# Patient Record
Sex: FEMALE | Race: WHITE | NOT HISPANIC OR LATINO | Employment: PART TIME | URBAN - METROPOLITAN AREA
[De-identification: names, ages, dates, MRNs, and addresses within clinical notes are randomized per-mention and may not be internally consistent; named-entity substitution may affect disease eponyms.]

---

## 2017-03-02 ENCOUNTER — DOCUMENTATION ONLY (OUTPATIENT)
Dept: FAMILY MEDICINE | Facility: CLINIC | Age: 67
End: 2017-03-02

## 2017-03-02 ENCOUNTER — TELEPHONE (OUTPATIENT)
Dept: FAMILY MEDICINE | Facility: CLINIC | Age: 67
End: 2017-03-02

## 2017-03-02 NOTE — PROGRESS NOTES
Pre-Visit Chart Review  For Appointment Scheduled on 3-8-17    Health Maintenance Due   Topic Date Due    Eye Exam  12/23/1962    TETANUS VACCINE  12/23/1970    Zoster Vaccine  12/23/2012    Influenza Vaccine  08/01/2016    Hemoglobin A1c  12/22/2016    Foot Exam  03/24/2017

## 2017-03-03 ENCOUNTER — OFFICE VISIT (OUTPATIENT)
Dept: FAMILY MEDICINE | Facility: CLINIC | Age: 67
End: 2017-03-03
Payer: COMMERCIAL

## 2017-03-03 VITALS
WEIGHT: 197.31 LBS | HEART RATE: 80 BPM | TEMPERATURE: 98 F | SYSTOLIC BLOOD PRESSURE: 137 MMHG | BODY MASS INDEX: 32.87 KG/M2 | HEIGHT: 65 IN | DIASTOLIC BLOOD PRESSURE: 87 MMHG

## 2017-03-03 DIAGNOSIS — S82.891D ANKLE FRACTURE, RIGHT, CLOSED, WITH ROUTINE HEALING, SUBSEQUENT ENCOUNTER: Primary | ICD-10-CM

## 2017-03-03 DIAGNOSIS — S13.4XXD WHIPLASH, SUBSEQUENT ENCOUNTER: ICD-10-CM

## 2017-03-03 PROCEDURE — 1160F RVW MEDS BY RX/DR IN RCRD: CPT | Mod: S$GLB,,, | Performed by: NURSE PRACTITIONER

## 2017-03-03 PROCEDURE — 99213 OFFICE O/P EST LOW 20 MIN: CPT | Mod: S$GLB,,, | Performed by: NURSE PRACTITIONER

## 2017-03-03 PROCEDURE — 99999 PR PBB SHADOW E&M-EST. PATIENT-LVL III: CPT | Mod: PBBFAC,,, | Performed by: NURSE PRACTITIONER

## 2017-03-03 RX ORDER — CYCLOBENZAPRINE HCL 10 MG
10 TABLET ORAL 3 TIMES DAILY PRN
Qty: 90 TABLET | Refills: 0 | Status: SHIPPED | OUTPATIENT
Start: 2017-03-03 | End: 2017-03-08 | Stop reason: SDUPTHER
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
## 2017-03-08 ENCOUNTER — OFFICE VISIT (OUTPATIENT)
Dept: FAMILY MEDICINE | Facility: CLINIC | Age: 67
End: 2017-03-08
Payer: COMMERCIAL

## 2017-03-08 ENCOUNTER — LAB VISIT (OUTPATIENT)
Dept: LAB | Facility: HOSPITAL | Age: 67
End: 2017-03-08
Attending: FAMILY MEDICINE
Payer: COMMERCIAL

## 2017-03-08 VITALS
HEIGHT: 65 IN | DIASTOLIC BLOOD PRESSURE: 80 MMHG | TEMPERATURE: 98 F | OXYGEN SATURATION: 94 % | HEART RATE: 94 BPM | SYSTOLIC BLOOD PRESSURE: 132 MMHG | WEIGHT: 206.38 LBS | BODY MASS INDEX: 34.38 KG/M2 | RESPIRATION RATE: 12 BRPM

## 2017-03-08 DIAGNOSIS — K29.70 GASTRITIS, PRESENCE OF BLEEDING UNSPECIFIED, UNSPECIFIED CHRONICITY, UNSPECIFIED GASTRITIS TYPE: ICD-10-CM

## 2017-03-08 DIAGNOSIS — E11.9 CONTROLLED TYPE 2 DIABETES MELLITUS WITHOUT COMPLICATION, UNSPECIFIED LONG TERM INSULIN USE STATUS: ICD-10-CM

## 2017-03-08 DIAGNOSIS — R10.13 EPIGASTRIC PAIN: Primary | ICD-10-CM

## 2017-03-08 DIAGNOSIS — R10.13 EPIGASTRIC PAIN: ICD-10-CM

## 2017-03-08 DIAGNOSIS — S82.891D ANKLE FRACTURE, RIGHT, CLOSED, WITH ROUTINE HEALING, SUBSEQUENT ENCOUNTER: ICD-10-CM

## 2017-03-08 LAB
BASOPHILS # BLD AUTO: 0.03 K/UL
BASOPHILS NFR BLD: 0.3 %
DIFFERENTIAL METHOD: ABNORMAL
EOSINOPHIL # BLD AUTO: 0.2 K/UL
EOSINOPHIL NFR BLD: 1.9 %
ERYTHROCYTE [DISTWIDTH] IN BLOOD BY AUTOMATED COUNT: 13.6 %
HCT VFR BLD AUTO: 41.3 %
HGB BLD-MCNC: 13.1 G/DL
LYMPHOCYTES # BLD AUTO: 2.5 K/UL
LYMPHOCYTES NFR BLD: 28.1 %
MCH RBC QN AUTO: 27.7 PG
MCHC RBC AUTO-ENTMCNC: 31.7 %
MCV RBC AUTO: 87 FL
MONOCYTES # BLD AUTO: 0.6 K/UL
MONOCYTES NFR BLD: 6.4 %
NEUTROPHILS # BLD AUTO: 5.6 K/UL
NEUTROPHILS NFR BLD: 62.9 %
PLATELET # BLD AUTO: 244 K/UL
PMV BLD AUTO: 10.9 FL
RBC # BLD AUTO: 4.73 M/UL
WBC # BLD AUTO: 8.9 K/UL

## 2017-03-08 PROCEDURE — 2022F DILAT RTA XM EVC RTNOPTHY: CPT | Mod: S$GLB,,, | Performed by: FAMILY MEDICINE

## 2017-03-08 PROCEDURE — 83036 HEMOGLOBIN GLYCOSYLATED A1C: CPT

## 2017-03-08 PROCEDURE — 99214 OFFICE O/P EST MOD 30 MIN: CPT | Mod: S$GLB,,, | Performed by: FAMILY MEDICINE

## 2017-03-08 PROCEDURE — 3060F POS MICROALBUMINURIA REV: CPT | Mod: S$GLB,,, | Performed by: FAMILY MEDICINE

## 2017-03-08 PROCEDURE — 1160F RVW MEDS BY RX/DR IN RCRD: CPT | Mod: S$GLB,,, | Performed by: FAMILY MEDICINE

## 2017-03-08 PROCEDURE — 85025 COMPLETE CBC W/AUTO DIFF WBC: CPT

## 2017-03-08 PROCEDURE — 99999 PR PBB SHADOW E&M-EST. PATIENT-LVL III: CPT | Mod: PBBFAC,,, | Performed by: FAMILY MEDICINE

## 2017-03-08 PROCEDURE — 36415 COLL VENOUS BLD VENIPUNCTURE: CPT | Mod: PO

## 2017-03-08 PROCEDURE — 3044F HG A1C LEVEL LT 7.0%: CPT | Mod: S$GLB,,, | Performed by: FAMILY MEDICINE

## 2017-03-08 RX ORDER — METFORMIN HYDROCHLORIDE 500 MG/1
500 TABLET, EXTENDED RELEASE ORAL
COMMUNITY
Start: 2017-02-10 | End: 2017-03-10 | Stop reason: SDUPTHER

## 2017-03-08 RX ORDER — CYCLOBENZAPRINE HCL 10 MG
10 TABLET ORAL 3 TIMES DAILY PRN
Qty: 90 TABLET | Refills: 0 | Status: SHIPPED | OUTPATIENT
Start: 2017-03-08 | End: 2017-06-01 | Stop reason: SDUPTHER

## 2017-03-08 RX ORDER — ESOMEPRAZOLE MAGNESIUM 40 MG/1
40 CAPSULE, DELAYED RELEASE ORAL
Qty: 30 CAPSULE | Refills: 1 | Status: SHIPPED | OUTPATIENT
Start: 2017-03-08 | End: 2017-06-23 | Stop reason: CLARIF

## 2017-03-08 RX ORDER — NAPROXEN 500 MG/1
500 TABLET ORAL 2 TIMES DAILY PRN
COMMUNITY
Start: 2017-02-23 | End: 2017-03-31
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
## 2017-03-08 NOTE — PATIENT INSTRUCTIONS
Weight Management: Getting Started  Healthy bodies come in all shapes and sizes. Not all bodies are made to be thin. For some people, a healthy weight is higher than the average weight listed on weight charts. Your healthcare provider can help you decide on a healthy weight for you.    Reasons to lose weight  Losing weight can help with some health problems, such as high blood pressure, heart disease, diabetes, sleep apnea, and arthritis. You may also feel more energy.  Set your long-term goal  Your goal doesn't even have to be a specific weight. You may decide on a fitness goal (such as being able to walk 10 miles a week), or a health goal (such as lowering your blood pressure). Choose a goal that is measurable and reasonable, so you know when you've reached it. A goal of reaching a BMI of less than 25 is not always reasonable (or possible).   Make an action plan  Habits dont change overnight. Setting your goals too high can leave you feeling discouraged if you cant reach them. Be realistic. Choose one or two small changes you can make now. Set an action plan for how you are going to make these changes. When you can stick to this plan, keep making a few more small changes. Taking small steps will help you stay on the path to success.  Track your progress  Write down your goals. Then, keep a daily record of your progress. Write down what you eat and how active you are. This record lets you look back on how much youve done. It may also help when youre feeling frustrated. Reward yourself for success. Even if you dont reach every goal, give yourself credit for what you do get done.  Get support  Encouragement from others can help make losing weight easier. Ask your family members and friends for support. They may even want to join you. Also look to your healthcare provider, registered dietitian, and  for help. Your local hospital can give you more information about nutrition, exercise, and  weight loss.  Date Last Reviewed: 1/31/2016  © 4910-6024 [employer] [address] [place] [address], [place] [address]. All rights reserved. This information is not intended as a substitute for professional medical care. Always follow your healthcare professional's instructions.

## 2017-03-08 NOTE — MR AVS SNAPSHOT
[facility]  [address]  [place] [address]  Phone: [phone]  Fax: [phone]                  Doris Pastrana   3/8/2017 2:00 PM   Office Visit    Description:  Female : 1950   Provider:  Tor Garg MD   Department:  [facility]           Reason for Visit     Abdominal Pain           Diagnoses this Visit        Comments    Epigastric pain    -  Primary     Gastritis, presence of bleeding unspecified, unspecified chronicity, unspecified gastritis type         Controlled type 2 diabetes mellitus without complication, unspecified long term insulin use status         Ankle fracture, right, closed, with routine healing, subsequent encounter                To Do List           Future Appointments        Provider Department Dept Phone    3/8/2017 3:15 PM LAB, TEREZATIMUR SAT [facility] [phone]    3/8/2017 3:30 PM LAB, [place] [facility] [phone]    3/16/2017 9:00 AM Andrzej Mendez MD [facility] [phone]      Goals (5 Years of Data)     None       These Medications        Disp Refills Start End    esomeprazole (NEXIUM) 40 MG capsule 30 capsule 1 3/8/2017 3/8/2018    Take 1 capsule (40 mg total) by mouth before breakfast. - Oral    Pharmacy: [id number] - [place] - [address] Ph #: [phone]       cyclobenzaprine (FLEXERIL) 10 MG tablet 90 tablet 0 3/8/2017     Take 1 tablet (10 mg total) by mouth 3 (three) times daily as needed for Muscle spasms. - Oral    Pharmacy: [id number] - [place] - [address] Ph #: [phone]         [facility] On [facility]     [facility] On Call Nurse Care Line -  Assistance  Registered nurses in the [facility] provide clinical advisement, health education, appointment booking, and other advisory services.  Call for this free service at [phone].             Medications           Message regarding Medications     Verify the  changes and/or additions to your medication regime listed below are the same as discussed with your clinician today.  If any of these changes or additions are incorrect, please notify your healthcare provider.        START taking these NEW medications        Refills    esomeprazole (NEXIUM) 40 MG capsule 1    Sig: Take 1 capsule (40 mg total) by mouth before breakfast.    Class: Normal    Route: Oral      STOP taking these medications     indomethacin (INDOCIN) 50 MG capsule Take 1 capsule (50 mg total) by mouth 3 (three) times daily with meals.           Verify that the below list of medications is an accurate representation of the medications you are currently taking.  If none reported, the list may be blank. If incorrect, please contact your healthcare provider. Carry this list with you in case of emergency.           Current Medications     alprazolam (XANAX) 0.25 MG tablet Use one 20-30 minutes before flying    cyclobenzaprine (FLEXERIL) 10 MG tablet Take 1 tablet (10 mg total) by mouth 3 (three) times daily as needed for Muscle spasms.    hydrocodone-acetaminophen 5-325mg (NORCO) 5-325 mg per tablet Take 1 tablet by mouth every 6 (six) hours as needed for Pain.    Lactobacillus rhamnosus GG (CULTURELLE) 10 billion cell capsule Take 1 capsule by mouth once daily.    meclizine (ANTIVERT) 25 mg tablet Take 1 tablet (25 mg total) by mouth 3 (three) times daily as needed.    metformin (GLUCOPHAGE-XR) 500 MG 24 hr tablet Take 500 mg by mouth daily with breakfast.    topiramate 25 mg capsule Take 1 capsule (25 mg total) by mouth once daily.    blood sugar diagnostic Strp 1 strip by Misc.(Non-Drug; Combo Route) route Daily.    esomeprazole (NEXIUM) 40 MG capsule Take 1 capsule (40 mg total) by mouth before breakfast.    lancets 33 gauge Misc 1 lancet by Misc.(Non-Drug; Combo Route) route Daily.    naproxen (NAPROSYN) 500 MG tablet Take 500 mg by mouth 2 (two) times daily as needed.           Clinical Reference  "Information           Your Vitals Were     BP Pulse Temp Resp Height Weight    132/80 (BP Location: Right arm, Patient Position: Sitting, BP Method: Automatic) 94 98.4 °F (36.9 °C) (Oral) 12 5' 5" (1.651 m) 93.6 kg (206 lb 5.6 oz)    SpO2 BMI             94% 34.34 kg/m2         Blood Pressure          Most Recent Value    BP  132/80      Allergies as of 3/8/2017     Pravastatin    Codeine    Iodine      Immunizations Administered on Date of Encounter - 3/8/2017     None      Orders Placed During Today's Visit     Future Labs/Procedures Expected by Expires    CBC auto differential  3/8/2017 3/9/2018    Hemoglobin A1c  3/8/2017 5/7/2018    Microalbumin/creatinine urine ratio  3/8/2017 3/8/2018      Instructions      Weight Management: Getting Started  Healthy bodies come in all shapes and sizes. Not all bodies are made to be thin. For some people, a healthy weight is higher than the average weight listed on weight charts. Your healthcare provider can help you decide on a healthy weight for you.    Reasons to lose weight  Losing weight can help with some health problems, such as high blood pressure, heart disease, diabetes, sleep apnea, and arthritis. You may also feel more energy.  Set your long-term goal  Your goal doesn't even have to be a specific weight. You may decide on a fitness goal (such as being able to walk 10 miles a week), or a health goal (such as lowering your blood pressure). Choose a goal that is measurable and reasonable, so you know when you've reached it. A goal of reaching a BMI of less than 25 is not always reasonable (or possible).   Make an action plan  Habits dont change overnight. Setting your goals too high can leave you feeling discouraged if you cant reach them. Be realistic. Choose one or two small changes you can make now. Set an action plan for how you are going to make these changes. When you can stick to this plan, keep making a few more small changes. Taking small steps will help " you stay on the path to success.  Track your progress  Write down your goals. Then, keep a daily record of your progress. Write down what you eat and how active you are. This record lets you look back on how much youve done. It may also help when youre feeling frustrated. Reward yourself for success. Even if you dont reach every goal, give yourself credit for what you do get done.  Get support  Encouragement from others can help make losing weight easier. Ask your family members and friends for support. They may even want to join you. Also look to your healthcare provider, registered dietitian, and  for help. Your local hospital can give you more information about nutrition, exercise, and weight loss.  Date Last Reviewed: 1/31/2016  © 8199-8259 [employer]. [address] [place] [address], [place] [address]. All rights reserved. This information is not intended as a substitute for professional medical care. Always follow your healthcare professional's instructions.             Language Assistance Services     ATTENTION: Language assistance services are available, free of charge. Please call [phone].      ATENCIÓN: Si habla barrett, tiene a rowe disposición servicios gratuitos de asistencia lingüística. Llame al [phone].     SEBASTIÁN Ý: N?u b?n nói Ti?ng Vi?t, có các d?ch v? h? tr? ngôn ng? mi?n phí dành cho b?n. G?i s? [phone].         [facility] complies with applicable Federal civil rights laws and does not discriminate on the basis of race, color, national origin, age, disability, or sex.

## 2017-03-09 ENCOUNTER — PATIENT MESSAGE (OUTPATIENT)
Dept: FAMILY MEDICINE | Facility: CLINIC | Age: 67
End: 2017-03-09

## 2017-03-09 LAB
ESTIMATED AVG GLUCOSE: 177 MG/DL
HBA1C MFR BLD HPLC: 7.8 %

## 2017-03-10 ENCOUNTER — TELEPHONE (OUTPATIENT)
Dept: FAMILY MEDICINE | Facility: CLINIC | Age: 67
End: 2017-03-10

## 2017-03-10 RX ORDER — METFORMIN HYDROCHLORIDE 500 MG/1
TABLET, EXTENDED RELEASE ORAL
Qty: 120 TABLET | Refills: 5 | Status: SHIPPED | OUTPATIENT
Start: 2017-03-10 | End: 2017-05-25 | Stop reason: SDUPTHER

## 2017-03-10 NOTE — TELEPHONE ENCOUNTER
----- Message from Stefany So sent at 3/10/2017  1:41 PM CST -----  Contact: self  Placed a call to the pod, missed a call from your office please call back at [phone] (tgjl)

## 2017-03-10 NOTE — TELEPHONE ENCOUNTER
----- Message from Stefany So sent at 3/10/2017  1:41 PM CST -----  Contact: self  Placed a call to the pod, missed a call from your office please call back at [phone] (arxh)

## 2017-03-10 NOTE — TELEPHONE ENCOUNTER
----- Message from Tor Garg MD sent at 3/9/2017  7:17 PM CST -----  The blood sugar control is poor.  I would suggest we increase the metformin to the maximum going to two a day for a week, then three a day for a week (two am and one pm) and then two twice a day and repeat the a1c in three months.    Results sent by email

## 2017-03-10 NOTE — TELEPHONE ENCOUNTER
Patient given lab results and instructions on how to increase Metformin-she is going to need more Metformin-send to [facility].

## 2017-03-15 DIAGNOSIS — M25.571 RIGHT ANKLE PAIN, UNSPECIFIED CHRONICITY: Primary | ICD-10-CM

## 2017-03-16 ENCOUNTER — TELEPHONE (OUTPATIENT)
Dept: FAMILY MEDICINE | Facility: CLINIC | Age: 67
End: 2017-03-16

## 2017-03-16 NOTE — TELEPHONE ENCOUNTER
Fax from pharmacy for prior auth on Nexium generic- called [employer]- was told no PPI's are covered on her insurance since they are available otc. Would Ranitidine be an alternative for her? Don't see that she has tried this in epic or ocw.

## 2017-03-17 ENCOUNTER — TELEPHONE (OUTPATIENT)
Dept: FAMILY MEDICINE | Facility: CLINIC | Age: 67
End: 2017-03-17

## 2017-03-17 NOTE — TELEPHONE ENCOUNTER
Ranitidine would be okay, its weaker and also available otc so they may not cover it either.  This is not being used for gerd but for gastritis/possible ulcer for 1-2 months.

## 2017-03-17 NOTE — TELEPHONE ENCOUNTER
----- Message from Ekaterina Comer sent at 3/16/2017  1:00 PM CDT -----  Ayleen calling back from pharmacy/stated prior authorization not usable for medication: Esomeprazole/please call back at [phone].

## 2017-03-23 ENCOUNTER — TELEPHONE (OUTPATIENT)
Dept: FAMILY MEDICINE | Facility: CLINIC | Age: 67
End: 2017-03-23

## 2017-03-23 ENCOUNTER — HOSPITAL ENCOUNTER (OUTPATIENT)
Dept: RADIOLOGY | Facility: HOSPITAL | Age: 67
Discharge: HOME | End: 2017-03-23
Attending: ORTHOPAEDIC SURGERY
Payer: COMMERCIAL

## 2017-03-23 ENCOUNTER — OFFICE VISIT (OUTPATIENT)
Dept: ORTHOPEDICS | Facility: CLINIC | Age: 67
End: 2017-03-23
Payer: COMMERCIAL

## 2017-03-23 VITALS
DIASTOLIC BLOOD PRESSURE: 78 MMHG | HEART RATE: 89 BPM | HEIGHT: 65 IN | BODY MASS INDEX: 34.32 KG/M2 | WEIGHT: 206 LBS | SYSTOLIC BLOOD PRESSURE: 160 MMHG

## 2017-03-23 DIAGNOSIS — M25.571 RIGHT ANKLE PAIN, UNSPECIFIED CHRONICITY: ICD-10-CM

## 2017-03-23 DIAGNOSIS — S93.431A SPRAIN OF TIBIOFIBULAR LIGAMENT OF RIGHT ANKLE, INITIAL ENCOUNTER: ICD-10-CM

## 2017-03-23 PROCEDURE — 99999 PR PBB SHADOW E&M-EST. PATIENT-LVL III: CPT | Mod: PBBFAC,,, | Performed by: ORTHOPAEDIC SURGERY

## 2017-03-23 PROCEDURE — 73610 X-RAY EXAM OF ANKLE: CPT | Mod: 26,RT,, | Performed by: RADIOLOGY

## 2017-03-23 PROCEDURE — 99243 OFF/OP CNSLTJ NEW/EST LOW 30: CPT | Mod: S$GLB,,, | Performed by: ORTHOPAEDIC SURGERY

## 2017-03-23 PROCEDURE — 73610 X-RAY EXAM OF ANKLE: CPT | Mod: TC,PN,RT

## 2017-03-23 NOTE — MR AVS SNAPSHOT
[facility]  [address] [place] [address] Drive  [place] [address]  Phone: [phone]                  Doris Pastrana   3/23/2017 1:30 PM   Office Visit    Description:  Female : 1950   Provider:  Andrzej Mendez MD   Department:  [facility]           Reason for Visit     Right Ankle - Injury                To Do List           Future Appointments        Provider Department Dept Phone    2017 8:00 AM [place] [facility] [phone]      Goals (5 Years of Data)     None      [facility]     [facility] Nurse Care Line -  Assistance  Registered nurses in the [facility] On Call Center provide clinical advisement, health education, appointment booking, and other advisory services.  Call for this free service at [phone].             Medications           Message regarding Medications     Verify the changes and/or additions to your medication regime listed below are the same as discussed with your clinician today.  If any of these changes or additions are incorrect, please notify your healthcare provider.             Verify that the below list of medications is an accurate representation of the medications you are currently taking.  If none reported, the list may be blank. If incorrect, please contact your healthcare provider. Carry this list with you in case of emergency.           Current Medications     alprazolam (XANAX) 0.25 MG tablet Use one 20-30 minutes before flying    blood sugar diagnostic Strp 1 strip by Misc.(Non-Drug; Combo Route) route Daily.    cyclobenzaprine (FLEXERIL) 10 MG tablet Take 1 tablet (10 mg total) by mouth 3 (three) times daily as needed for Muscle spasms.    esomeprazole (NEXIUM) 40 MG capsule Take 1 capsule (40 mg total) by mouth before breakfast.    hydrocodone-acetaminophen 5-325mg (NORCO) 5-325 mg per tablet Take 1 tablet by mouth every 6 (six) hours as needed for Pain.    Lactobacillus rhamnosus GG  "(CULTURELLE) 10 billion cell capsule Take 1 capsule by mouth once daily.    lancets 33 gauge Misc 1 lancet by Misc.(Non-Drug; Combo Route) route Daily.    meclizine (ANTIVERT) 25 mg tablet Take 1 tablet (25 mg total) by mouth 3 (three) times daily as needed.    metformin (GLUCOPHAGE-XR) 500 MG 24 hr tablet Take 2 a day for 1 week, the 2 AM and 1PM for 1 week, then 2 bid thereafter    naproxen (NAPROSYN) 500 MG tablet Take 500 mg by mouth 2 (two) times daily as needed.    topiramate 25 mg capsule Take 1 capsule (25 mg total) by mouth once daily.           Clinical Reference Information           Your Vitals Were     BP Pulse Height Weight BMI    160/78 89 5' 5" (1.651 m) 93.4 kg (206 lb) 34.28 kg/m2      Blood Pressure          Most Recent Value    BP  (!)  160/78      Allergies as of 3/23/2017     Pravastatin    Codeine    Iodine      Immunizations Administered on Date of Encounter - 3/23/2017     None      Language Assistance Services     ATTENTION: Language assistance services are available, free of charge. Please call [phone].      ATENCIÓN: Si habla español, tiene a rowe disposición servicios gratuitos de asistencia lingüística. Llame al [phone].     SEBASTIÁN Ý: N?u b?n nói Ti?ng Vi?t, có các d?ch v? h? tr? ngôn ng? mi?n phí dành cho b?n. G?i s? [phone].         [facility] complies with applicable Federal civil rights laws and does not discriminate on the basis of race, color, national origin, age, disability, or sex.        "

## 2017-03-23 NOTE — TELEPHONE ENCOUNTER
----- Message from Ekaterina Comer sent at 3/21/2017 12:09 PM CDT -----  Leyla Brown calling concerning medical record request received on patient/please call back at [phone].

## 2017-03-31 PROBLEM — S93.431A SPRAIN OF TIBIOFIBULAR LIGAMENT OF RIGHT ANKLE: Status: ACTIVE | Noted: 2017-03-31

## 2017-03-31 RX ORDER — CELECOXIB 200 MG/1
200 CAPSULE ORAL 2 TIMES DAILY
Qty: 60 CAPSULE | Refills: 3 | Status: SHIPPED | OUTPATIENT
Start: 2017-03-31 | End: 2017-06-23 | Stop reason: SDUPTHER
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
## 2017-04-04 ENCOUNTER — TELEPHONE (OUTPATIENT)
Dept: ORTHOPEDICS | Facility: CLINIC | Age: 67
End: 2017-04-04

## 2017-04-04 NOTE — TELEPHONE ENCOUNTER
----- Message from Valeriano Espitia sent at 3/31/2017  4:30 PM CDT -----  Contact: self   Patient wants to know when can she  another boot the one she got is too small, her foot is swollen please call back at [phone]

## 2017-04-04 NOTE — TELEPHONE ENCOUNTER
Left voicemail for patient to come to clinic on Thursday and we will have our DME fitter re fit her boot.

## 2017-04-05 ENCOUNTER — TELEPHONE (OUTPATIENT)
Dept: ORTHOPEDICS | Facility: CLINIC | Age: 67
End: 2017-04-05

## 2017-04-05 NOTE — TELEPHONE ENCOUNTER
Spoke with pt, informed pt PT orders have been faxed to  office in [place] per pt request.Pt verbalized understanding.

## 2017-04-27 ENCOUNTER — OFFICE VISIT (OUTPATIENT)
Dept: ORTHOPEDICS | Facility: CLINIC | Age: 67
End: 2017-04-27
Payer: COMMERCIAL

## 2017-04-27 VITALS
WEIGHT: 206 LBS | HEIGHT: 65 IN | HEART RATE: 79 BPM | DIASTOLIC BLOOD PRESSURE: 89 MMHG | SYSTOLIC BLOOD PRESSURE: 158 MMHG | BODY MASS INDEX: 34.32 KG/M2

## 2017-04-27 DIAGNOSIS — S93.431D SPRAIN OF TIBIOFIBULAR LIGAMENT OF RIGHT ANKLE, SUBSEQUENT ENCOUNTER: Primary | ICD-10-CM

## 2017-04-27 PROCEDURE — 99213 OFFICE O/P EST LOW 20 MIN: CPT | Mod: S$GLB,,, | Performed by: ORTHOPAEDIC SURGERY

## 2017-04-27 PROCEDURE — 99999 PR PBB SHADOW E&M-EST. PATIENT-LVL II: CPT | Mod: PBBFAC,,, | Performed by: ORTHOPAEDIC SURGERY

## 2017-04-27 PROCEDURE — 1160F RVW MEDS BY RX/DR IN RCRD: CPT | Mod: S$GLB,,, | Performed by: ORTHOPAEDIC SURGERY
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
## 2017-05-25 ENCOUNTER — LAB VISIT (OUTPATIENT)
Dept: LAB | Facility: HOSPITAL | Age: 67
End: 2017-05-25
Attending: FAMILY MEDICINE
Payer: COMMERCIAL

## 2017-05-25 ENCOUNTER — OFFICE VISIT (OUTPATIENT)
Dept: FAMILY MEDICINE | Facility: CLINIC | Age: 67
End: 2017-05-25
Payer: COMMERCIAL

## 2017-05-25 ENCOUNTER — PATIENT MESSAGE (OUTPATIENT)
Dept: FAMILY MEDICINE | Facility: CLINIC | Age: 67
End: 2017-05-25

## 2017-05-25 ENCOUNTER — NURSE TRIAGE (OUTPATIENT)
Dept: ADMINISTRATIVE | Facility: CLINIC | Age: 67
End: 2017-05-25

## 2017-05-25 ENCOUNTER — DOCUMENTATION ONLY (OUTPATIENT)
Dept: FAMILY MEDICINE | Facility: CLINIC | Age: 67
End: 2017-05-25

## 2017-05-25 VITALS
SYSTOLIC BLOOD PRESSURE: 147 MMHG | HEIGHT: 65 IN | DIASTOLIC BLOOD PRESSURE: 85 MMHG | TEMPERATURE: 98 F | HEART RATE: 77 BPM | BODY MASS INDEX: 33.38 KG/M2 | WEIGHT: 200.38 LBS

## 2017-05-25 DIAGNOSIS — R53.83 FATIGUE, UNSPECIFIED TYPE: ICD-10-CM

## 2017-05-25 DIAGNOSIS — R07.9 CHEST PAIN, UNSPECIFIED TYPE: ICD-10-CM

## 2017-05-25 DIAGNOSIS — R55 NEAR SYNCOPE: ICD-10-CM

## 2017-05-25 DIAGNOSIS — R53.83 FATIGUE, UNSPECIFIED TYPE: Primary | ICD-10-CM

## 2017-05-25 LAB
ANION GAP SERPL CALC-SCNC: 9 MMOL/L
BASOPHILS # BLD AUTO: 0.03 K/UL
BASOPHILS NFR BLD: 0.3 %
BUN SERPL-MCNC: 19 MG/DL
CALCIUM SERPL-MCNC: 9.8 MG/DL
CHLORIDE SERPL-SCNC: 103 MMOL/L
CO2 SERPL-SCNC: 30 MMOL/L
CREAT SERPL-MCNC: 0.8 MG/DL
DIFFERENTIAL METHOD: NORMAL
EOSINOPHIL # BLD AUTO: 0.2 K/UL
EOSINOPHIL NFR BLD: 2 %
ERYTHROCYTE [DISTWIDTH] IN BLOOD BY AUTOMATED COUNT: 14.2 %
EST. GFR  (AFRICAN AMERICAN): >60 ML/MIN/1.73 M^2
EST. GFR  (NON AFRICAN AMERICAN): >60 ML/MIN/1.73 M^2
GLUCOSE SERPL-MCNC: 108 MG/DL
HCT VFR BLD AUTO: 40.9 %
HGB BLD-MCNC: 13.4 G/DL
LYMPHOCYTES # BLD AUTO: 2 K/UL
LYMPHOCYTES NFR BLD: 22 %
MCH RBC QN AUTO: 28.6 PG
MCHC RBC AUTO-ENTMCNC: 32.8 %
MCV RBC AUTO: 87 FL
MONOCYTES # BLD AUTO: 0.5 K/UL
MONOCYTES NFR BLD: 5.6 %
NEUTROPHILS # BLD AUTO: 6.3 K/UL
NEUTROPHILS NFR BLD: 69.9 %
PLATELET # BLD AUTO: 257 K/UL
PMV BLD AUTO: 10.5 FL
POTASSIUM SERPL-SCNC: 4.1 MMOL/L
RBC # BLD AUTO: 4.69 M/UL
SODIUM SERPL-SCNC: 142 MMOL/L
TSH SERPL DL<=0.005 MIU/L-ACNC: 1.6 UIU/ML
WBC # BLD AUTO: 8.95 K/UL

## 2017-05-25 PROCEDURE — 85025 COMPLETE CBC W/AUTO DIFF WBC: CPT

## 2017-05-25 PROCEDURE — 93010 ELECTROCARDIOGRAM REPORT: CPT | Mod: S$GLB,,, | Performed by: INTERNAL MEDICINE

## 2017-05-25 PROCEDURE — 93005 ELECTROCARDIOGRAM TRACING: CPT | Mod: S$GLB,,, | Performed by: FAMILY MEDICINE

## 2017-05-25 PROCEDURE — 83036 HEMOGLOBIN GLYCOSYLATED A1C: CPT

## 2017-05-25 PROCEDURE — 84443 ASSAY THYROID STIM HORMONE: CPT

## 2017-05-25 PROCEDURE — 99214 OFFICE O/P EST MOD 30 MIN: CPT | Mod: S$GLB,,, | Performed by: PHYSICIAN ASSISTANT

## 2017-05-25 PROCEDURE — 99999 PR PBB SHADOW E&M-EST. PATIENT-LVL III: CPT | Mod: PBBFAC,,, | Performed by: PHYSICIAN ASSISTANT

## 2017-05-25 PROCEDURE — 80048 BASIC METABOLIC PNL TOTAL CA: CPT

## 2017-05-25 PROCEDURE — 36415 COLL VENOUS BLD VENIPUNCTURE: CPT | Mod: PO

## 2017-05-25 RX ORDER — METFORMIN HYDROCHLORIDE 500 MG/1
1000 TABLET, EXTENDED RELEASE ORAL 2 TIMES DAILY WITH MEALS
COMMUNITY
End: 2017-06-01 | Stop reason: SDUPTHER

## 2017-05-25 NOTE — TELEPHONE ENCOUNTER
Reason for Disposition   MODERATE weakness (i.e., interferes with work, school, normal activities) and cause unknown    Protocols used: ST WEAKNESS (GENERALIZED) AND FATIGUE-A-OH    Doris is calling to report that this AM she felt very weak and almost passed out.  States if her  would not have caught her she would have fallen.   is a diabetic and took her husbands Metformin HCL this am because it is the same strength as hers.  Does not monitor her blood sugar.   does not know how to use the monitor.   still feels strange and is concerned because she couldn't get an appointment until 3:40 pm today.  Advised that if she almost passed out and she still feels weak she should go to ER.   will not go to ER because it costs to much.  Will get her friend who is a nurse to come over and check her blood sugar for her.  Would like to see if she can get in to see Dr. Garg earlier than 3 pm.  Please contact Doris to advise.

## 2017-05-25 NOTE — PROGRESS NOTES
Subjective:       Patient ID: Doris Pastrana is a 66 y.o. female.    Chief Complaint: Loss of Consciousness; Hypertension; and Headache    Loss of Consciousness   This is a new problem. The current episode started today. The problem has been resolved. There was no loss of consciousness. The symptoms are aggravated by climbing stairs. Associated symptoms include chest pain, diaphoresis, dizziness, focal weakness, headaches, light-headedness and malaise/fatigue. Pertinent negatives include no abdominal pain, auditory change, aura, back pain, bladder incontinence, bowel incontinence, clumsiness, confusion, fever, focal sensory loss, nausea, palpitations, slurred speech, vertigo, visual change, vomiting or weakness. Associated symptoms comments: Blood sugar was 120 by the time the patient's friend got to the house.  BP was 160/70 about 1 hour after incident.  She does not take blood sugar readings daily and was recently increased to 1000 metformin BID. She has tried nothing for the symptoms. Her past medical history is significant for DM and HTN. There is no history of arrhythmia, CVA, seizures, TIA or vertigo.     Review of Systems   Constitutional: Positive for diaphoresis and malaise/fatigue. Negative for activity change, appetite change, fatigue, fever and unexpected weight change.   HENT: Negative for congestion, dental problem, hearing loss, postnasal drip, rhinorrhea, sinus pressure and trouble swallowing.    Eyes: Negative for photophobia, discharge and visual disturbance.   Respiratory: Negative for cough, chest tightness, shortness of breath and wheezing.    Cardiovascular: Positive for chest pain and syncope. Negative for palpitations and leg swelling.   Gastrointestinal: Negative for abdominal pain, blood in stool, bowel incontinence, constipation, diarrhea, nausea and vomiting.   Genitourinary: Negative for bladder incontinence, difficulty urinating, dyspareunia, dysuria, hematuria, menstrual problem,  pelvic pain, vaginal bleeding, vaginal discharge and vaginal pain.   Musculoskeletal: Negative for arthralgias, back pain, joint swelling and neck pain.   Skin: Negative for color change and rash.   Neurological: Positive for dizziness, focal weakness, light-headedness and headaches. Negative for vertigo, seizures, weakness and numbness.   Hematological: Does not bruise/bleed easily.   Psychiatric/Behavioral: Negative for confusion, sleep disturbance and suicidal ideas. The patient is not nervous/anxious.        Objective:      Physical Exam   Constitutional: She is oriented to person, place, and time. She appears well-developed and well-nourished. No distress.   HENT:   Head: Normocephalic and atraumatic.   Mouth/Throat: Uvula is midline, oropharynx is clear and moist and mucous membranes are normal. No oropharyngeal exudate, posterior oropharyngeal edema, posterior oropharyngeal erythema or tonsillar abscesses.   Eyes: Conjunctivae and EOM are normal. Pupils are equal, round, and reactive to light.   Neck: Normal range of motion. Neck supple. Carotid bruit is not present. No thyromegaly present.   Cardiovascular: Normal rate and regular rhythm.  Exam reveals no gallop and no friction rub.    No murmur heard.  Pulmonary/Chest: Effort normal and breath sounds normal. No stridor. No respiratory distress. She has no wheezes. She has no rales.   Abdominal: Soft. Bowel sounds are normal. She exhibits no mass. There is no tenderness.   Musculoskeletal: Normal range of motion. She exhibits no edema or tenderness.   Lymphadenopathy:     She has no cervical adenopathy.   Neurological: She is alert and oriented to person, place, and time. She has normal reflexes.   Skin: Skin is warm and dry.   Psychiatric: She has a normal mood and affect. Her behavior is normal. Judgment and thought content normal.   Nursing note and vitals reviewed.      Assessment:       1. Fatigue, unspecified type    2. Chest pain, unspecified type     3. Near syncope        Plan:       Doris was seen today for loss of consciousness, hypertension and headache.    Diagnoses and all orders for this visit:    Fatigue, unspecified type  -     CBC auto differential; Future  -     TSH; Future    Chest pain, unspecified type  -     EKG 12-lead    Near syncope    Move A1c to today, will call with results

## 2017-05-26 ENCOUNTER — TELEPHONE (OUTPATIENT)
Dept: FAMILY MEDICINE | Facility: CLINIC | Age: 67
End: 2017-05-26

## 2017-05-26 DIAGNOSIS — F41.9 ANXIETY: Primary | ICD-10-CM

## 2017-05-26 LAB
ESTIMATED AVG GLUCOSE: 160 MG/DL
HBA1C MFR BLD HPLC: 7.2 %

## 2017-05-26 RX ORDER — ESCITALOPRAM OXALATE 10 MG/1
10 TABLET ORAL DAILY
Qty: 30 TABLET | Refills: 0 | Status: SHIPPED | OUTPATIENT
Start: 2017-05-26 | End: 2017-06-23 | Stop reason: SDUPTHER

## 2017-05-26 NOTE — TELEPHONE ENCOUNTER
Spoke with patient she states that you told her you were going to prescribe Lexapro for her on yesterday.  [facility] to the [facility] [address]

## 2017-05-26 NOTE — TELEPHONE ENCOUNTER
----- Message from Mayi King sent at 5/26/2017 12:04 PM CDT -----  Contact: [phone]  Patient requesting a call from nurse checking the status of medication, pharmacy haven't received prescription that was to be sent over.  Patient contact # [phone]

## 2017-05-26 NOTE — TELEPHONE ENCOUNTER
----- Message from RT Rodrick sent at 5/26/2017  2:13 PM CDT -----  Contact: [place], [phone] [facility]  [place] [phone] [facility], requesting to inform the pt's Lexapro interferes with the pt's flexeril, thanks.

## 2017-05-27 ENCOUNTER — TELEPHONE (OUTPATIENT)
Dept: FAMILY MEDICINE | Facility: CLINIC | Age: 67
End: 2017-05-27

## 2017-05-27 DIAGNOSIS — R07.9 CHEST PAIN ON EXERTION: Primary | ICD-10-CM

## 2017-05-27 DIAGNOSIS — R94.31 ABNORMAL EKG: ICD-10-CM

## 2017-05-27 RX ORDER — GLIPIZIDE 5 MG/1
5 TABLET, FILM COATED, EXTENDED RELEASE ORAL
Qty: 90 TABLET | Refills: 1 | Status: SHIPPED | OUTPATIENT
Start: 2017-05-27 | End: 2017-06-01 | Stop reason: SDUPTHER

## 2017-05-27 NOTE — TELEPHONE ENCOUNTER
Glimepiride probably has a greater tendency to weight gain, glipizide is probably better and we'll try to keep to a low dose of the slow release.  She had a normal thyroid test two days ago.

## 2017-05-27 NOTE — TELEPHONE ENCOUNTER
----- Message from Tor Garg MD sent at 5/26/2017  9:20 PM CDT -----  The chemistry panel looks good but the a1c indicates the sugar control is still a little above goal of <7.  I'd suggest we add some glipizide or glimepiride (glucotrol or amaryl) to help bring it down.    Results sent by email

## 2017-05-27 NOTE — TELEPHONE ENCOUNTER
Patient informed. Please send in rx to [facility]. She stated if it would help her loose weight she will take it. Trying to stick to diet. No energy, almost passed out recently. Wanted thyroid testing. Has apt 6-5-17.

## 2017-05-29 NOTE — TELEPHONE ENCOUNTER
It was abnormal but not changed since the ekg of 2/23/16 after which she had a normal stress test with Dr. Loaiza.  We can get a cardiology consult to review need for further testing (nuclear stress test or angiogram) if she wishes.

## 2017-05-29 NOTE — TELEPHONE ENCOUNTER
Patient started the Glipizide. Patient is under a lot of stress with family- she can't lose weight, is always tired. When she tries to exercise she gets chest pain- she says she was told at appt 5/25/17 that her ekg was abnormal. Please review it. She is thinking about weight loss surgery and will discuss with Dr. Garg at appt 6/5/17.

## 2017-05-29 NOTE — TELEPHONE ENCOUNTER
----- Message from Mayi King sent at 5/27/2017 11:58 AM CDT -----  Contact: [phone]  Patient is returning nurse's phone call.  Please call patient back at [phone].

## 2017-05-29 NOTE — TELEPHONE ENCOUNTER
Patient advised- she wants to go ahead with cardio consult with the one her  see's Dr. Rashad Mir's.

## 2017-05-31 ENCOUNTER — DOCUMENTATION ONLY (OUTPATIENT)
Dept: FAMILY MEDICINE | Facility: CLINIC | Age: 67
End: 2017-05-31

## 2017-05-31 RX ORDER — INSULIN PUMP SYRINGE, 3 ML
EACH MISCELLANEOUS
Qty: 1 EACH | Refills: 0 | Status: SHIPPED | OUTPATIENT
Start: 2017-05-31 | End: 2019-03-12 | Stop reason: ALTCHOICE

## 2017-05-31 RX ORDER — LANCETS 33 GAUGE
2 EACH MISCELLANEOUS DAILY
Qty: 200 EACH | Refills: 3 | Status: SHIPPED | OUTPATIENT
Start: 2017-05-31 | End: 2018-07-25 | Stop reason: SDUPTHER

## 2017-05-31 NOTE — TELEPHONE ENCOUNTER
----- Message from Nai Mcdowell sent at 5/31/2017 10:15 AM CDT -----  Contact: self  Patient would like to speak to nurse regarding a prescription for new meter ,test strips and lancets    Please call her at  to advise.    Thanks

## 2017-05-31 NOTE — PROGRESS NOTES
Pre-Visit Chart Review  For Appointment Scheduled on 6-5-17    Health Maintenance Due   Topic Date Due    Zoster Vaccine  12/23/2012    Foot Exam  03/24/2017

## 2017-06-01 ENCOUNTER — TELEPHONE (OUTPATIENT)
Dept: FAMILY MEDICINE | Facility: CLINIC | Age: 67
End: 2017-06-01

## 2017-06-01 DIAGNOSIS — S82.891D ANKLE FRACTURE, RIGHT, CLOSED, WITH ROUTINE HEALING, SUBSEQUENT ENCOUNTER: ICD-10-CM

## 2017-06-01 RX ORDER — GLIPIZIDE 5 MG/1
5 TABLET, FILM COATED, EXTENDED RELEASE ORAL
Qty: 90 TABLET | Refills: 1 | Status: SHIPPED | OUTPATIENT
Start: 2017-06-01 | End: 2018-01-03 | Stop reason: SDUPTHER

## 2017-06-01 RX ORDER — CYCLOBENZAPRINE HCL 10 MG
10 TABLET ORAL 3 TIMES DAILY PRN
Qty: 90 TABLET | Refills: 0 | Status: SHIPPED | OUTPATIENT
Start: 2017-06-01 | End: 2017-10-18 | Stop reason: SDUPTHER

## 2017-06-01 RX ORDER — METFORMIN HYDROCHLORIDE 500 MG/1
1000 TABLET, EXTENDED RELEASE ORAL 2 TIMES DAILY WITH MEALS
Qty: 360 TABLET | Refills: 1 | Status: SHIPPED | OUTPATIENT
Start: 2017-06-01 | End: 2017-06-23 | Stop reason: SDUPTHER

## 2017-06-01 NOTE — TELEPHONE ENCOUNTER
Called the number in the message and it's the patient's cell number.  She states that she did not call regarding her medication.

## 2017-06-01 NOTE — TELEPHONE ENCOUNTER
----- Message from Kristina Luke sent at 6/1/2017 10:42 AM CDT -----  Adriana Byrd) called regarding refill for medication:  Escitalopram tablet 10 mg 90 day supply/pls call back at [phone]

## 2017-06-05 ENCOUNTER — DOCUMENTATION ONLY (OUTPATIENT)
Dept: FAMILY MEDICINE | Facility: CLINIC | Age: 67
End: 2017-06-05

## 2017-06-05 NOTE — PROGRESS NOTES
Pre-Visit Chart Review  For Appointment Scheduled on 6-23-17    Health Maintenance Due   Topic Date Due    Zoster Vaccine  12/23/2012    Foot Exam  03/24/2017

## 2017-06-17 ENCOUNTER — TELEPHONE (OUTPATIENT)
Dept: FAMILY MEDICINE | Facility: CLINIC | Age: 67
End: 2017-06-17

## 2017-06-17 ENCOUNTER — CLINICAL SUPPORT (OUTPATIENT)
Dept: FAMILY MEDICINE | Facility: CLINIC | Age: 67
End: 2017-06-17
Payer: COMMERCIAL

## 2017-06-17 VITALS — DIASTOLIC BLOOD PRESSURE: 88 MMHG | SYSTOLIC BLOOD PRESSURE: 156 MMHG

## 2017-06-17 DIAGNOSIS — I10 HYPERTENSION, ESSENTIAL: Primary | ICD-10-CM

## 2017-06-17 NOTE — TELEPHONE ENCOUNTER
----- Message from Maritza Garg sent at 6/17/2017 10:59 AM CDT -----  Patient's blood pressure is running high and she would like to come by to get her blood pressure checked.     Please call patient back at [phone].     Thank you.

## 2017-06-17 NOTE — PROGRESS NOTES
Patient here for a B/P nurse visit. She brought her cuff from home because she was not sure if she was using it corectly. I educated her on how to use it and she verbalized understanding. Her cuff read 165/93, taken on her right arm at 1135. I took it manually at 1145, from her left arm and the reading was 156/88. Will send note to Dr Garg.

## 2017-06-17 NOTE — TELEPHONE ENCOUNTER
Spoke with patient. She said she was not sure if she was using her B/P cuff correctly and wanted her b/p checked, she said she got a reading of 225/139 and also com[lained of a headache. Told patient to come up and bring her B/P cuff so I can make sure she is using it correctly.

## 2017-06-19 ENCOUNTER — TELEPHONE (OUTPATIENT)
Dept: ADMINISTRATIVE | Facility: HOSPITAL | Age: 67
End: 2017-06-19

## 2017-06-19 NOTE — TELEPHONE ENCOUNTER
For checking calibration you should use the same arm.  Looks like her cuff is accurate, left arm is lower than right in 85% of people.   I'd suggest starting on lisinopril or losartan.  (losartan if ever had a cough with an ace inhibitor in the past.)

## 2017-06-20 RX ORDER — LISINOPRIL 10 MG/1
10 TABLET ORAL DAILY
Qty: 90 TABLET | Refills: 3 | Status: SHIPPED | OUTPATIENT
Start: 2017-06-20 | End: 2017-08-03 | Stop reason: SDUPTHER

## 2017-06-23 ENCOUNTER — OFFICE VISIT (OUTPATIENT)
Dept: FAMILY MEDICINE | Facility: CLINIC | Age: 67
End: 2017-06-23
Payer: COMMERCIAL

## 2017-06-23 VITALS
WEIGHT: 194 LBS | BODY MASS INDEX: 32.32 KG/M2 | OXYGEN SATURATION: 93 % | TEMPERATURE: 99 F | DIASTOLIC BLOOD PRESSURE: 72 MMHG | SYSTOLIC BLOOD PRESSURE: 136 MMHG | HEART RATE: 88 BPM | HEIGHT: 65 IN | RESPIRATION RATE: 12 BRPM

## 2017-06-23 DIAGNOSIS — E11.59 HYPERTENSION ASSOCIATED WITH DIABETES: ICD-10-CM

## 2017-06-23 DIAGNOSIS — I10 GOOD HYPERTENSION CONTROL: ICD-10-CM

## 2017-06-23 DIAGNOSIS — M54.2 NECK PAIN: ICD-10-CM

## 2017-06-23 DIAGNOSIS — G89.29 CHRONIC LEFT-SIDED THORACIC BACK PAIN: Primary | ICD-10-CM

## 2017-06-23 DIAGNOSIS — M54.6 CHRONIC LEFT-SIDED THORACIC BACK PAIN: Primary | ICD-10-CM

## 2017-06-23 DIAGNOSIS — F41.9 ANXIETY: ICD-10-CM

## 2017-06-23 DIAGNOSIS — E11.9 CONTROLLED TYPE 2 DIABETES MELLITUS WITHOUT COMPLICATION, WITHOUT LONG-TERM CURRENT USE OF INSULIN: ICD-10-CM

## 2017-06-23 DIAGNOSIS — I15.2 HYPERTENSION ASSOCIATED WITH DIABETES: ICD-10-CM

## 2017-06-23 DIAGNOSIS — G44.229 CHRONIC TENSION-TYPE HEADACHE, NOT INTRACTABLE: ICD-10-CM

## 2017-06-23 DIAGNOSIS — M62.838 MUSCLE SPASM: ICD-10-CM

## 2017-06-23 PROCEDURE — 99214 OFFICE O/P EST MOD 30 MIN: CPT | Mod: S$GLB,,, | Performed by: FAMILY MEDICINE

## 2017-06-23 PROCEDURE — 4010F ACE/ARB THERAPY RXD/TAKEN: CPT | Mod: S$GLB,,, | Performed by: FAMILY MEDICINE

## 2017-06-23 PROCEDURE — 3045F PR MOST RECENT HEMOGLOBIN A1C LEVEL 7.0-9.0%: CPT | Mod: S$GLB,,, | Performed by: FAMILY MEDICINE

## 2017-06-23 PROCEDURE — 99999 PR PBB SHADOW E&M-EST. PATIENT-LVL III: CPT | Mod: PBBFAC,,, | Performed by: FAMILY MEDICINE

## 2017-06-23 RX ORDER — ESCITALOPRAM OXALATE 10 MG/1
TABLET ORAL
Qty: 30 TABLET | Refills: 0 | Status: SHIPPED | OUTPATIENT
Start: 2017-06-23 | End: 2017-06-23 | Stop reason: SDUPTHER

## 2017-06-23 RX ORDER — CELECOXIB 200 MG/1
200 CAPSULE ORAL 2 TIMES DAILY
Qty: 60 CAPSULE | Refills: 3 | Status: SHIPPED | OUTPATIENT
Start: 2017-06-23 | End: 2017-08-09 | Stop reason: HOSPADM

## 2017-06-23 RX ORDER — METFORMIN HYDROCHLORIDE 750 MG/1
750 TABLET, EXTENDED RELEASE ORAL 2 TIMES DAILY WITH MEALS
Qty: 180 TABLET | Refills: 1 | Status: SHIPPED | OUTPATIENT
Start: 2017-06-23 | End: 2018-01-03 | Stop reason: SDUPTHER

## 2017-06-23 RX ORDER — TIZANIDINE 4 MG/1
4 TABLET ORAL EVERY 6 HOURS PRN
Qty: 60 TABLET | Refills: 5 | Status: SHIPPED | OUTPATIENT
Start: 2017-06-23 | End: 2017-07-03

## 2017-06-23 RX ORDER — ESCITALOPRAM OXALATE 10 MG/1
10 TABLET ORAL DAILY
Qty: 30 TABLET | Refills: 11 | Status: SHIPPED | OUTPATIENT
Start: 2017-06-23 | End: 2017-08-03 | Stop reason: SDUPTHER

## 2017-06-23 NOTE — PATIENT INSTRUCTIONS
Walking for Fitness  Fitness walking has something for everyone, even people who are already fit. Walking is one of the safest ways to condition your body aerobically. It can boost energy, help you lose weight, and reduce stress.    Physical benefits  · Walking strengthens your heart and lungs, and tones your muscles.  · When walking, your feet land with less impact than in other sports. This reduces chances of muscle, bone, and joint injury.  · Regular walking improves your cholesterol levels and lowers your risk of heart disease. And it helps you control your blood sugar if you have diabetes.  · Walking is a weight-bearing activity, which helps maintain bone density. This can help prevent osteoporosis.  Personal rewards  · Taking walks can help you relax and manage stress. And fitness walking may make you feel better about yourself.  · Walking can help you sleep better at night and make you less likely to be depressed.  · Regular walking may help maintain your memory as you get older.  · Walking is a great way to spend extra time with friends and family members. Be sure to invite your dog along!  Q&A about fitness walking  Q: Will walking keep me fit?  A: Yes. Regular walking at the right pace gives you all the benefits of other aerobic activities, such as jogging and swimming.  Q: Will walking help me lose weight and keep it off?  A: Yes. Per mile, walking can burn as many calories as jogging. Your health care provider can help work walking into your weight-loss plan.  Q: Is walking safe for my health?  A: Yes. Walking is safe if you have high blood pressure, diabetes, heart disease, or other conditions. Talk to your health care provider before you start.  Date Last Reviewed: 5/9/2015  © 2546-3848 [employer]. [address] [place] [address], [place] [address]. All rights reserved. This information is not intended as a substitute for professional medical care. Always follow your healthcare professional's  instructions.        Weight Management: Getting Started  Healthy bodies come in all shapes and sizes. Not all bodies are made to be thin. For some people, a healthy weight is higher than the average weight listed on weight charts. Your healthcare provider can help you decide on a healthy weight for you.    Reasons to lose weight  Losing weight can help with some health problems, such as high blood pressure, heart disease, diabetes, sleep apnea, and arthritis. You may also feel more energy.  Set your long-term goal  Your goal doesn't even have to be a specific weight. You may decide on a fitness goal (such as being able to walk 10 miles a week), or a health goal (such as lowering your blood pressure). Choose a goal that is measurable and reasonable, so you know when you've reached it. A goal of reaching a BMI of less than 25 is not always reasonable (or possible).   Make an action plan  Habits dont change overnight. Setting your goals too high can leave you feeling discouraged if you cant reach them. Be realistic. Choose one or two small changes you can make now. Set an action plan for how you are going to make these changes. When you can stick to this plan, keep making a few more small changes. Taking small steps will help you stay on the path to success.  Track your progress  Write down your goals. Then, keep a daily record of your progress. Write down what you eat and how active you are. This record lets you look back on how much youve done. It may also help when youre feeling frustrated. Reward yourself for success. Even if you dont reach every goal, give yourself credit for what you do get done.  Get support  Encouragement from others can help make losing weight easier. Ask your family members and friends for support. They may even want to join you. Also look to your healthcare provider, registered dietitian, and  for help. Your local hospital can give you more information about  nutrition, exercise, and weight loss.  Date Last Reviewed: 1/31/2016 © 2000-2016 [employer]. [address] [place] [address], [place] [address]. All rights reserved. This information is not intended as a substitute for professional medical care. Always follow your healthcare professional's instructions.        Diabetes (General Information)  Diabetes is a long-term health problem. It means your body does not make enough insulin. Or it may mean that your body cannot use the insulin it makes. Insulin is a hormone in your body. It lets blood sugar (glucose) reach the cells in your body. All of your cells need glucose for fuel.  When you have diabetes, the glucose in your blood builds up because it cannot get into the cells. This buildup is called high blood sugar (hyperglycemia).  Your blood sugar level depends on several things. It depends on what kind of food you eat and how much of it you eat. It also depends on how much exercise you get, and how much insulin you have in your body. Eating too much of the wrong kinds of food or not taking diabetes medicine on time can cause high blood sugar. Infections can cause high blood sugar even if you are taking medicines correctly.  These things can also cause low blood sugar:  · Missing meals  · Not eating enough food  · Taking too much diabetes medicine  Diabetes can cause serious problems over time if you do not get treated. These problems include heart disease, stroke, kidney failure, and blindness. They also include nerve pain or loss of feeling in your legs and feet, and gangrene of the feet. By keeping your blood sugar under control you can prevent or delay these problems.  Normal blood sugar levels are 80 to 100 before a meal and less than 180 in the 1 to 2 hours after a meal.  Home care  Follow these guidelines when caring for yourself at home:  · Follow the diet your healthcare provider gives you. Take insulin or other diabetes medicine exactly as told  to.  · Watch your blood sugar as you are told to. Keep a log of your results. This will help your provider change your medicines to keep your blood sugar under control.  · Try to reach your ideal weight. You may be able to cut back on or not have to take diabetes medicine if you eat the right foods and get exercise.  · Do not smoke. Smoking worsens the effects of diabetes on your circulation. You are much more likely to have a heart attack if you have diabetes and you smoke.  · Take good care of your feet. If you have lost feeling in your feet, you may not see an injury or infection. Check your feet and between your toes at least once a week.  · Wear a medical alert bracelet or necklace, or carry a card in your wallet that says you have diabetes. This will help healthcare providers give you the right care if you get very ill and cannot tell them that you have diabetes.  Sick day plan  If you get a cold, the flu, or a bacterial or viral infection, take these steps:  · Look at your diabetes sick plan and call your healthcare provider as you were told to. You may need to call your provider right away if:  ¨ Your blood sugar is above 240 while taking your diabetes medicine  ¨ Your urine ketone levels are above normal or high  ¨ You have been vomiting more than 6 hours  ¨ You have trouble breathing or your breath ha s a fruity smell  ¨ You have a high fever  ¨ You have a fever for several days and you are not getting better  ¨ You get light-headed and are sleepier than usual  · Keep taking your diabetes pills (oral medicine) even if you have been vomiting and are feeling sick. Call your provider right away because you may need insulin to lower your blood sugar until you recover from your illness.  · Keep taking your insulin even if you have been vomiting and are feeling sick. Call your provider right away to ask if you need to change your insulin dose. This will depend on your blood sugar results.  · Check your blood  sugar every 2 to 4 hours, or at least 4 times a day.  · Check your ketones often. If you are vomiting and having diarrhea, watch them more often.  · Do not skip meals. Try to eat small meals on a regular schedule. Do this even if you do not feel like eating.  · Drink water or other liquids that do not have caffeine or calories. This will keep you from getting dehydrated. If you are nauseated or vomiting, takes small sips every 5 minutes. To prevent dehydration try to drink a cup (8 ounces) of fluids every hour while you are awake.  General care  Always bring a source of fast-acting sugar with you in case you have symptoms of low blood sugar (below 70). At the first sign of low blood sugar, eat or drink 15 to 20 grams of fast-acting sugar to raise your blood sugar. Examples are:  · 3 to 4 glucose tablets. You can buy these at most [employer].  · 4 ounces (1/2 cup) of regular (not diet) soft drinks  · 4 ounces (1/2 cup) of any fruit juice  · 8 ounces (1 cup) of milk  · 5 to 6 pieces of hard candy  · 1 tablespoon of honey  Check your blood sugar 15 minutes after treating yourself. If it is still below 70, take 15 to 20 more grams of fast-acting sugar. Test again in 15 minutes. If it returns to normal (70 or above), eat a snack or meal to keep your blood sugar in a safe range. If it stays low, call your doctor or go to an emergency room.  Follow-up care  Follow-up with your healthcare provider, or as advised. For more information about diabetes, visit the American Diabetes Association website at www.diabetes.org or call 157-061-4279.  When to seek medical advice  Call your healthcare provider right away if you have any of these symptoms of high blood sugar:  · Frequent urination  · Dizziness  · Drowsiness  · Thirst  · Headache  · Nausea or vomiting  · Abdominal pain  · Eyesight changes  · Fast breathing  · Confusion or loss of consciousness  Also call your provider right away if you have any of these signs of low blood  sugar:  · Fatigue  · Headache  · Shakes  · Excess sweating  · Hunger  · Feeling anxious or restless  · Eyesight changes  · Drowsiness  · Weakness  · Confusion or loss of consciousness  Call 911  Call for emergency help right away if any of these occur:  · Chest pain or shortness of breath  · Dizziness or fainting  · Weakness of an arm or leg or one side of the face  · Trouble speaking or seeing   Date Last Reviewed: 6/1/2016  © 6724-8863 [employer]. [address] [place] [address]. All rights reserved. This information is not intended as a substitute for professional medical care. Always follow your healthcare professional's instructions.
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
## 2017-06-29 DIAGNOSIS — E11.9 TYPE 2 DIABETES MELLITUS WITHOUT COMPLICATION: ICD-10-CM

## 2017-07-03 DIAGNOSIS — M25.571 ACUTE RIGHT ANKLE PAIN: Primary | ICD-10-CM

## 2017-07-13 ENCOUNTER — HOSPITAL ENCOUNTER (OUTPATIENT)
Dept: RADIOLOGY | Facility: HOSPITAL | Age: 67
Discharge: HOME | End: 2017-07-13
Attending: ORTHOPAEDIC SURGERY
Payer: COMMERCIAL

## 2017-07-13 ENCOUNTER — OFFICE VISIT (OUTPATIENT)
Dept: ORTHOPEDICS | Facility: CLINIC | Age: 67
End: 2017-07-13
Payer: COMMERCIAL

## 2017-07-13 VITALS
DIASTOLIC BLOOD PRESSURE: 78 MMHG | BODY MASS INDEX: 32.32 KG/M2 | WEIGHT: 194 LBS | SYSTOLIC BLOOD PRESSURE: 131 MMHG | HEIGHT: 65 IN | HEART RATE: 82 BPM

## 2017-07-13 DIAGNOSIS — S86.301D PERONEAL TENDON INJURY, RIGHT, SUBSEQUENT ENCOUNTER: ICD-10-CM

## 2017-07-13 DIAGNOSIS — M25.571 ACUTE RIGHT ANKLE PAIN: Primary | ICD-10-CM

## 2017-07-13 DIAGNOSIS — M25.571 ACUTE RIGHT ANKLE PAIN: ICD-10-CM

## 2017-07-13 PROBLEM — S86.309A PERONEAL TENDON INJURY: Status: ACTIVE | Noted: 2017-07-13

## 2017-07-13 PROCEDURE — 73610 X-RAY EXAM OF ANKLE: CPT | Mod: 26,RT,, | Performed by: RADIOLOGY

## 2017-07-13 PROCEDURE — 99999 PR PBB SHADOW E&M-EST. PATIENT-LVL III: CPT | Mod: PBBFAC,,, | Performed by: ORTHOPAEDIC SURGERY

## 2017-07-13 PROCEDURE — 99214 OFFICE O/P EST MOD 30 MIN: CPT | Mod: S$GLB,,, | Performed by: ORTHOPAEDIC SURGERY

## 2017-07-13 PROCEDURE — 73610 X-RAY EXAM OF ANKLE: CPT | Mod: TC,PN,RT
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
## 2017-07-27 ENCOUNTER — OFFICE VISIT (OUTPATIENT)
Dept: ORTHOPEDICS | Facility: CLINIC | Age: 67
End: 2017-07-27
Payer: COMMERCIAL

## 2017-07-27 ENCOUNTER — TELEPHONE (OUTPATIENT)
Dept: ORTHOPEDICS | Facility: CLINIC | Age: 67
End: 2017-07-27

## 2017-07-27 ENCOUNTER — HOSPITAL ENCOUNTER (OUTPATIENT)
Dept: RADIOLOGY | Facility: HOSPITAL | Age: 67
Discharge: HOME | End: 2017-07-27
Attending: ORTHOPAEDIC SURGERY
Payer: COMMERCIAL

## 2017-07-27 VITALS
BODY MASS INDEX: 32.32 KG/M2 | HEIGHT: 65 IN | HEART RATE: 84 BPM | WEIGHT: 194 LBS | DIASTOLIC BLOOD PRESSURE: 84 MMHG | SYSTOLIC BLOOD PRESSURE: 173 MMHG

## 2017-07-27 DIAGNOSIS — G89.29 CHRONIC PAIN OF RIGHT ANKLE: ICD-10-CM

## 2017-07-27 DIAGNOSIS — M25.571 ACUTE RIGHT ANKLE PAIN: ICD-10-CM

## 2017-07-27 DIAGNOSIS — S86.301D PERONEAL TENDON INJURY, RIGHT, SUBSEQUENT ENCOUNTER: ICD-10-CM

## 2017-07-27 DIAGNOSIS — M25.571 CHRONIC PAIN OF RIGHT ANKLE: ICD-10-CM

## 2017-07-27 DIAGNOSIS — S93.431D SPRAIN OF TIBIOFIBULAR LIGAMENT OF RIGHT ANKLE, SUBSEQUENT ENCOUNTER: Primary | ICD-10-CM

## 2017-07-27 PROCEDURE — 73721 MRI JNT OF LWR EXTRE W/O DYE: CPT | Mod: TC,RT

## 2017-07-27 PROCEDURE — 99214 OFFICE O/P EST MOD 30 MIN: CPT | Mod: S$GLB,,, | Performed by: ORTHOPAEDIC SURGERY

## 2017-07-27 PROCEDURE — 73721 MRI JNT OF LWR EXTRE W/O DYE: CPT | Mod: 26,RT,, | Performed by: RADIOLOGY

## 2017-07-27 PROCEDURE — 99999 PR PBB SHADOW E&M-EST. PATIENT-LVL III: CPT | Mod: PBBFAC,,, | Performed by: ORTHOPAEDIC SURGERY

## 2017-07-27 NOTE — TELEPHONE ENCOUNTER
Patient requested to see Dr. Mendez today as she had her MRI done. MRI has been read. Scheduled her for today at 145 with Dr. Mendez. Patient stated understanding.

## 2017-07-27 NOTE — TELEPHONE ENCOUNTER
----- Message from Leonela Molina sent at 7/27/2017  9:09 AM CDT -----  Contact: self  Patient [phone] had an MRI at [facility] this morning 07 27 17 at 8am/patient is saying that the technician told her Dr Mendez would have the report this morning and patient wants to speak with the Nurse about getting an appt with Dr Mendez this afternoon for the results/please call patient to advise and also patient wants to know Nurse did receive this message

## 2017-07-28 ENCOUNTER — TELEPHONE (OUTPATIENT)
Dept: ORTHOPEDICS | Facility: CLINIC | Age: 67
End: 2017-07-28

## 2017-07-28 NOTE — TELEPHONE ENCOUNTER
Left message for patient that we will book her surgery for 8/9/17. Patient to call back with any questions.

## 2017-07-28 NOTE — TELEPHONE ENCOUNTER
----- Message from Ekaterina Babcock sent at 7/28/2017 11:59 AM CDT -----  Patient is requesting to schedule her surgery on 08/09/17, contact patient at [phone].    Thank you
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
## 2017-08-01 DIAGNOSIS — S93.431D SPRAIN OF TIBIOFIBULAR LIGAMENT OF RIGHT ANKLE, SUBSEQUENT ENCOUNTER: Primary | ICD-10-CM

## 2017-08-01 DIAGNOSIS — M25.571 CHRONIC PAIN OF RIGHT ANKLE: ICD-10-CM

## 2017-08-01 DIAGNOSIS — G89.29 CHRONIC PAIN OF RIGHT ANKLE: ICD-10-CM

## 2017-08-01 DIAGNOSIS — S86.301D PERONEAL TENDON INJURY, RIGHT, SUBSEQUENT ENCOUNTER: ICD-10-CM

## 2017-08-01 RX ORDER — MUPIROCIN 20 MG/G
1 OINTMENT TOPICAL
Status: CANCELLED | OUTPATIENT
Start: 2017-08-01

## 2017-08-03 DIAGNOSIS — F41.9 ANXIETY: ICD-10-CM

## 2017-08-03 DIAGNOSIS — I10 HYPERTENSION, ESSENTIAL: ICD-10-CM

## 2017-08-03 RX ORDER — ESCITALOPRAM OXALATE 10 MG/1
10 TABLET ORAL DAILY
Qty: 90 TABLET | Refills: 3 | Status: SHIPPED | OUTPATIENT
Start: 2017-08-03 | End: 2018-10-01 | Stop reason: SDUPTHER

## 2017-08-03 RX ORDER — LISINOPRIL 10 MG/1
10 TABLET ORAL DAILY
Qty: 90 TABLET | Refills: 3 | Status: SHIPPED | OUTPATIENT
Start: 2017-08-03 | End: 2017-10-18 | Stop reason: SDUPTHER

## 2017-08-08 ENCOUNTER — ANESTHESIA EVENT (OUTPATIENT)
Dept: SURGERY | Facility: HOSPITAL | Age: 67
End: 2017-08-08
Payer: COMMERCIAL

## 2017-08-09 ENCOUNTER — SURGERY (OUTPATIENT)
Age: 67
End: 2017-08-09

## 2017-08-09 ENCOUNTER — HOSPITAL ENCOUNTER (OUTPATIENT)
Facility: HOSPITAL | Age: 67
Discharge: HOME | End: 2017-08-09
Attending: ORTHOPAEDIC SURGERY | Admitting: ORTHOPAEDIC SURGERY
Payer: COMMERCIAL

## 2017-08-09 ENCOUNTER — TELEPHONE (OUTPATIENT)
Dept: ORTHOPEDICS | Facility: CLINIC | Age: 67
End: 2017-08-09

## 2017-08-09 ENCOUNTER — ANESTHESIA (OUTPATIENT)
Dept: SURGERY | Facility: HOSPITAL | Age: 67
End: 2017-08-09
Payer: COMMERCIAL

## 2017-08-09 VITALS
OXYGEN SATURATION: 95 % | TEMPERATURE: 97 F | BODY MASS INDEX: 31.32 KG/M2 | DIASTOLIC BLOOD PRESSURE: 60 MMHG | HEART RATE: 78 BPM | SYSTOLIC BLOOD PRESSURE: 130 MMHG | WEIGHT: 188 LBS | HEIGHT: 65 IN | RESPIRATION RATE: 16 BRPM

## 2017-08-09 DIAGNOSIS — S86.301D PERONEAL TENDON INJURY, RIGHT, SUBSEQUENT ENCOUNTER: Primary | ICD-10-CM

## 2017-08-09 DIAGNOSIS — M25.571 CHRONIC PAIN OF RIGHT ANKLE: ICD-10-CM

## 2017-08-09 DIAGNOSIS — G89.29 CHRONIC PAIN OF RIGHT ANKLE: ICD-10-CM

## 2017-08-09 DIAGNOSIS — S93.431D SPRAIN OF TIBIOFIBULAR LIGAMENT OF RIGHT ANKLE, SUBSEQUENT ENCOUNTER: ICD-10-CM

## 2017-08-09 LAB — GLUCOSE SERPL-MCNC: 109 MG/DL (ref 70–110)

## 2017-08-09 PROCEDURE — 76942 ECHO GUIDE FOR BIOPSY: CPT | Mod: 26,59,, | Performed by: ANESTHESIOLOGY

## 2017-08-09 PROCEDURE — 82962 GLUCOSE BLOOD TEST: CPT | Mod: PO | Performed by: ORTHOPAEDIC SURGERY

## 2017-08-09 PROCEDURE — 25000003 PHARM REV CODE 250: Mod: PO | Performed by: ORTHOPAEDIC SURGERY

## 2017-08-09 PROCEDURE — 64446 NJX AA&/STRD SC NRV NFS IMG: CPT | Mod: PO | Performed by: ANESTHESIOLOGY

## 2017-08-09 PROCEDURE — 71000039 HC RECOVERY, EACH ADD'L HOUR: Mod: PO | Performed by: ORTHOPAEDIC SURGERY

## 2017-08-09 PROCEDURE — 63600175 PHARM REV CODE 636 W HCPCS: Mod: PO | Performed by: ORTHOPAEDIC SURGERY

## 2017-08-09 PROCEDURE — 63600175 PHARM REV CODE 636 W HCPCS: Mod: PO | Performed by: NURSE ANESTHETIST, CERTIFIED REGISTERED

## 2017-08-09 PROCEDURE — 25000003 PHARM REV CODE 250: Mod: PO | Performed by: ANESTHESIOLOGY

## 2017-08-09 PROCEDURE — 37000008 HC ANESTHESIA 1ST 15 MINUTES: Mod: PO | Performed by: ORTHOPAEDIC SURGERY

## 2017-08-09 PROCEDURE — 27200664 HC NERVE BLOCK COMPLETE KIT: Mod: PO | Performed by: ANESTHESIOLOGY

## 2017-08-09 PROCEDURE — D9220A PRA ANESTHESIA: Mod: ANES,,, | Performed by: ANESTHESIOLOGY

## 2017-08-09 PROCEDURE — 27691 REVISE LOWER LEG TENDON: CPT | Mod: RT,,, | Performed by: ORTHOPAEDIC SURGERY

## 2017-08-09 PROCEDURE — 36000709 HC OR TIME LEV III EA ADD 15 MIN: Mod: PO | Performed by: ORTHOPAEDIC SURGERY

## 2017-08-09 PROCEDURE — 27200750 HC INSULATED NEEDLE/ STIMUPLEX: Mod: PO | Performed by: ANESTHESIOLOGY

## 2017-08-09 PROCEDURE — 63600175 PHARM REV CODE 636 W HCPCS: Mod: PO | Performed by: ANESTHESIOLOGY

## 2017-08-09 PROCEDURE — 36000708 HC OR TIME LEV III 1ST 15 MIN: Mod: PO | Performed by: ORTHOPAEDIC SURGERY

## 2017-08-09 PROCEDURE — 88305 TISSUE EXAM BY PATHOLOGIST: CPT | Performed by: PATHOLOGY

## 2017-08-09 PROCEDURE — 29897 ANKLE ARTHROSCOPY/SURGERY: CPT | Mod: 51,RT,, | Performed by: ORTHOPAEDIC SURGERY

## 2017-08-09 PROCEDURE — 27201423 OPTIME MED/SURG SUP & DEVICES STERILE SUPPLY: Mod: PO | Performed by: ORTHOPAEDIC SURGERY

## 2017-08-09 PROCEDURE — 27698 REPAIR OF ANKLE LIGAMENT: CPT | Mod: 51,RT,, | Performed by: ORTHOPAEDIC SURGERY

## 2017-08-09 PROCEDURE — 37000009 HC ANESTHESIA EA ADD 15 MINS: Mod: PO | Performed by: ORTHOPAEDIC SURGERY

## 2017-08-09 PROCEDURE — 88305 TISSUE EXAM BY PATHOLOGIST: CPT | Mod: 26,,, | Performed by: PATHOLOGY

## 2017-08-09 PROCEDURE — C1713 ANCHOR/SCREW BN/BN,TIS/BN: HCPCS | Mod: PO | Performed by: ORTHOPAEDIC SURGERY

## 2017-08-09 PROCEDURE — D9220A PRA ANESTHESIA: Mod: CRNA,,, | Performed by: NURSE ANESTHETIST, CERTIFIED REGISTERED

## 2017-08-09 PROCEDURE — 71000033 HC RECOVERY, INTIAL HOUR: Mod: PO | Performed by: ORTHOPAEDIC SURGERY

## 2017-08-09 PROCEDURE — 64450 NJX AA&/STRD OTHER PN/BRANCH: CPT | Mod: 59,,, | Performed by: ANESTHESIOLOGY

## 2017-08-09 DEVICE — ANCHOR 2-0 FIBERWIRE 2.4X8.5MM: Type: IMPLANTABLE DEVICE | Site: ANKLE | Status: FUNCTIONAL

## 2017-08-09 DEVICE — LIGAMENT INTERNALBRACE: Type: IMPLANTABLE DEVICE | Site: ANKLE | Status: FUNCTIONAL

## 2017-08-09 RX ORDER — MUPIROCIN 20 MG/G
1 OINTMENT TOPICAL
Status: COMPLETED | OUTPATIENT
Start: 2017-08-09 | End: 2017-08-09

## 2017-08-09 RX ORDER — CLINDAMYCIN HYDROCHLORIDE 300 MG/1
300 CAPSULE ORAL EVERY 8 HOURS
Qty: 21 CAPSULE | Refills: 0 | Status: SHIPPED | OUTPATIENT
Start: 2017-08-09 | End: 2017-08-16

## 2017-08-09 RX ORDER — SODIUM CHLORIDE, SODIUM LACTATE, POTASSIUM CHLORIDE, CALCIUM CHLORIDE 600; 310; 30; 20 MG/100ML; MG/100ML; MG/100ML; MG/100ML
INJECTION, SOLUTION INTRAVENOUS CONTINUOUS
Status: DISCONTINUED | OUTPATIENT
Start: 2017-08-09 | End: 2017-08-09 | Stop reason: HOSPADM

## 2017-08-09 RX ORDER — PHENYLEPHRINE HYDROCHLORIDE 10 MG/ML
INJECTION INTRAVENOUS
Status: DISCONTINUED | OUTPATIENT
Start: 2017-08-09 | End: 2017-08-09

## 2017-08-09 RX ORDER — SCOLOPAMINE TRANSDERMAL SYSTEM 1 MG/1
1 PATCH, EXTENDED RELEASE TRANSDERMAL
Status: DISCONTINUED | OUTPATIENT
Start: 2017-08-09 | End: 2017-08-09 | Stop reason: HOSPADM

## 2017-08-09 RX ORDER — ACETAMINOPHEN 10 MG/ML
INJECTION, SOLUTION INTRAVENOUS
Status: DISCONTINUED | OUTPATIENT
Start: 2017-08-09 | End: 2017-08-09

## 2017-08-09 RX ORDER — FENTANYL CITRATE 50 UG/ML
25 INJECTION, SOLUTION INTRAMUSCULAR; INTRAVENOUS EVERY 5 MIN PRN
Status: DISCONTINUED | OUTPATIENT
Start: 2017-08-09 | End: 2017-08-09 | Stop reason: HOSPADM

## 2017-08-09 RX ORDER — FENTANYL CITRATE 50 UG/ML
INJECTION, SOLUTION INTRAMUSCULAR; INTRAVENOUS
Status: DISCONTINUED | OUTPATIENT
Start: 2017-08-09 | End: 2017-08-09

## 2017-08-09 RX ORDER — OXYCODONE HYDROCHLORIDE 5 MG/1
5 TABLET ORAL
Status: DISCONTINUED | OUTPATIENT
Start: 2017-08-09 | End: 2017-08-09 | Stop reason: HOSPADM

## 2017-08-09 RX ORDER — HYDROCODONE BITARTRATE AND ACETAMINOPHEN 10; 325 MG/1; MG/1
1 TABLET ORAL EVERY 4 HOURS PRN
Qty: 42 TABLET | Refills: 0 | Status: SHIPPED | OUTPATIENT
Start: 2017-08-09 | End: 2017-08-24 | Stop reason: SDUPTHER

## 2017-08-09 RX ORDER — ONDANSETRON HYDROCHLORIDE 2 MG/ML
INJECTION, SOLUTION INTRAMUSCULAR; INTRAVENOUS
Status: DISCONTINUED | OUTPATIENT
Start: 2017-08-09 | End: 2017-08-09

## 2017-08-09 RX ORDER — MUPIROCIN 20 MG/G
OINTMENT TOPICAL
Status: DISCONTINUED | OUTPATIENT
Start: 2017-08-09 | End: 2017-08-09 | Stop reason: HOSPADM

## 2017-08-09 RX ORDER — DEXAMETHASONE SODIUM PHOSPHATE 4 MG/ML
8 INJECTION, SOLUTION INTRA-ARTICULAR; INTRALESIONAL; INTRAMUSCULAR; INTRAVENOUS; SOFT TISSUE
Status: COMPLETED | OUTPATIENT
Start: 2017-08-09 | End: 2017-08-09

## 2017-08-09 RX ORDER — LIDOCAINE HYDROCHLORIDE 10 MG/ML
1 INJECTION, SOLUTION EPIDURAL; INFILTRATION; INTRACAUDAL; PERINEURAL ONCE
Status: DISCONTINUED | OUTPATIENT
Start: 2017-08-09 | End: 2017-08-09 | Stop reason: HOSPADM

## 2017-08-09 RX ORDER — SODIUM CHLORIDE 0.9 % (FLUSH) 0.9 %
3 SYRINGE (ML) INJECTION
Status: DISCONTINUED | OUTPATIENT
Start: 2017-08-09 | End: 2017-08-09 | Stop reason: HOSPADM

## 2017-08-09 RX ORDER — LIDOCAINE HCL/PF 100 MG/5ML
SYRINGE (ML) INTRAVENOUS
Status: DISCONTINUED | OUTPATIENT
Start: 2017-08-09 | End: 2017-08-09

## 2017-08-09 RX ORDER — PROMETHAZINE HYDROCHLORIDE 25 MG/1
25 TABLET ORAL EVERY 6 HOURS PRN
Qty: 20 TABLET | Refills: 1 | Status: SHIPPED | OUTPATIENT
Start: 2017-08-09 | End: 2017-08-24 | Stop reason: SDUPTHER

## 2017-08-09 RX ORDER — PROPOFOL 10 MG/ML
VIAL (ML) INTRAVENOUS
Status: DISCONTINUED | OUTPATIENT
Start: 2017-08-09 | End: 2017-08-09

## 2017-08-09 RX ORDER — SODIUM CHLORIDE 0.9 G/100ML
IRRIGANT IRRIGATION
Status: DISCONTINUED | OUTPATIENT
Start: 2017-08-09 | End: 2017-08-09 | Stop reason: HOSPADM

## 2017-08-09 RX ORDER — MIDAZOLAM HYDROCHLORIDE 1 MG/ML
INJECTION, SOLUTION INTRAMUSCULAR; INTRAVENOUS
Status: DISCONTINUED | OUTPATIENT
Start: 2017-08-09 | End: 2017-08-09

## 2017-08-09 RX ADMIN — MIDAZOLAM HYDROCHLORIDE 2 MG: 1 INJECTION, SOLUTION INTRAMUSCULAR; INTRAVENOUS at 08:08

## 2017-08-09 RX ADMIN — ONDANSETRON 4 MG: 2 INJECTION, SOLUTION INTRAMUSCULAR; INTRAVENOUS at 09:08

## 2017-08-09 RX ADMIN — DEXTROSE 2 G: 50 INJECTION, SOLUTION INTRAVENOUS at 09:08

## 2017-08-09 RX ADMIN — PROPOFOL 180 MG: 10 INJECTION, EMULSION INTRAVENOUS at 09:08

## 2017-08-09 RX ADMIN — DEXAMETHASONE SODIUM PHOSPHATE 8 MG: 4 INJECTION, SOLUTION INTRAMUSCULAR; INTRAVENOUS at 08:08

## 2017-08-09 RX ADMIN — ACETAMINOPHEN 1000 MG: 10 INJECTION, SOLUTION INTRAVENOUS at 09:08

## 2017-08-09 RX ADMIN — FENTANYL CITRATE 100 MCG: 50 INJECTION, SOLUTION INTRAMUSCULAR; INTRAVENOUS at 08:08

## 2017-08-09 RX ADMIN — MUPIROCIN 1 TUBE: 20 OINTMENT TOPICAL at 10:08

## 2017-08-09 RX ADMIN — SODIUM CHLORIDE, SODIUM LACTATE, POTASSIUM CHLORIDE, AND CALCIUM CHLORIDE: .6; .31; .03; .02 INJECTION, SOLUTION INTRAVENOUS at 07:08

## 2017-08-09 RX ADMIN — SODIUM CHLORIDE, SODIUM LACTATE, POTASSIUM CHLORIDE, AND CALCIUM CHLORIDE: .6; .31; .03; .02 INJECTION, SOLUTION INTRAVENOUS at 09:08

## 2017-08-09 RX ADMIN — PHENYLEPHRINE HYDROCHLORIDE 100 MCG: 10 INJECTION, SOLUTION INTRAMUSCULAR; INTRAVENOUS; SUBCUTANEOUS at 10:08

## 2017-08-09 RX ADMIN — LIDOCAINE HYDROCHLORIDE 100 MG: 20 INJECTION PARENTERAL at 09:08

## 2017-08-09 RX ADMIN — PHENYLEPHRINE HYDROCHLORIDE 200 MCG: 10 INJECTION, SOLUTION INTRAMUSCULAR; INTRAVENOUS; SUBCUTANEOUS at 10:08

## 2017-08-09 RX ADMIN — SODIUM CHLORIDE 3000 ML: 0.9 IRRIGANT IRRIGATION at 10:08

## 2017-08-09 RX ADMIN — MIDAZOLAM HYDROCHLORIDE 2 MG: 1 INJECTION, SOLUTION INTRAMUSCULAR; INTRAVENOUS at 07:08

## 2017-08-09 RX ADMIN — SCOPALAMINE 1.5 MG: 1 PATCH, EXTENDED RELEASE TRANSDERMAL at 08:08

## 2017-08-09 RX ADMIN — MUPIROCIN 1 G: 20 OINTMENT TOPICAL at 08:08

## 2017-08-09 NOTE — BRIEF OP NOTE
OPERATIVE NOTE     DATE: 8/9/2017   TIME: 1:17 PM     PATIENT NAME: Doris Pastrana      PRE-OPERATIVE DIAGNOSIS: 1) Right lateral ankle instability 2) Right peroneal tendon tear 3) Right chronic ankle pain    POST-OPERATIVE DIAGNOSIS: 1) Right lateral ankle instability 2) Right peroneal tendon tear 3) Right chronic ankle pain    PROCEDURE: 1. Right ankle arthroscopy with partial debridement 2. Excision of Right peroneus brevis with tenodesis to the peroneus longus tendon 3. Right Ankle Brostrom-Nguyen lateral ligament reconstruction     SURGEON: Andrzej Mendez MD     ANESTHESIA TYPE: GETA     SPECIMENS SENT: peroneus brevis tendon from right ankle    COMPLICATIONS: NONE     BLOOD LOSS: <5cc     ASSISTANT: NARDA Mckinney

## 2017-08-09 NOTE — OP NOTE
OPERATIVE NOTE     DATE: 8/9/2017   TIME: 1:17 PM     PATIENT NAME: Doris Pastrana      PRE-OPERATIVE DIAGNOSIS: 1) Right lateral ankle instability 2) Right peroneal tendon tear 3) Right chronic ankle pain    POST-OPERATIVE DIAGNOSIS: 1) Right lateral ankle instability 2) Right peroneal tendon tear 3) Right chronic ankle pain    PROCEDURE: 1. Right ankle arthroscopy with partial debridement 2. Excision of Right peroneus brevis with tenodesis to the peroneus longus tendon 3. Right Ankle Brostrom-Nguyen lateral ligament reconstruction     SURGEON: Andrzej Mendez MD     ANESTHESIA TYPE: GETA     SPECIMENS SENT: peroneus brevis tendon from right ankle    COMPLICATIONS: NONE     BLOOD LOSS: <5cc     ASSISTANT: NARDA Mckinney       Procedure in detail: After appropriate informed consent was obtained. The patient was placed in the supine position on the operating table. The Right lower extremity was placed in the well leg ochoa with the knee at 90 degrees. The Right lower extremity was then prepped and draped in the usual sterile fashion. Tourniquet was raised to 300 mmHg after esmarch exsanguination of the limb. The foot was placed in the ankle distractor and the ankle was distracted. The anteromedial portal for ankle arthroscopy was established using an 18 gauge spinal needle, injecting and aspirating back 10 cc of normal saline. A very small incision was made using a #11 blade just medial to the tibialis anterior tendon in line with the spinal needle insertion site through the skin only. A small hemostat was introduced and using to spread down to the capsule. The blunt trocar was placed into the joint. The camera was inserted and the ankle joint was inspected medially and laterally. There was good visualization of the joint; there was synovitis and generalized  degenerative changes.   The anterolateral portal was established using 18 gauge spinal needle which was inserted into the lateral joint space while  visualized using the camera. A very small incision as made using a #11 blade in line with the spinal needle insertion site through the skin only. The Arthrex 2.4mm shaver was placed and used to perform debridement of synovitis within the ankle. No osteochondral defects were found.   The camera was switched to the lateral portal where the joint was again inspected.  Last look was made through the medial and lateral gutters and the posterior ankle and then the trocar and the cameras were removed.   The patient's leg was taken out of the well leg ochoa.    Next, an approximately 9 cm curvilinear incision was made using a #15 blade overlying the distal fibula. Care was taken to identify and avoid the sural nerve. The peroneal tendons were exposed and the sheath was split longitudinally. The tendons were explored proximally and distally. There was severe degeneration and tearing of the peroneus brevis tendon with severe synovitis. The peroneus brevis tendon was not able to be repaired. The degenerated portion was removed using sharp dissection with a #15 blade. The peroneus brevis tenodesis was performed proximally and distally using 2.0 fiberwire interrupted sutures. The diseased tendon was sent for specimen.     Attention was then turned to the anterior portion of the incision where the proximal edge of the inferior extensor retinaculum was then identified, carefully dissected and mobilized. The lateral ankle capsule was then identified along with the attenuated anterior talofibular ligament. The ligament was quite thin. The calcaneofibular ligament was identified at the tip of the distal fibula with inferior retraction of the peroneal tendons. The capsule was then divided from the distal fibula and extended about one centimeter proximally via sub periosteal elevation with a #15 blade.     The lateral ankle gutter and the lateral talar dome were also inspected for loose bodies and osteochondral injuries. A 1 cm distal  fibular old avulsion fracture fragment was removed from the tip of the distal fibula. Next, the distal tip of the fibula at the ATFL and CFL footprint was debrided with a curette and surgical blade, then a wronger was used to lightly decorticate the bone in order to provide a bleeding bony bed for healing and suture anchor placement. Using the Arthrex 1.8-mm drill two drill holes were then made at the ATFL and CFL insertion site on the distal fibula. Care was taken to make sure that the drill hole tunnels did not intersect with each other. Next drill hole was placed in the distal fibula for the 3.5 mm anchor for the internal brace and drill was placed in the lateral neck of the talus for the 4.75mm internal brace.  Two 2.4mm Mini Bio-SutureTak anchors were placed into the holes. The distal suture was placed into the CFL in a horizontal mattress fashion. The proximal suturewas placed into the ATFL ligament using a horizontal mattress and these were both tied down with the foot in dorsiflexion and eversion.     The anchor for the internal brace was then placed into the lateral talar neck and a hemostat was placed under the suture to prevent overtightening.  The extensor retinaculum was then advanced and repaired to the periosteum of the distal fibula to reinforce the repair using interrupted 2.0 ethibond sutures. This is also done with the foot in neutral dorsiflexion and eversion.  The peroneal tendon sheath was repaired using 2.0 ethibone suture to prevent subluxation of the tendons.  The incision was irrigated with normal saline. Tourniquet was let down. Adequate hemostasis was achieved using bovie cautery. The deep layer of the incision was re-approximated using 0-monocyrl in an interrupted fashion. The subcutaneous layer was re-approximated using 3.0-monocyrl in an interrupted fashion. The skin was re-approximated using 3.0 nylon in a running fashion. Sterile dressing using xeroform, bacitracin, 4x4s, cast  padding, and posterior splint and stirrups with the foot in neutral and eversion were placed. Patient tolerated the procedure well without complications. A well-padded posterior splint and stirrups with the foot in neutral and eversion was placed. I was present and scrubbed for the entire case.

## 2017-08-09 NOTE — TELEPHONE ENCOUNTER
----- Message from RT Rodrick sent at 8/9/2017  2:27 PM CDT -----  Contact: Becca, [phone], [facility]  Becca, [phone], [facility], requesting to inform the have prescription for Norco and is allergic to codeine, thanks.

## 2017-08-09 NOTE — TRANSFER OF CARE
"Anesthesia Transfer of Care Note    Patient: Doris Pastrana    Procedure(s) Performed: Procedure(s) (LRB):  REPAIR-LIGAMENT (brostrom han) (Right)  REPAIR-TENDON-LEG (peroneal tendon) (Right)  ARTHROSCOPY-ANKLE (Right)  TENODESIS (TENDON FIXATION)PERONEAL (Right)    Patient location: PACU    Anesthesia Type: regional and general    Transport from OR: Transported from OR on room air with adequate spontaneous ventilation    Post pain: adequate analgesia    Post assessment: no apparent anesthetic complications    Post vital signs: stable    Level of consciousness: awake    Nausea/Vomiting: no nausea/vomiting    Complications: none    Transfer of care protocol was followed      Last vitals:   Visit Vitals  /60 (BP Location: Right arm, Patient Position: Lying, BP Method: Automatic)   Pulse 82   Temp 36.7 °C (98.1 °F) (Skin)   Resp 16   Ht 5' 5" (1.651 m)   Wt 85.3 kg (188 lb)   SpO2 99%   Breastfeeding? No   BMI 31.28 kg/m²     "

## 2017-08-09 NOTE — DISCHARGE SUMMARY
[facility]  Discharge Note  Short Stay    Admit Date: 8/9/2017    Discharge Date and Time: 8/9/2017  1:00 PM       Discharge Provider: Andrzej Mendez    Diagnoses:  Active Hospital Problems    Diagnosis  POA    *Sprain of tibiofibular ligament of right ankle [S93.431A]  Yes      Resolved Hospital Problems    Diagnosis Date Resolved POA   No resolved problems to display.       Discharged Condition: good    Hospital Course: Patient was admitted for an outpatient procedure and tolerated the procedure well with no complications.    Final Diagnoses: Same as principal problem.    Disposition: Home or Self Care    Follow up/Patient Instructions:    Medications:  Reconciled Home Medications:   Discharge Medication List as of 8/9/2017 12:28 PM      CONTINUE these medications which have NOT CHANGED    Details   alprazolam (XANAX) 0.25 MG tablet Use one 20-30 minutes before flying, Normal      blood sugar diagnostic Strp 2 strips by Misc.(Non-Drug; Combo Route) route Daily., Starting Wed 5/31/2017, Normal      blood-glucose meter kit One Touch glucometer check glucose twice daily. If One Touch not covered dispense Accu-chek., Normal      cyclobenzaprine (FLEXERIL) 10 MG tablet Take 1 tablet (10 mg total) by mouth 3 (three) times daily as needed for Muscle spasms., Starting Thu 6/1/2017, Normal      escitalopram oxalate (LEXAPRO) 10 MG tablet Take 1 tablet (10 mg total) by mouth once daily., Starting Thu 8/3/2017, Normal      glipiZIDE (GLUCOTROL) 5 MG TR24 Take 1 tablet (5 mg total) by mouth daily with breakfast., Starting Thu 6/1/2017, Until Fri 6/1/2018, Normal      Lactobacillus rhamnosus GG (CULTURELLE) 10 billion cell capsule Take 1 capsule by mouth once daily., Until Discontinued, Historical Med      lancets 33 gauge Misc 2 lancets by Misc.(Non-Drug; Combo Route) route Daily., Starting Wed 5/31/2017, Normal      lisinopril 10 MG tablet Take 1 tablet (10 mg total) by mouth once daily., Starting Thu 8/3/2017,  Until Fri 8/3/2018, Normal      meclizine (ANTIVERT) 25 mg tablet Take 1 tablet (25 mg total) by mouth 3 (three) times daily as needed., Starting 10/11/2016, Until Discontinued, Normal      metformin (GLUCOPHAGE-XR) 750 MG 24 hr tablet Take 1 tablet (750 mg total) by mouth 2 (two) times daily with meals., Starting Fri 6/23/2017, Print      topiramate 25 mg capsule Take 1 capsule (25 mg total) by mouth once daily., Starting Fri 6/24/2016, Until Wed 8/9/2017, Normal      celecoxib (CELEBREX) 200 MG capsule Take 1 capsule (200 mg total) by mouth 2 (two) times daily., Starting Fri 6/23/2017, Normal             Discharge Procedure Orders  Diet general     Call MD for:  temperature >100.4     Call MD for:  persistent nausea and vomiting     Call MD for:  severe uncontrolled pain     Call MD for:  difficulty breathing, headache or visual disturbances     Call MD for:  redness, tenderness, or signs of infection (pain, swelling, redness, odor or green/yellow discharge around incision site)     Call MD for:  hives     Call MD for:  persistent dizziness or light-headedness     Call MD for:  extreme fatigue     Keep surgical extremity elevated     Non weight bearing   Order Comments: Strict Non-weightbearing RLE     No driving, operating heavy equipment or signing legal documents while taking pain medication     Leave dressing on - Keep it clean, dry, and intact until clinic visit       Follow-up Information     Andrzej Mendez MD On 8/24/2017.    Specialty:  Orthopedic Surgery  Contact information:  [address] [place] [address]  [phone]                   Discharge Procedure Orders (must include Diet, Follow-up, Activity):    Discharge Procedure Orders (must include Diet, Follow-up, Activity)  Diet general     Call MD for:  temperature >100.4     Call MD for:  persistent nausea and vomiting     Call MD for:  severe uncontrolled pain     Call MD for:  difficulty breathing, headache or visual disturbances     Call  MD for:  redness, tenderness, or signs of infection (pain, swelling, redness, odor or green/yellow discharge around incision site)     Call MD for:  hives     Call MD for:  persistent dizziness or light-headedness     Call MD for:  extreme fatigue     Keep surgical extremity elevated     Non weight bearing   Order Comments: Strict Non-weightbearing RLE     No driving, operating heavy equipment or signing legal documents while taking pain medication     Leave dressing on - Keep it clean, dry, and intact until clinic visit

## 2017-08-09 NOTE — ANESTHESIA POSTPROCEDURE EVALUATION
"Anesthesia Post Evaluation    Patient: Doris Pastrana    Procedure(s) Performed: Procedure(s) (LRB):  REPAIR-LIGAMENT (brostrom han) (Right)  REPAIR-TENDON-LEG (peroneal tendon) (Right)  ARTHROSCOPY-ANKLE (Right)  TENODESIS (TENDON FIXATION)PERONEAL (Right)    Final Anesthesia Type: general  Patient location during evaluation: PACU  Patient participation: Yes- Able to Participate  Level of consciousness: awake and alert  Post-procedure vital signs: reviewed and stable  Pain management: adequate  Airway patency: patent  PONV status at discharge: No PONV  Anesthetic complications: no      Cardiovascular status: hemodynamically stable and blood pressure returned to baseline  Respiratory status: unassisted, spontaneous ventilation and room air  Hydration status: euvolemic  Follow-up not needed.        Visit Vitals  /60   Pulse 78   Temp 36.2 °C (97.2 °F) (Oral)   Resp 16   Ht 5' 5" (1.651 m)   Wt 85.3 kg (188 lb)   SpO2 95%   Breastfeeding? No   BMI 31.28 kg/m²       Pain/Hayde Score: Pain Assessment Performed: Yes (8/9/2017 12:36 PM)  Presence of Pain: denies (8/9/2017 12:36 PM)  Pain Rating Prior to Med Admin: 0 (8/9/2017 11:42 AM)  Hayde Score: 9 (8/9/2017 11:50 AM)      "

## 2017-08-09 NOTE — DISCHARGE INSTRUCTIONS
FOOT SURGERY  After surgery:    DOS:   Keep leg eleva alondra until first post operative visit   Keep dressing clean and dry DO NOT CHANGE BANDAGES   Advanced diet as tolerated.    Check circulation frequently in toes by pressing down on toenail. Nail should turn white and then pink when released.   May walk on foot to go to the bathroom only DO NOT WALK WITHOUT SHOE   Wear surgical shoe. May remove shoe to sleep.   Resume home medications    DONT:   Do not remove your dressing   Do not get dressing wet.   No driving until released by MD   DO NOT TAKE ADDITIONAL TYLENOL/ACETAMINOPHEN WHILE TAKING NARCOTIC PAIN MEDICATION THAT CONTAINS TYLENOL/ACETAMINOPHEN.    CALL PHYSICIAN FOR:   Pain, burning, or numbness of the toes not relieved by elevation of the leg.   Pale or cold toes; bluish nail beds.   Redness, swelling, or bleeding.   Fever> 101   Drainage (pus) from the puncture sites   Pain unrelieved by pain medication    FOR EMERGENCIES CONTACT YOUR PHYSICIAN [phone]      Discharge Instructions: After Your Surgery  Youve just had surgery. During surgery you were given medicine called anesthesia to keep you relaxed and free of pain. After surgery you may have some pain or nausea. This is common. Here are some tips for feeling better and getting well after surgery.     Stay on schedule with your medication.   Going home  Your doctor or nurse will show you how to take care of yourself when you go home. He or she will also answer your questions. Have an adult family member or friend drive you home. For the first 24 hours after your surgery:  · Do not drive or use heavy equipment.  · Do not make important decisions or sign legal papers.  · Do not drink alcohol.  · Have someone stay with you, if needed. He or she can watch for problems and help keep you safe.  Be sure to go to all follow-up visits with your doctor. And rest after your surgery for as long as your doctor tells you to.  Coping with  pain  If you have pain after surgery, pain medicine will help you feel better. Take it as told, before pain becomes severe. Also, ask your doctor or pharmacist about other ways to control pain. This might be with heat, ice, or relaxation. And follow any other instructions your surgeon or nurse gives you.  Tips for taking pain medicine  To get the best relief possible, remember these points:  · Pain medicines can upset your stomach. Taking them with a little food may help.  · Most pain relievers taken by mouth need at least 20 to 30 minutes to start to work.  · Taking medicine on a schedule can help you remember to take it. Try to time your medicine so that you can take it before starting an activity. This might be before you get dressed, go for a walk, or sit down for dinner.  · Constipation is a common side effect of pain medicines. Call your doctor before taking any medicines such as laxatives or stool softeners to help ease constipation. Also ask if you should skip any foods. Drinking lots of fluids and eating foods such as fruits and vegetables that are high in fiber can also help. Remember, do not take laxatives unless your surgeon has prescribed them.  · Drinking alcohol and taking pain medicine can cause dizziness and slow your breathing. It can even be deadly. Do not drink alcohol while taking pain medicine.  · Pain medicine can make you react more slowly to things. Do not drive or run machinery while taking pain medicine.  Your health care provider may tell you to take acetaminophen to help ease your pain. Ask him or her how much you are supposed to take each day. Acetaminophen or other pain relievers may interact with your prescription medicines or other over-the-counter (OTC) drugs. Some prescription medicines have acetaminophen and other ingredients. Using both prescription and OTC acetaminophen for pain can cause you to overdose. Read the labels on your OTC medicines with care. This will help you to  clearly know the list of ingredients, how much to take, and any warnings. It may also help you not take too much acetaminophen. If you have questions or do not understand the information, ask your pharmacist or health care provider to explain it to you before you take the OTC medicine.  Managing nausea  Some people have an upset stomach after surgery. This is often because of anesthesia, pain, or pain medicine, or the stress of surgery. These tips will help you handle nausea and eat healthy foods as you get better. If you were on a special food plan before surgery, ask your doctor if you should follow it while you get better. These tips may help:  · Do not push yourself to eat. Your body will tell you when to eat and how much.  · Start off with clear liquids and soup. They are easier to digest.  · Next try semi-solid foods, such as mashed potatoes, applesauce, and gelatin, as you feel ready.  · Slowly move to solid foods. Dont eat fatty, rich, or spicy foods at first.  · Do not force yourself to have 3 large meals a day. Instead eat smaller amounts more often.  · Take pain medicines with a small amount of solid food, such as crackers or toast, to avoid nausea.     Call your surgeon if  · You still have pain an hour after taking medicine. The medicine may not be strong enough.  · You feel too sleepy, dizzy, or groggy. The medicine may be too strong.  · You have side effects like nausea, vomiting, or skin changes, such as rash, itching, or hives.       If you have obstructive sleep apnea  You were given anesthesia medicine during surgery to keep you comfortable and free of pain. After surgery, you may have more apnea spells because of this medicine and other medicines you were given. The spells may last longer than usual.   At home:  · Keep using the continuous positive airway pressure (CPAP) device when you sleep. Unless your health care provider tells you not to, use it when you sleep, day or night. CPAP is a  common device used to treat obstructive sleep apnea.  · Talk with your provider before taking any pain medicine, muscle relaxants, or sedatives. Your provider will tell you about the possible dangers of taking these medicines.  Date Last Reviewed: 10/16/2014  © 5331-8730 [employer]. [address] [place] [address]. All rights reserved. This information is not intended as a substitute for professional medical care. Always follow your healthcare professional's instructions.

## 2017-08-09 NOTE — ANESTHESIA PREPROCEDURE EVALUATION
08/09/2017  Doris Pastrana is a 66 y.o., female.    Pre-op Assessment    I have reviewed the Patient Summary Reports.     I have reviewed the Nursing Notes.   I have reviewed the Medications.     Review of Systems  Anesthesia Hx:  Personal Hx of Anesthesia complications, Post-Operative Nausea/Vomiting   Social:  Former Smoker, Alcohol Use    Cardiovascular:   Hypertension Denies CAD.    Denies CABG/stent.   Denies Angina. hyperlipidemia    Hepatic/GI:   Liver Disease,    Musculoskeletal:   Arthritis     Endocrine:   Diabetes    Psych:   anxiety          Physical Exam  General:  Obesity    Airway/Jaw/Neck:  Airway Findings: Mouth Opening: Normal Tongue: Normal  General Airway Assessment: Adult  Mallampati: III  Improves to II with phonation.  TM Distance: Normal, at least 6 cm  Jaw/Neck Findings:  Neck ROM: Normal ROM  Neck Findings:  Girth Increased      Dental:  Dental Findings: upper front caps, molar caps   Chest/Lungs:  Chest/Lungs Findings: Clear to auscultation, Normal Respiratory Rate     Heart/Vascular:  Heart Findings: Rate: Normal  Rhythm: Regular Rhythm             Anesthesia Plan  Type of Anesthesia, risks & benefits discussed:  Anesthesia Type:  general  Patient's Preference:   Intra-op Monitoring Plan: standard ASA monitors  Intra-op Monitoring Plan Comments:   Post Op Pain Control Plan: multimodal analgesia  Post Op Pain Control Plan Comments:   Induction:   IV  Beta Blocker:  Patient is not currently on a Beta-Blocker (No further documentation required).       Informed Consent: Patient understands risks and agrees with Anesthesia plan.  Questions answered. Anesthesia consent signed with patient.  ASA Score: 3     Day of Surgery Review of History & Physical: I have interviewed and examined the patient. I have reviewed the patient's H&P dated:  There are no significant changes.  H&P update  referred to the provider.         Ready For Surgery From Anesthesia Perspective.
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
## 2017-08-09 NOTE — ANESTHESIA PROCEDURE NOTES
Peripheral right single shot saphenous nerve block    Patient location during procedure: pre-op   Block not for primary anesthetic.  Reason for block: at surgeon's request and post-op pain management   Post-op Pain Location: right ankle OA, arthroscopy  Start time: 8/9/2017 8:30 AM  Timeout: 8/9/2017 8:10 AM   End time: 8/9/2017 8:40 AM  Surgery related to: right ankle OA, arthroscopy  Staffing  Anesthesiologist: ZULEYMA KLEIN  Performed: anesthesiologist   Preanesthetic Checklist  Completed: patient identified, site marked, surgical consent, pre-op evaluation, timeout performed, IV checked, risks and benefits discussed and monitors and equipment checked  Peripheral Block  Patient position: supine  Prep: ChloraPrep  Patient monitoring: heart rate, cardiac monitor, continuous pulse ox, continuous capnometry and frequent blood pressure checks  Block type: saphenous  Laterality: right  Injection technique: single shot  Needle  Needle type: Stimuplex   Needle gauge: 21 G  Needle length: 4 in  Needle localization: anatomical landmarks and ultrasound guidance  Needle insertion depth: 6 cm   -ultrasound image captured on disc.  Assessment  Injection assessment: negative aspiration, negative parasthesia and local visualized surrounding nerve  Paresthesia pain: none  Heart rate change: no  Slow fractionated injection: yes  Medications:  Bolus administered: 15 mL of 0.5 bupivacaine  Epinephrine added: 5 mcg/mL (1/200,000)  Additional Notes  VSS.  [facility] RN monitoring vitals throughout procedure.  Patient tolerated procedure well.

## 2017-08-09 NOTE — ANESTHESIA PROCEDURE NOTES
Peripheral right continuous popliteal nerve block    Patient location during procedure: pre-op   Block not for primary anesthetic.  Reason for block: at surgeon's request and post-op pain management   Post-op Pain Location: right ankle tendon defect, arthroscopy  Start time: 8/9/2017 8:14 AM  Timeout: 8/9/2017 8:10 AM   End time: 8/9/2017 8:26 AM  Surgery related to: right ankle tendon defect, arthroscopy  Staffing  Anesthesiologist: ZULEYMA KLEIN  Other anesthesia staff: JAMES MONTAÑO  Performed: anesthesiologist   Preanesthetic Checklist  Completed: patient identified, site marked, surgical consent, pre-op evaluation, timeout performed, IV checked, risks and benefits discussed and monitors and equipment checked  Peripheral Block  Patient position: supine  Prep: ChloraPrep and site prepped and draped  Patient monitoring: heart rate, cardiac monitor, continuous pulse ox, continuous capnometry and frequent blood pressure checks  Block type: popliteal  Laterality: right  Injection technique: continuous  Needle  Needle type: Tuohy   Needle gauge: 18 G  Needle length: 4 in  Needle localization: anatomical landmarks and ultrasound guidance  Needle insertion depth: 7 cm  Catheter type: stimulating  Catheter size: 20 G  Catheter at skin depth: 7 cm  Test dose: lidocaine 1.5% with Epi 1-to-200,000 and negative   -ultrasound image captured on disc.  Assessment  Injection assessment: negative aspiration, negative parasthesia and local visualized surrounding nerve  Paresthesia pain: none  Heart rate change: no  Slow fractionated injection: yes  Medications:  Bolus administered: 30 mL of 0.5 bupivacaine  Epinephrine added: 5 mcg/mL (1/200,000)  Additional Notes  VSS.  [facility] RN monitoring vitals throughout procedure.  Patient tolerated procedure well.

## 2017-08-09 NOTE — TELEPHONE ENCOUNTER
Spoke with Becca at [facility] pharmacy, informed her pts reaction to codeine is nausea only and pt can take the codeine, ok per . She verbalized understanding and stated she will fill the script.

## 2017-08-10 ENCOUNTER — PATIENT MESSAGE (OUTPATIENT)
Dept: FAMILY MEDICINE | Facility: CLINIC | Age: 67
End: 2017-08-10

## 2017-08-10 NOTE — ANESTHESIA POST-OP PAIN MANAGEMENT
Acute Pain Service Progress Note    Doris Pastrana is a 66 y.o., female, [id number].    Surgery:   REPAIR-LIGAMENT (brostrom han) (Right )      REPAIR-TENDON-LEG (peroneal tendon) (Right )      ARTHROSCOPY-ANKLE (Right Ankle)      TENODESIS (TENDON FIXATION)PERONEAL (Right )    Post Op Day #: 1    Catheter type: perineural  popliteal    Infusion type: Ropivacaine 0.2%  8 basal    Problem List:    Active Hospital Problems    Diagnosis  POA    *Sprain of tibiofibular ligament of right ankle [S93.431A]  Yes      Resolved Hospital Problems    Diagnosis Date Resolved POA   No resolved problems to display.       Subjective:     General appearance of alert, oriented, no complaints   Pain with rest: 3    Numbers   Pain with movement: 4    Numbers   Side Effects   none    Objective:   Catheter site clean, dry, intact   Catheter placement 7 cm @skin      Vitals   Vitals:    08/09/17 1220   BP: 130/60   Pulse: 78   Resp: 16   Temp:         Labs    Admission on 08/09/2017, Discharged on 08/09/2017   Component Date Value Ref Range Status    POC Glucose 08/09/2017 109  70 - 110 mg/dL Final        Meds   No current facility-administered medications for this encounter.      Current Outpatient Prescriptions   Medication Sig Dispense Refill    cyclobenzaprine (FLEXERIL) 10 MG tablet Take 1 tablet (10 mg total) by mouth 3 (three) times daily as needed for Muscle spasms. 90 tablet 0    escitalopram oxalate (LEXAPRO) 10 MG tablet Take 1 tablet (10 mg total) by mouth once daily. 90 tablet 3    glipiZIDE (GLUCOTROL) 5 MG TR24 Take 1 tablet (5 mg total) by mouth daily with breakfast. 90 tablet 1    Lactobacillus rhamnosus GG (CULTURELLE) 10 billion cell capsule Take 1 capsule by mouth once daily.      lisinopril 10 MG tablet Take 1 tablet (10 mg total) by mouth once daily. 90 tablet 3    metformin (GLUCOPHAGE-XR) 750 MG 24 hr tablet Take 1 tablet (750 mg total) by mouth 2 (two) times daily with meals. 180 tablet 1    topiramate 25  mg capsule Take 1 capsule (25 mg total) by mouth once daily. (Patient taking differently: Take 25 mg by mouth daily as needed. ) 30 capsule 11    alprazolam (XANAX) 0.25 MG tablet Use one 20-30 minutes before flying (Patient taking differently: Use one 20-30 minutes before flying prn) 4 tablet 1    blood sugar diagnostic Strp 2 strips by Misc.(Non-Drug; Combo Route) route Daily. 200 strip 3    blood-glucose meter kit One Touch glucometer check glucose twice daily. If One Touch not covered dispense Accu-chek. 1 each 0    clindamycin (CLEOCIN) 300 MG capsule Take 1 capsule (300 mg total) by mouth every 8 (eight) hours. 21 capsule 0    hydrocodone-acetaminophen 10-325mg (NORCO)  mg Tab Take 1 tablet by mouth every 4 (four) hours as needed for Pain. 42 tablet 0    lancets 33 gauge Misc 2 lancets by Misc.(Non-Drug; Combo Route) route Daily. 200 each 3    meclizine (ANTIVERT) 25 mg tablet Take 1 tablet (25 mg total) by mouth 3 (three) times daily as needed. 30 tablet 5    promethazine (PHENERGAN) 25 MG tablet Take 1 tablet (25 mg total) by mouth every 6 (six) hours as needed for Nausea. 20 tablet 1       Assessment:     Pain control adequate    Plan:     Patient doing well, continue present treatment.  Nerve cath to be d/c'd tomorrow.  Instructions for removal given to pt and pt's daughter.    Evaluator Ehsan Hartley

## 2017-08-11 ENCOUNTER — TELEPHONE (OUTPATIENT)
Dept: ORTHOPEDICS | Facility: CLINIC | Age: 67
End: 2017-08-11

## 2017-08-11 NOTE — PROGRESS NOTES
APS  POD#2    Infusion complete and catheter removed intact per patient.   Patient instructed how to titrate PO analgesics for ongoing pain control.

## 2017-08-11 NOTE — ANESTHESIA POST-OP PAIN MANAGEMENT
Acute Pain Service Progress Note    Doris Pastrana is a 66 y.o., female, [id number].    Surgery:  REPAIR-LIGAMENT (brostrom han) (Right )      REPAIR-TENDON-LEG (peroneal tendon) (Right )      ARTHROSCOPY-ANKLE (Right Ankle)      TENODESIS (TENDON FIXATION)PERONEAL (Right )    Post Op Day #: 2    Catheter type: perineural  popliteal  REMOVED this AM      Problem List:    Active Hospital Problems    Diagnosis  POA    *Sprain of tibiofibular ligament of right ankle [S93.431A]  Yes      Resolved Hospital Problems    Diagnosis Date Resolved POA   No resolved problems to display.       Subjective:     General appearance of alert, oriented, no complaints   Pain with rest: 7    Numbers   Pain with movement: 9    Numbers   Side Effects  none  Objective:  OUT      Vitals   Vitals:    08/09/17 1220   BP: 130/60   Pulse: 78   Resp: 16   Temp:         Labs    Admission on 08/09/2017, Discharged on 08/09/2017   Component Date Value Ref Range Status    POC Glucose 08/09/2017 109  70 - 110 mg/dL Final        Meds   No current facility-administered medications for this encounter.      Current Outpatient Prescriptions   Medication Sig Dispense Refill    cyclobenzaprine (FLEXERIL) 10 MG tablet Take 1 tablet (10 mg total) by mouth 3 (three) times daily as needed for Muscle spasms. 90 tablet 0    escitalopram oxalate (LEXAPRO) 10 MG tablet Take 1 tablet (10 mg total) by mouth once daily. 90 tablet 3    glipiZIDE (GLUCOTROL) 5 MG TR24 Take 1 tablet (5 mg total) by mouth daily with breakfast. 90 tablet 1    Lactobacillus rhamnosus GG (CULTURELLE) 10 billion cell capsule Take 1 capsule by mouth once daily.      lisinopril 10 MG tablet Take 1 tablet (10 mg total) by mouth once daily. 90 tablet 3    metformin (GLUCOPHAGE-XR) 750 MG 24 hr tablet Take 1 tablet (750 mg total) by mouth 2 (two) times daily with meals. 180 tablet 1    topiramate 25 mg capsule Take 1 capsule (25 mg total) by mouth once daily. (Patient taking differently:  Take 25 mg by mouth daily as needed. ) 30 capsule 11    alprazolam (XANAX) 0.25 MG tablet Use one 20-30 minutes before flying (Patient taking differently: Use one 20-30 minutes before flying prn) 4 tablet 1    blood sugar diagnostic Strp 2 strips by Misc.(Non-Drug; Combo Route) route Daily. 200 strip 3    blood-glucose meter kit One Touch glucometer check glucose twice daily. If One Touch not covered dispense Accu-chek. 1 each 0    clindamycin (CLEOCIN) 300 MG capsule Take 1 capsule (300 mg total) by mouth every 8 (eight) hours. 21 capsule 0    hydrocodone-acetaminophen 10-325mg (NORCO)  mg Tab Take 1 tablet by mouth every 4 (four) hours as needed for Pain. 42 tablet 0    lancets 33 gauge Misc 2 lancets by Misc.(Non-Drug; Combo Route) route Daily. 200 each 3    meclizine (ANTIVERT) 25 mg tablet Take 1 tablet (25 mg total) by mouth 3 (three) times daily as needed. 30 tablet 5    promethazine (PHENERGAN) 25 MG tablet Take 1 tablet (25 mg total) by mouth every 6 (six) hours as needed for Nausea. 20 tablet 1           Assessment:     Pain control inadequate    Plan:     Discontinue present therapy. Analgesia to be provided by the primary team, ortho.  Nerve cath removed this am when ropiv ran out.  Pt advised by [facility] to regularly take her rx pain meds and request opiate only rx from ortho, as she is currently taking too much acetaminophen.  She may also begin nsaids     Evaluator Ehsan Hartley

## 2017-08-11 NOTE — TELEPHONE ENCOUNTER
Spoke to patient. Patient states pain is 10/10. Block has worn off. Instructed patient per Dr. Mendez to take Norco 10/325 2 tabs every 4 hours for the next 24 hours to get pain under control. In 24 hours return to Norco 10/325mg 1 tab every 4 hours. Instructed to keep foot elevated as high as possible. Patient stated understanding.

## 2017-08-11 NOTE — ADDENDUM NOTE
Addendum  created 08/11/17 0737 by Damián Lin MD    Anesthesia Event edited, Sign clinical note

## 2017-08-11 NOTE — TELEPHONE ENCOUNTER
----- Message from Elen Lester sent at 8/11/2017 10:03 AM CDT -----  Contact:  call  [phone]    Calling to  Get a  Different   Pain  Med// please call   For  Details ,  Pt  Needs  Something    Without tylenol//   Pt  Is  In   Pain //  And a call  Was  Placed   To  [id number] - [place] - [address]  [address] [place] [address]  Phone: [phone] Fax: [phone]

## 2017-08-17 ENCOUNTER — TELEPHONE (OUTPATIENT)
Dept: ORTHOPEDICS | Facility: CLINIC | Age: 67
End: 2017-08-17

## 2017-08-17 NOTE — TELEPHONE ENCOUNTER
Spoke with pt, pt requesting a home health aide to help her bathe as she has not bathed since the Tuesday before her sx date. Informed her I have to speak with  to see if she will order the HH, and there is a chance it will get denied.Pt verbalized understanding and stated she will wait for a return call.

## 2017-08-17 NOTE — TELEPHONE ENCOUNTER
----- Message from No Mai sent at 8/17/2017  1:04 PM CDT -----  Contact: pt [phone]  Patient called and asked for a call back today she is asking for some assistance from Home Health to help bathe. She has fallen twice since her surgery . She is home alone from time to time until her daughter gets home. Patient is asking for a call back today.

## 2017-08-21 DIAGNOSIS — S86.301D PERONEAL TENDON INJURY, RIGHT, SUBSEQUENT ENCOUNTER: Primary | ICD-10-CM

## 2017-08-24 ENCOUNTER — OFFICE VISIT (OUTPATIENT)
Dept: ORTHOPEDICS | Facility: CLINIC | Age: 67
End: 2017-08-24
Payer: COMMERCIAL

## 2017-08-24 ENCOUNTER — HOSPITAL ENCOUNTER (OUTPATIENT)
Dept: RADIOLOGY | Facility: HOSPITAL | Age: 67
Discharge: HOME | End: 2017-08-24
Attending: ORTHOPAEDIC SURGERY
Payer: COMMERCIAL

## 2017-08-24 VITALS
WEIGHT: 188 LBS | SYSTOLIC BLOOD PRESSURE: 134 MMHG | HEART RATE: 78 BPM | DIASTOLIC BLOOD PRESSURE: 65 MMHG | HEIGHT: 65 IN | BODY MASS INDEX: 31.32 KG/M2

## 2017-08-24 DIAGNOSIS — S86.301D PERONEAL TENDON INJURY, RIGHT, SUBSEQUENT ENCOUNTER: Primary | ICD-10-CM

## 2017-08-24 DIAGNOSIS — S86.301D PERONEAL TENDON INJURY, RIGHT, SUBSEQUENT ENCOUNTER: ICD-10-CM

## 2017-08-24 DIAGNOSIS — S93.431D SPRAIN OF TIBIOFIBULAR LIGAMENT OF RIGHT ANKLE, SUBSEQUENT ENCOUNTER: ICD-10-CM

## 2017-08-24 PROCEDURE — 29405 APPL SHORT LEG CAST: CPT | Mod: RT,S$GLB,, | Performed by: ORTHOPAEDIC SURGERY

## 2017-08-24 PROCEDURE — 73610 X-RAY EXAM OF ANKLE: CPT | Mod: TC,PN,RT

## 2017-08-24 PROCEDURE — 99024 POSTOP FOLLOW-UP VISIT: CPT | Mod: S$GLB,,, | Performed by: ORTHOPAEDIC SURGERY

## 2017-08-24 PROCEDURE — 73610 X-RAY EXAM OF ANKLE: CPT | Mod: 26,RT,, | Performed by: RADIOLOGY

## 2017-08-24 PROCEDURE — 99999 PR PBB SHADOW E&M-EST. PATIENT-LVL III: CPT | Mod: PBBFAC,,, | Performed by: ORTHOPAEDIC SURGERY

## 2017-08-24 RX ORDER — HYDROCODONE BITARTRATE AND ACETAMINOPHEN 10; 325 MG/1; MG/1
1 TABLET ORAL EVERY 4 HOURS PRN
Qty: 42 TABLET | Refills: 0 | Status: SHIPPED | OUTPATIENT
Start: 2017-08-24 | End: 2017-09-07 | Stop reason: SDUPTHER

## 2017-08-24 RX ORDER — PROMETHAZINE HYDROCHLORIDE 25 MG/1
25 TABLET ORAL EVERY 6 HOURS PRN
Qty: 20 TABLET | Refills: 1 | Status: SHIPPED | OUTPATIENT
Start: 2017-08-24 | End: 2017-10-19

## 2017-08-24 NOTE — PROGRESS NOTES
Subjective:      Patient ID: Doris Pastrana is a 66 y.o. female.    Chief Complaint: Post-op Evaluation of the Right Ankle and Post-op Evaluation (s/p ankle ATS with brostrom han; peroneal tendon repair 8/9/17)    She rates her pain as 7/10 today s/p right ankle arthroscopy with brostrom han and peroneal tenodesis.   Social History     Occupational History     [employer]     Social History Main Topics    Smoking status: Former Smoker     Packs/day: 1.00     Types: Cigarettes     Quit date: 3/12/1983    Smokeless tobacco: Never Used    Alcohol use Yes      Comment: socially    Drug use: No    Sexual activity: Not Currently            Objective:    Ortho Exam     RLE: Neurovascularly intact, incisions well healed, moderate swelling, sutures are intact.  No signs of infection.    Assessment:         s/p right ankle arthroscopy with brostrom han and peroneal tenodesis.       Plan:     We discussed that she may not experience complete relief of pain as she has osteoarthritis of the ankle as well.   Short leg cast applied. Non-weightbearing. F/u 2 weeks, no xray.

## 2017-09-07 ENCOUNTER — OFFICE VISIT (OUTPATIENT)
Dept: ORTHOPEDICS | Facility: CLINIC | Age: 67
End: 2017-09-07
Payer: COMMERCIAL

## 2017-09-07 VITALS
HEART RATE: 76 BPM | HEIGHT: 65 IN | WEIGHT: 188 LBS | DIASTOLIC BLOOD PRESSURE: 77 MMHG | SYSTOLIC BLOOD PRESSURE: 130 MMHG | BODY MASS INDEX: 31.32 KG/M2

## 2017-09-07 DIAGNOSIS — M25.371 RIGHT ANKLE INSTABILITY: ICD-10-CM

## 2017-09-07 DIAGNOSIS — S86.301D PERONEAL TENDON INJURY, RIGHT, SUBSEQUENT ENCOUNTER: Primary | ICD-10-CM

## 2017-09-07 PROBLEM — S93.431A SPRAIN OF TIBIOFIBULAR LIGAMENT OF RIGHT ANKLE: Status: RESOLVED | Noted: 2017-03-31 | Resolved: 2017-09-07

## 2017-09-07 PROCEDURE — 99024 POSTOP FOLLOW-UP VISIT: CPT | Mod: S$GLB,,, | Performed by: ORTHOPAEDIC SURGERY

## 2017-09-07 PROCEDURE — 29405 APPL SHORT LEG CAST: CPT | Mod: 58,RT,S$GLB, | Performed by: ORTHOPAEDIC SURGERY

## 2017-09-07 PROCEDURE — 99999 PR PBB SHADOW E&M-EST. PATIENT-LVL III: CPT | Mod: PBBFAC,,, | Performed by: ORTHOPAEDIC SURGERY

## 2017-09-07 RX ORDER — HYDROCODONE BITARTRATE AND ACETAMINOPHEN 10; 325 MG/1; MG/1
1 TABLET ORAL EVERY 4 HOURS PRN
Qty: 42 TABLET | Refills: 0 | Status: SHIPPED | OUTPATIENT
Start: 2017-09-07 | End: 2017-11-30

## 2017-09-07 NOTE — PROGRESS NOTES
Subjective:      Patient ID: Doris Pastrana is a 66 y.o. female.    Chief Complaint: Post-op Evaluation of the Right Ankle (DOS: 8-9-17/Ankle ATS; brostrom han; peroneal tendonesis )    She rates her pain as 6/10 today s/p right ankle arthroscopy with brostrom han and peroneal tenodesis.   Social History     Occupational History     [employer]     Social History Main Topics    Smoking status: Former Smoker     Packs/day: 1.00     Types: Cigarettes     Quit date: 3/12/1983    Smokeless tobacco: Never Used    Alcohol use Yes      Comment: socially    Drug use: No    Sexual activity: Not Currently            Objective:    Ortho Exam     RLE: Neurovascularly intact, incisions well healed, mild swelling.  No signs of infection. Decreased range of motion.     Assessment:         s/p right ankle arthroscopy with brostrom han and peroneal tenodesis.       Plan:       Short leg cast applied. Non-weightbearing. F/u 2 weeks, no xray.

## 2017-09-21 ENCOUNTER — OFFICE VISIT (OUTPATIENT)
Dept: ORTHOPEDICS | Facility: CLINIC | Age: 67
End: 2017-09-21
Payer: COMMERCIAL

## 2017-09-21 VITALS
HEART RATE: 83 BPM | SYSTOLIC BLOOD PRESSURE: 145 MMHG | DIASTOLIC BLOOD PRESSURE: 72 MMHG | BODY MASS INDEX: 31.32 KG/M2 | HEIGHT: 65 IN | WEIGHT: 188 LBS

## 2017-09-21 DIAGNOSIS — M25.371 RIGHT ANKLE INSTABILITY: Primary | ICD-10-CM

## 2017-09-21 DIAGNOSIS — S86.301D PERONEAL TENDON INJURY, RIGHT, SUBSEQUENT ENCOUNTER: ICD-10-CM

## 2017-09-21 PROCEDURE — 99024 POSTOP FOLLOW-UP VISIT: CPT | Mod: S$GLB,,, | Performed by: ORTHOPAEDIC SURGERY

## 2017-09-21 PROCEDURE — 97760 ORTHOTIC MGMT&TRAING 1ST ENC: CPT | Mod: S$GLB,,, | Performed by: ORTHOPAEDIC SURGERY

## 2017-09-21 PROCEDURE — 99999 PR PBB SHADOW E&M-EST. PATIENT-LVL III: CPT | Mod: PBBFAC,,, | Performed by: ORTHOPAEDIC SURGERY

## 2017-09-21 NOTE — PROGRESS NOTES
Subjective:      Patient ID: Doris Pastrana is a 66 y.o. female.    Chief Complaint: Post-op Evaluation of the Right Ankle (DOS: 8-9-17/Ankle ATS; brostrom han peroneal tendonesis )    She rates her pain as 8/10 today s/p right ankle arthroscopy with brostrom han and peroneal tenodesis.   Social History     Occupational History     [employer]     Social History Main Topics    Smoking status: Former Smoker     Packs/day: 1.00     Types: Cigarettes     Quit date: 3/12/1983    Smokeless tobacco: Never Used    Alcohol use Yes      Comment: socially    Drug use: No    Sexual activity: Not Currently            Objective:    Ortho Exam     RLE: Neurovascularly intact, incisions well healed, mild swelling.  No signs of infection.  Decreased range of motion.  Moderate tenderness to palpation.     Assessment:         s/p right ankle arthroscopy with brostrom han and peroneal tenodesis.     Plan:        We performed a custom orthotic/brace adjustment, fitting and training with the patient today. The patient demonstrated understanding and proper care. This was performed for 15 minutes.  Short cam boot  was given.  Weight bearing as tolerated in boot. PT for range of motion and strengthening of the right ankle and foot. Ok to transition to regular shoe after 2 weeks.  F/u 4 weeks, with xray of the right ankle.

## 2017-09-29 ENCOUNTER — TELEPHONE (OUTPATIENT)
Dept: ORTHOPEDICS | Facility: CLINIC | Age: 67
End: 2017-09-29

## 2017-09-29 NOTE — TELEPHONE ENCOUNTER
----- Message from Jamel SEGURA Rivera sent at 9/29/2017  2:38 PM CDT -----  Contact: Leticia/[facility]  Leticia called in regarding the attached patient and would like a call back from nurse regarding getting copy of op report from patients procedure on 8/9/17.  Fax number is [phone] and call back number is [phone], ext [phone]

## 2017-10-16 DIAGNOSIS — M25.371 RIGHT ANKLE INSTABILITY: Primary | ICD-10-CM

## 2017-10-18 DIAGNOSIS — S82.891D ANKLE FRACTURE, RIGHT, CLOSED, WITH ROUTINE HEALING, SUBSEQUENT ENCOUNTER: ICD-10-CM

## 2017-10-18 DIAGNOSIS — I10 HYPERTENSION, ESSENTIAL: ICD-10-CM

## 2017-10-18 RX ORDER — CYCLOBENZAPRINE HCL 10 MG
TABLET ORAL
Qty: 270 TABLET | Refills: 1 | Status: SHIPPED | OUTPATIENT
Start: 2017-10-18 | End: 2018-11-12 | Stop reason: SDUPTHER

## 2017-10-18 RX ORDER — LISINOPRIL 10 MG/1
10 TABLET ORAL DAILY
Qty: 90 TABLET | Refills: 3 | Status: SHIPPED | OUTPATIENT
Start: 2017-10-18 | End: 2018-11-08

## 2017-10-19 ENCOUNTER — PATIENT MESSAGE (OUTPATIENT)
Dept: ORTHOPEDICS | Facility: CLINIC | Age: 67
End: 2017-10-19

## 2017-10-19 ENCOUNTER — TELEPHONE (OUTPATIENT)
Dept: ORTHOPEDICS | Facility: CLINIC | Age: 67
End: 2017-10-19

## 2017-10-19 ENCOUNTER — OFFICE VISIT (OUTPATIENT)
Dept: ORTHOPEDICS | Facility: CLINIC | Age: 67
End: 2017-10-19
Payer: COMMERCIAL

## 2017-10-19 ENCOUNTER — HOSPITAL ENCOUNTER (OUTPATIENT)
Dept: RADIOLOGY | Facility: HOSPITAL | Age: 67
Discharge: HOME | End: 2017-10-19
Attending: ORTHOPAEDIC SURGERY
Payer: COMMERCIAL

## 2017-10-19 VITALS
DIASTOLIC BLOOD PRESSURE: 66 MMHG | HEART RATE: 86 BPM | WEIGHT: 188 LBS | SYSTOLIC BLOOD PRESSURE: 138 MMHG | BODY MASS INDEX: 31.32 KG/M2 | HEIGHT: 65 IN

## 2017-10-19 DIAGNOSIS — M25.371 RIGHT ANKLE INSTABILITY: Primary | ICD-10-CM

## 2017-10-19 DIAGNOSIS — S86.301D INJURY OF PERONEAL TENDON OF RIGHT FOOT, SUBSEQUENT ENCOUNTER: ICD-10-CM

## 2017-10-19 DIAGNOSIS — M25.371 RIGHT ANKLE INSTABILITY: ICD-10-CM

## 2017-10-19 DIAGNOSIS — M19.079 ARTHRITIS OF ANKLE: ICD-10-CM

## 2017-10-19 PROCEDURE — 73610 X-RAY EXAM OF ANKLE: CPT | Mod: 26,RT,, | Performed by: RADIOLOGY

## 2017-10-19 PROCEDURE — 99024 POSTOP FOLLOW-UP VISIT: CPT | Mod: S$GLB,,, | Performed by: ORTHOPAEDIC SURGERY

## 2017-10-19 PROCEDURE — 99999 PR PBB SHADOW E&M-EST. PATIENT-LVL III: CPT | Mod: PBBFAC,,, | Performed by: ORTHOPAEDIC SURGERY

## 2017-10-19 PROCEDURE — 73610 X-RAY EXAM OF ANKLE: CPT | Mod: TC,PN,RT

## 2017-10-19 RX ORDER — IBUPROFEN 800 MG/1
800 TABLET ORAL 3 TIMES DAILY
Qty: 60 TABLET | Refills: 6 | Status: SHIPPED | OUTPATIENT
Start: 2017-10-19 | End: 2017-11-11

## 2017-10-19 RX ORDER — TRAMADOL HYDROCHLORIDE 50 MG/1
50 TABLET ORAL EVERY 6 HOURS PRN
Qty: 50 TABLET | Refills: 1 | Status: SHIPPED | OUTPATIENT
Start: 2017-10-19 | End: 2017-10-29

## 2017-10-19 NOTE — TELEPHONE ENCOUNTER
----- Message from Ivy Munoz sent at 10/19/2017 10:04 AM CDT -----  Contact: Alba long/Erasmo Daniels [phone]  The tramadol will interact with the lexapro she takes.  Please call her.  Thank you!

## 2017-10-19 NOTE — PROGRESS NOTES
Subjective:      Patient ID: Doris Pastrana is a 66 y.o. female.    Chief Complaint: Post-op Evaluation of the Right Ankle (DOS: 8-9-17/Ankle ATS; brostrom han peroneal tendonesis )    She rates her pain as 7/10 today s/p right ankle arthroscopy with brostrom han and peroneal tenodesis. She still having pain and swelling and can't wear a shoe. She is doing PT.   Social History     Occupational History     [employer]     Social History Main Topics    Smoking status: Former Smoker     Packs/day: 1.00     Types: Cigarettes     Quit date: 3/12/1983    Smokeless tobacco: Never Used    Alcohol use Yes      Comment: socially    Drug use: No    Sexual activity: Not Currently            Objective:    Ortho Exam     RLE: Neurovascularly intact, incisions well healed, mild swelling.  No signs of infection.  Decreased range of motion.  Moderate tenderness to palpation lateral ankle. +swelling mainly lateral ankle.  tenderness to palpation achilles tendon and ankle joint.      Assessment:         s/p right ankle arthroscopy with brostrom han and peroneal tenodesis.     Medications Ordered This Encounter      ibuprofen (ADVIL,MOTRIN) 800 MG tablet      tramadol (ULTRAM) 50 mg tablet    Encounter Diagnoses   Name Primary?    Right ankle instability Yes    Injury of peroneal tendon of right foot, subsequent encounter     Arthritis of ankle       Plan:     Continue PT for range of motion and strengthening of the right ankle and foot. We discussed that she had significant osteoarthritis in the ankle and that her peroneal pathology was severe so it may take deneen time to improve and I reiniterated that swelling takes 6 months to a year to resolve.  Continue compression stockings.  F/u 6 weeks, with xray of the right ankle.  May try ankle injection at f/u.

## 2017-10-25 ENCOUNTER — TELEPHONE (OUTPATIENT)
Dept: ORTHOPEDICS | Facility: CLINIC | Age: 67
End: 2017-10-25

## 2017-10-25 NOTE — TELEPHONE ENCOUNTER
Spoke with pt pharmacy, pharmacist stated it is a drug interaction with tramadol and lexapro.Informed her she can cancel the script for tramadol, she verbalized understanding and stated she will cancel the script.

## 2017-11-08 ENCOUNTER — DOCUMENTATION ONLY (OUTPATIENT)
Dept: FAMILY MEDICINE | Facility: CLINIC | Age: 67
End: 2017-11-08

## 2017-11-08 NOTE — PROGRESS NOTES
Pre-Visit Chart Review  For Appointment Scheduled on 10-11-17    Health Maintenance Due   Topic Date Due    Zoster Vaccine  12/23/2012    Lipid Panel  06/22/2017    Influenza Vaccine  08/01/2017

## 2017-11-11 ENCOUNTER — LAB VISIT (OUTPATIENT)
Dept: LAB | Facility: HOSPITAL | Age: 67
End: 2017-11-11
Attending: FAMILY MEDICINE
Payer: COMMERCIAL

## 2017-11-11 ENCOUNTER — OFFICE VISIT (OUTPATIENT)
Dept: FAMILY MEDICINE | Facility: CLINIC | Age: 67
End: 2017-11-11
Attending: FAMILY MEDICINE
Payer: COMMERCIAL

## 2017-11-11 VITALS
DIASTOLIC BLOOD PRESSURE: 86 MMHG | HEART RATE: 84 BPM | WEIGHT: 196 LBS | RESPIRATION RATE: 12 BRPM | SYSTOLIC BLOOD PRESSURE: 144 MMHG | OXYGEN SATURATION: 96 % | TEMPERATURE: 98 F | BODY MASS INDEX: 32.65 KG/M2 | HEIGHT: 65 IN

## 2017-11-11 DIAGNOSIS — E78.5 HYPERLIPIDEMIA, UNSPECIFIED HYPERLIPIDEMIA TYPE: ICD-10-CM

## 2017-11-11 DIAGNOSIS — M54.6 CHRONIC MIDLINE THORACIC BACK PAIN: ICD-10-CM

## 2017-11-11 DIAGNOSIS — E11.9 TYPE 2 DIABETES MELLITUS WITHOUT COMPLICATION, WITHOUT LONG-TERM CURRENT USE OF INSULIN: ICD-10-CM

## 2017-11-11 DIAGNOSIS — G89.29 CHRONIC MIDLINE THORACIC BACK PAIN: ICD-10-CM

## 2017-11-11 DIAGNOSIS — R07.9 CHEST PAIN, UNSPECIFIED TYPE: Primary | ICD-10-CM

## 2017-11-11 LAB
ANION GAP SERPL CALC-SCNC: 9 MMOL/L
BUN SERPL-MCNC: 16 MG/DL
CALCIUM SERPL-MCNC: 9.9 MG/DL
CHLORIDE SERPL-SCNC: 104 MMOL/L
CHOLEST SERPL-MCNC: 243 MG/DL
CHOLEST/HDLC SERPL: 5 {RATIO}
CO2 SERPL-SCNC: 30 MMOL/L
CREAT SERPL-MCNC: 0.7 MG/DL
EST. GFR  (AFRICAN AMERICAN): >60 ML/MIN/1.73 M^2
EST. GFR  (NON AFRICAN AMERICAN): >60 ML/MIN/1.73 M^2
ESTIMATED AVG GLUCOSE: 120 MG/DL
GLUCOSE SERPL-MCNC: 78 MG/DL
HBA1C MFR BLD HPLC: 5.8 %
HDLC SERPL-MCNC: 49 MG/DL
HDLC SERPL: 20.2 %
LDLC SERPL CALC-MCNC: 164.6 MG/DL
NONHDLC SERPL-MCNC: 194 MG/DL
POTASSIUM SERPL-SCNC: 4 MMOL/L
SODIUM SERPL-SCNC: 143 MMOL/L
TRIGL SERPL-MCNC: 147 MG/DL

## 2017-11-11 PROCEDURE — 93005 ELECTROCARDIOGRAM TRACING: CPT | Mod: S$GLB,,, | Performed by: FAMILY MEDICINE

## 2017-11-11 PROCEDURE — 99214 OFFICE O/P EST MOD 30 MIN: CPT | Mod: S$GLB,,, | Performed by: FAMILY MEDICINE

## 2017-11-11 PROCEDURE — 83036 HEMOGLOBIN GLYCOSYLATED A1C: CPT

## 2017-11-11 PROCEDURE — 80061 LIPID PANEL: CPT

## 2017-11-11 PROCEDURE — 80048 BASIC METABOLIC PNL TOTAL CA: CPT

## 2017-11-11 PROCEDURE — 93010 ELECTROCARDIOGRAM REPORT: CPT | Mod: S$GLB,,, | Performed by: INTERNAL MEDICINE

## 2017-11-11 PROCEDURE — 99999 PR PBB SHADOW E&M-EST. PATIENT-LVL IV: CPT | Mod: PBBFAC,,, | Performed by: FAMILY MEDICINE

## 2017-11-11 PROCEDURE — 36415 COLL VENOUS BLD VENIPUNCTURE: CPT | Mod: PO

## 2017-11-11 NOTE — PATIENT INSTRUCTIONS
Walking for Fitness  Fitness walking has something for everyone, even people who are already fit. Walking is one of the safest ways to condition your body aerobically. It can boost energy, help you lose weight, and reduce stress.    Physical benefits  · Walking strengthens your heart and lungs, and tones your muscles.  · When walking, your feet land with less impact than in other sports. This reduces chances of muscle, bone, and joint injury.  · Regular walking improves your cholesterol levels and lowers your risk of heart disease. And it helps you control your blood sugar if you have diabetes.  · Walking is a weight-bearing activity, which helps maintain bone density. This can help prevent osteoporosis.  Personal rewards  · Taking walks can help you relax and manage stress. And fitness walking may make you feel better about yourself.  · Walking can help you sleep better at night and make you less likely to be depressed.  · Regular walking may help maintain your memory as you get older.  · Walking is a great way to spend extra time with friends and family members. Be sure to invite your dog along!  Q&A about fitness walking  Q: Will walking keep me fit?  A: Yes. Regular walking at the right pace gives you all the benefits of other aerobic activities, such as jogging and swimming.  Q: Will walking help me lose weight and keep it off?  A: Yes. Per mile, walking can burn as many calories as jogging. Your health care provider can help work walking into your weight-loss plan.  Q: Is walking safe for my health?  A: Yes. Walking is safe if you have high blood pressure, diabetes, heart disease, or other conditions. Talk to your healthcare provider before you start.  Date Last Reviewed: 4/1/2017 © 2000-2017 [employer]. [address] [place] [address], [place] [address]. All rights reserved. This information is not intended as a substitute for professional medical care. Always follow your healthcare professional's  instructions.        Weight Management: Getting Started  Healthy bodies come in all shapes and sizes. Not all bodies are made to be thin. For some people, a healthy weight is higher than the average weight listed on weight charts. Your healthcare provider can help you decide on a healthy weight for you.    Reasons to lose weight  Losing weight can help with some health problems, such as high blood pressure, heart disease, diabetes, sleep apnea, and arthritis. You may also feel more energy.  Set your long-term goal  Your goal doesn't even have to be a specific weight. You may decide on a fitness goal (such as being able to walk 10 miles a week), or a health goal (such as lowering your blood pressure). Choose a goal that is measurable and reasonable, so you know when you've reached it. A goal of reaching a BMI of less than 25 is not always reasonable (or possible).   Make an action plan  Habits dont change overnight. Setting your goals too high can leave you feeling discouraged if you cant reach them. Be realistic. Choose one or two small changes you can make now. Set an action plan for how you are going to make these changes. When you can stick to this plan, keep making a few more small changes. Taking small steps will help you stay on the path to success.  Track your progress  Write down your goals. Then, keep a daily record of your progress. Write down what you eat and how active you are. This record lets you look back on how much youve done. It may also help when youre feeling frustrated. Reward yourself for success. Even if you dont reach every goal, give yourself credit for what you do get done.  Get support  Encouragement from others can help make losing weight easier. Ask your family members and friends for support. They may even want to join you. Also look to your healthcare provider, registered dietitian, and  for help. Your local hospital can give you more information about  nutrition, exercise, and weight loss.  Date Last Reviewed: 1/31/2016 © 2000-2017 [employer]. [address] [place] [address], [place] [address]. All rights reserved. This information is not intended as a substitute for professional medical care. Always follow your healthcare professional's instructions.        Diabetes (General Information)  Diabetes is a long-term health problem. It means your body does not make enough insulin. Or it may mean that your body cannot use the insulin it makes. Insulin is a hormone in your body. It lets blood sugar (glucose) reach the cells in your body. All of your cells need glucose for fuel.  When you have diabetes, the glucose in your blood builds up because it cannot get into the cells. This buildup is called high blood sugar (hyperglycemia).  Your blood sugar level depends on several things. It depends on what kind of food you eat and how much of it you eat. It also depends on how much exercise you get, and how much insulin you have in your body. Eating too much of the wrong kinds of food or not taking diabetes medicine on time can cause high blood sugar. Infections can cause high blood sugar even if you are taking medicines correctly.  These things can also cause low blood sugar:  · Missing meals  · Not eating enough food  · Taking too much diabetes medicine  Diabetes can cause serious problems over time if you do not get treated. These problems include heart disease, stroke, kidney failure, and blindness. They also include nerve pain or loss of feeling in your legs and feet, and gangrene of the feet. By keeping your blood sugar under control you can prevent or delay these problems.  Normal blood sugar levels are 80 to 100 before a meal and less than 180 in the 1 to 2 hours after a meal.  Home care  Follow these guidelines when caring for yourself at home:  · Follow the diet your healthcare provider gives you. Take insulin or other diabetes medicine exactly as told  to.  · Watch your blood sugar as you are told to. Keep a log of your results. This will help your provider change your medicines to keep your blood sugar under control.  · Try to reach your ideal weight. You may be able to cut back on or not have to take diabetes medicine if you eat the right foods and get exercise.  · Do not smoke. Smoking worsens the effects of diabetes on your circulation. You are much more likely to have a heart attack if you have diabetes and you smoke.  · Take good care of your feet. If you have lost feeling in your feet, you may not see an injury or infection. Check your feet and between your toes at least once a week.  · Wear a medical alert bracelet or necklace, or carry a card in your wallet that says you have diabetes. This will help healthcare providers give you the right care if you get very ill and cannot tell them that you have diabetes.  Sick day plan  If you get a cold, the flu, or a bacterial or viral infection, take these steps:  · Look at your diabetes sick plan and call your healthcare provider as you were told to. You may need to call your provider right away if:  ¨ Your blood sugar is above 240 while taking your diabetes medicine  ¨ Your urine ketone levels are above normal or high  ¨ You have been vomiting more than 6 hours  ¨ You have trouble breathing or your breath ha s a fruity smell  ¨ You have a high fever  ¨ You have a fever for several days and you are not getting better  ¨ You get light-headed and are sleepier than usual  · Keep taking your diabetes pills (oral medicine) even if you have been vomiting and are feeling sick. Call your provider right away because you may need insulin to lower your blood sugar until you recover from your illness.  · Keep taking your insulin even if you have been vomiting and are feeling sick. Call your provider right away to ask if you need to change your insulin dose. This will depend on your blood sugar results.  · Check your blood  sugar every 2 to 4 hours, or at least 4 times a day.  · Check your ketones often. If you are vomiting and having diarrhea, watch them more often.  · Do not skip meals. Try to eat small meals on a regular schedule. Do this even if you do not feel like eating.  · Drink water or other liquids that do not have caffeine or calories. This will keep you from getting dehydrated. If you are nauseated or vomiting, takes small sips every 5 minutes. To prevent dehydration try to drink a cup (8 ounces) of fluids every hour while you are awake.  General care  Always bring a source of fast-acting sugar with you in case you have symptoms of low blood sugar (below 70). At the first sign of low blood sugar, eat or drink 15 to 20 grams of fast-acting sugar to raise your blood sugar. Examples are:  · 3 to 4 glucose tablets. You can buy these at most [employer].  · 4 ounces (1/2 cup) of regular (not diet) soft drinks  · 4 ounces (1/2 cup) of any fruit juice  · 8 ounces (1 cup) of milk  · 5 to 6 pieces of hard candy  · 1 tablespoon of honey  Check your blood sugar 15 minutes after treating yourself. If it is still below 70, take 15 to 20 more grams of fast-acting sugar. Test again in 15 minutes. If it returns to normal (70 or above), eat a snack or meal to keep your blood sugar in a safe range. If it stays low, call your doctor or go to an emergency room.  Follow-up care  Follow-up with your healthcare provider, or as advised. For more information about diabetes, visit the American Diabetes Association website at www.diabetes.org or call 864-263-6508.  When to seek medical advice  Call your healthcare provider right away if you have any of these symptoms of high blood sugar:  · Frequent urination  · Dizziness  · Drowsiness  · Thirst  · Headache  · Nausea or vomiting  · Abdominal pain  · Eyesight changes  · Fast breathing  · Confusion or loss of consciousness  Also call your provider right away if you have any of these signs of low blood  sugar:  · Fatigue  · Headache  · Shakes  · Excess sweating  · Hunger  · Feeling anxious or restless  · Eyesight changes  · Drowsiness  · Weakness  · Confusion or loss of consciousness  Call 911  Call for emergency help right away if any of these occur:  · Chest pain or shortness of breath  · Dizziness or fainting  · Weakness of an arm or leg or one side of the face  · Trouble speaking or seeing   Date Last Reviewed: 6/1/2016  © 0440-8054 [employer]. [address] [place] [address], [place] [address]. All rights reserved. This information is not intended as a substitute for professional medical care. Always follow your healthcare professional's instructions.        Controlling High Blood Pressure  High blood pressure (hypertension) is often called the silent killer. This is because many people who have it dont know it. High blood pressure is defined as 140/90 mm Hg or higher. Know your blood pressure and remember to check it regularly. Doing so can save your life. Here are some things you can do to help control your blood pressure.    Choose heart-healthy foods  · Select low-salt, low-fat foods. Limit sodium intake to 2,400 mg per day or the amount suggested by your healthcare provider.  · Limit canned, dried, cured, packaged, and fast foods. These can contain a lot of salt.  · Eat 8 to 10 servings of fruits and vegetables every day.  · Choose lean meats, fish, or chicken.  · Eat whole-grain pasta, brown rice, and beans.  · Eat 2 to 3 servings of low-fat or fat-free dairy products.  · Ask your doctor about the DASH eating plan. This plan helps reduce blood pressure.  · When you go to a restaurant, ask that your meal be prepared with no added salt.  Maintain a healthy weight  · Ask your healthcare provider how many calories to eat a day. Then stick to that number.  · Ask your healthcare provider what weight range is healthiest for you. If you are overweight, a weight loss of only 3% to 5% of your body  weight can help lower blood pressure. Generally, a good weight loss goal is to lose 10% of your body weight in a year.  · Limit snacks and sweets.  · Get regular exercise.  Get up and get active  · Choose activities you enjoy. Find ones you can do with friends or family. This includes bicycling, dancing, walking, and jogging.  · Park farther away from building entrances.  · Use stairs instead of the elevator.  · When you can, walk or bike instead of driving.  · [employer] [place] leaves, garden, or do household repairs.  · Be active at a moderate to vigorous level of physical activity for at least 40 minutes for a minimum of 3 to 4 days a week.   Manage stress  · Make time to relax and enjoy life. Find time to laugh.  · Communicate your concerns with your loved ones and your healthcare provider.  · Visit with family and friends, and keep up with hobbies.  Limit alcohol and quit smoking  · Men should have no more than 2 drinks per day.  · Women should have no more than 1 drink per day.  · Talk with your healthcare provider about quitting smoking. Smoking significantly increases your risk for heart disease and stroke. Ask your healthcare provider about community smoking cessation programs and other options.  Medicines  If lifestyle changes arent enough, your healthcare provider may prescribe high blood pressure medicine. Take all medicines as prescribed. If you have any questions about your medicines, ask your healthcare provider before stopping or changing them.   Date Last Reviewed: 4/27/2016  © 7745-9035 [email or web address]. [address] [place] [address], [place] [address]. All rights reserved. This information is not intended as a substitute for professional medical care. Always follow your healthcare professional's instructions.
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
## 2017-11-14 ENCOUNTER — TELEPHONE (OUTPATIENT)
Dept: FAMILY MEDICINE | Facility: CLINIC | Age: 67
End: 2017-11-14

## 2017-11-14 DIAGNOSIS — E78.5 HYPERLIPIDEMIA, UNSPECIFIED HYPERLIPIDEMIA TYPE: Primary | ICD-10-CM

## 2017-11-14 DIAGNOSIS — E11.9 CONTROLLED TYPE 2 DIABETES MELLITUS WITHOUT COMPLICATION, WITHOUT LONG-TERM CURRENT USE OF INSULIN: ICD-10-CM

## 2017-11-14 RX ORDER — ROSUVASTATIN CALCIUM 20 MG/1
20 TABLET, COATED ORAL NIGHTLY
Qty: 90 TABLET | Refills: 3 | Status: SHIPPED | OUTPATIENT
Start: 2017-11-14 | End: 2018-01-03 | Stop reason: SDUPTHER

## 2017-11-14 NOTE — TELEPHONE ENCOUNTER
----- Message from Tor Garg MD sent at 11/13/2017  8:03 PM CST -----  Chemistry panel and blood glucose control look excellent.  The BMP and A1c should be repeated in 4-6 months    Cholesterol levels are very high but slightly improved.  She is still well above the diabetic goal LDL of less than 100.  The American Heart Association recommends diabetics go on a statin to get the LDL below 100.    Results sent by email

## 2017-11-14 NOTE — TELEPHONE ENCOUNTER
No record of statin in epic, may have been older.  crestor sent to [facility].  Orders in for bmp, a1c, lipid recheck    Suggest take co-q-10 100mg daily for two weeks before starting crestor, then start every other day for the first month.  If no problems can go to daily after that.

## 2017-11-14 NOTE — TELEPHONE ENCOUNTER
Patient was read results. Is willing to go on statin. She was on one in the past from Dr Garg that caused leg cramps. Please send in something else to [facility]. Lab will be booked in 5 months

## 2017-11-14 NOTE — TELEPHONE ENCOUNTER
Patient notified stress echo is at [facility] 11/28/17 at 8:30- npo orders discussed.

## 2017-11-28 ENCOUNTER — HOSPITAL ENCOUNTER (OUTPATIENT)
Dept: CARDIOLOGY | Facility: HOSPITAL | Age: 67
Discharge: HOME | End: 2017-11-28
Attending: FAMILY MEDICINE
Payer: COMMERCIAL

## 2017-11-28 ENCOUNTER — PATIENT MESSAGE (OUTPATIENT)
Dept: FAMILY MEDICINE | Facility: CLINIC | Age: 67
End: 2017-11-28

## 2017-11-28 DIAGNOSIS — R07.9 CHEST PAIN, UNSPECIFIED TYPE: ICD-10-CM

## 2017-11-28 LAB
DIASTOLIC DYSFUNCTION: NO
GLOBAL PERICARDIAL EFFUSION: ABNORMAL
RETIRED EF AND QEF - SEE NOTES: 64 (ref 55–65)

## 2017-11-28 PROCEDURE — 93351 STRESS TTE COMPLETE: CPT

## 2017-11-28 PROCEDURE — 93351 STRESS TTE COMPLETE: CPT | Mod: 26,,, | Performed by: INTERNAL MEDICINE

## 2017-11-28 PROCEDURE — 93321 DOPPLER ECHO F-UP/LMTD STD: CPT | Mod: 26,,, | Performed by: INTERNAL MEDICINE

## 2017-11-29 DIAGNOSIS — M25.371 RIGHT ANKLE INSTABILITY: Primary | ICD-10-CM

## 2017-11-30 ENCOUNTER — OFFICE VISIT (OUTPATIENT)
Dept: ORTHOPEDICS | Facility: CLINIC | Age: 67
End: 2017-11-30
Payer: COMMERCIAL

## 2017-11-30 ENCOUNTER — HOSPITAL ENCOUNTER (OUTPATIENT)
Dept: RADIOLOGY | Facility: HOSPITAL | Age: 67
Discharge: HOME | End: 2017-11-30
Attending: ORTHOPAEDIC SURGERY
Payer: COMMERCIAL

## 2017-11-30 VITALS
HEART RATE: 93 BPM | WEIGHT: 195 LBS | DIASTOLIC BLOOD PRESSURE: 77 MMHG | BODY MASS INDEX: 32.49 KG/M2 | SYSTOLIC BLOOD PRESSURE: 171 MMHG | HEIGHT: 65 IN

## 2017-11-30 DIAGNOSIS — M19.079 ARTHRITIS OF ANKLE: Primary | ICD-10-CM

## 2017-11-30 DIAGNOSIS — M25.371 RIGHT ANKLE INSTABILITY: ICD-10-CM

## 2017-11-30 DIAGNOSIS — S86.301D INJURY OF PERONEAL TENDON OF RIGHT FOOT, SUBSEQUENT ENCOUNTER: ICD-10-CM

## 2017-11-30 PROCEDURE — 99999 PR PBB SHADOW E&M-EST. PATIENT-LVL III: CPT | Mod: PBBFAC,,, | Performed by: ORTHOPAEDIC SURGERY

## 2017-11-30 PROCEDURE — 73610 X-RAY EXAM OF ANKLE: CPT | Mod: TC,PN,RT

## 2017-11-30 PROCEDURE — 99214 OFFICE O/P EST MOD 30 MIN: CPT | Mod: S$GLB,,, | Performed by: ORTHOPAEDIC SURGERY

## 2017-11-30 PROCEDURE — 73610 X-RAY EXAM OF ANKLE: CPT | Mod: 26,RT,, | Performed by: RADIOLOGY

## 2017-11-30 NOTE — PROGRESS NOTES
Subjective:      Patient ID: Doris Pastrana is a 66 y.o. female.    Chief Complaint: Post-op Evaluation of the Right Ankle (DOS: 8-9-17/Ankle ATS; brostrom han; peroneal tenodesis )    She rates her pain as 5/10 today s/p right ankle arthroscopy with brostrom han and peroneal tenodesis. She still having pain and swelling but is now wearing a tennis shoe and it is definitely improving. She is doing well with PT.   Social History     Occupational History     [employer]     Social History Main Topics    Smoking status: Former Smoker     Packs/day: 1.00     Types: Cigarettes     Quit date: 3/12/1983    Smokeless tobacco: Never Used    Alcohol use Yes      Comment: socially    Drug use: No    Sexual activity: Not Currently            Objective:    Ortho Exam     RLE: Neurovascularly intact, incisions well healed, mild swelling.  No signs of infection.  Decreased range of motion.  Moderate tenderness to palpation lateral ankle. +swelling mainly lateral ankle.  tenderness to palpation ankle joint and along lateral incision.    Assessment:         s/p right ankle arthroscopy with brostrom han and peroneal tenodesis.        Encounter Diagnoses   Name Primary?    Arthritis of ankle Yes    Injury of peroneal tendon of right foot, subsequent encounter     Right ankle instability       Plan:     Continue PT for range of motion and strengthening of the right ankle and foot. We discussed that she had significant osteoarthritis in the ankle and that her peroneal pathology was severe so it may take quite  some time to improve and I reiniterated that swelling takes 6 months to a year to resolve.  Continue compression stockings.  F/u 3 months  xray of the right ankle.

## 2017-12-20 ENCOUNTER — OFFICE VISIT (OUTPATIENT)
Dept: FAMILY MEDICINE | Facility: CLINIC | Age: 67
End: 2017-12-20
Payer: COMMERCIAL

## 2017-12-20 VITALS
OXYGEN SATURATION: 94 % | DIASTOLIC BLOOD PRESSURE: 68 MMHG | WEIGHT: 202.38 LBS | RESPIRATION RATE: 18 BRPM | HEART RATE: 98 BPM | BODY MASS INDEX: 33.72 KG/M2 | TEMPERATURE: 99 F | SYSTOLIC BLOOD PRESSURE: 140 MMHG | HEIGHT: 65 IN

## 2017-12-20 DIAGNOSIS — J32.4 PANSINUSITIS, UNSPECIFIED CHRONICITY: Primary | ICD-10-CM

## 2017-12-20 PROCEDURE — 99214 OFFICE O/P EST MOD 30 MIN: CPT | Mod: S$GLB,,, | Performed by: FAMILY MEDICINE

## 2017-12-20 PROCEDURE — 99999 PR PBB SHADOW E&M-EST. PATIENT-LVL III: CPT | Mod: PBBFAC,,, | Performed by: FAMILY MEDICINE

## 2017-12-20 RX ORDER — HYDROCODONE POLISTIREX AND CHLORPHENIRAMINE POLISTIREX 10; 8 MG/5ML; MG/5ML
5 SUSPENSION, EXTENDED RELEASE ORAL EVERY 12 HOURS PRN
Qty: 115 ML | Refills: 0 | Status: SHIPPED | OUTPATIENT
Start: 2017-12-20 | End: 2017-12-30

## 2017-12-20 RX ORDER — AMOXICILLIN AND CLAVULANATE POTASSIUM 875; 125 MG/1; MG/1
1 TABLET, FILM COATED ORAL 2 TIMES DAILY
Qty: 20 TABLET | Refills: 0 | Status: SHIPPED | OUTPATIENT
Start: 2017-12-20 | End: 2017-12-30

## 2017-12-20 NOTE — PROGRESS NOTES
Subjective:       Patient ID: Doris Pastrana is a 66 y.o. female.    Chief Complaint: URI (X 2 weeks, head congestion, cough with light yellow sputum, left ear pain)    URI    Associated symptoms include congestion, coughing and a sore throat. Pertinent negatives include no abdominal pain, chest pain, nausea or rash.   Sinusitis   This is a new problem. The current episode started 1 to 4 weeks ago. The problem has been waxing and waning since onset. The maximum temperature recorded prior to her arrival was 100.4 - 100.9 F. The fever has been present for 1 to 2 days. The pain is moderate. Associated symptoms include congestion, coughing, shortness of breath, sinus pressure and a sore throat.     Review of Systems   Constitutional: Negative for fever.   HENT: Positive for congestion, sinus pressure and sore throat.    Respiratory: Positive for cough and shortness of breath.    Cardiovascular: Negative for chest pain.   Gastrointestinal: Negative for abdominal pain and nausea.   Skin: Negative for rash.   All other systems reviewed and are negative.      Objective:      Physical Exam   Constitutional: She appears well-developed. No distress.   HENT:   Right Ear: Tympanic membrane is not erythematous.   Left Ear: Tympanic membrane is not erythematous.   Nose: Mucosal edema present. Right sinus exhibits maxillary sinus tenderness and frontal sinus tenderness. Left sinus exhibits maxillary sinus tenderness and frontal sinus tenderness.   Mouth/Throat: Posterior oropharyngeal erythema present.   Neck: Neck supple.   Cardiovascular: Normal rate and regular rhythm.    No murmur heard.  Pulmonary/Chest: Effort normal and breath sounds normal.   Lymphadenopathy:     She has no cervical adenopathy.       Assessment:       1. Pansinusitis, unspecified chronicity        Plan:         Doris was seen today for uri.    Diagnoses and all orders for this visit:    Pansinusitis, unspecified chronicity  -     amoxicillin-clavulanate  875-125mg (AUGMENTIN) 875-125 mg per tablet; Take 1 tablet by mouth 2 (two) times daily. Take after meals.  -     hydrocodone-chlorpheniramine (TUSSIONEX) 10-8 mg/5 mL suspension; Take 5 mLs by mouth every 12 (twelve) hours as needed.

## 2018-01-03 DIAGNOSIS — E78.5 HYPERLIPIDEMIA, UNSPECIFIED HYPERLIPIDEMIA TYPE: ICD-10-CM

## 2018-01-03 DIAGNOSIS — E11.9 CONTROLLED TYPE 2 DIABETES MELLITUS WITHOUT COMPLICATION, WITHOUT LONG-TERM CURRENT USE OF INSULIN: ICD-10-CM

## 2018-01-03 RX ORDER — METFORMIN HYDROCHLORIDE 500 MG/1
TABLET, EXTENDED RELEASE ORAL
Qty: 360 TABLET | Refills: 0 | OUTPATIENT
Start: 2018-01-03

## 2018-01-03 RX ORDER — ROSUVASTATIN CALCIUM 20 MG/1
20 TABLET, COATED ORAL NIGHTLY
Qty: 90 TABLET | Refills: 3 | Status: SHIPPED | OUTPATIENT
Start: 2018-01-03 | End: 2018-04-12 | Stop reason: SDUPTHER

## 2018-01-03 RX ORDER — METFORMIN HYDROCHLORIDE 750 MG/1
750 TABLET, EXTENDED RELEASE ORAL 2 TIMES DAILY WITH MEALS
Qty: 180 TABLET | Refills: 1 | Status: SHIPPED | OUTPATIENT
Start: 2018-01-03 | End: 2018-07-23 | Stop reason: SDUPTHER

## 2018-01-03 RX ORDER — ROSUVASTATIN CALCIUM 20 MG/1
20 TABLET, COATED ORAL NIGHTLY
Qty: 90 TABLET | Refills: 3 | Status: CANCELLED | OUTPATIENT
Start: 2018-01-03 | End: 2019-01-03

## 2018-01-03 RX ORDER — GLIPIZIDE 5 MG/1
TABLET ORAL
Qty: 90 TABLET | Refills: 1 | Status: SHIPPED | OUTPATIENT
Start: 2018-01-03 | End: 2018-02-22 | Stop reason: SINTOL

## 2018-01-03 NOTE — TELEPHONE ENCOUNTER
Patient is now with [facility]. She is not sure why 500 mg was requested. She is not home to see what she is currently taking. Requested you [facility].   She was sent 90 day on crestor to [facility] in Nov. She will want the next refill to be at [facility]. Should she have you send it now?

## 2018-01-04 DIAGNOSIS — Z20.828 EXPOSURE TO INFLUENZA: Primary | ICD-10-CM

## 2018-01-04 RX ORDER — OSELTAMIVIR PHOSPHATE 75 MG/1
75 CAPSULE ORAL DAILY
Qty: 10 CAPSULE | Refills: 0 | Status: SHIPPED | OUTPATIENT
Start: 2018-01-04 | End: 2018-01-14

## 2018-02-05 ENCOUNTER — PATIENT MESSAGE (OUTPATIENT)
Dept: ORTHOPEDICS | Facility: CLINIC | Age: 68
End: 2018-02-05

## 2018-02-09 ENCOUNTER — TELEPHONE (OUTPATIENT)
Dept: ORTHOPEDICS | Facility: CLINIC | Age: 68
End: 2018-02-09

## 2018-02-09 NOTE — TELEPHONE ENCOUNTER
Lm for patient to return call. Needs to rs appt from 3/1/18 to 3/15/18 when Dr. Mendez returns to [place].

## 2018-02-12 ENCOUNTER — TELEPHONE (OUTPATIENT)
Dept: ORTHOPEDICS | Facility: CLINIC | Age: 68
End: 2018-02-12

## 2018-02-12 NOTE — TELEPHONE ENCOUNTER
Lm for patient to return call to rs appt from 3/1 to 3/15 as that is when Dr. Mendez will return to [place].

## 2018-02-12 NOTE — TELEPHONE ENCOUNTER
----- Message from Denise Ceja sent at 2/9/2018  4:54 PM CST -----  Contact: patient   Patient returning a missed call from [place]. Please advise.   Call back   Thank you!

## 2018-02-14 ENCOUNTER — TELEPHONE (OUTPATIENT)
Dept: ORTHOPEDICS | Facility: CLINIC | Age: 68
End: 2018-02-14

## 2018-02-14 NOTE — TELEPHONE ENCOUNTER
Lm for patient advising that Dr. Mendez will not be in [place] until 3/15/18. Third attempt to contact patient. Will cancel appt.

## 2018-02-19 ENCOUNTER — DOCUMENTATION ONLY (OUTPATIENT)
Dept: FAMILY MEDICINE | Facility: CLINIC | Age: 68
End: 2018-02-19

## 2018-02-19 DIAGNOSIS — Z78.0 ASYMPTOMATIC MENOPAUSAL STATE: ICD-10-CM

## 2018-02-19 DIAGNOSIS — E11.9 TYPE 2 DIABETES MELLITUS WITHOUT COMPLICATION, WITHOUT LONG-TERM CURRENT USE OF INSULIN: Primary | ICD-10-CM

## 2018-02-19 NOTE — PROGRESS NOTES
Pre-Visit Chart Review  For Appointment Scheduled on 2-22-18    Health Maintenance Due   Topic Date Due    DEXA SCAN  12/23/1992    Zoster Vaccine  12/23/2012    Influenza Vaccine  08/01/2017    Pneumococcal (65+) (2 of 2 - PPSV23) 12/23/2017    Eye Exam  12/23/2017

## 2018-02-22 ENCOUNTER — CLINICAL SUPPORT (OUTPATIENT)
Dept: FAMILY MEDICINE | Facility: CLINIC | Age: 68
End: 2018-02-22
Attending: FAMILY MEDICINE
Payer: COMMERCIAL

## 2018-02-22 ENCOUNTER — PATIENT MESSAGE (OUTPATIENT)
Dept: FAMILY MEDICINE | Facility: CLINIC | Age: 68
End: 2018-02-22

## 2018-02-22 VITALS
BODY MASS INDEX: 31.96 KG/M2 | TEMPERATURE: 99 F | RESPIRATION RATE: 12 BRPM | SYSTOLIC BLOOD PRESSURE: 130 MMHG | DIASTOLIC BLOOD PRESSURE: 90 MMHG | HEART RATE: 90 BPM | WEIGHT: 191.81 LBS | OXYGEN SATURATION: 94 % | HEIGHT: 65 IN

## 2018-02-22 DIAGNOSIS — Z78.0 MENOPAUSE: ICD-10-CM

## 2018-02-22 DIAGNOSIS — I10 GOOD HYPERTENSION CONTROL: ICD-10-CM

## 2018-02-22 DIAGNOSIS — E11.9 DIABETIC EYE EXAM: ICD-10-CM

## 2018-02-22 DIAGNOSIS — E11.9 TYPE 2 DIABETES MELLITUS WITHOUT COMPLICATION, WITHOUT LONG-TERM CURRENT USE OF INSULIN: ICD-10-CM

## 2018-02-22 DIAGNOSIS — E11.9 CONTROLLED TYPE 2 DIABETES MELLITUS WITHOUT COMPLICATION, WITHOUT LONG-TERM CURRENT USE OF INSULIN: Primary | ICD-10-CM

## 2018-02-22 DIAGNOSIS — Z23 IMMUNIZATION DUE: ICD-10-CM

## 2018-02-22 DIAGNOSIS — Z01.00 DIABETIC EYE EXAM: ICD-10-CM

## 2018-02-22 PROCEDURE — 90471 IMMUNIZATION ADMIN: CPT | Mod: S$GLB,,, | Performed by: FAMILY MEDICINE

## 2018-02-22 PROCEDURE — 99999 PR PBB SHADOW E&M-EST. PATIENT-LVL II: CPT | Mod: PBBFAC,,,

## 2018-02-22 PROCEDURE — 90732 PPSV23 VACC 2 YRS+ SUBQ/IM: CPT | Mod: S$GLB,,, | Performed by: FAMILY MEDICINE

## 2018-02-22 PROCEDURE — 92250 FUNDUS PHOTOGRAPHY W/I&R: CPT | Mod: S$GLB,,, | Performed by: OPTOMETRIST

## 2018-02-22 PROCEDURE — 99214 OFFICE O/P EST MOD 30 MIN: CPT | Mod: 25,S$GLB,, | Performed by: FAMILY MEDICINE

## 2018-02-22 PROCEDURE — 3008F BODY MASS INDEX DOCD: CPT | Mod: S$GLB,,, | Performed by: FAMILY MEDICINE

## 2018-02-22 PROCEDURE — 99999 PR PBB SHADOW E&M-EST. PATIENT-LVL V: CPT | Mod: PBBFAC,,, | Performed by: FAMILY MEDICINE

## 2018-02-22 RX ORDER — CEFDINIR 300 MG/1
300 CAPSULE ORAL 2 TIMES DAILY
COMMUNITY
Start: 2018-02-18 | End: 2018-03-15

## 2018-02-22 RX ORDER — FLUTICASONE PROPIONATE 50 MCG
1 SPRAY, SUSPENSION (ML) NASAL DAILY
COMMUNITY
End: 2018-12-21

## 2018-02-22 RX ORDER — MELOXICAM 7.5 MG/1
7.5 TABLET ORAL DAILY PRN
COMMUNITY
End: 2018-06-15

## 2018-02-22 RX ORDER — CETIRIZINE HYDROCHLORIDE 10 MG/1
10 TABLET ORAL DAILY PRN
COMMUNITY

## 2018-02-22 RX ORDER — TOPIRAMATE SPINKLE 25 MG/1
25 CAPSULE ORAL 2 TIMES DAILY
Status: ON HOLD | COMMUNITY
End: 2020-01-03

## 2018-02-22 RX ORDER — METFORMIN HYDROCHLORIDE 500 MG/1
500 TABLET, EXTENDED RELEASE ORAL
COMMUNITY
End: 2018-03-15

## 2018-02-22 NOTE — PATIENT INSTRUCTIONS
Walking for Fitness  Fitness walking has something for everyone, even people who are already fit. Walking is one of the safest ways to condition your body aerobically. It can boost energy, help you lose weight, and reduce stress.    Physical benefits  · Walking strengthens your heart and lungs, and tones your muscles.  · When walking, your feet land with less impact than in other sports. This reduces chances of muscle, bone, and joint injury.  · Regular walking improves your cholesterol levels and lowers your risk of heart disease. And it helps you control your blood sugar if you have diabetes.  · Walking is a weight-bearing activity, which helps maintain bone density. This can help prevent osteoporosis.  Personal rewards  · Taking walks can help you relax and manage stress. And fitness walking may make you feel better about yourself.  · Walking can help you sleep better at night and make you less likely to be depressed.  · Regular walking may help maintain your memory as you get older.  · Walking is a great way to spend extra time with friends and family members. Be sure to invite your dog along!  Q&A about fitness walking  Q: Will walking keep me fit?  A: Yes. Regular walking at the right pace gives you all the benefits of other aerobic activities, such as jogging and swimming.  Q: Will walking help me lose weight and keep it off?  A: Yes. Per mile, walking can burn as many calories as jogging. Your health care provider can help work walking into your weight-loss plan.  Q: Is walking safe for my health?  A: Yes. Walking is safe if you have high blood pressure, diabetes, heart disease, or other conditions. Talk to your healthcare provider before you start.  Date Last Reviewed: 4/1/2017 © 2000-2017 [employer]. [address] [place] [address], [place] [address]. All rights reserved. This information is not intended as a substitute for professional medical care. Always follow your healthcare professional's  instructions.        Weight Management: Getting Started  Healthy bodies come in all shapes and sizes. Not all bodies are made to be thin. For some people, a healthy weight is higher than the average weight listed on weight charts. Your healthcare provider can help you decide on a healthy weight for you.    Reasons to lose weight  Losing weight can help with some health problems, such as high blood pressure, heart disease, diabetes, sleep apnea, and arthritis. You may also feel more energy.  Set your long-term goal  Your goal doesn't even have to be a specific weight. You may decide on a fitness goal (such as being able to walk 10 miles a week), or a health goal (such as lowering your blood pressure). Choose a goal that is measurable and reasonable, so you know when you've reached it. A goal of reaching a BMI of less than 25 is not always reasonable (or possible).   Make an action plan  Habits dont change overnight. Setting your goals too high can leave you feeling discouraged if you cant reach them. Be realistic. Choose one or two small changes you can make now. Set an action plan for how you are going to make these changes. When you can stick to this plan, keep making a few more small changes. Taking small steps will help you stay on the path to success.  Track your progress  Write down your goals. Then, keep a daily record of your progress. Write down what you eat and how active you are. This record lets you look back on how much youve done. It may also help when youre feeling frustrated. Reward yourself for success. Even if you dont reach every goal, give yourself credit for what you do get done.  Get support  Encouragement from others can help make losing weight easier. Ask your family members and friends for support. They may even want to join you. Also look to your healthcare provider, registered dietitian, and  for help. Your local hospital can give you more information about  nutrition, exercise, and weight loss.  Date Last Reviewed: 1/31/2016  © 8251-2637 [employer]. [address] [place] [address], [place] [address]. All rights reserved. This information is not intended as a substitute for professional medical care. Always follow your healthcare professional's instructions.        Controlling High Blood Pressure  High blood pressure (hypertension) is often called the silent killer. This is because many people who have it dont know it. High blood pressure is defined as 140/90 mm Hg or higher. Know your blood pressure and remember to check it regularly. Doing so can save your life. Here are some things you can do to help control your blood pressure.    Choose heart-healthy foods  · Select low-salt, low-fat foods. Limit sodium intake to 2,400 mg per day or the amount suggested by your healthcare provider.  · Limit canned, dried, cured, packaged, and fast foods. These can contain a lot of salt.  · Eat 8 to 10 servings of fruits and vegetables every day.  · Choose lean meats, fish, or chicken.  · Eat whole-grain pasta, brown rice, and beans.  · Eat 2 to 3 servings of low-fat or fat-free dairy products.  · Ask your doctor about the DASH eating plan. This plan helps reduce blood pressure.  · When you go to a restaurant, ask that your meal be prepared with no added salt.  Maintain a healthy weight  · Ask your healthcare provider how many calories to eat a day. Then stick to that number.  · Ask your healthcare provider what weight range is healthiest for you. If you are overweight, a weight loss of only 3% to 5% of your body weight can help lower blood pressure. Generally, a good weight loss goal is to lose 10% of your body weight in a year.  · Limit snacks and sweets.  · Get regular exercise.  Get up and get active  · Choose activities you enjoy. Find ones you can do with friends or family. This includes bicycling, dancing, walking, and jogging.  · Park farther away from building  entrances.  · Use stairs instead of the elevator.  · When you can, walk or bike instead of driving.  · [place] leaves, garden, or do household repairs.  · Be active at a moderate to vigorous level of physical activity for at least 40 minutes for a minimum of 3 to 4 days a week.   Manage stress  · Make time to relax and enjoy life. Find time to laugh.  · Communicate your concerns with your loved ones and your healthcare provider.  · Visit with family and friends, and keep up with hobbies.  Limit alcohol and quit smoking  · Men should have no more than 2 drinks per day.  · Women should have no more than 1 drink per day.  · Talk with your healthcare provider about quitting smoking. Smoking significantly increases your risk for heart disease and stroke. Ask your healthcare provider about community smoking cessation programs and other options.  Medicines  If lifestyle changes arent enough, your healthcare provider may prescribe high blood pressure medicine. Take all medicines as prescribed. If you have any questions about your medicines, ask your healthcare provider before stopping or changing them.   Date Last Reviewed: 4/27/2016 © 2000-2017 [employer]. [address] [place] [address], [place] [address]. All rights reserved. This information is not intended as a substitute for professional medical care. Always follow your healthcare professional's instructions.

## 2018-02-22 NOTE — Clinical Note
OS image good, OD image out of focus and blurred. No apparent DM retinopathy, recommend comprehensive eye exam when convenient to check OD.

## 2018-02-22 NOTE — PROGRESS NOTES
Doris Pastrana is a 67 y.o. female here for a diabetic eye screening with non-dilated fundus photos per  Dr.Robert Garg    Patient cooperative?:  yes  Small pupils?:  no  Last eye exam: 2016    For exam results, see Encounter Report.

## 2018-02-22 NOTE — PROGRESS NOTES
Subjective:       Patient ID: Doris Pastrana is a 67 y.o. female.    Chief Complaint: Diabetes    67-year-old female coming in to recheck diabetes.  She is supposed to be on metformin XL or 750 twice daily that she thinks her pharmacy gave her the 500s instead.  Unfortunately she did not bring them in so we can confirm this.  Her pharmacy did request refills of 500s that she was not taking back in January and this was declined with the correct order sent in and confirmed through each scripts by the pharmacy.  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  She has been on glipizide 5 mg once daily and reports that her blood sugars are frequently getting down into the mid 40s.  She was interested in using Victoza instead of the Glipizide.  Her weight is down from her previous visit November, blood pressure is borderline and she is recovering from a flulike illness with bronchitis currently.  There is been somewhat painful in her household, her  has left and moved to [place].    Past Medical History:  No date: Acute sinus infection  No date: Allergy  No date: Anemia  No date: Arthritis  No date: Bronchitis  No date: Degenerative disc disease      Comment: lumbar, pain contract 2013  No date: Depression  16: Diabetes mellitus, type 2  No date: Fatty liver  16: Hyperlipidemia  No date: Hypertension  No date: Mitral valve prolapse  No date: Pleurisy  No date: Pneumonia  No date: PONV (postoperative nausea and vomiting)    Past Surgical History:  2017: ANKLE SURGERY      Comment: right ankle torn ligament  No date: APPENDECTOMY  No date:  SECTION  No date: CHOLECYSTECTOMY  8/13/15: COLONOSCOPY      Comment: Dr. Oliveira, five year recheck  No date: HYSTERECTOMY  No date: TONSILLECTOMY  No date: VAGINAL DELIVERY      Current Outpatient Prescriptions:     alprazolam (XANAX) 0.25 MG tablet, Use one 20-30 minutes before  flying (Patient taking differently: Use one 20-30 minutes before flying prn), Disp: 4 tablet, Rfl: 1    blood sugar diagnostic Strp, 2 strips by Misc.(Non-Drug; Combo Route) route Daily., Disp: 200 strip, Rfl: 3    blood-glucose meter kit, One Touch glucometer check glucose twice daily. If One Touch not covered dispense Accu-chek., Disp: 1 each, Rfl: 0    cefdinir (OMNICEF) 300 MG capsule, Take 300 mg by mouth 2 (two) times daily., Disp: , Rfl:     cetirizine (ZYRTEC) 10 MG tablet, Take 10 mg by mouth once daily., Disp: , Rfl:     cyclobenzaprine (FLEXERIL) 10 MG tablet, TAKE 1 TABLET BY MOUTH THREE TIMES DAILY AS NEEDED FOR MUSCLE SPASMS, Disp: 270 tablet, Rfl: 1    escitalopram oxalate (LEXAPRO) 10 MG tablet, Take 1 tablet (10 mg total) by mouth once daily., Disp: 90 tablet, Rfl: 3    fluticasone (FLONASE) 50 mcg/actuation nasal spray, 1 spray by Each Nare route once daily., Disp: , Rfl:     lancets 33 gauge Misc, 2 lancets by Misc.(Non-Drug; Combo Route) route Daily., Disp: 200 each, Rfl: 3    lisinopril 10 MG tablet, Take 1 tablet (10 mg total) by mouth once daily., Disp: 90 tablet, Rfl: 3    meclizine (ANTIVERT) 25 mg tablet, Take 1 tablet (25 mg total) by mouth 3 (three) times daily as needed., Disp: 30 tablet, Rfl: 5    meloxicam (MOBIC) 7.5 MG tablet, Take 7.5 mg by mouth daily as needed for Pain., Disp: , Rfl:     metFORMIN (GLUCOPHAGE-XR) 500 MG 24 hr tablet, Take 500 mg by mouth daily with breakfast., Disp: , Rfl: -QUESTIONABLE    OSELTAMIVIR PHOSPHATE (TAMIFLU ORAL), Take 75 mg by mouth 2 (two) times daily., Disp: , Rfl:     rosuvastatin (CRESTOR) 20 MG tablet, Take 1 tablet (20 mg total) by mouth every evening., Disp: 90 tablet, Rfl: 3    topiramate 25 mg capsule, Take 25 mg by mouth 2 (two) times daily., Disp: , Rfl:     metFORMIN (GLUCOPHAGE-XR) 750 MG 24 hr tablet, Take 1 tablet (750 mg total) by mouth 2 (two) times daily with meals., Disp: 180 tablet, Rfl: 1    DEXA SCAN due on  12/23/1992  Zoster Vaccine due on 12/23/2012-declines  Influenza Vaccine due on 08/01/2017-declines  Pneumococcal (65+)(2 of 2 - PPSV23) due on 12/23/2017  Eye Exam due on 12/23/2017  Urine Microalbumin due on 03/08/2018          Review of Systems   Constitutional: Negative for chills and fever.   HENT: Positive for congestion and voice change. Negative for rhinorrhea, sinus pain and sore throat.    Respiratory: Positive for cough. Negative for chest tightness and shortness of breath.    Cardiovascular: Negative for chest pain and palpitations.   Endocrine: Negative for polydipsia and polyuria.       Objective:      Physical Exam   Constitutional: She is oriented to person, place, and time. She appears well-developed. No distress.   Borderline blood pressure  Obese with BMI 31.9 she is down 4.1 pounds from November 11, 2017 when I last saw her   HENT:   Head: Normocephalic and atraumatic.   Cardiovascular: Normal rate and regular rhythm.    Neurological: She is alert and oriented to person, place, and time.   Skin: She is not diaphoretic.   Psychiatric: She has a normal mood and affect. Her behavior is normal. Thought content normal.   Nursing note and vitals reviewed.      Assessment:       1. Controlled type 2 diabetes mellitus without complication, without long-term current use of insulin    2. Diabetic eye exam    3. Menopause    4. Good hypertension control    5. Immunization due    6. BMI 31.0-31.9,adult        Plan:       1. Controlled type 2 diabetes mellitus without complication, without long-term current use of insulin  Having frequent hypoglycemic episodes.  Stop glipizide 5 mg daily.  Confirm she is taking the ordered metformin next are 750 twice daily and correct if not.  Then we'll check A1c and BMP along with a microalbumin in 3 months.  If her A1c goes up too high or if otherwise needed she would be agreeable to an injectable agent such as the Victoza or Trulicity  - Basic metabolic panel; Future  -  Hemoglobin A1c; Future  - Microalbumin/creatinine urine ratio; Future    2. Diabetic eye exam  - Ambulatory referral to Optometry    3. Menopause  - DXA Bone Density Spine And Hip; Future    4. Good hypertension control  No changes needed, monitor as she continues to lose weight    5. Immunization due  - (In Office Administered) Pneumococcal Polysaccharide Vaccine (23 Valent) (SQ/IM)    6. BMI 31.0-31.9,adult  Continue weight loss program         Patient readiness: acceptance and barriers:none    During the course of the visit the patient was educated and counseled about the following:     Diabetes:  Discussed general issues about diabetes pathophysiology and management.  Addressed ADA diet.  Encouraged aerobic exercise.  Reminded to get yearly retinal exam.  Hypertension:   Medication: no change.  Dietary sodium restriction.  Regular aerobic exercise.  Obesity:   General weight loss/lifestyle modification strategies discussed (elicit support from others; identify saboteurs; non-food rewards, etc).  Informal exercise measures discussed, e.g. taking stairs instead of elevator.  Regular aerobic exercise program discussed.    Goals: Diabetes: Maintain Hemoglobin A1C below 7, Hypertension: Reduce Blood Pressure and Obesity: Reduce calorie intake and BMI    Did patient meet goals/outcomes: Yes    The following self management tools provided: blood pressure log  blood glucose log  excercise log    Patient Instructions (the written plan) was given to the patient/family.     Time spent with patient: 30 minutes

## 2018-03-14 DIAGNOSIS — M19.079 ARTHRITIS OF ANKLE: Primary | ICD-10-CM

## 2018-03-15 ENCOUNTER — HOSPITAL ENCOUNTER (OUTPATIENT)
Dept: RADIOLOGY | Facility: HOSPITAL | Age: 68
Discharge: HOME | End: 2018-03-15
Attending: ORTHOPAEDIC SURGERY
Payer: COMMERCIAL

## 2018-03-15 ENCOUNTER — OFFICE VISIT (OUTPATIENT)
Dept: ORTHOPEDICS | Facility: CLINIC | Age: 68
End: 2018-03-15
Payer: COMMERCIAL

## 2018-03-15 VITALS
SYSTOLIC BLOOD PRESSURE: 187 MMHG | DIASTOLIC BLOOD PRESSURE: 86 MMHG | HEART RATE: 89 BPM | BODY MASS INDEX: 31.96 KG/M2 | HEIGHT: 65 IN | WEIGHT: 191.81 LBS

## 2018-03-15 DIAGNOSIS — M19.079 ARTHRITIS OF ANKLE: Primary | ICD-10-CM

## 2018-03-15 DIAGNOSIS — S86.301D INJURY OF PERONEAL TENDON OF RIGHT FOOT, SUBSEQUENT ENCOUNTER: ICD-10-CM

## 2018-03-15 DIAGNOSIS — M19.079 ARTHRITIS OF ANKLE: ICD-10-CM

## 2018-03-15 PROCEDURE — 3077F SYST BP >= 140 MM HG: CPT | Mod: CPTII,S$GLB,, | Performed by: ORTHOPAEDIC SURGERY

## 2018-03-15 PROCEDURE — 73610 X-RAY EXAM OF ANKLE: CPT | Mod: TC,PN,RT

## 2018-03-15 PROCEDURE — 99213 OFFICE O/P EST LOW 20 MIN: CPT | Mod: S$GLB,,, | Performed by: ORTHOPAEDIC SURGERY

## 2018-03-15 PROCEDURE — 99999 PR PBB SHADOW E&M-EST. PATIENT-LVL III: CPT | Mod: PBBFAC,,, | Performed by: ORTHOPAEDIC SURGERY

## 2018-03-15 PROCEDURE — 3079F DIAST BP 80-89 MM HG: CPT | Mod: CPTII,S$GLB,, | Performed by: ORTHOPAEDIC SURGERY

## 2018-03-15 PROCEDURE — 73610 X-RAY EXAM OF ANKLE: CPT | Mod: 26,RT,, | Performed by: RADIOLOGY

## 2018-03-15 NOTE — PROGRESS NOTES
Subjective:      Patient ID: Doris Pastrana is a 67 y.o. female.    Chief Complaint: Post-op Evaluation of the Right Ankle and Post-op Evaluation (s/p ankle ATS; Brostrom Han; Peroneal Tenodesis 8/9/17)    She rates her pain as 3/10 today s/p right ankle arthroscopy with brostrom han and peroneal tenodesis. She is walking with a regular shoe and still notes burning and pain lateral foot and ankle but I is improving.   Social History     Occupational History     [employer]     Social History Main Topics    Smoking status: Former Smoker     Packs/day: 1.00     Types: Cigarettes     Quit date: 3/12/1983    Smokeless tobacco: Never Used    Alcohol use Yes      Comment: socially    Drug use: No    Sexual activity: Not Currently            Objective:    Ortho Exam     RLE: Neurovascularly intact, incisions well healed, mild swelling.  No signs of infection. IMproving range of motion.  Mild tenderness to palpation lateral ankle.  Mild swelling mainly lateral ankle.  tenderness to palpation ankle joint and along lateral incision.    Assessment:         s/p right ankle arthroscopy with brostrom han and peroneal tenodesis.        Plan:     Continue HEP  PT for range of motion and strengthening of the right ankle and foot. 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  Continue compression stockings.  We discussed that she may benefit from injection. She would like an injection in May prior to going a trip. F/u prn.

## 2018-03-23 ENCOUNTER — OFFICE VISIT (OUTPATIENT)
Dept: OPTOMETRY | Facility: CLINIC | Age: 68
End: 2018-03-23
Attending: FAMILY MEDICINE
Payer: COMMERCIAL

## 2018-03-23 DIAGNOSIS — H04.123 DRY EYE SYNDROME, BILATERAL: ICD-10-CM

## 2018-03-23 DIAGNOSIS — H52.7 REFRACTIVE ERROR: ICD-10-CM

## 2018-03-23 DIAGNOSIS — E11.9 DIABETES MELLITUS WITHOUT OPHTHALMIC MANIFESTATIONS: Primary | ICD-10-CM

## 2018-03-23 DIAGNOSIS — H25.13 NUCLEAR SCLEROSIS, BILATERAL: ICD-10-CM

## 2018-03-23 PROCEDURE — 92015 DETERMINE REFRACTIVE STATE: CPT | Mod: S$GLB,,, | Performed by: OPTOMETRIST

## 2018-03-23 PROCEDURE — 99999 PR PBB SHADOW E&M-EST. PATIENT-LVL II: CPT | Mod: PBBFAC,,, | Performed by: OPTOMETRIST

## 2018-03-23 PROCEDURE — 92004 COMPRE OPH EXAM NEW PT 1/>: CPT | Mod: S$GLB,,, | Performed by: OPTOMETRIST

## 2018-03-23 NOTE — PROGRESS NOTES
HPI     Presenting Complaint: Pt here today for yearly diabetic eye exam and   refraction. Pt states blood sugar levels have been controlled.     Hemoglobin A1C       Date                     Value               Ref Range             Status                11/11/2017               5.8 (H)             4.0 - 5.6 %           Final                05/25/2017               7.2 (H)             4.5 - 6.2 %           Final                  03/08/2017               7.8 (H)             4.5 - 6.2 %           Final             Pt uses OTC readers for small print. Pt states distance has started   getting blurry over the last year.    DLE: 2 years     Ophthalmic medication / drops: Art tears as needed for dryness    (-) headaches  (-) diplopia   (-) flashes / (+) hx of floaters      Last edited by Tomás Burton, OD on 3/23/2018 10:52 AM. (History)            Assessment /Plan     For exam results, see Encounter Report.    Diabetes mellitus without ophthalmic manifestations    Nuclear sclerosis, bilateral    Refractive error    Dry eye syndrome, bilateral      DM type 2 w/o ocular retinopathy OU. Discussed possible ocular affects of uncontrolled blood sugar with patient. Recommended continued strong blood sugar control and continued care with PCP. Monitor yearly.     Mild NS OU. Discussed possible ocular affects of cataracts. Acceptable BCVA OU. Discussed treatment options. Surgery not recommended at this time. Monitor yearly.     Dispensed updated spectacle Rx. Discussed various spectacle lens options, pt wants to try PALs. Discussed adaptation period to new specs.  Demonstrated new spec Rx vs uncorrected vision in phoropter with patient satisfaction.    Mild KARL OU. Patient educated about ocular affects of dry eyes. Recommend OTC artificial tears two four times a day in OU. Discussed chronicity of KARL. RTC if symptoms not alleviated by continued use of artificial tears.       RTC in 1 year for comprehensive eye exam, or sooner prn.

## 2018-04-11 ENCOUNTER — LAB VISIT (OUTPATIENT)
Dept: LAB | Facility: HOSPITAL | Age: 68
End: 2018-04-11
Attending: FAMILY MEDICINE
Payer: COMMERCIAL

## 2018-04-11 DIAGNOSIS — E11.9 CONTROLLED TYPE 2 DIABETES MELLITUS WITHOUT COMPLICATION, WITHOUT LONG-TERM CURRENT USE OF INSULIN: ICD-10-CM

## 2018-04-11 DIAGNOSIS — E78.5 HYPERLIPIDEMIA, UNSPECIFIED HYPERLIPIDEMIA TYPE: ICD-10-CM

## 2018-04-11 LAB
ANION GAP SERPL CALC-SCNC: 7 MMOL/L
BUN SERPL-MCNC: 12 MG/DL
CALCIUM SERPL-MCNC: 9.5 MG/DL
CHLORIDE SERPL-SCNC: 103 MMOL/L
CHOLEST SERPL-MCNC: 173 MG/DL
CHOLEST/HDLC SERPL: 3.8 {RATIO}
CO2 SERPL-SCNC: 32 MMOL/L
CREAT SERPL-MCNC: 0.7 MG/DL
EST. GFR  (AFRICAN AMERICAN): >60 ML/MIN/1.73 M^2
EST. GFR  (NON AFRICAN AMERICAN): >60 ML/MIN/1.73 M^2
ESTIMATED AVG GLUCOSE: 146 MG/DL
GLUCOSE SERPL-MCNC: 121 MG/DL
HBA1C MFR BLD HPLC: 6.7 %
HDLC SERPL-MCNC: 45 MG/DL
HDLC SERPL: 26 %
LDLC SERPL CALC-MCNC: 93.8 MG/DL
NONHDLC SERPL-MCNC: 128 MG/DL
POTASSIUM SERPL-SCNC: 4.5 MMOL/L
SODIUM SERPL-SCNC: 142 MMOL/L
TRIGL SERPL-MCNC: 171 MG/DL

## 2018-04-11 PROCEDURE — 80061 LIPID PANEL: CPT

## 2018-04-11 PROCEDURE — 36415 COLL VENOUS BLD VENIPUNCTURE: CPT | Mod: PO

## 2018-04-11 PROCEDURE — 80048 BASIC METABOLIC PNL TOTAL CA: CPT

## 2018-04-11 PROCEDURE — 83036 HEMOGLOBIN GLYCOSYLATED A1C: CPT

## 2018-04-12 ENCOUNTER — TELEPHONE (OUTPATIENT)
Dept: FAMILY MEDICINE | Facility: CLINIC | Age: 68
End: 2018-04-12

## 2018-04-12 DIAGNOSIS — E78.5 HYPERLIPIDEMIA, UNSPECIFIED HYPERLIPIDEMIA TYPE: ICD-10-CM

## 2018-04-12 DIAGNOSIS — E11.9 CONTROLLED TYPE 2 DIABETES MELLITUS WITHOUT COMPLICATION, WITHOUT LONG-TERM CURRENT USE OF INSULIN: Primary | ICD-10-CM

## 2018-04-12 RX ORDER — ROSUVASTATIN CALCIUM 40 MG/1
40 TABLET, COATED ORAL NIGHTLY
Qty: 90 TABLET | Refills: 3 | Status: SHIPPED | OUTPATIENT
Start: 2018-04-12 | End: 2019-04-14 | Stop reason: SDUPTHER

## 2018-04-12 NOTE — TELEPHONE ENCOUNTER
Patient  Informed. She is willing to increase crestor to 40 mg. Please send rx to her [facility] on [address]. When should she pao labs? Need order.

## 2018-05-30 ENCOUNTER — TELEPHONE (OUTPATIENT)
Dept: FAMILY MEDICINE | Facility: CLINIC | Age: 68
End: 2018-05-30

## 2018-05-30 NOTE — TELEPHONE ENCOUNTER
----- Message from Hyacinth Lou sent at 5/30/2018 11:33 AM CDT -----  Contact: Self  Patient needs to know if the office can put orders in for a AC to do before her appt on Thursday     Please call back [phone]

## 2018-05-30 NOTE — TELEPHONE ENCOUNTER
Patient would like A1c lab scheduled. Informed patient labs are scheduled for 9/18. Patient would like them to be scheduled sooner. Informed patient when she comes to her appointment on 6/1/18 to bring her concerns up for a better clarification regarding her labs. Pt. Verbalized understanding.

## 2018-05-30 NOTE — TELEPHONE ENCOUNTER
----- Message from Yonatan Ledezma sent at 5/30/2018  1:25 PM CDT -----  Contact: Pt  .Type:  Patient Returning Call    Who Called: Fabian  Who Left Message for Patient: Doris  Does the patient know what this is regarding?:maybe about A1C  Best Call Back Number: [phone] (home)   Additional Information:

## 2018-06-01 ENCOUNTER — LAB VISIT (OUTPATIENT)
Dept: LAB | Facility: HOSPITAL | Age: 68
End: 2018-06-01
Attending: FAMILY MEDICINE
Payer: MEDICARE

## 2018-06-01 ENCOUNTER — OFFICE VISIT (OUTPATIENT)
Dept: FAMILY MEDICINE | Facility: CLINIC | Age: 68
End: 2018-06-01
Attending: FAMILY MEDICINE
Payer: MEDICARE

## 2018-06-01 VITALS
HEART RATE: 74 BPM | SYSTOLIC BLOOD PRESSURE: 134 MMHG | DIASTOLIC BLOOD PRESSURE: 80 MMHG | BODY MASS INDEX: 31.96 KG/M2 | HEIGHT: 65 IN | WEIGHT: 191.81 LBS | RESPIRATION RATE: 16 BRPM | TEMPERATURE: 98 F

## 2018-06-01 DIAGNOSIS — K52.9 CHRONIC DIARRHEA: ICD-10-CM

## 2018-06-01 DIAGNOSIS — R10.13 EPIGASTRIC PAIN: ICD-10-CM

## 2018-06-01 DIAGNOSIS — R10.13 EPIGASTRIC PAIN: Primary | ICD-10-CM

## 2018-06-01 DIAGNOSIS — F40.243 FEAR OF FLYING: ICD-10-CM

## 2018-06-01 PROCEDURE — 36415 COLL VENOUS BLD VENIPUNCTURE: CPT | Mod: PO

## 2018-06-01 PROCEDURE — 82150 ASSAY OF AMYLASE: CPT

## 2018-06-01 PROCEDURE — 80076 HEPATIC FUNCTION PANEL: CPT

## 2018-06-01 PROCEDURE — 99213 OFFICE O/P EST LOW 20 MIN: CPT | Mod: PBBFAC,PO | Performed by: FAMILY MEDICINE

## 2018-06-01 PROCEDURE — 99999 PR PBB SHADOW E&M-EST. PATIENT-LVL III: CPT | Mod: PBBFAC,,, | Performed by: FAMILY MEDICINE

## 2018-06-01 PROCEDURE — 99214 OFFICE O/P EST MOD 30 MIN: CPT | Mod: S$PBB,,, | Performed by: FAMILY MEDICINE

## 2018-06-01 PROCEDURE — 85025 COMPLETE CBC W/AUTO DIFF WBC: CPT

## 2018-06-01 RX ORDER — ALPRAZOLAM 0.25 MG/1
TABLET ORAL
Qty: 4 TABLET | Refills: 1 | Status: SHIPPED | OUTPATIENT
Start: 2018-06-01 | End: 2018-11-08 | Stop reason: SDUPTHER

## 2018-06-01 RX ORDER — CHOLESTYRAMINE 4 G/4.8G
1 POWDER, FOR SUSPENSION ORAL 3 TIMES DAILY
Qty: 30 PACKET | Refills: 5 | Status: SHIPPED | OUTPATIENT
Start: 2018-06-01 | End: 2018-12-21 | Stop reason: ALTCHOICE

## 2018-06-01 RX ORDER — OMEPRAZOLE 40 MG/1
40 CAPSULE, DELAYED RELEASE ORAL DAILY
Qty: 30 CAPSULE | Refills: 1 | Status: SHIPPED | OUTPATIENT
Start: 2018-06-01 | End: 2018-07-25 | Stop reason: SDUPTHER
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
## 2018-06-02 LAB
ALBUMIN SERPL BCP-MCNC: 4 G/DL
ALP SERPL-CCNC: 130 U/L
ALT SERPL W/O P-5'-P-CCNC: 22 U/L
AMYLASE SERPL-CCNC: 101 U/L
AST SERPL-CCNC: 17 U/L
BASOPHILS # BLD AUTO: 0.04 K/UL
BASOPHILS NFR BLD: 0.5 %
BILIRUB DIRECT SERPL-MCNC: 0.2 MG/DL
BILIRUB SERPL-MCNC: 0.6 MG/DL
DIFFERENTIAL METHOD: ABNORMAL
EOSINOPHIL # BLD AUTO: 0.2 K/UL
EOSINOPHIL NFR BLD: 2 %
ERYTHROCYTE [DISTWIDTH] IN BLOOD BY AUTOMATED COUNT: 14.9 %
HCT VFR BLD AUTO: 40.6 %
HGB BLD-MCNC: 12.4 G/DL
IMM GRANULOCYTES # BLD AUTO: 0.04 K/UL
IMM GRANULOCYTES NFR BLD AUTO: 0.5 %
LYMPHOCYTES # BLD AUTO: 2.3 K/UL
LYMPHOCYTES NFR BLD: 31.1 %
MCH RBC QN AUTO: 27.5 PG
MCHC RBC AUTO-ENTMCNC: 30.5 G/DL
MCV RBC AUTO: 90 FL
MONOCYTES # BLD AUTO: 0.5 K/UL
MONOCYTES NFR BLD: 6.6 %
NEUTROPHILS # BLD AUTO: 4.5 K/UL
NEUTROPHILS NFR BLD: 59.3 %
NRBC BLD-RTO: 0 /100 WBC
PLATELET # BLD AUTO: 249 K/UL
PMV BLD AUTO: 10.8 FL
PROT SERPL-MCNC: 7.2 G/DL
RBC # BLD AUTO: 4.51 M/UL
WBC # BLD AUTO: 7.53 K/UL

## 2018-06-14 ENCOUNTER — TELEPHONE (OUTPATIENT)
Dept: FAMILY MEDICINE | Facility: CLINIC | Age: 68
End: 2018-06-14

## 2018-06-14 NOTE — TELEPHONE ENCOUNTER
----- Message from Susan Redman sent at 6/14/2018  7:39 AM CDT -----  Call [phone] / pt has been up since 3 am with hip pain ... Can barely walk /asking to be seen today / next available 06/15 th

## 2018-06-15 ENCOUNTER — OFFICE VISIT (OUTPATIENT)
Dept: FAMILY MEDICINE | Facility: CLINIC | Age: 68
End: 2018-06-15
Attending: FAMILY MEDICINE
Payer: MEDICARE

## 2018-06-15 ENCOUNTER — HOSPITAL ENCOUNTER (OUTPATIENT)
Dept: RADIOLOGY | Facility: HOSPITAL | Age: 68
Discharge: HOME | End: 2018-06-15
Attending: FAMILY MEDICINE
Payer: MEDICARE

## 2018-06-15 ENCOUNTER — HOSPITAL ENCOUNTER (OUTPATIENT)
Dept: RADIOLOGY | Facility: CLINIC | Age: 68
Discharge: HOME | End: 2018-06-15
Attending: FAMILY MEDICINE
Payer: MEDICARE

## 2018-06-15 VITALS
OXYGEN SATURATION: 93 % | HEART RATE: 85 BPM | BODY MASS INDEX: 32.14 KG/M2 | WEIGHT: 192.88 LBS | TEMPERATURE: 99 F | RESPIRATION RATE: 18 BRPM | DIASTOLIC BLOOD PRESSURE: 78 MMHG | HEIGHT: 65 IN | SYSTOLIC BLOOD PRESSURE: 140 MMHG

## 2018-06-15 DIAGNOSIS — M75.111 INCOMPLETE TEAR OF RIGHT ROTATOR CUFF: ICD-10-CM

## 2018-06-15 DIAGNOSIS — M70.61 TROCHANTERIC BURSITIS OF RIGHT HIP: ICD-10-CM

## 2018-06-15 DIAGNOSIS — M25.511 CHRONIC RIGHT SHOULDER PAIN: ICD-10-CM

## 2018-06-15 DIAGNOSIS — G89.29 CHRONIC PAIN OF RIGHT ANKLE: ICD-10-CM

## 2018-06-15 DIAGNOSIS — G89.29 CHRONIC RIGHT SHOULDER PAIN: ICD-10-CM

## 2018-06-15 DIAGNOSIS — M25.571 CHRONIC PAIN OF RIGHT ANKLE: ICD-10-CM

## 2018-06-15 DIAGNOSIS — M70.61 TROCHANTERIC BURSITIS OF RIGHT HIP: Primary | ICD-10-CM

## 2018-06-15 PROCEDURE — 99215 OFFICE O/P EST HI 40 MIN: CPT | Mod: PBBFAC,25,PO | Performed by: FAMILY MEDICINE

## 2018-06-15 PROCEDURE — 99214 OFFICE O/P EST MOD 30 MIN: CPT | Mod: S$PBB,,, | Performed by: FAMILY MEDICINE

## 2018-06-15 PROCEDURE — 73030 X-RAY EXAM OF SHOULDER: CPT | Mod: 26,RT,S$GLB, | Performed by: RADIOLOGY

## 2018-06-15 PROCEDURE — 99999 PR PBB SHADOW E&M-EST. PATIENT-LVL V: CPT | Mod: PBBFAC,,, | Performed by: FAMILY MEDICINE

## 2018-06-15 PROCEDURE — 73502 X-RAY EXAM HIP UNI 2-3 VIEWS: CPT | Mod: 26,RT,S$GLB, | Performed by: RADIOLOGY

## 2018-06-15 PROCEDURE — 73502 X-RAY EXAM HIP UNI 2-3 VIEWS: CPT | Mod: TC,FY,PO,RT

## 2018-06-15 PROCEDURE — 73030 X-RAY EXAM OF SHOULDER: CPT | Mod: TC,FY,PO,RT

## 2018-06-15 RX ORDER — HYDROCODONE BITARTRATE AND ACETAMINOPHEN 5; 325 MG/1; MG/1
1 TABLET ORAL EVERY 6 HOURS PRN
Qty: 40 TABLET | Refills: 0 | Status: ON HOLD | OUTPATIENT
Start: 2018-06-15 | End: 2019-05-22 | Stop reason: SDUPTHER

## 2018-06-15 NOTE — PROGRESS NOTES
Subjective:       Patient ID: Doris Pastrana is a 67 y.o. female.    Chief Complaint: Hip Pain (right) and Shoulder Pain (right)    67-year-old female coming in with several concerns.  She has been having chronic pain in her right ankle and had a tendon surgery with Dr. Mendez.  She continues to have pain and dysfunction walking and is due for a follow-up with Dr. Mendez.  She has been having pain in her right shoulder on and off for several years and recently has had worsening pain posteriorly anteriorly and laterally.  She is unable to reach behind her to fasten her bra or scratch her back and has limited ability to abduct and flex the shoulder.  2 days ago she awakened and had pain in the right hip indicating the area of the greater trochanter that limited her walking and she was unable to go to work yesterday or today.  She doesn't recall any change in activity.  Her house has been raised and she goes up and down the stairs on average about 4 times a day but that has never been a problem.  She has not been sitting in any overstuffed chairs or bucket seats and her vehicle only requires a slight climb to get into it and has never bothered her before.  Pain in the hip does radiate down to the knee and maybe slightly below it but doesn't seem to have any radicular symptoms.  She has some ibuprofen 800s at home and did get partial relief using those.  Cannot tolerate tramadol or codeine due to nausea.    Past Medical History:  No date: Acute sinus infection  No date: Allergy  No date: Anemia  No date: Arthritis  No date: Bronchitis  No date: Degenerative disc disease      Comment: lumbar, pain contract 1/28/2013  No date: Depression  2/25/16: Diabetes mellitus, type 2  No date: Fatty liver  2/25/16: Hyperlipidemia  No date: Hypertension  No date: Mitral valve prolapse  No date: Pleurisy  No date: Pneumonia  No date: PONV (postoperative nausea and vomiting)    Past Surgical History:  08/09/2017: ANKLE SURGERY       Comment: right ankle torn ligament  No date: APPENDECTOMY  No date:  SECTION  No date: CHOLECYSTECTOMY  8/13/15: COLONOSCOPY      Comment: Dr. Oliveira, five year recheck  No date: HYSTERECTOMY  No date: TONSILLECTOMY  No date: VAGINAL DELIVERY    Current Outpatient Prescriptions on File Prior to Visit:  ALPRAZolam (XANAX) 0.25 MG tablet, Use one 20-30 minutes before flying prn, Disp: 4 tablet, Rfl: 1  blood sugar diagnostic Strp, 2 strips by Misc.(Non-Drug; Combo Route) route Daily., Disp: 200 strip, Rfl: 3  cetirizine (ZYRTEC) 10 MG tablet, Take 10 mg by mouth once daily., Disp: , Rfl:   cholestyramine-aspartame (PREVALITE) 4 gram PwPk, Take 1 packet (4 g total) by mouth 3 (three) times daily., Disp: 30 packet, Rfl: 5  cyclobenzaprine (FLEXERIL) 10 MG tablet, TAKE 1 TABLET BY MOUTH THREE TIMES DAILY AS NEEDED FOR MUSCLE SPASMS, Disp: 270 tablet, Rfl: 1  escitalopram oxalate (LEXAPRO) 10 MG tablet, Take 1 tablet (10 mg total) by mouth once daily., Disp: 90 tablet, Rfl: 3  fluticasone (FLONASE) 50 mcg/actuation nasal spray, 1 spray by Each Nare route once daily., Disp: , Rfl:   lancets 33 gauge Misc, 2 lancets by Misc.(Non-Drug; Combo Route) route Daily., Disp: 200 each, Rfl: 3  lisinopril 10 MG tablet, Take 1 tablet (10 mg total) by mouth once daily., Disp: 90 tablet, Rfl: 3  meclizine (ANTIVERT) 25 mg tablet, Take 1 tablet (25 mg total) by mouth 3 (three) times daily as needed., Disp: 30 tablet, Rfl: 5  metFORMIN (GLUCOPHAGE-XR) 750 MG 24 hr tablet, Take 1 tablet (750 mg total) by mouth 2 (two) times daily with meals., Disp: 180 tablet, Rfl: 1  omeprazole (PRILOSEC) 40 MG capsule, Take 1 capsule (40 mg total) by mouth once daily., Disp: 30 capsule, Rfl: 1  rosuvastatin (CRESTOR) 40 MG Tab, Take 1 tablet (40 mg total) by mouth every evening., Disp: 90 tablet, Rfl: 3  topiramate 25 mg capsule, Take 25 mg by mouth 2 (two) times daily., Disp: , Rfl:   blood-glucose meter kit, One Touch glucometer check glucose  twice daily. If One Touch not covered dispense Accu-chek., Disp: 1 each, Rfl: 0  (DISCONTINUED) meloxicam (MOBIC) 7.5 MG tablet, Take 7.5 mg by mouth daily as needed for Pain., Disp: , Rfl:     No current facility-administered medications on file prior to visit.           Review of Systems   Constitutional: Negative for activity change and unexpected weight change.   HENT: Negative for hearing loss, rhinorrhea and trouble swallowing.    Eyes: Negative for discharge and visual disturbance.   Respiratory: Positive for chest tightness. Negative for wheezing.    Cardiovascular: Positive for chest pain. Negative for palpitations.   Gastrointestinal: Positive for diarrhea. Negative for blood in stool, constipation and vomiting.   Endocrine: Positive for polydipsia. Negative for polyuria.   Genitourinary: Negative for difficulty urinating, dysuria, hematuria and menstrual problem.   Musculoskeletal: Positive for arthralgias, joint swelling and neck pain.   Neurological: Positive for weakness and headaches.   Psychiatric/Behavioral: Negative for confusion and dysphoric mood.       Objective:      Physical Exam   Constitutional: She is oriented to person, place, and time. She appears well-developed. No distress.   Borderline blood pressure  Obese with a BMI of 32.1 she is up 1.1 pounds from her last visit June 1, 2018   Musculoskeletal:        Right shoulder: She exhibits decreased range of motion, tenderness (Tenderness over supraspinatus, supraclavicular, and deltoid insertion), crepitus, pain, spasm and decreased strength. She exhibits no swelling, no effusion and no deformity.        Right hip: She exhibits decreased range of motion, decreased strength and bony tenderness (Very tender over the greater trochanter). She exhibits no swelling, no crepitus and no deformity.   Neurological: She is alert and oriented to person, place, and time.   Skin: She is not diaphoretic.   Psychiatric: She has a normal mood and affect. Her  behavior is normal. Judgment and thought content normal.   Nursing note and vitals reviewed.      Assessment:       1. Trochanteric bursitis of right hip    2. Chronic right shoulder pain    3. Incomplete tear of right rotator cuff    4. Chronic pain of right ankle        Plan:       1. Trochanteric bursitis of right hip  - X-Ray Hip 2 View Right; Future  - Ambulatory referral to Physical Medicine Rehab  - HYDROcodone-acetaminophen (NORCO) 5-325 mg per tablet; Take 1 tablet by mouth every 6 (six) hours as needed for Pain.  Dispense: 40 tablet; Refill: 0    2. Chronic right shoulder pain  - X-ray Shoulder 2 or More Views Right; Future  - Ambulatory referral to Physical Medicine Rehab    3. Incomplete tear of right rotator cuff  - X-ray Shoulder 2 or More Views Right; Future  - Ambulatory referral to Physical Medicine Rehab  - HYDROcodone-acetaminophen (NORCO) 5-325 mg per tablet; Take 1 tablet by mouth every 6 (six) hours as needed for Pain.  Dispense: 40 tablet; Refill: 0    4. Chronic pain of right ankle  - Ambulatory referral to Physical Medicine Rehab

## 2018-06-18 ENCOUNTER — INITIAL CONSULT (OUTPATIENT)
Dept: PHYSICAL MEDICINE AND REHAB | Facility: CLINIC | Age: 68
End: 2018-06-18
Attending: FAMILY MEDICINE
Payer: COMMERCIAL

## 2018-06-18 VITALS
HEIGHT: 65 IN | HEART RATE: 88 BPM | WEIGHT: 192 LBS | DIASTOLIC BLOOD PRESSURE: 100 MMHG | SYSTOLIC BLOOD PRESSURE: 155 MMHG | BODY MASS INDEX: 31.99 KG/M2

## 2018-06-18 DIAGNOSIS — G89.29 CHRONIC RIGHT SHOULDER PAIN: Primary | ICD-10-CM

## 2018-06-18 DIAGNOSIS — M25.511 CHRONIC RIGHT SHOULDER PAIN: Primary | ICD-10-CM

## 2018-06-18 DIAGNOSIS — M75.01 ADHESIVE CAPSULITIS OF RIGHT SHOULDER: ICD-10-CM

## 2018-06-18 PROCEDURE — 99243 OFF/OP CNSLTJ NEW/EST LOW 30: CPT | Mod: 25,S$GLB,, | Performed by: PHYSICAL MEDICINE & REHABILITATION

## 2018-06-18 PROCEDURE — 76881 US COMPL JOINT R-T W/IMG: CPT | Mod: S$GLB,,, | Performed by: PHYSICAL MEDICINE & REHABILITATION

## 2018-06-18 PROCEDURE — 99999 PR PBB SHADOW E&M-EST. PATIENT-LVL III: CPT | Mod: PBBFAC,,, | Performed by: PHYSICAL MEDICINE & REHABILITATION

## 2018-06-18 NOTE — LETTER
June 18, 2018      Tor Garg MD  [address]  [place] [address]           [facility]  [address] [place] [address]  [address]  [place] [address]  Phone: [phone]  Fax: [phone]          Patient: Doris Pastrana   MR Number: [id number]   YOB: 1950   Date of Visit: 6/18/2018       Dear Dr. Tor Garg:    Thank you for referring Doris Pastrana to me for evaluation. Attached you will find relevant portions of my assessment and plan of care.    If you have questions, please do not hesitate to call me. I look forward to following Doris Pastrana along with you.    Sincerely,    Boris Islas MD    Enclosure  CC:  No Recipients    If you would like to receive this communication electronically, please contact [email or web address] or [phone] to request more information on [employer] Link access.    For providers and/or their staff who would like to refer a patient to [facility], please contact us through our one-stop-shop provider referral line, [facility], at [phone].    If you feel you have received this communication in error or would no longer like to receive these types of communications, please e-mail [email or web address]

## 2018-06-18 NOTE — PROGRESS NOTES
[facility]    Consulting Provider: Tor Garg MD    CHIEF COMPLAINT:   Chief Complaint   Patient presents with    Shoulder Pain     right shoulder pain     HISTORY OF PRESENT ILLNESS: Doris Pastrana is a 67 y.o. female who presents to me today for evaluation of right shoulder pain. She reports she began to experience pain since February 2018. Initially, she was experiencing significant pain which gradually improved, but in the past few months had significantly worsened.   She describes the pain as sharp, stabbing, progressive, constant, 8/10 which is worse with overhead lifting, picking up objects or moving the arm behind her back. Pain is alleviated with ice and ibuprofen 800 mg. She has also been prescribed norco 5 mg for the pain. She also takes glucosamine chondroitin for joint health. She does experience occasional numbness and tingling down to the elbow. She has not tried any PT in the past. She did not receive any steroid injections in the past. She denies of any history of trauma, injury or any surgeries of the shoulder. She does report increased pain with lying down on the right but also attributes it to right hip pain.     Review of Systems   Constitutional: Negative for fever.   HENT: Negative for drooling.    Eyes: Negative for discharge.   Respiratory: Negative for choking.    Cardiovascular: Negative for chest pain.   Genitourinary: Negative for flank pain.   Skin: Negative for wound.   Allergic/Immunologic: Negative for immunocompromised state.   Neurological: Negative for tremors and syncope.   Psychiatric/Behavioral: Negative for behavioral problems.     Past Medical History:   Past Medical History:   Diagnosis Date    Acute sinus infection     Allergy     Anemia     Arthritis     Bronchitis     Degenerative disc disease     lumbar, pain contract 1/28/2013    Depression     Diabetes mellitus, type 2 2/25/16    Fatty liver     Hyperlipidemia 2/25/16     Hypertension     Mitral valve prolapse     Pleurisy     Pneumonia     PONV (postoperative nausea and vomiting)        Past Surgical History:   Past Surgical History:   Procedure Laterality Date    ANKLE SURGERY  2017    right ankle torn ligament    APPENDECTOMY       SECTION      CHOLECYSTECTOMY      COLONOSCOPY  8/13/15    Dr. Oliveira, five year recheck    HYSTERECTOMY      TONSILLECTOMY      VAGINAL DELIVERY         Family History:   Family History   Problem Relation Age of Onset    Arthritis Mother     Hypertension Mother     Vision loss Mother     Diabetes Mother     Alcohol abuse Father     Depression Father     Early death Father     Alcohol abuse Sister     Depression Sister     Cancer Sister         thyroid    Diabetes Sister     Depression Brother     Hypertension Brother     Cancer Brother         prostate/stomach    Diabetes Brother     Aneurysm Brother         aaa    Learning disabilities Daughter     Hypertension Maternal Grandmother     Heart disease Maternal Grandmother     Cancer Maternal Grandfather         stomach    Diabetes Paternal Grandmother     Heart disease Paternal Grandfather     Heart disease Maternal Uncle     Kidney disease Maternal Uncle     Kidney disease Paternal Aunt     Glaucoma Neg Hx        Medications:   Current Outpatient Prescriptions on File Prior to Visit   Medication Sig Dispense Refill    ALPRAZolam (XANAX) 0.25 MG tablet Use one 20-30 minutes before flying prn 4 tablet 1    blood sugar diagnostic Strp 2 strips by Misc.(Non-Drug; Combo Route) route Daily. 200 strip 3    blood-glucose meter kit One Touch glucometer check glucose twice daily. If One Touch not covered dispense Accu-chek. 1 each 0    cetirizine (ZYRTEC) 10 MG tablet Take 10 mg by mouth once daily.      cholestyramine-aspartame (PREVALITE) 4 gram PwPk Take 1 packet (4 g total) by mouth 3 (three) times daily. 30 packet 5    cyclobenzaprine (FLEXERIL) 10  MG tablet TAKE 1 TABLET BY MOUTH THREE TIMES DAILY AS NEEDED FOR MUSCLE SPASMS 270 tablet 1    escitalopram oxalate (LEXAPRO) 10 MG tablet Take 1 tablet (10 mg total) by mouth once daily. 90 tablet 3    fluticasone (FLONASE) 50 mcg/actuation nasal spray 1 spray by Each Nare route once daily.      HYDROcodone-acetaminophen (NORCO) 5-325 mg per tablet Take 1 tablet by mouth every 6 (six) hours as needed for Pain. 40 tablet 0    lancets 33 gauge Misc 2 lancets by Misc.(Non-Drug; Combo Route) route Daily. 200 each 3    lisinopril 10 MG tablet Take 1 tablet (10 mg total) by mouth once daily. 90 tablet 3    meclizine (ANTIVERT) 25 mg tablet Take 1 tablet (25 mg total) by mouth 3 (three) times daily as needed. 30 tablet 5    metFORMIN (GLUCOPHAGE-XR) 750 MG 24 hr tablet Take 1 tablet (750 mg total) by mouth 2 (two) times daily with meals. 180 tablet 1    omeprazole (PRILOSEC) 40 MG capsule Take 1 capsule (40 mg total) by mouth once daily. 30 capsule 1    rosuvastatin (CRESTOR) 40 MG Tab Take 1 tablet (40 mg total) by mouth every evening. 90 tablet 3    topiramate 25 mg capsule Take 25 mg by mouth 2 (two) times daily.       No current facility-administered medications on file prior to visit.        Allergies:   Review of patient's allergies indicates:   Allergen Reactions    Codeine Nausea Only    Rhubarb Swelling    Strawberries [strawberry] Hives    Iodine Rash       Other reaction(s): Nausea    Latex, natural rubber Rash    Pravastatin Other (See Comments)     Muscle pain        Social History:   Social History     Social History    Marital status:      Spouse name: N/A    Number of children: N/A    Years of education: N/A     Occupational History     [employer]     Social History Main Topics    Smoking status: Former Smoker     Packs/day: 1.00     Types: Cigarettes     Quit date: 3/12/1983    Smokeless tobacco: Never Used    Alcohol use Yes      Comment: socially    Drug use: No     "Sexual activity: Not Currently     Other Topics Concern    None     Social History Narrative    None     Doris lives alone in [place], [place]. 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     PHYSICAL EXAMINATION:   General    Vitals:    06/18/18 1258   Weight: 87.1 kg (192 lb)   Height: 5' 5" (1.651 m)     Constitutional: Oriented to person, place, and time. No apparent distress. Appears well-developed and well-nourished. Pleasant.  HENT:   Head: Normocephalic and atraumatic.   Eyes: Right eye exhibits no discharge. Left eye exhibits no discharge. No scleral icterus.   Pulmonary/Chest: Effort normal. No respiratory distress.   Abdominal: There is no guarding.   Neurological: Alert and oriented to person, place, and time.   Psychiatric: Behavior is normal.   Right Shoulder Exam     Tenderness   The patient is experiencing tenderness in the biceps tendon and acromioclavicular joint.    Range of Motion   Active Abduction: 90   Passive Abduction: 90   Extension: 10   Forward Flexion: 100   External Rotation: 50   Internal Rotation 90 degrees: 50     Muscle Strength   Abduction: 4/5   Internal Rotation: 4/5   External Rotation: 5/5   Supraspinatus: 4/5   Subscapularis: 4/5   Biceps: 5/5     Tests   Drop Arm: negative  Hawkin's test: positive  Impingement: positive  Sulcus: absent    Other   Erythema: absent  Scars: absent  Sensation: normal  Pulse: present    Comments:  Negative speed's, negative o'mandi's, pain with active and passive ROM      Left Shoulder Exam   Left shoulder exam is normal.    Tenderness   The patient is experiencing no tenderness.         Range of Motion   Active Abduction: 170   Passive Abduction: 170   Extension: 50   Forward Flexion: 180     Muscle Strength   Abduction: 5/5   Internal Rotation: 5/5   External Rotation: 5/5   Supraspinatus: 5/5   Subscapularis: 5/5   Biceps: 5/5     Tests   Cross Arm: negative  Hawkin's " test: negative  Impingement: negative    Other   Erythema: absent  Scars: absent  Sensation: normal  Pulse: present         INSPECTION: There is no swelling, ecchymoses, erythema or gross deformity of the shoulders.    Imaging  X-ray of the right shoulder from 06/15/18  FINDINGS:  There is osteopenia present.  There is moderate hypertrophic degenerative change of the right acromioclavicular joint.  No fracture, dislocation, or osseous destructive process.  No calcific tendinitis.  There is a calcified granuloma present in the right upper lobe.  There is degenerative change of the thoracic spine.   Impression       Degenerative change of the right acromioclavicular joint.     Diagnostic Ultrasound Exam:  FINDINGS: Real time examination was performed. Selected static and dynamic images were obtained, and correlated with prior x-ray and other available imaging.     The right shoulder was examined and findings were as follows: the long head of the biceps tendon was observed to have a normal fibular appearance and was positioned appropriately in the bicipital groove. There was a small amount of hypoechoic fluid surrounding the tendon. The biceps tendon did not reveal subluxation on dynamic imaging. There is no visible evidence for coracohumeral impingement. The acromioclavicular joint has some cortical irregularity as well as hypoechoic swelling.     The rotator cuff was examined in detail. The subscapularis is normal in echotexture. The supraspinatus was abnormal with a mild degree of heterogenous echotexture consistent with tendinopathy. There was no significant cortical irregularity of the greater tuberosity. The infraspinatus was heterogenous in internal echotexture consistent with tendonopathy. The teres minor was not assessed. The subacromial/subdeltoid bursa was thickened.     There does not appear to be any effusion within the posterior glenohumeral recess. The posterosuperior glenoid labrum is wnl . The  "spinoglenoid notch is normal, without a suprascapular ganglion cyst.    Data Reviewed: X-ray, ultrasound    Supportive Actions: Independent visualization of images or test specimens    ASSESSMENT:   1. Chronic right shoulder pain      PLAN:     1. Time was spent reviewing the above diagnosis in depth with Doris today, including acute management and rehabilitation.     2. Based on the history, physical exam and ultrasound exam findings, her pain is most consistent with adhesive capsulitis.  Diagnostic ultrasound exam today failed to show any significant rotator cuff tearing.  I recommended conservative care for her condition the form of injection therapy and physical therapy.    3. I recommend formal physical therapy for adhesive capsulitis, and we will help facilitate her getting set up with therapy and then transitioning to a home exercise program.    4. We discussed the option of a corticosteroid injection, but patient would like to return for the injection as she is experiencing increased anxiety with receiving an injection today.    5. RTC for right glenohumeral steroid injection under ultrasound guidance, as well as addressing of her right hip pain.    This is a consult from Dr. Tor Garg. Please see the "Communications" section of Epic to see how the consulting physician received the report of today's findings and recommendations. If it's an [facility] physician, it will be forwarded to his/her "in basket".    The above note was completed, in part, with the aid of Dragon dictation software/hardware. Translation errors may be present.    "

## 2018-06-25 ENCOUNTER — OFFICE VISIT (OUTPATIENT)
Dept: PHYSICAL MEDICINE AND REHAB | Facility: CLINIC | Age: 68
End: 2018-06-25
Payer: MEDICARE

## 2018-06-25 VITALS
HEART RATE: 91 BPM | WEIGHT: 192 LBS | HEIGHT: 65 IN | BODY MASS INDEX: 31.99 KG/M2 | DIASTOLIC BLOOD PRESSURE: 98 MMHG | SYSTOLIC BLOOD PRESSURE: 152 MMHG

## 2018-06-25 DIAGNOSIS — M70.61 GREATER TROCHANTERIC BURSITIS OF BOTH HIPS: ICD-10-CM

## 2018-06-25 DIAGNOSIS — M75.01 ADHESIVE CAPSULITIS OF RIGHT SHOULDER: Primary | ICD-10-CM

## 2018-06-25 DIAGNOSIS — M70.62 GREATER TROCHANTERIC BURSITIS OF BOTH HIPS: ICD-10-CM

## 2018-06-25 PROCEDURE — 96372 THER/PROPH/DIAG INJ SC/IM: CPT | Mod: PBBFAC,PN | Performed by: PHYSICAL MEDICINE & REHABILITATION

## 2018-06-25 PROCEDURE — 20611 DRAIN/INJ JOINT/BURSA W/US: CPT | Mod: PBBFAC,PN | Performed by: PHYSICAL MEDICINE & REHABILITATION

## 2018-06-25 PROCEDURE — 99999 PR PBB SHADOW E&M-EST. PATIENT-LVL III: CPT | Mod: PBBFAC,,, | Performed by: PHYSICAL MEDICINE & REHABILITATION

## 2018-06-25 PROCEDURE — 99212 OFFICE O/P EST SF 10 MIN: CPT | Mod: 25,S$PBB,, | Performed by: PHYSICAL MEDICINE & REHABILITATION

## 2018-06-25 PROCEDURE — 99213 OFFICE O/P EST LOW 20 MIN: CPT | Mod: PBBFAC,PN,25 | Performed by: PHYSICAL MEDICINE & REHABILITATION

## 2018-06-25 RX ORDER — TRIAMCINOLONE ACETONIDE 40 MG/ML
40 INJECTION, SUSPENSION INTRA-ARTICULAR; INTRAMUSCULAR
Status: DISCONTINUED | OUTPATIENT
Start: 2018-06-25 | End: 2018-06-25 | Stop reason: HOSPADM

## 2018-06-25 RX ADMIN — TRIAMCINOLONE ACETONIDE 40 MG: 40 INJECTION, SUSPENSION INTRA-ARTICULAR; INTRAMUSCULAR at 11:06

## 2018-06-25 NOTE — PROCEDURES
Large Joint Aspiration/Injection  Date/Time: 6/25/2018 11:12 AM  Performed by: MARCY HEATH  Authorized by: MARCY HEATH     Consent Done?:  Yes (Verbal)  Indications:  Pain  Procedure site marked: Yes    Timeout: Prior to procedure the correct patient, procedure, and site was verified      Location:  Shoulder  Site:  R glenohumeral  Prep: Patient was prepped and draped in usual sterile fashion    Ultrasonic Guidance for needle placement: Yes  Images are saved and documented.  Needle size:  22 G  Approach: needle in plane, lat to med.  Medications:  40 mg triamcinolone acetonide 40 mg/mL  Patient tolerance:  Patient tolerated the procedure well with no immediate complications    Additional Comments: Ultrasound guidance was used for correct needle placement, the images were saved will be uploaded to EMR.

## 2018-06-25 NOTE — PROGRESS NOTES
[facility]    CHIEF COMPLAINT:   Chief Complaint   Patient presents with    Hip Pain     HISTORY OF PRESENT ILLNESS: Doris Pastrana is a 67 y.o. female who presents to me today for initial evaluation of right hip pain and f/u evaluation of right shoulder pain.     She was previously seen on 6/15/18 for right shoulder pain and was diagnosed with adhesive capsulitis.  We discussed the option of a corticosteroid injection, but patient wanted to return for the injection as she was experiencing increased anxiety with receiving an injection that day.  She was also sent to PT. She returns for the shoulder injection and to address her right hip pain.     Today, she notes right lateral hip pain. Pain is localized posterior to the right GTB. Pain has been present for about a year and began insidiously. She accredits the pain to her not walking properly after a right peroneal tendon surgical repair last year. Aggravated by laying on that side. She takes flexeril and occasionally norco. Pain radiates down the right leg to the calf, laterally. Right leg falls asleep about once a week. Occasional weakness in the left arm/shoulder.     Previous HPI 6/15/18: She reports she began to experience pain since February 2018. Initially, she was experiencing significant pain which gradually improved, but in the past few months had significantly worsened.   She describes the pain as sharp, stabbing, progressive, constant, 8/10 which is worse with overhead lifting, picking up objects or moving the arm behind her back. Pain is alleviated with ice and ibuprofen 800 mg. She has also been prescribed norco 5 mg for the pain. She also takes glucosamine chondroitin for joint health. She does experience occasional numbness and tingling down to the elbow. She has not tried any PT in the past. She did not receive any steroid injections in the past. She denies of any history of trauma, injury or any surgeries of the shoulder. She  does report increased pain with lying down on the right but also attributes it to right hip pain.     Review of Systems   Constitutional: Negative for fever.   HENT: Negative for drooling.    Eyes: Negative for discharge.   Respiratory: (+) choking and trouble swallowing.    Cardiovascular: (+) chest pain and mitral valve prolapse.   Genitourinary: (+) for flank pain.   Skin: Negative for wound.   Allergic/Immunologic: Negative for immunocompromised state.   Neurological: Negative for tremors and syncope.   Psychiatric/Behavioral: Negative for behavioral problems.     Past Medical History:   Past Medical History:   Diagnosis Date    Acute sinus infection     Allergy     Anemia     Arthritis     Bronchitis     Degenerative disc disease     lumbar, pain contract 2013    Depression     Diabetes mellitus, type 2 16    Fatty liver     Hyperlipidemia 16    Hypertension     Mitral valve prolapse     Pleurisy     Pneumonia     PONV (postoperative nausea and vomiting)        Past Surgical History:   Past Surgical History:   Procedure Laterality Date    ANKLE SURGERY  2017    right ankle torn ligament    APPENDECTOMY       SECTION      CHOLECYSTECTOMY      COLONOSCOPY  8/13/15    Dr. Oliveira, five year recheck    HYSTERECTOMY      TONSILLECTOMY      VAGINAL DELIVERY         Family History:   Family History   Problem Relation Age of Onset    Arthritis Mother     Hypertension Mother     Vision loss Mother     Diabetes Mother     Alcohol abuse Father     Depression Father     Early death Father     Alcohol abuse Sister     Depression Sister     Cancer Sister         thyroid    Diabetes Sister     Depression Brother     Hypertension Brother     Cancer Brother         prostate/stomach    Diabetes Brother     Aneurysm Brother         aaa    Learning disabilities Daughter     Hypertension Maternal Grandmother     Heart disease Maternal Grandmother     Cancer  Maternal Grandfather         stomach    Diabetes Paternal Grandmother     Heart disease Paternal Grandfather     Heart disease Maternal Uncle     Kidney disease Maternal Uncle     Kidney disease Paternal Aunt     Glaucoma Neg Hx        Medications:   Current Outpatient Prescriptions on File Prior to Visit   Medication Sig Dispense Refill    ALPRAZolam (XANAX) 0.25 MG tablet Use one 20-30 minutes before flying prn 4 tablet 1    blood sugar diagnostic Strp 2 strips by Misc.(Non-Drug; Combo Route) route Daily. 200 strip 3    blood-glucose meter kit One Touch glucometer check glucose twice daily. If One Touch not covered dispense Accu-chek. 1 each 0    cetirizine (ZYRTEC) 10 MG tablet Take 10 mg by mouth once daily.      cholestyramine-aspartame (PREVALITE) 4 gram PwPk Take 1 packet (4 g total) by mouth 3 (three) times daily. 30 packet 5    cyclobenzaprine (FLEXERIL) 10 MG tablet TAKE 1 TABLET BY MOUTH THREE TIMES DAILY AS NEEDED FOR MUSCLE SPASMS 270 tablet 1    escitalopram oxalate (LEXAPRO) 10 MG tablet Take 1 tablet (10 mg total) by mouth once daily. 90 tablet 3    fluticasone (FLONASE) 50 mcg/actuation nasal spray 1 spray by Each Nare route once daily.      HYDROcodone-acetaminophen (NORCO) 5-325 mg per tablet Take 1 tablet by mouth every 6 (six) hours as needed for Pain. 40 tablet 0    lancets 33 gauge Misc 2 lancets by Misc.(Non-Drug; Combo Route) route Daily. 200 each 3    lisinopril 10 MG tablet Take 1 tablet (10 mg total) by mouth once daily. 90 tablet 3    meclizine (ANTIVERT) 25 mg tablet Take 1 tablet (25 mg total) by mouth 3 (three) times daily as needed. 30 tablet 5    metFORMIN (GLUCOPHAGE-XR) 750 MG 24 hr tablet Take 1 tablet (750 mg total) by mouth 2 (two) times daily with meals. 180 tablet 1    omeprazole (PRILOSEC) 40 MG capsule Take 1 capsule (40 mg total) by mouth once daily. 30 capsule 1    rosuvastatin (CRESTOR) 40 MG Tab Take 1 tablet (40 mg total) by mouth every evening. 90  "tablet 3    topiramate 25 mg capsule Take 25 mg by mouth 2 (two) times daily.       No current facility-administered medications on file prior to visit.        Allergies:   Review of patient's allergies indicates:   Allergen Reactions    Codeine Nausea Only    Rhubarb Swelling    Strawberries [strawberry] Hives    Iodine Rash       Other reaction(s): Nausea    Latex, natural rubber Rash    Pravastatin Other (See Comments)     Muscle pain        Social History:   Social History     Social History    Marital status:      Spouse name: N/A    Number of children: N/A    Years of education: N/A     Occupational History     [employer]     Social History Main Topics    Smoking status: Former Smoker     Packs/day: 1.00     Types: Cigarettes     Quit date: 3/12/1983    Smokeless tobacco: Never Used    Alcohol use Yes      Comment: socially    Drug use: No    Sexual activity: Not Currently     Other Topics Concern    None     Social History Narrative    None     Doris lives alone in [place], [place]. 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     PHYSICAL EXAMINATION:   General    Vitals:    06/25/18 0831   BP: (!) 152/98   Pulse: 91   Weight: 87.1 kg (192 lb)   Height: 5' 5" (1.651 m)     Constitutional: No apparent distress. Appears well-developed and well-nourished. Pleasant.  HENT:   Head: Normocephalic and atraumatic.   Eyes: Right eye exhibits no discharge. Left eye exhibits no discharge. No scleral icterus.   Pulmonary/Chest: Effort normal. No respiratory distress.   Abdominal: There is no guarding.   Neurological: Alert.   Psychiatric: Behavior is normal.   Right Hip Exam     Tenderness   The patient is experiencing tenderness in the greater trochanter.    Range of Motion   Flexion: 110   Internal Rotation: 30   External Rotation: 60     Muscle Strength   Abduction: 5/5   Adduction: 5/5   Flexion: 5/5     Tests   CLAUDIO: " negative    Other   Erythema: absent  Scars: absent  Sensation: decreased (lateral ankle area)    Comments:  Neg log roll      Left Hip Exam     Tenderness   The patient is experiencing tenderness in the greater trochanter.    Muscle Strength   Abduction: 5/5   Adduction: 5/5   Flexion: 5/5     Tests   CLAUDIO: negative    Other   Erythema: absent  Scars: absent  Sensation: normal        INSPECTION: There is no swelling, ecchymoses, erythema or gross deformity of the shoulders.    Imaging  X-ray of the right shoulder from 06/15/18  FINDINGS:  There is osteopenia present.  There is moderate hypertrophic degenerative change of the right acromioclavicular joint.  No fracture, dislocation, or osseous destructive process.  No calcific tendinitis.  There is a calcified granuloma present in the right upper lobe.  There is degenerative change of the thoracic spine.   Impression       Degenerative change of the right acromioclavicular joint.     Right Hip X-ray 6/15/18:  There is minor degenerative change of the symphysis pubis.  There is degenerative facet arthropathy in the lower lumbar spine.  There is minor degenerative change of the sacroiliac joints.  No osteonecrosis of the femoral head.  No acute fracture or osseous destructive process appreciated.  No hip joint space narrowing.    Diagnostic Ultrasound Exam at previous visit:  FINDINGS: Real time examination was performed. Selected static and dynamic images were obtained, and correlated with prior x-ray and other available imaging.     The right shoulder was examined and findings were as follows: the long head of the biceps tendon was observed to have a normal fibular appearance and was positioned appropriately in the bicipital groove. There was a small amount of hypoechoic fluid surrounding the tendon. The biceps tendon did not reveal subluxation on dynamic imaging. There is no visible evidence for coracohumeral impingement. The acromioclavicular joint has some  cortical irregularity as well as hypoechoic swelling.     The rotator cuff was examined in detail. The subscapularis is normal in echotexture. The supraspinatus was abnormal with a mild degree of heterogenous echotexture consistent with tendinopathy. There was no significant cortical irregularity of the greater tuberosity. The infraspinatus was heterogenous in internal echotexture consistent with tendonopathy. The teres minor was not assessed. The subacromial/subdeltoid bursa was thickened.     There does not appear to be any effusion within the posterior glenohumeral recess. The posterosuperior glenoid labrum is wnl . The spinoglenoid notch is normal, without a suprascapular ganglion cyst.    Data Reviewed: X-ray, ultrasound    Supportive Actions: Independent visualization of images or test specimens    ASSESSMENT:   1. Greater trochanteric bursitis of both hips    2. Adhesive capsulitis of right shoulder      PLAN:   1. Time was spent reviewing the above diagnosis in depth with Doris today, including acute management and rehabilitation.     2. Based on the history, physical exam and ultrasound exam findings, her pain is most consistent with adhesive capsulitis.  Diagnostic ultrasound failed to show any significant rotator cuff tearing.  I recommended conservative care for her condition the form of injection therapy and physical therapy.    3. I recommend formal physical therapy for adhesive capsulitis and greater trochanteric bursitis, and we will help facilitate her getting set up with therapy and then transitioning to a home exercise program.    4. We discussed the option of a corticosteroid injection, and will proceed with right glenohumeral injection today. See separate procedure note.     5. RTC for right greater trochanteric bursa steroid injection under ultrasound guidance.    The above note was completed, in part, with the aid of Dragon dictation software/hardware. Translation errors may be present.

## 2018-07-09 ENCOUNTER — OFFICE VISIT (OUTPATIENT)
Dept: PHYSICAL MEDICINE AND REHAB | Facility: CLINIC | Age: 68
End: 2018-07-09
Payer: MEDICARE

## 2018-07-09 VITALS
DIASTOLIC BLOOD PRESSURE: 94 MMHG | WEIGHT: 192 LBS | BODY MASS INDEX: 31.99 KG/M2 | SYSTOLIC BLOOD PRESSURE: 151 MMHG | HEIGHT: 65 IN | HEART RATE: 93 BPM

## 2018-07-09 DIAGNOSIS — M25.551 RIGHT HIP PAIN: Primary | ICD-10-CM

## 2018-07-09 DIAGNOSIS — M67.951 TENDINOPATHY OF RIGHT GLUTEAL REGION: ICD-10-CM

## 2018-07-09 PROCEDURE — 99999 PR PBB SHADOW E&M-EST. PATIENT-LVL III: CPT | Mod: PBBFAC,,, | Performed by: PHYSICAL MEDICINE & REHABILITATION

## 2018-07-09 PROCEDURE — 99212 OFFICE O/P EST SF 10 MIN: CPT | Mod: S$PBB,,, | Performed by: PHYSICAL MEDICINE & REHABILITATION

## 2018-07-09 PROCEDURE — 99213 OFFICE O/P EST LOW 20 MIN: CPT | Mod: PBBFAC,PN | Performed by: PHYSICAL MEDICINE & REHABILITATION

## 2018-07-09 NOTE — PROGRESS NOTES
[facility]    CHIEF COMPLAINT:   Chief Complaint   Patient presents with    Hip Pain     right hip pain     HISTORY OF PRESENT ILLNESS: Doris Pastrana is a 67 y.o. female who presents to me for evaluation of right hip pain.  During her previous appointment on 6/25 for right shoulder pain, she received an ultrasound-guided corticosteroid injection.  Her shoulder pain is significantly improved.  She was scheduled for an appointment today for injection of her right hip, but she says that her hip pain is also improved.  The right hip sharp pain is now a 3/10 in the lateral hip.  The pain occasionally radiates inferiorly across the lateral leg stopping at the knee.  Flexeril alleviates breakthrough pain.  She no longer takes Norco.  She denies numbness, weakness, or difficulty sleeping.  Right hip x-ray on 6/15/18 showed minor degenerative change of the sacroiliac joints with no acute or destructive findings.  She does not want injections but is interested in physical therapy.    Review of Systems   Constitutional: Negative for fever.   HENT: Negative for drooling.    Eyes: Negative for discharge.   Respiratory: Negative for choking.    Cardiovascular: Negative for chest pain.   Genitourinary: Negative for flank pain.   Skin: Negative for wound.   Allergic/Immunologic: Negative for immunocompromised state.   Neurological: Negative for tremors and syncope.   Psychiatric/Behavioral: Negative for behavioral problems.     Past Medical History:   Past Medical History:   Diagnosis Date    Acute sinus infection     Allergy     Anemia     Arthritis     Bronchitis     Degenerative disc disease     lumbar, pain contract 1/28/2013    Depression     Diabetes mellitus, type 2 2/25/16    Fatty liver     Hyperlipidemia 2/25/16    Hypertension     Mitral valve prolapse     Pleurisy     Pneumonia     PONV (postoperative nausea and vomiting)        Past Surgical History:   Past Surgical History:    Procedure Laterality Date    ANKLE SURGERY  2017    right ankle torn ligament    APPENDECTOMY       SECTION      CHOLECYSTECTOMY      COLONOSCOPY  8/13/15    Dr. Oliveira, five year recheck    HYSTERECTOMY      TONSILLECTOMY      VAGINAL DELIVERY         Family History:   Family History   Problem Relation Age of Onset    Arthritis Mother     Hypertension Mother     Vision loss Mother     Diabetes Mother     Alcohol abuse Father     Depression Father     Early death Father     Alcohol abuse Sister     Depression Sister     Cancer Sister         thyroid    Diabetes Sister     Depression Brother     Hypertension Brother     Cancer Brother         prostate/stomach    Diabetes Brother     Aneurysm Brother         aaa    Learning disabilities Daughter     Hypertension Maternal Grandmother     Heart disease Maternal Grandmother     Cancer Maternal Grandfather         stomach    Diabetes Paternal Grandmother     Heart disease Paternal Grandfather     Heart disease Maternal Uncle     Kidney disease Maternal Uncle     Kidney disease Paternal Aunt     Glaucoma Neg Hx        Medications:   Current Outpatient Prescriptions on File Prior to Visit   Medication Sig Dispense Refill    ALPRAZolam (XANAX) 0.25 MG tablet Use one 20-30 minutes before flying prn 4 tablet 1    blood sugar diagnostic Strp 2 strips by Misc.(Non-Drug; Combo Route) route Daily. 200 strip 3    blood-glucose meter kit One Touch glucometer check glucose twice daily. If One Touch not covered dispense Accu-chek. 1 each 0    cetirizine (ZYRTEC) 10 MG tablet Take 10 mg by mouth once daily.      cholestyramine-aspartame (PREVALITE) 4 gram PwPk Take 1 packet (4 g total) by mouth 3 (three) times daily. 30 packet 5    cyclobenzaprine (FLEXERIL) 10 MG tablet TAKE 1 TABLET BY MOUTH THREE TIMES DAILY AS NEEDED FOR MUSCLE SPASMS 270 tablet 1    escitalopram oxalate (LEXAPRO) 10 MG tablet Take 1 tablet (10 mg total) by  mouth once daily. 90 tablet 3    fluticasone (FLONASE) 50 mcg/actuation nasal spray 1 spray by Each Nare route once daily.      HYDROcodone-acetaminophen (NORCO) 5-325 mg per tablet Take 1 tablet by mouth every 6 (six) hours as needed for Pain. 40 tablet 0    lancets 33 gauge Misc 2 lancets by Misc.(Non-Drug; Combo Route) route Daily. 200 each 3    lisinopril 10 MG tablet Take 1 tablet (10 mg total) by mouth once daily. 90 tablet 3    meclizine (ANTIVERT) 25 mg tablet Take 1 tablet (25 mg total) by mouth 3 (three) times daily as needed. 30 tablet 5    metFORMIN (GLUCOPHAGE-XR) 750 MG 24 hr tablet Take 1 tablet (750 mg total) by mouth 2 (two) times daily with meals. 180 tablet 1    omeprazole (PRILOSEC) 40 MG capsule Take 1 capsule (40 mg total) by mouth once daily. 30 capsule 1    rosuvastatin (CRESTOR) 40 MG Tab Take 1 tablet (40 mg total) by mouth every evening. 90 tablet 3    topiramate 25 mg capsule Take 25 mg by mouth 2 (two) times daily.       No current facility-administered medications on file prior to visit.        Allergies:   Review of patient's allergies indicates:   Allergen Reactions    Codeine Nausea Only    Rhubarb Swelling    Strawberries [strawberry] Hives    Iodine Rash       Other reaction(s): Nausea    Latex, natural rubber Rash    Pravastatin Other (See Comments)     Muscle pain        Social History:   Social History     Social History    Marital status:      Spouse name: N/A    Number of children: N/A    Years of education: N/A     Occupational History     [employer]     Social History Main Topics    Smoking status: Former Smoker     Packs/day: 1.00     Types: Cigarettes     Quit date: 3/12/1983    Smokeless tobacco: Never Used    Alcohol use Yes      Comment: socially    Drug use: No    Sexual activity: Not Currently     Other Topics Concern    None     Social History Narrative    None     PHYSICAL EXAMINATION:   General    Vitals:    07/09/18 1048   BP:  "(!) 151/94   Pulse: 93   Weight: 87.1 kg (192 lb)   Height: 5' 5" (1.651 m)     Constitutional: Oriented to person, place, and time. No apparent distress. Appears well-developed and well-nourished. Pleasant.  HENT:   Head: Normocephalic and atraumatic.   Eyes: Right eye exhibits no discharge. Left eye exhibits no discharge. No scleral icterus.   Pulmonary/Chest: Effort normal. No respiratory distress.   Abdominal: There is no guarding.   Neurological: Alert and oriented to person, place, and time.   Psychiatric: Behavior is normal.   Right Hip Exam     Tenderness   The patient is experiencing tenderness in the lateral (SI Joint tenderness).    Range of Motion   Flexion: 100     Muscle Strength   Abduction: 5/5   Adduction: 5/5   Flexion: 5/5     Tests   CLAUDIO: positive  Crissy: negative    Other   Erythema: absent  Scars: absent  Sensation: normal  Pulse: present    Comments:  (+) CLAUDIO, pain at SI joint  (-) Log Roll, with pain at medial hip  (-) FADIR  (-) SLR  (-) Crissy's Test      Left Hip Exam   Left hip exam is normal.        INSPECTION: There is no swelling, ecchymoses, erythema or gross deformity of the hip.  LIGAMENTOUS LAXITY AND STABILITY: Positive CLAUDIO test. Negative Gaenslen's. Pain with SI joint compression. Negative log roll.  GAIT/DYNAMIC: Normal.    Imaging  X-Ray of right hip  6/15/18  There is minor degenerative change of the symphysis pubis.  There is degenerative facet arthropathy in the lower lumbar spine.  There is minor degenerative change of the sacroiliac joints.  No osteonecrosis of the femoral head.  No acute fracture or osseous destructive process appreciated.  No hip joint space narrowing.    Data Reviewed: X-ray    ASSESSMENT:   1. Greater trochanter tendonitis of right hip  2. Sacroiliac joint dysfunction of right hip    PLAN:     1. Time was spent reviewing the above diagnosis in depth with Doris today, including acute management and rehabilitation.  She has right hip pain due to a " greater trochanter tendonitis.    2. Counseled her on treatments available to address the pain including physical therapy, medications, and corticosteroid injection. She is not interested in injections but will return to clinic if the pain worsens.    3. Enrolled her in [facility] physical therapy for greater trochanter tendonitis of right hip.  ?  4. RTC in 6 weeks if symptoms persist or do not improve.    The above note was completed, in part, with the aid of Dragon dictation software/hardware. Translation errors may be present.

## 2018-07-13 ENCOUNTER — TELEPHONE (OUTPATIENT)
Dept: ORTHOPEDICS | Facility: CLINIC | Age: 68
End: 2018-07-13

## 2018-07-13 NOTE — TELEPHONE ENCOUNTER
----- Message from Gauri Espitia MA sent at 7/13/2018  8:13 AM CDT -----  Contact: Self  The patient LVM requesting a call back.    Call Back# [phone]  Thanks

## 2018-07-17 ENCOUNTER — CLINICAL SUPPORT (OUTPATIENT)
Dept: REHABILITATION | Facility: HOSPITAL | Age: 68
End: 2018-07-17
Attending: PHYSICAL MEDICINE & REHABILITATION
Payer: MEDICARE

## 2018-07-17 DIAGNOSIS — R26.9 GAIT ABNORMALITY: ICD-10-CM

## 2018-07-17 DIAGNOSIS — M25.611 DECREASED ROM OF RIGHT SHOULDER: ICD-10-CM

## 2018-07-17 PROCEDURE — G8978 MOBILITY CURRENT STATUS: HCPCS | Mod: CK,PN

## 2018-07-17 PROCEDURE — 97162 PT EVAL MOD COMPLEX 30 MIN: CPT | Mod: PN

## 2018-07-17 PROCEDURE — G8979 MOBILITY GOAL STATUS: HCPCS | Mod: CI,PN
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
## 2018-07-18 DIAGNOSIS — M25.571 RIGHT ANKLE PAIN, UNSPECIFIED CHRONICITY: Primary | ICD-10-CM

## 2018-07-19 ENCOUNTER — OFFICE VISIT (OUTPATIENT)
Dept: ORTHOPEDICS | Facility: CLINIC | Age: 68
End: 2018-07-19
Payer: MEDICARE

## 2018-07-19 ENCOUNTER — HOSPITAL ENCOUNTER (OUTPATIENT)
Dept: RADIOLOGY | Facility: HOSPITAL | Age: 68
Discharge: HOME | End: 2018-07-19
Attending: ORTHOPAEDIC SURGERY
Payer: MEDICARE

## 2018-07-19 VITALS
HEIGHT: 65 IN | HEART RATE: 95 BPM | SYSTOLIC BLOOD PRESSURE: 146 MMHG | WEIGHT: 192 LBS | DIASTOLIC BLOOD PRESSURE: 76 MMHG | BODY MASS INDEX: 31.99 KG/M2

## 2018-07-19 DIAGNOSIS — M19.079 ARTHRITIS OF ANKLE: Primary | ICD-10-CM

## 2018-07-19 DIAGNOSIS — M25.571 RIGHT ANKLE PAIN, UNSPECIFIED CHRONICITY: ICD-10-CM

## 2018-07-19 PROCEDURE — 99214 OFFICE O/P EST MOD 30 MIN: CPT | Mod: S$PBB,25,, | Performed by: ORTHOPAEDIC SURGERY

## 2018-07-19 PROCEDURE — 73610 X-RAY EXAM OF ANKLE: CPT | Mod: 26,RT,, | Performed by: RADIOLOGY

## 2018-07-19 PROCEDURE — 99999 PR PBB SHADOW E&M-EST. PATIENT-LVL III: CPT | Mod: PBBFAC,,, | Performed by: ORTHOPAEDIC SURGERY

## 2018-07-19 PROCEDURE — 20605 DRAIN/INJ JOINT/BURSA W/O US: CPT | Mod: PBBFAC,PN | Performed by: ORTHOPAEDIC SURGERY

## 2018-07-19 PROCEDURE — 99213 OFFICE O/P EST LOW 20 MIN: CPT | Mod: PBBFAC,25,PN | Performed by: ORTHOPAEDIC SURGERY

## 2018-07-19 PROCEDURE — 73610 X-RAY EXAM OF ANKLE: CPT | Mod: TC,PO,RT

## 2018-07-19 RX ORDER — TRIAMCINOLONE ACETONIDE 40 MG/ML
40 INJECTION, SUSPENSION INTRA-ARTICULAR; INTRAMUSCULAR
Status: DISCONTINUED | OUTPATIENT
Start: 2018-07-19 | End: 2018-07-19 | Stop reason: HOSPADM

## 2018-07-19 RX ADMIN — TRIAMCINOLONE ACETONIDE 40 MG: 40 INJECTION, SUSPENSION INTRA-ARTICULAR; INTRAMUSCULAR at 08:07

## 2018-07-19 NOTE — PROGRESS NOTES
Subjective:      Patient ID: Doris Pastrana is a 67 y.o. female.    Chief Complaint: Pain of the Right Ankle    She rates her pain as 4/10 today s/p right ankle arthroscopy with brostrom han and peroneal tenodesis. She is walking with a regular shoe and still notes burning and pain lateral foot and ankle. I last saw her in March and recommended injection. The brace helps but she has to wear it all the time.     Social History     Occupational History     [employer]     Social History Main Topics    Smoking status: Former Smoker     Packs/day: 1.00     Types: Cigarettes     Quit date: 3/12/1983    Smokeless tobacco: Never Used    Alcohol use Yes      Comment: socially    Drug use: No    Sexual activity: Not Currently          Objective:    Ortho Exam     RLE: Neurovascularly intact, incisions well healed, minimal swelling. No signs of infection. minimallycecrased range of motion.  Mild tenderness to palpation lateral ankle.  Mild swelling mainly lateral ankle.  tenderness to palpation ankle joint and along lateral incision. She also has tenderness to palpation and tightness in the achilles.     Assessment:         Right ankle osteoarthritis       Plan:     Continue HEP  PT. I injected the right ankle today. I can repeat injections every 6 months or prn as needed.  She is not a candidate for total ankle due to her diabetes. If conservative treatment fails the next step would be ankle fusion. She does not want ankle fusion.

## 2018-07-19 NOTE — PROCEDURES
Intermediate Joint Aspiration/Injection  Date/Time: 7/19/2018 8:52 AM  Performed by: ELY DING  Authorized by: ELY DING     Consent Done?: Yes (Verbal)  Indications: Pain and joint swelling  Site marked: The procedure site was marked      Location:  Ankle  Site:  R ankle  Prep: Patient was prepped and draped in usual sterile fashion    Ultrasonic Guidance for needle placement: No  Needle size:  22 G  Approach:  Anteromedial  Medications:  40 mg triamcinolone acetonide 40 mg/mL  Patient tolerance:  Patient tolerated the procedure well with no immediate complications

## 2018-07-23 DIAGNOSIS — E11.9 CONTROLLED TYPE 2 DIABETES MELLITUS WITHOUT COMPLICATION, WITHOUT LONG-TERM CURRENT USE OF INSULIN: ICD-10-CM

## 2018-07-23 RX ORDER — METFORMIN HYDROCHLORIDE 750 MG/1
TABLET, EXTENDED RELEASE ORAL
Qty: 180 TABLET | Refills: 0 | Status: SHIPPED | OUTPATIENT
Start: 2018-07-23 | End: 2018-11-12 | Stop reason: SDUPTHER

## 2018-07-25 DIAGNOSIS — R10.13 EPIGASTRIC PAIN: ICD-10-CM

## 2018-07-25 RX ORDER — LANCETS 33 GAUGE
2 EACH MISCELLANEOUS DAILY
Qty: 200 EACH | Refills: 3 | Status: SHIPPED | OUTPATIENT
Start: 2018-07-25 | End: 2019-03-12 | Stop reason: ALTCHOICE

## 2018-07-25 RX ORDER — OMEPRAZOLE 40 MG/1
40 CAPSULE, DELAYED RELEASE ORAL DAILY
Qty: 90 CAPSULE | Refills: 1 | Status: SHIPPED | OUTPATIENT
Start: 2018-07-25 | End: 2018-12-21

## 2018-07-26 PROBLEM — M25.611 DECREASED ROM OF RIGHT SHOULDER: Status: ACTIVE | Noted: 2018-07-26

## 2018-07-26 PROBLEM — R26.9 GAIT ABNORMALITY: Status: ACTIVE | Noted: 2018-07-26

## 2018-08-02 ENCOUNTER — CLINICAL SUPPORT (OUTPATIENT)
Dept: REHABILITATION | Facility: HOSPITAL | Age: 68
End: 2018-08-02
Payer: MEDICARE

## 2018-08-02 DIAGNOSIS — R26.9 GAIT ABNORMALITY: ICD-10-CM

## 2018-08-02 DIAGNOSIS — M25.611 DECREASED ROM OF RIGHT SHOULDER: ICD-10-CM

## 2018-08-02 PROCEDURE — 97110 THERAPEUTIC EXERCISES: CPT | Mod: PN

## 2018-08-02 NOTE — PROGRESS NOTES
Name: Doris BENAVIDEZ [facility] Number: [id number]  Date of Treatment: 08/02/2018   Diagnosis:   Encounter Diagnoses   Name Primary?    Gait abnormality     Decreased ROM of right shoulder        Time in: 1300  Time Out: 1345  Total Treatment Time: 45      Subjective:  During session, pt reported she is diabetic and hasn't eaten.  Pt asking how long session will be.  PTA gave pt crackers and OJ due to pt feeling like her blood sugars were dropping.  Pt stated her glucose meter is broken and she is unable to check her sugars.  Doris reports pain in shoulder as a 6/10 and pain in foot and hip as a 7/10.        Objective    Patient received individual therapy to increase strength, endurance, ROM, flexibility, posture and core stabilization with 0 patients with activities as follows:     Doris received therapeutic exercises to develop strength, endurance, ROM, flexibility, posture and core stabilization for 45 minutes including:     OH pulleys flex 5'  nustep x 10 min L-3 UE/LE  HSS seated 3 x 30 sec  Ball squeezes x 30 reps  Hip abd RTB x 30 reps  Seated trunk flexion with large SB x 20 reps  Shoulder rolls x 20 reps  scap retraction x 20 reps    Pt demo good understanding of the education provided. Doris demonstrated good return demonstration of activities.     Assessment:     Pt reports discomfort in R upper shoulder.  Also, in upper back with exercises.    Pt will continue to benefit from skilled PT intervention. Medical Necessity is demonstrated by:  Pain limits function of effected part for some activities, Unable to participate fully in daily activities, Requires skilled supervision to complete and progress HEP and Weakness.    Patient is making good progress towards established goals.      Plan:  Continue with established Plan of Care towards PT goals.

## 2018-08-16 ENCOUNTER — CLINICAL SUPPORT (OUTPATIENT)
Dept: REHABILITATION | Facility: HOSPITAL | Age: 68
End: 2018-08-16
Payer: MEDICARE

## 2018-08-16 DIAGNOSIS — R26.9 GAIT ABNORMALITY: ICD-10-CM

## 2018-08-16 DIAGNOSIS — M25.611 DECREASED ROM OF RIGHT SHOULDER: ICD-10-CM

## 2018-08-16 PROCEDURE — 97110 THERAPEUTIC EXERCISES: CPT | Mod: PN

## 2018-08-16 NOTE — PROGRESS NOTES
Name: Doris BENAVIDEZ [facility] Number: [id number]  Date of Treatment: 08/16/2018   Diagnosis:   Encounter Diagnoses   Name Primary?    Gait abnormality     Decreased ROM of right shoulder        Time in: 1300  Time Out: 1344  Total Treatment Time: 44      Subjective:    Doris reports improvement of symptoms and decreased pain.  Patient reports their pain to be 5/10 on a 0-10 scale with 0 being no pain and 10 being the worst pain imaginable.    Objective  Doris received therapeutic exercises to develop strength, endurance, ROM, flexibility, posture and core stabilization for 44 minutes including:   OH pulleys flex 4'   Nustep x 10 min L-3 LE   HSS seated 3 x 30 sec R LE   Ball squeezes x 30 reps   Hip abd RTB x 30 reps   Seated trunk flexion with large SB x 10 reps with 5sec hold   Shoulder rolls x 20 reps   Scap retraction x 20 reps     Manual therapy x 8' to R UT/supraspinatus to decrease mm tightness and pain.    Written Home Exercises Provided: Handout given for proper purse usage and how to decrease weight of purse  Pt demo good understanding of the education provided. Doris demonstrated good return demonstration of activities.     Assessment:   Patients purse weighed at 6.28#. Instructed we will weigh purse again next visit.  Patient not highly motivated to progress with therapy and seems to rush thru therex session.  Decreased tolerance of UE TE.  No stretch felt with L HS stretch.    Pt will continue to benefit from skilled PT intervention. Medical Necessity is demonstrated by:  Fall Risk, Continued inability to participate in vocational pursuits, Pain limits function of effected part for some activities, Unable to participate fully in daily activities, Requires skilled supervision to complete and progress HEP and Weakness.    Patient is making good progress towards established goals.    Plan:  Continue with established Plan of Care towards PT goals; Discussed with patient missed visit policy.  Gave patient a  business card with clinic # on it in case of being unable to make future visits.

## 2018-08-21 ENCOUNTER — CLINICAL SUPPORT (OUTPATIENT)
Dept: REHABILITATION | Facility: HOSPITAL | Age: 68
End: 2018-08-21
Payer: MEDICARE

## 2018-08-21 ENCOUNTER — TELEPHONE (OUTPATIENT)
Dept: REHABILITATION | Facility: HOSPITAL | Age: 68
End: 2018-08-21

## 2018-08-21 DIAGNOSIS — M75.01 ADHESIVE CAPSULITIS OF RIGHT SHOULDER: Primary | ICD-10-CM

## 2018-08-21 DIAGNOSIS — M25.551 RIGHT HIP PAIN: ICD-10-CM

## 2018-08-21 DIAGNOSIS — M67.951 TENDINOPATHY OF RIGHT GLUTEAL REGION: ICD-10-CM

## 2018-08-21 PROBLEM — M25.511 RIGHT SHOULDER PAIN: Status: ACTIVE | Noted: 2018-08-21

## 2018-08-21 PROCEDURE — 97110 THERAPEUTIC EXERCISES: CPT | Mod: PN

## 2018-08-21 NOTE — PROGRESS NOTES
Name: Doris Pastrana  Clinic Number: [id number]  Date of Treatment: 08/21/2018   Diagnosis:   Encounter Diagnoses   Name Primary?    Adhesive capsulitis of right shoulder Yes    Tendinopathy of right gluteal region     Right hip pain        Time in: 1405  Time Out: 1450  Total Treatment Time: 45  Group Time: NA      Subjective:    Doris reports moderate pain levels in rt shoulder (5/10).  Patient reports their rt hip pain to be 6/10 on a 0-10 scale with 0 being no pain and 10 being the worst pain imaginable.    Objective    Doris received therapeutic exercises to develop strength and endurance for 45 minutes including:     X 4'/4' OHP (flexion/scaption)  X 20 ea wall towel circles (cw/ccw)  X 10 ladder crawls  X 20 ea t-bar there ex (2#) = flex, ABD, ER  X 20 standing 4-way SLR  X 20 t-ball wall squats (orange)  X 10 min bike (L1)  X 20 ea t-band sh IR/ER (YTB)    Assessment:     Mrs. Pastrana was able to sanjay. tx session w/out increase in symptoms.    Pt will continue to benefit from skilled PT intervention. Medical Necessity is demonstrated by:  Pain limits function of effected part for some activities, Unable to participate fully in daily activities and Weakness.    Patient is making fair progress towards established goals.    New/Revised goals: NA      Plan:  Continue with established Plan of Care towards PT goals.

## 2018-08-29 DIAGNOSIS — Z12.39 BREAST CANCER SCREENING: ICD-10-CM

## 2018-10-01 ENCOUNTER — TELEPHONE (OUTPATIENT)
Dept: FAMILY MEDICINE | Facility: CLINIC | Age: 68
End: 2018-10-01

## 2018-10-01 DIAGNOSIS — F41.9 ANXIETY: ICD-10-CM

## 2018-10-01 RX ORDER — ESCITALOPRAM OXALATE 10 MG/1
TABLET ORAL
Qty: 30 TABLET | Refills: 5 | Status: SHIPPED | OUTPATIENT
Start: 2018-10-01 | End: 2018-11-12 | Stop reason: SDUPTHER

## 2018-11-07 ENCOUNTER — TELEPHONE (OUTPATIENT)
Dept: FAMILY MEDICINE | Facility: CLINIC | Age: 68
End: 2018-11-07

## 2018-11-07 NOTE — TELEPHONE ENCOUNTER
----- Message from Kristin Richmond sent at 11/6/2018  5:01 PM CST -----  Contact: Pt  Pt is having a persistent cough amongst other things she needs to be seen for. Pt says she is an established pt of Dr gil. She would like ot be seen asap for an appointment .       Pt can be reached at [phone]    Thanks

## 2018-11-07 NOTE — TELEPHONE ENCOUNTER
appt made for cough. She says she needs to get [id number]-moho she need any other lab?    Will schedule dexa and mammogram.

## 2018-11-08 ENCOUNTER — DOCUMENTATION ONLY (OUTPATIENT)
Dept: FAMILY MEDICINE | Facility: CLINIC | Age: 68
End: 2018-11-08

## 2018-11-08 ENCOUNTER — OFFICE VISIT (OUTPATIENT)
Dept: FAMILY MEDICINE | Facility: CLINIC | Age: 68
End: 2018-11-08
Payer: MEDICARE

## 2018-11-08 ENCOUNTER — HOSPITAL ENCOUNTER (OUTPATIENT)
Dept: RADIOLOGY | Facility: CLINIC | Age: 68
Discharge: HOME | End: 2018-11-08
Attending: PHYSICIAN ASSISTANT
Payer: MEDICARE

## 2018-11-08 VITALS
TEMPERATURE: 99 F | SYSTOLIC BLOOD PRESSURE: 146 MMHG | HEART RATE: 95 BPM | BODY MASS INDEX: 33.57 KG/M2 | WEIGHT: 201.5 LBS | HEIGHT: 65 IN | DIASTOLIC BLOOD PRESSURE: 89 MMHG

## 2018-11-08 DIAGNOSIS — Z78.0 ASYMPTOMATIC MENOPAUSE: ICD-10-CM

## 2018-11-08 DIAGNOSIS — I10 ESSENTIAL HYPERTENSION: ICD-10-CM

## 2018-11-08 DIAGNOSIS — Z12.39 SCREENING FOR BREAST CANCER: ICD-10-CM

## 2018-11-08 DIAGNOSIS — I15.2 HYPERTENSION ASSOCIATED WITH DIABETES: ICD-10-CM

## 2018-11-08 DIAGNOSIS — J18.9 COMMUNITY ACQUIRED PNEUMONIA, UNSPECIFIED LATERALITY: Primary | ICD-10-CM

## 2018-11-08 DIAGNOSIS — E66.09 CLASS 1 OBESITY DUE TO EXCESS CALORIES WITHOUT SERIOUS COMORBIDITY WITH BODY MASS INDEX (BMI) OF 33.0 TO 33.9 IN ADULT: ICD-10-CM

## 2018-11-08 DIAGNOSIS — R05.3 CHRONIC COUGH: ICD-10-CM

## 2018-11-08 DIAGNOSIS — F40.243 FEAR OF FLYING: ICD-10-CM

## 2018-11-08 DIAGNOSIS — E11.59 HYPERTENSION ASSOCIATED WITH DIABETES: ICD-10-CM

## 2018-11-08 DIAGNOSIS — R05.3 CHRONIC COUGH: Primary | ICD-10-CM

## 2018-11-08 PROCEDURE — 77080 DXA BONE DENSITY AXIAL: CPT | Mod: 26,,, | Performed by: RADIOLOGY

## 2018-11-08 PROCEDURE — 99214 OFFICE O/P EST MOD 30 MIN: CPT | Mod: S$PBB,,, | Performed by: PHYSICIAN ASSISTANT

## 2018-11-08 PROCEDURE — 71046 X-RAY EXAM CHEST 2 VIEWS: CPT | Mod: TC,FY,PO

## 2018-11-08 PROCEDURE — 77080 DXA BONE DENSITY AXIAL: CPT | Mod: TC,PO

## 2018-11-08 PROCEDURE — 99215 OFFICE O/P EST HI 40 MIN: CPT | Mod: PBBFAC,PO,25 | Performed by: PHYSICIAN ASSISTANT

## 2018-11-08 PROCEDURE — 99999 PR PBB SHADOW E&M-EST. PATIENT-LVL V: CPT | Mod: PBBFAC,,, | Performed by: PHYSICIAN ASSISTANT

## 2018-11-08 PROCEDURE — 71046 X-RAY EXAM CHEST 2 VIEWS: CPT | Mod: 26,,, | Performed by: RADIOLOGY

## 2018-11-08 RX ORDER — BENZONATATE 200 MG/1
200 CAPSULE ORAL 3 TIMES DAILY PRN
Qty: 30 CAPSULE | Refills: 0 | Status: SHIPPED | OUTPATIENT
Start: 2018-11-08 | End: 2018-11-18

## 2018-11-08 RX ORDER — ALPRAZOLAM 0.25 MG/1
TABLET ORAL
Qty: 4 TABLET | Refills: 1 | Status: SHIPPED | OUTPATIENT
Start: 2018-11-08 | End: 2019-03-07 | Stop reason: SDUPTHER

## 2018-11-08 RX ORDER — LOSARTAN POTASSIUM 25 MG/1
25 TABLET ORAL DAILY
Qty: 90 TABLET | Refills: 3 | Status: SHIPPED | OUTPATIENT
Start: 2018-11-08 | End: 2018-11-14 | Stop reason: SDUPTHER

## 2018-11-08 RX ORDER — AZITHROMYCIN 250 MG/1
TABLET, FILM COATED ORAL
Qty: 6 TABLET | Refills: 0 | Status: SHIPPED | OUTPATIENT
Start: 2018-11-08 | End: 2018-12-14 | Stop reason: ALTCHOICE

## 2018-11-08 NOTE — PROGRESS NOTES
Pre-Visit Chart Review  For Appointment Scheduled on 11/08/2018    Health Maintenance Due   Topic Date Due    DEXA SCAN  12/23/1992    Zoster Vaccine  12/23/2012    Urine Microalbumin  03/08/2018    Foot Exam  06/23/2018    Mammogram  06/27/2018    Influenza Vaccine  08/01/2018    Hemoglobin A1c  10/11/2018

## 2018-11-08 NOTE — PROGRESS NOTES
Subjective:       Patient ID: Doris Pastrana is a 67 y.o. female.    Chief Complaint: Cough (x 2 months)    Patient with type 2 diabetes mellitus, hypertension, and GERD presents for evaluation of cough.  The patient states that her cough began approximately 4-5 months ago.  She states that the cough is dry and hacky.  She is having hoarseness in her voice secondary to cough.  She has tried Mucinex and Zyrtec without relief.  She has a history of GERD and PPI several months ago because her symptoms are controlled with diet changes.  Patient is requesting refill of Xanax that she uses prior to flight.  She is due for routine diabetic labs which she is scheduled to have tomorrow.  She is due for mammogram and bone density scan which have been scheduled.  She declines influenza vaccine.  Patients patient medical/surgical, social and family histories have been reviewed         Review of Systems   Constitutional: Negative for activity change, appetite change, fatigue and unexpected weight change.   HENT: Positive for voice change. Negative for congestion, hearing loss, postnasal drip, rhinorrhea, sore throat and trouble swallowing.    Eyes: Negative for visual disturbance.   Respiratory: Positive for cough. Negative for apnea, chest tightness, shortness of breath, wheezing and stridor.    Cardiovascular: Negative for chest pain, palpitations and leg swelling.   Gastrointestinal: Negative for abdominal pain, constipation, diarrhea and nausea.   Endocrine: Negative for polydipsia and polyuria.   Genitourinary: Negative for difficulty urinating.   Musculoskeletal: Positive for neck pain. Negative for arthralgias.   Skin: Negative for rash.   Neurological: Negative for dizziness, light-headedness and headaches.   Psychiatric/Behavioral: Negative for dysphoric mood and sleep disturbance. The patient is not nervous/anxious.        Objective:      Physical Exam   Constitutional: She appears well-developed and well-nourished.  She is cooperative. No distress.   HENT:   Head: Normocephalic and atraumatic.   Eyes: Conjunctivae and EOM are normal. Pupils are equal, round, and reactive to light. No scleral icterus.   Cardiovascular: Normal rate, regular rhythm and normal heart sounds.   Pulmonary/Chest: Effort normal and breath sounds normal.   Abdominal: Soft. Bowel sounds are normal.   Musculoskeletal:        Right lower leg: She exhibits no edema.        Left lower leg: She exhibits no edema.   Neurological: She is alert.   Skin: Skin is warm and dry.   Psychiatric: She has a normal mood and affect. Her speech is normal. Judgment normal. Cognition and memory are normal.   Vitals reviewed.      Assessment:       1. Chronic cough    2. Screening for breast cancer    3. Asymptomatic menopause    4. Fear of flying    5. Essential hypertension    6. Hypertension associated with diabetes    7. Class 1 obesity due to excess calories without serious comorbidity with body mass index (BMI) of 33.0 to 33.9 in adult        Doris was seen today for cough.    Diagnoses and all orders for this visit:    Chronic cough  -     X-Ray Chest PA And Lateral; Future  Further recommendations will be made based on results     -     benzonatate (TESSALON) 200 MG capsule; Take 1 capsule (200 mg total) by mouth 3 (three) times daily as needed for Cough.  -     losartan (COZAAR) 25 MG tablet; Take 1 tablet (25 mg total) by mouth once daily.  D/c lisinopril   If cxr normal and symptoms persists ? LPRD?     Screening for breast cancer  -     Mammo Digital Screening Bilat; Future    Asymptomatic menopause  -     DXA Bone Density Spine And Hip; Future    Fear of flying  - patient given  printed prescription  ALPRAZolam (XANAX) 0.25 MG tablet; Use one 20-30 minutes before flying prn    Essential hypertension  -     losartan (COZAAR) 25 MG tablet; Take 1 tablet (25 mg total) by mouth once daily.    Hypertension associated with diabetes  DC lisinopril and start losartan.  She  is scheduled for routine diabetic labs tomorrow.  Continue per PCP recommendations.  Regarding her blood sugar she would like to consider once look weekly Trulicity   Class 1 obesity due to excess calories without serious comorbidity with body mass index (BMI) of 33.0 to 33.9 in adult    Patient readiness: eager and barriers:social stressors    During the course of the visit the patient was educated and counseled about the following:     Diabetes:  Labs as scheduled per PCP  Hypertension:   Medication: See above.  Obesity:   Regular aerobic exercise program discussed.    Goals: Diabetes: Maintain Hemoglobin A1C below 7, Hypertension: Reduce Blood Pressure and Obesity: Reduce calorie intake and BMI    Did patient meet goals/outcomes: No    The following self management tools provided: declined    Patient Instructions (the written plan) was given to the patient/family.     Time spent with patient: 30 minutes    Barriers to medications present (no )    Adverse reactions to current medications (yes ? Cough with ace inhibitor )    Over the counter medications reviewed (Yes)

## 2018-11-09 ENCOUNTER — LAB VISIT (OUTPATIENT)
Dept: LAB | Facility: HOSPITAL | Age: 68
End: 2018-11-09
Attending: FAMILY MEDICINE
Payer: MEDICARE

## 2018-11-09 ENCOUNTER — TELEPHONE (OUTPATIENT)
Dept: FAMILY MEDICINE | Facility: CLINIC | Age: 68
End: 2018-11-09

## 2018-11-09 DIAGNOSIS — E11.9 CONTROLLED TYPE 2 DIABETES MELLITUS WITHOUT COMPLICATION, WITHOUT LONG-TERM CURRENT USE OF INSULIN: ICD-10-CM

## 2018-11-09 DIAGNOSIS — E78.5 HYPERLIPIDEMIA, UNSPECIFIED HYPERLIPIDEMIA TYPE: ICD-10-CM

## 2018-11-09 LAB
ALBUMIN SERPL BCP-MCNC: 3.7 G/DL
ALP SERPL-CCNC: 127 U/L
ALT SERPL W/O P-5'-P-CCNC: 33 U/L
ANION GAP SERPL CALC-SCNC: 7 MMOL/L
AST SERPL-CCNC: 27 U/L
BILIRUB DIRECT SERPL-MCNC: 0.3 MG/DL
BILIRUB SERPL-MCNC: 0.8 MG/DL
BUN SERPL-MCNC: 11 MG/DL
CALCIUM SERPL-MCNC: 9.6 MG/DL
CHLORIDE SERPL-SCNC: 100 MMOL/L
CHOLEST SERPL-MCNC: 157 MG/DL
CHOLEST/HDLC SERPL: 3.7 {RATIO}
CK SERPL-CCNC: 81 U/L
CO2 SERPL-SCNC: 32 MMOL/L
CREAT SERPL-MCNC: 0.7 MG/DL
EST. GFR  (AFRICAN AMERICAN): >60 ML/MIN/1.73 M^2
EST. GFR  (NON AFRICAN AMERICAN): >60 ML/MIN/1.73 M^2
ESTIMATED AVG GLUCOSE: 151 MG/DL
GLUCOSE SERPL-MCNC: 122 MG/DL
HBA1C MFR BLD HPLC: 6.9 %
HDLC SERPL-MCNC: 42 MG/DL
HDLC SERPL: 26.8 %
LDLC SERPL CALC-MCNC: 68.4 MG/DL
NONHDLC SERPL-MCNC: 115 MG/DL
POTASSIUM SERPL-SCNC: 4.1 MMOL/L
PROT SERPL-MCNC: 6.9 G/DL
SODIUM SERPL-SCNC: 139 MMOL/L
TRIGL SERPL-MCNC: 233 MG/DL

## 2018-11-09 PROCEDURE — 83036 HEMOGLOBIN GLYCOSYLATED A1C: CPT

## 2018-11-09 PROCEDURE — 82550 ASSAY OF CK (CPK): CPT

## 2018-11-09 PROCEDURE — 80076 HEPATIC FUNCTION PANEL: CPT

## 2018-11-09 PROCEDURE — 36415 COLL VENOUS BLD VENIPUNCTURE: CPT | Mod: PO

## 2018-11-09 PROCEDURE — 80061 LIPID PANEL: CPT

## 2018-11-09 PROCEDURE — 80048 BASIC METABOLIC PNL TOTAL CA: CPT

## 2018-11-09 NOTE — TELEPHONE ENCOUNTER
----- Message from Hyacinth Lou sent at 11/9/2018  9:21 AM CST -----  Contact: Self  Type:  Patient Returning Call    Who Called:  Patient   Who Left Message for Patient:  Nurse  Does the patient know what this is regarding?:    Best Call Back Number:  [phone] (home)     Additional Information:

## 2018-11-12 DIAGNOSIS — S82.891D ANKLE FRACTURE, RIGHT, CLOSED, WITH ROUTINE HEALING, SUBSEQUENT ENCOUNTER: ICD-10-CM

## 2018-11-12 DIAGNOSIS — E11.9 CONTROLLED TYPE 2 DIABETES MELLITUS WITHOUT COMPLICATION, WITHOUT LONG-TERM CURRENT USE OF INSULIN: ICD-10-CM

## 2018-11-12 DIAGNOSIS — F41.9 ANXIETY: ICD-10-CM

## 2018-11-13 ENCOUNTER — DOCUMENTATION ONLY (OUTPATIENT)
Dept: FAMILY MEDICINE | Facility: CLINIC | Age: 68
End: 2018-11-13

## 2018-11-13 NOTE — PROGRESS NOTES
Pre-Visit Chart Review  For Appointment Scheduled on 12-21-18    Health Maintenance Due   Topic Date Due    Zoster Vaccine  12/23/2012    Foot Exam  06/23/2018    Mammogram  06/27/2018    Influenza Vaccine  08/01/2018

## 2018-11-14 DIAGNOSIS — R05.3 CHRONIC COUGH: ICD-10-CM

## 2018-11-14 DIAGNOSIS — I10 ESSENTIAL HYPERTENSION: ICD-10-CM

## 2018-11-14 RX ORDER — METFORMIN HYDROCHLORIDE 750 MG/1
TABLET, EXTENDED RELEASE ORAL
Qty: 180 TABLET | Refills: 0 | Status: SHIPPED | OUTPATIENT
Start: 2018-11-14 | End: 2019-03-06 | Stop reason: SDUPTHER

## 2018-11-14 RX ORDER — LOSARTAN POTASSIUM 25 MG/1
25 TABLET ORAL DAILY
Qty: 90 TABLET | Refills: 3 | Status: SHIPPED | OUTPATIENT
Start: 2018-11-14 | End: 2018-12-05

## 2018-11-14 RX ORDER — ESCITALOPRAM OXALATE 10 MG/1
TABLET ORAL
Qty: 90 TABLET | Refills: 2 | Status: SHIPPED | OUTPATIENT
Start: 2018-11-14 | End: 2019-03-06 | Stop reason: SDUPTHER

## 2018-11-14 RX ORDER — CYCLOBENZAPRINE HCL 10 MG
TABLET ORAL
Qty: 270 TABLET | Refills: 0 | Status: SHIPPED | OUTPATIENT
Start: 2018-11-14 | End: 2020-06-22 | Stop reason: SDUPTHER

## 2018-11-15 ENCOUNTER — HOSPITAL ENCOUNTER (OUTPATIENT)
Dept: RADIOLOGY | Facility: CLINIC | Age: 68
Discharge: HOME | End: 2018-11-15
Attending: PHYSICIAN ASSISTANT
Payer: MEDICARE

## 2018-11-15 DIAGNOSIS — Z12.39 SCREENING FOR BREAST CANCER: ICD-10-CM

## 2018-11-15 PROCEDURE — 77063 BREAST TOMOSYNTHESIS BI: CPT | Mod: TC,PO

## 2018-11-15 PROCEDURE — 77063 BREAST TOMOSYNTHESIS BI: CPT | Mod: 26,,, | Performed by: RADIOLOGY

## 2018-11-15 PROCEDURE — 77067 SCR MAMMO BI INCL CAD: CPT | Mod: 26,,, | Performed by: RADIOLOGY

## 2018-11-19 LAB
LEFT EYE DM RETINOPATHY: NEGATIVE
RIGHT EYE DM RETINOPATHY: NEGATIVE

## 2018-11-29 ENCOUNTER — TELEPHONE (OUTPATIENT)
Dept: FAMILY MEDICINE | Facility: CLINIC | Age: 68
End: 2018-11-29

## 2018-11-29 NOTE — TELEPHONE ENCOUNTER
Patient will come by later today for bp check- she stopped at pharmacy earlier and it was 181/110-she is under a lot of stress with family concerns.

## 2018-11-30 NOTE — TELEPHONE ENCOUNTER
----- Message from Miladys Garcia sent at 11/30/2018  7:56 AM CST -----  Contact: patient  Type: Needs Medical Advice    Who Called:  patient  Symptoms (please be specific):    How long has patient had these symptoms:    Pharmacy name and phone #:    Best Call Back Number: [phone]  Additional Information: wanted to know if she should come in today for the blood pressure check,requesting a call back from Fabian

## 2018-12-05 ENCOUNTER — CLINICAL SUPPORT (OUTPATIENT)
Dept: FAMILY MEDICINE | Facility: CLINIC | Age: 68
End: 2018-12-05
Attending: FAMILY MEDICINE
Payer: MEDICARE

## 2018-12-05 ENCOUNTER — TELEPHONE (OUTPATIENT)
Dept: FAMILY MEDICINE | Facility: CLINIC | Age: 68
End: 2018-12-05

## 2018-12-05 VITALS — DIASTOLIC BLOOD PRESSURE: 82 MMHG | SYSTOLIC BLOOD PRESSURE: 160 MMHG

## 2018-12-05 DIAGNOSIS — I10 HYPERTENSION, UNSPECIFIED TYPE: Primary | ICD-10-CM

## 2018-12-05 DIAGNOSIS — I10 ESSENTIAL HYPERTENSION: ICD-10-CM

## 2018-12-05 DIAGNOSIS — R05.3 CHRONIC COUGH: ICD-10-CM

## 2018-12-05 PROCEDURE — 99211 OFF/OP EST MAY X REQ PHY/QHP: CPT | Mod: PBBFAC,PO | Performed by: FAMILY MEDICINE

## 2018-12-05 PROCEDURE — 99999 PR PBB SHADOW E&M-EST. PATIENT-LVL I: CPT | Mod: PBBFAC,,, | Performed by: FAMILY MEDICINE

## 2018-12-05 RX ORDER — LOSARTAN POTASSIUM 50 MG/1
50 TABLET ORAL DAILY
Qty: 90 TABLET | Refills: 1 | Status: SHIPPED | OUTPATIENT
Start: 2018-12-05 | End: 2018-12-21

## 2018-12-05 NOTE — TELEPHONE ENCOUNTER
Increase losartan to 50mg and recheck bp in four weeks.    Sent to [facility] but can take two 25's at a time to use them up

## 2018-12-06 NOTE — TELEPHONE ENCOUNTER
Patient did get my voice mail and says she did start the increased dose- will keep appt she already has with Dr. Garg 12/21/18.

## 2018-12-07 ENCOUNTER — PATIENT OUTREACH (OUTPATIENT)
Dept: ADMINISTRATIVE | Facility: HOSPITAL | Age: 68
End: 2018-12-07

## 2018-12-07 NOTE — LETTER
2018    Doris Pastrana  [address]  [facility] [address]             [facility]  [address]  [facility] [address]  Phone: [phone] Dear [facility] is committed to your overall health and would like to ensure that you are up to date on your recommended test and/or procedures.   Tor Garg MD  has found that your chart shows you may be due for the followin FOOT EXAM    If you have had any of the above done at another facility, please let us know so that we may obtain copies from that facility.  If you have a copy of these records, please provide a copy for us to scan into your chart.  You are welcome to request that the report be faxed to us at  ([phone]).     Otherwise, please schedule these appointments at your earliest convenience by calling [phone] or going to [email or web address].        Sincerely,  [facility]  MD Becca Torrez LPN Clinical Care Coordinator  [facility] [address] [place] [address]  Phone [phone]  Fax [phone]

## 2018-12-14 ENCOUNTER — HOSPITAL ENCOUNTER (OUTPATIENT)
Dept: RADIOLOGY | Facility: CLINIC | Age: 68
Discharge: HOME | End: 2018-12-14
Attending: PHYSICIAN ASSISTANT
Payer: MEDICARE

## 2018-12-14 ENCOUNTER — TELEPHONE (OUTPATIENT)
Dept: FAMILY MEDICINE | Facility: CLINIC | Age: 68
End: 2018-12-14

## 2018-12-14 ENCOUNTER — OFFICE VISIT (OUTPATIENT)
Dept: FAMILY MEDICINE | Facility: CLINIC | Age: 68
End: 2018-12-14
Payer: MEDICARE

## 2018-12-14 VITALS
WEIGHT: 203.06 LBS | HEART RATE: 79 BPM | BODY MASS INDEX: 33.83 KG/M2 | HEIGHT: 65 IN | SYSTOLIC BLOOD PRESSURE: 179 MMHG | DIASTOLIC BLOOD PRESSURE: 98 MMHG | OXYGEN SATURATION: 94 % | TEMPERATURE: 99 F

## 2018-12-14 DIAGNOSIS — J18.9 COMMUNITY ACQUIRED PNEUMONIA, UNSPECIFIED LATERALITY: ICD-10-CM

## 2018-12-14 DIAGNOSIS — R09.1 PLEURISY: ICD-10-CM

## 2018-12-14 DIAGNOSIS — J18.9 PNEUMONIA OF RIGHT LOWER LOBE DUE TO INFECTIOUS ORGANISM: Primary | ICD-10-CM

## 2018-12-14 PROCEDURE — 71046 X-RAY EXAM CHEST 2 VIEWS: CPT | Mod: TC,FY,PO

## 2018-12-14 PROCEDURE — 99214 OFFICE O/P EST MOD 30 MIN: CPT | Mod: S$PBB,,, | Performed by: FAMILY MEDICINE

## 2018-12-14 PROCEDURE — 71046 X-RAY EXAM CHEST 2 VIEWS: CPT | Mod: 26,,, | Performed by: RADIOLOGY

## 2018-12-14 PROCEDURE — 99999 PR PBB SHADOW E&M-EST. PATIENT-LVL III: CPT | Mod: PBBFAC,,, | Performed by: FAMILY MEDICINE

## 2018-12-14 PROCEDURE — 99213 OFFICE O/P EST LOW 20 MIN: CPT | Mod: PBBFAC,PO | Performed by: FAMILY MEDICINE

## 2018-12-14 RX ORDER — LEVOFLOXACIN 500 MG/1
500 TABLET, FILM COATED ORAL DAILY
Qty: 7 TABLET | Refills: 0 | Status: SHIPPED | OUTPATIENT
Start: 2018-12-14 | End: 2019-03-07 | Stop reason: ALTCHOICE

## 2018-12-14 RX ORDER — ALBUTEROL SULFATE 90 UG/1
2 AEROSOL, METERED RESPIRATORY (INHALATION) 4 TIMES DAILY
Qty: 1 INHALER | Refills: 1 | Status: SHIPPED | OUTPATIENT
Start: 2018-12-14 | End: 2022-11-17 | Stop reason: SDUPTHER

## 2018-12-14 NOTE — LETTER
December 14, 2018    Employer                 [employer] [facility]  Family Medicine  [address] [place] [address]  Phone: [phone]  Fax: [phone]   December 14, 2018     Patient: Doris Pastrana   YOB: 1950   Date of Visit: 12/14/2018       To Whom it May Concern:    Doris Pastrana was seen in my clinic on 12/14/2018. She may return to work on 12/19/2018.    If you have any questions or concerns, please don't hesitate to call.    Sincerely,         Tor Duke MD

## 2018-12-14 NOTE — TELEPHONE ENCOUNTER
----- Message from Maverick Pozo sent at 12/14/2018  8:50 AM CST -----  Contact: Patient  Type: Needs Medical Advice    Who Called: Patient  Best Call Back Number: [phone]  Additional Information: Patient would like to get a chest x-ray. Patient is short of breath, has a dry cough, and a painful back. Please call to advise. Thanks!

## 2018-12-14 NOTE — PROGRESS NOTES
Subjective:       Patient ID: Doris Pastrana is a 67 y.o. female.    Chief Complaint: Cough and Back Pain (x 3 days )    Patient here for UC visit.      Cough   This is a new problem. The current episode started in the past 7 days. The problem has been waxing and waning. The problem occurs hourly. The cough is productive of purulent sputum. Associated symptoms include chest pain (right posterior - pleuritic), a fever (101), headaches and shortness of breath. Pertinent negatives include no rash. Her past medical history is significant for pneumonia.   Back Pain   Associated symptoms include chest pain (right posterior - pleuritic), a fever (101) and headaches. Pertinent negatives include no abdominal pain.     Review of Systems   Constitutional: Positive for fatigue and fever (101).   Respiratory: Positive for cough and shortness of breath.    Cardiovascular: Positive for chest pain (right posterior - pleuritic).   Gastrointestinal: Negative for abdominal pain and nausea.   Musculoskeletal: Positive for back pain.   Skin: Negative for rash.   Neurological: Positive for headaches.   All other systems reviewed and are negative.      Objective:      Physical Exam   Constitutional: She appears well-developed. No distress.   HENT:   Right Ear: Tympanic membrane normal. Tympanic membrane is not erythematous.   Left Ear: Tympanic membrane normal. Tympanic membrane is not erythematous.   Nose: Mucosal edema present. Right sinus exhibits no maxillary sinus tenderness. Left sinus exhibits no maxillary sinus tenderness.   Mouth/Throat: Posterior oropharyngeal erythema present.   Neck: Neck supple.   Cardiovascular: Normal rate and regular rhythm.   No murmur heard.  Pulmonary/Chest: Effort normal. She has rales in the right lower field.   Lymphadenopathy:     She has no cervical adenopathy.   Skin: Skin is warm and dry.       Assessment:       1. Pneumonia of right lower lobe due to infectious organism    2. Pleurisy         Plan:       Doris was seen today for cough and back pain.    Diagnoses and all orders for this visit:    Pneumonia of right lower lobe due to infectious organism    Pleurisy    Other orders  -     levoFLOXacin (LEVAQUIN) 500 MG tablet; Take 1 tablet (500 mg total) by mouth once daily.  -     albuterol (PROVENTIL/VENTOLIN HFA) 90 mcg/actuation inhaler; Inhale 2 puffs into the lungs 4 (four) times daily.    CXR today (was ordered 11/8 to f/u a possible LEFT sided infiltrate then);  She has a f/u appt in one week already scheduled.

## 2018-12-21 ENCOUNTER — OFFICE VISIT (OUTPATIENT)
Dept: FAMILY MEDICINE | Facility: CLINIC | Age: 68
End: 2018-12-21
Attending: FAMILY MEDICINE
Payer: MEDICARE

## 2018-12-21 ENCOUNTER — TELEPHONE (OUTPATIENT)
Dept: ADMINISTRATIVE | Facility: HOSPITAL | Age: 68
End: 2018-12-21

## 2018-12-21 VITALS
HEART RATE: 94 BPM | SYSTOLIC BLOOD PRESSURE: 148 MMHG | BODY MASS INDEX: 33.94 KG/M2 | WEIGHT: 203.69 LBS | HEIGHT: 65 IN | TEMPERATURE: 99 F | RESPIRATION RATE: 12 BRPM | OXYGEN SATURATION: 94 % | DIASTOLIC BLOOD PRESSURE: 96 MMHG

## 2018-12-21 DIAGNOSIS — I10 HYPERTENSION, POOR CONTROL: ICD-10-CM

## 2018-12-21 DIAGNOSIS — M70.61 TROCHANTERIC BURSITIS OF RIGHT HIP: ICD-10-CM

## 2018-12-21 DIAGNOSIS — R05.3 CHRONIC COUGH: ICD-10-CM

## 2018-12-21 DIAGNOSIS — E11.9 TYPE 2 DIABETES MELLITUS WITHOUT COMPLICATION, WITHOUT LONG-TERM CURRENT USE OF INSULIN: Primary | ICD-10-CM

## 2018-12-21 PROCEDURE — 99999 PR PBB SHADOW E&M-EST. PATIENT-LVL IV: CPT | Mod: PBBFAC,,, | Performed by: FAMILY MEDICINE

## 2018-12-21 PROCEDURE — 99214 OFFICE O/P EST MOD 30 MIN: CPT | Mod: PBBFAC,PO | Performed by: FAMILY MEDICINE

## 2018-12-21 PROCEDURE — 99214 OFFICE O/P EST MOD 30 MIN: CPT | Mod: S$PBB,,, | Performed by: FAMILY MEDICINE

## 2018-12-21 RX ORDER — LOSARTAN POTASSIUM 100 MG/1
100 TABLET ORAL DAILY
Qty: 90 TABLET | Refills: 3 | Status: SHIPPED | OUTPATIENT
Start: 2018-12-21 | End: 2019-01-03 | Stop reason: SDUPTHER

## 2018-12-21 NOTE — PROGRESS NOTES
Subjective:       Patient ID: Doris Pastrana is a 67 y.o. female.    Chief Complaint: Follow-up (talk about trulicity)    67-year-old female coming in with several concerns.  She has a history of diabetes currently on metformin alone with an A1c of 6.9.  She is within goal but she is not happy with the A1c level and think she can do better.  She has researched trulicity and would like to try that as an additional diabetic medication plus she is hopeful it will help her with losing weight.  She has had a chronic cough and was found to have pneumonia.  She completed a course of Zithromax followed by a course of Levaquin.  She is somewhat fatigued but otherwise most of her pneumonia symptoms have resolved but she does have a lingering nonproductive cough.  Her blood pressure has been elevated running in the 140s to 150 systolic and upper 80s to 90s diastolic.  She is currently taking losartan 25 2 daily for a total dose of 50 mg.  She has been experiencing pain in the right hip indicating the greater trochanter area.  It radiates somewhat back to the buttock and is aggravated by flexion of the hip.  She does not have any injury recalled and she has no radicular symptoms down the leg to suggest sciatica.    Past Medical History:  No date: Acute sinus infection  No date: Allergy  No date: Anemia  No date: Arthritis  No date: Bronchitis  No date: Degenerative disc disease      Comment:  lumbar, pain contract 2013  No date: Depression  16: Diabetes mellitus, type 2  No date: Fatty liver  16: Hyperlipidemia  No date: Hypertension  No date: Mitral valve prolapse  No date: Pleurisy  No date: Pneumonia  No date: PONV (postoperative nausea and vomiting)    Past Surgical History:  2017: ANKLE SURGERY      Comment:  right ankle torn ligament  No date: APPENDECTOMY  2017: ARTHROSCOPY-ANKLE; Right      Comment:  Performed by Andrzej Mendez MD at [facility] OR  No date:  SECTION  No date:  CHOLECYSTECTOMY  8/13/15: COLONOSCOPY      Comment:  Dr. Oliveira, five year recheck  8/13/2015: COLONOSCOPY; N/A      Comment:  Performed by Tevin Oliveira MD at [facility] ENDO  No date: HYSTERECTOMY  8/9/2017: REPAIR-LIGAMENT (brostrom han); Right      Comment:  Performed by Andrzej Mendez MD at [facility] OR  8/9/2017: REPAIR-TENDON-LEG (peroneal tendon); Right      Comment:  Performed by Andrzej Mendez MD at [facility] OR  8/9/2017: TENODESIS (TENDON FIXATION)PERONEAL; Right      Comment:  Performed by Andrzej Mendez MD at [facility] OR  No date: TONSILLECTOMY  No date: VAGINAL DELIVERY    Current Outpatient Medications on File Prior to Visit:  albuterol (PROVENTIL/VENTOLIN HFA) 90 mcg/actuation inhaler, Inhale 2 puffs into the lungs 4 (four) times daily., Disp: 1 Inhaler, Rfl: 1  ALPRAZolam (XANAX) 0.25 MG tablet, Use one 20-30 minutes before flying prn, Disp: 4 tablet, Rfl: 1  blood sugar diagnostic (ONETOUCH ULTRA BLUE TEST STRIP) Strp, TEST BLOOD SUGAR TWICE DAILY., Disp: 200 each, Rfl: 3  cetirizine (ZYRTEC) 10 MG tablet, Take 10 mg by mouth once daily., Disp: , Rfl:   cyclobenzaprine (FLEXERIL) 10 MG tablet, TAKE 1 TABLET BY MOUTH THREE TIMES DAILY AS NEEDED FOR MUSCLE SPASMS, Disp: 270 tablet, Rfl: 0  escitalopram oxalate (LEXAPRO) 10 MG tablet, TAKE 1 TABLET BY MOUTH ONE TIME DAILY, Disp: 90 tablet, Rfl: 2  HYDROcodone-acetaminophen (NORCO) 5-325 mg per tablet, Take 1 tablet by mouth every 6 (six) hours as needed for Pain., Disp: 40 tablet, Rfl: 0  lancets 33 gauge Misc, 2 lancets by Misc.(Non-Drug; Combo Route) route Daily., Disp: 200 each, Rfl: 3  levoFLOXacin (LEVAQUIN) 500 MG tablet, Take 1 tablet (500 mg total) by mouth once daily., Disp: 7 tablet, Rfl: 0  meclizine (ANTIVERT) 25 mg tablet, Take 1 tablet (25 mg total) by mouth 3 (three) times daily as needed., Disp: 30 tablet, Rfl: 5  metFORMIN (GLUCOPHAGE-XR) 750 MG 24 hr tablet, TAKE 1 TABLET BY MOUTH TWICE DAILY WITH MEALS, Disp: 180 tablet, Rfl:  0  rosuvastatin (CRESTOR) 40 MG Tab, Take 1 tablet (40 mg total) by mouth every evening., Disp: 90 tablet, Rfl: 3  topiramate 25 mg capsule, Take 25 mg by mouth 2 (two) times daily., Disp: , Rfl:   (DISCONTINUED) losartan (COZAAR) 50 MG tablet, Take 1 tablet (50 mg total) by mouth once daily., Disp: 90 tablet, Rfl: 1  blood-glucose meter kit, One Touch glucometer check glucose twice daily. If One Touch not covered dispense Accu-chek., Disp: 1 each, Rfl: 0  (DISCONTINUED) cholestyramine-aspartame (PREVALITE) 4 gram PwPk, Take 1 packet (4 g total) by mouth 3 (three) times daily., Disp: 30 packet, Rfl: 5  (DISCONTINUED) fluticasone (FLONASE) 50 mcg/actuation nasal spray, 1 spray by Each Nare route once daily., Disp: , Rfl:   (DISCONTINUED) omeprazole (PRILOSEC) 40 MG capsule, Take 1 capsule (40 mg total) by mouth once daily., Disp: 90 capsule, Rfl: 1    No current facility-administered medications on file prior to visit.           Review of Systems   Respiratory: Positive for cough. Negative for chest tightness and shortness of breath.    Cardiovascular: Negative for chest pain and palpitations.   Musculoskeletal: Positive for arthralgias, gait problem and myalgias. Negative for joint swelling.       Objective:      Physical Exam   Constitutional: She is oriented to person, place, and time. She appears well-developed. No distress.   Elevated blood pressure  Obese with a BMI of 33.9 she is up 10.8 lb from her Nicole 15, 2018 visit   HENT:   Head: Normocephalic and atraumatic.   Right Ear: External ear normal.   Left Ear: External ear normal.   Nose: Nose normal.   Mouth/Throat: Oropharynx is clear and moist. No oropharyngeal exudate.   Eyes: EOM are normal. Pupils are equal, round, and reactive to light. No scleral icterus.   Neck: Normal range of motion. Neck supple. No JVD present. No tracheal deviation present. No thyromegaly present.   Cardiovascular: Normal rate, regular rhythm and normal heart sounds. Exam reveals  no gallop and no friction rub.   No murmur heard.  Pulmonary/Chest: Effort normal and breath sounds normal. No stridor. No respiratory distress. She has no wheezes. She has no rales. She exhibits no tenderness.   No dullness to percussion, no tactile fremitus   Musculoskeletal:        Right hip: She exhibits bony tenderness (Tender over the right greater trochanter which reproduces her discomfort).   Lymphadenopathy:     She has no cervical adenopathy.   Neurological: She is alert and oriented to person, place, and time.   Skin: Skin is warm and dry. No rash noted. She is not diaphoretic. No erythema.   Psychiatric: She has a normal mood and affect. Her behavior is normal. Judgment and thought content normal.   Nursing note and vitals reviewed.      Assessment:       1. Type 2 diabetes mellitus without complication, without long-term current use of insulin    2. Chronic cough    3. Hypertension, poor control    4. Trochanteric bursitis of right hip        Plan:       1. Type 2 diabetes mellitus without complication, without long-term current use of insulin  Add low-dose trulicity 0.75 mg weekly.  Will recheck A1c in 3 months and possibly increase to full dose if needed.  If she does develop any symptoms of hypoglycemia we may reduce the metformin but I do not feel it is likely  - dulaglutide (TRULICITY) 0.75 mg/0.5 mL PnIj; Inject 0.5 mLs (0.75 mg total) into the skin every 7 days.  Dispense: 6 mL; Refill: 1    2. Chronic cough  Post bronchitis/pneumonia cough.  Lungs are clear in appears to have recovered.  Expect 6 weeks or so of lingering cough    3. Hypertension, poor control  Increase losartan to 100 mg and will recheck her blood pressure when she comes in for her A1c  - losartan (COZAAR) 100 MG tablet; Take 1 tablet (100 mg total) by mouth once daily.  Dispense: 90 tablet; Refill: 3    4. Trochanteric bursitis of right hip  Stretching exercises, ice applications, discuss possibility of steroid injection          Patient readiness: acceptance and barriers:social stressors    During the course of the visit the patient was educated and counseled about the following:     Diabetes:  Discussed general issues about diabetes pathophysiology and management.  Addressed ADA diet.  Hypertension:   Medication: Increase losartan 100 mg daily.  Dietary sodium restriction.  Regular aerobic exercise.  Obesity:   General weight loss/lifestyle modification strategies discussed (elicit support from others; identify saboteurs; non-food rewards, etc).  Informal exercise measures discussed, e.g. taking stairs instead of elevator.  Regular aerobic exercise program discussed.    Goals: Diabetes: Maintain Hemoglobin A1C below 7, Hypertension: Reduce Blood Pressure and Obesity: Reduce calorie intake and BMI    Did patient meet goals/outcomes: No    The following self management tools provided: blood pressure log  blood glucose log  excercise log    Patient Instructions (the written plan) was given to the patient/family.     Time spent with patient: 30 minutes

## 2018-12-21 NOTE — PATIENT INSTRUCTIONS
Walking for Fitness  Fitness walking has something for everyone, even people who are already fit. Walking is one of the safest ways to condition your body aerobically. It can boost energy, help you lose weight, and reduce stress.    Physical benefits  · Walking strengthens your heart and lungs, and tones your muscles.  · When walking, your feet land with less impact than in other sports. This reduces chances of muscle, bone, and joint injury.  · Regular walking improves your cholesterol levels and lowers your risk of heart disease. And it helps you control your blood sugar if you have diabetes.  · Walking is a weight-bearing activity, which helps maintain bone density. This can help prevent osteoporosis.  Personal rewards  · Taking walks can help you relax and manage stress. And fitness walking may make you feel better about yourself.  · Walking can help you sleep better at night and make you less likely to be depressed.  · Regular walking may help maintain your memory as you get older.  · Walking is a great way to spend extra time with friends and family members. Be sure to invite your dog along!  Q&A about fitness walking  Q: Will walking keep me fit?  A: Yes. Regular walking at the right pace gives you all the benefits of other aerobic activities, such as jogging and swimming.  Q: Will walking help me lose weight and keep it off?  A: Yes. Per mile, walking can burn as many calories as jogging. Your health care provider can help work walking into your weight-loss plan.  Q: Is walking safe for my health?  A: Yes. Walking is safe if you have high blood pressure, diabetes, heart disease, or other conditions. Talk to your healthcare provider before you start.  Date Last Reviewed: 4/1/2017 © 2000-2017 [employer]. [address] [place] [address], [place] [address]. All rights reserved. This information is not intended as a substitute for professional medical care. Always follow your healthcare professional's  instructions.        Weight Management: Getting Started  Healthy bodies come in all shapes and sizes. Not all bodies are made to be thin. For some people, a healthy weight is higher than the average weight listed on weight charts. Your healthcare provider can help you decide on a healthy weight for you.    Reasons to lose weight  Losing weight can help with some health problems, such as high blood pressure, heart disease, diabetes, sleep apnea, and arthritis. You may also feel more energy.  Set your long-term goal  Your goal doesn't even have to be a specific weight. You may decide on a fitness goal (such as being able to walk 10 miles a week), or a health goal (such as lowering your blood pressure). Choose a goal that is measurable and reasonable, so you know when you've reached it. A goal of reaching a BMI of less than 25 is not always reasonable (or possible).   Make an action plan  Habits dont change overnight. Setting your goals too high can leave you feeling discouraged if you cant reach them. Be realistic. Choose one or two small changes you can make now. Set an action plan for how you are going to make these changes. When you can stick to this plan, keep making a few more small changes. Taking small steps will help you stay on the path to success.  Track your progress  Write down your goals. Then, keep a daily record of your progress. Write down what you eat and how active you are. This record lets you look back on how much youve done. It may also help when youre feeling frustrated. Reward yourself for success. Even if you dont reach every goal, give yourself credit for what you do get done.  Get support  Encouragement from others can help make losing weight easier. Ask your family members and friends for support. They may even want to join you. Also look to your healthcare provider, registered dietitian, and  for help. Your local hospital can give you more information about  nutrition, exercise, and weight loss.  Date Last Reviewed: 1/31/2016  © 0480-4670 [employer] [address] [place] [address], [place] [address]. All rights reserved. This information is not intended as a substitute for professional medical care. Always follow your healthcare professional's instructions.        Controlling High Blood Pressure  High blood pressure (hypertension) is often called the silent killer. This is because many people who have it dont know it. High blood pressure is defined as 140/90 mm Hg or higher. Know your blood pressure and remember to check it regularly. Doing so can save your life. Here are some things you can do to help control your blood pressure.    Choose heart-healthy foods  · Select low-salt, low-fat foods. Limit sodium intake to 2,400 mg per day or the amount suggested by your healthcare provider.  · Limit canned, dried, cured, packaged, and fast foods. These can contain a lot of salt.  · Eat 8 to 10 servings of fruits and vegetables every day.  · Choose lean meats, fish, or chicken.  · Eat whole-grain pasta, brown rice, and beans.  · Eat 2 to 3 servings of low-fat or fat-free dairy products.  · Ask your doctor about the DASH eating plan. This plan helps reduce blood pressure.  · When you go to a restaurant, ask that your meal be prepared with no added salt.  Maintain a healthy weight  · Ask your healthcare provider how many calories to eat a day. Then stick to that number.  · Ask your healthcare provider what weight range is healthiest for you. If you are overweight, a weight loss of only 3% to 5% of your body weight can help lower blood pressure. Generally, a good weight loss goal is to lose 10% of your body weight in a year.  · Limit snacks and sweets.  · Get regular exercise.  Get up and get active  · Choose activities you enjoy. Find ones you can do with friends or family. This includes bicycling, dancing, walking, and jogging.  · Park farther away from building  entrances.  · Use stairs instead of the elevator.  · When you can, walk or bike instead of driving.  · [place] leaves, garden, or do household repairs.  · Be active at a moderate to vigorous level of physical activity for at least 40 minutes for a minimum of 3 to 4 days a week.   Manage stress  · Make time to relax and enjoy life. Find time to laugh.  · Communicate your concerns with your loved ones and your healthcare provider.  · Visit with family and friends, and keep up with hobbies.  Limit alcohol and quit smoking  · Men should have no more than 2 drinks per day.  · Women should have no more than 1 drink per day.  · Talk with your healthcare provider about quitting smoking. Smoking significantly increases your risk for heart disease and stroke. Ask your healthcare provider about community smoking cessation programs and other options.  Medicines  If lifestyle changes arent enough, your healthcare provider may prescribe high blood pressure medicine. Take all medicines as prescribed. If you have any questions about your medicines, ask your healthcare provider before stopping or changing them.   Date Last Reviewed: 4/27/2016 © 2000-2017 [employer]. [address] [place] [address], [place] [address]. All rights reserved. This information is not intended as a substitute for professional medical care. Always follow your healthcare professional's instructions.

## 2019-01-03 DIAGNOSIS — I10 HYPERTENSION, POOR CONTROL: ICD-10-CM

## 2019-01-03 DIAGNOSIS — E11.9 TYPE 2 DIABETES MELLITUS WITHOUT COMPLICATION, WITHOUT LONG-TERM CURRENT USE OF INSULIN: ICD-10-CM

## 2019-01-03 DIAGNOSIS — R05.3 CHRONIC COUGH: ICD-10-CM

## 2019-01-03 RX ORDER — LOSARTAN POTASSIUM 100 MG/1
100 TABLET ORAL DAILY
Qty: 90 TABLET | Refills: 3 | Status: SHIPPED | OUTPATIENT
Start: 2019-01-03 | End: 2019-03-06 | Stop reason: SDUPTHER

## 2019-01-04 NOTE — TELEPHONE ENCOUNTER
Patient says she wants Dr. Garg to send Trulicity to [facility]- may pay cash for it - also send Losartan.

## 2019-01-04 NOTE — TELEPHONE ENCOUNTER
----- Message from Balaji Foreman sent at 1/3/2019  3:45 PM CST -----  Type:  RX Refill Request    Who Called:  Patient  Refill or New Rx:  Refill  RX Name and Strength:  dulaglutide (TRULICITY) 0.75 mg/0.5 mL PnIj       How is the patient currently taking it? (ex. 1XDay): ?   Is this a 30 day or 90 day RX:  30  Preferred Pharmacy with phone number:    [id number] - [place] [facility] [address]  [address]  [place] [address]  Phone: [phone] Fax: [phone]    Local or Mail Order:  Local  Ordering Provider:  Britany Lyon Call Back Number:  [phone]  Additional Information:  Caller states that her prescriptions were sent to [facility] but her insurance won't cover them so she is requesting that they be sent to [facility] instead.    She also states that she isn't sure if Dr. Garg sent her BP medication [facility].  She states that she would like it sent to [facility] as well

## 2019-01-07 ENCOUNTER — TELEPHONE (OUTPATIENT)
Dept: FAMILY MEDICINE | Facility: CLINIC | Age: 69
End: 2019-01-07

## 2019-01-07 NOTE — TELEPHONE ENCOUNTER
----- Message from Ivania Bedolla sent at 1/7/2019  9:15 AM CST -----  Contact: Doris  Type:  Same Day Appointment Request    Caller is requesting a same day appointment.  Caller declined first available appointment listed below.      Name of Caller:  patient  Best Call Back Number:  [phone]  Additional Information:   Patient would like to speak with a nurse before making an appt--she is burning with urinating & also having a hard time getting full breaths without pain in her back--doesn't want to have pneumonia again--please advise--thank you

## 2019-03-06 ENCOUNTER — DOCUMENTATION ONLY (OUTPATIENT)
Dept: FAMILY MEDICINE | Facility: CLINIC | Age: 69
End: 2019-03-06

## 2019-03-06 DIAGNOSIS — E11.9 CONTROLLED TYPE 2 DIABETES MELLITUS WITHOUT COMPLICATION, WITHOUT LONG-TERM CURRENT USE OF INSULIN: ICD-10-CM

## 2019-03-06 DIAGNOSIS — I10 HYPERTENSION, POOR CONTROL: ICD-10-CM

## 2019-03-06 DIAGNOSIS — F41.9 ANXIETY: ICD-10-CM

## 2019-03-06 DIAGNOSIS — E11.9 TYPE 2 DIABETES MELLITUS WITHOUT COMPLICATION, WITHOUT LONG-TERM CURRENT USE OF INSULIN: ICD-10-CM

## 2019-03-06 DIAGNOSIS — R05.3 CHRONIC COUGH: ICD-10-CM

## 2019-03-06 RX ORDER — LOSARTAN POTASSIUM 100 MG/1
100 TABLET ORAL DAILY
Qty: 90 TABLET | Refills: 0 | Status: SHIPPED | OUTPATIENT
Start: 2019-03-06 | End: 2019-06-13 | Stop reason: SDUPTHER

## 2019-03-06 RX ORDER — MELOXICAM 15 MG/1
15 TABLET ORAL DAILY
Qty: 90 TABLET | Refills: 0 | Status: SHIPPED | OUTPATIENT
Start: 2019-03-06 | End: 2020-02-07

## 2019-03-06 RX ORDER — ESCITALOPRAM OXALATE 10 MG/1
TABLET ORAL
Qty: 90 TABLET | Refills: 1 | Status: SHIPPED | OUTPATIENT
Start: 2019-03-06 | End: 2019-08-19 | Stop reason: SDUPTHER

## 2019-03-06 RX ORDER — METFORMIN HYDROCHLORIDE 750 MG/1
750 TABLET, EXTENDED RELEASE ORAL 2 TIMES DAILY WITH MEALS
Qty: 180 TABLET | Refills: 0 | Status: SHIPPED | OUTPATIENT
Start: 2019-03-06 | End: 2019-06-13 | Stop reason: SDUPTHER

## 2019-03-06 NOTE — PROGRESS NOTES
Pre-Visit Chart Review  For Appointment Scheduled on 3-7-19    Health Maintenance Due   Topic Date Due    Zoster Vaccine  12/23/2012    Foot Exam  06/23/2018    Influenza Vaccine  08/01/2018

## 2019-03-07 ENCOUNTER — OFFICE VISIT (OUTPATIENT)
Dept: FAMILY MEDICINE | Facility: CLINIC | Age: 69
End: 2019-03-07
Attending: FAMILY MEDICINE
Payer: MEDICARE

## 2019-03-07 VITALS
HEART RATE: 102 BPM | BODY MASS INDEX: 32.65 KG/M2 | RESPIRATION RATE: 12 BRPM | DIASTOLIC BLOOD PRESSURE: 80 MMHG | SYSTOLIC BLOOD PRESSURE: 142 MMHG | TEMPERATURE: 98 F | HEIGHT: 65 IN | WEIGHT: 196 LBS | OXYGEN SATURATION: 97 %

## 2019-03-07 DIAGNOSIS — M70.61 TROCHANTERIC BURSITIS OF RIGHT HIP: ICD-10-CM

## 2019-03-07 DIAGNOSIS — M79.604 PAIN IN RIGHT LEG: Primary | ICD-10-CM

## 2019-03-07 DIAGNOSIS — F40.243 FEAR OF FLYING: ICD-10-CM

## 2019-03-07 PROCEDURE — 99213 OFFICE O/P EST LOW 20 MIN: CPT | Mod: PBBFAC,HCNC,PO | Performed by: FAMILY MEDICINE

## 2019-03-07 PROCEDURE — 99999 PR PBB SHADOW E&M-EST. PATIENT-LVL III: ICD-10-PCS | Mod: PBBFAC,HCNC,, | Performed by: FAMILY MEDICINE

## 2019-03-07 PROCEDURE — 99214 PR OFFICE/OUTPT VISIT, EST, LEVL IV, 30-39 MIN: ICD-10-PCS | Mod: HCNC,S$GLB,, | Performed by: FAMILY MEDICINE

## 2019-03-07 PROCEDURE — 99999 PR PBB SHADOW E&M-EST. PATIENT-LVL III: CPT | Mod: PBBFAC,HCNC,, | Performed by: FAMILY MEDICINE

## 2019-03-07 PROCEDURE — 99214 OFFICE O/P EST MOD 30 MIN: CPT | Mod: HCNC,S$GLB,, | Performed by: FAMILY MEDICINE

## 2019-03-07 RX ORDER — ALPRAZOLAM 0.25 MG/1
TABLET ORAL
Qty: 4 TABLET | Refills: 1 | Status: SHIPPED | OUTPATIENT
Start: 2019-03-07 | End: 2019-09-26 | Stop reason: SDUPTHER

## 2019-03-07 RX ORDER — MECLIZINE HYDROCHLORIDE 25 MG/1
25 TABLET ORAL 3 TIMES DAILY PRN
Qty: 30 TABLET | Refills: 5 | Status: SHIPPED | OUTPATIENT
Start: 2019-03-07 | End: 2020-06-22 | Stop reason: SDUPTHER

## 2019-03-07 NOTE — PROGRESS NOTES
Subjective:       Patient ID: Doris Pastrana is a 68 y.o. female.    Chief Complaint: Leg Pain    68-year-old female comes in with pain in the right leg and right lateral thigh that has been going on for some weeks.  She had a surgery in  with Dr. Mendez:  (PROCEDURE: 1. Right ankle arthroscopy with partial debridement 2. Excision of Right peroneus brevis with tenodesis to the peroneus longus tendon 3. Right Ankle Brostrom-Nguyen lateral ligament reconstruction ) with shortening of the peroneus brevis tendon which caused some alteration of her gait.  She has a somewhat warm sensation in the lateral right leg but no nodules swelling or edema. She later developed pain over the greater trochanter radiating down towards the knee with a burning discomfort with intermittent sensation of a knot present.  She has been seeing a pain management specialist regarding a back pain and thought this might be a sciatic pain related to that.    Past Medical History:  No date: Acute sinus infection  No date: Allergy  No date: Anemia  No date: Arthritis  No date: Bronchitis  No date: Degenerative disc disease      Comment:  lumbar, pain contract 2013  No date: Depression  16: Diabetes mellitus, type 2  No date: Fatty liver  16: Hyperlipidemia  No date: Hypertension  No date: Mitral valve prolapse  No date: Pleurisy  No date: Pneumonia  No date: PONV (postoperative nausea and vomiting)    Past Surgical History:  2017: ANKLE SURGERY      Comment:  right ankle torn ligament  No date: APPENDECTOMY  2017: ARTHROSCOPY-ANKLE; Right      Comment:  Performed by [facility]j [facility]endez [facility] at [facility]  No date:  SECTION  No date: CHOLECYSTECTOMY  8/13/15: COLONOSCOPY      Comment:  Dr. Oliveira, five year recheck  2015: COLONOSCOPY; N/A      Comment:  Performed by Tevin [facility] MD at [facility] ENDO  No date: HYSTERECTOMY  2017: REPAIR-LIGAMENT (brostrom nguyen); Right      Comment:  Performed by Andrzej MCNAMARA  MD Andrea at [facility] OR  8/9/2017: REPAIR-TENDON-LEG (peroneal tendon); Right      Comment:  Performed by Andrzej Mendez MD at [facility] OR  8/9/2017: TENODESIS (TENDON FIXATION)PERONEAL; Right      Comment:  Performed by Andrzej Mendez MD at [facility] OR  No date: TONSILLECTOMY  No date: VAGINAL DELIVERY    Current Outpatient Medications on File Prior to Visit:  albuterol (PROVENTIL/VENTOLIN HFA) 90 mcg/actuation inhaler, Inhale 2 puffs into the lungs 4 (four) times daily., Disp: 1 Inhaler, Rfl: 1  blood sugar diagnostic (ONETOUCH ULTRA BLUE TEST STRIP) Strp, TEST BLOOD SUGAR TWICE DAILY., Disp: 200 each, Rfl: 3  cetirizine (ZYRTEC) 10 MG tablet, Take 10 mg by mouth once daily., Disp: , Rfl:   cyclobenzaprine (FLEXERIL) 10 MG tablet, TAKE 1 TABLET BY MOUTH THREE TIMES DAILY AS NEEDED FOR MUSCLE SPASMS, Disp: 270 tablet, Rfl: 0  dulaglutide (TRULICITY) 0.75 mg/0.5 mL PnIj, Inject 0.5 mLs (0.75 mg total) into the skin every 7 days., Disp: 6 mL, Rfl: 0  escitalopram oxalate (LEXAPRO) 10 MG tablet, TAKE 1 TABLET BY MOUTH ONE TIME DAILY, Disp: 90 tablet, Rfl: 1  HYDROcodone-acetaminophen (NORCO) 5-325 mg per tablet, Take 1 tablet by mouth every 6 (six) hours as needed for Pain., Disp: 40 tablet, Rfl: 0  lancets 33 gauge Misc, 2 lancets by Misc.(Non-Drug; Combo Route) route Daily., Disp: 200 each, Rfl: 3  losartan (COZAAR) 100 MG tablet, Take 1 tablet (100 mg total) by mouth once daily., Disp: 90 tablet, Rfl: 0  meloxicam (MOBIC) 15 MG tablet, Take 1 tablet (15 mg total) by mouth once daily., Disp: 90 tablet, Rfl: 0  metFORMIN (GLUCOPHAGE-XR) 750 MG 24 hr tablet, Take 1 tablet (750 mg total) by mouth 2 (two) times daily with meals., Disp: 180 tablet, Rfl: 0  rosuvastatin (CRESTOR) 40 MG Tab, Take 1 tablet (40 mg total) by mouth every evening., Disp: 90 tablet, Rfl: 3  topiramate 25 mg capsule, Take 25 mg by mouth 2 (two) times daily., Disp: , Rfl:   (DISCONTINUED) ALPRAZolam (XANAX) 0.25 MG tablet, Use one 20-30 minutes  before flying prn, Disp: 4 tablet, Rfl: 1  (DISCONTINUED) meclizine (ANTIVERT) 25 mg tablet, Take 1 tablet (25 mg total) by mouth 3 (three) times daily as needed., Disp: 30 tablet, Rfl: 5  blood-glucose meter kit, One Touch glucometer check glucose twice daily. If One Touch not covered dispense Accu-chek., Disp: 1 each, Rfl: 0  (DISCONTINUED) levoFLOXacin (LEVAQUIN) 500 MG tablet, Take 1 tablet (500 mg total) by mouth once daily., Disp: 7 tablet, Rfl: 0    No current facility-administered medications on file prior to visit.           Review of Systems   Musculoskeletal: Positive for arthralgias and myalgias.   Skin: Negative for color change and rash.       Objective:      Physical Exam   Constitutional:   Mildly elevated systolic blood pressure  Obese with a BMI of 32.6 she is down 7.8 lb from her December 21, 2018 visit   Musculoskeletal:        Right hip: She exhibits bony tenderness (Tender over the right greater trochanter which reproduces her pain). She exhibits normal range of motion, normal strength, no tenderness, no swelling, no crepitus, no deformity and no laceration.        Right knee: Normal.        Right ankle: She exhibits decreased range of motion, swelling and deformity.        Legs:  Nursing note and vitals reviewed.      Assessment:       1. Pain in right leg    2. Trochanteric bursitis of right hip    3. Fear of flying        Plan:       1. Pain in right leg  Appears to be muscular ligamentous although there is an increased sensitivity that suggest there may be some nerve involvement.  The patient has an upcoming appointment to follow-up from her surgery with Dr. Mendez and will discuss with her    2. Trochanteric bursitis of right hip  Stretching exercises although she is actually quite flexible  Ice or alternating ice and heat  Discussed possibly using a steroid injection although I would prefer not to do that    3. Fear of flying  She is coming up on a trip to [place] which will  involve an air flight  - ALPRAZolam (XANAX) 0.25 MG tablet; Use one 20-30 minutes before flying prn  Dispense: 4 tablet; Refill: 1

## 2019-03-07 NOTE — PATIENT INSTRUCTIONS
Weight Management: Getting Started  Healthy bodies come in all shapes and sizes. Not all bodies are made to be thin. For some people, a healthy weight is higher than the average weight listed on weight charts. Your healthcare provider can help you decide on a healthy weight for you.    Reasons to lose weight  Losing weight can help with some health problems, such as high blood pressure, heart disease, diabetes, sleep apnea, and arthritis. You may also feel more energy.  Set your long-term goal  Your goal doesn't even have to be a specific weight. You may decide on a fitness goal (such as being able to walk 10 miles a week), or a health goal (such as lowering your blood pressure). Choose a goal that is measurable and reasonable, so you know when you've reached it. A goal of reaching a BMI of less than 25 is not always reasonable (or possible).   Make an action plan  Habits dont change overnight. Setting your goals too high can leave you feeling discouraged if you cant reach them. Be realistic. Choose one or two small changes you can make now. Set an action plan for how you are going to make these changes. When you can stick to this plan, keep making a few more small changes. Taking small steps will help you stay on the path to success.  Track your progress  Write down your goals. Then, keep a daily record of your progress. Write down what you eat and how active you are. This record lets you look back on how much youve done. It may also help when youre feeling frustrated. Reward yourself for success. Even if you dont reach every goal, give yourself credit for what you do get done.  Get support  Encouragement from others can help make losing weight easier. Ask your family members and friends for support. They may even want to join you. Also look to your healthcare provider, registered dietitian, and  for help. Your local hospital can give you more information about nutrition, exercise, and  weight loss.  Date Last Reviewed: 1/31/2016  © 9862-4854 [employer]. [address] [place] [address], [place] [address]. All rights reserved. This information is not intended as a substitute for professional medical care. Always follow your healthcare professional's instructions.        Controlling High Blood Pressure  High blood pressure (hypertension) is often called the silent killer. This is because many people who have it dont know it. High blood pressure is defined as 140/90 mm Hg or higher. Know your blood pressure and remember to check it regularly. Doing so can save your life. Here are some things you can do to help control your blood pressure.    Choose heart-healthy foods  · Select low-salt, low-fat foods. Limit sodium intake to 2,400 mg per day or the amount suggested by your healthcare provider.  · Limit canned, dried, cured, packaged, and fast foods. These can contain a lot of salt.  · Eat 8 to 10 servings of fruits and vegetables every day.  · Choose lean meats, fish, or chicken.  · Eat whole-grain pasta, brown rice, and beans.  · Eat 2 to 3 servings of low-fat or fat-free dairy products.  · Ask your doctor about the DASH eating plan. This plan helps reduce blood pressure.  · When you go to a restaurant, ask that your meal be prepared with no added salt.  Maintain a healthy weight  · Ask your healthcare provider how many calories to eat a day. Then stick to that number.  · Ask your healthcare provider what weight range is healthiest for you. If you are overweight, a weight loss of only 3% to 5% of your body weight can help lower blood pressure. Generally, a good weight loss goal is to lose 10% of your body weight in a year.  · Limit snacks and sweets.  · Get regular exercise.  Get up and get active  · Choose activities you enjoy. Find ones you can do with friends or family. This includes bicycling, dancing, walking, and jogging.  · Park farther away from building entrances.  · Use stairs  instead of the elevator.  · When you can, walk or bike instead of driving.  · [place] leaves, garden, or do household repairs.  · Be active at a moderate to vigorous level of physical activity for at least 40 minutes for a minimum of 3 to 4 days a week.   Manage stress  · Make time to relax and enjoy life. Find time to laugh.  · Communicate your concerns with your loved ones and your healthcare provider.  · Visit with family and friends, and keep up with hobbies.  Limit alcohol and quit smoking  · Men should have no more than 2 drinks per day.  · Women should have no more than 1 drink per day.  · Talk with your healthcare provider about quitting smoking. Smoking significantly increases your risk for heart disease and stroke. Ask your healthcare provider about community smoking cessation programs and other options.  Medicines  If lifestyle changes arent enough, your healthcare provider may prescribe high blood pressure medicine. Take all medicines as prescribed. If you have any questions about your medicines, ask your healthcare provider before stopping or changing them.   Date Last Reviewed: 4/27/2016 © 2000-2017 [employer]. [address] [place] [address], [place] [address]. All rights reserved. This information is not intended as a substitute for professional medical care. Always follow your healthcare professional's instructions.

## 2019-03-12 RX ORDER — LANCETS 28 GAUGE
1 EACH MISCELLANEOUS 2 TIMES DAILY
Qty: 200 EACH | Status: SHIPPED | OUTPATIENT
Start: 2019-03-12 | End: 2019-06-13 | Stop reason: SDUPTHER

## 2019-03-12 RX ORDER — LANCETS 28 GAUGE
EACH MISCELLANEOUS
COMMUNITY
End: 2019-03-12 | Stop reason: SDUPTHER

## 2019-03-12 NOTE — TELEPHONE ENCOUNTER
[facility]    True Metrix Meter, Strips, 28 g lancets   lov 3-7-19, lab 11-9-18  Please complete orders

## 2019-03-29 ENCOUNTER — DOCUMENTATION ONLY (OUTPATIENT)
Dept: FAMILY MEDICINE | Facility: CLINIC | Age: 69
End: 2019-03-29

## 2019-03-29 ENCOUNTER — TELEPHONE (OUTPATIENT)
Dept: FAMILY MEDICINE | Facility: CLINIC | Age: 69
End: 2019-03-29

## 2019-03-29 NOTE — TELEPHONE ENCOUNTER
Patient was in [facility]-dx tia with complex migraine- records requested- appt made 4/1/19.

## 2019-03-29 NOTE — PROGRESS NOTES
Pre-Visit Chart Review  For Appointment Scheduled on 4-1-19    Health Maintenance Due   Topic Date Due    Zoster Vaccine  12/23/2012    Foot Exam  06/23/2018    Influenza Vaccine  08/01/2018

## 2019-03-29 NOTE — TELEPHONE ENCOUNTER
----- Message from Zara Schaffer sent at 3/29/2019  8:55 AM CDT -----  Patient calling in to schedule an appointment follow up from hospital and was told to follow up within 1 or 2 days. Patient discharged 03/28/19 per patient    Epic earliest appt is 04/08/19 for NP and with Dr. Garg is 04/28/19    Please contact patient to advise [phone] (home)

## 2019-04-01 ENCOUNTER — OFFICE VISIT (OUTPATIENT)
Dept: FAMILY MEDICINE | Facility: CLINIC | Age: 69
End: 2019-04-01
Attending: FAMILY MEDICINE
Payer: MEDICARE

## 2019-04-01 VITALS
OXYGEN SATURATION: 95 % | HEIGHT: 65 IN | SYSTOLIC BLOOD PRESSURE: 138 MMHG | DIASTOLIC BLOOD PRESSURE: 80 MMHG | TEMPERATURE: 98 F | HEART RATE: 88 BPM | BODY MASS INDEX: 32.95 KG/M2 | WEIGHT: 197.75 LBS | RESPIRATION RATE: 12 BRPM

## 2019-04-01 DIAGNOSIS — E11.9 TYPE 2 DIABETES MELLITUS WITHOUT COMPLICATION, WITHOUT LONG-TERM CURRENT USE OF INSULIN: ICD-10-CM

## 2019-04-01 DIAGNOSIS — I10 GOOD HYPERTENSION CONTROL: ICD-10-CM

## 2019-04-01 DIAGNOSIS — J32.3 CHRONIC SPHENOIDAL SINUSITIS: ICD-10-CM

## 2019-04-01 DIAGNOSIS — N39.0 URINARY TRACT INFECTION WITH HEMATURIA, SITE UNSPECIFIED: ICD-10-CM

## 2019-04-01 DIAGNOSIS — G43.001 MIGRAINE WITHOUT AURA AND WITH STATUS MIGRAINOSUS, NOT INTRACTABLE: Primary | ICD-10-CM

## 2019-04-01 DIAGNOSIS — R31.9 URINARY TRACT INFECTION WITH HEMATURIA, SITE UNSPECIFIED: ICD-10-CM

## 2019-04-01 PROCEDURE — 99214 OFFICE O/P EST MOD 30 MIN: CPT | Mod: HCNC,S$GLB,, | Performed by: FAMILY MEDICINE

## 2019-04-01 PROCEDURE — 3079F DIAST BP 80-89 MM HG: CPT | Mod: HCNC,CPTII,S$GLB, | Performed by: FAMILY MEDICINE

## 2019-04-01 PROCEDURE — 1101F PR PT FALLS ASSESS DOC 0-1 FALLS W/OUT INJ PAST YR: ICD-10-PCS | Mod: HCNC,CPTII,S$GLB, | Performed by: FAMILY MEDICINE

## 2019-04-01 PROCEDURE — 1101F PT FALLS ASSESS-DOCD LE1/YR: CPT | Mod: HCNC,CPTII,S$GLB, | Performed by: FAMILY MEDICINE

## 2019-04-01 PROCEDURE — 3075F PR MOST RECENT SYSTOLIC BLOOD PRESS GE 130-139MM HG: ICD-10-PCS | Mod: HCNC,CPTII,S$GLB, | Performed by: FAMILY MEDICINE

## 2019-04-01 PROCEDURE — 3044F PR MOST RECENT HEMOGLOBIN A1C LEVEL <7.0%: ICD-10-PCS | Mod: HCNC,CPTII,S$GLB, | Performed by: FAMILY MEDICINE

## 2019-04-01 PROCEDURE — 99999 PR PBB SHADOW E&M-EST. PATIENT-LVL IV: ICD-10-PCS | Mod: PBBFAC,HCNC,, | Performed by: FAMILY MEDICINE

## 2019-04-01 PROCEDURE — 3075F SYST BP GE 130 - 139MM HG: CPT | Mod: HCNC,CPTII,S$GLB, | Performed by: FAMILY MEDICINE

## 2019-04-01 PROCEDURE — 3044F HG A1C LEVEL LT 7.0%: CPT | Mod: HCNC,CPTII,S$GLB, | Performed by: FAMILY MEDICINE

## 2019-04-01 PROCEDURE — 99999 PR PBB SHADOW E&M-EST. PATIENT-LVL IV: CPT | Mod: PBBFAC,HCNC,, | Performed by: FAMILY MEDICINE

## 2019-04-01 PROCEDURE — 99214 PR OFFICE/OUTPT VISIT, EST, LEVL IV, 30-39 MIN: ICD-10-PCS | Mod: HCNC,S$GLB,, | Performed by: FAMILY MEDICINE

## 2019-04-01 PROCEDURE — 3079F PR MOST RECENT DIASTOLIC BLOOD PRESSURE 80-89 MM HG: ICD-10-PCS | Mod: HCNC,CPTII,S$GLB, | Performed by: FAMILY MEDICINE

## 2019-04-01 RX ORDER — AMOXICILLIN AND CLAVULANATE POTASSIUM 875; 125 MG/1; MG/1
1 TABLET, FILM COATED ORAL 2 TIMES DAILY
Qty: 20 TABLET | Refills: 0 | Status: SHIPPED | OUTPATIENT
Start: 2019-04-01 | End: 2019-04-11
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
## 2019-04-01 NOTE — PATIENT INSTRUCTIONS
Weight Management: Getting Started  Healthy bodies come in all shapes and sizes. Not all bodies are made to be thin. For some people, a healthy weight is higher than the average weight listed on weight charts. Your healthcare provider can help you decide on a healthy weight for you.    Reasons to lose weight  Losing weight can help with some health problems, such as high blood pressure, heart disease, diabetes, sleep apnea, and arthritis. You may also feel more energy.  Set your long-term goal  Your goal doesn't even have to be a specific weight. You may decide on a fitness goal (such as being able to walk 10 miles a week), or a health goal (such as lowering your blood pressure). Choose a goal that is measurable and reasonable, so you know when you've reached it. A goal of reaching a BMI of less than 25 is not always reasonable (or possible).   Make an action plan  Habits dont change overnight. Setting your goals too high can leave you feeling discouraged if you cant reach them. Be realistic. Choose one or two small changes you can make now. Set an action plan for how you are going to make these changes. When you can stick to this plan, keep making a few more small changes. Taking small steps will help you stay on the path to success.  Track your progress  Write down your goals. Then, keep a daily record of your progress. Write down what you eat and how active you are. This record lets you look back on how much youve done. It may also help when youre feeling frustrated. Reward yourself for success. Even if you dont reach every goal, give yourself credit for what you do get done.  Get support  Encouragement from others can help make losing weight easier. Ask your family members and friends for support. They may even want to join you. Also look to your healthcare provider, registered dietitian, and  for help. Your local hospital can give you more information about nutrition, exercise, and  weight loss.  Date Last Reviewed: 1/31/2016  © 8592-3913 [employer]. [address] [place] [address], [place] [address]. All rights reserved. This information is not intended as a substitute for professional medical care. Always follow your healthcare professional's instructions.        Controlling High Blood Pressure  High blood pressure (hypertension) is often called the silent killer. This is because many people who have it dont know it. High blood pressure is defined as 140/90 mm Hg or higher. Know your blood pressure and remember to check it regularly. Doing so can save your life. Here are some things you can do to help control your blood pressure.    Choose heart-healthy foods  · Select low-salt, low-fat foods. Limit sodium intake to 2,400 mg per day or the amount suggested by your healthcare provider.  · Limit canned, dried, cured, packaged, and fast foods. These can contain a lot of salt.  · Eat 8 to 10 servings of fruits and vegetables every day.  · Choose lean meats, fish, or chicken.  · Eat whole-grain pasta, brown rice, and beans.  · Eat 2 to 3 servings of low-fat or fat-free dairy products.  · Ask your doctor about the DASH eating plan. This plan helps reduce blood pressure.  · When you go to a restaurant, ask that your meal be prepared with no added salt.  Maintain a healthy weight  · Ask your healthcare provider how many calories to eat a day. Then stick to that number.  · Ask your healthcare provider what weight range is healthiest for you. If you are overweight, a weight loss of only 3% to 5% of your body weight can help lower blood pressure. Generally, a good weight loss goal is to lose 10% of your body weight in a year.  · Limit snacks and sweets.  · Get regular exercise.  Get up and get active  · Choose activities you enjoy. Find ones you can do with friends or family. This includes bicycling, dancing, walking, and jogging.  · Park farther away from building entrances.  · Use stairs  instead of the elevator.  · When you can, walk or bike instead of driving.  · Swisher leaves, garden, or do household repairs.  · Be active at a moderate to vigorous level of physical activity for at least 40 minutes for a minimum of 3 to 4 days a week.   Manage stress  · Make time to relax and enjoy life. Find time to laugh.  · Communicate your concerns with your loved ones and your healthcare provider.  · Visit with family and friends, and keep up with hobbies.  Limit alcohol and quit smoking  · Men should have no more than 2 drinks per day.  · Women should have no more than 1 drink per day.  · Talk with your healthcare provider about quitting smoking. Smoking significantly increases your risk for heart disease and stroke. Ask your healthcare provider about community smoking cessation programs and other options.  Medicines  If lifestyle changes arent enough, your healthcare provider may prescribe high blood pressure medicine. Take all medicines as prescribed. If you have any questions about your medicines, ask your healthcare provider before stopping or changing them.   Date Last Reviewed: 4/27/2016 © 2000-2017 [employer]. [address] [place] [id number]. All rights reserved. This information is not intended as a substitute for professional medical care. Always follow your healthcare professional's instructions.        Walking for Fitness  Fitness walking has something for everyone, even people who are already fit. Walking is one of the safest ways to condition your body aerobically. It can boost energy, help you lose weight, and reduce stress.    Physical benefits  · Walking strengthens your heart and lungs, and tones your muscles.  · When walking, your feet land with less impact than in other sports. This reduces chances of muscle, bone, and joint injury.  · Regular walking improves your cholesterol levels and lowers your risk of heart disease. And it helps you control your blood sugar if  you have diabetes.  · Walking is a weight-bearing activity, which helps maintain bone density. This can help prevent osteoporosis.  Personal rewards  · Taking walks can help you relax and manage stress. And fitness walking may make you feel better about yourself.  · Walking can help you sleep better at night and make you less likely to be depressed.  · Regular walking may help maintain your memory as you get older.  · Walking is a great way to spend extra time with friends and family members. Be sure to invite your dog along!  Q&A about fitness walking  Q: Will walking keep me fit?  A: Yes. Regular walking at the right pace gives you all the benefits of other aerobic activities, such as jogging and swimming.  Q: Will walking help me lose weight and keep it off?  A: Yes. Per mile, walking can burn as many calories as jogging. Your health care provider can help work walking into your weight-loss plan.  Q: Is walking safe for my health?  A: Yes. Walking is safe if you have high blood pressure, diabetes, heart disease, or other conditions. Talk to your healthcare provider before you start.  Date Last Reviewed: 4/1/2017  © 4407-1612 [employer]. [address] [place] [address], [place] [address]. All rights reserved. This information is not intended as a substitute for professional medical care. Always follow your healthcare professional's instructions.

## 2019-04-14 DIAGNOSIS — E78.5 HYPERLIPIDEMIA, UNSPECIFIED HYPERLIPIDEMIA TYPE: ICD-10-CM

## 2019-04-15 RX ORDER — ROSUVASTATIN CALCIUM 40 MG/1
TABLET, COATED ORAL
Qty: 30 TABLET | Refills: 5 | Status: SHIPPED | OUTPATIENT
Start: 2019-04-15 | End: 2019-12-13 | Stop reason: SDUPTHER

## 2019-05-01 ENCOUNTER — TELEPHONE (OUTPATIENT)
Dept: FAMILY MEDICINE | Facility: CLINIC | Age: 69
End: 2019-05-01

## 2019-05-01 DIAGNOSIS — N30.00 ACUTE CYSTITIS WITHOUT HEMATURIA: Primary | ICD-10-CM

## 2019-05-01 NOTE — TELEPHONE ENCOUNTER
----- Message from Ernestine Wilks sent at 5/1/2019  8:19 AM CDT -----  Contact: self   Patient need a rx called in for a uti, please call back at [phone]  [id number] - [place] - [id number]  [address] [place] [address]  Phone: [phone] Fax: [phone]

## 2019-05-01 NOTE — TELEPHONE ENCOUNTER
Pt stated her urine have foul odor accompanied with burning and pressure when urinating. Offered patient same day appointment, pt stated she's at work today and earliest she can come in is tomorrow. Requesting ABX to be called in. Please advise.

## 2019-05-02 ENCOUNTER — LAB VISIT (OUTPATIENT)
Dept: LAB | Facility: HOSPITAL | Age: 69
End: 2019-05-02
Attending: FAMILY MEDICINE
Payer: MEDICARE

## 2019-05-02 DIAGNOSIS — N30.00 ACUTE CYSTITIS WITHOUT HEMATURIA: ICD-10-CM

## 2019-05-02 LAB
BILIRUB UR QL STRIP: NEGATIVE
CLARITY UR: CLEAR
COLOR UR: YELLOW
GLUCOSE UR QL STRIP: NEGATIVE
HGB UR QL STRIP: ABNORMAL
KETONES UR QL STRIP: NEGATIVE
LEUKOCYTE ESTERASE UR QL STRIP: NEGATIVE
MICROSCOPIC COMMENT: ABNORMAL
NITRITE UR QL STRIP: NEGATIVE
PH UR STRIP: 7 [PH] (ref 5–8)
PROT UR QL STRIP: ABNORMAL
RBC #/AREA URNS AUTO: 24 /HPF (ref 0–4)
SP GR UR STRIP: 1.01 (ref 1–1.03)
SQUAMOUS #/AREA URNS AUTO: 0 /HPF
URN SPEC COLLECT METH UR: ABNORMAL

## 2019-05-02 PROCEDURE — 87086 URINE CULTURE/COLONY COUNT: CPT | Mod: HCNC

## 2019-05-02 PROCEDURE — 81000 URINALYSIS NONAUTO W/SCOPE: CPT | Mod: HCNC,PO

## 2019-05-03 DIAGNOSIS — R31.9 HEMATURIA, UNSPECIFIED TYPE: Primary | ICD-10-CM

## 2019-05-03 LAB — BACTERIA UR CULT: NO GROWTH

## 2019-05-06 ENCOUNTER — DOCUMENTATION ONLY (OUTPATIENT)
Dept: FAMILY MEDICINE | Facility: CLINIC | Age: 69
End: 2019-05-06

## 2019-05-06 ENCOUNTER — TELEPHONE (OUTPATIENT)
Dept: FAMILY MEDICINE | Facility: CLINIC | Age: 69
End: 2019-05-06

## 2019-05-06 NOTE — PROGRESS NOTES
Pre-Visit Chart Review  For Appointment Scheduled on 5-7-19    Health Maintenance Due   Topic Date Due    Foot Exam  06/23/2018    Hemoglobin A1c  05/09/2019

## 2019-05-06 NOTE — TELEPHONE ENCOUNTER
Patient given ua and culture results- is having back pain and had blood in urine- appt made 5/7/19 with Dr. Garg.

## 2019-05-06 NOTE — TELEPHONE ENCOUNTER
----- Message from vIy Munoz sent at 5/6/2019 10:44 AM CDT -----  Contact: self [phone]  Patient called back, she said she inadvertently hung up the call, please call back.  Thank you!

## 2019-05-07 ENCOUNTER — OFFICE VISIT (OUTPATIENT)
Dept: FAMILY MEDICINE | Facility: CLINIC | Age: 69
End: 2019-05-07
Attending: FAMILY MEDICINE
Payer: MEDICARE

## 2019-05-07 ENCOUNTER — HOSPITAL ENCOUNTER (OUTPATIENT)
Dept: RADIOLOGY | Facility: HOSPITAL | Age: 69
Discharge: HOME | End: 2019-05-07
Attending: FAMILY MEDICINE
Payer: MEDICARE

## 2019-05-07 VITALS
WEIGHT: 198.19 LBS | TEMPERATURE: 98 F | BODY MASS INDEX: 33.02 KG/M2 | DIASTOLIC BLOOD PRESSURE: 98 MMHG | RESPIRATION RATE: 16 BRPM | OXYGEN SATURATION: 97 % | HEIGHT: 65 IN | HEART RATE: 87 BPM | SYSTOLIC BLOOD PRESSURE: 164 MMHG

## 2019-05-07 DIAGNOSIS — R31.9 HEMATURIA, UNSPECIFIED TYPE: ICD-10-CM

## 2019-05-07 DIAGNOSIS — R10.9 FLANK PAIN: Primary | ICD-10-CM

## 2019-05-07 DIAGNOSIS — E11.9 TYPE 2 DIABETES MELLITUS WITHOUT COMPLICATION, WITHOUT LONG-TERM CURRENT USE OF INSULIN: ICD-10-CM

## 2019-05-07 DIAGNOSIS — R10.9 FLANK PAIN: ICD-10-CM

## 2019-05-07 DIAGNOSIS — E55.9 VITAMIN D DEFICIENCY: ICD-10-CM

## 2019-05-07 LAB
BILIRUB SERPL-MCNC: ABNORMAL MG/DL
BLOOD URINE, POC: 250
COLOR, POC UA: ABNORMAL
GLUCOSE UR QL STRIP: ABNORMAL
KETONES UR QL STRIP: ABNORMAL
LEUKOCYTE ESTERASE URINE, POC: ABNORMAL
NITRITE, POC UA: ABNORMAL
PH, POC UA: 7
PROTEIN, POC: ABNORMAL
SPECIFIC GRAVITY, POC UA: 1000
UROBILINOGEN, POC UA: ABNORMAL

## 2019-05-07 PROCEDURE — 3044F PR MOST RECENT HEMOGLOBIN A1C LEVEL <7.0%: ICD-10-PCS | Mod: HCNC,CPTII,S$GLB, | Performed by: FAMILY MEDICINE

## 2019-05-07 PROCEDURE — 1101F PR PT FALLS ASSESS DOC 0-1 FALLS W/OUT INJ PAST YR: ICD-10-PCS | Mod: HCNC,CPTII,S$GLB, | Performed by: FAMILY MEDICINE

## 2019-05-07 PROCEDURE — 99999 PR PBB SHADOW E&M-EST. PATIENT-LVL IV: ICD-10-PCS | Mod: PBBFAC,HCNC,, | Performed by: FAMILY MEDICINE

## 2019-05-07 PROCEDURE — 81002 POCT URINE DIPSTICK WITHOUT MICROSCOPE: ICD-10-PCS | Mod: HCNC,S$GLB,, | Performed by: FAMILY MEDICINE

## 2019-05-07 PROCEDURE — 74176 CT RENAL STONE STUDY ABD PELVIS WO: ICD-10-PCS | Mod: 26,HCNC,, | Performed by: RADIOLOGY

## 2019-05-07 PROCEDURE — 3044F HG A1C LEVEL LT 7.0%: CPT | Mod: HCNC,CPTII,S$GLB, | Performed by: FAMILY MEDICINE

## 2019-05-07 PROCEDURE — 1101F PT FALLS ASSESS-DOCD LE1/YR: CPT | Mod: HCNC,CPTII,S$GLB, | Performed by: FAMILY MEDICINE

## 2019-05-07 PROCEDURE — 3080F DIAST BP >= 90 MM HG: CPT | Mod: HCNC,CPTII,S$GLB, | Performed by: FAMILY MEDICINE

## 2019-05-07 PROCEDURE — 81002 URINALYSIS NONAUTO W/O SCOPE: CPT | Mod: HCNC,S$GLB,, | Performed by: FAMILY MEDICINE

## 2019-05-07 PROCEDURE — 3077F PR MOST RECENT SYSTOLIC BLOOD PRESSURE >= 140 MM HG: ICD-10-PCS | Mod: HCNC,CPTII,S$GLB, | Performed by: FAMILY MEDICINE

## 2019-05-07 PROCEDURE — 99999 PR PBB SHADOW E&M-EST. PATIENT-LVL IV: CPT | Mod: PBBFAC,HCNC,, | Performed by: FAMILY MEDICINE

## 2019-05-07 PROCEDURE — 74176 CT ABD & PELVIS W/O CONTRAST: CPT | Mod: TC,HCNC

## 2019-05-07 PROCEDURE — 99213 OFFICE O/P EST LOW 20 MIN: CPT | Mod: HCNC,25,S$GLB, | Performed by: FAMILY MEDICINE

## 2019-05-07 PROCEDURE — 99213 PR OFFICE/OUTPT VISIT, EST, LEVL III, 20-29 MIN: ICD-10-PCS | Mod: HCNC,25,S$GLB, | Performed by: FAMILY MEDICINE

## 2019-05-07 PROCEDURE — 3080F PR MOST RECENT DIASTOLIC BLOOD PRESSURE >= 90 MM HG: ICD-10-PCS | Mod: HCNC,CPTII,S$GLB, | Performed by: FAMILY MEDICINE

## 2019-05-07 PROCEDURE — 3077F SYST BP >= 140 MM HG: CPT | Mod: HCNC,CPTII,S$GLB, | Performed by: FAMILY MEDICINE

## 2019-05-07 PROCEDURE — 74176 CT ABD & PELVIS W/O CONTRAST: CPT | Mod: 26,HCNC,, | Performed by: RADIOLOGY

## 2019-05-07 NOTE — PATIENT INSTRUCTIONS
Walking for Fitness  Fitness walking has something for everyone, even people who are already fit. Walking is one of the safest ways to condition your body aerobically. It can boost energy, help you lose weight, and reduce stress.    Physical benefits  · Walking strengthens your heart and lungs, and tones your muscles.  · When walking, your feet land with less impact than in other sports. This reduces chances of muscle, bone, and joint injury.  · Regular walking improves your cholesterol levels and lowers your risk of heart disease. And it helps you control your blood sugar if you have diabetes.  · Walking is a weight-bearing activity, which helps maintain bone density. This can help prevent osteoporosis.  Personal rewards  · Taking walks can help you relax and manage stress. And fitness walking may make you feel better about yourself.  · Walking can help you sleep better at night and make you less likely to be depressed.  · Regular walking may help maintain your memory as you get older.  · Walking is a great way to spend extra time with friends and family members. Be sure to invite your dog along!  Q&A about fitness walking  Q: Will walking keep me fit?  A: Yes. Regular walking at the right pace gives you all the benefits of other aerobic activities, such as jogging and swimming.  Q: Will walking help me lose weight and keep it off?  A: Yes. Per mile, walking can burn as many calories as jogging. Your health care provider can help work walking into your weight-loss plan.  Q: Is walking safe for my health?  A: Yes. Walking is safe if you have high blood pressure, diabetes, heart disease, or other conditions. Talk to your healthcare provider before you start.  Date Last Reviewed: 4/1/2017 © 2000-2017 [employer]. [address] [place] [address], [place] [address]. All rights reserved. This information is not intended as a substitute for professional medical care. Always follow your healthcare professional's  instructions.        Weight Management: Getting Started  Healthy bodies come in all shapes and sizes. Not all bodies are made to be thin. For some people, a healthy weight is higher than the average weight listed on weight charts. Your healthcare provider can help you decide on a healthy weight for you.    Reasons to lose weight  Losing weight can help with some health problems, such as high blood pressure, heart disease, diabetes, sleep apnea, and arthritis. You may also feel more energy.  Set your long-term goal  Your goal doesn't even have to be a specific weight. You may decide on a fitness goal (such as being able to walk 10 miles a week), or a health goal (such as lowering your blood pressure). Choose a goal that is measurable and reasonable, so you know when you've reached it. A goal of reaching a BMI of less than 25 is not always reasonable (or possible).   Make an action plan  Habits dont change overnight. Setting your goals too high can leave you feeling discouraged if you cant reach them. Be realistic. Choose one or two small changes you can make now. Set an action plan for how you are going to make these changes. When you can stick to this plan, keep making a few more small changes. Taking small steps will help you stay on the path to success.  Track your progress  Write down your goals. Then, keep a daily record of your progress. Write down what you eat and how active you are. This record lets you look back on how much youve done. It may also help when youre feeling frustrated. Reward yourself for success. Even if you dont reach every goal, give yourself credit for what you do get done.  Get support  Encouragement from others can help make losing weight easier. Ask your family members and friends for support. They may even want to join you. Also look to your healthcare provider, registered dietitian, and  for help. Your local hospital can give you more information about  nutrition, exercise, and weight loss.  Date Last Reviewed: 1/31/2016  © 8284-5695 [employer]. [address] [place] [address], [place] [address]. All rights reserved. This information is not intended as a substitute for professional medical care. Always follow your healthcare professional's instructions.        Controlling High Blood Pressure  High blood pressure (hypertension) is often called the silent killer. This is because many people who have it dont know it. High blood pressure is defined as 140/90 mm Hg or higher. Know your blood pressure and remember to check it regularly. Doing so can save your life. Here are some things you can do to help control your blood pressure.    Choose heart-healthy foods  · Select low-salt, low-fat foods. Limit sodium intake to 2,400 mg per day or the amount suggested by your healthcare provider.  · Limit canned, dried, cured, packaged, and fast foods. These can contain a lot of salt.  · Eat 8 to 10 servings of fruits and vegetables every day.  · Choose lean meats, fish, or chicken.  · Eat whole-grain pasta, brown rice, and beans.  · Eat 2 to 3 servings of low-fat or fat-free dairy products.  · Ask your doctor about the DASH eating plan. This plan helps reduce blood pressure.  · When you go to a restaurant, ask that your meal be prepared with no added salt.  Maintain a healthy weight  · Ask your healthcare provider how many calories to eat a day. Then stick to that number.  · Ask your healthcare provider what weight range is healthiest for you. If you are overweight, a weight loss of only 3% to 5% of your body weight can help lower blood pressure. Generally, a good weight loss goal is to lose 10% of your body weight in a year.  · Limit snacks and sweets.  · Get regular exercise.  Get up and get active  · Choose activities you enjoy. Find ones you can do with friends or family. This includes bicycling, dancing, walking, and jogging.  · Park farther away from building  entrances.  · Use stairs instead of the elevator.  · When you can, walk or bike instead of driving.  · Galt leaves, garden, or do household repairs.  · Be active at a moderate to vigorous level of physical activity for at least 40 minutes for a minimum of 3 to 4 days a week.   Manage stress  · Make time to relax and enjoy life. Find time to laugh.  · Communicate your concerns with your loved ones and your healthcare provider.  · Visit with family and friends, and keep up with hobbies.  Limit alcohol and quit smoking  · Men should have no more than 2 drinks per day.  · Women should have no more than 1 drink per day.  · Talk with your healthcare provider about quitting smoking. Smoking significantly increases your risk for heart disease and stroke. Ask your healthcare provider about community smoking cessation programs and other options.  Medicines  If lifestyle changes arent enough, your healthcare provider may prescribe high blood pressure medicine. Take all medicines as prescribed. If you have any questions about your medicines, ask your healthcare provider before stopping or changing them.   Date Last Reviewed: 4/27/2016 © 2000-2017 [employer]. [address] [place] [address], [place] [address]. All rights reserved. This information is not intended as a substitute for professional medical care. Always follow your healthcare professional's instructions.

## 2019-05-07 NOTE — PROGRESS NOTES
Subjective:       Patient ID: Doris Pastrana is a 68 y.o. female.    Chief Complaint: Urinary Tract Infection and Back Pain    68-year-old female comes in with ongoing urinary problems with dysuria, hematuria, and a sensation of needing to void with relatively low urine output.  She had a urinary tract infection that was treated  but failed to clear completely.  On May 2nd she came in for a urinalysis that showed only a small amount of blood present but no sign of infection with relatively limited symptoms.  A recommendation was made to repeat the urine in two weeks and if the hematuria persisted a workup for stone would be done.  She came in today because of new pain in the left flank area describing aching pain with increased frequency of urine and small volumes.  She has had no fever or chills, the dysuria has improved but is still present.  She has no CVA area pain.    Past Medical History:  No date: Acute sinus infection  No date: Allergy  No date: Anemia  No date: Arthritis  No date: Bronchitis  No date: Degenerative disc disease      Comment:  lumbar, pain contract 2013  No date: Depression  16: Diabetes mellitus, type 2  No date: Fatty liver  16: Hyperlipidemia  No date: Hypertension  No date: Mitral valve prolapse  No date: Pleurisy  No date: Pneumonia  No date: PONV (postoperative nausea and vomiting)  No date: Sinus infection  2019: TIA (transient ischemic attack)    Past Surgical History:  2017: ANKLE SURGERY      Comment:  right ankle torn ligament  No date: APPENDECTOMY  2017: ARTHROSCOPY-ANKLE; Right      Comment:  Per[facility]  No date:  SECTION  No date: CHOLECYSTECTOMY  8/13/15: COLONOSCOPY      Comment:  Dr. Oliveira, five year recheck  2015: COLONOSCOPY; N/A      Comment:  Performed by T[facility] MD at [facility] ENDO  No date: HYSTERECTOMY  2017: REPAIR-LIGAMENT (brostrom han); Right      Comment:   Performed by [facility] at [facility] OR  8/9/2017: REPAIR-TENDON-LEG (peroneal tendon); Right      Comment:  Performed by [facility]j [facility]endez MD at [facility] OR  8/9/2017: TENODESIS (TENDON FIXATION)PERONEAL; Right      Comment:  Performed by [facility]j Mendez MD at [facility] OR  No date: TONSILLECTOMY  No date: VAGINAL DELIVERY    Current Outpatient Medications on File Prior to Visit:  albuterol (PROVENTIL/VENTOLIN HFA) 90 mcg/actuation inhaler, Inhale 2 puffs into the lungs 4 (four) times daily., Disp: 1 Inhaler, Rfl: 1  ALPRAZolam (XANAX) 0.25 MG tablet, Use one 20-30 minutes before flying prn, Disp: 4 tablet, Rfl: 1  blood sugar diagnostic (TRUE METRIX GLUCOSE TEST STRIP MISC), by Misc.(Non-Drug; Combo Route) route., Disp: , Rfl:   blood-glucose meter (TRUE METRIX GLUCOSE METER MISC), by Misc.(Non-Drug; Combo Route) route., Disp: , Rfl:   cetirizine (ZYRTEC) 10 MG tablet, Take 10 mg by mouth once daily., Disp: , Rfl:   cyclobenzaprine (FLEXERIL) 10 MG tablet, TAKE 1 TABLET BY MOUTH THREE TIMES DAILY AS NEEDED FOR MUSCLE SPASMS, Disp: 270 tablet, Rfl: 0  dulaglutide (TRULICITY) 0.75 mg/0.5 mL PnIj, Inject 0.5 mLs (0.75 mg total) into the skin every 7 days., Disp: 6 mL, Rfl: 0  escitalopram oxalate (LEXAPRO) 10 MG tablet, TAKE 1 TABLET BY MOUTH ONE TIME DAILY, Disp: 90 tablet, Rfl: 1  HYDROcodone-acetaminophen (NORCO) 5-325 mg per tablet, Take 1 tablet by mouth every 6 (six) hours as needed for Pain., Disp: 40 tablet, Rfl: 0  lancets 28 gauge Misc, 1 lancet by Misc.(Non-Drug; Combo Route) route 2 (two) times daily., Disp: 200 each, Rfl: each  losartan (COZAAR) 100 MG tablet, Take 1 tablet (100 mg total) by mouth once daily., Disp: 90 tablet, Rfl: 0  meclizine (ANTIVERT) 25 mg tablet, Take 1 tablet (25 mg total) by mouth 3 (three) times daily as needed., Disp: 30 tablet, Rfl: 5  meloxicam (MOBIC) 15 MG tablet, Take 1 tablet (15 mg total) by mouth once daily., Disp: 90 tablet, Rfl: 0  metFORMIN (GLUCOPHAGE-XR) 750 MG 24  hr tablet, Take 1 tablet (750 mg total) by mouth 2 (two) times daily with meals., Disp: 180 tablet, Rfl: 0  rosuvastatin (CRESTOR) 40 MG Tab, TAKE ONE TABLET BY MOUTH EVERY EVENING., Disp: 30 tablet, Rfl: 5  topiramate 25 mg capsule, Take 25 mg by mouth 2 (two) times daily., Disp: , Rfl:     No current facility-administered medications on file prior to visit.         Review of Systems   Constitutional: Negative for chills and fever.   Gastrointestinal: Negative for nausea and vomiting.   Endocrine: Positive for polyuria.   Genitourinary: Positive for flank pain, frequency, hematuria and urgency.       Objective:      Physical Exam   Abdominal: There is no hepatosplenomegaly. There is tenderness in the suprapubic area and left lower quadrant. There is no rigidity, no rebound, no guarding and no CVA tenderness. No hernia.   Nursing note and vitals reviewed.      Assessment:       1. Flank pain    2. Hematuria, unspecified type    3. Type 2 diabetes mellitus without complication, without long-term current use of insulin    4. Vitamin D deficiency        Plan:       1. Flank pain  Point of care urine is negative for leukocytes or nitrates but positive for blood  Suspect left ureteral stone  - CT Renal Stone Study ABD Pelvis WO; Future    2. Hematuria, unspecified type  See above  - CT Renal Stone Study ABD Pelvis WO; Future    3. Type 2 diabetes mellitus without complication, without long-term current use of insulin  Due for A1c recheck  - Basic metabolic panel; Future  - Hemoglobin A1c; Future    4. Vitamin D deficiency  Complains of fatigue and concerned about vitamin-D deficiency  - Vitamin D; Future

## 2019-05-09 ENCOUNTER — TELEPHONE (OUTPATIENT)
Dept: FAMILY MEDICINE | Facility: CLINIC | Age: 69
End: 2019-05-09

## 2019-05-09 ENCOUNTER — LAB VISIT (OUTPATIENT)
Dept: LAB | Facility: HOSPITAL | Age: 69
End: 2019-05-09
Attending: FAMILY MEDICINE
Payer: MEDICARE

## 2019-05-09 DIAGNOSIS — R31.9 HEMATURIA, UNSPECIFIED TYPE: ICD-10-CM

## 2019-05-09 DIAGNOSIS — E11.9 TYPE 2 DIABETES MELLITUS WITHOUT COMPLICATION, WITHOUT LONG-TERM CURRENT USE OF INSULIN: ICD-10-CM

## 2019-05-09 DIAGNOSIS — E78.5 HYPERLIPIDEMIA ASSOCIATED WITH TYPE 2 DIABETES MELLITUS: ICD-10-CM

## 2019-05-09 DIAGNOSIS — E55.9 VITAMIN D DEFICIENCY: Primary | ICD-10-CM

## 2019-05-09 DIAGNOSIS — E11.69 HYPERLIPIDEMIA ASSOCIATED WITH TYPE 2 DIABETES MELLITUS: ICD-10-CM

## 2019-05-09 PROCEDURE — 82043 UR ALBUMIN QUANTITATIVE: CPT | Mod: HCNC

## 2019-05-09 PROCEDURE — 88112 CYTOLOGY SPECIMEN-URINE: ICD-10-PCS | Mod: 26,HCNC,, | Performed by: PATHOLOGY

## 2019-05-09 PROCEDURE — 88112 CYTOPATH CELL ENHANCE TECH: CPT | Mod: HCNC | Performed by: PATHOLOGY

## 2019-05-09 RX ORDER — ERGOCALCIFEROL 1.25 MG/1
50000 CAPSULE ORAL
Qty: 12 CAPSULE | Refills: 2 | Status: SHIPPED | OUTPATIENT
Start: 2019-05-09 | End: 2019-12-13

## 2019-05-09 NOTE — TELEPHONE ENCOUNTER
----- Message from Tor Garg MD sent at 5/8/2019  8:17 PM CDT -----  The vitamin-D level is low.  I would suggest we go back to a high dose vitamin-D supplement once weekly.    The chemistry panel looks good with a normal blood glucose, normal kidney function, and normal electrolytes.  The A1c indicates overall glucose control is very good and no changes are needed.  It should be repeated in 4-6 months.

## 2019-05-09 NOTE — TELEPHONE ENCOUNTER
Vitamin-D supplement sent to [facility]    Orders in for BMP, A1c, lipid panel, microalbumin, and vitamin-D in six months.  Needs follow-up appointment at that time to include a foot exam and lab follow-up

## 2019-05-09 NOTE — TELEPHONE ENCOUNTER
The microalbumin order was given.  Apparently she came in for a urine today for hematuria and the lab did the microalbumin also, six months too early!  I don't know how they managed to get linked to appt today.

## 2019-05-10 ENCOUNTER — OFFICE VISIT (OUTPATIENT)
Dept: UROLOGY | Facility: CLINIC | Age: 69
End: 2019-05-10
Attending: FAMILY MEDICINE
Payer: MEDICARE

## 2019-05-10 ENCOUNTER — OFFICE VISIT (OUTPATIENT)
Dept: FAMILY MEDICINE | Facility: CLINIC | Age: 69
End: 2019-05-10
Attending: FAMILY MEDICINE
Payer: MEDICARE

## 2019-05-10 ENCOUNTER — APPOINTMENT (OUTPATIENT)
Dept: LAB | Facility: HOSPITAL | Age: 69
End: 2019-05-10
Attending: NURSE PRACTITIONER
Payer: MEDICARE

## 2019-05-10 ENCOUNTER — PATIENT OUTREACH (OUTPATIENT)
Dept: ADMINISTRATIVE | Facility: HOSPITAL | Age: 69
End: 2019-05-10

## 2019-05-10 VITALS
SYSTOLIC BLOOD PRESSURE: 162 MMHG | BODY MASS INDEX: 32.69 KG/M2 | TEMPERATURE: 98 F | DIASTOLIC BLOOD PRESSURE: 102 MMHG | RESPIRATION RATE: 18 BRPM | WEIGHT: 196.19 LBS | HEIGHT: 65 IN | HEART RATE: 87 BPM

## 2019-05-10 VITALS
HEIGHT: 65 IN | HEART RATE: 77 BPM | BODY MASS INDEX: 32.54 KG/M2 | TEMPERATURE: 98 F | SYSTOLIC BLOOD PRESSURE: 164 MMHG | DIASTOLIC BLOOD PRESSURE: 98 MMHG | WEIGHT: 195.31 LBS

## 2019-05-10 DIAGNOSIS — I10 HYPERTENSION, POOR CONTROL: Primary | ICD-10-CM

## 2019-05-10 DIAGNOSIS — J31.0 CHRONIC RHINITIS: ICD-10-CM

## 2019-05-10 DIAGNOSIS — R31.9 HEMATURIA, UNSPECIFIED TYPE: ICD-10-CM

## 2019-05-10 DIAGNOSIS — R30.0 DYSURIA: ICD-10-CM

## 2019-05-10 DIAGNOSIS — R10.9 FLANK PAIN: Primary | ICD-10-CM

## 2019-05-10 DIAGNOSIS — H69.92 DYSFUNCTION OF LEFT EUSTACHIAN TUBE: ICD-10-CM

## 2019-05-10 LAB
ALBUMIN/CREAT UR: 74.5 UG/MG (ref 0–30)
BACTERIA #/AREA URNS HPF: NORMAL /HPF
BILIRUB SERPL-MCNC: ABNORMAL MG/DL
BILIRUB UR QL STRIP: NEGATIVE
BLOOD URINE, POC: ABNORMAL
CLARITY UR: CLEAR
COLOR UR: YELLOW
COLOR, POC UA: YELLOW
CREAT UR-MCNC: 47 MG/DL (ref 15–325)
GLUCOSE UR QL STRIP: ABNORMAL
GLUCOSE UR QL STRIP: NEGATIVE
HGB UR QL STRIP: NEGATIVE
KETONES UR QL STRIP: ABNORMAL
KETONES UR QL STRIP: NEGATIVE
LEUKOCYTE ESTERASE UR QL STRIP: NEGATIVE
LEUKOCYTE ESTERASE URINE, POC: ABNORMAL
MICROALBUMIN UR DL<=1MG/L-MCNC: 35 UG/ML
MICROSCOPIC COMMENT: NORMAL
NITRITE UR QL STRIP: NEGATIVE
NITRITE, POC UA: ABNORMAL
PH UR STRIP: 8 [PH] (ref 5–8)
PH, POC UA: 7
PROT UR QL STRIP: NEGATIVE
PROTEIN, POC: ABNORMAL
RBC #/AREA URNS HPF: 1 /HPF (ref 0–4)
SP GR UR STRIP: 1.01 (ref 1–1.03)
SPECIFIC GRAVITY, POC UA: 1.01
URN SPEC COLLECT METH UR: NORMAL
UROBILINOGEN UR STRIP-ACNC: NEGATIVE EU/DL
UROBILINOGEN, POC UA: 0.2

## 2019-05-10 PROCEDURE — 51701 INSERT BLADDER CATHETER: CPT | Mod: HCNC,S$GLB,, | Performed by: NURSE PRACTITIONER

## 2019-05-10 PROCEDURE — 1101F PR PT FALLS ASSESS DOC 0-1 FALLS W/OUT INJ PAST YR: ICD-10-PCS | Mod: HCNC,CPTII,S$GLB, | Performed by: FAMILY MEDICINE

## 2019-05-10 PROCEDURE — 3077F SYST BP >= 140 MM HG: CPT | Mod: HCNC,CPTII,S$GLB, | Performed by: FAMILY MEDICINE

## 2019-05-10 PROCEDURE — 99204 OFFICE O/P NEW MOD 45 MIN: CPT | Mod: HCNC,25,S$GLB, | Performed by: NURSE PRACTITIONER

## 2019-05-10 PROCEDURE — 99214 OFFICE O/P EST MOD 30 MIN: CPT | Mod: HCNC,S$GLB,, | Performed by: FAMILY MEDICINE

## 2019-05-10 PROCEDURE — 81002 URINALYSIS NONAUTO W/O SCOPE: CPT | Mod: HCNC,S$GLB,, | Performed by: NURSE PRACTITIONER

## 2019-05-10 PROCEDURE — 3077F PR MOST RECENT SYSTOLIC BLOOD PRESSURE >= 140 MM HG: ICD-10-PCS | Mod: HCNC,CPTII,S$GLB, | Performed by: NURSE PRACTITIONER

## 2019-05-10 PROCEDURE — 88112 CYTOPATH CELL ENHANCE TECH: CPT | Mod: 26,,, | Performed by: PATHOLOGY

## 2019-05-10 PROCEDURE — 3080F PR MOST RECENT DIASTOLIC BLOOD PRESSURE >= 90 MM HG: ICD-10-PCS | Mod: HCNC,CPTII,S$GLB, | Performed by: NURSE PRACTITIONER

## 2019-05-10 PROCEDURE — 99214 PR OFFICE/OUTPT VISIT, EST, LEVL IV, 30-39 MIN: ICD-10-PCS | Mod: HCNC,S$GLB,, | Performed by: FAMILY MEDICINE

## 2019-05-10 PROCEDURE — 3080F PR MOST RECENT DIASTOLIC BLOOD PRESSURE >= 90 MM HG: ICD-10-PCS | Mod: HCNC,CPTII,S$GLB, | Performed by: FAMILY MEDICINE

## 2019-05-10 PROCEDURE — 3077F SYST BP >= 140 MM HG: CPT | Mod: HCNC,CPTII,S$GLB, | Performed by: NURSE PRACTITIONER

## 2019-05-10 PROCEDURE — 99999 PR PBB SHADOW E&M-EST. PATIENT-LVL III: ICD-10-PCS | Mod: PBBFAC,HCNC,, | Performed by: FAMILY MEDICINE

## 2019-05-10 PROCEDURE — 1101F PT FALLS ASSESS-DOCD LE1/YR: CPT | Mod: HCNC,CPTII,S$GLB, | Performed by: FAMILY MEDICINE

## 2019-05-10 PROCEDURE — 88112 CYTOLOGY SPECIMEN-URINE: ICD-10-PCS | Mod: 26,,, | Performed by: PATHOLOGY

## 2019-05-10 PROCEDURE — 1101F PT FALLS ASSESS-DOCD LE1/YR: CPT | Mod: HCNC,CPTII,S$GLB, | Performed by: NURSE PRACTITIONER

## 2019-05-10 PROCEDURE — 99999 PR PBB SHADOW E&M-EST. PATIENT-LVL V: ICD-10-PCS | Mod: PBBFAC,HCNC,, | Performed by: NURSE PRACTITIONER

## 2019-05-10 PROCEDURE — 3077F PR MOST RECENT SYSTOLIC BLOOD PRESSURE >= 140 MM HG: ICD-10-PCS | Mod: HCNC,CPTII,S$GLB, | Performed by: FAMILY MEDICINE

## 2019-05-10 PROCEDURE — 81002 POCT URINE DIPSTICK WITHOUT MICROSCOPE: ICD-10-PCS | Mod: HCNC,S$GLB,, | Performed by: NURSE PRACTITIONER

## 2019-05-10 PROCEDURE — 99999 PR PBB SHADOW E&M-EST. PATIENT-LVL V: CPT | Mod: PBBFAC,HCNC,, | Performed by: NURSE PRACTITIONER

## 2019-05-10 PROCEDURE — 51701 PR INSERTION OF NON-INDWELLING BLADDER CATHETERIZATION FOR RESIDUAL UR: ICD-10-PCS | Mod: HCNC,S$GLB,, | Performed by: NURSE PRACTITIONER

## 2019-05-10 PROCEDURE — 3080F DIAST BP >= 90 MM HG: CPT | Mod: HCNC,CPTII,S$GLB, | Performed by: NURSE PRACTITIONER

## 2019-05-10 PROCEDURE — 99999 PR PBB SHADOW E&M-EST. PATIENT-LVL III: CPT | Mod: PBBFAC,HCNC,, | Performed by: FAMILY MEDICINE

## 2019-05-10 PROCEDURE — 81000 URINALYSIS NONAUTO W/SCOPE: CPT | Mod: HCNC

## 2019-05-10 PROCEDURE — 3080F DIAST BP >= 90 MM HG: CPT | Mod: HCNC,CPTII,S$GLB, | Performed by: FAMILY MEDICINE

## 2019-05-10 PROCEDURE — 99204 PR OFFICE/OUTPT VISIT, NEW, LEVL IV, 45-59 MIN: ICD-10-PCS | Mod: HCNC,25,S$GLB, | Performed by: NURSE PRACTITIONER

## 2019-05-10 PROCEDURE — 87086 URINE CULTURE/COLONY COUNT: CPT | Mod: HCNC

## 2019-05-10 PROCEDURE — 1101F PR PT FALLS ASSESS DOC 0-1 FALLS W/OUT INJ PAST YR: ICD-10-PCS | Mod: HCNC,CPTII,S$GLB, | Performed by: NURSE PRACTITIONER

## 2019-05-10 PROCEDURE — 88112 CYTOPATH CELL ENHANCE TECH: CPT | Performed by: PATHOLOGY

## 2019-05-10 RX ORDER — FLUTICASONE PROPIONATE 50 MCG
2 SPRAY, SUSPENSION (ML) NASAL DAILY
Qty: 16 G | Refills: 5 | Status: SHIPPED | OUTPATIENT
Start: 2019-05-10 | End: 2020-06-22 | Stop reason: SDUPTHER

## 2019-05-10 RX ORDER — AMLODIPINE BESYLATE 2.5 MG/1
2.5 TABLET ORAL DAILY
Qty: 30 TABLET | Refills: 11 | Status: SHIPPED | OUTPATIENT
Start: 2019-05-10 | End: 2019-06-06 | Stop reason: DRUGHIGH

## 2019-05-10 NOTE — PROGRESS NOTES
[facility] Note  Staff: FABIANA Angulo    PCP: Dr. Tor Garg    Chief Complaint: Gross hematuria, Left flank pain    Subjective:        HPI: Doris Pastrana is a 68 y.o. female NEW PT presents with Dysuria, urethral pain, and flank pain which began 2-3 weeks ago.    CT Renal Stone Study done 19:  IMPRESSION:  There is a 6 mm left renal pelvic stone without hydronephrosis.  A 2 mm   stone is seen in the upper pole of the left kidney and a 1 mm stone is   seen in the right kidney without right-sided hydronephrosis.  Prior   hysterectomy.  Fatty infiltration of the liver parenchyma.  Prior   cholecystectomy.    Questions asked pt during office visit today:  DTF: Yes, NTF: 3-4x night  Urgency:No, incontinence with urgency? Yes;  .  Gross Hematuria:  Yes - 1st episode was 2-3 weeks ago.  History of UTI: No  Sexually active?: No    No history of kidney stones.    REVIEW OF SYSTEMS:  A comprehensive 10 system review was performed and is negative except as noted above in HPI    PMHx:  Past Medical History:   Diagnosis Date    Acute sinus infection     Allergy     Anemia     Arthritis     Bronchitis     Degenerative disc disease     lumbar, pain contract 2013    Depression     Diabetes mellitus, type 2 16    Fatty liver     Hyperlipidemia 16    Hypertension     Mitral valve prolapse     Pleurisy     Pneumonia     PONV (postoperative nausea and vomiting)     Sinus infection     TIA (transient ischemic attack) 2019     PSHx:  Past Surgical History:   Procedure Laterality Date    ANKLE SURGERY  2017    right ankle torn ligament    APPENDECTOMY      ARTHROSCOPY-ANKLE Right 2017    [facility] OR     SECTION      CHOLECYSTECTOMY      COLONOSCOPY  8/13/15    Dr. Oliveira, five year recheck    COLONOSCOPY N/A 2015    [facility]O    HYSTERECTOMY       REPAIR-LIGAMENT (brostrom han) Right 8/9/2017    [facility] OR    REPAIR-TENDON-LEG (peroneal tendon) Right 8/9/2017    [facility] OR    TENODESIS (TENDON FIXATION)PERONEAL Right 8/9/2017    [facility] OR    TONSILLECTOMY      VAGINAL DELIVERY       Fam Hx:   malignancies: No , gyn malignancies: No      Allergies:  Codeine; Rhubarb; Strawberries [strawberry]; Iodine; Latex, natural rubber; and Pravastatin    Medications: reviewed   Anticoagulation: No    Objective:     Vitals:    05/10/19 0819   BP: (!) 162/102   Pulse: 87   Resp: 18   Temp: 98.4 °F (36.9 °C)       Physical Exam   Vitals reviewed.  Constitutional: She is oriented to person, place, and time. She appears well-developed and well-nourished.   HENT:   Head: Normocephalic and atraumatic.   Eyes: Conjunctivae and EOM are normal. Pupils are equal, round, and reactive to light.   Neck: Normal range of motion. Neck supple.   Cardiovascular: Normal rate, regular rhythm, normal heart sounds and intact distal pulses.    Pulmonary/Chest: Effort normal and breath sounds normal.   Abdominal: Soft. Bowel sounds are normal.   Musculoskeletal: Normal range of motion.   Neurological: She is alert and oriented to person, place, and time. She has normal reflexes.   Skin: Skin is warm and dry.     Psychiatric: She has a normal mood and affect. Her behavior is normal. Judgment and thought content normal.      Exam:  In and out cath was performed by me today with 10 mL of urine residual collected.    LABS REVIEW:  UA today: abnormal    UCx:   Results for orders placed or performed in visit on 05/02/19   Urine culture   Result Value Ref Range    Urine Culture, Routine No growth      Cr:   Lab Results   Component Value Date    CREATININE 0.8 05/07/2019     Assessment:       1. Flank pain    2. Hematuria, unspecified type    3. Dysuria          Plan:   Gross hematuria,  Flank pain:    1.  Urine culture, UA Micro, UA, Cytology to be performed.    F/u with MD for gross hematuria for further evaluation/treatment.    [facility]: Pt Declined    Jossie Rodriguez, LINNEAC

## 2019-05-10 NOTE — LETTER
May 10, 2019    Doris Pastrana  [address]   [place] [address]             [facility]  [address]  [facility] [address]  Phone: [phone] Dear [facility] is committed to your overall health and would like to ensure that you are up to date on your recommended test and/or procedures.   Tor Garg MD  has found that your chart shows you may be due for the following:    YEARLY DIABETIC EYE EXAM & FOOT EXAM    If you have had any of the above done at another facility, please let us know so that we may obtain copies from that facility.  If you have a copy of these records, please provide a copy for us to scan into your chart.  You are welcome to request that the report be faxed to us at  ([phone]).     Otherwise, please schedule these appointments at your earliest convenience by calling [phone] or going to [email or web address].    If you have an upcoming scheduled appointment for the item above, please disregard this letter.    Sincerely,  Your [facility]  MD Becca Torrez LPN Clinical Care Coordinator  [address] [place] [address]  [phone]

## 2019-05-10 NOTE — LETTER
May 13, 2019      Tor Garg MD  [address]  [place] [address]           [place] [facility]  [address] [place] [address] [address]  [place] [address]  Phone: [phone]  Fax: [phone]          Patient: Drois Pastrana   MR Number: [id number]   YOB: 1950   Date of Visit: 5/10/2019       Dear Dr. Tor Garg:    Thank you for referring Doris Pastrana to me for evaluation. Attached you will find relevant portions of my assessment and plan of care.    If you have questions, please do not hesitate to call me. I look forward to following Doris Pastrana along with you.    Sincerely,    Jossie Rodriguez, [facility]    Enclosure  CC:  No Recipients    If you would like to receive this communication electronically, please contact [email or web address] or [phone] to request more information on [employer] access.    For providers and/or their staff who would like to refer a patient to [facility], please contact us through our one-stop-shop provider referral line, [facility], at [phone].    If you feel you have received this communication in error or would no longer like to receive these types of communications, please e-mail [email or web address]

## 2019-05-10 NOTE — PATIENT INSTRUCTIONS
"  Dysuria     Painful urination (dysuria) is often caused by a problem in the urinary tract.   Dysuria is pain felt during urination. It is often described as a burning. Learn more about this problem and how it can be treated.  What causes dysuria?  Possible causes include:  · Infection with a bacteria or virus such as a urinary tract infection (UTI or a sexually transmitted infection (STI)  · Sensitivity or allergy to chemicals such as those found in lotions and other products  · Prostate or bladder problems  · Radiation therapy to the pelvic area  How is dysuria diagnosed?  Your healthcare provider will examine you. He or she will ask about your symptoms and health. After talking with you and doing a physical exam, your healthcare provider may know what is causing your dysuria. He or she will usually request  a sample of your urine. Tests of your urine, or a "urinalysis," are done. A urinalysis may include:  · Looking at the urine sample (visual exam)  · Checking for substances (chemical exam)  · Looking at a small amount under a microscope (microscopic exam)  Some parts of the urinalysis may be done in the provider's office and some in a lab. And, the urine sample may be checked for bacteria and yeast (urine culture). Your healthcare provider will tell you more about these tests if they are needed.  How is dysuria treated?  Treatment depends on the cause. If you have a bacterial infection, you may need antibiotics. You may be given medicines to make it easier for you to urinate and help relieve pain. Your healthcare provider can tell you more about your treatment options. Untreated, symptoms may get worse.  When to call your healthcare provider  Call the healthcare provider right away if you have any of the following:  · Fever of 100.4°F (38°C) or higher   · No improvement after three days of treatment  · Trouble urinating because of pain  · New or increased discharge from the vagina or penis  · Rash or joint " pain  · Increased back or abdominal pain  · Enlarged painful lymph nodes (lumps) in the groin   Date Last Reviewed: 1/1/2017 © 2000-2017 [employer]. [address] [place] [address], [place] [address]. All rights reserved. This information is not intended as a substitute for professional medical care. Always follow your healthcare professional's instructions.        What is Hematuria?  Blood in your urine is a condition known as hematuria. Most of the time, the cause of hematuria is not serious. But, never ignore blood in the urine. Your doctor can evaluate you to find the cause of the bleeding and treat it, if needed.  Types of hematuria  · Gross hematuria means that the blood can be seen by the naked eye. The urine may look pinkish, brownish, or bright red.  · Microscopic hematuria means that the urine is clear, but blood cells can be seen when urine is looked at under a microscope or tested in a lab.  Both types of hematuria can have the same causes. Neither one is necessarily more serious than the other. With either type, you may have other symptoms, such as pain, pressure, or burning when you urinate, abdominal pain, or back pain. Or, you may not have any other symptoms. No matter how much blood is found, the cause of the bleeding needs to be identified.  Finding the cause of hematuria  To evaluate your condition, your doctor will first confirm that blood is indeed present. Then other tests are done to pinpoint where the blood is coming from and why. Your doctor will decide which tests will best determine the cause of your hematuria. Some common tests are listed below.  · History and physical exam  · Lab tests may include urinalysis, a urine culture, a urine cytology, and various blood tests  · Cystoscopy  · Computed tomography (CT) or CT urography  · Magnetic resonance imaging (MRI) or MR urography  · Ultrasound of the kidney  · Kidney biopsy  Causes of hematuria include the very benign (exercised  induced hematuria) to the very severe (cancer of the urinary system). A variety of treatments are available depending on the cause.  Date Last Reviewed: 12/2/2016  © 7584-1023 [employer]. [address] [place] [address], [place] [address]. All rights reserved. This information is not intended as a substitute for professional medical care. Always follow your healthcare professional's instructions.

## 2019-05-10 NOTE — PROGRESS NOTES
Subjective:       Patient ID: Doris Pastrana is a 68 y.o. female.    Chief Complaint: Hypertension    68-year-old female seen recently with urinary complaints and hematuria has been evaluated with CT renal stone study finding some her intrarenal stones and was referred to Urology.  Her blood pressure was moderately elevated when she was here but she is undergoing a divorce and is under a lot of stress so no changes were made at that time.  Her blood pressure had increased to a greater extent when she was at Urology and she was referred here for further evaluation.  She is having headaches but the appear to be more muscle tension in nature.    Past Medical History:  No date: Acute sinus infection  No date: Allergy  No date: Anemia  No date: Arthritis  No date: Bronchitis  No date: Degenerative disc disease      Comment:  lumbar, pain contract 2013  No date: Depression  16: Diabetes mellitus, type 2  No date: Fatty liver  16: Hyperlipidemia  No date: Hypertension  No date: Mitral valve prolapse  No date: Pleurisy  No date: Pneumonia  No date: PONV (postoperative nausea and vomiting)  No date: Sinus infection  2019: TIA (transient ischemic attack)    Past Surgical History:  2017: ANKLE SURGERY      Comment:  right ankle torn ligament  No date: APPENDECTOMY  2017: ARTHROSCOPY-ANKLE; Right      Comment:  Performed by Andrzej Mendez MD at [facility] OR  No date:  SECTION  No date: CHOLECYSTECTOMY  8/13/15: COLONOSCOPY      Comment:  Dr. Oliveira, five year recheck  2015: COLONOSCOPY; N/A      Comment:  Performed by Tevin Oliveira MD at [facility] ENDO  No date: HYSTERECTOMY  2017: REPAIR-LIGAMENT (brostrom han); Right      Comment:  Performed by Andrzej Mendez MD at [facility] OR  2017: REPAIR-TENDON-LEG (peroneal tendon); Right      Comment:  Performed by Andrzej Mendez MD at [facility] OR  2017: TENODESIS (TENDON FIXATION)PERONEAL; Right      Comment:  Performed by  [facility]endez [facility] at [facility] OR  No date: TONSILLECTOMY  No date: VAGINAL DELIVERY    Current Outpatient Medications on File Prior to Visit:  albuterol (PROVENTIL/VENTOLIN HFA) 90 mcg/actuation inhaler, Inhale 2 puffs into the lungs 4 (four) times daily., Disp: 1 Inhaler, Rfl: 1  ALPRAZolam (XANAX) 0.25 MG tablet, Use one 20-30 minutes before flying prn, Disp: 4 tablet, Rfl: 1  blood sugar diagnostic (TRUE METRIX GLUCOSE TEST STRIP MISC), by Misc.(Non-Drug; Combo Route) route., Disp: , Rfl:   blood-glucose meter (TRUE METRIX GLUCOSE METER MISC), by Misc.(Non-Drug; Combo Route) route., Disp: , Rfl:   cetirizine (ZYRTEC) 10 MG tablet, Take 10 mg by mouth once daily., Disp: , Rfl:   cyclobenzaprine (FLEXERIL) 10 MG tablet, TAKE 1 TABLET BY MOUTH THREE TIMES DAILY AS NEEDED FOR MUSCLE SPASMS, Disp: 270 tablet, Rfl: 0  dulaglutide (TRULICITY) 0.75 mg/0.5 mL PnIj, Inject 0.5 mLs (0.75 mg total) into the skin every 7 days., Disp: 6 mL, Rfl: 0  ergocalciferol (ERGOCALCIFEROL) 50,000 unit Cap, Take 1 capsule (50,000 Units total) by mouth every 7 days., Disp: 12 capsule, Rfl: 2  escitalopram oxalate (LEXAPRO) 10 MG tablet, TAKE 1 TABLET BY MOUTH ONE TIME DAILY, Disp: 90 tablet, Rfl: 1  HYDROcodone-acetaminophen (NORCO) 5-325 mg per tablet, Take 1 tablet by mouth every 6 (six) hours as needed for Pain., Disp: 40 tablet, Rfl: 0  lancets 28 gauge Misc, 1 lancet by Misc.(Non-Drug; Combo Route) route 2 (two) times daily., Disp: 200 each, Rfl: each  losartan (COZAAR) 100 MG tablet, Take 1 tablet (100 mg total) by mouth once daily., Disp: 90 tablet, Rfl: 0  meclizine (ANTIVERT) 25 mg tablet, Take 1 tablet (25 mg total) by mouth 3 (three) times daily as needed., Disp: 30 tablet, Rfl: 5  meloxicam (MOBIC) 15 MG tablet, Take 1 tablet (15 mg total) by mouth once daily., Disp: 90 tablet, Rfl: 0  metFORMIN (GLUCOPHAGE-XR) 750 MG 24 hr tablet, Take 1 tablet (750 mg total) by mouth 2 (two) times daily with meals., Disp: 180 tablet, Rfl:  0  rosuvastatin (CRESTOR) 40 MG Tab, TAKE ONE TABLET BY MOUTH EVERY EVENING., Disp: 30 tablet, Rfl: 5  topiramate 25 mg capsule, Take 25 mg by mouth 2 (two) times daily., Disp: , Rfl:     No current facility-administered medications on file prior to visit.         Review of Systems   Constitutional: Negative for chills and fever.   HENT: Positive for congestion, ear pain (Left ear fullness and mild discomfort) and hearing loss (Mild loss on left side). Negative for ear discharge.    Respiratory: Negative for chest tightness and shortness of breath.    Cardiovascular: Negative for chest pain and palpitations.   Neurological: Positive for numbness (Mild numbness in the area of the left supraclavicular nerve somewhat aggravated by head position changes but not painful).       Objective:      Physical Exam   Constitutional: She is oriented to person, place, and time. She appears well-developed. No distress.   Elevated blood pressure  Obese with a BMI of 32.5 she is down 2.9 lb from her May 7, 2019 visit   HENT:   Head: Normocephalic and atraumatic.   Right Ear: Hearing, tympanic membrane, external ear and ear canal normal.   Left Ear: Hearing, external ear and ear canal normal. Tympanic membrane is bulging. Tympanic membrane is not injected, not erythematous and not retracted. Tympanic membrane mobility is abnormal.  No middle ear effusion.   Nose: Mucosal edema and rhinorrhea present.   Cardiovascular: Normal rate, regular rhythm and normal heart sounds. Exam reveals no gallop and no friction rub.   No murmur heard.  Neurological: She is alert and oriented to person, place, and time.   Skin: She is not diaphoretic.   Psychiatric: She has a normal mood and affect. Her behavior is normal. Judgment and thought content normal.   Nursing note and vitals reviewed.      Assessment:       1. Hypertension, poor control    2. Chronic rhinitis    3. Dysfunction of left eustachian tube        Plan:       1. Hypertension, poor  control  Discussed options, a diuretic would work well with the losartan but could raise blood sugar levels and would increased frequency of urination which is already a problem for her.  A beta-blocker could cause fatigue and sleep issues which she is already having problems with and might mask early symptoms of hypoglycemia.  Amlodipine could cause some ankle edema secondary to fluid retention but usually only at higher doses and can be combined with valsartan later if successful.  We jointly elected to add the valsartan.  She will come back in two weeks for a nurse blood pressure check and adjustment of medications  - amLODIPine (NORVASC) 2.5 MG tablet; Take 1 tablet (2.5 mg total) by mouth once daily.  Dispense: 30 tablet; Refill: 11    2. Chronic rhinitis  - fluticasone propionate (FLONASE) 50 mcg/actuation nasal spray; 2 sprays (100 mcg total) by Each Nare route once daily.  Dispense: 16 g; Refill: 5    3. Dysfunction of left eustachian tube  - fluticasone propionate (FLONASE) 50 mcg/actuation nasal spray; 2 sprays (100 mcg total) by Each Nare route once daily.  Dispense: 16 g; Refill: 5

## 2019-05-12 LAB — BACTERIA UR CULT: NO GROWTH

## 2019-05-13 DIAGNOSIS — M79.671 RIGHT FOOT PAIN: Primary | ICD-10-CM

## 2019-05-14 ENCOUNTER — OFFICE VISIT (OUTPATIENT)
Dept: ORTHOPEDICS | Facility: CLINIC | Age: 69
End: 2019-05-14
Payer: MEDICARE

## 2019-05-14 ENCOUNTER — HOSPITAL ENCOUNTER (OUTPATIENT)
Dept: RADIOLOGY | Facility: HOSPITAL | Age: 69
Discharge: HOME | End: 2019-05-14
Attending: ORTHOPAEDIC SURGERY
Payer: MEDICARE

## 2019-05-14 ENCOUNTER — TELEPHONE (OUTPATIENT)
Dept: ORTHOPEDICS | Facility: CLINIC | Age: 69
End: 2019-05-14

## 2019-05-14 VITALS
BODY MASS INDEX: 32.49 KG/M2 | HEART RATE: 95 BPM | SYSTOLIC BLOOD PRESSURE: 152 MMHG | DIASTOLIC BLOOD PRESSURE: 93 MMHG | HEIGHT: 65 IN | WEIGHT: 195 LBS

## 2019-05-14 DIAGNOSIS — M79.671 RIGHT FOOT PAIN: Primary | ICD-10-CM

## 2019-05-14 DIAGNOSIS — M79.671 RIGHT FOOT PAIN: ICD-10-CM

## 2019-05-14 DIAGNOSIS — S86.301D INJURY OF PERONEAL TENDON OF RIGHT FOOT, SUBSEQUENT ENCOUNTER: ICD-10-CM

## 2019-05-14 PROCEDURE — 20550 TENDON SHEATH: ICD-10-PCS | Mod: RT,S$GLB,, | Performed by: ORTHOPAEDIC SURGERY

## 2019-05-14 PROCEDURE — 99999 PR PBB SHADOW E&M-EST. PATIENT-LVL III: ICD-10-PCS | Mod: PBBFAC,,, | Performed by: ORTHOPAEDIC SURGERY

## 2019-05-14 PROCEDURE — 20550 NJX 1 TENDON SHEATH/LIGAMENT: CPT | Mod: RT,S$GLB,, | Performed by: ORTHOPAEDIC SURGERY

## 2019-05-14 PROCEDURE — 1101F PT FALLS ASSESS-DOCD LE1/YR: CPT | Mod: CPTII,S$GLB,, | Performed by: ORTHOPAEDIC SURGERY

## 2019-05-14 PROCEDURE — 3080F DIAST BP >= 90 MM HG: CPT | Mod: CPTII,S$GLB,, | Performed by: ORTHOPAEDIC SURGERY

## 2019-05-14 PROCEDURE — 3077F SYST BP >= 140 MM HG: CPT | Mod: CPTII,S$GLB,, | Performed by: ORTHOPAEDIC SURGERY

## 2019-05-14 PROCEDURE — 73630 XR FOOT COMPLETE 3 VIEW RIGHT: ICD-10-PCS | Mod: 26,RT,, | Performed by: RADIOLOGY

## 2019-05-14 PROCEDURE — 3077F PR MOST RECENT SYSTOLIC BLOOD PRESSURE >= 140 MM HG: ICD-10-PCS | Mod: CPTII,S$GLB,, | Performed by: ORTHOPAEDIC SURGERY

## 2019-05-14 PROCEDURE — 1101F PR PT FALLS ASSESS DOC 0-1 FALLS W/OUT INJ PAST YR: ICD-10-PCS | Mod: CPTII,S$GLB,, | Performed by: ORTHOPAEDIC SURGERY

## 2019-05-14 PROCEDURE — 99214 PR OFFICE/OUTPT VISIT, EST, LEVL IV, 30-39 MIN: ICD-10-PCS | Mod: 25,S$GLB,, | Performed by: ORTHOPAEDIC SURGERY

## 2019-05-14 PROCEDURE — 73630 X-RAY EXAM OF FOOT: CPT | Mod: 26,RT,, | Performed by: RADIOLOGY

## 2019-05-14 PROCEDURE — 3080F PR MOST RECENT DIASTOLIC BLOOD PRESSURE >= 90 MM HG: ICD-10-PCS | Mod: CPTII,S$GLB,, | Performed by: ORTHOPAEDIC SURGERY

## 2019-05-14 PROCEDURE — 99999 PR PBB SHADOW E&M-EST. PATIENT-LVL III: CPT | Mod: PBBFAC,,, | Performed by: ORTHOPAEDIC SURGERY

## 2019-05-14 PROCEDURE — 99214 OFFICE O/P EST MOD 30 MIN: CPT | Mod: 25,S$GLB,, | Performed by: ORTHOPAEDIC SURGERY

## 2019-05-14 PROCEDURE — 73630 X-RAY EXAM OF FOOT: CPT | Mod: TC,PO,RT

## 2019-05-14 RX ADMIN — TRIAMCINOLONE ACETONIDE 40 MG: 40 INJECTION, SUSPENSION INTRA-ARTICULAR; INTRAMUSCULAR at 05:05

## 2019-05-14 NOTE — PROCEDURES
Tendon Sheath  Date/Time: 5/14/2019 5:11 PM  Performed by: Andrzej Mendez MD  Authorized by: Andrzej Mendez MD     Consent Done?:  Yes (Verbal)  Indications:  Pain  Site marked: the procedure site was marked    Location:  Foot  Foot joint: right peroneal tendon sheath.  Prep: patient was prepped and draped in usual sterile fashion    Needle size:  22 G  Approach:  Lateral  Medications:  40 mg triamcinolone acetonide 40 mg/mL  Patient tolerance:  Patient tolerated the procedure well with no immediate complications

## 2019-05-14 NOTE — TELEPHONE ENCOUNTER
----- Message from Olimpia Durand sent at 5/14/2019  2:45 PM CDT -----  Contact: Pt  Type: Needs Medical Advice    Who Called:  Patient  Additional Information: pt is lost but she is on her way she didn't know where to  go

## 2019-05-15 PROBLEM — M25.371 RIGHT ANKLE INSTABILITY: Status: RESOLVED | Noted: 2017-09-07 | Resolved: 2019-05-15

## 2019-05-15 RX ORDER — TRIAMCINOLONE ACETONIDE 40 MG/ML
40 INJECTION, SUSPENSION INTRA-ARTICULAR; INTRAMUSCULAR
Status: DISCONTINUED | OUTPATIENT
Start: 2019-05-14 | End: 2019-05-15 | Stop reason: HOSPADM

## 2019-05-15 NOTE — PROGRESS NOTES
Subjective:      Patient ID: Doris Pastrana is a 68 y.o. female.    Chief Complaint: Pain of the Right Foot    Pt is here for f/u today c/o right foot pain. The pain is in the area of the previous surgical site on the lateral foot. The last injection I did for her ankle was almost a year ago.   Social History     Occupational History     Employer: [employer]   Tobacco Use    Smoking status: Former Smoker     Packs/day: 1.00     Types: Cigarettes     Last attempt to quit: 3/12/1983     Years since quittin.2    Smokeless tobacco: Never Used   Substance and Sexual Activity    Alcohol use: Yes     Comment: socially    Drug use: No    Sexual activity: Not Currently            Objective:    Ortho Exam     RLE: neurovascularly intact,  tenderness to palpation along the peroneal tendons with some mild swelling which is much less swollen than prior to surgery.   NROM of the ankle.   Assessment:       1. Right foot pain          Plan:       I injected the right peroneal tendon sheath today. Other than ankle fusion I do not see anything additional that can be done at this time for the pain due to the severity of her peroneal pathology.

## 2019-05-21 ENCOUNTER — OFFICE VISIT (OUTPATIENT)
Dept: UROLOGY | Facility: CLINIC | Age: 69
End: 2019-05-21
Payer: MEDICARE

## 2019-05-21 ENCOUNTER — ANESTHESIA EVENT (OUTPATIENT)
Dept: SURGERY | Facility: HOSPITAL | Age: 69
End: 2019-05-21
Payer: MEDICARE

## 2019-05-21 ENCOUNTER — APPOINTMENT (OUTPATIENT)
Dept: LAB | Facility: HOSPITAL | Age: 69
End: 2019-05-21
Attending: UROLOGY
Payer: MEDICARE

## 2019-05-21 VITALS
HEART RATE: 100 BPM | HEIGHT: 65 IN | DIASTOLIC BLOOD PRESSURE: 75 MMHG | SYSTOLIC BLOOD PRESSURE: 122 MMHG | BODY MASS INDEX: 32.75 KG/M2 | WEIGHT: 196.56 LBS

## 2019-05-21 DIAGNOSIS — N89.8 VAGINAL DISCHARGE: ICD-10-CM

## 2019-05-21 DIAGNOSIS — N20.0 NEPHROLITHIASIS: ICD-10-CM

## 2019-05-21 DIAGNOSIS — R31.29 MICROSCOPIC HEMATURIA: Primary | ICD-10-CM

## 2019-05-21 DIAGNOSIS — R10.2 VAGINAL PAIN: ICD-10-CM

## 2019-05-21 LAB
BACTERIA #/AREA URNS HPF: ABNORMAL /HPF
BILIRUB SERPL-MCNC: ABNORMAL MG/DL
BILIRUB UR QL STRIP: NEGATIVE
BLOOD URINE, POC: ABNORMAL
CLARITY UR: ABNORMAL
COLOR UR: YELLOW
COLOR, POC UA: ABNORMAL
GLUCOSE UR QL STRIP: ABNORMAL
GLUCOSE UR QL STRIP: NEGATIVE
HGB UR QL STRIP: ABNORMAL
HYALINE CASTS #/AREA URNS LPF: 0 /LPF
KETONES UR QL STRIP: ABNORMAL
KETONES UR QL STRIP: ABNORMAL
LEUKOCYTE ESTERASE UR QL STRIP: NEGATIVE
LEUKOCYTE ESTERASE URINE, POC: ABNORMAL
MICROSCOPIC COMMENT: ABNORMAL
NITRITE UR QL STRIP: NEGATIVE
NITRITE, POC UA: ABNORMAL
PH UR STRIP: 6 [PH] (ref 5–8)
PH, POC UA: 6
PROT UR QL STRIP: ABNORMAL
PROTEIN, POC: 100
RBC #/AREA URNS HPF: >100 /HPF (ref 0–4)
SP GR UR STRIP: >=1.03 (ref 1–1.03)
SPECIFIC GRAVITY, POC UA: 1.03
URN SPEC COLLECT METH UR: ABNORMAL
UROBILINOGEN UR STRIP-ACNC: NEGATIVE EU/DL
UROBILINOGEN, POC UA: ABNORMAL
WBC #/AREA URNS HPF: 2 /HPF (ref 0–5)

## 2019-05-21 PROCEDURE — 81002 URINALYSIS NONAUTO W/O SCOPE: CPT | Mod: HCNC,S$GLB,, | Performed by: UROLOGY

## 2019-05-21 PROCEDURE — 3074F PR MOST RECENT SYSTOLIC BLOOD PRESSURE < 130 MM HG: ICD-10-PCS | Mod: HCNC,CPTII,S$GLB, | Performed by: UROLOGY

## 2019-05-21 PROCEDURE — 99999 PR PBB SHADOW E&M-EST. PATIENT-LVL V: CPT | Mod: PBBFAC,HCNC,, | Performed by: UROLOGY

## 2019-05-21 PROCEDURE — 3078F PR MOST RECENT DIASTOLIC BLOOD PRESSURE < 80 MM HG: ICD-10-PCS | Mod: HCNC,CPTII,S$GLB, | Performed by: UROLOGY

## 2019-05-21 PROCEDURE — 99215 PR OFFICE/OUTPT VISIT, EST, LEVL V, 40-54 MIN: ICD-10-PCS | Mod: HCNC,25,S$GLB, | Performed by: UROLOGY

## 2019-05-21 PROCEDURE — 81002 POCT URINE DIPSTICK WITHOUT MICROSCOPE: ICD-10-PCS | Mod: HCNC,S$GLB,, | Performed by: UROLOGY

## 2019-05-21 PROCEDURE — 3074F SYST BP LT 130 MM HG: CPT | Mod: HCNC,CPTII,S$GLB, | Performed by: UROLOGY

## 2019-05-21 PROCEDURE — 1101F PT FALLS ASSESS-DOCD LE1/YR: CPT | Mod: HCNC,CPTII,S$GLB, | Performed by: UROLOGY

## 2019-05-21 PROCEDURE — 87480 CANDIDA DNA DIR PROBE: CPT | Mod: HCNC

## 2019-05-21 PROCEDURE — 1101F PR PT FALLS ASSESS DOC 0-1 FALLS W/OUT INJ PAST YR: ICD-10-PCS | Mod: HCNC,CPTII,S$GLB, | Performed by: UROLOGY

## 2019-05-21 PROCEDURE — 99215 OFFICE O/P EST HI 40 MIN: CPT | Mod: HCNC,25,S$GLB, | Performed by: UROLOGY

## 2019-05-21 PROCEDURE — 51701 INSERT BLADDER CATHETER: CPT | Mod: HCNC,S$GLB,, | Performed by: UROLOGY

## 2019-05-21 PROCEDURE — 87510 GARDNER VAG DNA DIR PROBE: CPT | Mod: HCNC

## 2019-05-21 PROCEDURE — 51701 PR INSERTION OF NON-INDWELLING BLADDER CATHETERIZATION FOR RESIDUAL UR: ICD-10-PCS | Mod: HCNC,S$GLB,, | Performed by: UROLOGY

## 2019-05-21 PROCEDURE — 99999 PR PBB SHADOW E&M-EST. PATIENT-LVL V: ICD-10-PCS | Mod: PBBFAC,HCNC,, | Performed by: UROLOGY

## 2019-05-21 PROCEDURE — 3078F DIAST BP <80 MM HG: CPT | Mod: HCNC,CPTII,S$GLB, | Performed by: UROLOGY

## 2019-05-21 PROCEDURE — 87086 URINE CULTURE/COLONY COUNT: CPT | Mod: HCNC

## 2019-05-21 PROCEDURE — 81000 URINALYSIS NONAUTO W/SCOPE: CPT | Mod: HCNC

## 2019-05-21 RX ORDER — CIPROFLOXACIN 2 MG/ML
400 INJECTION, SOLUTION INTRAVENOUS
Status: CANCELLED | OUTPATIENT
Start: 2019-05-21

## 2019-05-21 RX ORDER — FLUCONAZOLE 150 MG/1
150 TABLET ORAL DAILY
Qty: 2 TABLET | Refills: 1 | Status: SHIPPED | OUTPATIENT
Start: 2019-05-21 | End: 2019-07-05 | Stop reason: ALTCHOICE

## 2019-05-21 NOTE — PROGRESS NOTES
[facility] Urology Clinic Note  Jossie Rodriguez, FNP-C    PCP: Dr. Tor Garg    Chief Complaint: : L flank pain     Subjective:        HPI: Doris Pastrana is a 68 y.o. female     pt presented initially to pcp with GH and atypical urine cytology then to Jossie on  with 2 week h/o urethral pain and dysuria. A CTRSS done  showed 6 mm left renal pelvic stone without hydronephrosis, and a 2 mm &1 mm stone in the right kidney. stone is seen in the upper pole of the left kidney and a 1 mm stone in the right.     She was referred to me for further evaluation. Pain 4/10 on left but has been present for months. Pain as high as 8/10. No f/c/+frequencyand urgency then saw me today and no significant complaints of pain. Some incont. + dysuria at meatus, which is new since being on abx (which she had been on for possible infection but cx was neg)    She was also c/o some OAB and incontinence she wanted to discuss in more detail at another date  RUBINA 6:   Do you usually experience frequent urination? 3.   Do you usually experience urine leakage associated with a feeling of urgency; that is, a strong sensation of needing to go to the bathroom? 3.   Do you usually experience urine leakage related to coughing, sneezing, or laughing?1.   Do you experience small amounts of urine leakage (that is, drops)? 1.   Do you experience difficulty emptying your bladder? 0.   Do you usually experience pain or discomfort in the lower abdomen or genital region? 3  Total Score: 11  Bother score: 5  Quality of Life: mostly dissatisfied      , 3 natural,1 csxn, +hysterectomy    Urine history:   19 Ng, cath: 3+bld/no leuk (>100 wbc, few bact), stent in  5/10/19  Ng, cath: neg, void: tr wbc, cytology: neg, pvr by in and out cath: 10cc(on abx)  19  Cytology: atypia  19 No cx, vodi: 250 bld/tr prot  19  Ng, void: 3+bld/tr prot      REVIEW OF SYSTEMS:  General ROS: no fevers, no chills  Psychological ROS: no  depression  Endocrine ROS: no heat or cold  Respiratory ROS: no SOB  Cardiovascular ROS: no CP  Gastrointestinal ROS: no abdominal pain, no constipation, no diarrhea, no BRBPR  Musculoskeletal ROS: no muscle pain  Neurological ROS: no headaches  Dermatological ROS: no rashes  HEENT: noglasses, no sinus   ROS: per HPI      PMHx:  Past Medical History:   Diagnosis Date    Acute sinus infection     Allergy     Anemia     Arthritis     Bronchitis     Degenerative disc disease     lumbar, pain contract 2013    Depression     Diabetes mellitus, type 2 16    Fatty liver     Hyperlipidemia 16    Hypertension     Mitral valve prolapse     Pleurisy     Pneumonia     PONV (postoperative nausea and vomiting)     Sinus infection     TIA (transient ischemic attack) 2019     PSHx:  Past Surgical History:   Procedure Laterality Date    ANKLE SURGERY  2017    right ankle torn ligament    APPENDECTOMY      ARTHROSCOPY-ANKLE Right 2017    [facility] OR     SECTION      CHOLECYSTECTOMY      COLONOSCOPY  8/13/15    Dr. Oliveira, five year recheck    COLONOSCOPY N/A 2015    [facility] ENDO    HYSTERECTOMY      REPAIR-LIGAMENT (brostrom han) Right 2017    [facility] at [facility] OR    REPAIR-TENDON-LEG (peroneal tendon) Right 2017    [facility] at [facility] OR    TENODESIS (TENDON FIXATION)PERONEAL Right 2017    [facility] at [facility] OR    TONSILLECTOMY      VAGINAL DELIVERY       Fam Hx:   malignancies: No , gyn malignancies: No   No stones  Mother  a 81, father  a 62 from aortic aneurysm    Social history:  +15 pack year hx of smoking, quit 35 years ago  5 children  Lives in [place]  Occupation - takes care of total care pt,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 Needs to work to get paid.   Enjoys gardening     Allergies:  Codeine; Rhubarb;  Strawberries [strawberry]; Iodine; Latex, natural rubber; and Pravastatin    Medications: reviewed   Anticoagulation: No    Objective:     Vitals:    05/21/19 1422   BP: 122/75   Pulse: 100       Physical Exam   Vitals reviewed.  Constitutional: She is oriented to person, place, and time. She appears well-developed and well-nourished.   HENT:   Head: Normocephalic and atraumatic.   Eyes: Conjunctivae and EOM are normal. Pupils are equal, round, and reactive to light.   Neck: Normal range of motion. Neck supple.   Cardiovascular: Normal rate, regular rhythm, normal heart sounds and intact distal pulses.    Pulmonary/Chest: Effort normal and breath sounds normal.   Abdominal: Soft. Bowel sounds are normal.   Musculoskeletal: Normal range of motion.   Neurological: She is alert and oriented to person, place, and time. She has normal reflexes.   Skin: Skin is warm and dry.     Psychiatric: She has a normal mood and affect. Her behavior is normal. Judgment and thought content normal.      Exam:  In and out cath was performed by me today with 10 mL of urine residual collected.  +vaginal discharge sent for affirm    LABS REVIEW:      Lab Results   Component Value Date    WBC 7.53 06/01/2018    HGB 12.4 06/01/2018    HCT 40.6 06/01/2018    MCV 90 06/01/2018     06/01/2018     BMP  Lab Results   Component Value Date     05/07/2019    K 4.3 05/07/2019     05/07/2019    CO2 33 (H) 05/07/2019    BUN 14 05/07/2019    CREATININE 0.8 05/07/2019    CALCIUM 10.1 05/07/2019    ANIONGAP 6 (L) 05/07/2019    ESTGFRAFRICA >60 05/07/2019    EGFRNONAA >60 05/07/2019       Pathology:   Urine, voided, cytology 5/10/19:  Negative for malignant cells.    Urine (voided) 5/9/19:  Urothelial cell atypia    Imaging:  kub 5/22/19  A 6 mm stone is present on the left at the L3 level corresponding to a ureteropelvic junction stone identified on CT 05/07/2019.  A 3 mm stone is evident within the upper pole of the left kidney.  No  right urolithiasis is seen.  Several calcifications are seen within the pelvis compatible with phleboliths identified on prior CT.  Surgical clips are present in the right upper quadrant.  There is an old calcified granuloma in the right midlung.    ctrss 5/7/19  The adrenal glands are not enlarged.  The kidneys are of normal size and CT density.  On the right there is a 1 mm right intrarenal stone without hydronephrosis or mass.      On the left there is a 2 mm intrarenal stone at the upper pole calyx and a 6 mm stone in the left renal pelvis although without hydronephrosis seen.  The ureters follow a normal course down to the bladder without dilation or stone distally.     The abdominal aorta and inferior vena cava are of normal caliber.  Retroperitoneal adenopathy is not seen      Assessment:       1. Microscopic hematuria    2. Nephrolithiasis    3. Vaginal discharge    4. Vaginal pain          Plan:     L kd stone likely cause of GH and pain  Ekg, kub, cbc and bmp in am  eswl vs ureteroscopy - both treatment options discussed   1 month ago had tia vs migraine complications-workup negative at [facility]  Not on asa 81mg      Reviewed cath urines vs voided urines  Sending cath urine today, ua microscopic. Unlikely uti  First stone but has b stones    Incontinence and oab to be discussed at later date    Diflucan, starting antifungal   Sending affirm       [facility]: Pt Declined    Marisol Stewart,

## 2019-05-22 ENCOUNTER — ANESTHESIA (OUTPATIENT)
Dept: SURGERY | Facility: HOSPITAL | Age: 69
End: 2019-05-22
Payer: MEDICARE

## 2019-05-22 ENCOUNTER — TELEPHONE (OUTPATIENT)
Dept: UROLOGY | Facility: CLINIC | Age: 69
End: 2019-05-22

## 2019-05-22 ENCOUNTER — HOSPITAL ENCOUNTER (OUTPATIENT)
Facility: HOSPITAL | Age: 69
Discharge: HOME | End: 2019-05-22
Attending: UROLOGY | Admitting: UROLOGY
Payer: MEDICARE

## 2019-05-22 VITALS
SYSTOLIC BLOOD PRESSURE: 138 MMHG | DIASTOLIC BLOOD PRESSURE: 65 MMHG | RESPIRATION RATE: 17 BRPM | WEIGHT: 196 LBS | TEMPERATURE: 98 F | HEIGHT: 65 IN | HEART RATE: 82 BPM | BODY MASS INDEX: 32.65 KG/M2 | OXYGEN SATURATION: 94 %

## 2019-05-22 DIAGNOSIS — M70.61 TROCHANTERIC BURSITIS OF RIGHT HIP: ICD-10-CM

## 2019-05-22 DIAGNOSIS — R31.29 MICROSCOPIC HEMATURIA: ICD-10-CM

## 2019-05-22 DIAGNOSIS — N20.0 NEPHROLITHIASIS: ICD-10-CM

## 2019-05-22 DIAGNOSIS — R31.0 GROSS HEMATURIA: Primary | ICD-10-CM

## 2019-05-22 DIAGNOSIS — M75.111 INCOMPLETE TEAR OF RIGHT ROTATOR CUFF: ICD-10-CM

## 2019-05-22 LAB
ANION GAP SERPL CALC-SCNC: 8 MMOL/L (ref 8–16)
BACTERIAL VAGINOSIS DNA: POSITIVE
BASOPHILS # BLD AUTO: 0 K/UL (ref 0–0.2)
BASOPHILS NFR BLD: 0.4 % (ref 0–1.9)
BUN SERPL-MCNC: 16 MG/DL (ref 8–23)
CALCIUM SERPL-MCNC: 9.5 MG/DL (ref 8.7–10.5)
CANDIDA GLABRATA DNA: NEGATIVE
CANDIDA KRUSEI DNA: NEGATIVE
CANDIDA RRNA VAG QL PROBE: NEGATIVE
CHLORIDE SERPL-SCNC: 104 MMOL/L (ref 95–110)
CO2 SERPL-SCNC: 29 MMOL/L (ref 23–29)
CREAT SERPL-MCNC: 0.7 MG/DL (ref 0.5–1.4)
DIFFERENTIAL METHOD: ABNORMAL
EOSINOPHIL # BLD AUTO: 0.1 K/UL (ref 0–0.5)
EOSINOPHIL NFR BLD: 1 % (ref 0–8)
ERYTHROCYTE [DISTWIDTH] IN BLOOD BY AUTOMATED COUNT: 14.7 % (ref 11.5–14.5)
EST. GFR  (AFRICAN AMERICAN): >60 ML/MIN/1.73 M^2
EST. GFR  (NON AFRICAN AMERICAN): >60 ML/MIN/1.73 M^2
GLUCOSE SERPL-MCNC: 130 MG/DL (ref 70–110)
HCT VFR BLD AUTO: 39.3 % (ref 37–48.5)
HGB BLD-MCNC: 12.8 G/DL (ref 12–16)
LYMPHOCYTES # BLD AUTO: 1.4 K/UL (ref 1–4.8)
LYMPHOCYTES NFR BLD: 24.3 % (ref 18–48)
MCH RBC QN AUTO: 27.7 PG (ref 27–31)
MCHC RBC AUTO-ENTMCNC: 32.5 G/DL (ref 32–36)
MCV RBC AUTO: 85 FL (ref 82–98)
MONOCYTES # BLD AUTO: 0.4 K/UL (ref 0.3–1)
MONOCYTES NFR BLD: 6 % (ref 4–15)
NEUTROPHILS # BLD AUTO: 4 K/UL (ref 1.8–7.7)
NEUTROPHILS NFR BLD: 68.3 % (ref 38–73)
PLATELET # BLD AUTO: 253 K/UL (ref 150–350)
PMV BLD AUTO: 8.5 FL (ref 9.2–12.9)
POCT GLUCOSE: 135 MG/DL (ref 70–110)
POTASSIUM SERPL-SCNC: 4.1 MMOL/L (ref 3.5–5.1)
RBC # BLD AUTO: 4.61 M/UL (ref 4–5.4)
SODIUM SERPL-SCNC: 141 MMOL/L (ref 136–145)
T VAGINALIS RRNA GENITAL QL PROBE: NEGATIVE
WBC # BLD AUTO: 5.9 K/UL (ref 3.9–12.7)

## 2019-05-22 PROCEDURE — 63600175 PHARM REV CODE 636 W HCPCS: Mod: HCNC | Performed by: NURSE ANESTHETIST, CERTIFIED REGISTERED

## 2019-05-22 PROCEDURE — 50590 FRAGMENTING OF KIDNEY STONE: CPT | Mod: HCNC,LT,, | Performed by: UROLOGY

## 2019-05-22 PROCEDURE — 37000008 HC ANESTHESIA 1ST 15 MINUTES: Mod: HCNC | Performed by: UROLOGY

## 2019-05-22 PROCEDURE — 25000003 PHARM REV CODE 250: Mod: HCNC | Performed by: NURSE ANESTHETIST, CERTIFIED REGISTERED

## 2019-05-22 PROCEDURE — 71000015 HC POSTOP RECOV 1ST HR: Mod: HCNC | Performed by: UROLOGY

## 2019-05-22 PROCEDURE — 36000704 HC OR TIME LEV I 1ST 15 MIN: Mod: HCNC | Performed by: UROLOGY

## 2019-05-22 PROCEDURE — 99900103 DSU ONLY-NO CHARGE-INITIAL HR (STAT): Mod: HCNC | Performed by: UROLOGY

## 2019-05-22 PROCEDURE — 80048 BASIC METABOLIC PNL TOTAL CA: CPT | Mod: HCNC

## 2019-05-22 PROCEDURE — 71000039 HC RECOVERY, EACH ADD'L HOUR: Mod: HCNC | Performed by: UROLOGY

## 2019-05-22 PROCEDURE — 63600175 PHARM REV CODE 636 W HCPCS: Mod: HCNC | Performed by: ANESTHESIOLOGY

## 2019-05-22 PROCEDURE — 36000705 HC OR TIME LEV I EA ADD 15 MIN: Mod: HCNC | Performed by: UROLOGY

## 2019-05-22 PROCEDURE — 85025 COMPLETE CBC W/AUTO DIFF WBC: CPT | Mod: HCNC

## 2019-05-22 PROCEDURE — 63600175 PHARM REV CODE 636 W HCPCS: Mod: HCNC | Performed by: UROLOGY

## 2019-05-22 PROCEDURE — 36415 COLL VENOUS BLD VENIPUNCTURE: CPT | Mod: HCNC

## 2019-05-22 PROCEDURE — 50590 PR FRAGMENT KIDNEY STONE/ ESWL: ICD-10-PCS | Mod: HCNC,LT,, | Performed by: UROLOGY

## 2019-05-22 PROCEDURE — 99900104 DSU ONLY-NO CHARGE-EA ADD'L HR (STAT): Mod: HCNC | Performed by: UROLOGY

## 2019-05-22 PROCEDURE — C1769 GUIDE WIRE: HCPCS | Mod: HCNC | Performed by: UROLOGY

## 2019-05-22 PROCEDURE — D9220A PRA ANESTHESIA: Mod: HCNC,ANES,, | Performed by: ANESTHESIOLOGY

## 2019-05-22 PROCEDURE — 71000033 HC RECOVERY, INTIAL HOUR: Mod: HCNC | Performed by: UROLOGY

## 2019-05-22 PROCEDURE — 37000009 HC ANESTHESIA EA ADD 15 MINS: Mod: HCNC | Performed by: UROLOGY

## 2019-05-22 PROCEDURE — 25000003 PHARM REV CODE 250: Mod: HCNC | Performed by: ANESTHESIOLOGY

## 2019-05-22 PROCEDURE — 93005 ELECTROCARDIOGRAM TRACING: CPT | Mod: HCNC,59

## 2019-05-22 PROCEDURE — D9220A PRA ANESTHESIA: ICD-10-PCS | Mod: HCNC,ANES,, | Performed by: ANESTHESIOLOGY

## 2019-05-22 RX ORDER — SODIUM CHLORIDE 0.9 % (FLUSH) 0.9 %
3 SYRINGE (ML) INJECTION
Status: DISCONTINUED | OUTPATIENT
Start: 2019-05-22 | End: 2019-05-22 | Stop reason: HOSPADM

## 2019-05-22 RX ORDER — GLYCOPYRROLATE 0.2 MG/ML
INJECTION INTRAMUSCULAR; INTRAVENOUS
Status: DISCONTINUED | OUTPATIENT
Start: 2019-05-22 | End: 2019-05-22

## 2019-05-22 RX ORDER — TAMSULOSIN HYDROCHLORIDE 0.4 MG/1
0.4 CAPSULE ORAL
Qty: 30 CAPSULE | Refills: 0 | Status: SHIPPED | OUTPATIENT
Start: 2019-05-22 | End: 2019-07-05 | Stop reason: SDUPTHER

## 2019-05-22 RX ORDER — CIPROFLOXACIN 2 MG/ML
400 INJECTION, SOLUTION INTRAVENOUS
Status: COMPLETED | OUTPATIENT
Start: 2019-05-22 | End: 2019-05-22

## 2019-05-22 RX ORDER — OXYCODONE HYDROCHLORIDE 5 MG/1
5 TABLET ORAL
Status: DISCONTINUED | OUTPATIENT
Start: 2019-05-22 | End: 2019-05-22 | Stop reason: HOSPADM

## 2019-05-22 RX ORDER — FENTANYL CITRATE 50 UG/ML
25 INJECTION, SOLUTION INTRAMUSCULAR; INTRAVENOUS EVERY 5 MIN PRN
Status: DISCONTINUED | OUTPATIENT
Start: 2019-05-22 | End: 2019-05-22 | Stop reason: HOSPADM

## 2019-05-22 RX ORDER — LIDOCAINE HYDROCHLORIDE 10 MG/ML
1 INJECTION, SOLUTION EPIDURAL; INFILTRATION; INTRACAUDAL; PERINEURAL ONCE
Status: COMPLETED | OUTPATIENT
Start: 2019-05-22 | End: 2019-05-22

## 2019-05-22 RX ORDER — DEXAMETHASONE SODIUM PHOSPHATE 4 MG/ML
INJECTION, SOLUTION INTRA-ARTICULAR; INTRALESIONAL; INTRAMUSCULAR; INTRAVENOUS; SOFT TISSUE
Status: DISCONTINUED | OUTPATIENT
Start: 2019-05-22 | End: 2019-05-22

## 2019-05-22 RX ORDER — DIPHENHYDRAMINE HYDROCHLORIDE 50 MG/ML
25 INJECTION INTRAMUSCULAR; INTRAVENOUS EVERY 6 HOURS PRN
Status: DISCONTINUED | OUTPATIENT
Start: 2019-05-22 | End: 2019-05-22 | Stop reason: HOSPADM

## 2019-05-22 RX ORDER — MEPERIDINE HYDROCHLORIDE 50 MG/ML
12.5 INJECTION INTRAMUSCULAR; INTRAVENOUS; SUBCUTANEOUS ONCE AS NEEDED
Status: DISCONTINUED | OUTPATIENT
Start: 2019-05-22 | End: 2019-05-22 | Stop reason: HOSPADM

## 2019-05-22 RX ORDER — HYDROCODONE BITARTRATE AND ACETAMINOPHEN 5; 325 MG/1; MG/1
1 TABLET ORAL EVERY 6 HOURS PRN
Qty: 40 TABLET | Refills: 0 | Status: ON HOLD | OUTPATIENT
Start: 2019-05-22 | End: 2020-01-03

## 2019-05-22 RX ORDER — FENTANYL CITRATE 50 UG/ML
INJECTION, SOLUTION INTRAMUSCULAR; INTRAVENOUS
Status: DISCONTINUED | OUTPATIENT
Start: 2019-05-22 | End: 2019-05-22

## 2019-05-22 RX ORDER — ONDANSETRON HYDROCHLORIDE 2 MG/ML
INJECTION, SOLUTION INTRAMUSCULAR; INTRAVENOUS
Status: DISCONTINUED | OUTPATIENT
Start: 2019-05-22 | End: 2019-05-22

## 2019-05-22 RX ORDER — SCOLOPAMINE TRANSDERMAL SYSTEM 1 MG/1
1 PATCH, EXTENDED RELEASE TRANSDERMAL
Status: DISCONTINUED | OUTPATIENT
Start: 2019-05-22 | End: 2019-05-22 | Stop reason: HOSPADM

## 2019-05-22 RX ORDER — SODIUM CHLORIDE, SODIUM LACTATE, POTASSIUM CHLORIDE, CALCIUM CHLORIDE 600; 310; 30; 20 MG/100ML; MG/100ML; MG/100ML; MG/100ML
INJECTION, SOLUTION INTRAVENOUS CONTINUOUS
Status: DISCONTINUED | OUTPATIENT
Start: 2019-05-22 | End: 2019-05-22 | Stop reason: HOSPADM

## 2019-05-22 RX ORDER — SODIUM CHLORIDE 0.9 % (FLUSH) 0.9 %
3 SYRINGE (ML) INJECTION EVERY 8 HOURS
Status: DISCONTINUED | OUTPATIENT
Start: 2019-05-22 | End: 2019-05-22 | Stop reason: HOSPADM

## 2019-05-22 RX ORDER — LIDOCAINE HCL/PF 100 MG/5ML
SYRINGE (ML) INTRAVENOUS
Status: DISCONTINUED | OUTPATIENT
Start: 2019-05-22 | End: 2019-05-22

## 2019-05-22 RX ORDER — HYDROMORPHONE HYDROCHLORIDE 2 MG/ML
0.2 INJECTION, SOLUTION INTRAMUSCULAR; INTRAVENOUS; SUBCUTANEOUS EVERY 5 MIN PRN
Status: DISCONTINUED | OUTPATIENT
Start: 2019-05-22 | End: 2019-05-22 | Stop reason: HOSPADM

## 2019-05-22 RX ORDER — MIDAZOLAM HYDROCHLORIDE 1 MG/ML
INJECTION INTRAMUSCULAR; INTRAVENOUS
Status: DISCONTINUED | OUTPATIENT
Start: 2019-05-22 | End: 2019-05-22

## 2019-05-22 RX ORDER — METOCLOPRAMIDE HYDROCHLORIDE 5 MG/ML
10 INJECTION INTRAMUSCULAR; INTRAVENOUS EVERY 10 MIN PRN
Status: COMPLETED | OUTPATIENT
Start: 2019-05-22 | End: 2019-05-22

## 2019-05-22 RX ORDER — METRONIDAZOLE 500 MG/1
500 TABLET ORAL EVERY 12 HOURS
Qty: 14 TABLET | Refills: 0 | Status: SHIPPED | OUTPATIENT
Start: 2019-05-22 | End: 2019-07-05 | Stop reason: ALTCHOICE

## 2019-05-22 RX ORDER — PROPOFOL 10 MG/ML
VIAL (ML) INTRAVENOUS
Status: DISCONTINUED | OUTPATIENT
Start: 2019-05-22 | End: 2019-05-22

## 2019-05-22 RX ADMIN — LIDOCAINE HYDROCHLORIDE: 10 INJECTION, SOLUTION EPIDURAL; INFILTRATION; INTRACAUDAL; PERINEURAL at 09:05

## 2019-05-22 RX ADMIN — SCOPALAMINE 1 PATCH: 1 PATCH, EXTENDED RELEASE TRANSDERMAL at 10:05

## 2019-05-22 RX ADMIN — GLYCOPYRROLATE 0.1 MG: 0.2 INJECTION, SOLUTION INTRAMUSCULAR; INTRAVENOUS at 10:05

## 2019-05-22 RX ADMIN — PROPOFOL 200 MG: 10 INJECTION, EMULSION INTRAVENOUS at 11:05

## 2019-05-22 RX ADMIN — SODIUM CHLORIDE, SODIUM LACTATE, POTASSIUM CHLORIDE, AND CALCIUM CHLORIDE: .6; .31; .03; .02 INJECTION, SOLUTION INTRAVENOUS at 09:05

## 2019-05-22 RX ADMIN — ONDANSETRON 4 MG: 2 INJECTION, SOLUTION INTRAMUSCULAR; INTRAVENOUS at 10:05

## 2019-05-22 RX ADMIN — FENTANYL CITRATE 50 MCG: 50 INJECTION, SOLUTION INTRAMUSCULAR; INTRAVENOUS at 11:05

## 2019-05-22 RX ADMIN — DEXAMETHASONE SODIUM PHOSPHATE 4 MG: 4 INJECTION, SOLUTION INTRAMUSCULAR; INTRAVENOUS at 11:05

## 2019-05-22 RX ADMIN — CIPROFLOXACIN 400 MG: 2 INJECTION INTRAVENOUS at 10:05

## 2019-05-22 RX ADMIN — MIDAZOLAM HYDROCHLORIDE 2 MG: 1 INJECTION, SOLUTION INTRAMUSCULAR; INTRAVENOUS at 10:05

## 2019-05-22 RX ADMIN — SODIUM CHLORIDE, SODIUM LACTATE, POTASSIUM CHLORIDE, AND CALCIUM CHLORIDE: .6; .31; .03; .02 INJECTION, SOLUTION INTRAVENOUS at 11:05

## 2019-05-22 RX ADMIN — METOCLOPRAMIDE 10 MG: 5 INJECTION, SOLUTION INTRAMUSCULAR; INTRAVENOUS at 12:05

## 2019-05-22 RX ADMIN — LIDOCAINE HYDROCHLORIDE 100 MG: 20 INJECTION, SOLUTION INTRAVENOUS at 11:05

## 2019-05-22 NOTE — OP NOTE
[facility]  Operative Note    Date: 05/22/2019    Pre-Op Diagnosis: Left renal stone    Post-Op Diagnosis: same    Procedure(s) Performed:   1.  Left ESWL    Specimen(s): none    Staff Surgeon:  Marisol Stewart MD    Anesthesia: Monitored Local Anesthesia with Sedation    Indications: Doris Pastrana is a 68 y.o. female with a  left renal stone presenting for definitive stone management.  The stone is radio-opaque on KUB.      Findings:   The stone is located in the left upj at L3 and measures about 6-7mm. It was faintly radio-opaque on kub.   By 1500 shocks the stone was mostly completely fragmented. By 3000 shocks no longer present. Other renals stones not visible on kub.      Estimated Blood Loss: none    Drains: none    Procedure in Detail:  After risk, benefits, and possible complications of ESWL were discussed, the patient elected to undergo the procedure and informed consent was obtained. All questions were answered in the pre-operative area and the patient was transferred to the lithotripsy suite and placed on the lithotriptor table. SCDs were applied and working.  Time out was preformed, aleks-procedural antibiotics were administered.    The patient's left upj stone was aligned on the X-Y- and Z axis and left ESWL was begun after general anesthesia was administered.  When the patient was adequately sedated, 3000 thousand shocks were administered at a rate of 2 shocks per second, beginning at 16 KV of power and advanced to 26 KV. Intermittent fluoroscopy was used to monitor fragmentation of the stone.     The stone is located in the left upj at L3 and measures about 6-7mm. It was faintly radio-opaque on kub.   By 1500 shocks the stone was mostly completely fragmented. By 3000 shocks no longer present. Other renals stones not visible on kub.    The patient tolerated the procedure well and was transferred to the PACU in stable condition.      Plan:   Home with:  -urine strainer:  strain all urine and bring fragments to appointment so we can send for analysis  ·  flomax and take for 2 weeks  · norco prn pain  · F/u in 4 weeks with urology nurse practioner with xray beforehand to begin stone workup (24 hour urine, pth, uric acid, vit D and bmp) and discuss overactive bladder, trying meds and incontinence.   · Need to continue to monitor for residual stones every 6 months.  · F/u in 6 mnths with me with kub and rbus prior  (can be ordered at f/u with destinee)            Marisol Stewart MD

## 2019-05-22 NOTE — OR NURSING
Pt. Awake and alert, vital signs stable.  Tolerated liquids without nausea.  Denies pain. Ambulates readily. IV removed per policy, catheter intact. Discharge instructions reviewed with patient and daughter, who both verbalized understanding and could teach back instructions. Written instructions given to patient, who verbalized understanding.  Dr. Stewart  spoke with pt. And answered questions and states pt. Is ready to discharge. Discharge home with family per wheelchair to [facility].

## 2019-05-22 NOTE — H&P
[facility] Note  Staff:Marisol Stewart md    PCP: Dr. Tor Garg    Chief Complaint: : L flank pain     Subjective:        HPI: Doris Pastrana is a 68 y.o. female presented Dysuria, urethral pain, and flank pain which began 2-3 weeks ago.  CT Renal Stone Study done 19:  IMPRESSION:  There is a 6 mm left renal pelvic stone without hydronephrosis.  A 2 mm   stone is seen in the upper pole of the left kidney and a 1 mm stone is   seen in the right kidney without right-sided hydronephrosis.  Prior   hysterectomy.  Fatty infiltration of the liver parenchyma.  Prior   cholecystectomy.   I saw her yesterday. Still hadn't passed stone. Uncomfortable.     Pain 4/10 on left but has been present for months. Pain as high as 8/10. No f/c/+frequencyand urgency but that's not new. Some incont. + dysuria at meatus, which is new since being on abx (which she had been on for possible infection but cx was neg)  Decided to proceed today. Not on any anticoag    REVIEW OF SYSTEMS:  A comprehensive 10 system review was performed and is negative except as noted above in HPI    PMHx:  Past Medical History:   Diagnosis Date    Acute sinus infection     Allergy     Anemia     Arthritis     Bronchitis     Degenerative disc disease     lumbar, pain contract 2013    Depression     Diabetes mellitus, type 2 16    Fatty liver     Hyperlipidemia 16    Hypertension     Mitral valve prolapse     Pleurisy     Pneumonia     PONV (postoperative nausea and vomiting)     Sinus infection     TIA (transient ischemic attack) 2019     PSHx:  Past Surgical History:   Procedure Laterality Date    ANKLE SURGERY  2017    right ankle torn ligament    APPENDECTOMY      ARTHROSCOPY-ANKLE Right 2017    [facility] OR     SECTION      CHOLECYSTECTOMY      COLONOSCOPY  8/13/15    Dr. Oliveira, five year recheck    COLONOSCOPY N/A  8/13/2015    [facility]    HYSTERECTOMY      REPAIR-LIGAMENT (brostrom han) Right 8/9/2017    Per[facility] by [facility] at [facility] OR    REPAIR-TENDON-LEG (peroneal tendon) Right 8/9/2017    Per[facility] by [facility] at [facility] OR    TENODESIS (TENDON FIXATION)PERONEAL Right 8/9/2017    Per[facility] at [facility] OR    TONSILLECTOMY      VAGINAL DELIVERY       Fam Hx:   malignancies: No , gyn malignancies: No     Social history:  Occupation - takes care of total care pt,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 Needs to work to get paid.      Allergies:  Codeine; Rhubarb; Strawberries [strawberry]; Iodine; Latex, natural rubber; and Pravastatin    Medications: reviewed   Anticoagulation: No    Objective:     Vitals:     Vitals:    05/22/19 0916   BP: (!) 157/82   Pulse: 75   Resp: 18   Temp: 97.9 °F (36.6 °C)       Physical Exam   Vitals reviewed.  Constitutional: She is oriented to person, place, and time. She appears well-developed and well-nourished.   HENT:   Head: Normocephalic and atraumatic.   Eyes: Conjunctivae and EOM are normal. Pupils are equal, round, and reactive to light.   Neck: Normal range of motion. Neck supple.   Cardiovascular: Normal rate, regular rhythm, normal heart sounds and intact distal pulses.    Pulmonary/Chest: Effort normal and breath sounds normal.   Abdominal: Soft. Bowel sounds are normal.   Musculoskeletal: Normal range of motion.   Neurological: She is alert and oriented to person, place, and time. She has normal reflexes.   Skin: Skin is warm and dry.     Psychiatric: She has a normal mood and affect. Her behavior is normal. Judgment and thought content normal.       LABS REVIEW:  UA today: tr wbc/tr bld  ua cath: 2+bld       UCx:   Results for orders placed or performed in visit on 05/10/19   Urine culture   Result Value Ref Range    Urine Culture, Routine No growth    Results for orders placed or performed in visit on 05/02/19    Urine culture   Result Value Ref Range    Urine Culture, Routine No growth      Labs:  BMP  Lab Results   Component Value Date     05/22/2019    K 4.1 05/22/2019     05/22/2019    CO2 29 05/22/2019    BUN 16 05/22/2019    CREATININE 0.7 05/22/2019    CALCIUM 9.5 05/22/2019    ANIONGAP 8 05/22/2019    ESTGFRAFRICA >60 05/22/2019    EGFRNONAA >60 05/22/2019     Lab Results   Component Value Date    WBC 5.90 05/22/2019    HGB 12.8 05/22/2019    HCT 39.3 05/22/2019    MCV 85 05/22/2019     05/22/2019       Cr:   Lab Results   Component Value Date    CREATININE 0.8 05/07/2019     Assessment:       1. Gross hematuria          Plan:     L kd stone  Ekg, kub, cbc and bmp in am    eswl vs ureteroscopy  1 month ago had tia vs migraine complications-workup negative at [facility]  Not on asa 81mg      Reviewed cath uriens  Sending cath urine today, ua microscopic. Unlikely uti  First stoen but has b stones    inctont and oab at later op visit     Diflucan, starting antifungal   Sending affirm     kub today shows stone visible at L3/L4,  Proceed with ESWL and possible stent  Went over rationale    The patient is scheduled for ESWL. The risks and benefits of extracorporeal shock wave lithotripsy were discussed with the patient in detail.  Consent was obtained.  The risks include but are not limited to bleeding in or around the kidney, infection, pain, incomplete fragmentation of the stone requiring a repeat treatment or a different form of treatment, obstruction of the kidney by stone particles possibly requiring a ureteral stent or nephrostomy tube, injury to or loss of the kidney ,injury to the ureter or bladder, need for further procedures, hypertension (transient or permanent), recurrence of stones, and need for open surgery.  Alternative treatments were also discussed with the patient in detail to include ureteroscopy, percutaneous treatment of the stone, open surgery  and observation. Patient understands these  risks and has agreed to proceed with surgery.     [facility]: Pt Declined    Marisol Stewart,

## 2019-05-22 NOTE — DISCHARGE INSTRUCTIONS
ESWL post-op instructions:    ·  flomax and take for 2 weeks  · norco prn pain  · F/u in 4 weeks with urology nurse practioner with xray beforehand to begin stone workup (24 hour urine, pth, uric acid, vit D and bmp) and discuss overactive bladder, trying meds and incontinence.   · Need to continue to monitor for residual stones every 6 months.  · F/u in 6 mnths with me with kub and rbus prior  (can be ordered at f/u with destinee)      Home with:  -urine strainer: strain all urine and bring fragments to appointment so we can send for analysis    Home medications and prescriptions:  -Flomax 0.4mg (Tamsulosin) by mouth nightly to help with stent pain and help pass stones (can cause lightheadness, so take at night)  -norco 5/hydrocodone as needed, dispense  15 -take miralax 17g daily while taking pain medicine   -patient can restart any blood thinners 2 days later if urine is clear yellow, if not, and patient is unsure of when to restart then call office. If planning on having a planned ureteroscopy in two weeks then will remain off blood thinners until then. If patient is scheduled for a cystoscopy stent removal only can start the blood thinners as recommended.     Follow up:  -kub/xray in 4 weeks, must be done prior to follow up: Samara, my nurse will schedule  -patient to follow up in clinic with our nurse practioner in 4 weeks  -if patient comes for follow up and has not had a kub/xray then pt needs to call office to schedule kub first, otherwise we will not be able to see how the pt responded to stone treatment.     Other instructions  You may see some blood in your urine while the stone fragments are passing and a few days afterward. Do not be alarmed, even if the urine was clear for a while. Push fluids and refrain from strenuous activity until clearing occurs. If you have difficulty passing clots or don't improve, call us. You can also try sitting in a warm tub of water to help to urinate if needed.  Avoid medications such as Aspirin, Advil, Motrin, Plavix, or Coumadin, which thin the blood and cause bleeding.  If you have never had your stones analyzed, strain all urine and bring stone fragments with you to your next appointment.  Diet:  You may return to your normal diet immediately. Alcohol, spicy foods, acidy foods and drinks with caffeine may cause irritation or frequency and should be used in moderation. To keep your urine flowing freely and to avoid constipation, drink plenty of fluids during the day (8-10 glasses).  Activity:  While the kidney is healing do not engage in strenuous activity. If you are active, you may see more blood in the urine. We would suggest cutting down your activity under these circumstances until the bleeding has stopped, perhaps two weeks.  Bowels:  It is important to keep your bowels regular during the postoperative period. Straining with bowel movements can cause bleeding. A bowel movement every other day is reasonable. Use a mild over-the-counter laxative if needed, such as Milk of Magnesia 2-3 tablespoons, or 1-2 Dulcolax tablets. Call if you continue to have problems. Narcotics can worsen constipation; if you had been taking narcotics for pain, before, during or after your surgery, you may be constipated. Ditropan for bladder spasms may also cause constipation.  Problems you should report to us:  1. Fevers over 101.5 degrees Fahrenheit  2. Inability to urinate.   3. Drug reactions (hives, rash, nausea, vomiting, diarrhea)   4. Severe burning or pain with urination that is not improving.   5. Severe pain in your kidney not relieved with pain medications, go to ER if this occurs   You will also have some burning with urination. This is normal after stone therapy and is also expected while the stent is in place. This burning should be mild or moderate and should improve over time. Severe burning, especially when it is not improving, can be a sign of infection.      Bring stone  "fragments with you to your next appt.    Call Urology at [phone] with any problems     Discharge Instructions: After Your Surgery/Procedure  Youve just had surgery. During surgery you were given medicine called anesthesia to keep you relaxed and free of pain. After surgery you may have some pain or nausea. This is common. Here are some tips for feeling better and getting well after surgery.     Stay on schedule with your medication.   Going home  Your doctor or nurse will show you how to take care of yourself when you go home. He or she will also answer your questions. Have an adult family member or friend drive you home.      For your safety we recommend these precaution for the first 24 hours after your procedure:  · Do not drive or use heavy equipment.  · Do not make important decisions or sign legal papers.  · Do not drink alcohol.  · Have someone stay with you, if needed. He or she can watch for problems and help keep you safe.  · Your concentration, balance, coordination, and judgement may be impaired for many hours after anesthesia.  Use caution when ambulating or standing up.     · You may feel weak and "washed out" after anesthesia and surgery.      Subtle residual effects of general anesthesia or sedation with regional / local anesthesia can last more than 24 hours.  Rest for the remainder of the day or longer if your Doctor/Surgeon has advised you to do so.  Although you may feel normal within the first 24 hours, your reflexes and mental ability may be impaired without you realizing it.  You may feel dizzy, lightheaded or sleepy for 24 hours or longer.      Be sure to go to all follow-up visits with your doctor. And rest after your surgery for as long as your doctor tells you to.  Coping with pain  If you have pain after surgery, pain medicine will help you feel better. Take it as told, before pain becomes severe. Also, ask your doctor or pharmacist about other ways to control pain. This might be with " heat, ice, or relaxation. And follow any other instructions your surgeon or nurse gives you.  Tips for taking pain medicine  To get the best relief possible, remember these points:  · Pain medicines can upset your stomach. Taking them with a little food may help.  · Most pain relievers taken by mouth need at least 20 to 30 minutes to start to work.  · Taking medicine on a schedule can help you remember to take it. Try to time your medicine so that you can take it before starting an activity. This might be before you get dressed, go for a walk, or sit down for dinner.  · Constipation is a common side effect of pain medicines. Call your doctor before taking any medicines such as laxatives or stool softeners to help ease constipation. Also ask if you should skip any foods. Drinking lots of fluids and eating foods such as fruits and vegetables that are high in fiber can also help. Remember, do not take laxatives unless your surgeon has prescribed them.  · Drinking alcohol and taking pain medicine can cause dizziness and slow your breathing. It can even be deadly. Do not drink alcohol while taking pain medicine.  · Pain medicine can make you react more slowly to things. Do not drive or run machinery while taking pain medicine.  Your health care provider may tell you to take acetaminophen to help ease your pain. Ask him or her how much you are supposed to take each day. Acetaminophen or other pain relievers may interact with your prescription medicines or other over-the-counter (OTC) drugs. Some prescription medicines have acetaminophen and other ingredients. Using both prescription and OTC acetaminophen for pain can cause you to overdose. Read the labels on your OTC medicines with care. This will help you to clearly know the list of ingredients, how much to take, and any warnings. It may also help you not take too much acetaminophen. If you have questions or do not understand the information, ask your pharmacist or  health care provider to explain it to you before you take the OTC medicine.  Managing nausea  Some people have an upset stomach after surgery. This is often because of anesthesia, pain, or pain medicine, or the stress of surgery. These tips will help you handle nausea and eat healthy foods as you get better. If you were on a special food plan before surgery, ask your doctor if you should follow it while you get better. These tips may help:  · Do not push yourself to eat. Your body will tell you when to eat and how much.  · Start off with clear liquids and soup. They are easier to digest.  · Next try semi-solid foods, such as mashed potatoes, applesauce, and gelatin, as you feel ready.  · Slowly move to solid foods. Dont eat fatty, rich, or spicy foods at first.  · Do not force yourself to have 3 large meals a day. Instead eat smaller amounts more often.  · Take pain medicines with a small amount of solid food, such as crackers or toast, to avoid nausea.     Call your surgeon if  · You still have pain an hour after taking medicine. The medicine may not be strong enough.  · You feel too sleepy, dizzy, or groggy. The medicine may be too strong.  · You have side effects like nausea, vomiting, or skin changes, such as rash, itching, or hives.       If you have obstructive sleep apnea  You were given anesthesia medicine during surgery to keep you comfortable and free of pain. After surgery, you may have more apnea spells because of this medicine and other medicines you were given. The spells may last longer than usual.   At home:  · Keep using the continuous positive airway pressure (CPAP) device when you sleep. Unless your health care provider tells you not to, use it when you sleep, day or night. CPAP is a common device used to treat obstructive sleep apnea.  · Talk with your provider before taking any pain medicine, muscle relaxants, or sedatives. Your provider will tell you about the possible dangers of taking these  medicines.  © 9815-0077 [employer]. [address] [place] [address], [place] [address]. All rights reserved. This information is not intended as a substitute for professional medical care. Always follow your healthcare professional's instructions.  Post op instructions for prevention of DVT  What is deep vein thrombosis?  Deep vein thrombosis (DVT) is the medical term for blood clots in the deep veins of the leg.  These blood clots can be dangerous.  A DVT can block a blood vessel and keep blood from getting where it needs to go.  Another problem is that the clot can travel to other parts of the body such as the lungs.  A clot that travels to the lungs is called a pulmonary embolus (PE) and can cause serious problems with breathing which can lead to death.  Am I at risk for DVT/PE?  If you are not very active, you are at risk of DVT.  Anyone confined to bed, sitting for long periods of time, recovering from surgery, etc. increases the risk of DVT.  Other risk factors are cancer diagnosis, certain medications, estrogen replacement in any form,older age, obesity, pregnancy, smoking, history of clotting disorders, and dehydration.  How will I know if I have a DVT?   Swelling in the lower leg   Pain   Warmth, redness, hardness or bulging of the vein  If you have any of these symptoms, call your doctors office right away.  Some people will not have any symptoms until the clot moves to the lungs.  What are the symptoms of a PE?   Panting, shortness of breath, or trouble breathing   Sharp, knife-like chest pain when you breathe   Coughing or coughing up blood   Rapid heartbeat  If you have any of these symptoms or get worse quickly, call 911 for emergency treatment.  How can I prevent a DVT?   Avoid long periods of inactivity and dont cross your legs--get up and walk around every hour or so.   Stay active--walking after surgery is highly encouraged.  This means you should get out of the house and walk in  the neighborhood.  Going up and down stairs will not impair healing (unless advised against such activity by your doctor).     Drink plenty of noncaffeinated, nonalcoholic fluids each day to prevent dehydration.   Wear special support stockings, if they have been advised by your doctor.   If you travel, stop at least once an hour and walk around.   Avoid smoking (assistance with stopping is available through your healthcare provider)  Always notify your doctor if you are not able to follow the post operative instructions that are given to you at the time of discharge.  It may be necessary to prescribe one of the medications available to prevent DVT.    We hope your stay was comfortable as you heal now, mend and rest.    For we have enjoyed taking care of you by giving your our best.    And as you get better, by regaining your health and strength;   We count it as a privilege to have served you and hope your time at [facility] was well spent.      Thank  You!!!

## 2019-05-22 NOTE — DISCHARGE SUMMARY
[facility]  Urology  Discharge Note - Short Stay      Patient Name: Doris Pastrana  MRN: [id number]  Discharge Date and Time:  05/22/2019 11:58 AM  Attending Physician: Marisol Stewart,*   Discharging Provider: Marisol Stewart MD  Primary Care Physician: Tor Garg MD    Final Active Diagnoses:    Diagnosis Date Noted POA    Nephrolithiasis [N20.0] 05/22/2019 Yes      Problems Resolved During this Admission:       Final Diagnoses: Same as principal problem.    Hospital Course: Patient was admitted for an outpatient procedure and tolerated the procedure well with no complications.*    Procedure(s) (LRB):  LITHOTRIPSY, ESWL - only if stone visible on xray  with cysto after on table possible (Left)     Indwelling Lines/Drains at time of discharge:   Lines/Drains/Airways     Epidural Line                 Perineural Analgesia/Anesthesia Assessment (using dermatomes) Epidural 08/09/17 0814 651 days         Perineural Analgesia/Anesthesia Assessment (using dermatomes) Epidural 08/09/17 0830 651 days                Discharged Condition: good    Disposition: home    Follow Up:      Patient Instructions:      X-Ray Abdomen AP 1 View   Standing Status: Future Standing Exp. Date: 05/22/20     Order Specific Question Answer Comments   Reason for Exam: s/p L ESWL of L upj stone      Diet Adult Regular     Notify your health care provider if you experience any of the following:  temperature >100.4     Notify your health care provider if you experience any of the following:  persistent nausea and vomiting or diarrhea     Activity as tolerated       Medications:  Reconciled Home Medications:      Medication List      START taking these medications    tamsulosin 0.4 mg Cap  Commonly known as:  FLOMAX  Take 1 capsule (0.4 mg total) by mouth after dinner.        CONTINUE taking these medications    albuterol 90 mcg/actuation inhaler  Commonly known as:  PROVENTIL/VENTOLIN HFA  Inhale 2 puffs  into the lungs 4 (four) times daily.     ALPRAZolam 0.25 MG tablet  Commonly known as:  XANAX  Use one 20-30 minutes before flying prn     amLODIPine 2.5 MG tablet  Commonly known as:  NORVASC  Take 1 tablet (2.5 mg total) by mouth once daily.     cetirizine 10 MG tablet  Commonly known as:  ZYRTEC  Take 10 mg by mouth once daily.     cyclobenzaprine 10 MG tablet  Commonly known as:  FLEXERIL  TAKE 1 TABLET BY MOUTH THREE TIMES DAILY AS NEEDED FOR MUSCLE SPASMS     dulaglutide 0.75 mg/0.5 mL Pnij  Commonly known as:  TRULICITY  Inject 0.5 mLs (0.75 mg total) into the skin every 7 days.     ergocalciferol 50,000 unit Cap  Commonly known as:  ERGOCALCIFEROL  Take 1 capsule (50,000 Units total) by mouth every 7 days.     escitalopram oxalate 10 MG tablet  Commonly known as:  LEXAPRO  TAKE 1 TABLET BY MOUTH ONE TIME DAILY     fluconazole 150 MG Tab  Commonly known as:  DIFLUCAN  Take 1 tablet (150 mg total) by mouth once daily. Take 1 now and if no improvement, repeat in 7 days     fluticasone propionate 50 mcg/actuation nasal spray  Commonly known as:  FLONASE  2 sprays (100 mcg total) by Each Nare route once daily.     HYDROcodone-acetaminophen 5-325 mg per tablet  Commonly known as:  NORCO  Take 1 tablet by mouth every 6 (six) hours as needed for Pain.     lancets 28 gauge Misc  1 lancet by Misc.(Non-Drug; Combo Route) route 2 (two) times daily.     losartan 100 MG tablet  Commonly known as:  COZAAR  Take 1 tablet (100 mg total) by mouth once daily.     meclizine 25 mg tablet  Commonly known as:  ANTIVERT  Take 1 tablet (25 mg total) by mouth 3 (three) times daily as needed.     meloxicam 15 MG tablet  Commonly known as:  MOBIC  Take 1 tablet (15 mg total) by mouth once daily.     metFORMIN 750 MG 24 hr tablet  Commonly known as:  GLUCOPHAGE-XR  Take 1 tablet (750 mg total) by mouth 2 (two) times daily with meals.     rosuvastatin 40 MG Tab  Commonly known as:  CRESTOR  TAKE ONE TABLET BY MOUTH EVERY EVENING.      topiramate 25 mg capsule  Take 25 mg by mouth 2 (two) times daily.     TRUE METRIX GLUCOSE METER MISC  by Misc.(Non-Drug; Combo Route) route.     TRUE METRIX GLUCOSE TEST STRIP MISC  by Misc.(Non-Drug; Combo Route) route.            Discharge Procedure Orders (must include Diet, Follow-up, Activity):   Discharge Procedure Orders (must include Diet, Follow-up, Activity)   X-Ray Abdomen AP 1 View   Standing Status: Future Standing Exp. Date: 05/22/20     Order Specific Question Answer Comments   Reason for Exam: s/p L ESWL of L upj stone      Diet Adult Regular     Notify your health care provider if you experience any of the following:  temperature >100.4     Notify your health care provider if you experience any of the following:  persistent nausea and vomiting or diarrhea     Activity as tolerated            Marisol Stewart MD  Urology  [facility]

## 2019-05-22 NOTE — ANESTHESIA PREPROCEDURE EVALUATION
05/22/2019  Doris Pastrana is a 68 y.o., female.    Anesthesia Evaluation    I have reviewed the Patient Summary Reports.    I have reviewed the Nursing Notes.   I have reviewed the Medications.     Review of Systems  Anesthesia Hx:  Hx of Anesthetic complications (PONV)    Social:  Non-Smoker    Hematology/Oncology:         -- Anemia:   Cardiovascular:   Hypertension, well controlled Valvular problems/Murmurs, MVP hyperlipidemia    Pulmonary:   Pneumonia    Renal/:   Chronic Renal Disease renal calculi    Hepatic/GI:   Liver Disease,    Musculoskeletal:   Arthritis     Neurological:   TIA,    Endocrine:   Diabetes, well controlled, type 2    Psych:   Psychiatric History depression          Physical Exam  General:  Well nourished, Obesity    Airway/Jaw/Neck:  Airway Findings: Mouth Opening: Normal Tongue: Normal  General Airway Assessment: Adult  Oropharynx Findings:  Mallampati: II  Jaw/Neck Findings:  Neck ROM: Normal ROM     Eyes/Ears/Nose:  Eyes/Ears/Nose Findings:    Dental:  Dental Findings:   Chest/Lungs:  Chest/Lungs Findings: Normal Respiratory Rate     Heart/Vascular:  Heart Findings: Rate: Normal  Rhythm: Regular Rhythm        Mental Status:  Mental Status Findings:  Cooperative, Alert and Oriented         Anesthesia Plan  Type of Anesthesia, risks & benefits discussed:  Anesthesia Type:  general  Patient's Preference:   Intra-op Monitoring Plan: standard ASA monitors  Intra-op Monitoring Plan Comments:   Post Op Pain Control Plan: multimodal analgesia  Post Op Pain Control Plan Comments:   Induction:   IV  Beta Blocker:  Patient is not currently on a Beta-Blocker (No further documentation required).       Informed Consent: Patient understands risks and agrees with Anesthesia plan.  Questions answered. Anesthesia consent signed with patient.  ASA Score: 3     Day of Surgery Review of History &  Physical:  There are no significant changes.   H&P completed by Anesthesiologist.   Anesthesia Plan Notes: Pt c/o nausea now and wants NO ZOFRAN.        Ready For Surgery From Anesthesia Perspective.

## 2019-05-22 NOTE — ANESTHESIA POSTPROCEDURE EVALUATION
Anesthesia Post Evaluation    Patient: Doris Pastrana    Procedure(s) Performed: Procedure(s) (LRB):  LITHOTRIPSY, ESWL - only if stone visible on xray  with cysto after on table possible (Left)    Final Anesthesia Type: general  Patient location during evaluation: PACU  Patient participation: Yes- Able to Participate  Level of consciousness: awake and alert  Post-procedure vital signs: reviewed and stable  Pain management: adequate  Airway patency: patent  PONV status at discharge: No PONV  Anesthetic complications: no      Cardiovascular status: blood pressure returned to baseline  Respiratory status: unassisted  Hydration status: euvolemic  Follow-up not needed.          Vitals Value Taken Time   /72 5/22/2019 12:54 PM   Temp 36.3 °C (97.3 °F) 5/22/2019 12:45 PM   Pulse 76 5/22/2019 12:55 PM   Resp 13 5/22/2019 12:55 PM   SpO2 93 % 5/22/2019 12:55 PM   Vitals shown include unvalidated device data.      Event Time     Out of Recovery 13:00:00          Pain/Hayde Score: Hayde Score: 10 (5/22/2019 12:45 PM)

## 2019-05-22 NOTE — TELEPHONE ENCOUNTER
----- Message from Marisol Stewart MD sent at 5/22/2019 11:43 AM CDT -----  F/u in 6 weeks with destinee with kub prior postop eswl  Pt has specific schedule requests- can talkl to her first before schedule

## 2019-05-22 NOTE — TRANSFER OF CARE
"Anesthesia Transfer of Care Note    Patient: Doris Pastrana    Procedure(s) Performed: Procedure(s) (LRB):  LITHOTRIPSY, ESWL - only if stone visible on xray  with cysto after on table possible (Left)    Patient location: PACU    Anesthesia Type: general    Transport from OR: Transported from OR on 2-3 L/min O2 by NC with adequate spontaneous ventilation    Post pain: adequate analgesia    Post assessment: no apparent anesthetic complications    Post vital signs: stable    Level of consciousness: awake    Nausea/Vomiting: no nausea/vomiting    Complications: none    Transfer of care protocol was followed      Last vitals:   Visit Vitals  BP (!) 157/82 (BP Location: Left arm, Patient Position: Sitting)   Pulse 75   Temp 36.6 °C (97.9 °F) (Skin)   Resp 18   Ht 5' 5" (1.651 m)   Wt 88.9 kg (196 lb)   SpO2 95%   Breastfeeding? No   BMI 32.62 kg/m²     "

## 2019-05-23 ENCOUNTER — NURSE TRIAGE (OUTPATIENT)
Dept: ADMINISTRATIVE | Facility: CLINIC | Age: 69
End: 2019-05-23

## 2019-05-23 ENCOUNTER — TELEPHONE (OUTPATIENT)
Dept: UROLOGY | Facility: CLINIC | Age: 69
End: 2019-05-23

## 2019-05-23 LAB — BACTERIA UR CULT: NO GROWTH

## 2019-05-23 RX ORDER — PREDNISONE 20 MG/1
20 TABLET ORAL DAILY
Qty: 6 TABLET | Refills: 0 | Status: SHIPPED | OUTPATIENT
Start: 2019-05-23 | End: 2019-05-26

## 2019-05-23 RX ORDER — PREDNISONE 50 MG/1
50 TABLET ORAL DAILY
Qty: 3 TABLET | Refills: 0 | Status: SHIPPED | OUTPATIENT
Start: 2019-05-23 | End: 2019-05-23 | Stop reason: ALTCHOICE

## 2019-05-23 RX ORDER — PREDNISONE 50 MG/1
50 TABLET ORAL DAILY
Qty: 3 TABLET | Refills: 0 | Status: SHIPPED | OUTPATIENT
Start: 2019-05-23 | End: 2019-05-23

## 2019-05-23 NOTE — TELEPHONE ENCOUNTER
I spoke with pt and will start her on prednisone 50mg (sent in 3)  She's not on any abx  She's itching everywhere  No difficulty breathing    She will go ahead and take 1now and she will contact us for final dosing

## 2019-05-23 NOTE — TELEPHONE ENCOUNTER
----- Message from Yelitza Redman sent at 5/23/2019  4:21 PM CDT -----  Contact: [facility]  Rx prednisone and calling to changing the strength and adjusting the directions and the patient is at the pharmacy again right now waiting on Rx and please advise [phone] and fax # [phone]

## 2019-05-23 NOTE — TELEPHONE ENCOUNTER
Widespread hives started about 2 or 3 am this morning. Had Lithotripsy yesterday. Denies any difficulty breathing, difficulty swallowing, or swelling. Care advice discussed. Understanding verbalized. Please contact caller directly to discuss any further care advice.    Reason for Disposition   Widespread hives   Other post-op symptom or question    Protocols used: HIVES-A-AH, POST-OP SYMPTOMS AND [id number]

## 2019-05-23 NOTE — PROGRESS NOTES
Let her know her vaginosis screen did not show yeast. She has bacterial vaginosis.   This could be cauusing her urethral pain  I can give her flagyl twice a day for 7 days   In the meantie she needs to start a probiotic an dtake daily

## 2019-05-23 NOTE — TELEPHONE ENCOUNTER
Pharmacy wants to change prescription for prednisone 20mg take 2.5 tablets daily for 3 days please advise if okay to change.

## 2019-05-23 NOTE — TELEPHONE ENCOUNTER
----- Message from Zara Schaffer sent at 5/23/2019  9:38 AM CDT -----  [facility]   [facility] need to speak to nurse regarding a script for predniSONE (DELTASONE) 50 MG Tab     Was sent in and need to know could pharmacy change strength and adjust directions due to pharmacy do not have 50 or 10s      Please call   [id number] [place] [facility] [address]  [address] [place] [address]  Phone: [phone] Fax: [phone]

## 2019-05-24 ENCOUNTER — TELEPHONE (OUTPATIENT)
Dept: FAMILY MEDICINE | Facility: CLINIC | Age: 69
End: 2019-05-24

## 2019-05-24 ENCOUNTER — CLINICAL SUPPORT (OUTPATIENT)
Dept: FAMILY MEDICINE | Facility: CLINIC | Age: 69
End: 2019-05-24
Attending: FAMILY MEDICINE
Payer: MEDICARE

## 2019-05-24 VITALS — DIASTOLIC BLOOD PRESSURE: 72 MMHG | HEART RATE: 84 BPM | SYSTOLIC BLOOD PRESSURE: 138 MMHG

## 2019-05-24 DIAGNOSIS — I10 HYPERTENSION, UNSPECIFIED TYPE: Primary | ICD-10-CM

## 2019-05-24 PROCEDURE — 99999 PR PBB SHADOW E&M-EST. PATIENT-LVL I: ICD-10-PCS | Mod: PBBFAC,HCNC,, | Performed by: FAMILY MEDICINE

## 2019-05-24 PROCEDURE — 99999 PR PBB SHADOW E&M-EST. PATIENT-LVL I: CPT | Mod: PBBFAC,HCNC,, | Performed by: FAMILY MEDICINE

## 2019-05-24 NOTE — TELEPHONE ENCOUNTER
She has hives, her md was going to prescribe her prednisone, but the pharmacy did not have the strength she wanted.  She has been on Benadryl 50 mg, but she still has hives and itching.  She has some left over 20 mg prednisone, and is wondering if she can take it?  She doesn't know what the md prescribed.  Informed prescribed prednisone 20 mg po bid x 3 days.  Discussed colloidal oatmeal powder as well..    Reason for Disposition   Caller has medication question, adult has minor symptoms, caller declines triage, and triager answers question    Protocols used: MEDICATION QUESTION CALL-A-

## 2019-05-24 NOTE — TELEPHONE ENCOUNTER
Fair but not good.  I'd suggest we increase the amlodipine to 5mg if she can tolerate it without going too low.  She can try taking two of the 2.5's as a test.

## 2019-06-06 RX ORDER — AMLODIPINE BESYLATE 5 MG/1
5 TABLET ORAL DAILY
Qty: 90 TABLET | Refills: 3 | Status: SHIPPED | OUTPATIENT
Start: 2019-06-06 | End: 2019-06-11 | Stop reason: DRUGHIGH

## 2019-06-10 DIAGNOSIS — E11.9 TYPE 2 DIABETES MELLITUS WITHOUT COMPLICATION, WITHOUT LONG-TERM CURRENT USE OF INSULIN: ICD-10-CM

## 2019-06-10 RX ORDER — DULAGLUTIDE 0.75 MG/.5ML
INJECTION, SOLUTION SUBCUTANEOUS
Qty: 12 ML | Refills: 1 | Status: SHIPPED | OUTPATIENT
Start: 2019-06-10 | End: 2019-12-10 | Stop reason: SDUPTHER

## 2019-06-11 ENCOUNTER — CLINICAL SUPPORT (OUTPATIENT)
Dept: FAMILY MEDICINE | Facility: CLINIC | Age: 69
End: 2019-06-11
Payer: MEDICARE

## 2019-06-11 VITALS — SYSTOLIC BLOOD PRESSURE: 126 MMHG | HEART RATE: 80 BPM | DIASTOLIC BLOOD PRESSURE: 72 MMHG

## 2019-06-11 DIAGNOSIS — I10 HYPERTENSION, UNSPECIFIED TYPE: Primary | ICD-10-CM

## 2019-06-11 PROCEDURE — 99999 PR PBB SHADOW E&M-EST. PATIENT-LVL I: ICD-10-PCS | Mod: PBBFAC,HCNC,,

## 2019-06-11 PROCEDURE — 99999 PR PBB SHADOW E&M-EST. PATIENT-LVL I: CPT | Mod: PBBFAC,HCNC,,

## 2019-06-11 RX ORDER — AMLODIPINE BESYLATE 10 MG/1
10 TABLET ORAL DAILY
Qty: 90 TABLET | Refills: 3 | Status: SHIPPED | OUTPATIENT
Start: 2019-06-11 | End: 2020-03-19

## 2019-06-11 NOTE — TELEPHONE ENCOUNTER
Patient came in today for blood pressure check.    126/72 p80    Patient needs Amlodipine changed to 10mg and sent to [facility].     Change Trulicity to 90 day supply and send to [facility].

## 2019-06-13 DIAGNOSIS — I10 HYPERTENSION, POOR CONTROL: ICD-10-CM

## 2019-06-13 DIAGNOSIS — E11.9 CONTROLLED TYPE 2 DIABETES MELLITUS WITHOUT COMPLICATION, WITHOUT LONG-TERM CURRENT USE OF INSULIN: ICD-10-CM

## 2019-06-13 DIAGNOSIS — R05.3 CHRONIC COUGH: ICD-10-CM

## 2019-06-13 RX ORDER — GLUCOSAM/CHON-MSM1/C/MANG/BOSW 500-416.6
TABLET ORAL
Qty: 200 EACH | Refills: 3 | Status: SHIPPED | OUTPATIENT
Start: 2019-06-13 | End: 2020-06-22 | Stop reason: SDUPTHER

## 2019-06-13 RX ORDER — LOSARTAN POTASSIUM 100 MG/1
TABLET ORAL
Qty: 90 TABLET | Refills: 3 | Status: SHIPPED | OUTPATIENT
Start: 2019-06-13 | End: 2020-03-19

## 2019-06-13 RX ORDER — METFORMIN HYDROCHLORIDE 750 MG/1
TABLET, EXTENDED RELEASE ORAL
Qty: 180 TABLET | Refills: 1 | Status: SHIPPED | OUTPATIENT
Start: 2019-06-13 | End: 2019-10-28 | Stop reason: SDUPTHER

## 2019-06-21 NOTE — TELEPHONE ENCOUNTER
----- Message from Ekaterina Babcock sent at 6/21/2019  8:31 AM CDT -----  Type:  RX Refill Request    [facility]  [facility]  Refill or New Rx:  New  RX Name and Strength:  [employer] true metrix test strips  How is the patient currently taking it? (ex. 1XDay):  Test 2 x day  Is this a 30 day or 90 day RX:  90  Preferred Pharmacy with phone number:    [facility] - [place], [place] - [address]  [address] [address]  Phone: [phone] Fax: [phone]  Local or Mail Order:  Mail order  Ordering Provider:    Best Call Back Number:  [phone]  Additional Information:

## 2019-07-03 ENCOUNTER — HOSPITAL ENCOUNTER (OUTPATIENT)
Dept: RADIOLOGY | Facility: HOSPITAL | Age: 69
Discharge: HOME | End: 2019-07-03
Attending: UROLOGY
Payer: MEDICARE

## 2019-07-03 DIAGNOSIS — N20.0 NEPHROLITHIASIS: ICD-10-CM

## 2019-07-03 PROCEDURE — 74018 XR ABDOMEN AP 1 VIEW: ICD-10-PCS | Mod: 26,HCNC,, | Performed by: RADIOLOGY

## 2019-07-03 PROCEDURE — 74018 RADEX ABDOMEN 1 VIEW: CPT | Mod: TC,HCNC,FY

## 2019-07-03 PROCEDURE — 74018 RADEX ABDOMEN 1 VIEW: CPT | Mod: 26,HCNC,, | Performed by: RADIOLOGY

## 2019-07-05 ENCOUNTER — OFFICE VISIT (OUTPATIENT)
Dept: UROLOGY | Facility: CLINIC | Age: 69
End: 2019-07-05
Payer: MEDICARE

## 2019-07-05 VITALS
RESPIRATION RATE: 18 BRPM | WEIGHT: 197.31 LBS | HEIGHT: 65 IN | BODY MASS INDEX: 32.87 KG/M2 | SYSTOLIC BLOOD PRESSURE: 137 MMHG | DIASTOLIC BLOOD PRESSURE: 80 MMHG | HEART RATE: 101 BPM | TEMPERATURE: 99 F

## 2019-07-05 DIAGNOSIS — N20.0 KIDNEY STONES: Primary | ICD-10-CM

## 2019-07-05 DIAGNOSIS — N25.81 SECONDARY HYPERPARATHYROIDISM OF RENAL ORIGIN: ICD-10-CM

## 2019-07-05 DIAGNOSIS — R32 URINARY INCONTINENCE, UNSPECIFIED TYPE: ICD-10-CM

## 2019-07-05 LAB
BILIRUB SERPL-MCNC: NORMAL MG/DL
BLOOD URINE, POC: NORMAL
COLOR, POC UA: YELLOW
GLUCOSE UR QL STRIP: NORMAL
KETONES UR QL STRIP: NORMAL
LEUKOCYTE ESTERASE URINE, POC: NORMAL
NITRITE, POC UA: NORMAL
PH, POC UA: 6
PROTEIN, POC: NORMAL
SPECIFIC GRAVITY, POC UA: 1.02
UROBILINOGEN, POC UA: 0.2

## 2019-07-05 PROCEDURE — 81002 URINALYSIS NONAUTO W/O SCOPE: CPT | Mod: HCNC,S$GLB,, | Performed by: NURSE PRACTITIONER

## 2019-07-05 PROCEDURE — 99999 PR PBB SHADOW E&M-EST. PATIENT-LVL V: CPT | Mod: PBBFAC,HCNC,, | Performed by: NURSE PRACTITIONER

## 2019-07-05 PROCEDURE — 1101F PT FALLS ASSESS-DOCD LE1/YR: CPT | Mod: HCNC,CPTII,S$GLB, | Performed by: NURSE PRACTITIONER

## 2019-07-05 PROCEDURE — 3075F SYST BP GE 130 - 139MM HG: CPT | Mod: HCNC,CPTII,S$GLB, | Performed by: NURSE PRACTITIONER

## 2019-07-05 PROCEDURE — 1101F PR PT FALLS ASSESS DOC 0-1 FALLS W/OUT INJ PAST YR: ICD-10-PCS | Mod: HCNC,CPTII,S$GLB, | Performed by: NURSE PRACTITIONER

## 2019-07-05 PROCEDURE — 99999 PR PBB SHADOW E&M-EST. PATIENT-LVL V: ICD-10-PCS | Mod: PBBFAC,HCNC,, | Performed by: NURSE PRACTITIONER

## 2019-07-05 PROCEDURE — 3079F PR MOST RECENT DIASTOLIC BLOOD PRESSURE 80-89 MM HG: ICD-10-PCS | Mod: HCNC,CPTII,S$GLB, | Performed by: NURSE PRACTITIONER

## 2019-07-05 PROCEDURE — 99024 PR POST-OP FOLLOW-UP VISIT: ICD-10-PCS | Mod: ,,, | Performed by: NURSE PRACTITIONER

## 2019-07-05 PROCEDURE — 81002 POCT URINE DIPSTICK WITHOUT MICROSCOPE: ICD-10-PCS | Mod: HCNC,S$GLB,, | Performed by: NURSE PRACTITIONER

## 2019-07-05 PROCEDURE — 99024 POSTOP FOLLOW-UP VISIT: CPT | Mod: ,,, | Performed by: NURSE PRACTITIONER

## 2019-07-05 PROCEDURE — 3075F PR MOST RECENT SYSTOLIC BLOOD PRESS GE 130-139MM HG: ICD-10-PCS | Mod: HCNC,CPTII,S$GLB, | Performed by: NURSE PRACTITIONER

## 2019-07-05 PROCEDURE — 3079F DIAST BP 80-89 MM HG: CPT | Mod: HCNC,CPTII,S$GLB, | Performed by: NURSE PRACTITIONER

## 2019-07-05 RX ORDER — AMLODIPINE BESYLATE 2.5 MG/1
TABLET ORAL
COMMUNITY
Start: 2019-07-04 | End: 2019-09-26

## 2019-07-05 RX ORDER — TAMSULOSIN HYDROCHLORIDE 0.4 MG/1
0.4 CAPSULE ORAL
Qty: 90 CAPSULE | Refills: 3 | Status: SHIPPED | OUTPATIENT
Start: 2019-07-05 | End: 2020-02-07

## 2019-07-05 NOTE — PROGRESS NOTES
[facility] Note  Staff: FABIANA Angulo    PCP: Dr. Tor Garg    Chief Complaint: F/UP-LEFT ESWL procedure    Subjective:        HPI: Doris Pastrana is a 68 y.o. female presents today for f/up/routine recheck.    On 5/22/19 pt had the following procedure performed by Dr. [facility]:  LEFT ESWL    Findings:   The stone is located in the left upj at L3 and measures about 6-7mm. It was faintly radio-opaque on kub.   By 1500 shocks the stone was mostly completely fragmented. By 3000 shocks no longer present. Other renals stones not visible on kub.    S/P KUB XR done on 7/3/19:  IMPRESSION:  A renal or ureteral stone is not identified by this modality.  Phleboliths   noted in the lower pelvis on both sides.  Prior cholecystectomy.    TODAY, pt is doing well, with no complaints voiced.  She currently denies dysuria, hematuria or problems/pain with urination.    Hx of Incontinence:  Pt states today that ever since she has been taking daily Flomax 0.4 mg that MD had prescribed to her, she has no more incontinence, it is gone completely, therefore she is very happy and wants to continue taking this medication at this time.    REVIEW OF SYSTEMS:  A comprehensive 10 system review was performed and is negative except as noted above in HPI    PMHx:  Past Medical History:   Diagnosis Date    Acute sinus infection     Allergy     Anemia     Arthritis     Bronchitis     Degenerative disc disease     lumbar, pain contract 1/28/2013    Depression     Diabetes mellitus, type 2 2/25/16    Fatty liver     Hyperlipidemia 2/25/16    Hypertension     Kidney stone     Mitral valve prolapse     Pleurisy     Pneumonia     PONV (postoperative nausea and vomiting)     Sinus infection     TIA (transient ischemic attack) 03/28/2019     PSHx:  Past Surgical History:   Procedure Laterality Date    ANKLE SURGERY  08/09/2017    right ankle torn ligament    APPENDECTOMY       ARTHROSCOPY-ANKLE Right 2017    [facility]     SECTION      CHOLECYSTECTOMY      COLONOSCOPY  8/13/15    Dr. Oliveira, five year recheck    COLONOSCOPY N/A 2015    Performed [facility] at [facility]O    HYSTERECTOMY      LITHOTRIPSY, ESWL - only if stone visible on xray  with cysto after on table possible Left 2019    Performed by [facility] at [facility] OR    REPAIR-LIGAMENT (brostrom han) Right 2017    [facility] at [facility] OR    REPAIR-TENDON-LEG (peroneal tendon) Right 2017    [facility] at [facility] OR    TENODESIS (TENDON FIXATION)PERONEAL Right 2017    [facility] at [facility] OR    TONSILLECTOMY      VAGINAL DELIVERY       Allergies:  Codeine; Rhubarb; Strawberries [strawberry]; Iodine; Latex, natural rubber; and Pravastatin    Medications: reviewed   Anticoagulation: Yes - MOBIC DAILY    Objective:     Vitals:    19 0832   BP: 137/80   Pulse: 101   Resp: 18   Temp: 98.6 °F (37 °C)     Physical Exam   Vitals reviewed.  Constitutional: She is oriented to person, place, and time. She appears well-developed and well-nourished.   HENT:   Head: Normocephalic and atraumatic.   Eyes: Conjunctivae and EOM are normal. Pupils are equal, round, and reactive to light.   Neck: Normal range of motion. Neck supple.   Cardiovascular: Normal rate, regular rhythm, normal heart sounds and intact distal pulses.    Pulmonary/Chest: Effort normal and breath sounds normal.   Abdominal: Soft. Bowel sounds are normal.   Musculoskeletal: Normal range of motion.   Neurological: She is alert and oriented to person, place, and time. She has normal reflexes.   Skin: Skin is warm and dry.     Psychiatric: She has a normal mood and affect. Her behavior is normal. Judgment and thought content normal.     LABS REVIEW:  UA today:   Color:Clear, Yellow  Spec. Grav.  1.025  PH   6.0  Negative for leukocytes, nitrates, protein, glucose, ketones, urobili, bili, and blood.    Cr:   Lab Results   Component Value Date    CREATININE 0.7 05/22/2019     Assessment:       1. Kidney stones    2. Secondary hyperparathyroidism of renal origin     3. Urinary incontinence, unspecified type          Plan:   Kidney Stones S/P Left ESWL:    1.  L[facility]olink 24 hr urine orders thoroughly explained and given to pt during ov today.  All questions answered.  2.  The following labs to be done for further evaluation of stones:  BMP  Vitamin D  Uric acid, PTH    F/u TBD after we review 24-hr urine and lab work results, then we will contact this pt to direct appropriate further plan of care at that time.  Pt vu. [facility]: PT DECLINED    FABIANA Miranda

## 2019-07-05 NOTE — PATIENT INSTRUCTIONS
Understanding Kidney Stones  Your kidneys are bean-shaped organs. They help filter extra salts, waste, and water from your body. You need to drink enough water every day to help flush the extra salts into your urine.     What are kidney stones?  Kidney stones are made up of chemical crystals that separate out from urine. These crystals clump together to make stones. They form in the calyx of the kidney. They may stay in the kidney or move into the urinary tract.   Why kidney stones form  Kidneys form stones for many reasons. If you dont drink enough water, for instance, you wont have enough urine to dilute chemicals. Then the chemicals may form crystals, which can develop into stones. Here are some reasons why kidney stones form:  · Fluid loss (dehydration). This can concentrate urine, causing stones to form.  · Certain foods. Some foods contain large amounts of the chemicals that sometimes crystallize into stones. Eating foods that contain a lot of meat or salt can lead to more kidney stones.  · Kidney infections. These infections foster stones by slowing urine flow or changing the acid balance of your urine.  · Family history. If family members have had kidney stones, youre more likely to have them, too.  · A lack of certain substances in your urine. Some substances can help protect you from forming stones. If you dont have enough of these in your urine, stone formation can increase.  Where stones form  Stones begin in the cup-shaped part of the kidney (calyx). Some stay in the calyx and grow. Others move into the kidney, pelvis, or into the ureter. There they can lodge, block the flow of urine, and cause pain.  Symptoms  Many stones cause sudden and severe pain and bloody urine. Others cause nausea or frequent, burning urination. Symptoms often depend on your stones size and location. Fever may indicate a serious infection. Call your healthcare provider right away if you develop a fever.  Date Last  Reviewed: 1/1/2017 © 2000-2017 [employer]. [address] [place] [address], [place] [address]. All rights reserved. This information is not intended as a substitute for professional medical care. Always follow your healthcare professional's instructions.        Identifying Kidney Stones  There are 4 general types of kidney stones. Your kidney stones size and shape determine whether it is likely to pass by itself. Knowing what a stone is made of (its composition) helps your healthcare provider find its cause. Then he or she can suggest the best treatment. X-rays or scans can help show the stone's size and shape. Your healthcare provider may also give you a strainer. You can use this to catch the stone while passing urine, and the provider can then test the stone. Other urine and blood tests may be done to help identify the stone. These tests can also help identify causes for different types of stones.     Size  A stone may be as small as a grain of sand. Or it may be as large as a golf ball. Small stones may pass out of your body when you urinate.   Shape  Small, smooth, round stones may pass easily. Jagged-edged stones often lodge inside the kidney or ureter. Staghorn stones can fill the entire renal pelvis and calyces.   Composition  Most stones are made of calcium oxalate, a hard compound. Stones made of cystine or uric acid, or caused by infection (struvite stones), are less dense. Stones often contain more than one chemical.      Your kidneys filter your blood and release chemicals into the urine. If certain chemicals build up in the kidneys, they can form a stone.   Treating your stones  You and your healthcare provider will work together to form a treatment plan. Your provider may suggest that you let your stone pass naturally. Or you may manage it with medicines. Certain procedures may also help, such as SWL (shock wave lithotripsy) or using a camera inside the body to remove the stone (ureteroscopy).  And you will be told how you can help prevent kidney stones in the future.  Date Last Reviewed: 1/1/2017  © 9911-2701 [employer]. [address] [place] [address], [place] [address]. All rights reserved. This information is not intended as a substitute for professional medical care. Always follow your healthcare professional's instructions.        Preventing Kidney Stones  If youve had a kidney stone, you may worry that youll have another. Removing or passing your stone doesnt prevent future stones. But with your healthcare providers help, you can reduce your risk of forming new stones. Follow up with your healthcare provider to help find new stones. You may need follow-up every 3 months to a year for a lifetime.    Drink lots of water  Staying well-hydrated is the best way to reduce your risk of future stones. Drink 8 12-ounce glasses of water daily. Have 2 with each meal and 2 between meals. Try keeping a pitcher of water nearby during the day and at night.  Take medicines if needed  Medicines, including vitamins and minerals, may be prescribed for certain types of stones. You may want to write your doses and medicine times on a calendar. Some medicines decrease stone-forming chemicals in your blood. Others help prevent those chemicals from crystallizing in urine. Still others help keep a normal acid balance in your urine.  Follow your prescribed diet  Your healthcare provider will tell you which foods contain the chemicals you should avoid. Your healthcare provider may also suggest talking to a dietitian. He or she can help you plan meals youll enjoy. These meals wont put you at risk for future stones. You may be told to limit certain foods, depending on which type of stones youve had. You should limit the amount of salt in your food to about 2 grams a day. This will help prevent most types of kidney stones. Make sure you get an adequate amount of calcium in your diet.  For calcium oxalate  stones: Limit animal protein, such as meat, eggs, and fish. Limit grapefruit juice and alcohol. Limit high-oxalate foods (such as cola, tea, chocolate, spinach, rhubarb, wheat bran, and peanuts).  For uric acid stones: Limit high-purine foods, such as mushrooms, peas, beans, anchovies, meat, poultry, shellfish, and organ meats. These foods increase uric acid production.  For cystine stones: Limit high-methionine foods (fish is the most common, but eggs and meats, also). These foods increase production of cystine.  Date Last Reviewed: 2/1/2017  © 2654-7180 [employer]. [address] [place] [address], [place] [address]. All rights reserved. This information is not intended as a substitute for professional medical care. Always follow your healthcare professional's instructions.        Treating Kidney Stones: Medicines  In some cases, your healthcare provider may prescribe medicines to dissolve or prevent stones. Or medications may be prescribed to stop an infection. Once the infection is controlled, your stone can be removed.    Medicines  For uric acid or cystine stones, your healthcare provider may prescribe medicines. Youll take these for your lifetime. Medicines cant dissolve calcium oxalate stones, but often help prevent them. If you have an infection stone, your healthcare provider may prescribe antibiotics. You may take these before and after your stone is removed.  Uric acid stones are caused by too much uric acid in your urine. This can be worsened by a high-meat diet. Allopurinol reduces uric acid. The stone can be dissolved with bicarbonate, potassium citrate, or a similar drug.  Cystine stones are caused by too much cystine (an amino acid) in your urine. This condition is uncommon and inherited. Penicillamine or tiopronin reduces cystine. Bicarbonate, potassium citrate, or a similar drug dissolves cystine stones.  Infection stones are caused by kidney or bladder infections that change the chemical  balance of your urine. Antibiotics control the infection and may slow the stones growth. Then your stone is removed. Stone infections are caused by bacteria that make an enzyme called urease. Your healthcare provider may use a medicine to block this enzyme. Your healthcare provider may also prescribe medicines to relax the ureters and allow the stones to pass through more quickly.  Calcium stones are caused by a number of different things. If you have too much calcium in your urine, your healthcare provider may prescribe diuretics. If your urine has too much oxalate or if your stones are from too little citrate, your healthcare provider may give you a different medication.   Date Last Reviewed: 2/1/2017 © 2000-2017 [employer]. [address] [place] [address], [place] [address]. All rights reserved. This information is not intended as a substitute for professional medical care. Always follow your healthcare professional's instructions.

## 2019-08-19 DIAGNOSIS — F41.9 ANXIETY: ICD-10-CM

## 2019-08-19 RX ORDER — ESCITALOPRAM OXALATE 10 MG/1
TABLET ORAL
Qty: 90 TABLET | Refills: 1 | Status: SHIPPED | OUTPATIENT
Start: 2019-08-19 | End: 2020-01-13

## 2019-09-24 ENCOUNTER — NURSE TRIAGE (OUTPATIENT)
Dept: ADMINISTRATIVE | Facility: CLINIC | Age: 69
End: 2019-09-24

## 2019-09-24 RX ORDER — INSULIN PUMP SYRINGE, 3 ML
EACH MISCELLANEOUS
Qty: 1 EACH | Refills: 0 | Status: SHIPPED | OUTPATIENT
Start: 2019-09-24 | End: 2020-03-20 | Stop reason: CLARIF

## 2019-09-24 NOTE — TELEPHONE ENCOUNTER
Reason for Disposition   Patient sounds very sick or weak to the triager    Additional Information   Negative: Unconscious or difficult to awaken   Negative: Acting confused (e.g., disoriented, slurred speech)   Negative: Very weak (can't stand)   Negative: Sounds like a life-threatening emergency to the triager   Negative: Vomiting and signs of dehydration (e.g., very dry mouth, lightheaded, etc.)   Negative: Blood glucose > 240 mg/dl (13 mmol/l) and rapid breathing   Negative: Blood glucose > 500 mg/dl (27.5 mmol/l)   Negative: Blood glucose > 240 mg/dl (13 mmol/l) AND urine ketones moderate-large (or more than 1+)   Negative: Blood glucose > 240 mg/dl (13 mmol/l) and blood ketones > 1.5 mmol/l   Negative: Blood glucose > 240 mg/dl (13 mmol/l) AND vomiting AND unable to check for ketones (in blood or urine)   Negative: Vomiting lasting > 4 hours    Protocols used: DIABETES - HIGH BLOOD SUGAR-A-[place]    Doris called to say her fasting CBG this AM was 566.  She changed the batteries in her glucometer, then rechecked, and was confirmed at 566.  She just checked again, still fasting, and is now 424. Usual AM CBG is in the 80's, and last A1c 05/07/19 was 6.5. She has not had anything to eat yet today. Took her metformin with the first AM check.  Took Trulicity injection yesterday. Home care advice given, to include hydration increase now.  She complains of nausea, fatigue, and states she has no energy.  Per [facility] triage protocol, recommend ED now for evaluation.  She declines, and wants appt in [place] [facility].  Checked availability, and not able to find appt for her today.  She states she will go to [facility] in [place].  Assured her will message Dr Garg's staff, with request to call her at [phone] with any additional advice.

## 2019-09-24 NOTE — TELEPHONE ENCOUNTER
Patient was seen at [facility]- she was diagnosis uti, sinusitis, ear infection. Her blood sugar was 466 in their waiting room-when she was seen it was 117 and 115. She is asking to have a new True Metrix glucometer sent to [facility].

## 2019-09-25 ENCOUNTER — DOCUMENTATION ONLY (OUTPATIENT)
Dept: FAMILY MEDICINE | Facility: CLINIC | Age: 69
End: 2019-09-25

## 2019-09-25 NOTE — PROGRESS NOTES
Pre-Visit Chart Review  For Appointment Scheduled on 9-26-19    Health Maintenance Due   Topic Date Due    Foot Exam  06/23/2018

## 2019-09-26 ENCOUNTER — HOSPITAL ENCOUNTER (OUTPATIENT)
Dept: RADIOLOGY | Facility: CLINIC | Age: 69
Discharge: HOME | End: 2019-09-26
Attending: FAMILY MEDICINE
Payer: MEDICARE

## 2019-09-26 ENCOUNTER — OFFICE VISIT (OUTPATIENT)
Dept: FAMILY MEDICINE | Facility: CLINIC | Age: 69
End: 2019-09-26
Attending: FAMILY MEDICINE
Payer: MEDICARE

## 2019-09-26 VITALS
BODY MASS INDEX: 32.44 KG/M2 | HEIGHT: 65 IN | DIASTOLIC BLOOD PRESSURE: 80 MMHG | HEART RATE: 83 BPM | OXYGEN SATURATION: 96 % | SYSTOLIC BLOOD PRESSURE: 134 MMHG | RESPIRATION RATE: 12 BRPM | WEIGHT: 194.69 LBS | TEMPERATURE: 98 F

## 2019-09-26 DIAGNOSIS — F40.243 ANXIETY WITH FLYING: ICD-10-CM

## 2019-09-26 DIAGNOSIS — R23.3 ABNORMAL BRUISING: ICD-10-CM

## 2019-09-26 DIAGNOSIS — S83.419A SPRAIN OF MEDIAL COLLATERAL LIGAMENT OF KNEE, UNSPECIFIED LATERALITY, INITIAL ENCOUNTER: Primary | ICD-10-CM

## 2019-09-26 DIAGNOSIS — M79.662 PAIN OF LEFT CALF: ICD-10-CM

## 2019-09-26 DIAGNOSIS — J31.0 CHRONIC RHINITIS: ICD-10-CM

## 2019-09-26 DIAGNOSIS — H69.93 DYSFUNCTION OF BOTH EUSTACHIAN TUBES: ICD-10-CM

## 2019-09-26 DIAGNOSIS — H65.92 LEFT NON-SUPPURATIVE OTITIS MEDIA: ICD-10-CM

## 2019-09-26 PROCEDURE — 99214 PR OFFICE/OUTPT VISIT, EST, LEVL IV, 30-39 MIN: ICD-10-PCS | Mod: HCNC,S$GLB,, | Performed by: FAMILY MEDICINE

## 2019-09-26 PROCEDURE — 1101F PR PT FALLS ASSESS DOC 0-1 FALLS W/OUT INJ PAST YR: ICD-10-PCS | Mod: HCNC,CPTII,S$GLB, | Performed by: FAMILY MEDICINE

## 2019-09-26 PROCEDURE — 3079F PR MOST RECENT DIASTOLIC BLOOD PRESSURE 80-89 MM HG: ICD-10-PCS | Mod: HCNC,CPTII,S$GLB, | Performed by: FAMILY MEDICINE

## 2019-09-26 PROCEDURE — 3075F SYST BP GE 130 - 139MM HG: CPT | Mod: HCNC,CPTII,S$GLB, | Performed by: FAMILY MEDICINE

## 2019-09-26 PROCEDURE — 93971 EXTREMITY STUDY: CPT | Mod: TC,HCNC,PO,LT

## 2019-09-26 PROCEDURE — 99999 PR PBB SHADOW E&M-EST. PATIENT-LVL IV: ICD-10-PCS | Mod: PBBFAC,HCNC,, | Performed by: FAMILY MEDICINE

## 2019-09-26 PROCEDURE — 99214 OFFICE O/P EST MOD 30 MIN: CPT | Mod: HCNC,S$GLB,, | Performed by: FAMILY MEDICINE

## 2019-09-26 PROCEDURE — 93971 US LOWER EXTREMITY VEINS LEFT: ICD-10-PCS | Mod: 26,HCNC,LT,S$GLB | Performed by: RADIOLOGY

## 2019-09-26 PROCEDURE — 93971 EXTREMITY STUDY: CPT | Mod: 26,HCNC,LT,S$GLB | Performed by: RADIOLOGY

## 2019-09-26 PROCEDURE — 3075F PR MOST RECENT SYSTOLIC BLOOD PRESS GE 130-139MM HG: ICD-10-PCS | Mod: HCNC,CPTII,S$GLB, | Performed by: FAMILY MEDICINE

## 2019-09-26 PROCEDURE — 1101F PT FALLS ASSESS-DOCD LE1/YR: CPT | Mod: HCNC,CPTII,S$GLB, | Performed by: FAMILY MEDICINE

## 2019-09-26 PROCEDURE — 99999 PR PBB SHADOW E&M-EST. PATIENT-LVL IV: CPT | Mod: PBBFAC,HCNC,, | Performed by: FAMILY MEDICINE

## 2019-09-26 PROCEDURE — 3079F DIAST BP 80-89 MM HG: CPT | Mod: HCNC,CPTII,S$GLB, | Performed by: FAMILY MEDICINE

## 2019-09-26 RX ORDER — ALPRAZOLAM 0.25 MG/1
TABLET ORAL
Qty: 10 TABLET | Refills: 1 | Status: SHIPPED | OUTPATIENT
Start: 2019-09-26 | End: 2022-01-10 | Stop reason: SDUPTHER

## 2019-09-26 RX ORDER — CEFDINIR 300 MG/1
300 CAPSULE ORAL 2 TIMES DAILY
COMMUNITY
Start: 2019-09-24 | End: 2019-10-06

## 2019-09-26 RX ORDER — PREDNISONE 20 MG/1
20 TABLET ORAL DAILY
COMMUNITY
Start: 2019-09-24 | End: 2019-09-29

## 2019-09-26 NOTE — PATIENT INSTRUCTIONS
Weight Management: Getting Started  Healthy bodies come in all shapes and sizes. Not all bodies are made to be thin. For some people, a healthy weight is higher than the average weight listed on weight charts. Your healthcare provider can help you decide on a healthy weight for you.    Reasons to lose weight  Losing weight can help with some health problems, such as high blood pressure, heart disease, diabetes, sleep apnea, and arthritis. You may also feel more energy.  Set your long-term goal  Your goal doesn't even have to be a specific weight. You may decide on a fitness goal (such as being able to walk 10 miles a week), or a health goal (such as lowering your blood pressure). Choose a goal that is measurable and reasonable, so you know when you've reached it. A goal of reaching a BMI of less than 25 is not always reasonable (or possible).   Make an action plan  Habits dont change overnight. Setting your goals too high can leave you feeling discouraged if you cant reach them. Be realistic. Choose one or two small changes you can make now. Set an action plan for how you are going to make these changes. When you can stick to this plan, keep making a few more small changes. Taking small steps will help you stay on the path to success.  Track your progress  Write down your goals. Then, keep a daily record of your progress. Write down what you eat and how active you are. This record lets you look back on how much youve done. It may also help when youre feeling frustrated. Reward yourself for success. Even if you dont reach every goal, give yourself credit for what you do get done.  Get support  Encouragement from others can help make losing weight easier. Ask your family members and friends for support. They may even want to join you. Also look to your healthcare provider, registered dietitian, and  for help. Your local hospital can give you more information about nutrition, exercise, and  weight loss.  Date Last Reviewed: 1/31/2016  © 1366-9210 [employer]. [address] [place] [address], [place] [address]. All rights reserved. This information is not intended as a substitute for professional medical care. Always follow your healthcare professional's instructions.

## 2019-09-26 NOTE — PROGRESS NOTES
Subjective:       Patient ID: Doris Pastrana is a 68 y.o. female.    Chief Complaint: Leg Pain; Otalgia; and Back Pain    68-year-old female comes in with several concerns.  She has been having pain in the proximal anterior medial aspect of the left lower leg just below the knee.  She has some small spot bruises in the medial area of both lower legs beginning at the knee and somewhat distal to it.  These are about the size of a nickel or quarter and appear traumatic but she cannot recall any injury.  She also has some pain in the medial right knee but does not have as much disability is she does on the left.  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  The bed is not a mechanical bed that can be lowered and she is often standing on her toes and extending forward at the waist which produces discomfort in her calves frequently.  The patient also has a left ear pain and went to urgent care two days ago when she noticed her blood sugar was running in the 500s.  Her glucometer was found to be malfunctioning when they had a normal glucose reading at urgent care with a markedly elevated reading on her glucometer.  She was found to have a left ear infection and was put on Omnicef and she was told she had a urinary infection when she complained of some burning and frequency.  Records we obtained our largely illegible but the urinalysis they included appear to be normal.    The patient has a history of anxiety with flying and will be taking several trips in the near future to [place] to help her daughter with a new baby.  She is asking for a refill of her low-dose Xanax that she takes just before flying.  Her 1st trip will be October 10th.    Past Medical History:  No date:  Acute sinus infection  No date: Allergy  No date: Anemia  No date: Arthritis  No date: Bronchitis  No date: Degenerative disc disease      Comment:  lumbar, pain contract 2013  No date: Depression  16: Diabetes mellitus, type 2  No date: Fatty liver  16: Hyperlipidemia  No date: Hypertension  No date: Kidney stone  No date: Mitral valve prolapse  No date: Pleurisy  No date: Pneumonia  No date: PONV (postoperative nausea and vomiting)  No date: Sinus infection  2019: TIA (transient ischemic attack)    Past Surgical History:  2017: ANKLE SURGERY      Comment:  right ankle torn ligament  No date: APPENDECTOMY  No date:  SECTION  No date: CHOLECYSTECTOMY  8/13/15: COLONOSCOPY      Comment:  Dr. Oliveira, five year recheck  2019: EXTRACORPOREAL SHOCK WAVE LITHOTRIPSY; Left      Comment:  Procedure: LITHOTRIPSY, ESWL - only if stone visible on                xray  with cysto after on table possible;  Surgeon:                Marisol Stewart MD;  Location: [facility];                 Service: Urology;  Laterality: Left;  No date: HYSTERECTOMY  No date: TONSILLECTOMY  No date: VAGINAL DELIVERY    Current Outpatient Medications on File Prior to Visit:  albuterol (PROVENTIL/VENTOLIN HFA) 90 mcg/actuation inhaler, Inhale 2 puffs into the lungs 4 (four) times daily. (Patient taking differently: Inhale 2 puffs into the lungs every 4 (four) hours as needed. ), Disp: 1 Inhaler, Rfl: 1  amLODIPine (NORVASC) 10 MG tablet, Take 1 tablet (10 mg total) by mouth once daily., Disp: 90 tablet, Rfl: 3  blood sugar diagnostic Strp, True Metrix glucose strips check twice daily, Disp: 200 each, Rfl: 3  blood-glucose meter kit, True Metrix glucometer- check glucose twice daily. Dx e11.9, Disp: 1 each, Rfl: 0  cefdinir (OMNICEF) 300 MG capsule, Take 300 mg by mouth 2 (two) times daily., Disp: , Rfl:   cyclobenzaprine (FLEXERIL) 10 MG tablet, TAKE 1 TABLET BY MOUTH THREE TIMES DAILY AS NEEDED FOR  MUSCLE SPASMS, Disp: 270 tablet, Rfl: 0  ergocalciferol (ERGOCALCIFEROL) 50,000 unit Cap, Take 1 capsule (50,000 Units total) by mouth every 7 days., Disp: 12 capsule, Rfl: 2  escitalopram oxalate (LEXAPRO) 10 MG tablet, TAKE 1 TABLET EVERY DAY, Disp: 90 tablet, Rfl: 1  fluticasone propionate (FLONASE) 50 mcg/actuation nasal spray, 2 sprays (100 mcg total) by Each Nare route once daily., Disp: 16 g, Rfl: 5  losartan (COZAAR) 100 MG tablet, TAKE 1 TABLET EVERY DAY, Disp: 90 tablet, Rfl: 3  meclizine (ANTIVERT) 25 mg tablet, Take 1 tablet (25 mg total) by mouth 3 (three) times daily as needed., Disp: 30 tablet, Rfl: 5  meloxicam (MOBIC) 15 MG tablet, Take 1 tablet (15 mg total) by mouth once daily., Disp: 90 tablet, Rfl: 0  metFORMIN (GLUCOPHAGE-XR) 750 MG 24 hr tablet, TAKE 1 TABLET TWICE DAILY WITH MEALS, Disp: 180 tablet, Rfl: 1  predniSONE (DELTASONE) 20 MG tablet, Take 20 mg by mouth once daily., Disp: , Rfl:   tamsulosin (FLOMAX) 0.4 mg Cap, Take 1 capsule (0.4 mg total) by mouth after dinner., Disp: 90 capsule, Rfl: 3  topiramate 25 mg capsule, Take 25 mg by mouth 2 (two) times daily., Disp: , Rfl:   TRUEPLUS LANCETS 28 gauge Misc, TEST TWO TIMES DAILY, Disp: 200 each, Rfl: 3  TRULICITY 0.75 mg/0.5 mL PnIj, INJECT ONE SYRINGE SUBCUTANEOUSLY ONCE WEEKLY., Disp: 12 mL, Rfl: 1  (DISCONTINUED) ALPRAZolam (XANAX) 0.25 MG tablet, Use one 20-30 minutes before flying prn, Disp: 4 tablet, Rfl: 1  cetirizine (ZYRTEC) 10 MG tablet, Take 10 mg by mouth once daily., Disp: , Rfl:   HYDROcodone-acetaminophen (NORCO) 5-325 mg per tablet, Take 1 tablet by mouth every 6 (six) hours as needed for Pain. (Patient not taking: Reported on 9/26/2019), Disp: 40 tablet, Rfl: 0  rosuvastatin (CRESTOR) 40 MG Tab, TAKE ONE TABLET BY MOUTH EVERY EVENING. (Patient not taking: Reported on 9/26/2019), Disp: 30 tablet, Rfl: 5  (DISCONTINUED) amLODIPine (NORVASC) 2.5 MG tablet, , Disp: , Rfl:     No current facility-administered medications on  file prior to visit.         Review of Systems   Constitutional: Negative for chills and fever.   HENT: Positive for congestion, ear pain and rhinorrhea. Negative for hearing loss, nosebleeds, sinus pressure and sinus pain.    Respiratory: Negative for chest tightness and shortness of breath.    Cardiovascular: Negative for chest pain and palpitations.   Gastrointestinal: Negative for anal bleeding and blood in stool.   Genitourinary: Negative for hematuria.   Musculoskeletal: Positive for myalgias. Negative for arthralgias.   Skin: Positive for color change (Several bruises both lower legs most of them medially in relatively protected areas). Negative for rash.   Hematological: Bruises/bleeds easily (Only over the lower extremities).       Objective:      Physical Exam   Constitutional: She is oriented to person, place, and time. She appears well-developed. No distress.   Good blood pressure control  Obese with a BMI of 32.4 she is down 0.6 lb from her May 10, 2019 visit   HENT:   Head: Normocephalic.   Right Ear: Hearing, external ear and ear canal normal. Tympanic membrane is bulging. Tympanic membrane is not injected and not erythematous. Tympanic membrane mobility is abnormal. A middle ear effusion (Minimal effusion with clear fluid) is present. No decreased hearing is noted.   Left Ear: Hearing, external ear and ear canal normal. Tympanic membrane is injected, erythematous and bulging. Tympanic membrane mobility is abnormal. A middle ear effusion (Minimal effusion with clear fluid) is present. No decreased hearing is noted.   Nose: Mucosal edema and rhinorrhea present. Right sinus exhibits no maxillary sinus tenderness and no frontal sinus tenderness. Left sinus exhibits no maxillary sinus tenderness and no frontal sinus tenderness.   Mouth/Throat: Oropharynx is clear and moist. No oropharyngeal exudate, posterior oropharyngeal edema, posterior oropharyngeal erythema or tonsillar abscesses.   Eyes: Pupils are  equal, round, and reactive to light. EOM are normal. No scleral icterus.   Cardiovascular: Normal rate, regular rhythm and normal heart sounds. Exam reveals no gallop and no friction rub.   No murmur heard.  Musculoskeletal:        Right knee: She exhibits normal range of motion, no swelling, no effusion, no ecchymosis, no deformity, no laceration, no erythema, normal alignment, no LCL laxity, normal patellar mobility, no bony tenderness, normal meniscus and no MCL laxity. Tenderness found. MCL (Distal attachment of medial collateral ligament) tenderness noted. No medial joint line, no lateral joint line, no LCL and no patellar tendon tenderness noted.        Left knee: She exhibits normal range of motion, no swelling, no effusion, no ecchymosis, no deformity, no erythema, normal alignment, no LCL laxity, normal patellar mobility, no bony tenderness, normal meniscus and no MCL laxity. Tenderness found. Medial joint line and MCL (Distal attachment of medial collateral ligament tender) tenderness noted. No lateral joint line, no LCL and no patellar tendon tenderness noted.        Left lower leg: She exhibits tenderness (Mild to moderate calf tenderness without palpable cords). She exhibits no bony tenderness, no swelling, no edema and no deformity.   Neurological: She is alert and oriented to person, place, and time.   Skin: Skin is warm, dry and intact. Bruising noted. No abrasion, no burn, no ecchymosis, no laceration, no lesion, no petechiae and no rash noted. She is not diaphoretic. No erythema.        Psychiatric: She has a normal mood and affect. Her behavior is normal. Judgment and thought content normal.   Nursing note and vitals reviewed.      Assessment:       1. Sprain of medial collateral ligament of knee, unspecified laterality, initial encounter    2. Abnormal bruising    3. Pain of left calf    4. Chronic rhinitis    5. Dysfunction of both eustachian tubes    6. Left non-suppurative otitis media    7.  Anxiety with flying        Plan:       1. Sprain of medial collateral ligament of knee, unspecified laterality, initial encounter  Suspect her injuries are related to her work activity.  She needs a low bench or step stool to assist with patient care.  Be aware of any activity that produces pain in the medial knees and try to avoid that activity whether at work or in exercise or normal household activities    2. Abnormal bruising  Appears to be due to minor trauma, no evidence of any systemic bruising  - US Lower Extremity Veins Left; Future    3. Pain of left calf  Doubt DVT but patient is going to be flying extensively in the near future and will confirm with ultrasound  - US Lower Extremity Veins Left; Future    4. Chronic rhinitis  Recommend use Flonase regularly with nasal saline spray as needed.  Recommend use Afrin 30 min before flying    5. Dysfunction of both eustachian tubes  See above    6. Left non-suppurative otitis media  Complete Omnicef given by urgent care    7. Anxiety with flying  The  (Prescription Monitoring Program) website was checked with no inappropriate activity found.  - ALPRAZolam (XANAX) 0.25 MG tablet; Use one 20-30 minutes before flying prn  Dispense: 10 tablet; Refill: 1

## 2019-09-30 ENCOUNTER — TELEPHONE (OUTPATIENT)
Dept: FAMILY MEDICINE | Facility: CLINIC | Age: 69
End: 2019-09-30

## 2019-09-30 NOTE — TELEPHONE ENCOUNTER
----- Message from Miller Berrios sent at 9/30/2019  7:52 AM CDT -----  Type: Needs Medical Advice    Who Called:  Self   Symptoms (please be specific):  NA   How long has patient had these symptoms:  NA   Pharmacy name and phone #:  [facility] [address] Call Back Number: [phone]  Additional Information: Pt completed medication for uti, pt states she is still in pain. Patient asking for a rx refill.

## 2019-10-01 ENCOUNTER — OFFICE VISIT (OUTPATIENT)
Dept: FAMILY MEDICINE | Facility: CLINIC | Age: 69
End: 2019-10-01
Payer: MEDICARE

## 2019-10-01 VITALS
WEIGHT: 197.06 LBS | HEIGHT: 65 IN | TEMPERATURE: 99 F | HEART RATE: 87 BPM | SYSTOLIC BLOOD PRESSURE: 120 MMHG | BODY MASS INDEX: 32.83 KG/M2 | DIASTOLIC BLOOD PRESSURE: 70 MMHG | OXYGEN SATURATION: 95 %

## 2019-10-01 DIAGNOSIS — E11.00 TYPE 2 DIABETES MELLITUS WITH HYPEROSMOLARITY WITHOUT COMA, WITHOUT LONG-TERM CURRENT USE OF INSULIN: ICD-10-CM

## 2019-10-01 DIAGNOSIS — N39.0 URINARY TRACT INFECTION WITHOUT HEMATURIA, SITE UNSPECIFIED: ICD-10-CM

## 2019-10-01 DIAGNOSIS — B96.89 BACTERIAL SINUSITIS: ICD-10-CM

## 2019-10-01 DIAGNOSIS — J32.0 MAXILLARY SINUSITIS, UNSPECIFIED CHRONICITY: ICD-10-CM

## 2019-10-01 DIAGNOSIS — J32.9 BACTERIAL SINUSITIS: ICD-10-CM

## 2019-10-01 DIAGNOSIS — B37.9 YEAST INFECTION: ICD-10-CM

## 2019-10-01 DIAGNOSIS — R30.0 DYSURIA: Primary | ICD-10-CM

## 2019-10-01 PROCEDURE — 87088 URINE BACTERIA CULTURE: CPT | Mod: HCNC

## 2019-10-01 PROCEDURE — 87186 SC STD MICRODIL/AGAR DIL: CPT | Mod: HCNC

## 2019-10-01 PROCEDURE — 99999 PR PBB SHADOW E&M-EST. PATIENT-LVL V: CPT | Mod: PBBFAC,HCNC,, | Performed by: PHYSICIAN ASSISTANT

## 2019-10-01 PROCEDURE — 99999 PR PBB SHADOW E&M-EST. PATIENT-LVL V: ICD-10-PCS | Mod: PBBFAC,HCNC,, | Performed by: PHYSICIAN ASSISTANT

## 2019-10-01 PROCEDURE — 87086 URINE CULTURE/COLONY COUNT: CPT | Mod: HCNC

## 2019-10-01 PROCEDURE — 1101F PR PT FALLS ASSESS DOC 0-1 FALLS W/OUT INJ PAST YR: ICD-10-PCS | Mod: HCNC,CPTII,S$GLB, | Performed by: PHYSICIAN ASSISTANT

## 2019-10-01 PROCEDURE — 99499 UNLISTED E&M SERVICE: CPT | Mod: HCNC,S$GLB,, | Performed by: PHYSICIAN ASSISTANT

## 2019-10-01 PROCEDURE — 99499 RISK ADDL DX/OHS AUDIT: ICD-10-PCS | Mod: HCNC,S$GLB,, | Performed by: PHYSICIAN ASSISTANT

## 2019-10-01 PROCEDURE — 3044F HG A1C LEVEL LT 7.0%: CPT | Mod: HCNC,CPTII,S$GLB, | Performed by: PHYSICIAN ASSISTANT

## 2019-10-01 PROCEDURE — 87077 CULTURE AEROBIC IDENTIFY: CPT | Mod: HCNC

## 2019-10-01 PROCEDURE — 3074F PR MOST RECENT SYSTOLIC BLOOD PRESSURE < 130 MM HG: ICD-10-PCS | Mod: HCNC,CPTII,S$GLB, | Performed by: PHYSICIAN ASSISTANT

## 2019-10-01 PROCEDURE — 3044F PR MOST RECENT HEMOGLOBIN A1C LEVEL <7.0%: ICD-10-PCS | Mod: HCNC,CPTII,S$GLB, | Performed by: PHYSICIAN ASSISTANT

## 2019-10-01 PROCEDURE — 99214 OFFICE O/P EST MOD 30 MIN: CPT | Mod: HCNC,S$GLB,, | Performed by: PHYSICIAN ASSISTANT

## 2019-10-01 PROCEDURE — 99214 PR OFFICE/OUTPT VISIT, EST, LEVL IV, 30-39 MIN: ICD-10-PCS | Mod: HCNC,S$GLB,, | Performed by: PHYSICIAN ASSISTANT

## 2019-10-01 PROCEDURE — 3074F SYST BP LT 130 MM HG: CPT | Mod: HCNC,CPTII,S$GLB, | Performed by: PHYSICIAN ASSISTANT

## 2019-10-01 PROCEDURE — 3078F DIAST BP <80 MM HG: CPT | Mod: HCNC,CPTII,S$GLB, | Performed by: PHYSICIAN ASSISTANT

## 2019-10-01 PROCEDURE — 1101F PT FALLS ASSESS-DOCD LE1/YR: CPT | Mod: HCNC,CPTII,S$GLB, | Performed by: PHYSICIAN ASSISTANT

## 2019-10-01 PROCEDURE — 3078F PR MOST RECENT DIASTOLIC BLOOD PRESSURE < 80 MM HG: ICD-10-PCS | Mod: HCNC,CPTII,S$GLB, | Performed by: PHYSICIAN ASSISTANT

## 2019-10-01 RX ORDER — FLUCONAZOLE 150 MG/1
TABLET ORAL
Qty: 2 TABLET | Refills: 0 | Status: SHIPPED | OUTPATIENT
Start: 2019-10-01 | End: 2019-10-08 | Stop reason: SDUPTHER

## 2019-10-01 RX ORDER — LEVOFLOXACIN 500 MG/1
500 TABLET, FILM COATED ORAL DAILY
Qty: 10 TABLET | Refills: 0 | Status: SHIPPED | OUTPATIENT
Start: 2019-10-01 | End: 2019-10-11

## 2019-10-01 NOTE — PROGRESS NOTES
Subjective:       Patient ID: Doris Pastrana is a 68 y.o. female.    Chief Complaint: Urinary Tract Infection    HPI   Dysuria x 10 days   Seen in urgent care 9-24-19   Seen for ear and sinus infection  Pt Rx'd with omnicef x 10 days and prednisone  Pt did not get better  Pt still has head and sinus discomfort  Pt also thought the omnicef would take care of UTI she was told she had  Pt has dysuria and frequency with odor to urine   Review of Systems   Constitutional: Positive for activity change and fatigue. Negative for appetite change, chills, diaphoresis, fever and unexpected weight change.   HENT: Positive for congestion, ear pain, postnasal drip, sinus pressure and sinus pain. Negative for sore throat.    Eyes: Negative.    Respiratory: Positive for cough. Negative for shortness of breath and wheezing.    Cardiovascular: Negative.  Negative for chest pain and leg swelling.   Gastrointestinal: Negative.    Endocrine: Negative.    Genitourinary: Positive for dysuria. Negative for flank pain, frequency, hematuria and urgency.   Musculoskeletal: Negative.    Skin: Negative.  Negative for rash.   Neurological: Negative.        Objective:      Physical Exam   Constitutional: She appears well-developed and well-nourished. No distress.   HENT:   Head: Normocephalic and atraumatic.   Right Ear: External ear normal.   Left Ear: External ear normal.   Nose: Nose normal.   Mouth/Throat: Oropharynx is clear and moist. No oropharyngeal exudate.   L max tenderness  Cloudy mucus   Eyes: Conjunctivae are normal. No scleral icterus.   Neck: Normal range of motion. Neck supple. No tracheal deviation present. No thyromegaly present.   Cardiovascular: Normal rate, regular rhythm, normal heart sounds and intact distal pulses. Exam reveals no gallop and no friction rub.   No murmur heard.  Pulmonary/Chest: Effort normal and breath sounds normal. No stridor. No respiratory distress. She has no wheezes. She has no rales.   Abdominal:  Soft. She exhibits no distension and no mass. There is no tenderness. There is no rebound and no guarding. No hernia.   No CVA tenderness     Musculoskeletal: She exhibits no edema.   Lymphadenopathy:     She has no cervical adenopathy.   Skin: Skin is warm and dry. No rash noted.   Vitals reviewed.      Assessment:       1. Dysuria    2. Urinary tract infection without hematuria, site unspecified    3. Bacterial sinusitis    4. Maxillary sinusitis, unspecified chronicity    5. Type 2 diabetes mellitus with hyperosmolarity without coma, without long-term current use of insulin    6. Yeast infection        Plan:       Doris was seen today for urinary tract infection.    Diagnoses and all orders for this visit:    Dysuria  -     POCT urinalysis, dipstick or tablet reag  -     Urine culture    Urinary tract infection without hematuria, site unspecified  -     levoFLOXacin (LEVAQUIN) 500 MG tablet; Take 1 tablet (500 mg total) by mouth once daily. for 10 days    Bacterial sinusitis  -     levoFLOXacin (LEVAQUIN) 500 MG tablet; Take 1 tablet (500 mg total) by mouth once daily. for 10 days    Maxillary sinusitis, unspecified chronicity  -     levoFLOXacin (LEVAQUIN) 500 MG tablet; Take 1 tablet (500 mg total) by mouth once daily. for 10 days    Type 2 diabetes mellitus with hyperosmolarity without coma, without long-term current use of insulin    Yeast infection  -     fluconazole (DIFLUCAN) 150 MG Tab; Take one tab for yeast infection and can repeat in 3 to 5 days if necessary    dip urine shows 1 + wbc's

## 2019-10-04 LAB — BACTERIA UR CULT: ABNORMAL

## 2019-10-08 ENCOUNTER — TELEPHONE (OUTPATIENT)
Dept: FAMILY MEDICINE | Facility: CLINIC | Age: 69
End: 2019-10-08

## 2019-10-08 DIAGNOSIS — B37.9 YEAST INFECTION: ICD-10-CM

## 2019-10-08 RX ORDER — FLUCONAZOLE 150 MG/1
TABLET ORAL
Qty: 2 TABLET | Refills: 0 | Status: SHIPPED | OUTPATIENT
Start: 2019-10-08 | End: 2020-01-13

## 2019-10-08 NOTE — TELEPHONE ENCOUNTER
Please call and notify pt her urine culture confirms an urinary infection  The antibiotic she is taking should work   If any symptoms remain after completion of antibiotic then pt will need to be rechecked

## 2019-10-08 NOTE — TELEPHONE ENCOUNTER
Patient has been informed and verbalizes full understanding.  States that she took foth of the diflucan tablets but is still having the problem with the yeast infection. Has a few more days of abx to go. Requests refill of diflucan. Leaving for a 10 day trip to [place] later this week and would like to clear this up.

## 2019-10-09 NOTE — TELEPHONE ENCOUNTER
Per Mr Jennings:    Notify pt refill sent    Routing comment      Patient has been informed and verbalizes full understanding.

## 2019-10-28 ENCOUNTER — TELEPHONE (OUTPATIENT)
Dept: FAMILY MEDICINE | Facility: CLINIC | Age: 69
End: 2019-10-28

## 2019-10-28 DIAGNOSIS — E11.9 CONTROLLED TYPE 2 DIABETES MELLITUS WITHOUT COMPLICATION, WITHOUT LONG-TERM CURRENT USE OF INSULIN: ICD-10-CM

## 2019-10-28 NOTE — TELEPHONE ENCOUNTER
----- Message from Graciela Clemons sent at 10/28/2019  3:35 PM CDT -----  Contact: patient  Type: Needs Medical Advice    Who Called:  patient  Symptoms (please be specific):  na  How long has patient had these symptoms:  l[facility]pez  Pharmacy name and phone #:  [facility]  [address] Call Back Number: [phone]  Additional Information: Patient would like flu shot tomorrow around 11 [place]. Please call to advise and schedule.. Thanks!

## 2019-10-29 ENCOUNTER — CLINICAL SUPPORT (OUTPATIENT)
Dept: FAMILY MEDICINE | Facility: CLINIC | Age: 69
End: 2019-10-29
Payer: MEDICARE

## 2019-10-29 DIAGNOSIS — R39.9 UTI SYMPTOMS: Primary | ICD-10-CM

## 2019-10-29 PROCEDURE — G0008 FLU VACCINE - HIGH DOSE (65+) PRESERVATIVE FREE IM: ICD-10-PCS | Mod: HCNC,S$GLB,, | Performed by: FAMILY MEDICINE

## 2019-10-29 PROCEDURE — 99999 PR PBB SHADOW E&M-EST. PATIENT-LVL I: CPT | Mod: PBBFAC,HCNC,,

## 2019-10-29 PROCEDURE — G0008 ADMIN INFLUENZA VIRUS VAC: HCPCS | Mod: HCNC,S$GLB,, | Performed by: FAMILY MEDICINE

## 2019-10-29 PROCEDURE — 90662 IIV NO PRSV INCREASED AG IM: CPT | Mod: HCNC,S$GLB,, | Performed by: FAMILY MEDICINE

## 2019-10-29 PROCEDURE — 99999 PR PBB SHADOW E&M-EST. PATIENT-LVL I: ICD-10-PCS | Mod: PBBFAC,HCNC,,

## 2019-10-29 PROCEDURE — 90662 FLU VACCINE - HIGH DOSE (65+) PRESERVATIVE FREE IM: ICD-10-PCS | Mod: HCNC,S$GLB,, | Performed by: FAMILY MEDICINE

## 2019-10-29 RX ORDER — METFORMIN HYDROCHLORIDE 750 MG/1
TABLET, EXTENDED RELEASE ORAL
Qty: 180 TABLET | Refills: 1 | Status: SHIPPED | OUTPATIENT
Start: 2019-10-29 | End: 2020-02-07 | Stop reason: SDUPTHER

## 2019-11-01 ENCOUNTER — DOCUMENTATION ONLY (OUTPATIENT)
Dept: FAMILY MEDICINE | Facility: CLINIC | Age: 69
End: 2019-11-01

## 2019-11-01 NOTE — PROGRESS NOTES
Pre-Visit Chart Review  For Appointment Scheduled on 11-14-19    Health Maintenance Due   Topic Date Due    Foot Exam  06/23/2018    Hemoglobin A1c  11/07/2019    Eye Exam  11/19/2019

## 2019-12-05 ENCOUNTER — PATIENT OUTREACH (OUTPATIENT)
Dept: ADMINISTRATIVE | Facility: HOSPITAL | Age: 69
End: 2019-12-05

## 2019-12-05 NOTE — LETTER
AUTHORIZATION FOR RELEASE OF   CONFIDENTIAL INFORMATION    Dear Dr. Hurst,    We are seeing Doris Pastrana, date of birth 1950, in the clinic at [facility]. Tor Garg MD is the patient's PCP. Doris Pastrana has an outstanding lab/procedure at the time we reviewed her chart. In order to help keep her health information updated, she has authorized us to request the following medical record(s):        (  )  MAMMOGRAM                                      (  )  COLONOSCOPY      (  )  PAP SMEAR                                          (  )  OUTSIDE LAB RESULTS     (  )  DEXA SCAN                                          (  X)  EYE EXAM            (  )  FOOT EXAM                                          (  )  ENTIRE RECORD     (  )  OUTSIDE IMMUNIZATIONS                 (  )  _______________         Please fax records to [facility], Tor Garg MD, [phone]    Thanks,    Freda Beltran, Panel Care Coordinator  [facility]  Phone: [phone]  Fax: [phone]            Patient Name: Doris Pastrana  : 1950  Patient Phone #: [phone]

## 2019-12-06 ENCOUNTER — PATIENT OUTREACH (OUTPATIENT)
Dept: ADMINISTRATIVE | Facility: HOSPITAL | Age: 69
End: 2019-12-06

## 2019-12-10 ENCOUNTER — NURSE TRIAGE (OUTPATIENT)
Dept: ADMINISTRATIVE | Facility: CLINIC | Age: 69
End: 2019-12-10

## 2019-12-10 DIAGNOSIS — E11.9 TYPE 2 DIABETES MELLITUS WITHOUT COMPLICATION, WITHOUT LONG-TERM CURRENT USE OF INSULIN: ICD-10-CM

## 2019-12-10 NOTE — TELEPHONE ENCOUNTER
Reason for Disposition   MODERATE pain (e.g. interferes with normal activities) and present > 3 days   MODERATE neck pain (e.g., interferes with normal activities like work or school)    Additional Information   Negative: Shock suspected (e.g., cold/pale/clammy skin, too weak to stand, low BP, rapid pulse)   Negative: Similar pain previously and it was from 'heart attack'   Negative: Similar pain previously from 'angina' and not relieved by nitroglycerin   Negative: Sounds like a life-threatening emergency to the triager   Negative: Followed an injury to arm   Negative: Chest pain   Negative: Wound looks infected   Negative: Elbow pain is the main symptom   Negative: Hand or wrist pain is the main symptom   Negative: Difficulty breathing or unusual sweating (e.g., sweating without exertion)   Negative: Chest pain lasting longer than 5 minutes   Negative: Age > 40 and no obvious cause for pain, pain still present even when not moving the arm   Negative: Fever and red area (or area very tender to touch)   Negative: Fever and swollen joint   Negative: Entire arm is swollen   Negative: Patient sounds very sick or weak to the triager   Negative: Red area or streak and large (> 2 in. or 5 cm)   Negative: SEVERE pain (e.g., excruciating, unable to do any normal activities)   Negative: Cast on wrist or arm and now increasing pain   Negative: Weakness (i.e., loss of strength) in hand or fingers   Negative: Arm pains with exertion (e.g., occurs with walking; goes away on resting)   Negative: Painful rash with multiple small blisters grouped together (i.e., dermatomal distribution or 'band' or 'stripe')   Negative: Looks like a boil, infected sore, deep ulcer, or other infected rash (spreading redness, pus)   Negative: Localized rash is very painful (no fever)   Negative: Numbness (i.e., loss of sensation) in hand or fingers   Negative: Localized pain, redness or hard lump along vein    Negative: Patient wants to be seen   Negative: Shock suspected (e.g., cold/pale/clammy skin, too weak to stand, low BP, rapid pulse)   Negative: Similar pain previously and it was from 'heart attack'   Negative: Similar pain previously from 'angina' and not relieved by nitroglycerin   Negative: Difficult to awaken or acting confused (e.g., disoriented, slurred speech)   Negative: Sounds like a life-threatening emergency to the triager   Negative: Followed an injury to neck (e.g., MVA, sports, impact or collision)   Negative: Chest pain   Negative: Lymph node in the neck is swollen or painful to the touch   Negative: Sore throat is the main symptom   Negative: Difficulty breathing or unusual sweating (e.g., sweating without exertion)   Negative: Chest pain lasting longer than 5 minutes   Negative: Stiff neck (can't touch chin to chest) and has headache   Negative: Stiff neck (can't touch chin to chest) and fever   Negative: Weakness of an arm or hand   Negative: Problems with bowel or bladder control   Negative: Patient sounds very sick or weak to the triager   Negative: SEVERE pain (e.g., excruciating, unable to do any normal activities)   Negative: Head is twisting to one side (or ask 'is it turning against your will?')   Negative: Fever > 103 F (39.4 C)   Negative: Fever > 100.0 F (37.8 C) and IVDA (intravenous drug abuse)   Negative: Fever > 100.0 F (37.8 C) and has diabetes mellitus or a weak immune system (e.g., HIV positive, cancer chemotherapy, organ transplant, splenectomy, chronic steroids)   Negative: Numbness in an arm or hand (i.e., loss of sensation)   Negative: Painful rash with multiple small blisters grouped together (i.e., dermatomal distribution or 'band' or 'stripe')   Negative: High-risk adult (e.g., history of cancer, HIV, or IV drug abuse)   Negative: Patient wants to be seen   Negative: Tenderness in front of neck over windpipe    Protocols used: ARM PAIN-A-OH, NECK  PAIN OR [id number]   Pt complains of neck and shoulder pain 4/10. Stated she thinks it's a pinched nerve. Care advice and when to call back provided from protocol. Advised to see Physician within 3 days. Warm transfer to appt desk for assistance.

## 2019-12-11 ENCOUNTER — OFFICE VISIT (OUTPATIENT)
Dept: FAMILY MEDICINE | Facility: CLINIC | Age: 69
End: 2019-12-11
Payer: MEDICARE

## 2019-12-11 VITALS
OXYGEN SATURATION: 95 % | BODY MASS INDEX: 32.32 KG/M2 | TEMPERATURE: 98 F | HEIGHT: 65 IN | SYSTOLIC BLOOD PRESSURE: 118 MMHG | HEART RATE: 80 BPM | RESPIRATION RATE: 18 BRPM | DIASTOLIC BLOOD PRESSURE: 66 MMHG | WEIGHT: 194 LBS

## 2019-12-11 DIAGNOSIS — E55.9 VITAMIN D DEFICIENCY: ICD-10-CM

## 2019-12-11 DIAGNOSIS — M25.512 CHRONIC LEFT SHOULDER PAIN: ICD-10-CM

## 2019-12-11 DIAGNOSIS — G89.29 NECK PAIN, CHRONIC: Primary | ICD-10-CM

## 2019-12-11 DIAGNOSIS — G89.29 CHRONIC LEFT SHOULDER PAIN: ICD-10-CM

## 2019-12-11 DIAGNOSIS — M54.2 NECK PAIN, CHRONIC: Primary | ICD-10-CM

## 2019-12-11 DIAGNOSIS — E78.5 HYPERLIPIDEMIA, UNSPECIFIED HYPERLIPIDEMIA TYPE: ICD-10-CM

## 2019-12-11 DIAGNOSIS — E11.9 DIABETES MELLITUS WITHOUT COMPLICATION: Primary | ICD-10-CM

## 2019-12-11 PROCEDURE — 99214 PR OFFICE/OUTPT VISIT, EST, LEVL IV, 30-39 MIN: ICD-10-PCS | Mod: HCNC,S$GLB,, | Performed by: NURSE PRACTITIONER

## 2019-12-11 PROCEDURE — 99999 PR PBB SHADOW E&M-EST. PATIENT-LVL V: ICD-10-PCS | Mod: PBBFAC,HCNC,, | Performed by: NURSE PRACTITIONER

## 2019-12-11 PROCEDURE — 99999 PR PBB SHADOW E&M-EST. PATIENT-LVL V: CPT | Mod: PBBFAC,HCNC,, | Performed by: NURSE PRACTITIONER

## 2019-12-11 PROCEDURE — 99214 OFFICE O/P EST MOD 30 MIN: CPT | Mod: HCNC,S$GLB,, | Performed by: NURSE PRACTITIONER
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
## 2019-12-11 NOTE — PATIENT INSTRUCTIONS
Neck Pain    There are several possible causes of neck pain when there is no injury:  · You can get a minor ligament sprain or muscle strain from a sudden minor neck movement. Sleeping with your neck in an awkward position can also cause this.  · Some people respond to emotional stress by tensing the muscles of their neck, shoulders, and upper back. Chronic spasm in these muscles can cause neck pain and sometimes headaches.  · Gradual wear and tear of the joints in the spine can cause degenerative arthritis. This can be a source of occasional or chronic neck pain.  · The spinal disks may bulge and put pressure on a nearby spinal nerve. This can happen as a natural result of aging or repeated small injuries to the neck. The spinal disks are the cushions between each spinal bone. This causes tingling, pain, or numbness that spreads from the neck to the shoulder, arm, or hand on one side.  Acute neck pain usually gets better in 1 to 2 weeks. Neck pain related to disk disease, arthritis in the spinal joints, or spinal stenosis can become chronic and last for months or years. Spinal stenosis is narrowing of the spinal canal.  X-rays are usually not ordered for the initial evaluation of neck pain. However, X-rays may be done if you had a forceful physical injury, such as a car accident or fall. If pain continues and doesnt respond to medical treatment, X-rays and other tests may be done at a later time.  Home care  · Rest and relax the muscles. Use a comfortable pillow that supports the head. It should also help keep the spine in a neutral position. The position of the head should not be tilted forward or backward. A rolled up towel may help for a custom fit.  · Some people find relief with heat. Heat can be applied with either a warm shower or bath or a moist towel heated in the microwave and massage. Others prefer cold packs. You can make an ice pack by filling a plastic bag that seals at the top with ice cubes or  crushed ice and then wrapping it with a thin towel. Try both and use the method that feels best for 15 to 20 minutes, several times a day.  · Whether using ice or heat, be careful that you do not injure your skin. Never put ice directly on the skin. Always wrap the ice in a towel or other type of cloth.This is very important, especially in people with poor skin sensations.   · Try to reduce your stress level. Emotional stress can lead to neck muscle tension and get in the way of or delay the healing process.  · You may use over-the-counter pain medicine to control pain, unless another medicine was prescribed. If you have chronic liver or kidney disease or ever had a stomach ulcer or GI bleeding, talk with your healthcare provider before using these medicines.  Follow-up care  Follow up with your healthcare provider if your symptoms do not show signs of improvement after one week. Physical therapy or further tests may be needed.  If X-rays, CT scans, or MRI scans were taken, you will be told of any new findings that may affect your care.  Call 911  Call 911 if you have:  · Sudden weakness or numbness in one or both arms  · Neck swelling, difficulty or painful swallowing  · Difficulty breathing  · Chest pain  When to seek medical advice  Call your healthcare provider right away if any of these occur:  · Pain becomes worse or spreads into one or both arm  · Increasing headache  · Fever of 100.4°F (38°C) or above lasting for 24 to 48 hours  Date Last Reviewed: 7/1/2016  © 3262-4941 [employer]. [address] [place] [address], [place] [address]. All rights reserved. This information is not intended as a substitute for professional medical care. Always follow your healthcare professional's instructions.

## 2019-12-12 ENCOUNTER — LAB VISIT (OUTPATIENT)
Dept: LAB | Facility: HOSPITAL | Age: 69
End: 2019-12-12
Attending: FAMILY MEDICINE
Payer: MEDICARE

## 2019-12-12 DIAGNOSIS — E11.9 DIABETES MELLITUS WITHOUT COMPLICATION: ICD-10-CM

## 2019-12-12 DIAGNOSIS — E78.5 HYPERLIPIDEMIA, UNSPECIFIED HYPERLIPIDEMIA TYPE: ICD-10-CM

## 2019-12-12 DIAGNOSIS — E55.9 VITAMIN D DEFICIENCY: ICD-10-CM

## 2019-12-12 LAB
25(OH)D3+25(OH)D2 SERPL-MCNC: 20 NG/ML (ref 30–96)
ANION GAP SERPL CALC-SCNC: 6 MMOL/L (ref 8–16)
BUN SERPL-MCNC: 13 MG/DL (ref 8–23)
CALCIUM SERPL-MCNC: 9.6 MG/DL (ref 8.7–10.5)
CHLORIDE SERPL-SCNC: 103 MMOL/L (ref 95–110)
CHOLEST SERPL-MCNC: 221 MG/DL (ref 120–199)
CHOLEST/HDLC SERPL: 4.6 {RATIO} (ref 2–5)
CO2 SERPL-SCNC: 32 MMOL/L (ref 23–29)
CREAT SERPL-MCNC: 0.7 MG/DL (ref 0.5–1.4)
EST. GFR  (AFRICAN AMERICAN): >60 ML/MIN/1.73 M^2
EST. GFR  (NON AFRICAN AMERICAN): >60 ML/MIN/1.73 M^2
ESTIMATED AVG GLUCOSE: 143 MG/DL (ref 68–131)
GLUCOSE SERPL-MCNC: 124 MG/DL (ref 70–110)
HBA1C MFR BLD HPLC: 6.6 % (ref 4–5.6)
HDLC SERPL-MCNC: 48 MG/DL (ref 40–75)
HDLC SERPL: 21.7 % (ref 20–50)
LDLC SERPL CALC-MCNC: 130.4 MG/DL (ref 63–159)
NONHDLC SERPL-MCNC: 173 MG/DL
POTASSIUM SERPL-SCNC: 4.1 MMOL/L (ref 3.5–5.1)
SODIUM SERPL-SCNC: 141 MMOL/L (ref 136–145)
TRIGL SERPL-MCNC: 213 MG/DL (ref 30–150)

## 2019-12-12 PROCEDURE — 36415 COLL VENOUS BLD VENIPUNCTURE: CPT | Mod: HCNC,PO

## 2019-12-12 PROCEDURE — 80048 BASIC METABOLIC PNL TOTAL CA: CPT | Mod: HCNC

## 2019-12-12 PROCEDURE — 82306 VITAMIN D 25 HYDROXY: CPT | Mod: HCNC

## 2019-12-12 PROCEDURE — 83036 HEMOGLOBIN GLYCOSYLATED A1C: CPT | Mod: HCNC

## 2019-12-12 PROCEDURE — 80061 LIPID PANEL: CPT | Mod: HCNC

## 2019-12-13 ENCOUNTER — TELEPHONE (OUTPATIENT)
Dept: FAMILY MEDICINE | Facility: CLINIC | Age: 69
End: 2019-12-13

## 2019-12-13 DIAGNOSIS — E11.00 TYPE 2 DIABETES MELLITUS WITH HYPEROSMOLARITY WITHOUT COMA, WITHOUT LONG-TERM CURRENT USE OF INSULIN: Primary | ICD-10-CM

## 2019-12-13 DIAGNOSIS — E55.9 VITAMIN D DEFICIENCY: ICD-10-CM

## 2019-12-13 DIAGNOSIS — E78.5 HYPERLIPIDEMIA, UNSPECIFIED HYPERLIPIDEMIA TYPE: ICD-10-CM

## 2019-12-13 RX ORDER — ROSUVASTATIN CALCIUM 40 MG/1
40 TABLET, COATED ORAL NIGHTLY
Qty: 30 TABLET | Refills: 11 | Status: SHIPPED | OUTPATIENT
Start: 2019-12-13 | End: 2020-12-16 | Stop reason: SDUPTHER

## 2019-12-13 RX ORDER — ERGOCALCIFEROL 1.25 MG/1
CAPSULE ORAL
Qty: 24 CAPSULE | Refills: 1 | Status: SHIPPED | OUTPATIENT
Start: 2019-12-13 | End: 2019-12-16 | Stop reason: SDUPTHER

## 2019-12-13 NOTE — TELEPHONE ENCOUNTER
----- Message from oTr Garg MD sent at 12/12/2019  5:35 PM CST -----  Her vitamin-D level is low, lower than it was seven months ago has she been taking the high dose vitamin-D?  If she has we need to increase it to twice a week.    Blood glucose is mildly elevated but the A1c indicates overall glucose control is very good with no changes needed.  Kidney function is normal and the CO2 is mildly elevated.    Her cholesterol levels are much higher than they were last year.  It does not look like she is taking the Crestor at all.    Result sent by letter

## 2019-12-13 NOTE — TELEPHONE ENCOUNTER
Vitamin-D increase to twice weekly and sent to [facility]  New prescription for Crestor sent to [facility]    Orders in for CMP, lipid panel, A1c, vitamin-D level in six months with ov to follow

## 2019-12-13 NOTE — TELEPHONE ENCOUNTER
Patient informed. She has been taking her Vit D weekly. Please send in new rx for twice weekly to [facility].   She stated she has been out of the Crestor for a long time. Please send in new rx to [facility] also.

## 2019-12-16 ENCOUNTER — HOSPITAL ENCOUNTER (OUTPATIENT)
Dept: RADIOLOGY | Facility: HOSPITAL | Age: 69
Discharge: HOME | End: 2019-12-16
Attending: ORTHOPAEDIC SURGERY
Payer: MEDICARE

## 2019-12-16 ENCOUNTER — OFFICE VISIT (OUTPATIENT)
Dept: ORTHOPEDICS | Facility: CLINIC | Age: 69
End: 2019-12-16
Payer: MEDICARE

## 2019-12-16 ENCOUNTER — TELEPHONE (OUTPATIENT)
Dept: ORTHOPEDICS | Facility: CLINIC | Age: 69
End: 2019-12-16

## 2019-12-16 ENCOUNTER — DOCUMENTATION ONLY (OUTPATIENT)
Dept: ORTHOPEDICS | Facility: CLINIC | Age: 69
End: 2019-12-16

## 2019-12-16 VITALS
DIASTOLIC BLOOD PRESSURE: 71 MMHG | BODY MASS INDEX: 32.32 KG/M2 | WEIGHT: 194 LBS | HEART RATE: 83 BPM | SYSTOLIC BLOOD PRESSURE: 131 MMHG | HEIGHT: 65 IN

## 2019-12-16 DIAGNOSIS — M19.071 ARTHRITIS OF RIGHT ANKLE: Primary | ICD-10-CM

## 2019-12-16 DIAGNOSIS — M25.571 RIGHT ANKLE PAIN, UNSPECIFIED CHRONICITY: Primary | ICD-10-CM

## 2019-12-16 DIAGNOSIS — E55.9 VITAMIN D DEFICIENCY: ICD-10-CM

## 2019-12-16 DIAGNOSIS — M25.571 RIGHT ANKLE PAIN, UNSPECIFIED CHRONICITY: ICD-10-CM

## 2019-12-16 PROCEDURE — 3078F DIAST BP <80 MM HG: CPT | Mod: HCNC,CPTII,S$GLB, | Performed by: ORTHOPAEDIC SURGERY

## 2019-12-16 PROCEDURE — 1125F PR PAIN SEVERITY QUANTIFIED, PAIN PRESENT: ICD-10-PCS | Mod: HCNC,S$GLB,, | Performed by: ORTHOPAEDIC SURGERY

## 2019-12-16 PROCEDURE — 73610 XR ANKLE COMPLETE 3 VIEW RIGHT: ICD-10-PCS | Mod: 26,HCNC,RT, | Performed by: RADIOLOGY

## 2019-12-16 PROCEDURE — 1125F AMNT PAIN NOTED PAIN PRSNT: CPT | Mod: HCNC,S$GLB,, | Performed by: ORTHOPAEDIC SURGERY

## 2019-12-16 PROCEDURE — 99214 PR OFFICE/OUTPT VISIT, EST, LEVL IV, 30-39 MIN: ICD-10-PCS | Mod: 25,HCNC,S$GLB, | Performed by: ORTHOPAEDIC SURGERY

## 2019-12-16 PROCEDURE — 73610 X-RAY EXAM OF ANKLE: CPT | Mod: 26,HCNC,RT, | Performed by: RADIOLOGY

## 2019-12-16 PROCEDURE — 1159F MED LIST DOCD IN RCRD: CPT | Mod: HCNC,S$GLB,, | Performed by: ORTHOPAEDIC SURGERY

## 2019-12-16 PROCEDURE — 1101F PT FALLS ASSESS-DOCD LE1/YR: CPT | Mod: HCNC,CPTII,S$GLB, | Performed by: ORTHOPAEDIC SURGERY

## 2019-12-16 PROCEDURE — 3078F PR MOST RECENT DIASTOLIC BLOOD PRESSURE < 80 MM HG: ICD-10-PCS | Mod: HCNC,CPTII,S$GLB, | Performed by: ORTHOPAEDIC SURGERY

## 2019-12-16 PROCEDURE — 73610 X-RAY EXAM OF ANKLE: CPT | Mod: TC,HCNC,PO,RT

## 2019-12-16 PROCEDURE — 1101F PR PT FALLS ASSESS DOC 0-1 FALLS W/OUT INJ PAST YR: ICD-10-PCS | Mod: HCNC,CPTII,S$GLB, | Performed by: ORTHOPAEDIC SURGERY

## 2019-12-16 PROCEDURE — 3075F PR MOST RECENT SYSTOLIC BLOOD PRESS GE 130-139MM HG: ICD-10-PCS | Mod: HCNC,CPTII,S$GLB, | Performed by: ORTHOPAEDIC SURGERY

## 2019-12-16 PROCEDURE — 99999 PR PBB SHADOW E&M-EST. PATIENT-LVL III: ICD-10-PCS | Mod: PBBFAC,HCNC,, | Performed by: ORTHOPAEDIC SURGERY

## 2019-12-16 PROCEDURE — 99214 OFFICE O/P EST MOD 30 MIN: CPT | Mod: 25,HCNC,S$GLB, | Performed by: ORTHOPAEDIC SURGERY

## 2019-12-16 PROCEDURE — 1159F PR MEDICATION LIST DOCUMENTED IN MEDICAL RECORD: ICD-10-PCS | Mod: HCNC,S$GLB,, | Performed by: ORTHOPAEDIC SURGERY

## 2019-12-16 PROCEDURE — 99999 PR PBB SHADOW E&M-EST. PATIENT-LVL III: CPT | Mod: PBBFAC,HCNC,, | Performed by: ORTHOPAEDIC SURGERY

## 2019-12-16 PROCEDURE — 3075F SYST BP GE 130 - 139MM HG: CPT | Mod: HCNC,CPTII,S$GLB, | Performed by: ORTHOPAEDIC SURGERY

## 2019-12-16 PROCEDURE — 20605 INTERMEDIATE JOINT ASPIRATION/INJECTION: R ANKLE: ICD-10-PCS | Mod: HCNC,RT,S$GLB, | Performed by: ORTHOPAEDIC SURGERY

## 2019-12-16 PROCEDURE — 20605 DRAIN/INJ JOINT/BURSA W/O US: CPT | Mod: HCNC,RT,S$GLB, | Performed by: ORTHOPAEDIC SURGERY

## 2019-12-16 RX ORDER — ERGOCALCIFEROL 1.25 MG/1
CAPSULE ORAL
Qty: 4 CAPSULE | Refills: 3 | Status: SHIPPED | OUTPATIENT
Start: 2019-12-16 | End: 2020-02-07

## 2019-12-16 RX ADMIN — TRIAMCINOLONE ACETONIDE 40 MG: 40 INJECTION, SUSPENSION INTRA-ARTICULAR; INTRAMUSCULAR at 02:12

## 2019-12-16 NOTE — TELEPHONE ENCOUNTER
Notified patient of scheduled appointment today. Advised that other than surgery there is nothing further that Dr. Mendez can do for her as she is maxed out on injections. Patient states she is ready for the surgery.

## 2019-12-16 NOTE — PROGRESS NOTES
Instructed patient on preop instructions. Patient to stop taking any blood thinning medications at least seven days prior to scheduled surgery. Patient to not take any NSAIDS at least 7 days prior to surgery and will resume when Dr. Mendez instructs them to as it can delay bone healing. Nothing to eat/drink after midnight the night prior to surgery.   Patient instructed on postop care. Instructions included to remain non weight bearing until instructed otherwise by Dr. Mendez. Post op dressing can not get wet. If it gets wet patient to call office immediately or go to [facility] ER to have it replaced. Patient to keep affected limb elevated higher than the heart at all times after surgery to decrease swelling. Further instructions given to patient on preop/postop instruction handout. No NSAID form given to patient. Handout on knee scooter given to patient as well. Patient will borrow knee scooter from a friend. She will call back with her confirmed surgery date. Patient verbalized understanding.

## 2019-12-16 NOTE — PROCEDURES
Intermediate Joint Aspiration/Injection: R ankle  Date/Time: 12/16/2019 2:15 PM  Performed by: Andrzej Mendez MD  Authorized by: Andrzej Mendez MD     Consent Done?:  Yes (Verbal)  Indications:  Joint swelling and pain  Site marked: The procedure site was marked      Location:  Ankle  Site:  R ankle  Prep: Patient was prepped and draped in usual sterile fashion    Needle size:  22 G  Approach:  Anterolateral  Medications:  40 mg triamcinolone acetonide 40 mg/mL  Patient tolerance:  Patient tolerated the procedure well with no immediate complications

## 2019-12-17 RX ORDER — TRIAMCINOLONE ACETONIDE 40 MG/ML
40 INJECTION, SUSPENSION INTRA-ARTICULAR; INTRAMUSCULAR
Status: DISCONTINUED | OUTPATIENT
Start: 2019-12-16 | End: 2019-12-17 | Stop reason: HOSPADM

## 2019-12-17 NOTE — PROGRESS NOTES
Subjective:      Patient ID: Doris Pastrana is a 68 y.o. female.    Chief Complaint: Pain of the Right Ankle    Pt is here for f/u today c/o right ankle pain. She is here to discuss a plan for surgery.   Social History     Occupational History     Employer: [employer]   Tobacco Use    Smoking status: Former Smoker     Packs/day: 1.00     Types: Cigarettes     Last attempt to quit: 3/12/1983     Years since quittin.7    Smokeless tobacco: Never Used   Substance and Sexual Activity    Alcohol use: Yes     Comment: socially    Drug use: No    Sexual activity: Not Currently            Objective:    Ortho Exam     RLE: neurovascularly intact,  tenderness to palpation at the ankle joint with ankle joint effusion.   Mildly decreased ROM of the ankle. non-tender to palpation and NROM of the subtalar joint.     .  Assessment:       1. Arthritis of right ankle          Plan:       I injected the right ankle today. Other than ankle fusion I do not see anything additional that can be done at this time for the pain due to the severity of her peroneal pathology. I have explained the procedure, including indications, risks, and alternatives.  Doris Pastrana gave informed consent and is medically ready for surgery. To OR in February, she will call us with a date.   I ordered a Vitamin D level and will supplement if needed as her last Vitamin D level was 20.

## 2019-12-20 ENCOUNTER — TELEPHONE (OUTPATIENT)
Dept: PAIN MEDICINE | Facility: CLINIC | Age: 69
End: 2019-12-20

## 2019-12-20 ENCOUNTER — INITIAL CONSULT (OUTPATIENT)
Dept: SPINE | Facility: CLINIC | Age: 69
End: 2019-12-20
Payer: MEDICARE

## 2019-12-20 VITALS — HEIGHT: 65 IN | WEIGHT: 194 LBS | BODY MASS INDEX: 32.32 KG/M2

## 2019-12-20 DIAGNOSIS — M54.12 CERVICAL RADICULITIS: Primary | ICD-10-CM

## 2019-12-20 DIAGNOSIS — M54.12 RADICULITIS, CERVICAL: Primary | ICD-10-CM

## 2019-12-20 PROCEDURE — 99204 PR OFFICE/OUTPT VISIT, NEW, LEVL IV, 45-59 MIN: ICD-10-PCS | Mod: S$GLB,,, | Performed by: PHYSICAL MEDICINE & REHABILITATION

## 2019-12-20 PROCEDURE — 1125F AMNT PAIN NOTED PAIN PRSNT: CPT | Mod: S$GLB,,, | Performed by: PHYSICAL MEDICINE & REHABILITATION

## 2019-12-20 PROCEDURE — 99204 OFFICE O/P NEW MOD 45 MIN: CPT | Mod: S$GLB,,, | Performed by: PHYSICAL MEDICINE & REHABILITATION

## 2019-12-20 PROCEDURE — 1159F MED LIST DOCD IN RCRD: CPT | Mod: S$GLB,,, | Performed by: PHYSICAL MEDICINE & REHABILITATION

## 2019-12-20 PROCEDURE — 1101F PT FALLS ASSESS-DOCD LE1/YR: CPT | Mod: S$GLB,,, | Performed by: PHYSICAL MEDICINE & REHABILITATION

## 2019-12-20 PROCEDURE — 1159F PR MEDICATION LIST DOCUMENTED IN MEDICAL RECORD: ICD-10-PCS | Mod: S$GLB,,, | Performed by: PHYSICAL MEDICINE & REHABILITATION

## 2019-12-20 PROCEDURE — 1125F PR PAIN SEVERITY QUANTIFIED, PAIN PRESENT: ICD-10-PCS | Mod: S$GLB,,, | Performed by: PHYSICAL MEDICINE & REHABILITATION

## 2019-12-20 PROCEDURE — 1101F PR PT FALLS ASSESS DOC 0-1 FALLS W/OUT INJ PAST YR: ICD-10-PCS | Mod: S$GLB,,, | Performed by: PHYSICAL MEDICINE & REHABILITATION
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
## 2019-12-20 NOTE — TELEPHONE ENCOUNTER
----- Message from Brandon Bose MD sent at 12/20/2019 11:21 AM CST -----  Please schedule for interlaminar epidural steroid injection at C7-T1
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
## 2019-12-20 NOTE — LETTER
December 20, 2019      Joanne Fortune NP  [address] [facility] [address]  [place] [address]           [facility]  [address] [place] [address] [address]  [place] [address]  Phone: [phone]  Fax: [phone]          Patient: Doris Pastrana   MR Number: [id number]   YOB: 1950   Date of Visit: 12/20/2019       Dear Joanne Fortune:    Thank you for referring Doris Pastrana to me for evaluation. Attached you will find relevant portions of my assessment and plan of care.    If you have questions, please do not hesitate to call me. I look forward to following Doris Pastrana along with you.    Sincerely,    Brandon Bose MD    Enclosure  CC:  No Recipients    If you would like to receive this communication electronically, please contact [email or web address] or [phone] to request more information on [facility] access.    For providers and/or their staff who would like to refer a patient to [facility], please contact us through our one-stop-shop provider referral line, [facility], at [phone].    If you feel you have received this communication in error or would no longer like to receive these types of communications, please e-mail [email or web address]

## 2020-01-03 ENCOUNTER — HOSPITAL ENCOUNTER (OUTPATIENT)
Facility: AMBULARY SURGERY CENTER | Age: 70
Discharge: HOME | End: 2020-01-03
Attending: ANESTHESIOLOGY | Admitting: ANESTHESIOLOGY
Payer: MEDICARE

## 2020-01-03 DIAGNOSIS — M54.16 LUMBAR RADICULITIS: ICD-10-CM

## 2020-01-03 DIAGNOSIS — M54.12 CERVICAL RADICULITIS: Primary | ICD-10-CM

## 2020-01-03 LAB — POCT GLUCOSE: 109 MG/DL (ref 70–110)

## 2020-01-03 PROCEDURE — 62321 NJX INTERLAMINAR CRV/THRC: CPT | Performed by: ANESTHESIOLOGY

## 2020-01-03 PROCEDURE — 62321 NJX INTERLAMINAR CRV/THRC: CPT | Mod: HCNC,,, | Performed by: ANESTHESIOLOGY

## 2020-01-03 PROCEDURE — 62321 PR INJ CERV/THORAC, W/GUIDANCE: ICD-10-PCS | Mod: HCNC,,, | Performed by: ANESTHESIOLOGY

## 2020-01-03 PROCEDURE — 99152 MOD SED SAME PHYS/QHP 5/>YRS: CPT | Mod: HCNC,,, | Performed by: ANESTHESIOLOGY

## 2020-01-03 PROCEDURE — 99152 PR MOD CONSCIOUS SEDATION, SAME PHYS, 5+ YRS, FIRST 15 MIN: ICD-10-PCS | Mod: HCNC,,, | Performed by: ANESTHESIOLOGY

## 2020-01-03 RX ORDER — LIDOCAINE HYDROCHLORIDE 10 MG/ML
INJECTION, SOLUTION EPIDURAL; INFILTRATION; INTRACAUDAL; PERINEURAL
Status: DISCONTINUED | OUTPATIENT
Start: 2020-01-03 | End: 2020-01-03 | Stop reason: HOSPADM

## 2020-01-03 RX ORDER — FENTANYL CITRATE 50 UG/ML
INJECTION, SOLUTION INTRAMUSCULAR; INTRAVENOUS
Status: DISCONTINUED | OUTPATIENT
Start: 2020-01-03 | End: 2020-01-03 | Stop reason: HOSPADM

## 2020-01-03 RX ORDER — SODIUM CHLORIDE 9 MG/ML
INJECTION, SOLUTION INTRAMUSCULAR; INTRAVENOUS; SUBCUTANEOUS
Status: DISCONTINUED | OUTPATIENT
Start: 2020-01-03 | End: 2020-01-03 | Stop reason: HOSPADM

## 2020-01-03 RX ORDER — DEXAMETHASONE SODIUM PHOSPHATE 10 MG/ML
INJECTION INTRAMUSCULAR; INTRAVENOUS
Status: DISCONTINUED | OUTPATIENT
Start: 2020-01-03 | End: 2020-01-03 | Stop reason: HOSPADM

## 2020-01-03 RX ORDER — MIDAZOLAM HYDROCHLORIDE 2 MG/2ML
INJECTION, SOLUTION INTRAMUSCULAR; INTRAVENOUS
Status: DISCONTINUED | OUTPATIENT
Start: 2020-01-03 | End: 2020-01-03 | Stop reason: HOSPADM

## 2020-01-03 RX ORDER — SODIUM CHLORIDE 9 MG/ML
INJECTION, SOLUTION INTRAVENOUS CONTINUOUS
Status: DISCONTINUED | OUTPATIENT
Start: 2020-01-03 | End: 2020-01-03 | Stop reason: HOSPADM

## 2020-01-03 RX ORDER — SODIUM CHLORIDE, SODIUM LACTATE, POTASSIUM CHLORIDE, CALCIUM CHLORIDE 600; 310; 30; 20 MG/100ML; MG/100ML; MG/100ML; MG/100ML
INJECTION, SOLUTION INTRAVENOUS ONCE AS NEEDED
Status: DISCONTINUED | OUTPATIENT
Start: 2020-01-03 | End: 2020-01-03 | Stop reason: HOSPADM

## 2020-01-03 RX ADMIN — SODIUM CHLORIDE: 9 INJECTION, SOLUTION INTRAVENOUS at 12:01

## 2020-01-03 NOTE — OP NOTE
PROCEDURE DATE: 1/3/2020    Procedure: C7-T1 cervical interlaminar epidural steroid injection under utilizing fluoroscopy.    Diagnosis: Cervical Degenerative Disc Diease; Cervical Radiculitis  POSTOP DIAGNOSIS: SAME    Physician: Eddi Albrecht MD    Medications injected:  Dexamethasone 10mg followed by a slow injection of 4 mL sterile, preservative-free normal saline.    Local anesthetic used: Lidocaine 1%, 2 ml.    Sedation Medications: RN IV sedation    Complications:  None    Estimated blood loss: None    Technique:  A time-out was taken to identify patient and procedure prior to starting the procedure.  With the patient laying in a prone position with the neck in a mid-flexed forward position, the area was prepped and draped in the usual sterile fashion using ChloraPrep and a fenestrated drape.  The area was determined under AP fluoroscopic guidance.  Local anesthetic was given using a 25-gauge 1.5 inch needle by raising a wheal and then infiltrating ventrally.  A 3.5 inch 20-gauge Touhy needle was introduced under fluoroscopic guidance to meet the lamina of C7.  The needle was then hinged under the lamina then advanced using loss of resistance technique.  Once the tip of the needle was in the desired position, the 1ml of noniodine contrast dye Omnipaque was injected to determine placement and no uptake.  The steroid was then injected slowly followed by a slow injection of 4 mL of the sterile preservative-free normal saline.  The patient tolerated the procedure well.    The patient was monitored after the procedure and was given post-procedure and discharge instructions to follow at home. The patient was discharged in a stable condition.

## 2020-01-03 NOTE — DISCHARGE INSTRUCTIONS
Pain injection instructions:     This procedure may take a couple weeks to relieve pain    No driving for 24 hrs.   Activity as tolerated- gradually increase activities.  Dont lift over 10 lbs for 24 hrs   No heat at injection sites x 2 days. No heating pads, hot tubs, saunas, or swimming in any body of water or pool for 2 days.  Use ice pack for mild swelling and for comfort , apply for 20 minutes, remove for 20 minute intervals. No direct contact of ice itself  to skin.  May shower today. No tub baths for two days.      Resume Aspirin, Plavix, or Coumadin the day after the procedure unless otherwise instructed.   If diabetic,monitor your glucose carefully as steroids can increase your glucose level    Seek immediate medical help for:   Severe increase in your usual pain or appearance of new pain.  Prolonged (mor than 8 hours) or increasing weakness or numbness in the legs or arms.  .    Fever above 101 ,Drainage,redness,active bleeding, or increased swelling at the injection site.  Headache, shortness of breath, chest pain, or breathing problems.

## 2020-01-03 NOTE — DISCHARGE SUMMARY
[facility]  Discharge Note  Short Stay    Admit Date: 1/3/2020    Discharge Date and Time: 1/3/2020    Attending Physician: Eddi Albrecht MD     Discharge Provider: Eddi Albrecht    Diagnoses:  Active Hospital Problems    Diagnosis  POA    *Lumbar radiculitis [M54.16]  Yes      Resolved Hospital Problems   No resolved problems to display.       Hospital Course: Cervical OLIVE  Discharged Condition: Good    Final Diagnoses:   Active Hospital Problems    Diagnosis  POA    *Lumbar radiculitis [M54.16]  Yes      Resolved Hospital Problems   No resolved problems to display.       Disposition: Home or Self Care    Follow up/Patient Instructions:    Medications:  Reconciled Home Medications:      Medication List      CHANGE how you take these medications    albuterol 90 mcg/actuation inhaler  Commonly known as:  PROVENTIL/VENTOLIN HFA  Inhale 2 puffs into the lungs 4 (four) times daily.  What changed:    · when to take this  · reasons to take this        CONTINUE taking these medications    ALPRAZolam 0.25 MG tablet  Commonly known as:  XANAX  Use one 20-30 minutes before flying prn     amLODIPine 10 MG tablet  Commonly known as:  NORVASC  Take 1 tablet (10 mg total) by mouth once daily.     blood sugar diagnostic Strp  True Metrix glucose strips check twice daily     blood-glucose meter kit  True Metrix glucometer- check glucose twice daily. Dx e11.9     cetirizine 10 MG tablet  Commonly known as:  ZYRTEC  Take 10 mg by mouth daily as needed.     cyclobenzaprine 10 MG tablet  Commonly known as:  FLEXERIL  TAKE 1 TABLET BY MOUTH THREE TIMES DAILY AS NEEDED FOR MUSCLE SPASMS     dulaglutide 0.75 mg/0.5 mL Pnij  Commonly known as:  Trulicity  INJECT ONE SYRINGE SUBCUTANEOUSLY ONCE WEEKLY.     ergocalciferol 50,000 unit Cap  Commonly known as:  ERGOCALCIFEROL  Take one twice a week     escitalopram oxalate 10 MG tablet  Commonly known as:  LEXAPRO  TAKE 1 TABLET EVERY DAY     fluconazole 150 MG Tab  Commonly known as:   DIFLUCAN  Take one tab for yeast infection and can repeat in 3 to 5 days if necessary     fluticasone propionate 50 mcg/actuation nasal spray  Commonly known as:  FLONASE  2 sprays (100 mcg total) by Each Nare route once daily.     losartan 100 MG tablet  Commonly known as:  COZAAR  TAKE 1 TABLET EVERY DAY     meclizine 25 mg tablet  Commonly known as:  ANTIVERT  Take 1 tablet (25 mg total) by mouth 3 (three) times daily as needed.     meloxicam 15 MG tablet  Commonly known as:  MOBIC  Take 1 tablet (15 mg total) by mouth once daily.     metFORMIN 750 MG 24 hr tablet  Commonly known as:  GLUCOPHAGE-XR  TAKE 1 TABLET TWICE DAILY WITH MEALS     rosuvastatin 40 MG Tab  Commonly known as:  CRESTOR  Take 1 tablet (40 mg total) by mouth every evening.     tamsulosin 0.4 mg Cap  Commonly known as:  FLOMAX  Take 1 capsule (0.4 mg total) by mouth after dinner.     TRUEplus Lancets 28 gauge Misc  Generic drug:  lancets  TEST TWO TIMES DAILY          Discharge Procedure Orders   Call MD for:  temperature >100.4     Call MD for:  persistent nausea and vomiting or diarrhea     Call MD for:  severe uncontrolled pain     Call MD for:  redness, tenderness, or signs of infection (pain, swelling, redness, odor or green/yellow discharge around incision site)     Call MD for:  difficulty breathing or increased cough     Call MD for:  severe persistent headache        Follow up with MD in 2-3 weeks    Discharge Procedure Orders (must include Diet, Follow-up, Activity):   Discharge Procedure Orders (must include Diet, Follow-up, Activity)   Call MD for:  temperature >100.4     Call MD for:  persistent nausea and vomiting or diarrhea     Call MD for:  severe uncontrolled pain     Call MD for:  redness, tenderness, or signs of infection (pain, swelling, redness, odor or green/yellow discharge around incision site)     Call MD for:  difficulty breathing or increased cough     Call MD for:  severe persistent headache

## 2020-01-06 VITALS
BODY MASS INDEX: 32.32 KG/M2 | RESPIRATION RATE: 18 BRPM | WEIGHT: 194 LBS | HEART RATE: 72 BPM | DIASTOLIC BLOOD PRESSURE: 74 MMHG | HEIGHT: 65 IN | TEMPERATURE: 99 F | OXYGEN SATURATION: 94 % | SYSTOLIC BLOOD PRESSURE: 115 MMHG

## 2020-01-09 ENCOUNTER — TELEPHONE (OUTPATIENT)
Dept: FAMILY MEDICINE | Facility: CLINIC | Age: 70
End: 2020-01-09

## 2020-01-13 DIAGNOSIS — F41.9 ANXIETY: ICD-10-CM

## 2020-01-13 RX ORDER — ESCITALOPRAM OXALATE 10 MG/1
TABLET ORAL
Qty: 90 TABLET | Refills: 1 | Status: SHIPPED | OUTPATIENT
Start: 2020-01-13 | End: 2020-05-14 | Stop reason: SDUPTHER

## 2020-01-20 DIAGNOSIS — M19.071 OSTEOARTHRITIS OF RIGHT ANKLE, UNSPECIFIED OSTEOARTHRITIS TYPE: ICD-10-CM

## 2020-01-20 DIAGNOSIS — M19.071 ARTHRITIS OF RIGHT ANKLE: Primary | ICD-10-CM

## 2020-01-20 DIAGNOSIS — E55.9 VITAMIN D DEFICIENCY: ICD-10-CM

## 2020-01-20 RX ORDER — SODIUM CHLORIDE 9 MG/ML
INJECTION, SOLUTION INTRAVENOUS CONTINUOUS
Status: CANCELLED | OUTPATIENT
Start: 2020-01-20

## 2020-01-24 ENCOUNTER — OFFICE VISIT (OUTPATIENT)
Dept: SPINE | Facility: CLINIC | Age: 70
End: 2020-01-24
Payer: MEDICARE

## 2020-01-24 ENCOUNTER — TELEPHONE (OUTPATIENT)
Dept: PAIN MEDICINE | Facility: CLINIC | Age: 70
End: 2020-01-24

## 2020-01-24 ENCOUNTER — TELEPHONE (OUTPATIENT)
Dept: ORTHOPEDICS | Facility: CLINIC | Age: 70
End: 2020-01-24

## 2020-01-24 VITALS
DIASTOLIC BLOOD PRESSURE: 74 MMHG | HEIGHT: 65 IN | HEART RATE: 83 BPM | WEIGHT: 194 LBS | SYSTOLIC BLOOD PRESSURE: 124 MMHG | BODY MASS INDEX: 32.32 KG/M2

## 2020-01-24 DIAGNOSIS — R22.1 MASS OF LEFT SIDE OF NECK: ICD-10-CM

## 2020-01-24 DIAGNOSIS — E11.9 TYPE 2 DIABETES MELLITUS WITHOUT COMPLICATION, UNSPECIFIED WHETHER LONG TERM INSULIN USE: ICD-10-CM

## 2020-01-24 DIAGNOSIS — M54.12 CERVICAL RADICULITIS: Primary | ICD-10-CM

## 2020-01-24 PROCEDURE — 99213 PR OFFICE/OUTPT VISIT, EST, LEVL III, 20-29 MIN: ICD-10-PCS | Mod: S$GLB,,, | Performed by: PHYSICAL MEDICINE & REHABILITATION

## 2020-01-24 PROCEDURE — 3078F DIAST BP <80 MM HG: CPT | Mod: S$GLB,,, | Performed by: PHYSICAL MEDICINE & REHABILITATION

## 2020-01-24 PROCEDURE — 3074F PR MOST RECENT SYSTOLIC BLOOD PRESSURE < 130 MM HG: ICD-10-PCS | Mod: S$GLB,,, | Performed by: PHYSICAL MEDICINE & REHABILITATION

## 2020-01-24 PROCEDURE — 1101F PR PT FALLS ASSESS DOC 0-1 FALLS W/OUT INJ PAST YR: ICD-10-PCS | Mod: S$GLB,,, | Performed by: PHYSICAL MEDICINE & REHABILITATION

## 2020-01-24 PROCEDURE — 3078F PR MOST RECENT DIASTOLIC BLOOD PRESSURE < 80 MM HG: ICD-10-PCS | Mod: S$GLB,,, | Performed by: PHYSICAL MEDICINE & REHABILITATION

## 2020-01-24 PROCEDURE — 3074F SYST BP LT 130 MM HG: CPT | Mod: S$GLB,,, | Performed by: PHYSICAL MEDICINE & REHABILITATION

## 2020-01-24 PROCEDURE — 1101F PT FALLS ASSESS-DOCD LE1/YR: CPT | Mod: S$GLB,,, | Performed by: PHYSICAL MEDICINE & REHABILITATION

## 2020-01-24 PROCEDURE — 99213 OFFICE O/P EST LOW 20 MIN: CPT | Mod: S$GLB,,, | Performed by: PHYSICAL MEDICINE & REHABILITATION

## 2020-01-24 PROCEDURE — 1159F PR MEDICATION LIST DOCUMENTED IN MEDICAL RECORD: ICD-10-PCS | Mod: S$GLB,,, | Performed by: PHYSICAL MEDICINE & REHABILITATION

## 2020-01-24 PROCEDURE — 1159F MED LIST DOCD IN RCRD: CPT | Mod: S$GLB,,, | Performed by: PHYSICAL MEDICINE & REHABILITATION

## 2020-01-24 PROCEDURE — 1125F PR PAIN SEVERITY QUANTIFIED, PAIN PRESENT: ICD-10-PCS | Mod: S$GLB,,, | Performed by: PHYSICAL MEDICINE & REHABILITATION

## 2020-01-24 PROCEDURE — 1125F AMNT PAIN NOTED PAIN PRSNT: CPT | Mod: S$GLB,,, | Performed by: PHYSICAL MEDICINE & REHABILITATION

## 2020-01-24 RX ORDER — HYDROCODONE BITARTRATE AND ACETAMINOPHEN 5; 325 MG/1; MG/1
1 TABLET ORAL EVERY 6 HOURS PRN
Qty: 28 TABLET | Refills: 0 | Status: SHIPPED | OUTPATIENT
Start: 2020-01-24 | End: 2020-06-22

## 2020-01-24 NOTE — TELEPHONE ENCOUNTER
----- Message from Mindi Munson MA sent at 1/24/2020 10:22 AM CST -----  Contact: Eddi Oliva   office,,  [place]ina   States they have sent several request for medical records,   And have a deadline for [place]   Wants to know if we will send records   Call back

## 2020-01-24 NOTE — PROGRESS NOTES
SUBJECTIVE:    Patient ID: Doris Pastrana is a 69 y.o. female.    Chief Complaint: Follow-up    She is here for follow-up status post interlaminar epidural steroid injection C7-T1 on 2020 with Dr. Albrecht.  She had about 25% improvement from that procedure.  Definitely took the edge off the pain.  Current pain level is 4/10.  Still getting some numbness and pain in the left arm down to the elbow.  Has some discomfort in the left upper thoracic region around the bra line.  She is concerned about some swelling left side base of the neck.  She has been there for quite some time no appreciable change.        Past Medical History:   Diagnosis Date    Acute sinus infection     Allergy     Anemia     Arthritis     Bronchitis     Degenerative disc disease     lumbar, pain contract 2013    Depression     Diabetes mellitus, type 2 16    Fatty liver     Hyperlipidemia 16    Hypertension     Kidney stone     Mitral valve prolapse     Pleurisy     Pneumonia     PONV (postoperative nausea and vomiting)     Sinus infection     TIA (transient ischemic attack) 2019     Social History     Socioeconomic History    Marital status:      Spouse name: Not on file    Number of children: Not on file    Years of education: Not on file    Highest education level: Not on file   Occupational History     Employer: [employer]   Social Needs    Financial resource strain: Not on file    Food insecurity:     Worry: Not on file     Inability: Not on file    Transportation needs:     Medical: Not on file     Non-medical: Not on file   Tobacco Use    Smoking status: Former Smoker     Packs/day: 1.00     Types: Cigarettes     Last attempt to quit: 3/12/1983     Years since quittin.8    Smokeless tobacco: Never Used   Substance and Sexual Activity    Alcohol use: Yes     Comment: socially    Drug use: No    Sexual activity: Not Currently   Lifestyle    Physical activity:     Days per  week: Not on file     Minutes per session: Not on file    Stress: Not on file   Relationships    Social connections:     Talks on phone: Not on file     Gets together: Not on file     Attends Gnosticism service: Not on file     Active member of club or organization: Not on file     Attends meetings of clubs or organizations: Not on file     Relationship status: Not on file   Other Topics Concern    Not on file   Social History Narrative    Not on file     Past Surgical History:   Procedure Laterality Date    ANKLE SURGERY  2017    right ankle torn ligament    APPENDECTOMY       SECTION      CHOLECYSTECTOMY      COLONOSCOPY  8/13/15    Dr. Oliveira, five year recheck    EPIDURAL STEROID INJECTION INTO CERVICAL SPINE N/A 1/3/2020    Procedure: Injection-steroid-epidural-cervical;  Surgeon: Eddi Albrecht MD;  Location: [facility];  Service: Pain Management;  Laterality: N/A;  C7-T1    EXTRACORPOREAL SHOCK WAVE LITHOTRIPSY Left 2019    Procedure: LITHOTRIPSY, ESWL - only if stone visible on xray  with cysto after on table possible;  Surgeon: Marisol Stewart MD;  Location: [facility] OR;  Service: Urology;  Laterality: Left;    HYSTERECTOMY      TONSILLECTOMY      VAGINAL DELIVERY       Family History   Problem Relation Age of Onset    Arthritis Mother     Hypertension Mother     Vision loss Mother     Diabetes Mother     Alcohol abuse Father     Depression Father     Early death Father     Alcohol abuse Sister     Depression Sister     Cancer Sister         thyroid    Diabetes Sister     Heart disease Sister         chf    Atrial fibrillation Sister     Depression Brother     Hypertension Brother     Cancer Brother         prostate/stomach    Diabetes Brother     Aneurysm Brother         aaa    Learning disabilities Daughter     Hypertension Maternal Grandmother     Heart disease Maternal Grandmother     Arthritis Maternal Grandmother     Glaucoma Maternal Grandmother   "   Cancer Maternal Grandfather         stomach    Diabetes Paternal Grandmother     Breast cancer Paternal Grandmother     Heart disease Paternal Grandfather     Heart disease Maternal Uncle     Kidney disease Maternal Uncle     Kidney disease Paternal Aunt      Vitals:    01/24/20 1359   BP: 124/74   Pulse: 83   Weight: 88 kg (194 lb 0.1 oz)   Height: 5' 5" (1.651 m)       Review of Systems   Constitutional: Negative for chills, diaphoresis, fatigue, fever and unexpected weight change.   HENT: Negative for trouble swallowing.    Eyes: Negative for visual disturbance.   Respiratory: Negative for shortness of breath.    Cardiovascular: Negative for chest pain.   Gastrointestinal: Negative for abdominal pain, constipation, nausea and vomiting.   Genitourinary: Negative for difficulty urinating.   Musculoskeletal: Negative for arthralgias, back pain, gait problem, joint swelling, myalgias, neck pain and neck stiffness.   Neurological: Negative for dizziness, speech difficulty, weakness, light-headedness, numbness and headaches.          Objective:      Physical Exam   Constitutional: She is oriented to person, place, and time. She appears well-developed and well-nourished.   Neurological: She is alert and oriented to person, place, and time.   She is a soft tissue mass at the base of her neck on the left side anteriorly I suspect is a lipoma           Assessment:       1. Cervical radiculitis    2. Mass of left side of neck           Plan:     the we will repeat interlaminar epidural steroid injections.  Consider medial branch blocks and radiofrequency ablation C5-6 and C6-7 bilaterally.  Ultrasound the left neck mass.  Follow-up with me after the procedure.  She is requesting pain medication.  We will give her some short-term low dose of hydrocodone.  I note she has a codeine allergy listed but that is really a intolerance due to nausea.  She tolerates hydrocodone just fine      Cervical radiculitis  -     " HYDROcodone-acetaminophen (NORCO) 5-325 mg per tablet; Take 1 tablet by mouth every 6 (six) hours as needed.  Dispense: 28 tablet; Refill: 0    Mass of left side of neck  -     US Soft Tissue Head Neck Thyroid; Future; Expected date: 01/24/2020

## 2020-01-24 NOTE — TELEPHONE ENCOUNTER
----- Message from Brandon Bose MD sent at 1/24/2020  2:16 PM CST -----  Please schedule for repeat cervical interlaminar injection at C7-T1

## 2020-01-28 ENCOUNTER — HOSPITAL ENCOUNTER (OUTPATIENT)
Dept: RADIOLOGY | Facility: HOSPITAL | Age: 70
Discharge: HOME | End: 2020-01-28
Attending: PHYSICAL MEDICINE & REHABILITATION
Payer: MEDICARE

## 2020-01-28 DIAGNOSIS — R22.1 MASS OF LEFT SIDE OF NECK: ICD-10-CM

## 2020-01-28 PROCEDURE — 76536 US EXAM OF HEAD AND NECK: CPT | Mod: TC,PO

## 2020-01-29 ENCOUNTER — TELEPHONE (OUTPATIENT)
Dept: SPINE | Facility: CLINIC | Age: 70
End: 2020-01-29

## 2020-01-29 NOTE — TELEPHONE ENCOUNTER
----- Message from Brandon Bose MD sent at 1/29/2020  8:46 AM CST -----  There was no mass seen on her ultrasound

## 2020-01-30 ENCOUNTER — TELEPHONE (OUTPATIENT)
Dept: ORTHOPEDICS | Facility: CLINIC | Age: 70
End: 2020-01-30

## 2020-01-30 ENCOUNTER — DOCUMENTATION ONLY (OUTPATIENT)
Dept: FAMILY MEDICINE | Facility: CLINIC | Age: 70
End: 2020-01-30

## 2020-01-30 NOTE — PROGRESS NOTES
Pre-Visit Chart Review  For Appointment Scheduled on 2-7-20    Health Maintenance Due   Topic Date Due    Foot Exam  06/23/2018    Eye Exam  11/19/2019

## 2020-01-30 NOTE — TELEPHONE ENCOUNTER
----- Message from Veda Wayne LPN sent at 1/29/2020  4:24 PM CST -----  Contact: Patient  Patient will not be able to have the procedure scheduled for 2/5 she has a sinus infection and a UTI, taking steroids and antibiotics,  will need to reschedule  Call back   ----- Message -----  From: Aquiles Jones MA  Sent: 1/29/2020   4:09 PM CST  To: Andrea Donnelly Staff    Patient will not be able to have the procedure scheduled for 2/5 she has a sinus infection and a UTI, taking steroids and antibiotics,  will need to reschedule  Call back

## 2020-01-30 NOTE — TELEPHONE ENCOUNTER
Spoke to patient. Advised surgery can be moved to either 2/12 or 2/19. Patient to call back to confirm date.

## 2020-02-07 ENCOUNTER — OFFICE VISIT (OUTPATIENT)
Dept: FAMILY MEDICINE | Facility: CLINIC | Age: 70
End: 2020-02-07
Attending: FAMILY MEDICINE
Payer: MEDICARE

## 2020-02-07 ENCOUNTER — TELEPHONE (OUTPATIENT)
Dept: FAMILY MEDICINE | Facility: CLINIC | Age: 70
End: 2020-02-07

## 2020-02-07 VITALS
TEMPERATURE: 98 F | HEART RATE: 83 BPM | DIASTOLIC BLOOD PRESSURE: 80 MMHG | RESPIRATION RATE: 20 BRPM | SYSTOLIC BLOOD PRESSURE: 132 MMHG | HEIGHT: 65 IN | BODY MASS INDEX: 32.1 KG/M2 | WEIGHT: 192.69 LBS | OXYGEN SATURATION: 95 %

## 2020-02-07 DIAGNOSIS — E11.00 TYPE 2 DIABETES MELLITUS WITH HYPEROSMOLARITY WITHOUT COMA, WITHOUT LONG-TERM CURRENT USE OF INSULIN: Primary | ICD-10-CM

## 2020-02-07 DIAGNOSIS — I15.2 HYPERTENSION ASSOCIATED WITH DIABETES: ICD-10-CM

## 2020-02-07 DIAGNOSIS — E11.69 HYPERLIPIDEMIA ASSOCIATED WITH TYPE 2 DIABETES MELLITUS: ICD-10-CM

## 2020-02-07 DIAGNOSIS — E11.59 HYPERTENSION ASSOCIATED WITH DIABETES: ICD-10-CM

## 2020-02-07 DIAGNOSIS — M19.079 ARTHRITIS OF ANKLE: ICD-10-CM

## 2020-02-07 DIAGNOSIS — E11.9 CONTROLLED TYPE 2 DIABETES MELLITUS WITHOUT COMPLICATION, WITHOUT LONG-TERM CURRENT USE OF INSULIN: ICD-10-CM

## 2020-02-07 DIAGNOSIS — E55.9 VITAMIN D DEFICIENCY: ICD-10-CM

## 2020-02-07 DIAGNOSIS — M54.12 CERVICAL RADICULITIS: ICD-10-CM

## 2020-02-07 DIAGNOSIS — E11.9 TYPE 2 DIABETES MELLITUS WITHOUT COMPLICATION, WITHOUT LONG-TERM CURRENT USE OF INSULIN: ICD-10-CM

## 2020-02-07 DIAGNOSIS — E78.5 HYPERLIPIDEMIA ASSOCIATED WITH TYPE 2 DIABETES MELLITUS: ICD-10-CM

## 2020-02-07 PROCEDURE — 3075F SYST BP GE 130 - 139MM HG: CPT | Mod: HCNC,CPTII,S$GLB, | Performed by: FAMILY MEDICINE

## 2020-02-07 PROCEDURE — 3044F HG A1C LEVEL LT 7.0%: CPT | Mod: HCNC,CPTII,S$GLB, | Performed by: FAMILY MEDICINE

## 2020-02-07 PROCEDURE — 99214 OFFICE O/P EST MOD 30 MIN: CPT | Mod: HCNC,S$GLB,, | Performed by: FAMILY MEDICINE

## 2020-02-07 PROCEDURE — 99499 RISK ADDL DX/OHS AUDIT: ICD-10-PCS | Mod: HCNC,S$GLB,, | Performed by: FAMILY MEDICINE

## 2020-02-07 PROCEDURE — 3044F PR MOST RECENT HEMOGLOBIN A1C LEVEL <7.0%: ICD-10-PCS | Mod: HCNC,CPTII,S$GLB, | Performed by: FAMILY MEDICINE

## 2020-02-07 PROCEDURE — 1101F PR PT FALLS ASSESS DOC 0-1 FALLS W/OUT INJ PAST YR: ICD-10-PCS | Mod: HCNC,CPTII,S$GLB, | Performed by: FAMILY MEDICINE

## 2020-02-07 PROCEDURE — 3075F PR MOST RECENT SYSTOLIC BLOOD PRESS GE 130-139MM HG: ICD-10-PCS | Mod: HCNC,CPTII,S$GLB, | Performed by: FAMILY MEDICINE

## 2020-02-07 PROCEDURE — 99214 PR OFFICE/OUTPT VISIT, EST, LEVL IV, 30-39 MIN: ICD-10-PCS | Mod: HCNC,S$GLB,, | Performed by: FAMILY MEDICINE

## 2020-02-07 PROCEDURE — 99999 PR PBB SHADOW E&M-EST. PATIENT-LVL IV: ICD-10-PCS | Mod: PBBFAC,HCNC,, | Performed by: FAMILY MEDICINE

## 2020-02-07 PROCEDURE — 1125F PR PAIN SEVERITY QUANTIFIED, PAIN PRESENT: ICD-10-PCS | Mod: HCNC,S$GLB,, | Performed by: FAMILY MEDICINE

## 2020-02-07 PROCEDURE — 3079F PR MOST RECENT DIASTOLIC BLOOD PRESSURE 80-89 MM HG: ICD-10-PCS | Mod: HCNC,CPTII,S$GLB, | Performed by: FAMILY MEDICINE

## 2020-02-07 PROCEDURE — 99999 PR PBB SHADOW E&M-EST. PATIENT-LVL IV: CPT | Mod: PBBFAC,HCNC,, | Performed by: FAMILY MEDICINE

## 2020-02-07 PROCEDURE — 1125F AMNT PAIN NOTED PAIN PRSNT: CPT | Mod: HCNC,S$GLB,, | Performed by: FAMILY MEDICINE

## 2020-02-07 PROCEDURE — 99499 UNLISTED E&M SERVICE: CPT | Mod: HCNC,S$GLB,, | Performed by: FAMILY MEDICINE

## 2020-02-07 PROCEDURE — 3079F DIAST BP 80-89 MM HG: CPT | Mod: HCNC,CPTII,S$GLB, | Performed by: FAMILY MEDICINE

## 2020-02-07 PROCEDURE — 1101F PT FALLS ASSESS-DOCD LE1/YR: CPT | Mod: HCNC,CPTII,S$GLB, | Performed by: FAMILY MEDICINE

## 2020-02-07 PROCEDURE — 1159F PR MEDICATION LIST DOCUMENTED IN MEDICAL RECORD: ICD-10-PCS | Mod: HCNC,S$GLB,, | Performed by: FAMILY MEDICINE

## 2020-02-07 PROCEDURE — 1159F MED LIST DOCD IN RCRD: CPT | Mod: HCNC,S$GLB,, | Performed by: FAMILY MEDICINE

## 2020-02-07 RX ORDER — CEFDINIR 300 MG/1
600 CAPSULE ORAL DAILY
COMMUNITY
Start: 2020-01-31 | End: 2020-02-17

## 2020-02-07 RX ORDER — ERGOCALCIFEROL 1.25 MG/1
CAPSULE ORAL
Qty: 24 CAPSULE | Refills: 1 | Status: SHIPPED | OUTPATIENT
Start: 2020-02-07 | End: 2020-06-22 | Stop reason: SDUPTHER

## 2020-02-07 RX ORDER — GABAPENTIN 300 MG/1
CAPSULE ORAL
Qty: 90 CAPSULE | Refills: 5 | Status: SHIPPED | OUTPATIENT
Start: 2020-02-07 | End: 2020-10-30

## 2020-02-07 RX ORDER — METFORMIN HYDROCHLORIDE 750 MG/1
750 TABLET, EXTENDED RELEASE ORAL 2 TIMES DAILY WITH MEALS
Qty: 180 TABLET | Refills: 1 | Status: SHIPPED | OUTPATIENT
Start: 2020-02-07 | End: 2020-06-22 | Stop reason: SDUPTHER

## 2020-02-07 NOTE — PROGRESS NOTES
Subjective:       Patient ID: Doris Pastrana is a 69 y.o. female.    Chief Complaint: Diabetes    69-year-old female coming in follow-up on diabetes and hypertension.  She is still being evaluated for possible right ankle fusion.  She had a 2nd opinion but does not have the results of that yet.  She had epidural steroid injections in her neck for radicular pain down the left arm.  It did help significantly with neck pain but she still gets radicular pain down the left shoulder area with certain movements.  She has never tried gabapentin.  Pain management is talking about doing an ablation which she is reluctant to attempt.  She is quite willing to try the gabapentin.  Blood sugars been doing well with no episodes of hypoglycemia her last A1c in December was 6.6 and she schedule for another one in mid June.  She is tolerating her medications without problems. She will be due for a colonoscopy in August so will set that up at her next visit in June.  She still due for an eye exam but has been very busy and has not been able to get in for it.  She is aware that it is due.  She is due for a foot exam.    She needs a refill on her vitamin-D.  She scheduled in June also for a vitamin-D level and is supposed to be taking the 30450 unit are go Calciferol twice a week but was only given supply for once a week.    Past Medical History:  No date: Acute sinus infection  No date: Allergy  No date: Anemia  No date: Arthritis  No date: Bronchitis  No date: Degenerative disc disease      Comment:  lumbar, pain contract 1/28/2013  No date: Depression  2/25/16: Diabetes mellitus, type 2  No date: Fatty liver  2/25/16: Hyperlipidemia  No date: Hypertension  No date: Kidney stone  No date: Mitral valve prolapse  No date: Pleurisy  No date: Pneumonia  No date: PONV (postoperative nausea and vomiting)  No date: Sinus infection  03/28/2019: TIA (transient ischemic attack)    Past Surgical History:  08/09/2017: ANKLE SURGERY       Comment:  right ankle torn ligament  No date: APPENDECTOMY  No date:  SECTION  No date: CHOLECYSTECTOMY  8/13/15: COLONOSCOPY      Comment:  Dr. Oliveira, five year recheck  1/3/2020: EPIDURAL STEROID INJECTION INTO CERVICAL SPINE; N/A      Comment:  Procedure: Injection-steroid-epidural-cervical;                 Surgeon: Eddi Albrecht MD;  Location: [facility] OR;  Service:                Pain Management;  Laterality: N/A;  C7-T1  2019: EXTRACORPOREAL SHOCK WAVE LITHOTRIPSY; Left      Comment:  Procedure: LITHOTRIPSY, ESWL - only if stone visible on                xray  with cysto after on table possible;  Surgeon:                Marisol Stewart MD;  Location: [facility] OR;                 Service: Urology;  Laterality: Left;  No date: HYSTERECTOMY  No date: TONSILLECTOMY  No date: VAGINAL DELIVERY    Current Outpatient Medications on File Prior to Visit:  albuterol (PROVENTIL/VENTOLIN HFA) 90 mcg/actuation inhaler, Inhale 2 puffs into the lungs 4 (four) times daily. (Patient taking differently: Inhale 2 puffs into the lungs every 4 (four) hours as needed. ), Disp: 1 Inhaler, Rfl: 1  ALPRAZolam (XANAX) 0.25 MG tablet, Use one 20-30 minutes before flying prn, Disp: 10 tablet, Rfl: 1  amLODIPine (NORVASC) 10 MG tablet, Take 1 tablet (10 mg total) by mouth once daily., Disp: 90 tablet, Rfl: 3  cefdinir (OMNICEF) 300 MG capsule, Take 600 mg by mouth once daily. , Disp: , Rfl:   cetirizine (ZYRTEC) 10 MG tablet, Take 10 mg by mouth daily as needed. , Disp: , Rfl:   cyclobenzaprine (FLEXERIL) 10 MG tablet, TAKE 1 TABLET BY MOUTH THREE TIMES DAILY AS NEEDED FOR MUSCLE SPASMS, Disp: 270 tablet, Rfl: 0  escitalopram oxalate (LEXAPRO) 10 MG tablet, TAKE 1 TABLET EVERY DAY, Disp: 90 tablet, Rfl: 1  fluticasone propionate (FLONASE) 50 mcg/actuation nasal spray, 2 sprays (100 mcg total) by Each Nare route once daily., Disp: 16 g, Rfl: 5  HYDROcodone-acetaminophen (NORCO) 5-325 mg per tablet, Take 1 tablet by mouth every  6 (six) hours as needed., Disp: 28 tablet, Rfl: 0  losartan (COZAAR) 100 MG tablet, TAKE 1 TABLET EVERY DAY, Disp: 90 tablet, Rfl: 3  meclizine (ANTIVERT) 25 mg tablet, Take 1 tablet (25 mg total) by mouth 3 (three) times daily as needed., Disp: 30 tablet, Rfl: 5  rosuvastatin (CRESTOR) 40 MG Tab, Take 1 tablet (40 mg total) by mouth every evening., Disp: 30 tablet, Rfl: 11  (DISCONTINUED) dulaglutide (TRULICITY) 0.75 mg/0.5 mL PnIj, INJECT ONE SYRINGE SUBCUTANEOUSLY ONCE WEEKLY., Disp: 12 mL, Rfl: 1  (DISCONTINUED) ergocalciferol (ERGOCALCIFEROL) 50,000 unit Cap, Take one twice a week, Disp: 4 capsule, Rfl: 3  (DISCONTINUED) metFORMIN (GLUCOPHAGE-XR) 750 MG 24 hr tablet, TAKE 1 TABLET TWICE DAILY WITH MEALS, Disp: 180 tablet, Rfl: 1  blood sugar diagnostic Strp, True Metrix glucose strips check twice daily (Patient not taking: Reported on 2/7/2020), Disp: 200 each, Rfl: 3  blood-glucose meter kit, True Metrix glucometer- check glucose twice daily. Dx e11.9 (Patient not taking: Reported on 2/7/2020), Disp: 1 each, Rfl: 0  TRUEPLUS LANCETS 28 gauge Misc, TEST TWO TIMES DAILY (Patient not taking: Reported on 2/7/2020), Disp: 200 each, Rfl: 3  (DISCONTINUED) meloxicam (MOBIC) 15 MG tablet, Take 1 tablet (15 mg total) by mouth once daily. (Patient not taking: Reported on 1/24/2020), Disp: 90 tablet, Rfl: 0  (DISCONTINUED) tamsulosin (FLOMAX) 0.4 mg Cap, Take 1 capsule (0.4 mg total) by mouth after dinner., Disp: 90 capsule, Rfl: 3    No current facility-administered medications on file prior to visit.         Review of Systems   Eyes: Negative for visual disturbance.   Respiratory: Negative for chest tightness and shortness of breath.    Endocrine: Negative for polydipsia and polyuria.   Musculoskeletal: Positive for arthralgias.       Objective:      Physical Exam   Constitutional: She is oriented to person, place, and time. She appears well-developed. No distress.   Good blood pressure control  Obese with a BMI of  32.1 she is down 2 lb from her September 26, 2019 visit   HENT:   Head: Normocephalic and atraumatic.   Cardiovascular: Normal rate, regular rhythm, normal heart sounds and intact distal pulses. Exam reveals no gallop and no friction rub.   No murmur heard.  Pulses:       Dorsalis pedis pulses are 2+ on the right side, and 2+ on the left side.        Posterior tibial pulses are 2+ on the right side, and 2+ on the left side.   Pulmonary/Chest: Effort normal and breath sounds normal.   Musculoskeletal:        Right foot: There is normal range of motion and no deformity.        Left foot: There is normal range of motion and no deformity.   Feet:   Right Foot:   Protective Sensation: 8 sites tested. 8 sites sensed.   Skin Integrity: Negative for ulcer, blister, skin breakdown, erythema, warmth, callus or dry skin.   Left Foot:   Protective Sensation: 8 sites tested. 8 sites sensed.   Skin Integrity: Negative for ulcer, blister, skin breakdown, erythema, warmth, callus or dry skin.   Neurological: She is alert and oriented to person, place, and time.   Proprioception intact all 10 toes   Skin: Capillary refill takes less than 2 seconds. No rash noted. She is not diaphoretic. No erythema.   Psychiatric: She has a normal mood and affect. Her behavior is normal. Judgment and thought content normal.   Nursing note and vitals reviewed.      Assessment:       1. Type 2 diabetes mellitus with hyperosmolarity without coma, without long-term current use of insulin    2. Hyperlipidemia associated with type 2 diabetes mellitus    3. Hypertension associated with diabetes    4. Arthritis of ankle    5. Cervical radiculitis    6. Type 2 diabetes mellitus without complication, without long-term current use of insulin    7. Controlled type 2 diabetes mellitus without complication, without long-term current use of insulin    8. Vitamin D deficiency        Plan:       1. Type 2 diabetes mellitus with hyperosmolarity without coma, without  long-term current use of insulin  Lab Results   Component Value Date    HGBA1C 6.6 (H) 12/12/2019         2. Hyperlipidemia associated with type 2 diabetes mellitus  Recheck scheduled in mid June    3. Hypertension associated with diabetes  Good control, no changes needed    4. Arthritis of ankle  Still awaiting determination of whether she is to have a fusion    5. Cervical radiculitis  Willing to try gabapentin for radicular symptoms  - gabapentin (NEURONTIN) 300 MG capsule; Take 1 at bedtime for 1 week, then two a day for 1 week, then 3 a day  Dispense: 90 capsule; Refill: 5    6. Type 2 diabetes mellitus without complication, without long-term current use of insulin  Good control with no changes needed recheck scheduled in June  - dulaglutide (TRULICITY) 0.75 mg/0.5 mL PnIj; INJECT ONE SYRINGE SUBCUTANEOUSLY ONCE WEEKLY.  Dispense: 6 mL; Refill: 1    7. Controlled type 2 diabetes mellitus without complication, without long-term current use of insulin  - metFORMIN (GLUCOPHAGE-XR) 750 MG XR 24hr tablet; Take 1 tablet (750 mg total) by mouth 2 (two) times daily with meals.  Dispense: 180 tablet; Refill: 1    8. Vitamin D deficiency  Increase to twice weekly and it is also schedule for recheck in June  - ergocalciferol (ERGOCALCIFEROL) 50,000 unit Cap; Take one twice a week  Dispense: 24 capsule; Refill: 1

## 2020-02-07 NOTE — TELEPHONE ENCOUNTER
----- Message from Jamel Stafford sent at 2/7/2020  2:35 PM CST -----  Contact: Arianna[facility]  Arianna called in regarding patients Rx for gabapentin (NEURONTIN) 300 MG capsule and needs some clarification on directions.      [id number] [place] - [address]  [address] [place] [address]  Phone: [phone] Fax: [phone]

## 2020-02-07 NOTE — PATIENT INSTRUCTIONS

## 2020-02-14 ENCOUNTER — TELEPHONE (OUTPATIENT)
Dept: FAMILY MEDICINE | Facility: CLINIC | Age: 70
End: 2020-02-14

## 2020-02-14 NOTE — TELEPHONE ENCOUNTER
Patient notified. Advised her to continue Benadryl. She will retry Gabapentin after the hives resolve and let office know how she did.

## 2020-02-14 NOTE — TELEPHONE ENCOUNTER
----- Message from Jamel Stafford sent at 2/14/2020  8:42 AM CST -----  Contact: same  Patient called in and stated she went to [facility] (not sure when) and was given a Rx of cefdinir (OMNICEF) 300 MG capsule for her sinus infection.  Patient stated now she has a rash & wanted to talk to nurse to see what she should do or if something could be called in for her.  Patient stated she did not take her dose today.    Call back number is [phone]

## 2020-02-14 NOTE — TELEPHONE ENCOUNTER
Patient was given Omnicef at urgent care for sinusitis. Has 4 left and sinus is better. Broke out with hives last night. Started Gabapentin 2/7/2020. Stopped the Omnicef. Last dose of Gabapentin 2/12/2020. Taking Benadryl. What do you think she is reacting to?

## 2020-02-14 NOTE — TELEPHONE ENCOUNTER
By timing it could be either one, Omnicef is about 3 times more likely to cause a rash than gabapentin so statistically it is more likely the Omnicef

## 2020-02-17 ENCOUNTER — OFFICE VISIT (OUTPATIENT)
Dept: FAMILY MEDICINE | Facility: CLINIC | Age: 70
End: 2020-02-17
Payer: MEDICARE

## 2020-02-17 ENCOUNTER — TELEPHONE (OUTPATIENT)
Dept: FAMILY MEDICINE | Facility: CLINIC | Age: 70
End: 2020-02-17

## 2020-02-17 VITALS
HEIGHT: 65 IN | BODY MASS INDEX: 31.81 KG/M2 | SYSTOLIC BLOOD PRESSURE: 116 MMHG | TEMPERATURE: 99 F | DIASTOLIC BLOOD PRESSURE: 68 MMHG | OXYGEN SATURATION: 95 % | WEIGHT: 190.94 LBS | HEART RATE: 94 BPM

## 2020-02-17 DIAGNOSIS — I15.2 HYPERTENSION ASSOCIATED WITH DIABETES: ICD-10-CM

## 2020-02-17 DIAGNOSIS — E11.59 HYPERTENSION ASSOCIATED WITH DIABETES: ICD-10-CM

## 2020-02-17 DIAGNOSIS — T78.40XA ALLERGIC REACTION, INITIAL ENCOUNTER: ICD-10-CM

## 2020-02-17 DIAGNOSIS — L50.9 HIVES: Primary | ICD-10-CM

## 2020-02-17 PROCEDURE — 3074F SYST BP LT 130 MM HG: CPT | Mod: HCNC,CPTII,S$GLB, | Performed by: NURSE PRACTITIONER

## 2020-02-17 PROCEDURE — 99213 OFFICE O/P EST LOW 20 MIN: CPT | Mod: HCNC,S$GLB,, | Performed by: NURSE PRACTITIONER

## 2020-02-17 PROCEDURE — 99213 PR OFFICE/OUTPT VISIT, EST, LEVL III, 20-29 MIN: ICD-10-PCS | Mod: HCNC,S$GLB,, | Performed by: NURSE PRACTITIONER

## 2020-02-17 PROCEDURE — 1101F PR PT FALLS ASSESS DOC 0-1 FALLS W/OUT INJ PAST YR: ICD-10-PCS | Mod: HCNC,CPTII,S$GLB, | Performed by: NURSE PRACTITIONER

## 2020-02-17 PROCEDURE — 1159F MED LIST DOCD IN RCRD: CPT | Mod: HCNC,S$GLB,, | Performed by: NURSE PRACTITIONER

## 2020-02-17 PROCEDURE — 3044F PR MOST RECENT HEMOGLOBIN A1C LEVEL <7.0%: ICD-10-PCS | Mod: HCNC,CPTII,S$GLB, | Performed by: NURSE PRACTITIONER

## 2020-02-17 PROCEDURE — 1101F PT FALLS ASSESS-DOCD LE1/YR: CPT | Mod: HCNC,CPTII,S$GLB, | Performed by: NURSE PRACTITIONER

## 2020-02-17 PROCEDURE — 99999 PR PBB SHADOW E&M-EST. PATIENT-LVL III: CPT | Mod: PBBFAC,HCNC,, | Performed by: NURSE PRACTITIONER

## 2020-02-17 PROCEDURE — 3078F DIAST BP <80 MM HG: CPT | Mod: HCNC,CPTII,S$GLB, | Performed by: NURSE PRACTITIONER

## 2020-02-17 PROCEDURE — 3044F HG A1C LEVEL LT 7.0%: CPT | Mod: HCNC,CPTII,S$GLB, | Performed by: NURSE PRACTITIONER

## 2020-02-17 PROCEDURE — 1159F PR MEDICATION LIST DOCUMENTED IN MEDICAL RECORD: ICD-10-PCS | Mod: HCNC,S$GLB,, | Performed by: NURSE PRACTITIONER

## 2020-02-17 PROCEDURE — 99999 PR PBB SHADOW E&M-EST. PATIENT-LVL III: ICD-10-PCS | Mod: PBBFAC,HCNC,, | Performed by: NURSE PRACTITIONER

## 2020-02-17 PROCEDURE — 3078F PR MOST RECENT DIASTOLIC BLOOD PRESSURE < 80 MM HG: ICD-10-PCS | Mod: HCNC,CPTII,S$GLB, | Performed by: NURSE PRACTITIONER

## 2020-02-17 PROCEDURE — 3074F PR MOST RECENT SYSTOLIC BLOOD PRESSURE < 130 MM HG: ICD-10-PCS | Mod: HCNC,CPTII,S$GLB, | Performed by: NURSE PRACTITIONER

## 2020-02-17 RX ORDER — DEXAMETHASONE SODIUM PHOSPHATE 4 MG/ML
8 INJECTION, SOLUTION INTRA-ARTICULAR; INTRALESIONAL; INTRAMUSCULAR; INTRAVENOUS; SOFT TISSUE ONCE
Status: DISCONTINUED | OUTPATIENT
Start: 2020-02-17 | End: 2022-04-11 | Stop reason: HOSPADM

## 2020-02-17 NOTE — PROGRESS NOTES
"Subjective:       Patient ID: Doris Pastrana is a 69 y.o. female.    Chief Complaint: Urticaria and itching    HPI   Ms. Pastrana is a 68 yo female who presents today with c/o hives and itching.  This began Thursday.  She recently started taking both omnicef and gabapentin.  She was instructed initially to stop taking the omnicef as this was thought to be the most likely culprit.  She has since stopped taking the gabapentin as well.  She notes that when the rash initially erupted it was raised, and red, and "itchy".  The rash is on bilateral arm, chest, abdomen, and bilateral legs.  She has been treating with benadryl which provides some relief.    Vitals:    02/17/20 1530   BP: 116/68   Pulse: 94   Temp: 98.8 °F (37.1 °C)     Review of Systems   Constitutional: Negative for chills, fatigue, fever and unexpected weight change.   HENT: Negative for congestion, hearing loss, nosebleeds, postnasal drip, sinus pressure, sinus pain and sore throat.    Eyes: Negative for pain, discharge, redness and visual disturbance.   Respiratory: Negative for cough, chest tightness and shortness of breath.    Cardiovascular: Negative for chest pain and palpitations.   Gastrointestinal: Negative for abdominal pain, constipation, diarrhea, nausea and vomiting.   Endocrine: Negative for cold intolerance and heat intolerance.   Musculoskeletal: Negative for back pain.   Skin: Positive for rash.   Neurological: Negative for dizziness, syncope, light-headedness and headaches.   Psychiatric/Behavioral: Negative for agitation and dysphoric mood. The patient is not nervous/anxious.        Objective:      Physical Exam   Constitutional: She is oriented to person, place, and time. She appears well-developed and well-nourished.   HENT:   Head: Normocephalic and atraumatic.   Nose: Nose normal.   Eyes: Pupils are equal, round, and reactive to light. Conjunctivae and lids are normal.   Neck: Full passive range of motion without pain. "   Cardiovascular: Normal rate.   Pulmonary/Chest: Effort normal.   Neurological: She is alert and oriented to person, place, and time.   Skin: Skin is warm, dry and intact. Rash noted.   Areas of erythema noted to bilateral arms and chest area appearing to be from patient scratching.     Psychiatric: She has a normal mood and affect. Her speech is normal and behavior is normal. Cognition and memory are normal.       Assessment & Plan:       Hives  -     dexamethasone injection 8 mg         Allergic reaction, initial encounter  -     dexamethasone injection 8 mg        -     May continue use of benadryl         -    If no improvement with the above, notify office  Hypertension associated with diabetes  -Controlled, continue current medication regimen         Follow up if symptoms worsen or fail to improve.

## 2020-02-17 NOTE — H&P (VIEW-ONLY)
"Subjective:       Patient ID: Doris Pastrana is a 69 y.o. female.    Chief Complaint: Urticaria and itching    HPI   Ms. Pastrana is a 70 yo female who presents today with c/o hives and itching.  This began Thursday.  She recently started taking both omnicef and gabapentin.  She was instructed initially to stop taking the omnicef as this was thought to be the most likely culprit.  She has since stopped taking the gabapentin as well.  She notes that when the rash initially erupted it was raised, and red, and "itchy".  The rash is on bilateral arm, chest, abdomen, and bilateral legs.  She has been treating with benadryl which provides some relief.    Vitals:    02/17/20 1530   BP: 116/68   Pulse: 94   Temp: 98.8 °F (37.1 °C)     Review of Systems   Constitutional: Negative for chills, fatigue, fever and unexpected weight change.   HENT: Negative for congestion, hearing loss, nosebleeds, postnasal drip, sinus pressure, sinus pain and sore throat.    Eyes: Negative for pain, discharge, redness and visual disturbance.   Respiratory: Negative for cough, chest tightness and shortness of breath.    Cardiovascular: Negative for chest pain and palpitations.   Gastrointestinal: Negative for abdominal pain, constipation, diarrhea, nausea and vomiting.   Endocrine: Negative for cold intolerance and heat intolerance.   Musculoskeletal: Negative for back pain.   Skin: Positive for rash.   Neurological: Negative for dizziness, syncope, light-headedness and headaches.   Psychiatric/Behavioral: Negative for agitation and dysphoric mood. The patient is not nervous/anxious.        Objective:      Physical Exam   Constitutional: She is oriented to person, place, and time. She appears well-developed and well-nourished.   HENT:   Head: Normocephalic and atraumatic.   Nose: Nose normal.   Eyes: Pupils are equal, round, and reactive to light. Conjunctivae and lids are normal.   Neck: Full passive range of motion without pain. "   Cardiovascular: Normal rate.   Pulmonary/Chest: Effort normal.   Neurological: She is alert and oriented to person, place, and time.   Skin: Skin is warm, dry and intact. Rash noted.   Areas of erythema noted to bilateral arms and chest area appearing to be from patient scratching.     Psychiatric: She has a normal mood and affect. Her speech is normal and behavior is normal. Cognition and memory are normal.       Assessment & Plan:       Hives  -     dexamethasone injection 8 mg         Allergic reaction, initial encounter  -     dexamethasone injection 8 mg        -     May continue use of benadryl         -    If no improvement with the above, notify office  Hypertension associated with diabetes  -Controlled, continue current medication regimen         Follow up if symptoms worsen or fail to improve.

## 2020-02-17 NOTE — TELEPHONE ENCOUNTER
His sounds like she may need steroids but we have not seen her for this.  She needs an office visit

## 2020-02-17 NOTE — TELEPHONE ENCOUNTER
----- Message from Smita Jin sent at 2/17/2020  8:34 AM CST -----  Contact: patient  Type: Needs Medical Advice    Who Called:  patient  Symptoms (please be specific):  Itching from Hives  How long has patient had these symptoms:  Last Thursday  Pharmacy name and phone #:  ##  [id number] - [place] - [address]  [address]  [place] [address]  Phone: [phone] Fax: [phone]      Best Call Back Number: [phone]  Additional Information: Patient states that the itching and Hives has not gotten any better.  Benadryl has not helped at all.

## 2020-03-03 ENCOUNTER — TELEPHONE (OUTPATIENT)
Dept: PAIN MEDICINE | Facility: CLINIC | Age: 70
End: 2020-03-03

## 2020-03-03 DIAGNOSIS — M54.12 RADICULITIS, CERVICAL: Primary | ICD-10-CM

## 2020-03-03 NOTE — TELEPHONE ENCOUNTER
----- Message from Nicole Leigh sent at 3/3/2020 12:44 PM CST -----  Contact: Pt  Pt would like the office to contact her as soon as possible about scheduling injections. Thank you.    [phone]

## 2020-03-10 ENCOUNTER — HOSPITAL ENCOUNTER (OUTPATIENT)
Facility: AMBULARY SURGERY CENTER | Age: 70
Discharge: HOME | End: 2020-03-10
Attending: ANESTHESIOLOGY | Admitting: ANESTHESIOLOGY
Payer: MEDICARE

## 2020-03-10 DIAGNOSIS — M54.12 CERVICAL RADICULITIS: Primary | ICD-10-CM

## 2020-03-10 LAB — POCT GLUCOSE: 123 MG/DL (ref 70–110)

## 2020-03-10 PROCEDURE — 62321 PR INJ CERV/THORAC, W/GUIDANCE: ICD-10-PCS | Mod: HCNC,,, | Performed by: ANESTHESIOLOGY

## 2020-03-10 PROCEDURE — 62321 NJX INTERLAMINAR CRV/THRC: CPT | Performed by: ANESTHESIOLOGY

## 2020-03-10 PROCEDURE — 99152 PR MOD CONSCIOUS SEDATION, SAME PHYS, 5+ YRS, FIRST 15 MIN: ICD-10-PCS | Mod: HCNC,,, | Performed by: ANESTHESIOLOGY

## 2020-03-10 PROCEDURE — 62321 NJX INTERLAMINAR CRV/THRC: CPT | Mod: HCNC,,, | Performed by: ANESTHESIOLOGY

## 2020-03-10 PROCEDURE — 99152 MOD SED SAME PHYS/QHP 5/>YRS: CPT | Mod: HCNC,,, | Performed by: ANESTHESIOLOGY

## 2020-03-10 RX ORDER — SODIUM CHLORIDE 9 MG/ML
INJECTION, SOLUTION INTRAMUSCULAR; INTRAVENOUS; SUBCUTANEOUS
Status: DISCONTINUED | OUTPATIENT
Start: 2020-03-10 | End: 2020-03-10 | Stop reason: HOSPADM

## 2020-03-10 RX ORDER — DEXAMETHASONE SODIUM PHOSPHATE 10 MG/ML
INJECTION INTRAMUSCULAR; INTRAVENOUS
Status: DISCONTINUED | OUTPATIENT
Start: 2020-03-10 | End: 2020-03-10 | Stop reason: HOSPADM

## 2020-03-10 RX ORDER — FENTANYL CITRATE 50 UG/ML
INJECTION, SOLUTION INTRAMUSCULAR; INTRAVENOUS
Status: DISCONTINUED | OUTPATIENT
Start: 2020-03-10 | End: 2020-03-10 | Stop reason: HOSPADM

## 2020-03-10 RX ORDER — SODIUM CHLORIDE, SODIUM LACTATE, POTASSIUM CHLORIDE, CALCIUM CHLORIDE 600; 310; 30; 20 MG/100ML; MG/100ML; MG/100ML; MG/100ML
INJECTION, SOLUTION INTRAVENOUS CONTINUOUS
Status: DISCONTINUED | OUTPATIENT
Start: 2020-03-10 | End: 2020-03-10 | Stop reason: HOSPADM

## 2020-03-10 RX ORDER — LIDOCAINE HYDROCHLORIDE 10 MG/ML
INJECTION, SOLUTION EPIDURAL; INFILTRATION; INTRACAUDAL; PERINEURAL
Status: DISCONTINUED | OUTPATIENT
Start: 2020-03-10 | End: 2020-03-10 | Stop reason: HOSPADM

## 2020-03-10 RX ORDER — MIDAZOLAM HYDROCHLORIDE 2 MG/2ML
INJECTION, SOLUTION INTRAMUSCULAR; INTRAVENOUS
Status: DISCONTINUED | OUTPATIENT
Start: 2020-03-10 | End: 2020-03-10 | Stop reason: HOSPADM

## 2020-03-10 RX ADMIN — SODIUM CHLORIDE, SODIUM LACTATE, POTASSIUM CHLORIDE, CALCIUM CHLORIDE: 600; 310; 30; 20 INJECTION, SOLUTION INTRAVENOUS at 09:03

## 2020-03-10 NOTE — DISCHARGE SUMMARY
[facility]  Discharge Note  Short Stay    Admit Date: 3/10/2020    Discharge Date and Time: 3/10/2020    Attending Physician: Eddi Albrecht MD     Discharge Provider: Eddi Albrecht    Diagnoses:  Active Hospital Problems    Diagnosis  POA    *Cervical radiculitis [M54.12]  Yes      Resolved Hospital Problems   No resolved problems to display.       Hospital Course: Cervical OLIVE  Discharged Condition: Good    Final Diagnoses:   Active Hospital Problems    Diagnosis  POA    *Cervical radiculitis [M54.12]  Yes      Resolved Hospital Problems   No resolved problems to display.       Disposition: Home or Self Care    Follow up/Patient Instructions:    Medications:  Reconciled Home Medications:      Medication List      CHANGE how you take these medications    albuterol 90 mcg/actuation inhaler  Commonly known as:  PROVENTIL/VENTOLIN HFA  Inhale 2 puffs into the lungs 4 (four) times daily.  What changed:    · when to take this  · reasons to take this        CONTINUE taking these medications    ALPRAZolam 0.25 MG tablet  Commonly known as:  XANAX  Use one 20-30 minutes before flying prn     amLODIPine 10 MG tablet  Commonly known as:  NORVASC  Take 1 tablet (10 mg total) by mouth once daily.     blood sugar diagnostic Strp  True Metrix glucose strips check twice daily     blood-glucose meter kit  True Metrix glucometer- check glucose twice daily. Dx e11.9     cetirizine 10 MG tablet  Commonly known as:  ZYRTEC  Take 10 mg by mouth daily as needed.     cyclobenzaprine 10 MG tablet  Commonly known as:  FLEXERIL  TAKE 1 TABLET BY MOUTH THREE TIMES DAILY AS NEEDED FOR MUSCLE SPASMS     dulaglutide 0.75 mg/0.5 mL Pnij  Commonly known as:  TRULICITY  INJECT ONE SYRINGE SUBCUTANEOUSLY ONCE WEEKLY.     ergocalciferol 50,000 unit Cap  Commonly known as:  ERGOCALCIFEROL  Take one twice a week     escitalopram oxalate 10 MG tablet  Commonly known as:  LEXAPRO  TAKE 1 TABLET EVERY DAY     fluticasone propionate 50 mcg/actuation  nasal spray  Commonly known as:  FLONASE  2 sprays (100 mcg total) by Each Nare route once daily.     gabapentin 300 MG capsule  Commonly known as:  NEURONTIN  Take 1 at bedtime for 1 week, then two a day for 1 week, then 3 a day     HYDROcodone-acetaminophen 5-325 mg per tablet  Commonly known as:  NORCO  Take 1 tablet by mouth every 6 (six) hours as needed.     losartan 100 MG tablet  Commonly known as:  COZAAR  TAKE 1 TABLET EVERY DAY     meclizine 25 mg tablet  Commonly known as:  ANTIVERT  Take 1 tablet (25 mg total) by mouth 3 (three) times daily as needed.     metFORMIN 750 MG XR 24hr tablet  Commonly known as:  GLUCOPHAGE-XR  Take 1 tablet (750 mg total) by mouth 2 (two) times daily with meals.     rosuvastatin 40 MG Tab  Commonly known as:  CRESTOR  Take 1 tablet (40 mg total) by mouth every evening.     TRUEPLUS LANCETS 28 gauge Misc  Generic drug:  lancets  TEST TWO TIMES DAILY          Discharge Procedure Orders   Call MD for:  temperature >100.4     Call MD for:  persistent nausea and vomiting or diarrhea     Call MD for:  severe uncontrolled pain     Call MD for:  redness, tenderness, or signs of infection (pain, swelling, redness, odor or green/yellow discharge around incision site)     Call MD for:  difficulty breathing or increased cough     Call MD for:  severe persistent headache        Follow up with MD in 2-3 weeks    Discharge Procedure Orders (must include Diet, Follow-up, Activity):   Discharge Procedure Orders (must include Diet, Follow-up, Activity)   Call MD for:  temperature >100.4     Call MD for:  persistent nausea and vomiting or diarrhea     Call MD for:  severe uncontrolled pain     Call MD for:  redness, tenderness, or signs of infection (pain, swelling, redness, odor or green/yellow discharge around incision site)     Call MD for:  difficulty breathing or increased cough     Call MD for:  severe persistent headache

## 2020-03-10 NOTE — PLAN OF CARE
Pt states ready to go home , stable, sanjay po fluids, ambulatory, denies pain, Leg raises performed in bed without diff and instructed on fall risk. PT and spouse verbalized understanding

## 2020-03-10 NOTE — OP NOTE
PROCEDURE DATE: 3/10/2020    Procedure: C7-T1 cervical interlaminar epidural steroid injection under utilizing fluoroscopy.    Diagnosis: Cervical Degenerative Disc Diease; Cervical Radiculitis  POSTOP DIAGNOSIS: SAME    Physician: Eddi Albrecht MD    Medications injected:  Dexamethasone 10mg followed by a slow injection of 4 mL sterile, preservative-free normal saline.    Local anesthetic used: Lidocaine 1%, 2 ml.    Sedation Medications: RN IV sedation    Complications:  None    Estimated blood loss: None    Technique:  A time-out was taken to identify patient and procedure prior to starting the procedure.  With the patient laying in a prone position with the neck in a mid-flexed forward position, the area was prepped and draped in the usual sterile fashion using ChloraPrep and a fenestrated drape.  The area was determined under AP fluoroscopic guidance.  Local anesthetic was given using a 25-gauge 1.5 inch needle by raising a wheal and then infiltrating ventrally.  A 3.5 inch 20-gauge Touhy needle was introduced under fluoroscopic guidance to meet the lamina of C7.  The needle was then hinged under the lamina then advanced using loss of resistance technique.  Once the tip of the needle was in the desired position, the 1ml of non-iodine contrast dye Omnipaque was injected to determine placement and no uptake.  The steroid was then injected slowly followed by a slow injection of 4 mL of the sterile preservative-free normal saline.  The patient tolerated the procedure well.    The patient was monitored after the procedure and was given post-procedure and discharge instructions to follow at home. The patient was discharged in a stable condition.

## 2020-03-11 VITALS
TEMPERATURE: 98 F | HEART RATE: 75 BPM | BODY MASS INDEX: 31.65 KG/M2 | OXYGEN SATURATION: 97 % | DIASTOLIC BLOOD PRESSURE: 77 MMHG | RESPIRATION RATE: 18 BRPM | HEIGHT: 65 IN | SYSTOLIC BLOOD PRESSURE: 141 MMHG | WEIGHT: 190 LBS

## 2020-03-19 DIAGNOSIS — I10 HYPERTENSION, POOR CONTROL: ICD-10-CM

## 2020-03-19 DIAGNOSIS — R05.3 CHRONIC COUGH: ICD-10-CM

## 2020-03-19 RX ORDER — LOSARTAN POTASSIUM 100 MG/1
TABLET ORAL
Qty: 90 TABLET | Refills: 1 | Status: SHIPPED | OUTPATIENT
Start: 2020-03-19 | End: 2020-06-22 | Stop reason: SDUPTHER

## 2020-03-19 RX ORDER — AMLODIPINE BESYLATE 10 MG/1
TABLET ORAL
Qty: 90 TABLET | Refills: 1 | Status: SHIPPED | OUTPATIENT
Start: 2020-03-19 | End: 2020-06-22 | Stop reason: SDUPTHER

## 2020-03-20 RX ORDER — INSULIN PUMP SYRINGE, 3 ML
EACH MISCELLANEOUS
Qty: 1 EACH | Refills: 0 | Status: SHIPPED | OUTPATIENT
Start: 2020-03-20 | End: 2020-03-30 | Stop reason: SDUPTHER

## 2020-03-20 RX ORDER — ISOPROPYL ALCOHOL 70 ML/100ML
1 SWAB TOPICAL
Qty: 200 EACH | Refills: 3 | Status: SHIPPED | OUTPATIENT
Start: 2020-03-20 | End: 2020-03-30 | Stop reason: SDUPTHER

## 2020-03-30 RX ORDER — INSULIN PUMP SYRINGE, 3 ML
EACH MISCELLANEOUS
Qty: 1 EACH | Refills: 0 | Status: SHIPPED | OUTPATIENT
Start: 2020-03-30 | End: 2023-07-14

## 2020-03-30 RX ORDER — ISOPROPYL ALCOHOL 70 ML/100ML
1 SWAB TOPICAL
Qty: 200 EACH | Refills: 3 | Status: SHIPPED | OUTPATIENT
Start: 2020-03-30 | End: 2020-12-16 | Stop reason: SDUPTHER

## 2020-03-30 NOTE — TELEPHONE ENCOUNTER
LOV   Last refill-   Last labs   Next visit       Patient is requesting  Accu-chek Patience plus meter  Accu-chek Patience plus test strips   And BD single use Swabs.   Not sure if that is the right glucose meter as on file  Pending.

## 2020-04-22 RX ORDER — TAMSULOSIN HYDROCHLORIDE 0.4 MG/1
CAPSULE ORAL
Qty: 90 CAPSULE | OUTPATIENT
Start: 2020-04-22

## 2020-05-14 ENCOUNTER — PATIENT OUTREACH (OUTPATIENT)
Dept: ADMINISTRATIVE | Facility: OTHER | Age: 70
End: 2020-05-14

## 2020-05-14 ENCOUNTER — OFFICE VISIT (OUTPATIENT)
Dept: FAMILY MEDICINE | Facility: CLINIC | Age: 70
End: 2020-05-14
Payer: MEDICARE

## 2020-05-14 ENCOUNTER — HOSPITAL ENCOUNTER (OUTPATIENT)
Dept: RADIOLOGY | Facility: CLINIC | Age: 70
Discharge: HOME | End: 2020-05-14
Attending: NURSE PRACTITIONER
Payer: MEDICARE

## 2020-05-14 ENCOUNTER — TELEPHONE (OUTPATIENT)
Dept: FAMILY MEDICINE | Facility: CLINIC | Age: 70
End: 2020-05-14

## 2020-05-14 VITALS
BODY MASS INDEX: 32.58 KG/M2 | SYSTOLIC BLOOD PRESSURE: 136 MMHG | OXYGEN SATURATION: 96 % | HEART RATE: 100 BPM | DIASTOLIC BLOOD PRESSURE: 68 MMHG | RESPIRATION RATE: 18 BRPM | TEMPERATURE: 98 F | HEIGHT: 65 IN | WEIGHT: 195.56 LBS

## 2020-05-14 DIAGNOSIS — E11.59 HYPERTENSION ASSOCIATED WITH DIABETES: ICD-10-CM

## 2020-05-14 DIAGNOSIS — B96.89 BACTERIAL VAGINITIS: ICD-10-CM

## 2020-05-14 DIAGNOSIS — R10.9 FLANK PAIN, ACUTE: ICD-10-CM

## 2020-05-14 DIAGNOSIS — F41.9 ANXIETY: ICD-10-CM

## 2020-05-14 DIAGNOSIS — N76.0 BACTERIAL VAGINITIS: ICD-10-CM

## 2020-05-14 DIAGNOSIS — R30.0 DYSURIA: ICD-10-CM

## 2020-05-14 DIAGNOSIS — R39.9 UTI SYMPTOMS: Primary | ICD-10-CM

## 2020-05-14 DIAGNOSIS — I15.2 HYPERTENSION ASSOCIATED WITH DIABETES: ICD-10-CM

## 2020-05-14 DIAGNOSIS — N20.0 NEPHROLITHIASIS: ICD-10-CM

## 2020-05-14 DIAGNOSIS — E11.9 CONTROLLED TYPE 2 DIABETES MELLITUS WITHOUT COMPLICATION, WITHOUT LONG-TERM CURRENT USE OF INSULIN: ICD-10-CM

## 2020-05-14 LAB
BACTERIA #/AREA URNS AUTO: ABNORMAL /HPF
BILIRUB SERPL-MCNC: NEGATIVE MG/DL
BILIRUB UR QL STRIP: NEGATIVE
BLOOD URINE, POC: NEGATIVE
CLARITY UR: CLEAR
COLOR UR: YELLOW
COLOR, POC UA: YELLOW
GLUCOSE UR QL STRIP: NEGATIVE
GLUCOSE UR QL STRIP: NORMAL
HGB UR QL STRIP: NEGATIVE
KETONES UR QL STRIP: NEGATIVE
KETONES UR QL STRIP: NEGATIVE
LEUKOCYTE ESTERASE UR QL STRIP: ABNORMAL
LEUKOCYTE ESTERASE URINE, POC: ABNORMAL
MICROSCOPIC COMMENT: ABNORMAL
NITRITE UR QL STRIP: NEGATIVE
NITRITE, POC UA: NEGATIVE
NON-SQ EPI CELLS #/AREA URNS AUTO: 1 /HPF
PH UR STRIP: 7 [PH] (ref 5–8)
PH, POC UA: 8
PROT UR QL STRIP: NEGATIVE
PROTEIN, POC: ABNORMAL
SP GR UR STRIP: 1 (ref 1–1.03)
SPECIFIC GRAVITY, POC UA: 1
SQUAMOUS #/AREA URNS AUTO: 1 /HPF
URN SPEC COLLECT METH UR: ABNORMAL
UROBILINOGEN, POC UA: NORMAL
WBC #/AREA URNS AUTO: 2 /HPF (ref 0–5)

## 2020-05-14 PROCEDURE — 76770 US RETROPERITONEAL COMPLETE: ICD-10-PCS | Mod: 26,HCNC,, | Performed by: RADIOLOGY

## 2020-05-14 PROCEDURE — 81002 URINALYSIS NONAUTO W/O SCOPE: CPT | Mod: HCNC,S$GLB,, | Performed by: NURSE PRACTITIONER

## 2020-05-14 PROCEDURE — 3044F HG A1C LEVEL LT 7.0%: CPT | Mod: HCNC,CPTII,S$GLB, | Performed by: NURSE PRACTITIONER

## 2020-05-14 PROCEDURE — 76770 US EXAM ABDO BACK WALL COMP: CPT | Mod: 26,HCNC,, | Performed by: RADIOLOGY

## 2020-05-14 PROCEDURE — 1159F MED LIST DOCD IN RCRD: CPT | Mod: HCNC,S$GLB,, | Performed by: NURSE PRACTITIONER

## 2020-05-14 PROCEDURE — 87086 URINE CULTURE/COLONY COUNT: CPT | Mod: HCNC

## 2020-05-14 PROCEDURE — 1125F PR PAIN SEVERITY QUANTIFIED, PAIN PRESENT: ICD-10-PCS | Mod: HCNC,S$GLB,, | Performed by: NURSE PRACTITIONER

## 2020-05-14 PROCEDURE — 3075F SYST BP GE 130 - 139MM HG: CPT | Mod: HCNC,CPTII,S$GLB, | Performed by: NURSE PRACTITIONER

## 2020-05-14 PROCEDURE — 99999 PR PBB SHADOW E&M-EST. PATIENT-LVL V: CPT | Mod: PBBFAC,HCNC,, | Performed by: NURSE PRACTITIONER

## 2020-05-14 PROCEDURE — 1101F PR PT FALLS ASSESS DOC 0-1 FALLS W/OUT INJ PAST YR: ICD-10-PCS | Mod: HCNC,CPTII,S$GLB, | Performed by: NURSE PRACTITIONER

## 2020-05-14 PROCEDURE — 1159F PR MEDICATION LIST DOCUMENTED IN MEDICAL RECORD: ICD-10-PCS | Mod: HCNC,S$GLB,, | Performed by: NURSE PRACTITIONER

## 2020-05-14 PROCEDURE — 81000 URINALYSIS NONAUTO W/SCOPE: CPT | Mod: HCNC,PO

## 2020-05-14 PROCEDURE — 1101F PT FALLS ASSESS-DOCD LE1/YR: CPT | Mod: HCNC,CPTII,S$GLB, | Performed by: NURSE PRACTITIONER

## 2020-05-14 PROCEDURE — 3078F PR MOST RECENT DIASTOLIC BLOOD PRESSURE < 80 MM HG: ICD-10-PCS | Mod: HCNC,CPTII,S$GLB, | Performed by: NURSE PRACTITIONER

## 2020-05-14 PROCEDURE — 1125F AMNT PAIN NOTED PAIN PRSNT: CPT | Mod: HCNC,S$GLB,, | Performed by: NURSE PRACTITIONER

## 2020-05-14 PROCEDURE — 99999 PR PBB SHADOW E&M-EST. PATIENT-LVL V: ICD-10-PCS | Mod: PBBFAC,HCNC,, | Performed by: NURSE PRACTITIONER

## 2020-05-14 PROCEDURE — 3078F DIAST BP <80 MM HG: CPT | Mod: HCNC,CPTII,S$GLB, | Performed by: NURSE PRACTITIONER

## 2020-05-14 PROCEDURE — 3044F PR MOST RECENT HEMOGLOBIN A1C LEVEL <7.0%: ICD-10-PCS | Mod: HCNC,CPTII,S$GLB, | Performed by: NURSE PRACTITIONER

## 2020-05-14 PROCEDURE — 3075F PR MOST RECENT SYSTOLIC BLOOD PRESS GE 130-139MM HG: ICD-10-PCS | Mod: HCNC,CPTII,S$GLB, | Performed by: NURSE PRACTITIONER

## 2020-05-14 PROCEDURE — 76770 US EXAM ABDO BACK WALL COMP: CPT | Mod: TC,HCNC,PO

## 2020-05-14 PROCEDURE — 99214 PR OFFICE/OUTPT VISIT, EST, LEVL IV, 30-39 MIN: ICD-10-PCS | Mod: 25,HCNC,S$GLB, | Performed by: NURSE PRACTITIONER

## 2020-05-14 PROCEDURE — 99214 OFFICE O/P EST MOD 30 MIN: CPT | Mod: 25,HCNC,S$GLB, | Performed by: NURSE PRACTITIONER

## 2020-05-14 PROCEDURE — 81002 POCT URINE DIPSTICK WITHOUT MICROSCOPE: ICD-10-PCS | Mod: HCNC,S$GLB,, | Performed by: NURSE PRACTITIONER

## 2020-05-14 RX ORDER — ESCITALOPRAM OXALATE 10 MG/1
10 TABLET ORAL DAILY
Qty: 90 TABLET | Refills: 1 | Status: SHIPPED | OUTPATIENT
Start: 2020-05-14 | End: 2020-08-05 | Stop reason: SDUPTHER

## 2020-05-14 RX ORDER — METRONIDAZOLE 500 MG/1
500 TABLET ORAL EVERY 12 HOURS
Qty: 14 TABLET | Refills: 0 | Status: SHIPPED | OUTPATIENT
Start: 2020-05-14 | End: 2020-05-21

## 2020-05-14 NOTE — TELEPHONE ENCOUNTER
----- Message from Viktoriya Leung sent at 5/14/2020  7:03 AM CDT -----  Name of Caller Patient  Reason for Visit/Symptoms Med Refill and burns when urinating, back hurts bad on both sides, foul order, vaginal infection, left shoulder and neck painful-2 months  Best Contact Number or Confirm if Mychart Preferred [phone] (home)     Preferred Date/Time of Appointment This week  Interested in Virtual Visit (yes/no) No   Additional Information She wants to see the doctor himself. She states the virtual visit is too hard to figure out but will see if she can get somebody to help her. If not, she wants to get an appointment to take care of all her ailments.

## 2020-05-14 NOTE — PROGRESS NOTES
Patient ID: Doris Pastrana is a 69 y.o. female.    Chief Complaint:   pain while urinating (lower back ) and possible yeast infection    Urinary Tract Infection    This is a new problem. The current episode started 1 to 4 weeks ago (2 weeks). The problem occurs every urination. The problem has been gradually worsening. The quality of the pain is described as aching and burning. The pain is at a severity of 7/10. The pain is moderate. There has been no fever. There is no history of pyelonephritis. Associated symptoms include a discharge, flank pain, frequency and nausea. Pertinent negatives include no behavior changes, chills, hematuria, hesitancy, possible pregnancy, sweats, urgency, vomiting, weight loss, constipation, rash or withholding. Associated symptoms comments: Patient also reports strong foul odor. She has tried nothing for the symptoms. Her past medical history is significant for diabetes mellitus, hypertension, kidney stones and a urological procedure.      Patient is new to me. Reviewed past medical and social history.    Past Medical History:   Diagnosis Date    Acute sinus infection     Allergy     Anemia     Arthritis     Bronchitis     Degenerative disc disease     lumbar, pain contract 2013    Depression     Diabetes mellitus, type 2 16    Fatty liver     Hyperlipidemia 16    Hypertension     Kidney stone     Mitral valve prolapse     Pleurisy     Pneumonia     PONV (postoperative nausea and vomiting)     Sinus infection     TIA (transient ischemic attack) 2019     Past Surgical History:   Procedure Laterality Date    ANKLE SURGERY  2017    right ankle torn ligament    APPENDECTOMY       SECTION      CHOLECYSTECTOMY      COLONOSCOPY  8/13/15    Dr. Oliveira, five year recheck    EPIDURAL STEROID INJECTION INTO CERVICAL SPINE N/A 1/3/2020    Procedure: Injection-steroid-epidural-cervical;  Surgeon: Eddi Albrecht MD;  [facility]: [facility];   Service: Pain Management;  Laterality: N/A;  C7-T1    EPIDURAL STEROID INJECTION INTO CERVICAL SPINE N/A 3/10/2020    Procedure: Injection-steroid-epidural-cervical;  Surgeon: Eddi Albrecht MD;  Location: [facility] OR;  Service: Pain Management;  Laterality: N/A;  C7-T1    EXTRACORPOREAL SHOCK WAVE LITHOTRIPSY Left 5/22/2019    Procedure: LITHOTRIPSY, ESWL - only if stone visible on xray  with cysto after on table possible;  Surgeon: Marisol Stewart MD;  Location: [facility] OR;  Service: Urology;  Laterality: Left;    HYSTERECTOMY      TONSILLECTOMY      VAGINAL DELIVERY       Current Outpatient Medications on File Prior to Visit   Medication Sig Dispense Refill    albuterol (PROVENTIL/VENTOLIN HFA) 90 mcg/actuation inhaler Inhale 2 puffs into the lungs 4 (four) times daily. (Patient taking differently: Inhale 2 puffs into the lungs every 4 (four) hours as needed. ) 1 Inhaler 1    alcohol swabs (ALCOHOL WIPES) PadM Apply 1 each topically as needed. Use two daily to check blood glucose. 200 each 3    ALPRAZolam (XANAX) 0.25 MG tablet Use one 20-30 minutes before flying prn 10 tablet 1    amLODIPine (NORVASC) 10 MG tablet TAKE 1 TABLET EVERY DAY 90 tablet 1    blood sugar diagnostic Strp Patience Plus Accu-chek glucose strips check twice daily 200 each 3    blood-glucose meter kit Use as instructed 1 each 0    cetirizine (ZYRTEC) 10 MG tablet Take 10 mg by mouth daily as needed.       cyclobenzaprine (FLEXERIL) 10 MG tablet TAKE 1 TABLET BY MOUTH THREE TIMES DAILY AS NEEDED FOR MUSCLE SPASMS 270 tablet 0    dulaglutide (TRULICITY) 0.75 mg/0.5 mL PnIj INJECT ONE SYRINGE SUBCUTANEOUSLY ONCE WEEKLY. 6 mL 1    ergocalciferol (ERGOCALCIFEROL) 50,000 unit Cap Take one twice a week 24 capsule 1    fluticasone propionate (FLONASE) 50 mcg/actuation nasal spray 2 sprays (100 mcg total) by Each Nare route once daily. 16 g 5    gabapentin (NEURONTIN) 300 MG capsule Take 1 at bedtime for 1 week, then two a day for 1  week, then 3 a day 90 capsule 5    HYDROcodone-acetaminophen (NORCO) 5-325 mg per tablet Take 1 tablet by mouth every 6 (six) hours as needed. 28 tablet 0    losartan (COZAAR) 100 MG tablet TAKE 1 TABLET EVERY DAY 90 tablet 1    meclizine (ANTIVERT) 25 mg tablet Take 1 tablet (25 mg total) by mouth 3 (three) times daily as needed. 30 tablet 5    metFORMIN (GLUCOPHAGE-XR) 750 MG XR 24hr tablet Take 1 tablet (750 mg total) by mouth 2 (two) times daily with meals. 180 tablet 1    rosuvastatin (CRESTOR) 40 MG Tab Take 1 tablet (40 mg total) by mouth every evening. 30 tablet 11    TRUEPLUS LANCETS 28 gauge Misc TEST TWO TIMES DAILY 200 each 3    [DISCONTINUED] escitalopram oxalate (LEXAPRO) 10 MG tablet TAKE 1 TABLET EVERY DAY 90 tablet 1     Current Facility-Administered Medications on File Prior to Visit   Medication Dose Route Frequency Provider Last Rate Last Dose    dexamethasone injection 8 mg  8 mg Intramuscular Once Maddy Sims NP           Review of Systems   Constitutional: Negative for chills, fever and weight loss.   Respiratory: Negative for shortness of breath and wheezing.    Cardiovascular: Negative for chest pain and palpitations.   Gastrointestinal: Positive for nausea. Negative for constipation, diarrhea and vomiting.   Genitourinary: Positive for dysuria, flank pain, frequency and vaginal discharge. Negative for hematuria, hesitancy, urgency and vaginal bleeding.   Skin: Negative for rash.   All other systems reviewed and are negative.      Objective:      Physical Exam   Constitutional: She is oriented to person, place, and time. She appears well-developed and well-nourished.   HENT:   Head: Normocephalic.   Mouth/Throat: Oropharynx is clear and moist.   Eyes: Pupils are equal, round, and reactive to light. Conjunctivae and EOM are normal.   Neck: Normal range of motion. Neck supple.   Cardiovascular: Normal rate, regular rhythm and normal heart sounds.   Pulmonary/Chest: Effort normal  and breath sounds normal.   Abdominal: Soft. Bowel sounds are normal.   Genitourinary: Vaginal discharge found.   Genitourinary Comments: Patient reports vaginal discharge   Musculoskeletal: Normal range of motion.   Neurological: She is alert and oriented to person, place, and time.   Skin: Skin is warm and dry.   Psychiatric: She has a normal mood and affect.   Vitals reviewed.      Assessment:       1. UTI symptoms    2. Hypertension associated with diabetes    3. Controlled type 2 diabetes mellitus without complication, without long-term current use of insulin    4. Nephrolithiasis    5. Bacterial vaginitis    6. Flank pain, acute    7. Dysuria    8. Anxiety        Plan:       Doris was seen today for pain while urinating and possible yeast infection.    Diagnoses and all orders for this visit:    UTI symptoms  -     POCT URINE DIPSTICK WITHOUT MICROSCOPE; Future  -     Urine culture  -     URINALYSIS; Future  -     Ambulatory referral/consult to Urology; Future    Hypertension associated with diabetes    Controlled type 2 diabetes mellitus without complication, without long-term current use of insulin    Nephrolithiasis  -     Ambulatory referral/consult to Urology; Future    Bacterial vaginitis  -     metroNIDAZOLE (FLAGYL) 500 MG tablet; Take 1 tablet (500 mg total) by mouth every 12 (twelve) hours. for 7 days  -     Ambulatory referral/consult to Urology; Future    Flank pain, acute  -     US Retroperitoneal Complete; Future  -     Ambulatory referral/consult to Urology; Future    Dysuria  -     Ambulatory referral/consult to Urology; Future    Anxiety  -     escitalopram oxalate (LEXAPRO) 10 MG tablet; Take 1 tablet (10 mg total) by mouth once daily.    Ultrasound and UA with culture today. Will call with results and discuss further plan of care.  Patient education provided.  All questions and concerns addressed  RTC PRN and if symptoms worsen or fail to improve  Patient verbalizes understanding         Patient Instructions       Kidney Stone (with Pain)    The sharp cramping pain on either side of your lower back and nausea/vomiting that you have are because of a small stone that has formed in the kidney. It is now passing down a narrow tube (ureter) on its way to your bladder. Once the stone reaches your bladder, the pain will often stop. But it may come back as the stone continues to pass out of the bladder and through the urethra. The stone may pass in your urine stream in one piece. The size may be 1/16 inch to 1/4 inch (1 mm to 6 mm). Or, the stone may break up into trever fragments that you may not even notice.  Once you have had a kidney stone, you are at risk of getting another one in the future. There are 4 types of kidney stones. Eighty percent are calcium stones--mostly calcium oxalate but also some with calcium phosphate. The other 3 types include uric acid stones, struvite stones (from a preceding infection), and rarely, cystine stones.  Most stones will pass on their own, but may take from a few hours to a few days. Sometimes the stone is too large to pass by itself. In that case, the healthcare provider will need to use other ways to remove the stone. These techniques include:  · Lithotripsy. This uses ultrasound waves to break up the stone.  · Ureteroscopy. This pushes a basket-like instrument through the urethra and bladder and into the ureter to pull out the stone.  · Various types of direct surgery through the skin  Home care  The following are general care guidelines:  · Drink plenty of fluids. This means at least 12, 8-ounce glasses of fluid--mostly water--a day.  · Each time you urinate, do so in a jar. Pour the urine from the jar through the strainer and into the toilet. Continue doing this until 24 hours after your pain stops. By then, if there was a kidney stone, it should pass from your bladder. Some stones dissolve into sand-like particles and pass right through the strainer. In that  case, you wont ever see a stone.  · Save any stone that you find in the strainer and bring it to your healthcare provider to look at. It may be possible to stop certain types of stones from forming. For this reason, it is important to know what kind of stone you have.  · Try to stay as active as possible. This will help the stone pass. Don't stay in bed unless your pain keeps you from getting up. You may notice a red, pink, or brown color to your urine. This is normal while passing a kidney stone.  · If you develop pain, you may take ibuprofen or naproxen for pain, unless another medicine was prescribed. If you have chronic liver or kidney disease, talk with your healthcare provider before taking these medicines. Also talk with your provider if you've had a stomach ulcer or GI bleeding.  Preventing stones  Each year for the next 5 to 7 years, you are at risk that a new stone will form. Your risk is a 50% chance over this time period. The risk is higher if you have a family history of kidney stones or have certain chronic illnesses like hypertension, obesity, or diabetes. But you can make changes to your lifestyle and diet that can lower your risk for another stone.  Most kidney stones are made of calcium. The following is advice for preventing another calcium stone. If you dont know the type of stone you have, follow this advice until the cause of your stone is found.  Things that help:  · The most important thing you can do is to drink plenty of fluids each day. See home care above.   · Eat foods that contain phytates. These include wheat, rice, rye, barley, and beans. Phytates are substances that may lower your risk for any type of stone to form.  · Eat more fruits and vegetables. Choose those that are high in potassium.  · Eat foods high in natural citrate like fruit and low-sugar fruit juices.  · Having too little calcium in your diet can put you at risk for calcium kidney stones. Eat a normal amount of  calcium in your diet and talk with your healthcare provider if you are taking calcium supplements. Cutting back on your calcium intake may raise your risk. New research shows that eating calcium-rich and oxalate-rich foods together lowers your risk for stones by binding the minerals in the stomach and intestines before they can reach the kidneys.    · Limit salt intake to 2 grams (1 teaspoon) per day. Use limited amounts when cooking, and dont add salt at the table. Processed and canned foods are usually high in salt.   · Spinach, rhubarb, peanuts, cashews, almonds, grapefruit, and grapefruit juice are all high oxalate foods. You should limit how much of these you eat. Or eat them with calcium-rich foods. These include dairy products, dark leafy greens, soy products, and calcium-enriched foods.  · Reducing the amount of animal meat and high protein foods in your diet may lower your risk for uric acid stones.  · Avoid excess sugar (sucrose) and fructose (sweetener in many soft drinks) in your diet.   · If you take vitamin C as a supplement, don't take more than 1,000 mg a day.  · A dietitian or your healthcare provider can give you information about changes in your diet that will help prevent more kidney stones from forming.  Follow-up care  Follow up with your healthcare provider, or as advised, if the pain lasts more than 48 hours. Talk with your provider about urine and blood tests to find out the cause of your stone. If you had an X-ray, CT scan, or other diagnostic test, you will be told of any new findings that may affect your care.  Call 911  Call 911 if you have any of these:  · Weakness, dizziness, or fainting  When to seek medical advice  Call your healthcare provider right away if any of these occur:  · Pain that is not controlled by the medicine given  · Repeated vomiting or unable to keep down fluids  · Fever of 100.4ºF (38ºC) or higher, or as directed by your healthcare provider  · Passage of solid red  or brown urine (can't see through it) or urine with lots of blood clots  · Foul-smelling or cloudy urine  · Unable to pass urine for 8 hours and increasing bladder pressure  Date Last Reviewed: 10/1/2016  © 9210-3747 [employer]. [address] [place] [address]. All rights reserved. This information is not intended as a substitute for professional medical care. Always follow your healthcare professional's instructions.        Vaginal Infection: Bacterial Vaginosis  Both good and bad bacteria are present in a healthy vagina. Bacterial vaginosis (BV) occurs when these bacteria get out of balance. The numbers of good bacteria decrease. This allows the numbers of bad bacteria to increase and cause BV. In most cases, BV is not a serious problem.  Causes of bacterial vaginosis  The cause of BV is not clear. Douching may lead to it. Having sex with a new partner or more than 1 partner makes it more likely.  Symptoms of bacterial vaginosis  Symptoms of BV vary for each woman. Some women have few symptoms or none at all. If symptoms are present, they can include:  · Thin, milky white or gray or sometimes green discharge  · Unpleasant fishy odor  · Irritation, itching, and burning at opening of vagina which may indicate mixed vaginitis    · Burning or irritation with sex or urination which may indicate mixed vaginitis  Diagnosing bacterial vaginosis  Your healthcare provider will ask about your symptoms and health history. He or she will also do a pelvic exam. This is an exam of your vagina and cervix. A sample of vaginal fluid or discharge may be taken. This sample is checked for signs of BV.  Treating bacterial vaginosis  BV is often treated with antibiotics. They may be given in oral pill form or as a vaginal cream. To use these medicines:  · Be sure to take all of your medicine, even if your symptoms go away.  · If youre taking antibiotic pills, do not drink alcohol until youre finished with  all of your medicine.  · If youre using vaginal cream, apply it as directed. Be aware that the cream may make condoms and diaphragms less effective.  · Call your healthcare provider if symptoms do not go away within 4 days of starting treatment. Also call if you have a reaction to the medicine.  Why treatment matters  Even if you have no symptoms or your symptoms are mild, BV should be treated. Untreated BV can lead to health problems such as:  · Increased risk of  delivery if youre pregnant  · Increased risk of complications after surgery on the reproductive organs  · Possible increased risk of pelvic inflammatory disease (PID)   Date Last Reviewed: 3/1/2017  © 2770-9509 [employer]. [address] [place] [address], [place] [address]. All rights reserved. This information is not intended as a substitute for professional medical care. Always follow your healthcare professional's instructions.

## 2020-05-15 ENCOUNTER — OFFICE VISIT (OUTPATIENT)
Dept: UROLOGY | Facility: CLINIC | Age: 70
End: 2020-05-15
Payer: MEDICARE

## 2020-05-15 VITALS
SYSTOLIC BLOOD PRESSURE: 126 MMHG | TEMPERATURE: 98 F | HEART RATE: 89 BPM | WEIGHT: 195.56 LBS | BODY MASS INDEX: 32.58 KG/M2 | DIASTOLIC BLOOD PRESSURE: 80 MMHG | HEIGHT: 65 IN | RESPIRATION RATE: 18 BRPM

## 2020-05-15 DIAGNOSIS — R39.9 UTI SYMPTOMS: ICD-10-CM

## 2020-05-15 DIAGNOSIS — N20.0 NEPHROLITHIASIS: ICD-10-CM

## 2020-05-15 DIAGNOSIS — B96.89 BACTERIAL VAGINITIS: ICD-10-CM

## 2020-05-15 DIAGNOSIS — N76.0 BACTERIAL VAGINITIS: ICD-10-CM

## 2020-05-15 DIAGNOSIS — R30.0 DYSURIA: ICD-10-CM

## 2020-05-15 DIAGNOSIS — R10.9 FLANK PAIN, ACUTE: ICD-10-CM

## 2020-05-15 PROCEDURE — 3074F PR MOST RECENT SYSTOLIC BLOOD PRESSURE < 130 MM HG: ICD-10-PCS | Mod: HCNC,CPTII,S$GLB, | Performed by: NURSE PRACTITIONER

## 2020-05-15 PROCEDURE — 99999 PR PBB SHADOW E&M-EST. PATIENT-LVL V: CPT | Mod: PBBFAC,HCNC,, | Performed by: NURSE PRACTITIONER

## 2020-05-15 PROCEDURE — 1159F PR MEDICATION LIST DOCUMENTED IN MEDICAL RECORD: ICD-10-PCS | Mod: HCNC,S$GLB,, | Performed by: NURSE PRACTITIONER

## 2020-05-15 PROCEDURE — 1101F PR PT FALLS ASSESS DOC 0-1 FALLS W/OUT INJ PAST YR: ICD-10-PCS | Mod: HCNC,CPTII,S$GLB, | Performed by: NURSE PRACTITIONER

## 2020-05-15 PROCEDURE — 99999 PR PBB SHADOW E&M-EST. PATIENT-LVL V: ICD-10-PCS | Mod: PBBFAC,HCNC,, | Performed by: NURSE PRACTITIONER

## 2020-05-15 PROCEDURE — 3079F DIAST BP 80-89 MM HG: CPT | Mod: HCNC,CPTII,S$GLB, | Performed by: NURSE PRACTITIONER

## 2020-05-15 PROCEDURE — 1126F PR PAIN SEVERITY QUANTIFIED, NO PAIN PRESENT: ICD-10-PCS | Mod: HCNC,S$GLB,, | Performed by: NURSE PRACTITIONER

## 2020-05-15 PROCEDURE — 3074F SYST BP LT 130 MM HG: CPT | Mod: HCNC,CPTII,S$GLB, | Performed by: NURSE PRACTITIONER

## 2020-05-15 PROCEDURE — 1126F AMNT PAIN NOTED NONE PRSNT: CPT | Mod: HCNC,S$GLB,, | Performed by: NURSE PRACTITIONER

## 2020-05-15 PROCEDURE — 99214 PR OFFICE/OUTPT VISIT, EST, LEVL IV, 30-39 MIN: ICD-10-PCS | Mod: HCNC,S$GLB,, | Performed by: NURSE PRACTITIONER

## 2020-05-15 PROCEDURE — 1101F PT FALLS ASSESS-DOCD LE1/YR: CPT | Mod: HCNC,CPTII,S$GLB, | Performed by: NURSE PRACTITIONER

## 2020-05-15 PROCEDURE — 1159F MED LIST DOCD IN RCRD: CPT | Mod: HCNC,S$GLB,, | Performed by: NURSE PRACTITIONER

## 2020-05-15 PROCEDURE — 3079F PR MOST RECENT DIASTOLIC BLOOD PRESSURE 80-89 MM HG: ICD-10-PCS | Mod: HCNC,CPTII,S$GLB, | Performed by: NURSE PRACTITIONER

## 2020-05-15 PROCEDURE — 99214 OFFICE O/P EST MOD 30 MIN: CPT | Mod: HCNC,S$GLB,, | Performed by: NURSE PRACTITIONER

## 2020-05-15 RX ORDER — CIPROFLOXACIN 250 MG/1
250 TABLET, FILM COATED ORAL 2 TIMES DAILY
Qty: 10 TABLET | Refills: 0 | Status: SHIPPED | OUTPATIENT
Start: 2020-05-15 | End: 2020-05-20

## 2020-05-15 RX ORDER — TAMSULOSIN HYDROCHLORIDE 0.4 MG/1
0.4 CAPSULE ORAL DAILY
COMMUNITY
Start: 2020-02-21 | End: 2020-10-30 | Stop reason: SDUPTHER

## 2020-05-15 NOTE — PROGRESS NOTES
[facility] Note  Staff: CHANEL Angulo-C    PCP: Dr. Tor Garg    Chief Complaint: Evaluation of kidney stones    Subjective:        HPI: Doris Pastrana is a 69 y.o. female referred by PCP clinic presents for consult of kidney stones? (Not quite clear from talking with pt today)  No recent gross hematuria noted.    Pt was initially seen by Jany Berrios DNP at [facility] yesterday for painful urination, yeast infection.  Her LUTS began 1-4 weeks ago.  The problem occurs every urination. The problem has been gradually worsening. The quality of the pain is described as aching and burning. The pain is at a severity of 7/10. The pain is moderate. There has been no fever. There is no history of pyelonephritis. Associated symptoms include a discharge, flank pain, frequency and nausea. Pertinent negatives include no behavior changes, chills, hematuria, hesitancy, possible pregnancy, sweats, urgency, vomiting, weight loss, constipation, rash or withholding. Associated symptoms comments: Patient also reports strong foul odor. She has tried nothing for the symptoms. Her past medical history is significant for diabetes mellitus, hypertension, kidney stones and a urological procedure.     Urine culture was done yesterday and still pending at this time.  POCT dip yesterday showed ++WBCs, Trace protein  UA from PCP office showed 2+ leukocytes, but all others normal findings.    RBUS done yesterday:  IMPRESSION:  Probable bilateral nonobstructive nephrolithiasis.  No hydronephrosis.    PT  HX:  The pt was last seen by Dr. Marisol Stewart on 05/29/2019 for left sided flank pains.  Pt has hx of kidney stones.  Last procedure: LEFT sided ESWL by Dr. Stewart on 05/22/2019.    REVIEW OF SYSTEMS:  A comprehensive 10 system review was performed and is negative except as noted above in HPI    PMHx:  Past Medical History:   Diagnosis Date    Acute sinus infection      Allergy     Anemia     Arthritis     Bronchitis     Degenerative disc disease     lumbar, pain contract 2013    Depression     Diabetes mellitus, type 2 16    Fatty liver     Hyperlipidemia 16    Hypertension     Kidney stone     Mitral valve prolapse     Pleurisy     Pneumonia     PONV (postoperative nausea and vomiting)     Sinus infection     TIA (transient ischemic attack) 2019     PSHx:  Past Surgical History:   Procedure Laterality Date    ANKLE SURGERY  2017    right ankle torn ligament    APPENDECTOMY       SECTION      CHOLECYSTECTOMY      COLONOSCOPY  8/13/15    Dr. Oliveira, five year recheck    EPIDURAL STEROID INJECTION INTO CERVICAL SPINE N/A 1/3/2020    Procedure: Injection-steroid-epidural-cervical;  Surgeon: Eddi Albrecht MD;  Location: [facility] OR;  Service: Pain Management;  Laterality: N/A;  C7-T1    EPIDURAL STEROID INJECTION INTO CERVICAL SPINE N/A 3/10/2020    Procedure: Injection-steroid-epidural-cervical;  Surgeon: Eddi Albrecht MD;  Location: [facility] OR;  Service: Pain Management;  Laterality: N/A;  C7-T1    EXTRACORPOREAL SHOCK WAVE LITHOTRIPSY Left 2019    Procedure: LITHOTRIPSY, ESWL - only if stone visible on xray  with cysto after on table possible;  Surgeon: Marisol Stewart MD;  Location: [facility] OR;  Service: Urology;  Laterality: Left;    HYSTERECTOMY      TONSILLECTOMY      VAGINAL DELIVERY       Allergies:  Omnicef [cefdinir]; Codeine; Rhubarb; Strawberries [strawberry]; Iodine; Latex, natural rubber; and Pravastatin    Medications: reviewed   Objective:     Vitals:    05/15/20 0803   BP: 126/80   Pulse: 89   Resp: 18   Temp: 98.2 °F (36.8 °C)     Physical Exam   Vitals reviewed.  Constitutional: She is oriented to person, place, and time. She appears well-developed and well-nourished.   HENT:   Head: Normocephalic and atraumatic.   Eyes: Conjunctivae and EOM are normal. Pupils are equal, round, and reactive to light.    Neck: Normal range of motion. Neck supple.   Cardiovascular: Normal rate, regular rhythm, normal heart sounds and intact distal pulses.    Pulmonary/Chest: Effort normal and breath sounds normal.   Abdominal: Soft. Bowel sounds are normal.   Musculoskeletal: Normal range of motion.   Neurological: She is alert and oriented to person, place, and time. She has normal reflexes.   Skin: Skin is warm and dry.     Psychiatric: She has a normal mood and affect. Her behavior is normal. Judgment and thought content normal.     Assessment:       1. UTI symptoms    2. Nephrolithiasis    3. Bacterial vaginitis    4. Flank pain, acute    5. Dysuria          Plan:   Kidney infection vs. Kidney stone at this time:    1. Awaiting on urine culture results that PCP office did yesterday.  In meantime will prescribe Cipro 250 mg BID x 5 days for possible infection due to weekend.    2. Offered pt further evaluation of stones with CT Renal Stone Study, pt declined at this time due to financial reasons.  US showed no hydro at this time.  And stones in bilat kidneys were not causing problems.    3. Advised pt if symptoms worsen this weekend, go to nearest ER.    F/u TBD once we review pending lab results to determine next best POC.  We will then contact this pt for further instructions.  Pt vu.    [facility]: Pending    FABIANA Miranda

## 2020-05-15 NOTE — LETTER
May 15, 2020      Jany Berrios, GREG  [address]  [place] [address]           [place] [facility]  [address] [place] [address] ROHINI GARNER  [facility] [address]  Phone: [phone]  Fax: [phone]          Patient: Doris Pastrana   MR Number: [id number]   YOB: 1950   Date of Visit: 5/15/2020       Dear Jany Berrios:    Thank you for referring Doris Pastrana to me for evaluation. Attached you will find relevant portions of my assessment and plan of care.    If you have questions, please do not hesitate to call me. I look forward to following Doris Pastrana along with you.    Sincerely,    Jossie Rodriguez, [facility]    Enclosure  CC:  No Recipients    If you would like to receive this communication electronically, please contact [email or web address] or [phone] to request more information on [employer] access.    For providers and/or their staff who would like to refer a patient to [facility], please contact us through our one-stop-shop provider referral line, [facility], at [phone].    If you feel you have received this communication in error or would no longer like to receive these types of communications, please e-mail [email or web address]

## 2020-05-16 LAB
BACTERIA UR CULT: NORMAL
BACTERIA UR CULT: NORMAL

## 2020-06-08 ENCOUNTER — TELEPHONE (OUTPATIENT)
Dept: ORTHOPEDICS | Facility: CLINIC | Age: 70
End: 2020-06-08

## 2020-06-08 NOTE — TELEPHONE ENCOUNTER
----- Message from Maddy Ortega sent at 6/8/2020  2:08 PM CDT -----  Contact: PT  Type: Needs Medical Advice    Who Called:  Pt  Best Call Back Number: [phone]  Additional Information: Requesting a call back regarding scheduling her procedure   Please Advise ---Thank you

## 2020-06-09 DIAGNOSIS — M19.071 ARTHRITIS OF RIGHT ANKLE: Primary | ICD-10-CM

## 2020-06-15 ENCOUNTER — PATIENT OUTREACH (OUTPATIENT)
Dept: ADMINISTRATIVE | Facility: OTHER | Age: 70
End: 2020-06-15

## 2020-06-16 ENCOUNTER — OFFICE VISIT (OUTPATIENT)
Dept: ORTHOPEDICS | Facility: CLINIC | Age: 70
End: 2020-06-16
Payer: MEDICARE

## 2020-06-16 ENCOUNTER — HOSPITAL ENCOUNTER (OUTPATIENT)
Dept: RADIOLOGY | Facility: HOSPITAL | Age: 70
Discharge: HOME | End: 2020-06-16
Attending: ORTHOPAEDIC SURGERY
Payer: MEDICARE

## 2020-06-16 ENCOUNTER — DOCUMENTATION ONLY (OUTPATIENT)
Dept: ORTHOPEDICS | Facility: CLINIC | Age: 70
End: 2020-06-16

## 2020-06-16 ENCOUNTER — DOCUMENTATION ONLY (OUTPATIENT)
Dept: FAMILY MEDICINE | Facility: CLINIC | Age: 70
End: 2020-06-16

## 2020-06-16 VITALS
HEART RATE: 86 BPM | SYSTOLIC BLOOD PRESSURE: 126 MMHG | DIASTOLIC BLOOD PRESSURE: 78 MMHG | BODY MASS INDEX: 32.58 KG/M2 | HEIGHT: 65 IN | WEIGHT: 195.56 LBS

## 2020-06-16 DIAGNOSIS — M19.071 ARTHRITIS OF RIGHT ANKLE: ICD-10-CM

## 2020-06-16 DIAGNOSIS — M19.071 PRIMARY OSTEOARTHRITIS OF RIGHT ANKLE: Primary | ICD-10-CM

## 2020-06-16 DIAGNOSIS — Z01.818 PREOP TESTING: ICD-10-CM

## 2020-06-16 PROCEDURE — 99214 OFFICE O/P EST MOD 30 MIN: CPT | Mod: HCNC,S$GLB,, | Performed by: ORTHOPAEDIC SURGERY

## 2020-06-16 PROCEDURE — 1125F AMNT PAIN NOTED PAIN PRSNT: CPT | Mod: HCNC,S$GLB,, | Performed by: ORTHOPAEDIC SURGERY

## 2020-06-16 PROCEDURE — 73610 X-RAY EXAM OF ANKLE: CPT | Mod: 26,HCNC,RT, | Performed by: RADIOLOGY

## 2020-06-16 PROCEDURE — 1125F PR PAIN SEVERITY QUANTIFIED, PAIN PRESENT: ICD-10-PCS | Mod: HCNC,S$GLB,, | Performed by: ORTHOPAEDIC SURGERY

## 2020-06-16 PROCEDURE — 99999 PR PBB SHADOW E&M-EST. PATIENT-LVL IV: ICD-10-PCS | Mod: PBBFAC,HCNC,, | Performed by: ORTHOPAEDIC SURGERY

## 2020-06-16 PROCEDURE — 99999 PR PBB SHADOW E&M-EST. PATIENT-LVL IV: CPT | Mod: PBBFAC,HCNC,, | Performed by: ORTHOPAEDIC SURGERY

## 2020-06-16 PROCEDURE — 3078F DIAST BP <80 MM HG: CPT | Mod: HCNC,CPTII,S$GLB, | Performed by: ORTHOPAEDIC SURGERY

## 2020-06-16 PROCEDURE — 1159F MED LIST DOCD IN RCRD: CPT | Mod: HCNC,S$GLB,, | Performed by: ORTHOPAEDIC SURGERY

## 2020-06-16 PROCEDURE — 99214 PR OFFICE/OUTPT VISIT, EST, LEVL IV, 30-39 MIN: ICD-10-PCS | Mod: HCNC,S$GLB,, | Performed by: ORTHOPAEDIC SURGERY

## 2020-06-16 PROCEDURE — 1101F PR PT FALLS ASSESS DOC 0-1 FALLS W/OUT INJ PAST YR: ICD-10-PCS | Mod: HCNC,CPTII,S$GLB, | Performed by: ORTHOPAEDIC SURGERY

## 2020-06-16 PROCEDURE — 3074F PR MOST RECENT SYSTOLIC BLOOD PRESSURE < 130 MM HG: ICD-10-PCS | Mod: HCNC,CPTII,S$GLB, | Performed by: ORTHOPAEDIC SURGERY

## 2020-06-16 PROCEDURE — 3074F SYST BP LT 130 MM HG: CPT | Mod: HCNC,CPTII,S$GLB, | Performed by: ORTHOPAEDIC SURGERY

## 2020-06-16 PROCEDURE — 73610 X-RAY EXAM OF ANKLE: CPT | Mod: TC,HCNC,PO,RT

## 2020-06-16 PROCEDURE — 73610 XR ANKLE COMPLETE 3 VIEW RIGHT: ICD-10-PCS | Mod: 26,HCNC,RT, | Performed by: RADIOLOGY

## 2020-06-16 PROCEDURE — 1101F PT FALLS ASSESS-DOCD LE1/YR: CPT | Mod: HCNC,CPTII,S$GLB, | Performed by: ORTHOPAEDIC SURGERY

## 2020-06-16 PROCEDURE — 3078F PR MOST RECENT DIASTOLIC BLOOD PRESSURE < 80 MM HG: ICD-10-PCS | Mod: HCNC,CPTII,S$GLB, | Performed by: ORTHOPAEDIC SURGERY

## 2020-06-16 PROCEDURE — 1159F PR MEDICATION LIST DOCUMENTED IN MEDICAL RECORD: ICD-10-PCS | Mod: HCNC,S$GLB,, | Performed by: ORTHOPAEDIC SURGERY

## 2020-06-16 NOTE — PROGRESS NOTES
Pre-Visit Chart Review  For Appointment Scheduled on 6-22-20    Health Maintenance Due   Topic Date Due    Eye Exam  11/19/2019    Hemoglobin A1c  06/12/2020

## 2020-06-16 NOTE — PROGRESS NOTES
Instructed patient on preop instructions. Patient to stop taking any blood thinning medications at least seven days prior to scheduled surgery. Patient to not take any NSAIDS at least 7 days prior to surgery and will resume when Dr. Mendez instructs them to as it can delay bone healing. Nothing to eat/drink after midnight the night prior to surgery.   Patient instructed on postop care. Instructions included to remain non weight bearing until instructed otherwise by Dr. Mendez. Post op dressing can not get wet. If it gets wet patient to call office immediately or go to [facility] ER to have it replaced. Patient to keep affected limb elevated higher than the heart at all times after surgery to decrease swelling. Further instructions given to patient on preop/postop instruction handout. No NSAID form given to patient. Patient has wheelchair at home. Covid test scheduled for 7/18/2020. Surgery scheduled for 7/20/2020. Patient verbalized understanding.

## 2020-06-19 RX ORDER — SODIUM CHLORIDE 9 MG/ML
INJECTION, SOLUTION INTRAVENOUS CONTINUOUS
Status: CANCELLED | OUTPATIENT
Start: 2020-06-19

## 2020-06-22 ENCOUNTER — OFFICE VISIT (OUTPATIENT)
Dept: FAMILY MEDICINE | Facility: CLINIC | Age: 70
End: 2020-06-22
Attending: FAMILY MEDICINE
Payer: MEDICARE

## 2020-06-22 VITALS
HEIGHT: 65 IN | DIASTOLIC BLOOD PRESSURE: 62 MMHG | OXYGEN SATURATION: 95 % | SYSTOLIC BLOOD PRESSURE: 110 MMHG | RESPIRATION RATE: 12 BRPM | TEMPERATURE: 98 F | HEART RATE: 91 BPM | BODY MASS INDEX: 32.25 KG/M2 | WEIGHT: 193.56 LBS

## 2020-06-22 DIAGNOSIS — Z12.11 COLON CANCER SCREENING: ICD-10-CM

## 2020-06-22 DIAGNOSIS — I15.2 HYPERTENSION ASSOCIATED WITH DIABETES: ICD-10-CM

## 2020-06-22 DIAGNOSIS — H69.92 DYSFUNCTION OF LEFT EUSTACHIAN TUBE: ICD-10-CM

## 2020-06-22 DIAGNOSIS — J31.0 CHRONIC RHINITIS: ICD-10-CM

## 2020-06-22 DIAGNOSIS — E11.9 CONTROLLED TYPE 2 DIABETES MELLITUS WITHOUT COMPLICATION, WITHOUT LONG-TERM CURRENT USE OF INSULIN: ICD-10-CM

## 2020-06-22 DIAGNOSIS — M54.9 MID BACK PAIN: Primary | ICD-10-CM

## 2020-06-22 DIAGNOSIS — E55.9 VITAMIN D DEFICIENCY: ICD-10-CM

## 2020-06-22 DIAGNOSIS — E11.59 HYPERTENSION ASSOCIATED WITH DIABETES: ICD-10-CM

## 2020-06-22 DIAGNOSIS — Z12.31 ENCOUNTER FOR SCREENING MAMMOGRAM FOR BREAST CANCER: ICD-10-CM

## 2020-06-22 PROCEDURE — 1159F PR MEDICATION LIST DOCUMENTED IN MEDICAL RECORD: ICD-10-PCS | Mod: HCNC,S$GLB,, | Performed by: FAMILY MEDICINE

## 2020-06-22 PROCEDURE — 99214 PR OFFICE/OUTPT VISIT, EST, LEVL IV, 30-39 MIN: ICD-10-PCS | Mod: HCNC,S$GLB,, | Performed by: FAMILY MEDICINE

## 2020-06-22 PROCEDURE — 3074F SYST BP LT 130 MM HG: CPT | Mod: HCNC,CPTII,S$GLB, | Performed by: FAMILY MEDICINE

## 2020-06-22 PROCEDURE — 1125F PR PAIN SEVERITY QUANTIFIED, PAIN PRESENT: ICD-10-PCS | Mod: HCNC,S$GLB,, | Performed by: FAMILY MEDICINE

## 2020-06-22 PROCEDURE — 99214 OFFICE O/P EST MOD 30 MIN: CPT | Mod: HCNC,S$GLB,, | Performed by: FAMILY MEDICINE

## 2020-06-22 PROCEDURE — 99499 RISK ADDL DX/OHS AUDIT: ICD-10-PCS | Mod: HCNC,S$GLB,, | Performed by: FAMILY MEDICINE

## 2020-06-22 PROCEDURE — 3078F DIAST BP <80 MM HG: CPT | Mod: HCNC,CPTII,S$GLB, | Performed by: FAMILY MEDICINE

## 2020-06-22 PROCEDURE — 3008F PR BODY MASS INDEX (BMI) DOCUMENTED: ICD-10-PCS | Mod: HCNC,CPTII,S$GLB, | Performed by: FAMILY MEDICINE

## 2020-06-22 PROCEDURE — 1125F AMNT PAIN NOTED PAIN PRSNT: CPT | Mod: HCNC,S$GLB,, | Performed by: FAMILY MEDICINE

## 2020-06-22 PROCEDURE — 3044F HG A1C LEVEL LT 7.0%: CPT | Mod: HCNC,CPTII,S$GLB, | Performed by: FAMILY MEDICINE

## 2020-06-22 PROCEDURE — 99999 PR PBB SHADOW E&M-EST. PATIENT-LVL V: CPT | Mod: PBBFAC,HCNC,, | Performed by: FAMILY MEDICINE

## 2020-06-22 PROCEDURE — 3078F PR MOST RECENT DIASTOLIC BLOOD PRESSURE < 80 MM HG: ICD-10-PCS | Mod: HCNC,CPTII,S$GLB, | Performed by: FAMILY MEDICINE

## 2020-06-22 PROCEDURE — 99999 PR PBB SHADOW E&M-EST. PATIENT-LVL V: ICD-10-PCS | Mod: PBBFAC,HCNC,, | Performed by: FAMILY MEDICINE

## 2020-06-22 PROCEDURE — 3008F BODY MASS INDEX DOCD: CPT | Mod: HCNC,CPTII,S$GLB, | Performed by: FAMILY MEDICINE

## 2020-06-22 PROCEDURE — 1101F PR PT FALLS ASSESS DOC 0-1 FALLS W/OUT INJ PAST YR: ICD-10-PCS | Mod: HCNC,CPTII,S$GLB, | Performed by: FAMILY MEDICINE

## 2020-06-22 PROCEDURE — 1159F MED LIST DOCD IN RCRD: CPT | Mod: HCNC,S$GLB,, | Performed by: FAMILY MEDICINE

## 2020-06-22 PROCEDURE — 1101F PT FALLS ASSESS-DOCD LE1/YR: CPT | Mod: HCNC,CPTII,S$GLB, | Performed by: FAMILY MEDICINE

## 2020-06-22 PROCEDURE — 3044F PR MOST RECENT HEMOGLOBIN A1C LEVEL <7.0%: ICD-10-PCS | Mod: HCNC,CPTII,S$GLB, | Performed by: FAMILY MEDICINE

## 2020-06-22 PROCEDURE — 99499 UNLISTED E&M SERVICE: CPT | Mod: HCNC,S$GLB,, | Performed by: FAMILY MEDICINE

## 2020-06-22 PROCEDURE — 3074F PR MOST RECENT SYSTOLIC BLOOD PRESSURE < 130 MM HG: ICD-10-PCS | Mod: HCNC,CPTII,S$GLB, | Performed by: FAMILY MEDICINE

## 2020-06-22 RX ORDER — METFORMIN HYDROCHLORIDE 750 MG/1
750 TABLET, EXTENDED RELEASE ORAL 2 TIMES DAILY WITH MEALS
Qty: 180 TABLET | Refills: 1 | Status: SHIPPED | OUTPATIENT
Start: 2020-06-22 | End: 2020-10-08 | Stop reason: SDUPTHER

## 2020-06-22 RX ORDER — AMLODIPINE BESYLATE 10 MG/1
10 TABLET ORAL DAILY
Qty: 90 TABLET | Refills: 3 | Status: SHIPPED | OUTPATIENT
Start: 2020-06-22 | End: 2020-12-16 | Stop reason: SDUPTHER

## 2020-06-22 RX ORDER — ERGOCALCIFEROL 1.25 MG/1
CAPSULE ORAL
Qty: 24 CAPSULE | Refills: 1 | Status: ON HOLD | OUTPATIENT
Start: 2020-06-22 | End: 2023-09-06

## 2020-06-22 RX ORDER — DULAGLUTIDE 0.75 MG/.5ML
INJECTION, SOLUTION SUBCUTANEOUS
Qty: 6 ML | Refills: 1 | Status: SHIPPED | OUTPATIENT
Start: 2020-06-22 | End: 2020-12-16 | Stop reason: SDUPTHER

## 2020-06-22 RX ORDER — LANCETS 28 GAUGE
EACH MISCELLANEOUS
Qty: 200 EACH | Refills: 3 | Status: SHIPPED | OUTPATIENT
Start: 2020-06-22 | End: 2020-07-01

## 2020-06-22 RX ORDER — MECLIZINE HYDROCHLORIDE 25 MG/1
25 TABLET ORAL 3 TIMES DAILY PRN
Qty: 30 TABLET | Refills: 5 | Status: SHIPPED | OUTPATIENT
Start: 2020-06-22 | End: 2020-12-16 | Stop reason: SDUPTHER

## 2020-06-22 RX ORDER — MELOXICAM 15 MG/1
15 TABLET ORAL DAILY
Qty: 30 TABLET | Refills: 2 | Status: ON HOLD | OUTPATIENT
Start: 2020-06-22 | End: 2020-07-20 | Stop reason: HOSPADM

## 2020-06-22 RX ORDER — FLUTICASONE PROPIONATE 50 MCG
2 SPRAY, SUSPENSION (ML) NASAL DAILY
Qty: 16 G | Refills: 5 | Status: SHIPPED | OUTPATIENT
Start: 2020-06-22 | End: 2020-12-16 | Stop reason: SDUPTHER

## 2020-06-22 RX ORDER — CYCLOBENZAPRINE HCL 10 MG
10 TABLET ORAL 3 TIMES DAILY PRN
Qty: 270 TABLET | Refills: 1 | Status: SHIPPED | OUTPATIENT
Start: 2020-06-22 | End: 2020-12-16 | Stop reason: SDUPTHER

## 2020-06-22 RX ORDER — LOSARTAN POTASSIUM 100 MG/1
100 TABLET ORAL DAILY
Qty: 90 TABLET | Refills: 1 | Status: SHIPPED | OUTPATIENT
Start: 2020-06-22 | End: 2020-12-16 | Stop reason: SDUPTHER

## 2020-06-22 NOTE — PROGRESS NOTES
Subjective:       Patient ID: Doris Pastrana is a 69 y.o. female.    Chief Complaint: Hypertension and Diabetes    69-year-old female coming in to follow-up on diabetes and hypertension as well as new onset of back pain.  She has been going to the back in spine clinic and seeing Dr. Albrecht for cervical pain and has had several epidural injections but this pain is different and new in involves a lot of spasm in the thoracolumbar area mostly on the right side.  It occurs when she rolls out of bed or does a twisting turning action.  She has no radicular symptoms associated with this pain and does not recall any injury.  Her lab was done recently with an A1c of 6.5 and an LDL cholesterol of 45.  She has a history of diabetes without complications and without insulin, hypertension, hyperlipidemia, degenerative disc disease of lumbar and cervical spine, kidney stones status post lithotripsy, depression, fatty liver disease, TIA.    Past Medical History:  No date: Acute sinus infection  No date: Allergy  No date: Anemia  No date: Arthritis  No date: Bronchitis  No date: Degenerative disc disease      Comment:  lumbar, pain contract 2013  No date: Depression  16: Diabetes mellitus, type 2  No date: Fatty liver  16: Hyperlipidemia  No date: Hypertension  No date: Kidney stone  No date: Mitral valve prolapse  No date: Pleurisy  No date: Pneumonia  No date: PONV (postoperative nausea and vomiting)  No date: Sinus infection  2019: TIA (transient ischemic attack)    Past Surgical History:  2017: ANKLE SURGERY      Comment:  right ankle torn ligament  No date: APPENDECTOMY  No date:  SECTION  No date: CHOLECYSTECTOMY  8/13/15: COLONOSCOPY      Comment:  Dr. Oliveira, five year recheck  1/3/2020: EPIDURAL STEROID INJECTION INTO CERVICAL SPINE; N/A      Comment:  Procedure: Injection-steroid-epidural-cervical;                 Surgeon: Eddi Albrecht MD;  Location: [facility] OR;  Service:                Pain  Management;  Laterality: N/A;  C7-T1  3/10/2020: EPIDURAL STEROID INJECTION INTO CERVICAL SPINE; N/A      Comment:  Procedure: Injection-steroid-epidural-cervical;                 Surgeon: Eddi Albrecht MD;  Location: [facility] OR;  Service:                Pain Management;  Laterality: N/A;  C7-T1  5/22/2019: EXTRACORPOREAL SHOCK WAVE LITHOTRIPSY; Left      Comment:  Procedure: LITHOTRIPSY, ESWL - only if stone visible on                xray  with cysto after on table possible;  Surgeon:                Marisol Stewart MD;  Location: [facility] OR;                 Service: Urology;  Laterality: Left;  No date: HYSTERECTOMY  No date: TONSILLECTOMY  No date: VAGINAL DELIVERY    Current Outpatient Medications on File Prior to Visit:  albuterol (PROVENTIL/VENTOLIN HFA) 90 mcg/actuation inhaler, Inhale 2 puffs into the lungs 4 (four) times daily. (Patient taking differently: Inhale 2 puffs into the lungs every 4 (four) hours as needed. ), Disp: 1 Inhaler, Rfl: 1  alcohol swabs (ALCOHOL WIPES) PadM, Apply 1 each topically as needed. Use two daily to check blood glucose., Disp: 200 each, Rfl: 3  ALPRAZolam (XANAX) 0.25 MG tablet, Use one 20-30 minutes before flying prn, Disp: 10 tablet, Rfl: 1  blood sugar diagnostic Strp, Patience Plus Accu-chek glucose strips check twice daily, Disp: 200 each, Rfl: 3  blood-glucose meter kit, Use as instructed, Disp: 1 each, Rfl: 0  cetirizine (ZYRTEC) 10 MG tablet, Take 10 mg by mouth daily as needed. , Disp: , Rfl:   escitalopram oxalate (LEXAPRO) 10 MG tablet, Take 1 tablet (10 mg total) by mouth once daily., Disp: 90 tablet, Rfl: 1  gabapentin (NEURONTIN) 300 MG capsule, Take 1 at bedtime for 1 week, then two a day for 1 week, then 3 a day (Patient taking differently: Take 900 mg by mouth once daily. Take 1 at bedtime for 1 week, then two a day for 1 week, then 3 a day), Disp: 90 capsule, Rfl: 5  rosuvastatin (CRESTOR) 40 MG Tab, Take 1 tablet (40 mg total) by mouth every evening., Disp: 30  tablet, Rfl: 11  tamsulosin (FLOMAX) 0.4 mg Cap, Take 0.4 mg by mouth once daily. , Disp: , Rfl:   (DISCONTINUED) amLODIPine (NORVASC) 10 MG tablet, TAKE 1 TABLET EVERY DAY, Disp: 90 tablet, Rfl: 1  (DISCONTINUED) cyclobenzaprine (FLEXERIL) 10 MG tablet, TAKE 1 TABLET BY MOUTH THREE TIMES DAILY AS NEEDED FOR MUSCLE SPASMS, Disp: 270 tablet, Rfl: 0  (DISCONTINUED) dulaglutide (TRULICITY) 0.75 mg/0.5 mL PnIj, INJECT ONE SYRINGE SUBCUTANEOUSLY ONCE WEEKLY., Disp: 6 mL, Rfl: 1  (DISCONTINUED) ergocalciferol (ERGOCALCIFEROL) 50,000 unit Cap, Take one twice a week, Disp: 24 capsule, Rfl: 1  (DISCONTINUED) fluticasone propionate (FLONASE) 50 mcg/actuation nasal spray, 2 sprays (100 mcg total) by Each Nare route once daily., Disp: 16 g, Rfl: 5  (DISCONTINUED) losartan (COZAAR) 100 MG tablet, TAKE 1 TABLET EVERY DAY, Disp: 90 tablet, Rfl: 1  (DISCONTINUED) meclizine (ANTIVERT) 25 mg tablet, Take 1 tablet (25 mg total) by mouth 3 (three) times daily as needed., Disp: 30 tablet, Rfl: 5  (DISCONTINUED) metFORMIN (GLUCOPHAGE-XR) 750 MG XR 24hr tablet, Take 1 tablet (750 mg total) by mouth 2 (two) times daily with meals., Disp: 180 tablet, Rfl: 1  (DISCONTINUED) TRUEPLUS LANCETS 28 gauge Misc, TEST TWO TIMES DAILY, Disp: 200 each, Rfl: 3  (DISCONTINUED) HYDROcodone-acetaminophen (NORCO) 5-325 mg per tablet, Take 1 tablet by mouth every 6 (six) hours as needed. (Patient not taking: Reported on 6/16/2020), Disp: 28 tablet, Rfl: 0    Current Facility-Administered Medications on File Prior to Visit:  dexamethasone injection 8 mg, 8 mg, Intramuscular, Once, Maddy Sims NP          Review of Systems   Constitutional: Negative for chills, diaphoresis and fever.   Eyes: Negative for visual disturbance.   Respiratory: Negative for chest tightness and shortness of breath.    Cardiovascular: Negative for chest pain and palpitations.   Endocrine: Negative for polydipsia and polyuria.   Musculoskeletal: Positive for back pain. Negative  for neck pain.   Neurological: Negative for numbness.       Objective:      Physical Exam  Vitals signs and nursing note reviewed.   Constitutional:       Appearance: Normal appearance. She is obese.   HENT:      Head: Normocephalic and atraumatic.   Neck:      Musculoskeletal: Normal range of motion and neck supple.   Cardiovascular:      Rate and Rhythm: Normal rate and regular rhythm.      Pulses: Normal pulses.   Pulmonary:      Effort: Pulmonary effort is normal. No respiratory distress.      Breath sounds: Normal breath sounds. No stridor.   Musculoskeletal:      Thoracic back: She exhibits tenderness and spasm. She exhibits normal range of motion, no swelling, no edema and no deformity.        Back:    Neurological:      Mental Status: She is alert.         Assessment:       1. Mid back pain    2. Controlled type 2 diabetes mellitus without complication, without long-term current use of insulin    3. Hypertension associated with diabetes    4. Vitamin D deficiency    5. Chronic rhinitis    6. Dysfunction of left eustachian tube    7. Colon cancer screening    8. Encounter for screening mammogram for breast cancer    9. BMI 32.0-32.9,adult        Plan:       1. Mid back pain  Appears to be muscular, not radiculopathy.  She gets some relief with Flexeril.  Kidney function is good and she has no GI upset so will start her on some meloxicam for a few weeks  - meloxicam (MOBIC) 15 MG tablet; Take 1 tablet (15 mg total) by mouth once daily.  Dispense: 30 tablet; Refill: 2  - cyclobenzaprine (FLEXERIL) 10 MG tablet; Take 1 tablet (10 mg total) by mouth 3 (three) times daily as needed.  Dispense: 270 tablet; Refill: 1    2. Controlled type 2 diabetes mellitus without complication, without long-term current use of insulin  Good control with no changes needed  - dulaglutide (TRULICITY) 0.75 mg/0.5 mL PnIj; INJECT ONE SYRINGE SUBCUTANEOUSLY ONCE WEEKLY.  Dispense: 6 mL; Refill: 1  - metFORMIN (GLUCOPHAGE-XR) 750 MG XR  24hr tablet; Take 1 tablet (750 mg total) by mouth 2 (two) times daily with meals.  Dispense: 180 tablet; Refill: 1  - Basic metabolic panel; Future  - Hemoglobin A1C; Future    3. Hypertension associated with diabetes  Good control with no changes needed  - losartan (COZAAR) 100 MG tablet; Take 1 tablet (100 mg total) by mouth once daily.  Dispense: 90 tablet; Refill: 1  - Basic metabolic panel; Future    4. Vitamin D deficiency  Refill vitamin-D done  - ergocalciferol (ERGOCALCIFEROL) 50,000 unit Cap; Take one twice a week  Dispense: 24 capsule; Refill: 1    5. Chronic rhinitis  Refill Flonase done  - fluticasone propionate (FLONASE) 50 mcg/actuation nasal spray; 2 sprays (100 mcg total) by Each Nostril route once daily.  Dispense: 16 g; Refill: 5    6. Dysfunction of left eustachian tube  See above  - fluticasone propionate (FLONASE) 50 mcg/actuation nasal spray; 2 sprays (100 mcg total) by Each Nostril route once daily.  Dispense: 16 g; Refill: 5    7. Colon cancer screening  Colonoscopy due in August, she may defer at a little because she is planning on having surgery on her foot  - Ambulatory referral/consult to Gastroenterology; Future    8. Encounter for screening mammogram for breast cancer  Due in November  - Mammo Digital Screening Bilat; Future    9. BMI 32.0-32.9,adult

## 2020-06-29 ENCOUNTER — HOSPITAL ENCOUNTER (OUTPATIENT)
Dept: RADIOLOGY | Facility: CLINIC | Age: 70
Discharge: HOME | End: 2020-06-29
Attending: FAMILY MEDICINE
Payer: MEDICARE

## 2020-06-29 DIAGNOSIS — Z12.31 ENCOUNTER FOR SCREENING MAMMOGRAM FOR BREAST CANCER: ICD-10-CM

## 2020-06-29 PROCEDURE — 77067 SCR MAMMO BI INCL CAD: CPT | Mod: TC,HCNC,PO

## 2020-06-29 PROCEDURE — 77063 MAMMO DIGITAL SCREENING BILAT WITH TOMOSYNTHESIS_CAD: ICD-10-PCS | Mod: 26,HCNC,, | Performed by: RADIOLOGY

## 2020-06-29 PROCEDURE — 77067 MAMMO DIGITAL SCREENING BILAT WITH TOMOSYNTHESIS_CAD: ICD-10-PCS | Mod: 26,HCNC,, | Performed by: RADIOLOGY

## 2020-06-29 PROCEDURE — 77063 BREAST TOMOSYNTHESIS BI: CPT | Mod: 26,HCNC,, | Performed by: RADIOLOGY

## 2020-06-29 PROCEDURE — 77067 SCR MAMMO BI INCL CAD: CPT | Mod: 26,HCNC,, | Performed by: RADIOLOGY

## 2020-07-17 ENCOUNTER — LAB VISIT (OUTPATIENT)
Dept: PRIMARY CARE CLINIC | Facility: CLINIC | Age: 70
End: 2020-07-17
Payer: MEDICARE

## 2020-07-17 ENCOUNTER — ANESTHESIA EVENT (OUTPATIENT)
Dept: SURGERY | Facility: HOSPITAL | Age: 70
End: 2020-07-17
Payer: MEDICARE

## 2020-07-17 DIAGNOSIS — Z01.818 PREOP TESTING: ICD-10-CM

## 2020-07-17 PROCEDURE — U0003 INFECTIOUS AGENT DETECTION BY NUCLEIC ACID (DNA OR RNA); SEVERE ACUTE RESPIRATORY SYNDROME CORONAVIRUS 2 (SARS-COV-2) (CORONAVIRUS DISEASE [COVID-19]), AMPLIFIED PROBE TECHNIQUE, MAKING USE OF HIGH THROUGHPUT TECHNOLOGIES AS DESCRIBED BY CMS-2020-01-R: HCPCS | Mod: HCNC

## 2020-07-18 LAB — SARS-COV-2 RNA RESP QL NAA+PROBE: NOT DETECTED

## 2020-07-20 ENCOUNTER — HOSPITAL ENCOUNTER (OUTPATIENT)
Dept: RADIOLOGY | Facility: HOSPITAL | Age: 70
Discharge: HOME | End: 2020-07-20
Attending: ORTHOPAEDIC SURGERY | Admitting: ORTHOPAEDIC SURGERY
Payer: MEDICARE

## 2020-07-20 ENCOUNTER — ANESTHESIA (OUTPATIENT)
Dept: SURGERY | Facility: HOSPITAL | Age: 70
End: 2020-07-20
Payer: MEDICARE

## 2020-07-20 ENCOUNTER — HOSPITAL ENCOUNTER (OUTPATIENT)
Facility: HOSPITAL | Age: 70
Discharge: HOME | End: 2020-07-20
Attending: ORTHOPAEDIC SURGERY | Admitting: ORTHOPAEDIC SURGERY
Payer: MEDICARE

## 2020-07-20 DIAGNOSIS — M19.071 PRIMARY OSTEOARTHRITIS OF RIGHT ANKLE: ICD-10-CM

## 2020-07-20 DIAGNOSIS — M19.071 PRIMARY OSTEOARTHRITIS OF RIGHT ANKLE: Primary | ICD-10-CM

## 2020-07-20 LAB — GLUCOSE SERPL-MCNC: 122 MG/DL (ref 70–110)

## 2020-07-20 PROCEDURE — 25000003 PHARM REV CODE 250: Mod: HCNC,PO | Performed by: ORTHOPAEDIC SURGERY

## 2020-07-20 PROCEDURE — 63600175 PHARM REV CODE 636 W HCPCS: Mod: HCNC,PO | Performed by: NURSE ANESTHETIST, CERTIFIED REGISTERED

## 2020-07-20 PROCEDURE — C1769 GUIDE WIRE: HCPCS | Mod: HCNC,PO | Performed by: ORTHOPAEDIC SURGERY

## 2020-07-20 PROCEDURE — 76000 FLUOROSCOPY <1 HR PHYS/QHP: CPT | Mod: TC,HCNC,PO

## 2020-07-20 PROCEDURE — D9220A PRA ANESTHESIA: Mod: HCNC,CRNA,, | Performed by: NURSE ANESTHETIST, CERTIFIED REGISTERED

## 2020-07-20 PROCEDURE — 64450 NJX AA&/STRD OTHER PN/BRANCH: CPT | Mod: 59,RT,, | Performed by: ANESTHESIOLOGY

## 2020-07-20 PROCEDURE — 64450 PR NERVE BLOCK INJ, ANES/STEROID, OTHER PERIPHERAL: ICD-10-PCS | Mod: 59,RT,, | Performed by: ANESTHESIOLOGY

## 2020-07-20 PROCEDURE — 37000009 HC ANESTHESIA EA ADD 15 MINS: Mod: HCNC,PO | Performed by: ORTHOPAEDIC SURGERY

## 2020-07-20 PROCEDURE — 63600175 PHARM REV CODE 636 W HCPCS: Mod: HCNC,PO | Performed by: ANESTHESIOLOGY

## 2020-07-20 PROCEDURE — 64447 NJX AA&/STRD FEMORAL NRV IMG: CPT | Mod: HCNC,PO | Performed by: ANESTHESIOLOGY

## 2020-07-20 PROCEDURE — 25000003 PHARM REV CODE 250: Mod: HCNC,PO | Performed by: NURSE ANESTHETIST, CERTIFIED REGISTERED

## 2020-07-20 PROCEDURE — 71000039 HC RECOVERY, EACH ADD'L HOUR: Mod: HCNC,PO | Performed by: ORTHOPAEDIC SURGERY

## 2020-07-20 PROCEDURE — 64447 PR NERVE BLOCK INJ, ANES/STEROID, FEMORAL, INCL IMAG GUIDANCE: ICD-10-PCS | Mod: 59,RT,, | Performed by: ANESTHESIOLOGY

## 2020-07-20 PROCEDURE — 37000008 HC ANESTHESIA 1ST 15 MINUTES: Mod: HCNC,PO | Performed by: ORTHOPAEDIC SURGERY

## 2020-07-20 PROCEDURE — 76942 ECHO GUIDE FOR BIOPSY: CPT | Mod: 26,,, | Performed by: ANESTHESIOLOGY

## 2020-07-20 PROCEDURE — 25000003 PHARM REV CODE 250: Mod: HCNC,PO | Performed by: ANESTHESIOLOGY

## 2020-07-20 PROCEDURE — 27201423 OPTIME MED/SURG SUP & DEVICES STERILE SUPPLY: Mod: HCNC,PO | Performed by: ORTHOPAEDIC SURGERY

## 2020-07-20 PROCEDURE — 27200750 HC INSULATED NEEDLE/ STIMUPLEX: Mod: HCNC,PO | Performed by: ANESTHESIOLOGY

## 2020-07-20 PROCEDURE — C1713 ANCHOR/SCREW BN/BN,TIS/BN: HCPCS | Mod: HCNC,PO | Performed by: ORTHOPAEDIC SURGERY

## 2020-07-20 PROCEDURE — 64447 NJX AA&/STRD FEMORAL NRV IMG: CPT | Mod: 59,RT,, | Performed by: ANESTHESIOLOGY

## 2020-07-20 PROCEDURE — C9290 INJ, BUPIVACAINE LIPOSOME: HCPCS | Mod: HCNC,PO | Performed by: ANESTHESIOLOGY

## 2020-07-20 PROCEDURE — 36000708 HC OR TIME LEV III 1ST 15 MIN: Mod: HCNC,PO | Performed by: ORTHOPAEDIC SURGERY

## 2020-07-20 PROCEDURE — 71000015 HC POSTOP RECOV 1ST HR: Mod: HCNC,PO | Performed by: ORTHOPAEDIC SURGERY

## 2020-07-20 PROCEDURE — 76942 ECHO GUIDE FOR BIOPSY: CPT | Mod: HCNC,PO | Performed by: ANESTHESIOLOGY

## 2020-07-20 PROCEDURE — 27870 PR ARTHRODESIS,ANKLE,OPEN: ICD-10-PCS | Mod: HCNC,RT,, | Performed by: ORTHOPAEDIC SURGERY

## 2020-07-20 PROCEDURE — 82962 GLUCOSE BLOOD TEST: CPT | Mod: HCNC,PO | Performed by: ORTHOPAEDIC SURGERY

## 2020-07-20 PROCEDURE — D9220A PRA ANESTHESIA: ICD-10-PCS | Mod: HCNC,ANES,, | Performed by: ANESTHESIOLOGY

## 2020-07-20 PROCEDURE — 71000033 HC RECOVERY, INTIAL HOUR: Mod: HCNC,PO | Performed by: ORTHOPAEDIC SURGERY

## 2020-07-20 PROCEDURE — S0020 INJECTION, BUPIVICAINE HYDRO: HCPCS | Mod: HCNC,PO | Performed by: ANESTHESIOLOGY

## 2020-07-20 PROCEDURE — D9220A PRA ANESTHESIA: Mod: HCNC,ANES,, | Performed by: ANESTHESIOLOGY

## 2020-07-20 PROCEDURE — D9220A PRA ANESTHESIA: ICD-10-PCS | Mod: HCNC,CRNA,, | Performed by: NURSE ANESTHETIST, CERTIFIED REGISTERED

## 2020-07-20 PROCEDURE — 27870 FUSION OF ANKLE JOINT OPEN: CPT | Mod: HCNC,RT,, | Performed by: ORTHOPAEDIC SURGERY

## 2020-07-20 PROCEDURE — 71000016 HC POSTOP RECOV ADDL HR: Mod: HCNC,PO | Performed by: ORTHOPAEDIC SURGERY

## 2020-07-20 PROCEDURE — 36000709 HC OR TIME LEV III EA ADD 15 MIN: Mod: HCNC,PO | Performed by: ORTHOPAEDIC SURGERY

## 2020-07-20 PROCEDURE — 64445 NJX AA&/STRD SCIATIC NRV IMG: CPT | Mod: HCNC,PO | Performed by: ANESTHESIOLOGY

## 2020-07-20 PROCEDURE — 27800903 OPTIME MED/SURG SUP & DEVICES OTHER IMPLANTS: Mod: HCNC,PO | Performed by: ORTHOPAEDIC SURGERY

## 2020-07-20 PROCEDURE — 76942 PR U/S GUIDANCE FOR NEEDLE GUIDANCE: ICD-10-PCS | Mod: 26,,, | Performed by: ANESTHESIOLOGY

## 2020-07-20 DEVICE — SCREW BONE LOCK T10 3.5X24MM: Type: IMPLANTABLE DEVICE | Site: ANKLE | Status: FUNCTIONAL

## 2020-07-20 DEVICE — SCREW BONE LOCK T10 3.5X22MM: Type: IMPLANTABLE DEVICE | Site: ANKLE | Status: FUNCTIONAL

## 2020-07-20 DEVICE — MATRIX BONE BIO4 VIABLE 2.5CC: Type: IMPLANTABLE DEVICE | Site: ANKLE | Status: FUNCTIONAL

## 2020-07-20 DEVICE — PLATE BONE FIB VARIAX LAT DIST: Type: IMPLANTABLE DEVICE | Site: ANKLE | Status: FUNCTIONAL

## 2020-07-20 DEVICE — IMPLANTABLE DEVICE: Type: IMPLANTABLE DEVICE | Site: ANKLE | Status: FUNCTIONAL

## 2020-07-20 DEVICE — SCREW BONE LOCK T10 3.5X16MM: Type: IMPLANTABLE DEVICE | Site: ANKLE | Status: FUNCTIONAL

## 2020-07-20 DEVICE — SCREW BONE LOCK T10 3.5X26MM: Type: IMPLANTABLE DEVICE | Site: ANKLE | Status: FUNCTIONAL

## 2020-07-20 DEVICE — SCREW BONE LOCKING 3.5X20MM: Type: IMPLANTABLE DEVICE | Site: ANKLE | Status: FUNCTIONAL

## 2020-07-20 DEVICE — SCREW BONE LOCK T10 3.5X30MM: Type: IMPLANTABLE DEVICE | Site: ANKLE | Status: FUNCTIONAL

## 2020-07-20 RX ORDER — CLINDAMYCIN PHOSPHATE 900 MG/50ML
900 INJECTION, SOLUTION INTRAVENOUS
Status: COMPLETED | OUTPATIENT
Start: 2020-07-20 | End: 2020-07-20

## 2020-07-20 RX ORDER — OXYCODONE AND ACETAMINOPHEN 10; 325 MG/1; MG/1
1 TABLET ORAL EVERY 4 HOURS PRN
Qty: 24 TABLET | Refills: 0 | Status: SHIPPED | OUTPATIENT
Start: 2020-07-20 | End: 2020-08-21

## 2020-07-20 RX ORDER — ACETAMINOPHEN 10 MG/ML
INJECTION, SOLUTION INTRAVENOUS
Status: DISCONTINUED | OUTPATIENT
Start: 2020-07-20 | End: 2020-07-20

## 2020-07-20 RX ORDER — FENTANYL CITRATE 50 UG/ML
100 INJECTION, SOLUTION INTRAMUSCULAR; INTRAVENOUS SEE ADMIN INSTRUCTIONS
Status: DISCONTINUED | OUTPATIENT
Start: 2020-07-20 | End: 2020-07-20 | Stop reason: HOSPADM

## 2020-07-20 RX ORDER — ONDANSETRON 2 MG/ML
INJECTION INTRAMUSCULAR; INTRAVENOUS
Status: DISCONTINUED | OUTPATIENT
Start: 2020-07-20 | End: 2020-07-20

## 2020-07-20 RX ORDER — OXYCODONE HYDROCHLORIDE 5 MG/1
5 TABLET ORAL ONCE AS NEEDED
Status: DISCONTINUED | OUTPATIENT
Start: 2020-07-20 | End: 2020-07-20 | Stop reason: HOSPADM

## 2020-07-20 RX ORDER — MIDAZOLAM HYDROCHLORIDE 1 MG/ML
2 INJECTION INTRAMUSCULAR; INTRAVENOUS
Status: DISCONTINUED | OUTPATIENT
Start: 2020-07-20 | End: 2020-07-20 | Stop reason: HOSPADM

## 2020-07-20 RX ORDER — SODIUM CHLORIDE 0.9 G/100ML
IRRIGANT IRRIGATION
Status: DISCONTINUED | OUTPATIENT
Start: 2020-07-20 | End: 2020-07-20 | Stop reason: HOSPADM

## 2020-07-20 RX ORDER — LIDOCAINE HYDROCHLORIDE 10 MG/ML
1 INJECTION, SOLUTION EPIDURAL; INFILTRATION; INTRACAUDAL; PERINEURAL ONCE
Status: DISCONTINUED | OUTPATIENT
Start: 2020-07-20 | End: 2020-07-20 | Stop reason: HOSPADM

## 2020-07-20 RX ORDER — BUPIVACAINE HYDROCHLORIDE 5 MG/ML
INJECTION, SOLUTION EPIDURAL; INTRACAUDAL
Status: DISCONTINUED | OUTPATIENT
Start: 2020-07-20 | End: 2020-07-20

## 2020-07-20 RX ORDER — LIDOCAINE HYDROCHLORIDE 20 MG/ML
INJECTION INTRAVENOUS
Status: DISCONTINUED | OUTPATIENT
Start: 2020-07-20 | End: 2020-07-20

## 2020-07-20 RX ORDER — ONDANSETRON 4 MG/1
8 TABLET, ORALLY DISINTEGRATING ORAL EVERY 8 HOURS PRN
Qty: 6 TABLET | Refills: 1 | Status: SHIPPED | OUTPATIENT
Start: 2020-07-20 | End: 2020-08-21

## 2020-07-20 RX ORDER — GLYCOPYRROLATE 0.2 MG/ML
INJECTION INTRAMUSCULAR; INTRAVENOUS
Status: DISCONTINUED | OUTPATIENT
Start: 2020-07-20 | End: 2020-07-20

## 2020-07-20 RX ORDER — NEOSTIGMINE METHYLSULFATE 1 MG/ML
INJECTION, SOLUTION INTRAVENOUS
Status: DISCONTINUED | OUTPATIENT
Start: 2020-07-20 | End: 2020-07-20

## 2020-07-20 RX ORDER — SODIUM CHLORIDE 9 MG/ML
INJECTION, SOLUTION INTRAVENOUS CONTINUOUS
Status: DISCONTINUED | OUTPATIENT
Start: 2020-07-20 | End: 2020-07-20 | Stop reason: HOSPADM

## 2020-07-20 RX ORDER — PROPOFOL 10 MG/ML
VIAL (ML) INTRAVENOUS
Status: DISCONTINUED | OUTPATIENT
Start: 2020-07-20 | End: 2020-07-20

## 2020-07-20 RX ORDER — MUPIROCIN 20 MG/G
OINTMENT TOPICAL
Status: DISCONTINUED | OUTPATIENT
Start: 2020-07-20 | End: 2020-07-20 | Stop reason: HOSPADM

## 2020-07-20 RX ORDER — KETAMINE HCL IN 0.9 % NACL 50 MG/5 ML
SYRINGE (ML) INTRAVENOUS
Status: DISCONTINUED | OUTPATIENT
Start: 2020-07-20 | End: 2020-07-20

## 2020-07-20 RX ORDER — FENTANYL CITRATE 50 UG/ML
25 INJECTION, SOLUTION INTRAMUSCULAR; INTRAVENOUS EVERY 5 MIN PRN
Status: DISCONTINUED | OUTPATIENT
Start: 2020-07-20 | End: 2020-07-20 | Stop reason: HOSPADM

## 2020-07-20 RX ORDER — ROCURONIUM BROMIDE 10 MG/ML
INJECTION, SOLUTION INTRAVENOUS
Status: DISCONTINUED | OUTPATIENT
Start: 2020-07-20 | End: 2020-07-20

## 2020-07-20 RX ORDER — SODIUM CHLORIDE, SODIUM LACTATE, POTASSIUM CHLORIDE, CALCIUM CHLORIDE 600; 310; 30; 20 MG/100ML; MG/100ML; MG/100ML; MG/100ML
INJECTION, SOLUTION INTRAVENOUS CONTINUOUS
Status: DISCONTINUED | OUTPATIENT
Start: 2020-07-20 | End: 2020-07-20 | Stop reason: HOSPADM

## 2020-07-20 RX ADMIN — BUPIVACAINE 20 ML: 13.3 INJECTION, SUSPENSION, LIPOSOMAL INFILTRATION at 08:07

## 2020-07-20 RX ADMIN — ACETAMINOPHEN 1000 MG: 10 INJECTION, SOLUTION INTRAVENOUS at 10:07

## 2020-07-20 RX ADMIN — ROCURONIUM BROMIDE 10 MG: 10 INJECTION, SOLUTION INTRAVENOUS at 10:07

## 2020-07-20 RX ADMIN — SODIUM CHLORIDE, SODIUM LACTATE, POTASSIUM CHLORIDE, AND CALCIUM CHLORIDE: .6; .31; .03; .02 INJECTION, SOLUTION INTRAVENOUS at 10:07

## 2020-07-20 RX ADMIN — BUPIVACAINE HYDROCHLORIDE 20 ML: 5 INJECTION, SOLUTION EPIDURAL; INTRACAUDAL; PERINEURAL at 08:07

## 2020-07-20 RX ADMIN — GLYCOPYRROLATE 0.6 MG: 0.2 INJECTION, SOLUTION INTRAMUSCULAR; INTRAVENOUS at 11:07

## 2020-07-20 RX ADMIN — ROCURONIUM BROMIDE 10 MG: 10 INJECTION, SOLUTION INTRAVENOUS at 11:07

## 2020-07-20 RX ADMIN — ROCURONIUM BROMIDE 40 MG: 10 INJECTION, SOLUTION INTRAVENOUS at 09:07

## 2020-07-20 RX ADMIN — Medication 20 MG: at 10:07

## 2020-07-20 RX ADMIN — CLINDAMYCIN IN 5 PERCENT DEXTROSE 900 MG: 18 INJECTION, SOLUTION INTRAVENOUS at 07:07

## 2020-07-20 RX ADMIN — LIDOCAINE HYDROCHLORIDE 100 MG: 20 INJECTION, SOLUTION INTRAVENOUS at 09:07

## 2020-07-20 RX ADMIN — SODIUM CHLORIDE, SODIUM LACTATE, POTASSIUM CHLORIDE, AND CALCIUM CHLORIDE: .6; .31; .03; .02 INJECTION, SOLUTION INTRAVENOUS at 07:07

## 2020-07-20 RX ADMIN — PROPOFOL 180 MG: 10 INJECTION, EMULSION INTRAVENOUS at 09:07

## 2020-07-20 RX ADMIN — PROMETHAZINE HYDROCHLORIDE 6.25 MG: 25 INJECTION INTRAMUSCULAR; INTRAVENOUS at 12:07

## 2020-07-20 RX ADMIN — NEOSTIGMINE METHYLSULFATE 5 MG: 1 INJECTION INTRAVENOUS at 11:07

## 2020-07-20 RX ADMIN — ONDANSETRON 4 MG: 2 INJECTION, SOLUTION INTRAMUSCULAR; INTRAVENOUS at 11:07

## 2020-07-20 RX ADMIN — MIDAZOLAM HYDROCHLORIDE 2 MG: 1 INJECTION, SOLUTION INTRAMUSCULAR; INTRAVENOUS at 08:07

## 2020-07-20 RX ADMIN — FENTANYL CITRATE 50 MCG: 50 INJECTION, SOLUTION INTRAMUSCULAR; INTRAVENOUS at 08:07

## 2020-07-20 RX ADMIN — MIDAZOLAM HYDROCHLORIDE 2 MG: 1 INJECTION, SOLUTION INTRAMUSCULAR; INTRAVENOUS at 09:07

## 2020-07-20 NOTE — OP NOTE
Operative Note    DATE: 7/20/2020     TIME: 11:45 AM      PATIENT NAME: Doris Pastrana      PRE-OPERATIVE DIAGNOSIS: Right ankle osteoarthritis      POST-OPERATIVE DIAGNOSIS:  Right ankle osteoarthritis      PROCEDURE: 1) Right Ankle Arthrodesis 2) [place] major bone graft Right fibula     ANESTHESIA TYPE: GETA      SPECIMENS SENT: NONE      COMPLICATIONS: NONE      BLOOD LOSS: 15 cc      SURGEON: Andrzej Mendez MD      ASSISTANT: Bryan Willis     Procedure in detail:   After appropriate informed consent was obtained.The patient was placed in the supine position on the operating table with a bump under the ipsilateral hip. The Right lower extremity was prepped and draped in the usual sterile fashion. Bony landmarks for nail insertion were marked with fluoroscopic guidance. Tourniquet was raised to 300 mmHg after esmarch exsanguination of the limb. Next, an approximately 14-cm lateral curvilinear incision was made in line with base of the fourth metatarsal overlying the midline of the fibula using a #15 blade. Proximally the incision was carried through the skin only an deep dissection was performed using Litler scissors in order to protect the superficial branch of the peroneal nerve and the sural nerve. Seine retractors were placed, key elevator was used to clear the perisosteum from the lateral aspect of the fibula. Sagittal saw was used to resect approximately 12 cm of distal fibula using an oblique cut directed from proximal to distal in order to expose the lateral ankle joint and it was harvested as bone graft for the case.  The tibiotalar joint was decorticated using sagittal saw, hobosn rongeur, curettes and a 4 mm maikol. A straight cut was made using a sagittal saw at the level of the distal tibia and completed using an osteotome. Attention was then turned to the medial aspect of the ankle where a 4-cm longitudinal incision was made down to the level of the bone in line with the tip of the medial  malleolus. The decortication of the tibiotalar joint was completed from the medial side using an osteotome. Lamina  was placed in the subtalar joint which decorticated. All joint surfaces were feathered using an osteotome and a maikol.     A rongeur is used to remove the hypertrophied synovial tissue and loose cartilage and bone from the anterior aspect of the joint further improving visualization of the joint. Any visible cartilage of the ankle joint was resected with curettes, osteotomes, maikol and hobson rongeurs. The lamina  was placed within the joint in order to permit decortication. The ankle joint was decorticated. Smooth bleeding bone surfaces were obtained and the surfaces of the bone were feathered with a 1/4 inch osteotome. At the end of the debridement and decorticaiton the joint was copiously irrigated with normal saline. Attention was then turned The medial aspect of the ankle where a 3 cm incision was made overlying the distal medial ankle. The medial aspect of the joint was visualized and decorticated. The medial and lateral gutters, and any remaining cartilage on the lateral articular surfaces of the talus and the articular surfaces of the malleoli was taken down.     The ankle joint was packed with BIO4 and morselized autograft from the fibula. The ankle was positioned in 5 degrees of valgus and neutral dorsiflexion and pinned with the guide pins for the Grace 5.0-mm cannulated screw system. Two cannulated, 5.0 mm partially threaded cancellous screws were placed. One guide pin for the cannulated screws was placed from the bare of area of the subtalar joint through the talus and into the distal tibia. The second pin was inserted from the anterolateral aspect of the tibia into the the talus. The positions of the guide wires was and the alignment of the ankle were confirmed to be appropriate under fluoroscopy. The screws were inserted using standard technique of measuring with depth  gauge, over drilling the guidewire and inserting the appropriate length screws. There was good compression and purchase across the joint with the two screws. There was good alignment and position of the hardware on AP/lateral/oblique views under fluourscopy. Next a Bend distal fibula locking plate with locking screws was placed on the lateral aspect of the distal tibia and talus for additional fixation and stability.  AP/LAT/Mortise views of the left ankle under fluoroscopy showed very good reduction, with acceptable alignment and placement of screws.     The incisions were irrigated with normal saline. Medium hemovac was placed. The deep layer of the lateral incision was re-approximated using 0 Monocryl in interrupted fashion, subcutaneous layer was re-approximated using 2.0 Monocryl in interrupted fashion, skin was re-approximated in  using 3.0 nylon in a running fashion. Sterile dressing using Xeroform, bacitracin, 4 x 8's, ABD, cast padding, posterior splint and stirrups with the foot in neutral dorsiflexion was placed. Patient tolerated the procedure well without complications. I was present and scrubbed for the entire case.

## 2020-07-20 NOTE — PLAN OF CARE
Pt discharged to home.  Discharge instructions given, pt stated understanding.   IV removed.  Pt left via wheelchair with daughter to home.

## 2020-07-20 NOTE — BRIEF OP NOTE
Operative Note    DATE:7/20/2020     TIME: 11:45 AM      PATIENT NAME: Doris Pastrana      PRE-OPERATIVE DIAGNOSIS: Right  ankle osteoarthritis      POST-OPERATIVE DIAGNOSIS:  Right  ankle osteoarthritis      PROCEDURE: 1) Right Ankle Arthrodesis 2) [place] major bone graft Right fibula     ANESTHESIA TYPE: GETA      SPECIMENS SENT: NONE      COMPLICATIONS: NONE      BLOOD LOSS: 15 cc      SURGEON: Andrzej Mendez MD      ASSISTANT: NARDA Mendoza

## 2020-07-20 NOTE — ANESTHESIA PREPROCEDURE EVALUATION
07/20/2020  Doris Pastrana is a 69 y.o., female.    Anesthesia Evaluation          Review of Systems  Anesthesia Hx:  Hx of Anesthetic complications   Cardiovascular:   Exercise tolerance: good Hypertension Denies MI.  Denies CAD.    ECG has been reviewed.    Pulmonary:   Pneumonia    Renal/:   Chronic Renal Disease renal calculi    Hepatic/GI:   Liver Disease,    Musculoskeletal:   Arthritis   Spine Disorders:    Neurological:   TIA, Neuromuscular Disease,    Endocrine:   Diabetes    Psych:   Psychiatric History depression          Physical Exam  General:  Well nourished    Airway/Jaw/Neck:  Airway Findings: Mouth Opening: Normal Tongue: Normal  General Airway Assessment: Adult  Mallampati: II  TM Distance: Normal, at least 6 cm       Chest/Lungs:  Chest/Lungs Clear    Heart/Vascular:  Heart Findings: Normal            Anesthesia Plan  Type of Anesthesia, risks & benefits discussed:  Anesthesia Type:  general  Patient's Preference:   Intra-op Monitoring Plan: standard ASA monitors  Intra-op Monitoring Plan Comments:   Post Op Pain Control Plan: multimodal analgesia and IV/PO Opioids PRN  Post Op Pain Control Plan Comments:   Induction:   IV  Beta Blocker:  Patient is not currently on a Beta-Blocker (No further documentation required).       Informed Consent: Patient understands risks and agrees with Anesthesia plan.  Questions answered. Anesthesia consent signed with patient.  ASA Score: 3     Day of Surgery Review of History & Physical:    H&P update referred to the surgeon.     Anesthesia Plan Notes: I have personally evaluated the patient and discussed risk/benefits/alternatives of general anesthesia.        Ready For Surgery From Anesthesia Perspective.

## 2020-07-20 NOTE — H&P
Subjective:       Patient ID: Doris Pastrana is a 69 y.o. female.     Chief Complaint: Pain of the Right Ankle    Pt is here for f/u today c/o right ankle pain. She was last seen in Dec 2019 and consented to ankle fusion surgery but she never picked a date.   Social History            Occupational History       Employer: [employer]   Tobacco Use    Smoking status: Former Smoker       Packs/day: 1.00       Types: Cigarettes       Quit date: 3/12/1983       Years since quittin.2    Smokeless tobacco: Never Used   Substance and Sexual Activity    Alcohol use: Yes       Comment: socially    Drug use: No    Sexual activity: Not Currently             Objective:    Ortho Exam     RLE: neurovascularly intact,  tenderness to palpation at the ankle joint with ankle joint effusion.   Mildly decreased ROM of the ankle. non-tender to palpation and NROM of the subtalar joint.      .  Assessment:       1. Primary osteoarthritis of right ankle          Plan:       We discussed again that other than ankle fusion I do not see anything additional that can be done at this time for the pain due to the severity of her peroneal pathology. I have explained the procedure, including indications, risks, and alternatives.  Doris Pastrana gave informed consent and is medically ready for surgery.

## 2020-07-20 NOTE — ANESTHESIA POSTPROCEDURE EVALUATION
Anesthesia Post Evaluation    Patient: Doris Pastrana    Procedure(s) Performed: Procedure(s) (LRB):  FUSION, JOINT, ANKLE (Right)    Final Anesthesia Type: general    Patient location during evaluation: PACU  Patient participation: Yes- Able to Participate  Level of consciousness: awake and alert and oriented  Post-procedure vital signs: reviewed and stable  Pain management: adequate  Airway patency: patent    PONV status at discharge: nausea (controlled)  Anesthetic complications: no      Cardiovascular status: blood pressure returned to baseline  Respiratory status: unassisted, spontaneous ventilation and room air  Hydration status: euvolemic  Follow-up not needed.          Vitals Value Taken Time   /58 07/20/20 1400   Temp 36.2 °C (97.2 °F) 07/20/20 1157   Pulse 76 07/20/20 1400   Resp 19 07/20/20 1400   SpO2 97 % 07/20/20 1400         Event Time   Out of Recovery 13:15:00         Pain/Hayde Score: Hayde Score: 9 (7/20/2020  1:15 PM)

## 2020-07-20 NOTE — TRANSFER OF CARE
"Anesthesia Transfer of Care Note    Patient: Doris Pastrana    Procedure(s) Performed: Procedure(s) (LRB):  FUSION, JOINT, ANKLE (Right)    Patient location: PACU    Anesthesia Type: general    Transport from OR: Transported from OR on room air with adequate spontaneous ventilation    Post pain: adequate analgesia    Post assessment: no apparent anesthetic complications and tolerated procedure well    Post vital signs: stable    Level of consciousness: awake    Nausea/Vomiting: no nausea/vomiting    Complications: none    Transfer of care protocol was followed      Last vitals:   Visit Vitals  /71   Pulse 83   Temp 36.5 °C (97.7 °F) (Skin)   Resp 16   Ht 5' 5" (1.651 m)   Wt 87.8 kg (193 lb 9 oz)   SpO2 97%   Breastfeeding No   BMI 32.21 kg/m²     "

## 2020-07-20 NOTE — ANESTHESIA PROCEDURE NOTES
Popliteal Sciatic Single Injection Block    Patient location during procedure: pre-op   Block not for primary anesthetic.  Reason for block: at surgeon's request and post-op pain management   Post-op Pain Location: right ankle  Start time: 7/20/2020 8:10 AM  Timeout: 7/20/2020 8:08 AM   End time: 7/20/2020 8:19 AM    Staffing  Authorizing Provider: Iam Alanis MD  Performing Provider: Iam Alanis MD    Preanesthetic Checklist  Completed: patient identified, site marked, surgical consent, pre-op evaluation, timeout performed, IV checked, risks and benefits discussed and monitors and equipment checked  Peripheral Block  Patient position: supine  Prep: ChloraPrep  Patient monitoring: heart rate, cardiac monitor, continuous pulse ox, continuous capnometry and frequent blood pressure checks  Block type: popliteal  Laterality: right  Injection technique: single shot  Needle  Needle type: Stimuplex   Needle gauge: 21 G  Needle length: 4 in  Needle localization: anatomical landmarks and ultrasound guidance   -ultrasound image captured on disc.  Assessment  Injection assessment: negative aspiration, negative parasthesia and local visualized surrounding nerve  Paresthesia pain: none  Heart rate change: no  Slow fractionated injection: yes  Additional Notes  VSS.  [facility] RN monitoring vitals throughout procedure.  Patient tolerated procedure well.

## 2020-07-20 NOTE — DISCHARGE INSTRUCTIONS
Arth[facility]  Post Operative Instructions    Cast:     You will have a splint on the leg following surgery    The splint will remain in place for  2 weeks    You will be non-weightbearing in the splint and cast for 6-8 weeks    Wound:    The surgical incision has been closed with sutures    Do not get the dressing/splint wet and do not remove the dressing/splint for 2 weeks. When showering place a bag over the dressing and secure with tape or rubberband to your leg to avoid the cast and wound getting wet and still put the leg out of the shower      Do not remove the dressing until your 2 week appointment - the first dressing change will occur at your 2 week appointment    Stitches will be removed at your 2 week appointment if the incision is healed    A cast will be applied at your 2 week visit    Once the cast is removed at 6-8 weeks you will begin Weight bearing as tolerated in a CAM Boot    You can shower and wash the foot once you are in the boot    You do not have to sleep in the boot    Do not immerse the foot in water (bath, hot tub, pool, lake, pond, river, ocean) for 4 weeks    You will apply scar cream and pain cream together to the incisions once in the boot    Weight Bearing:    You will be non-weight bearing for the first two weeks. You will be given crutches and knee scooter or wheelchair     You will be non-weightbearing for 6-8 weeks depending on how your bones are healing.     After 6-8 weeks you may begin fully walking on the foot if the bones are healed    Medications:    You will be given a prescription for pain medication     Pain medication should be used regularly for the first 24-48 hours, when required for the first 1 to 2 weeks, followed by Regular Tylenol     Driving:     For right foot surgery you are not permitted to drive for 8-10 weeks    For left foot surgery, please contact your insurance company to see if you are permitted to drive    Driving is not permitted while on  narcotics    Work:    Two weeks off work is recommended for initial recovery    If you are able to get to work safely, and will be seated with the foot elevated for the majority of the day, you may return to work a couple days after surgery. This is assuming you are not taking narcotic pain mediation.    From 2-6 weeks sedentary duties is recommended    By 10-12 weeks you can slowly return to your normal duties    If your job is physically demanding, return to full duties is usually possible around 12 weeks post operatively    Follow Up:    You will have your first appointment 2 weeks after surgery in the Clinic    You will have follow up appointments every 2 weeks until you get into the boot    Recovery:    It is normal to experience mild to moderate pain, numbness, or tingling for the first 2 weeks following surgery    Please come to the emergency department if you are suffering from severe pain    You will get back to most of your activities by 3-6 months    Swelling often remains for 6-12 months    You are expected to experience a maximal improvement from surgery in 9-12 months    Physiotherapy:    Formal physiotherapy is necessary and will usually be twice a week for 6-8 weeks    Do not remove your dressing or let anyone else including a physician remove your dressing unless otherwise instructed by Dr. Mendez. You may over wrap the dressing if there is any blood or fluid oozing through the ace bandage.

## 2020-07-20 NOTE — ANESTHESIA PROCEDURE NOTES
Adductor Canal Single Injection Block    Patient location during procedure: pre-op   Block not for primary anesthetic.  Reason for block: at surgeon's request and post-op pain management   Post-op Pain Location: right medial ankle  Start time: 7/20/2020 8:10 AM  Timeout: 7/20/2020 8:08 AM   End time: 7/20/2020 8:19 AM    Staffing  Authorizing Provider: Iam Alanis MD  Performing Provider: Iam Alanis MD    Preanesthetic Checklist  Completed: patient identified, site marked, surgical consent, pre-op evaluation, timeout performed, IV checked, risks and benefits discussed and monitors and equipment checked  Peripheral Block  Patient position: supine  Prep: ChloraPrep  Patient monitoring: heart rate, cardiac monitor, continuous pulse ox, continuous capnometry and frequent blood pressure checks  Block type: adductor canal  Laterality: right  Injection technique: single shot  Needle  Needle type: Stimuplex   Needle gauge: 21 G  Needle length: 4 in  Needle localization: anatomical landmarks and ultrasound guidance   -ultrasound image captured on disc.  Assessment  Injection assessment: negative aspiration, negative parasthesia and local visualized surrounding nerve  Paresthesia pain: none  Heart rate change: no  Slow fractionated injection: yes  Additional Notes  VSS.  [facility] RN monitoring vitals throughout procedure.  Patient tolerated procedure well.

## 2020-07-20 NOTE — DISCHARGE SUMMARY
[facility]  Discharge Note  Short Stay    Procedure(s) (LRB):  FUSION, JOINT, ANKLE (Right)    OUTCOME: Patient tolerated treatment/procedure well without complication and is now ready for discharge.    DISPOSITION: Home or Self Care    FINAL DIAGNOSIS:  Osteoarthritis of right ankle    FOLLOWUP: In clinic on 7/24 and in 2 weeks    DISCHARGE INSTRUCTIONS:    Discharge Procedure Orders   Diet general     Call MD for:  temperature >100.4     Call MD for:  persistent nausea and vomiting     Call MD for:  severe uncontrolled pain     Call MD for:  difficulty breathing, headache or visual disturbances     Call MD for:  redness, tenderness, or signs of infection (pain, swelling, redness, odor or green/yellow discharge around incision site)     Call MD for:  hives     Call MD for:  persistent dizziness or light-headedness     Call MD for:  extreme fatigue     Keep surgical extremity elevated     No driving, operating heavy equipment or signing legal documents while taking pain medication     Leave dressing on - Keep it clean, dry, and intact until clinic visit     Nursing communication     Non weight bearing

## 2020-07-20 NOTE — ANESTHESIA PROCEDURE NOTES
Intubation  Performed by: Mary Grace Mckenna CRNA  Authorized by: Iam Alanis MD     Intubation:     Induction:  Intravenous    Intubated:  Postinduction    Mask Ventilation:  Easy mask    Attempts:  1    Attempted By:  CRNA    Method of Intubation:  Direct    Blade:  Garcia 2    Laryngeal View Grade: Grade I - full view of chords      Difficult Airway Encountered?: No      Complications:  None    Airway Device:  Oral endotracheal tube    Airway Device Size:  7.0    Style/Cuff Inflation:  Cuffed    Secured at:  The lips    Placement Verified By:  Capnometry    Complicating Factors:  None    Findings Post-Intubation:  BS equal bilateral

## 2020-07-21 VITALS
SYSTOLIC BLOOD PRESSURE: 103 MMHG | TEMPERATURE: 97 F | HEART RATE: 85 BPM | OXYGEN SATURATION: 95 % | DIASTOLIC BLOOD PRESSURE: 59 MMHG | HEIGHT: 65 IN | RESPIRATION RATE: 19 BRPM | WEIGHT: 193.56 LBS | BODY MASS INDEX: 32.25 KG/M2

## 2020-07-23 ENCOUNTER — OFFICE VISIT (OUTPATIENT)
Dept: ORTHOPEDICS | Facility: CLINIC | Age: 70
End: 2020-07-23
Payer: MEDICARE

## 2020-07-23 VITALS
WEIGHT: 193.56 LBS | BODY MASS INDEX: 32.25 KG/M2 | TEMPERATURE: 98 F | HEIGHT: 65 IN | DIASTOLIC BLOOD PRESSURE: 70 MMHG | HEART RATE: 86 BPM | SYSTOLIC BLOOD PRESSURE: 112 MMHG

## 2020-07-23 DIAGNOSIS — M19.071 ARTHRITIS OF RIGHT ANKLE: Primary | ICD-10-CM

## 2020-07-23 PROCEDURE — 99999 PR PBB SHADOW E&M-EST. PATIENT-LVL V: CPT | Mod: PBBFAC,HCNC,, | Performed by: ORTHOPAEDIC SURGERY

## 2020-07-23 PROCEDURE — 99999 PR PBB SHADOW E&M-EST. PATIENT-LVL V: ICD-10-PCS | Mod: PBBFAC,HCNC,, | Performed by: ORTHOPAEDIC SURGERY

## 2020-07-23 PROCEDURE — 99024 POSTOP FOLLOW-UP VISIT: CPT | Mod: HCNC,S$GLB,, | Performed by: ORTHOPAEDIC SURGERY

## 2020-07-23 PROCEDURE — 99024 PR POST-OP FOLLOW-UP VISIT: ICD-10-PCS | Mod: HCNC,S$GLB,, | Performed by: ORTHOPAEDIC SURGERY

## 2020-07-23 NOTE — PROGRESS NOTES
Pt is here for drain removal today s/p right ankle fusion. She rates her pain as 6/10 today.   DOS: 7/20/20.    RLE: Neurovascularly intact, splint is intact    Hemovac drain removed today. Keep f/u visit on 8/7.

## 2020-07-30 RX ORDER — HYDROCODONE BITARTRATE AND ACETAMINOPHEN 5; 325 MG/1; MG/1
1 TABLET ORAL EVERY 6 HOURS PRN
Qty: 24 TABLET | Refills: 0 | Status: SHIPPED | OUTPATIENT
Start: 2020-07-30 | End: 2020-08-21

## 2020-08-05 DIAGNOSIS — F41.9 ANXIETY: ICD-10-CM

## 2020-08-05 DIAGNOSIS — M19.071 ARTHRITIS OF RIGHT ANKLE: Primary | ICD-10-CM

## 2020-08-05 RX ORDER — ESCITALOPRAM OXALATE 10 MG/1
10 TABLET ORAL DAILY
Qty: 90 TABLET | Refills: 1 | Status: SHIPPED | OUTPATIENT
Start: 2020-08-05 | End: 2020-12-16 | Stop reason: SDUPTHER

## 2020-08-05 NOTE — TELEPHONE ENCOUNTER
Last filled 5-14-20 to [facility] for 90 x 1   Now [employer]   lov 5-14-20 Ms Yash  Last OV Dr Garg 2-7-20

## 2020-08-07 ENCOUNTER — OFFICE VISIT (OUTPATIENT)
Dept: ORTHOPEDICS | Facility: CLINIC | Age: 70
End: 2020-08-07
Payer: MEDICARE

## 2020-08-07 ENCOUNTER — DOCUMENTATION ONLY (OUTPATIENT)
Dept: ORTHOPEDICS | Facility: CLINIC | Age: 70
End: 2020-08-07

## 2020-08-07 ENCOUNTER — HOSPITAL ENCOUNTER (OUTPATIENT)
Dept: RADIOLOGY | Facility: HOSPITAL | Age: 70
Discharge: HOME | End: 2020-08-07
Attending: ORTHOPAEDIC SURGERY
Payer: MEDICARE

## 2020-08-07 VITALS
WEIGHT: 193.56 LBS | SYSTOLIC BLOOD PRESSURE: 123 MMHG | HEART RATE: 95 BPM | HEIGHT: 65 IN | DIASTOLIC BLOOD PRESSURE: 60 MMHG | BODY MASS INDEX: 32.25 KG/M2

## 2020-08-07 DIAGNOSIS — M19.071 ARTHRITIS OF RIGHT ANKLE: Primary | ICD-10-CM

## 2020-08-07 DIAGNOSIS — M19.071 ARTHRITIS OF RIGHT ANKLE: ICD-10-CM

## 2020-08-07 PROBLEM — S86.309A PERONEAL TENDON INJURY: Status: RESOLVED | Noted: 2017-07-13 | Resolved: 2020-08-07

## 2020-08-07 PROBLEM — R26.9 GAIT ABNORMALITY: Status: RESOLVED | Noted: 2018-07-26 | Resolved: 2020-08-07

## 2020-08-07 PROCEDURE — 99024 PR POST-OP FOLLOW-UP VISIT: ICD-10-PCS | Mod: HCNC,S$GLB,, | Performed by: ORTHOPAEDIC SURGERY

## 2020-08-07 PROCEDURE — 99999 PR PBB SHADOW E&M-EST. PATIENT-LVL V: CPT | Mod: PBBFAC,HCNC,, | Performed by: ORTHOPAEDIC SURGERY

## 2020-08-07 PROCEDURE — 29405 PR APPLY SHORT LEG CAST: ICD-10-PCS | Mod: HCNC,58,RT,S$GLB | Performed by: ORTHOPAEDIC SURGERY

## 2020-08-07 PROCEDURE — 73610 X-RAY EXAM OF ANKLE: CPT | Mod: TC,HCNC,PN,RT

## 2020-08-07 PROCEDURE — 99024 POSTOP FOLLOW-UP VISIT: CPT | Mod: HCNC,S$GLB,, | Performed by: ORTHOPAEDIC SURGERY

## 2020-08-07 PROCEDURE — 29405 APPL SHORT LEG CAST: CPT | Mod: HCNC,58,RT,S$GLB | Performed by: ORTHOPAEDIC SURGERY

## 2020-08-07 PROCEDURE — 73610 X-RAY EXAM OF ANKLE: CPT | Mod: 26,HCNC,RT, | Performed by: RADIOLOGY

## 2020-08-07 PROCEDURE — 99999 PR PBB SHADOW E&M-EST. PATIENT-LVL V: ICD-10-PCS | Mod: PBBFAC,HCNC,, | Performed by: ORTHOPAEDIC SURGERY

## 2020-08-07 PROCEDURE — 73610 XR ANKLE COMPLETE 3 VIEW RIGHT: ICD-10-PCS | Mod: 26,HCNC,RT, | Performed by: RADIOLOGY

## 2020-08-07 NOTE — PROGRESS NOTES
Per Dr. Mendez's orders to remove staples. Staples to right lower extremity were removed without any complications. Benzoin tincture applied to outer edges of incision, steri strips applied. Dr. Mendez ordered short leg cast to be applied to right lower extremity. Right short leg cast applied to extremity without any problems with assistance from Dr. Mendez. Patient tolerated application of cast well. Patient able to move right toes freely, cap refill less than 3 seconds. Instructed patient on cast care to include non weight bearing to extremity and elevation of extremity. Cast is not able to get wet. If cast gets wet patient must call the office to come in for cast change. If it is after hours or on the weekend patient to go to ER to have cast removed. Patient to call for an appointment to have cast reapplied on next business day. Do not stick anything inside cast as it could open the skin causing a wound and/or infection. Continue to elevate affected extremity above the heart to allow for swelling to resolve. Patient to call the office if cast starts to rub or becomes loose on lower extremity. Patient stated understanding.

## 2020-08-07 NOTE — PROGRESS NOTES
Subjective:      Patient ID: Doris Pastrana is a 69 y.o. female.    Chief Complaint: Post-op Evaluation of the Right Ankle and Post-op Evaluation (s/p arthrodesis; bone graft 20)    Doing very well today. She rates her pain as 4/10 today.   Social History     Occupational History     Employer: [employer]   Tobacco Use    Smoking status: Former Smoker     Packs/day: 1.00     Types: Cigarettes     Quit date: 3/12/1983     Years since quittin.4    Smokeless tobacco: Never Used   Substance and Sexual Activity    Alcohol use: Yes     Comment: socially    Drug use: No    Sexual activity: Not Currently            Objective:    Ortho Exam     RLE: neurovascularly intact, incisions healing well, No signs of infection.     XRAYS: 3 views of right ankle obtained and reviewed today reveal good alignment. Hardware is intact  .     Assessment:         s/p Right ankle arthrodesis  Plan:       I revmoved her staples today and steri-strips were placed. Short leg fiberglass cast applied. Non-weightbearing.   F/u 2 weeks with xray out of plaster right ankle.

## 2020-08-19 DIAGNOSIS — M19.071 ARTHRITIS OF RIGHT ANKLE: Primary | ICD-10-CM

## 2020-08-21 ENCOUNTER — OFFICE VISIT (OUTPATIENT)
Dept: ORTHOPEDICS | Facility: CLINIC | Age: 70
End: 2020-08-21
Payer: MEDICARE

## 2020-08-21 ENCOUNTER — HOSPITAL ENCOUNTER (OUTPATIENT)
Dept: RADIOLOGY | Facility: HOSPITAL | Age: 70
Discharge: HOME | End: 2020-08-21
Attending: ORTHOPAEDIC SURGERY
Payer: MEDICARE

## 2020-08-21 ENCOUNTER — DOCUMENTATION ONLY (OUTPATIENT)
Dept: ORTHOPEDICS | Facility: CLINIC | Age: 70
End: 2020-08-21

## 2020-08-21 VITALS
HEART RATE: 101 BPM | WEIGHT: 193.56 LBS | DIASTOLIC BLOOD PRESSURE: 65 MMHG | BODY MASS INDEX: 32.25 KG/M2 | SYSTOLIC BLOOD PRESSURE: 117 MMHG | HEIGHT: 65 IN

## 2020-08-21 DIAGNOSIS — M19.071 ARTHRITIS OF RIGHT ANKLE: Primary | ICD-10-CM

## 2020-08-21 DIAGNOSIS — M19.071 ARTHRITIS OF RIGHT ANKLE: ICD-10-CM

## 2020-08-21 PROCEDURE — 73610 XR ANKLE COMPLETE 3 VIEW RIGHT: ICD-10-PCS | Mod: 26,HCNC,RT, | Performed by: RADIOLOGY

## 2020-08-21 PROCEDURE — 99024 POSTOP FOLLOW-UP VISIT: CPT | Mod: HCNC,S$GLB,, | Performed by: ORTHOPAEDIC SURGERY

## 2020-08-21 PROCEDURE — 73610 X-RAY EXAM OF ANKLE: CPT | Mod: TC,HCNC,PN,RT

## 2020-08-21 PROCEDURE — 99999 PR PBB SHADOW E&M-EST. PATIENT-LVL V: ICD-10-PCS | Mod: PBBFAC,HCNC,, | Performed by: ORTHOPAEDIC SURGERY

## 2020-08-21 PROCEDURE — 29405 PR APPLY SHORT LEG CAST: ICD-10-PCS | Mod: 58,HCNC,RT,S$GLB | Performed by: ORTHOPAEDIC SURGERY

## 2020-08-21 PROCEDURE — 73610 X-RAY EXAM OF ANKLE: CPT | Mod: 26,HCNC,RT, | Performed by: RADIOLOGY

## 2020-08-21 PROCEDURE — 99999 PR PBB SHADOW E&M-EST. PATIENT-LVL V: CPT | Mod: PBBFAC,HCNC,, | Performed by: ORTHOPAEDIC SURGERY

## 2020-08-21 PROCEDURE — 99024 PR POST-OP FOLLOW-UP VISIT: ICD-10-PCS | Mod: HCNC,S$GLB,, | Performed by: ORTHOPAEDIC SURGERY

## 2020-08-21 PROCEDURE — 29405 APPL SHORT LEG CAST: CPT | Mod: 58,HCNC,RT,S$GLB | Performed by: ORTHOPAEDIC SURGERY

## 2020-08-21 NOTE — PROGRESS NOTES
Dr. Mendez ordered right short leg cast to be removed. Cast removal explained to patient. Patient applied goggles. Right short leg cast intact. Right short leg cast removed without difficulty using the cast saw. Incisions intact. No skin irritation noted. Incisions intact with steri strips. Patient tolerated cast removal well. Dr. Mendez ordered short leg cast to be applied to right lower extremity. Right short leg cast applied to extremity without any problems. Patient tolerated application of cast well. Patient able to move right toes freely, cap refill less than 3 seconds. Instructed patient on cast care to include non weight bearing to extremity and elevation of extremity. Cast is not able to get wet. If cast gets wet patient must call the office to come in for cast change. If it is after hours or on the weekend patient to go to ER to have cast removed. Patient to call for an appointment to have cast reapplied on next business day. Do not stick anything inside cast as it could open the skin causing a wound and/or infection. Continue to elevate affected extremity above the heart to allow for swelling to resolve. Patient to call the office if cast starts to rub or becomes loose on lower extremity. Patient stated understanding.

## 2020-08-21 NOTE — PROGRESS NOTES
Subjective:      Patient ID: Doris Pastrana is a 69 y.o. female.    Chief Complaint: Post-op Evaluation of the Right Ankle and Post-op Evaluation (s/p arthrodesis; bone graft 20)    Doing very well today. She rates her pain as0 /10 today.   Social History     Occupational History     Employer: [employer]   Tobacco Use    Smoking status: Former Smoker     Packs/day: 1.00     Types: Cigarettes     Quit date: 3/12/1983     Years since quittin.4    Smokeless tobacco: Never Used   Substance and Sexual Activity    Alcohol use: Yes     Comment: socially    Drug use: No    Sexual activity: Not Currently            Objective:    Ortho Exam     RLE: neurovascularly intact, incisions healing well, No signs of infection.  tenderness to palpation at the fusion site.     XRAYS: 3 views of right ankle obtained and reviewed today reveal good alignment. Hardware is intact  . There is interval progression of healing.    Assessment:         s/p Right ankle arthrodesis  Plan:        Short leg fiberglass cast applied. Non-weightbearing.   F/u 2 weeks with xray out of plaster right ankle.

## 2020-09-02 DIAGNOSIS — M19.071 ARTHRITIS OF RIGHT ANKLE: Primary | ICD-10-CM

## 2020-09-04 ENCOUNTER — TELEPHONE (OUTPATIENT)
Dept: ORTHOPEDICS | Facility: CLINIC | Age: 70
End: 2020-09-04

## 2020-09-04 ENCOUNTER — HOSPITAL ENCOUNTER (OUTPATIENT)
Dept: RADIOLOGY | Facility: HOSPITAL | Age: 70
Discharge: HOME | End: 2020-09-04
Attending: ORTHOPAEDIC SURGERY
Payer: MEDICARE

## 2020-09-04 ENCOUNTER — DOCUMENTATION ONLY (OUTPATIENT)
Dept: ORTHOPEDICS | Facility: CLINIC | Age: 70
End: 2020-09-04

## 2020-09-04 ENCOUNTER — OFFICE VISIT (OUTPATIENT)
Dept: ORTHOPEDICS | Facility: CLINIC | Age: 70
End: 2020-09-04
Payer: MEDICARE

## 2020-09-04 VITALS
DIASTOLIC BLOOD PRESSURE: 56 MMHG | BODY MASS INDEX: 32.25 KG/M2 | WEIGHT: 193.56 LBS | SYSTOLIC BLOOD PRESSURE: 107 MMHG | HEART RATE: 99 BPM | HEIGHT: 65 IN

## 2020-09-04 DIAGNOSIS — M19.071 ARTHRITIS OF RIGHT ANKLE: Primary | ICD-10-CM

## 2020-09-04 DIAGNOSIS — M19.071 ARTHRITIS OF RIGHT ANKLE: ICD-10-CM

## 2020-09-04 PROCEDURE — 97760 ORTHOTIC MGMT&TRAING 1ST ENC: CPT | Mod: HCNC,GP,S$GLB, | Performed by: ORTHOPAEDIC SURGERY

## 2020-09-04 PROCEDURE — 97760 PR ORTHOTIC MGMT&TRAINJ INITIAL ENC EA 15 MINS: ICD-10-PCS | Mod: HCNC,GP,S$GLB, | Performed by: ORTHOPAEDIC SURGERY

## 2020-09-04 PROCEDURE — 99999 PR PBB SHADOW E&M-EST. PATIENT-LVL V: CPT | Mod: PBBFAC,HCNC,, | Performed by: ORTHOPAEDIC SURGERY

## 2020-09-04 PROCEDURE — 73610 X-RAY EXAM OF ANKLE: CPT | Mod: 26,HCNC,RT, | Performed by: RADIOLOGY

## 2020-09-04 PROCEDURE — 73610 XR ANKLE COMPLETE 3 VIEW RIGHT: ICD-10-PCS | Mod: 26,HCNC,RT, | Performed by: RADIOLOGY

## 2020-09-04 PROCEDURE — 99024 PR POST-OP FOLLOW-UP VISIT: ICD-10-PCS | Mod: HCNC,S$GLB,, | Performed by: ORTHOPAEDIC SURGERY

## 2020-09-04 PROCEDURE — 99024 POSTOP FOLLOW-UP VISIT: CPT | Mod: HCNC,S$GLB,, | Performed by: ORTHOPAEDIC SURGERY

## 2020-09-04 PROCEDURE — 99999 PR PBB SHADOW E&M-EST. PATIENT-LVL V: ICD-10-PCS | Mod: PBBFAC,HCNC,, | Performed by: ORTHOPAEDIC SURGERY

## 2020-09-04 PROCEDURE — 73610 X-RAY EXAM OF ANKLE: CPT | Mod: TC,HCNC,PN,RT

## 2020-09-04 NOTE — TELEPHONE ENCOUNTER
----- Message from Cecilia De Los Santos MA sent at 9/4/2020  8:53 AM CDT -----  PT stated she will be about 15 mins late   Best Call Back #[phone]

## 2020-09-04 NOTE — PROGRESS NOTES
Subjective:      Patient ID: Doris Pastrana is a 69 y.o. female.    Chief Complaint: Post-op Evaluation of the Right Ankle and Post-op Evaluation (s/p arthrodesis; bone graft 20)    Doing very well today. She rates her pain as 0/10 today.   Social History     Occupational History     Employer: [employer]   Tobacco Use    Smoking status: Former Smoker     Packs/day: 1.00     Types: Cigarettes     Quit date: 3/12/1983     Years since quittin.5    Smokeless tobacco: Never Used   Substance and Sexual Activity    Alcohol use: Yes     Comment: socially    Drug use: No    Sexual activity: Not Currently            Objective:    Ortho Exam     RLE: neurovascularly intact, incisions healing well, No signs of infection. No tenderness to palpation at the fusion site.     XRAYS: 3 views of right ankle obtained and reviewed today reveal good alignment. Hardware is intact . There is interval progression of healing.    Assessment:         s/p Right ankle arthrodesis  Plan:       We performed a custom orthotic/brace adjustment, fitting and training with the patient today. The patient demonstrated understanding and proper care. This was performed for 15 minutes.  Short boot and was given.  Weight bearing as tolerated.   F/u 3 weeks with xray right ankle.

## 2020-09-04 NOTE — PROGRESS NOTES
Dr. Mendez ordered left short leg cast to be removed. Cast removal explained to patient. Patient applied goggles. Left short leg cast intact. Left short leg cast removed without difficulty using the cast saw. Incisions intact. No skin irritation noted. Patient tolerated cast removal well.

## 2020-09-14 ENCOUNTER — TELEPHONE (OUTPATIENT)
Dept: ORTHOPEDICS | Facility: CLINIC | Age: 70
End: 2020-09-14

## 2020-09-14 DIAGNOSIS — M19.071 ARTHRITIS OF RIGHT ANKLE: Primary | ICD-10-CM

## 2020-09-14 RX ORDER — CLINDAMYCIN HYDROCHLORIDE 300 MG/1
300 CAPSULE ORAL EVERY 8 HOURS
Qty: 21 CAPSULE | Refills: 0 | Status: SHIPPED | OUTPATIENT
Start: 2020-09-14 | End: 2020-09-21

## 2020-09-14 NOTE — TELEPHONE ENCOUNTER
----- Message from Eddi Acosta sent at 9/14/2020 11:56 AM CDT -----  Regarding: Sx site is red, sowllen and painful  Contact: pt   [phone]  Please call

## 2020-09-14 NOTE — TELEPHONE ENCOUNTER
Called pt back. Dr. Mendez is sending in an antibiotic. She is to let us know if the redness does not go away after finishing the meds. Pt advised to wrap ankle with ACE and can leave the boot off if not walking. Can place the boot on with the ACE wrap to walk. Thanks, Carol

## 2020-09-14 NOTE — TELEPHONE ENCOUNTER
Called pt back. She states that the boot irritates the left side of her right ankle. States that the incision is red and hot. States that her pain is 6/10. Not sure if running fever, does not have thermometer. States started Friday. Stopped wearing the boot Friday. States that it has gotten redder since she noticed it. Scheduled appt for tomorrow with Dr. Mendez.

## 2020-09-19 ENCOUNTER — HOSPITAL ENCOUNTER (EMERGENCY)
Facility: HOSPITAL | Age: 70
Discharge: HOME | End: 2020-09-19
Attending: EMERGENCY MEDICINE
Payer: MEDICARE

## 2020-09-19 VITALS
TEMPERATURE: 99 F | OXYGEN SATURATION: 94 % | HEIGHT: 65 IN | BODY MASS INDEX: 32.15 KG/M2 | WEIGHT: 193 LBS | DIASTOLIC BLOOD PRESSURE: 59 MMHG | HEART RATE: 79 BPM | SYSTOLIC BLOOD PRESSURE: 111 MMHG | RESPIRATION RATE: 16 BRPM

## 2020-09-19 DIAGNOSIS — N20.1 URETEROLITHIASIS: Primary | ICD-10-CM

## 2020-09-19 LAB
ALBUMIN SERPL BCP-MCNC: 4.1 G/DL (ref 3.5–5.2)
ALP SERPL-CCNC: 144 U/L (ref 55–135)
ALT SERPL W/O P-5'-P-CCNC: 29 U/L (ref 10–44)
ANION GAP SERPL CALC-SCNC: 14 MMOL/L (ref 8–16)
AST SERPL-CCNC: 21 U/L (ref 10–40)
BACTERIA #/AREA URNS HPF: ABNORMAL /HPF
BASOPHILS # BLD AUTO: 0.05 K/UL (ref 0–0.2)
BASOPHILS NFR BLD: 0.5 % (ref 0–1.9)
BILIRUB SERPL-MCNC: 1 MG/DL (ref 0.1–1)
BILIRUB UR QL STRIP: NEGATIVE
BUN SERPL-MCNC: 20 MG/DL (ref 8–23)
CALCIUM SERPL-MCNC: 9.8 MG/DL (ref 8.7–10.5)
CHLORIDE SERPL-SCNC: 103 MMOL/L (ref 95–110)
CLARITY UR: CLEAR
CO2 SERPL-SCNC: 26 MMOL/L (ref 23–29)
COLOR UR: YELLOW
CREAT SERPL-MCNC: 0.9 MG/DL (ref 0.5–1.4)
DIFFERENTIAL METHOD: ABNORMAL
EOSINOPHIL # BLD AUTO: 0.1 K/UL (ref 0–0.5)
EOSINOPHIL NFR BLD: 1.1 % (ref 0–8)
ERYTHROCYTE [DISTWIDTH] IN BLOOD BY AUTOMATED COUNT: 14.2 % (ref 11.5–14.5)
EST. GFR  (AFRICAN AMERICAN): >60 ML/MIN/1.73 M^2
EST. GFR  (NON AFRICAN AMERICAN): >60 ML/MIN/1.73 M^2
GLUCOSE SERPL-MCNC: 151 MG/DL (ref 70–110)
GLUCOSE UR QL STRIP: NEGATIVE
HCT VFR BLD AUTO: 41.8 % (ref 37–48.5)
HGB BLD-MCNC: 13.2 G/DL (ref 12–16)
HGB UR QL STRIP: ABNORMAL
HYALINE CASTS #/AREA URNS LPF: 0 /LPF
IMM GRANULOCYTES # BLD AUTO: 0.04 K/UL (ref 0–0.04)
IMM GRANULOCYTES NFR BLD AUTO: 0.4 % (ref 0–0.5)
KETONES UR QL STRIP: NEGATIVE
LEUKOCYTE ESTERASE UR QL STRIP: NEGATIVE
LYMPHOCYTES # BLD AUTO: 1.9 K/UL (ref 1–4.8)
LYMPHOCYTES NFR BLD: 17.2 % (ref 18–48)
MCH RBC QN AUTO: 27.2 PG (ref 27–31)
MCHC RBC AUTO-ENTMCNC: 31.6 G/DL (ref 32–36)
MCV RBC AUTO: 86 FL (ref 82–98)
MICROSCOPIC COMMENT: ABNORMAL
MONOCYTES # BLD AUTO: 0.8 K/UL (ref 0.3–1)
MONOCYTES NFR BLD: 7.7 % (ref 4–15)
NEUTROPHILS # BLD AUTO: 8 K/UL (ref 1.8–7.7)
NEUTROPHILS NFR BLD: 73.1 % (ref 38–73)
NITRITE UR QL STRIP: NEGATIVE
NRBC BLD-RTO: 0 /100 WBC
PH UR STRIP: 6 [PH] (ref 5–8)
PLATELET # BLD AUTO: 281 K/UL (ref 150–350)
PMV BLD AUTO: 10.1 FL (ref 9.2–12.9)
POTASSIUM SERPL-SCNC: 4.3 MMOL/L (ref 3.5–5.1)
PROT SERPL-MCNC: 7.5 G/DL (ref 6–8.4)
PROT UR QL STRIP: ABNORMAL
RBC # BLD AUTO: 4.86 M/UL (ref 4–5.4)
RBC #/AREA URNS HPF: 5 /HPF (ref 0–4)
SODIUM SERPL-SCNC: 143 MMOL/L (ref 136–145)
SP GR UR STRIP: >=1.03 (ref 1–1.03)
URN SPEC COLLECT METH UR: ABNORMAL
UROBILINOGEN UR STRIP-ACNC: 1 EU/DL
WBC # BLD AUTO: 10.97 K/UL (ref 3.9–12.7)
WBC #/AREA URNS HPF: 3 /HPF (ref 0–5)

## 2020-09-19 PROCEDURE — 93010 ELECTROCARDIOGRAM REPORT: CPT | Mod: HCNC,,, | Performed by: SPECIALIST

## 2020-09-19 PROCEDURE — 63600175 PHARM REV CODE 636 W HCPCS: Mod: HCNC | Performed by: EMERGENCY MEDICINE

## 2020-09-19 PROCEDURE — 80053 COMPREHEN METABOLIC PANEL: CPT | Mod: HCNC

## 2020-09-19 PROCEDURE — 85025 COMPLETE CBC W/AUTO DIFF WBC: CPT | Mod: HCNC

## 2020-09-19 PROCEDURE — 96361 HYDRATE IV INFUSION ADD-ON: CPT | Mod: HCNC

## 2020-09-19 PROCEDURE — 99285 EMERGENCY DEPT VISIT HI MDM: CPT | Mod: 25,HCNC

## 2020-09-19 PROCEDURE — 96375 TX/PRO/DX INJ NEW DRUG ADDON: CPT | Mod: HCNC

## 2020-09-19 PROCEDURE — 93005 ELECTROCARDIOGRAM TRACING: CPT | Mod: HCNC

## 2020-09-19 PROCEDURE — 36415 COLL VENOUS BLD VENIPUNCTURE: CPT | Mod: HCNC

## 2020-09-19 PROCEDURE — 96365 THER/PROPH/DIAG IV INF INIT: CPT | Mod: HCNC

## 2020-09-19 PROCEDURE — 81000 URINALYSIS NONAUTO W/SCOPE: CPT | Mod: HCNC

## 2020-09-19 PROCEDURE — 25000003 PHARM REV CODE 250: Mod: HCNC | Performed by: EMERGENCY MEDICINE

## 2020-09-19 PROCEDURE — 93010 EKG 12-LEAD: ICD-10-PCS | Mod: HCNC,,, | Performed by: SPECIALIST

## 2020-09-19 PROCEDURE — 96376 TX/PRO/DX INJ SAME DRUG ADON: CPT | Mod: HCNC

## 2020-09-19 RX ORDER — ONDANSETRON 4 MG/1
4 TABLET, FILM COATED ORAL EVERY 6 HOURS
Qty: 12 TABLET | Refills: 0 | Status: SHIPPED | OUTPATIENT
Start: 2020-09-19 | End: 2022-05-26

## 2020-09-19 RX ORDER — HYDROMORPHONE HYDROCHLORIDE 2 MG/ML
1 INJECTION, SOLUTION INTRAMUSCULAR; INTRAVENOUS; SUBCUTANEOUS
Status: COMPLETED | OUTPATIENT
Start: 2020-09-19 | End: 2020-09-19

## 2020-09-19 RX ORDER — HYDROMORPHONE HYDROCHLORIDE 2 MG/ML
0.2 INJECTION, SOLUTION INTRAMUSCULAR; INTRAVENOUS; SUBCUTANEOUS
Status: COMPLETED | OUTPATIENT
Start: 2020-09-19 | End: 2020-09-19

## 2020-09-19 RX ORDER — ONDANSETRON 2 MG/ML
8 INJECTION INTRAMUSCULAR; INTRAVENOUS
Status: COMPLETED | OUTPATIENT
Start: 2020-09-19 | End: 2020-09-19

## 2020-09-19 RX ORDER — HYDROCODONE BITARTRATE AND ACETAMINOPHEN 5; 325 MG/1; MG/1
1 TABLET ORAL EVERY 4 HOURS PRN
Qty: 6 TABLET | Refills: 0 | Status: SHIPPED | OUTPATIENT
Start: 2020-09-19 | End: 2020-12-17

## 2020-09-19 RX ADMIN — HYDROMORPHONE HYDROCHLORIDE 0.2 MG: 2 INJECTION INTRAMUSCULAR; INTRAVENOUS; SUBCUTANEOUS at 01:09

## 2020-09-19 RX ADMIN — SODIUM CHLORIDE 500 ML: 0.9 INJECTION, SOLUTION INTRAVENOUS at 02:09

## 2020-09-19 RX ADMIN — ONDANSETRON 8 MG: 2 INJECTION INTRAMUSCULAR; INTRAVENOUS at 01:09

## 2020-09-19 RX ADMIN — HYDROMORPHONE HYDROCHLORIDE 1 MG: 2 INJECTION INTRAMUSCULAR; INTRAVENOUS; SUBCUTANEOUS at 03:09

## 2020-09-19 RX ADMIN — HYDROMORPHONE HYDROCHLORIDE 1 MG: 2 INJECTION INTRAMUSCULAR; INTRAVENOUS; SUBCUTANEOUS at 01:09

## 2020-09-19 RX ADMIN — DEXTROSE MONOHYDRATE 12.5 MG: 50 INJECTION, SOLUTION INTRAVENOUS at 03:09

## 2020-09-19 NOTE — ED PROVIDER NOTES
Encounter Date: 2020    SCRIBE #1 NOTE: I, Eddi Alvarez, am scribing for, and in the presence of, Dr. Serrano.       History     Chief Complaint   Patient presents with    Abdominal Pain     LLQ radiating from L lower back, beginning this AM with N/V     Time seen by provider: 1:18 PM on 2020      Doris Pastrana is a 69 y.o. female with a PMHx of T2DM, HLD, kidney stones, and HTN who presents to the ED for abdominal pain that started 2 hours PTA. The patient reports that this pain started in the LLQ and radiates to the left flank region. She states that this pain has gradually been worsening since onset. Patient reports that 2 hours ago she additionally started to have difficulty urinating, but denies pain with urination and blood in urine. She does endorse having some chills currently, but denies fever. She admits to having nausea and vomiting. Patient denies chest pain, SOB, or any other complaint at this time. The patient has a PSHx of appendectomy, cholecystectomy, hysterectomy, and .      The history is provided by the patient.     Review of patient's allergies indicates:   Allergen Reactions    Omnicef [cefdinir] Hives    Codeine Nausea Only    Rhubarb Swelling    Strawberries [strawberry] Hives    Iodine Rash       Other reaction(s): Nausea    Latex, natural rubber Rash    Pravastatin Other (See Comments)     Muscle pain      Past Medical History:   Diagnosis Date    Acute sinus infection     Allergy     Anemia     Arthritis     Bronchitis     Degenerative disc disease     lumbar, pain contract 2013    Depression     Diabetes mellitus, type 2 16    Fatty liver     Hyperlipidemia 16    Hypertension     Kidney stone     Mitral valve prolapse     Pleurisy     Pneumonia     PONV (postoperative nausea and vomiting)     Sinus infection     TIA (transient ischemic attack) 2019     Past Surgical History:   Procedure Laterality Date    ANKLE  SURGERY  2017    right ankle torn ligament    APPENDECTOMY       SECTION      CHOLECYSTECTOMY      COLONOSCOPY  8/13/15    Dr. Oliveira, five year recheck    EPIDURAL STEROID INJECTION INTO CERVICAL SPINE N/A 1/3/2020    Procedure: Injection-steroid-epidural-cervical;  Surgeon: Eddi Albrecht MD;  Location[facility] OR;  Service: Pain Management;  Laterality: N/A;  C7-T1    EPIDURAL STEROID INJECTION INTO CERVICAL SPINE N/A 3/10/2020    Procedure: Injection-steroid-epidural-cervical;  Surgeon: Eddi Albrecht MD;  Location: [facility] OR;  Service: Pain Management;  Laterality: N/A;  C7-T1    EXTRACORPOREAL SHOCK WAVE LITHOTRIPSY Left 2019    Procedure: LITHOTRIPSY, ESWL - only if stone visible on xray  with cysto after on table possible;  Surgeon: Marisol Stewart MD;  Location: [facility] OR;  Service: Urology;  Laterality: Left;    HYSTERECTOMY      OOPHORECTOMY      TONSILLECTOMY      VAGINAL DELIVERY       Family History   Problem Relation Age of Onset    Arthritis Mother     Hypertension Mother     Vision loss Mother     Diabetes Mother     Alcohol abuse Father     Depression Father     Early death Father     Alcohol abuse Sister     Depression Sister     Cancer Sister         thyroid    Diabetes Sister     Heart disease Sister         chf    Atrial fibrillation Sister     Depression Brother     Hypertension Brother     Cancer Brother         prostate/stomach    Diabetes Brother     Aneurysm Brother         aaa    Learning disabilities Daughter     Hypertension Maternal Grandmother     Heart disease Maternal Grandmother     Arthritis Maternal Grandmother     Glaucoma Maternal Grandmother     Cancer Maternal Grandfather         stomach    Diabetes Paternal Grandmother     Breast cancer Paternal Grandmother     Heart disease Paternal Grandfather     Heart disease Maternal Uncle     Kidney disease Maternal Uncle     Kidney disease Paternal Aunt      Social History      Tobacco Use    Smoking status: Former Smoker     Packs/day: 1.00     Types: Cigarettes     Quit date: 3/12/1983     Years since quittin.5    Smokeless tobacco: Never Used   Substance Use Topics    Alcohol use: Yes     Comment: socially    Drug use: No     Review of Systems   Constitutional: Positive for chills. Negative for activity change, diaphoresis and fever.   HENT: Negative for ear pain, rhinorrhea, sore throat and trouble swallowing.    Eyes: Negative for pain and visual disturbance.   Respiratory: Negative for cough, shortness of breath and stridor.    Cardiovascular: Negative for chest pain.   Gastrointestinal: Positive for abdominal pain, nausea and vomiting. Negative for blood in stool and diarrhea.   Genitourinary: Positive for difficulty urinating. Negative for dysuria, hematuria, vaginal bleeding and vaginal discharge.   Musculoskeletal: Negative for gait problem.   Skin: Negative for rash and wound.   Neurological: Negative for seizures and headaches.   Psychiatric/Behavioral: Negative for hallucinations and suicidal ideas.       Physical Exam     Initial Vitals [20 1309]   BP Pulse Resp Temp SpO2   133/76 90 17 98 °F (36.7 °C) 96 %      MAP       --         Physical Exam    Nursing note and vitals reviewed.  Constitutional: She appears well-developed. She is not diaphoretic. No distress.   HENT:   Head: Normocephalic and atraumatic.   Nose: Nose normal.   Eyes: EOM are normal. No scleral icterus.   Neck: Normal range of motion. Neck supple.   Cardiovascular: Normal rate, regular rhythm, normal heart sounds and intact distal pulses. Exam reveals no gallop and no friction rub.    No murmur heard.  Pulmonary/Chest: Breath sounds normal. No stridor. No respiratory distress. She has no wheezes. She has no rhonchi. She has no rales.   Abdominal: Soft. Bowel sounds are normal. She exhibits no distension. There is no abdominal tenderness. There is no rigidity, no rebound, no guarding and  no CVA tenderness.   Musculoskeletal: Normal range of motion.   Neurological: She is alert and oriented to person, place, and time. No cranial nerve deficit.   Skin: Skin is warm and dry. Capillary refill takes less than 2 seconds. No rash noted.   Psychiatric: She has a normal mood and affect.         ED Course   Procedures  Labs Reviewed   CBC W/ AUTO DIFFERENTIAL - Abnormal; Notable for the following components:       Result Value    Mean Corpuscular Hemoglobin Conc 31.6 (*)     Gran # (ANC) 8.0 (*)     Gran% 73.1 (*)     Lymph% 17.2 (*)     All other components within normal limits   COMPREHENSIVE METABOLIC PANEL - Abnormal; Notable for the following components:    Glucose 151 (*)     Alkaline Phosphatase 144 (*)     All other components within normal limits   URINALYSIS, REFLEX TO URINE CULTURE - Abnormal; Notable for the following components:    Specific Gravity, UA >=1.030 (*)     Protein, UA 2+ (*)     Occult Blood UA 2+ (*)     All other components within normal limits    Narrative:     Specimen Source->Urine   URINALYSIS MICROSCOPIC - Abnormal; Notable for the following components:    RBC, UA 5 (*)     All other components within normal limits    Narrative:     Specimen Source->Urine     EKG Readings: (Independently Interpreted)   Rhythm: Normal Sinus Rhythm. Heart Rate: 84. Ectopy: No Ectopy. Conduction: Normal. ST Segments: Normal ST Segments. T Waves: Normal. Clinical Impression: Normal Sinus Rhythm       Imaging Results           CT Renal Stone Study ABD Pelvis WO (Final result)  Result time 09/19/20 14:43:52    Final result by Yolanda Molina MD (09/19/20 14:43:52)                 Impression:      There is a 3 mm obstructing, distal left ureteral stone with associated mild left hydroureteronephrosis.  This report was flagged in Epic as abnormal.      Electronically signed by: Yolanda Molina MD  Date:    09/19/2020  Time:    14:43             Narrative:    EXAMINATION:  CT RENAL STONE STUDY ABD  PELVIS WO    CLINICAL HISTORY:  Flank pain, kidney stone suspected;    TECHNIQUE:  Low dose axial images, sagittal and coronal reformations were obtained from the lung bases to the pubic symphysis.  Contrast was not administered.    COMPARISON:  None    FINDINGS:  The liver, spleen, adrenal glands and pancreas are unremarkable.    There are bilateral renal calculi the largest of which is seen on the left, in the midpole, measuring 5 mm.    There is a 3 mm calculus seen within the distal left ureter with associated mild left hydroureteronephrosis.  There is no right hydronephrosis.  There is no evidence pelvic free fluid.  There is no evidence of abdominal nor pelvic lymphadenopathy.  The osseous structures are unremarkable.    The patient is status post cholecystectomy.  Common bile duct measures 1.2 cm, likely the patient's configuration following cholecystectomy.                                 Medical Decision Making:   History:   Old Medical Records: I decided to obtain old medical records.  Clinical Tests:   Lab Tests: Reviewed and Ordered  Radiological Study: Ordered and Reviewed  ED Management:  70 yo F past medical history of kidney stones presents with flank pain consistent with renal colic.  Patient otherwise well-appearing with low suspicion for sepsis, dissection or infected kidney stone. Low suspicion for atypical appendicitis, torsion, acute cholecystitis, or intraabdominal infection. Low suspicion for MACIE, obstructive nephropathy given exam and history.    Will obtain CT noncontrast, UA, reassess.    CT showed 3 mm stone. No evidence of infection on UA. Patient tolerating PO and pain controlled prior to discharge. Strict return precautions for infected stone or po intolerance discussed. Given strainer and flomax and referral to urology. Pt understands and agrees with discharge instructions. Pt also given strict return precautions for any new or worsening symptoms and plans to follow up closely with  Urology.               Scribe Attestation:   Scribe #1: I performed the above scribed service and the documentation accurately describes the services I performed. I attest to the accuracy of the note.      Attending Attestation:     Physician Attestation for Scribe:    I, Dr. Phani Serrano, personally performed the services described in this documentation.   All medical record entries made by the scribe were at my direction and in my presence.   I have reviewed the chart and agree that the record is accurate and complete.   Phani Serrano MD  4:50 PM 09/19/2020     DISCLAIMER: This note was prepared with [employer] voice recognition transcription software. Garbled syntax, mangled pronouns, and other bizarre constructions may be attributed to that software system.          ED Course as of Sep 19 1651   Sat Sep 19, 2020   1503 Impression:     There is a 3 mm obstructing, distal left ureteral stone with associated mild left hydroureteronephrosis.  This report was flagged in Epic as abnormal.        Electronically signed by: Yolanda Molina MD  Date:                                            09/19/2020  Time:                                           14:43    [BD]      ED Course User Index  [BD] Phani Serrano MD            Clinical Impression:     ICD-10-CM ICD-9-CM   1. Ureterolithiasis  N20.1 592.1                      Disposition:   Disposition: Discharged  Condition: Stable     ED Disposition Condition    Discharge Stable        ED Prescriptions     Medication Sig Dispense Start Date End Date Auth. Provider    HYDROcodone-acetaminophen (NORCO) 5-325 mg per tablet Take 1 tablet by mouth every 4 (four) hours as needed for Pain. 6 tablet 9/19/2020  Phani Serrano MD    ondansetron (ZOFRAN) 4 MG tablet Take 1 tablet (4 mg total) by mouth every 6 (six) hours. 12 tablet 9/19/2020  Phani Serrano MD        Follow-up Information     Follow up With Specialties Details Why Contact Info    Marisol LLAMAS  MD [facility] Urology Go in 3 days  [address]  [address]  [place] [address]  [phone]      Tor Garg MD Family Medicine Go in 2 days  [address]  [place] [address]  [phone]      [facility] Emergency Medicine Go to  As needed, If symptoms worsen [address]  [facility] [address]  [phone]                                       Phani Serrano MD  09/19/20 2345

## 2020-09-19 NOTE — ED NOTES
Pt reports waking up with LLQ abd pain radiating to left flank since this Am. +nausea. Hx kidney stones.

## 2020-09-19 NOTE — ED NOTES
"Pt rubbing center of chest. "I think I have a gas bubble." Reports chest discomfort. Dr. Serrano notified and aware. Phenergan infusing. Will cont to monitor.  "

## 2020-09-19 NOTE — ED NOTES
Pt moving around in bed, guarding abd and moaning in pain after Dilaudid 0.2mg. Verbalizes hx of gastric ulcer 20 years ago and does not take NSAIDs.

## 2020-09-23 ENCOUNTER — PATIENT MESSAGE (OUTPATIENT)
Dept: GASTROENTEROLOGY | Facility: CLINIC | Age: 70
End: 2020-09-23

## 2020-09-23 DIAGNOSIS — M19.071 ARTHRITIS OF RIGHT ANKLE: Primary | ICD-10-CM

## 2020-09-24 ENCOUNTER — HOSPITAL ENCOUNTER (OUTPATIENT)
Dept: RADIOLOGY | Facility: HOSPITAL | Age: 70
Discharge: HOME | End: 2020-09-24
Attending: ORTHOPAEDIC SURGERY
Payer: MEDICARE

## 2020-09-24 ENCOUNTER — OFFICE VISIT (OUTPATIENT)
Dept: ORTHOPEDICS | Facility: CLINIC | Age: 70
End: 2020-09-24
Payer: MEDICARE

## 2020-09-24 VITALS
SYSTOLIC BLOOD PRESSURE: 113 MMHG | HEIGHT: 65 IN | DIASTOLIC BLOOD PRESSURE: 60 MMHG | BODY MASS INDEX: 32.15 KG/M2 | HEART RATE: 92 BPM | WEIGHT: 193 LBS

## 2020-09-24 DIAGNOSIS — M19.071 ARTHRITIS OF RIGHT ANKLE: ICD-10-CM

## 2020-09-24 DIAGNOSIS — M19.071 ARTHRITIS OF RIGHT ANKLE: Primary | ICD-10-CM

## 2020-09-24 PROCEDURE — 73610 XR ANKLE COMPLETE 3 VIEW RIGHT: ICD-10-PCS | Mod: 26,HCNC,RT, | Performed by: RADIOLOGY

## 2020-09-24 PROCEDURE — 73610 X-RAY EXAM OF ANKLE: CPT | Mod: 26,HCNC,RT, | Performed by: RADIOLOGY

## 2020-09-24 PROCEDURE — 99999 PR PBB SHADOW E&M-EST. PATIENT-LVL IV: CPT | Mod: PBBFAC,HCNC,, | Performed by: ORTHOPAEDIC SURGERY

## 2020-09-24 PROCEDURE — 99999 PR PBB SHADOW E&M-EST. PATIENT-LVL IV: ICD-10-PCS | Mod: PBBFAC,HCNC,, | Performed by: ORTHOPAEDIC SURGERY

## 2020-09-24 PROCEDURE — 99024 PR POST-OP FOLLOW-UP VISIT: ICD-10-PCS | Mod: HCNC,S$GLB,, | Performed by: ORTHOPAEDIC SURGERY

## 2020-09-24 PROCEDURE — 73610 X-RAY EXAM OF ANKLE: CPT | Mod: TC,HCNC,PO,RT

## 2020-09-24 PROCEDURE — 99024 POSTOP FOLLOW-UP VISIT: CPT | Mod: HCNC,S$GLB,, | Performed by: ORTHOPAEDIC SURGERY

## 2020-09-24 RX ORDER — MUPIROCIN 20 MG/G
OINTMENT TOPICAL
COMMUNITY
Start: 2020-07-06 | End: 2021-07-09

## 2020-09-24 NOTE — PROGRESS NOTES
Subjective:      Patient ID: Doris Pastrana is a 69 y.o. female.      Doing very well today. She rates her pain as 4/10 today. DOS: 20    Social History     Occupational History     Employer: [employer]   Tobacco Use    Smoking status: Former Smoker     Packs/day: 1.00     Types: Cigarettes     Quit date: 3/12/1983     Years since quittin.5    Smokeless tobacco: Never Used   Substance and Sexual Activity    Alcohol use: Yes     Comment: socially    Drug use: No    Sexual activity: Not Currently            Objective:    Ortho Exam     RLE: neurovascularly intact, incisions well healed, No signs of infection. No tenderness to palpation at the fusion site.     XRAYS: 3 views of right ankle obtained and reviewed today reveal good alignment. Hardware is intact . There is interval progression of healing.    Assessment:         s/p Right ankle arthrodesis  Plan:     Weight bearing as tolerated in regular shoe. Ankle sleeve given. F/u 4 weeks with xray right ankle.

## 2020-09-29 ENCOUNTER — PATIENT MESSAGE (OUTPATIENT)
Dept: OTHER | Facility: OTHER | Age: 70
End: 2020-09-29

## 2020-10-05 ENCOUNTER — PATIENT MESSAGE (OUTPATIENT)
Dept: ADMINISTRATIVE | Facility: HOSPITAL | Age: 70
End: 2020-10-05

## 2020-10-08 DIAGNOSIS — E11.9 CONTROLLED TYPE 2 DIABETES MELLITUS WITHOUT COMPLICATION, WITHOUT LONG-TERM CURRENT USE OF INSULIN: ICD-10-CM

## 2020-10-08 NOTE — TELEPHONE ENCOUNTER
Care Due:                  Date            Visit Type   Department     Provider  --------------------------------------------------------------------------------                                ESTABLISHED                              PATIENT      [facility]  Last Visit: 06-      [facility]       Tor Garg                                           [facility]  Next Visit: 12-      [facility]       Tor Garg                                                            Last  Test          Frequency    Reason                     Performed    Due Date  --------------------------------------------------------------------------------    HBA1C.......  6 months...  dulaglutide, metFORMIN...  06- 12-    Powered by [employer]. Reference number: [id number]. 10/08/2020 4:00:09 PM   CDT

## 2020-10-09 RX ORDER — METFORMIN HYDROCHLORIDE 750 MG/1
750 TABLET, EXTENDED RELEASE ORAL 2 TIMES DAILY WITH MEALS
Qty: 180 TABLET | Refills: 0 | Status: SHIPPED | OUTPATIENT
Start: 2020-10-09 | End: 2020-12-16 | Stop reason: SDUPTHER

## 2020-10-09 NOTE — PROGRESS NOTES
Refill Routing Note   Medication(s) are not appropriate for processing by [facility] for the following reason(s):     - Patient has been seen in the Emergency Room/Department since the last PCP visit    [facility] actions taken in this encounter: Defer       Medication Therapy Plan: FLOS AND FOVS; ED VISIT(9/19/20-Ureterolithiasis), DEFER TO YOU  Medication reconciliation completed: No   Automatic Epic Generated Protocol Data:        Requested Prescriptions   Pending Prescriptions Disp Refills    metFORMIN (GLUCOPHAGE-XR) 750 MG ER 24hr tablet 180 tablet 0     Sig: Take 1 tablet (750 mg total) by mouth 2 (two) times daily with meals.       Endocrinology:  Diabetes - Biguanides Passed - 10/8/2020  3:59 PM        Passed - Patient is at least 18 years old        Passed - Office Visit within last 12 months or future 90 days.     Recent Outpatient Visits            2 weeks ago Arthritis of right ankle    [facility] Andrzej Mendez MD    1 month ago Arthritis of right ankle    [facility] Andrzej Mendez MD    1 month ago Arthritis of right ankle    [facility] Andrzej Mendez MD    2 months ago Arthritis of right ankle    [facility] Andrzej Mendez MD    2 months ago [facility] of right ankle    [facility]          Future Appointments              In 2 weeks Andrzej Mendez MD [facility]    In 2 months LAB, [facility], [place]    In 2 months MD [facility], [place]                Passed - Cr is 1.3 or below and within 360 days     Creatinine   Date Value Ref Range Status   09/19/2020 0.9 0.5 - 1.4 mg/dL Final   06/16/2020 0.7 0.5 - 1.4 mg/dL Final   12/12/2019 0.7 0.5 - 1.4 mg/dL Final              Passed - HBA1C is 7.9 or below and within 180 days     Hemoglobin A1C   Date Value Ref Range Status   06/16/2020 6.5 (H)  4.0 - 5.6 % Final     Comment:     ADA Screening Guidelines:  5.7-6.4%  Consistent with prediabetes  >or=6.5%  Consistent with diabetes  High levels of fetal hemoglobin interfere with the HbA1C  assay. Heterozygous hemoglobin variants (HbS, HgC, etc)do  not significantly interfere with this assay.   However, presence of multiple variants may affect accuracy.     12/12/2019 6.6 (H) 4.0 - 5.6 % Final     Comment:     ADA Screening Guidelines:  5.7-6.4%  Consistent with prediabetes  >or=6.5%  Consistent with diabetes  High levels of fetal hemoglobin interfere with the HbA1C  assay. Heterozygous hemoglobin variants (HbS, HgC, etc)do  not significantly interfere with this assay.   However, presence of multiple variants may affect accuracy.     05/07/2019 6.5 (H) 4.0 - 5.6 % Final     Comment:     ADA Screening Guidelines:  5.7-6.4%  Consistent with prediabetes  >or=6.5%  Consistent with diabetes  High levels of fetal hemoglobin interfere with the HbA1C  assay. Heterozygous hemoglobin variants (HbS, HgC, etc)do  not significantly interfere with this assay.   However, presence of multiple variants may affect accuracy.                Passed - eGFR is 30 or above and within 360 days     eGFR if non    Date Value Ref Range Status   09/19/2020 >60 >60 mL/min/1.73 m^2 Final     Comment:     Calculation used to obtain the estimated glomerular filtration  rate (eGFR) is the CKD-EPI equation.      06/16/2020 >60.0 >60 mL/min/1.73 m^2 Final     Comment:     Calculation used to obtain the estimated glomerular filtration  rate (eGFR) is the CKD-EPI equation.      12/12/2019 >60.0 >60 mL/min/1.73 m^2 Final     Comment:     Calculation used to obtain the estimated glomerular filtration  rate (eGFR) is the CKD-EPI equation.        eGFR if    Date Value Ref Range Status   09/19/2020 >60 >60 mL/min/1.73 m^2 Final   06/16/2020 >60.0 >60 mL/min/1.73 m^2 Final   12/12/2019 >60.0 >60 mL/min/1.73 m^2 Final                     Appointments  past 12m or future 3m with PCP    Date Provider   Last Visit   6/22/2020 Tor Garg MD   Next Visit   12/16/2020 Tor Garg MD   ED visits in past 90 days: 1        Note composed:10:38 AM 10/09/2020

## 2020-10-20 ENCOUNTER — PATIENT OUTREACH (OUTPATIENT)
Dept: ADMINISTRATIVE | Facility: OTHER | Age: 70
End: 2020-10-20

## 2020-10-20 NOTE — PROGRESS NOTES
Chart was reviewed for overdue [facility]active [facility]counters ([facility])  topics  Updates were requested from care everywhere  Health Maintenance has been updated  LINKS immunization registry triggered

## 2020-10-21 DIAGNOSIS — M19.071 ARTHRITIS OF RIGHT ANKLE: Primary | ICD-10-CM

## 2020-10-23 ENCOUNTER — OFFICE VISIT (OUTPATIENT)
Dept: ORTHOPEDICS | Facility: CLINIC | Age: 70
End: 2020-10-23
Payer: MEDICARE

## 2020-10-23 ENCOUNTER — HOSPITAL ENCOUNTER (OUTPATIENT)
Dept: RADIOLOGY | Facility: HOSPITAL | Age: 70
Discharge: HOME | End: 2020-10-23
Attending: ORTHOPAEDIC SURGERY
Payer: MEDICARE

## 2020-10-23 VITALS
SYSTOLIC BLOOD PRESSURE: 111 MMHG | HEART RATE: 88 BPM | DIASTOLIC BLOOD PRESSURE: 68 MMHG | HEIGHT: 65 IN | BODY MASS INDEX: 32.14 KG/M2 | WEIGHT: 192.88 LBS

## 2020-10-23 DIAGNOSIS — M19.071 ARTHRITIS OF RIGHT ANKLE: Primary | ICD-10-CM

## 2020-10-23 DIAGNOSIS — M19.071 ARTHRITIS OF RIGHT ANKLE: ICD-10-CM

## 2020-10-23 PROCEDURE — 73610 XR ANKLE COMPLETE 3 VIEW RIGHT: ICD-10-PCS | Mod: 26,HCNC,RT, | Performed by: RADIOLOGY

## 2020-10-23 PROCEDURE — 99214 OFFICE O/P EST MOD 30 MIN: CPT | Mod: HCNC,S$GLB,, | Performed by: ORTHOPAEDIC SURGERY

## 2020-10-23 PROCEDURE — 1159F MED LIST DOCD IN RCRD: CPT | Mod: HCNC,S$GLB,, | Performed by: ORTHOPAEDIC SURGERY

## 2020-10-23 PROCEDURE — 3078F DIAST BP <80 MM HG: CPT | Mod: HCNC,CPTII,S$GLB, | Performed by: ORTHOPAEDIC SURGERY

## 2020-10-23 PROCEDURE — 1101F PT FALLS ASSESS-DOCD LE1/YR: CPT | Mod: HCNC,CPTII,S$GLB, | Performed by: ORTHOPAEDIC SURGERY

## 2020-10-23 PROCEDURE — 3078F PR MOST RECENT DIASTOLIC BLOOD PRESSURE < 80 MM HG: ICD-10-PCS | Mod: HCNC,CPTII,S$GLB, | Performed by: ORTHOPAEDIC SURGERY

## 2020-10-23 PROCEDURE — 3074F PR MOST RECENT SYSTOLIC BLOOD PRESSURE < 130 MM HG: ICD-10-PCS | Mod: HCNC,CPTII,S$GLB, | Performed by: ORTHOPAEDIC SURGERY

## 2020-10-23 PROCEDURE — 1101F PR PT FALLS ASSESS DOC 0-1 FALLS W/OUT INJ PAST YR: ICD-10-PCS | Mod: HCNC,CPTII,S$GLB, | Performed by: ORTHOPAEDIC SURGERY

## 2020-10-23 PROCEDURE — 1125F AMNT PAIN NOTED PAIN PRSNT: CPT | Mod: HCNC,S$GLB,, | Performed by: ORTHOPAEDIC SURGERY

## 2020-10-23 PROCEDURE — 3074F SYST BP LT 130 MM HG: CPT | Mod: HCNC,CPTII,S$GLB, | Performed by: ORTHOPAEDIC SURGERY

## 2020-10-23 PROCEDURE — 1159F PR MEDICATION LIST DOCUMENTED IN MEDICAL RECORD: ICD-10-PCS | Mod: HCNC,S$GLB,, | Performed by: ORTHOPAEDIC SURGERY

## 2020-10-23 PROCEDURE — 3008F BODY MASS INDEX DOCD: CPT | Mod: HCNC,CPTII,S$GLB, | Performed by: ORTHOPAEDIC SURGERY

## 2020-10-23 PROCEDURE — 1125F PR PAIN SEVERITY QUANTIFIED, PAIN PRESENT: ICD-10-PCS | Mod: HCNC,S$GLB,, | Performed by: ORTHOPAEDIC SURGERY

## 2020-10-23 PROCEDURE — 73610 X-RAY EXAM OF ANKLE: CPT | Mod: 26,HCNC,RT, | Performed by: RADIOLOGY

## 2020-10-23 PROCEDURE — 3008F PR BODY MASS INDEX (BMI) DOCUMENTED: ICD-10-PCS | Mod: HCNC,CPTII,S$GLB, | Performed by: ORTHOPAEDIC SURGERY

## 2020-10-23 PROCEDURE — 99999 PR PBB SHADOW E&M-EST. PATIENT-LVL IV: ICD-10-PCS | Mod: PBBFAC,HCNC,, | Performed by: ORTHOPAEDIC SURGERY

## 2020-10-23 PROCEDURE — 99999 PR PBB SHADOW E&M-EST. PATIENT-LVL IV: CPT | Mod: PBBFAC,HCNC,, | Performed by: ORTHOPAEDIC SURGERY

## 2020-10-23 PROCEDURE — 99214 PR OFFICE/OUTPT VISIT, EST, LEVL IV, 30-39 MIN: ICD-10-PCS | Mod: HCNC,S$GLB,, | Performed by: ORTHOPAEDIC SURGERY

## 2020-10-23 PROCEDURE — 73610 X-RAY EXAM OF ANKLE: CPT | Mod: TC,HCNC,PN,RT

## 2020-10-23 RX ORDER — MELOXICAM 15 MG/1
TABLET ORAL
COMMUNITY
Start: 2020-10-05 | End: 2020-12-16 | Stop reason: SDUPTHER

## 2020-10-23 NOTE — PROGRESS NOTES
Subjective:      Patient ID: Doris Pastrana is a 69 y.o. female.      Doing  well today. She rates her pain as 5/10 today. DOS: 20  She says she has been wearing the HOKA tennis shoe but she came in wearing the boot today. She reports that she still has swelling and burning on the outside of the ankle but it is better than before surgery and she is happy she did the surgery.     Social History     Occupational History     Employer: [employer]   Tobacco Use    Smoking status: Former Smoker     Packs/day: 1.00     Types: Cigarettes     Quit date: 3/12/1983     Years since quittin.6    Smokeless tobacco: Never Used   Substance and Sexual Activity    Alcohol use: Yes     Comment: socially    Drug use: No    Sexual activity: Not Currently            Objective:    Ortho Exam     RLE: neurovascularly intact, incisions well healed, No signs of infection. No tenderness to palpation at the fusion site. No palpable hardware. Mild dependent edema.     XRAYS: 3 views of right ankle obtained and reviewed today reveal good alignment. Hardware is intact . There is interval progression of healing. Fusion appears well healed.     Assessment:         s/p Right ankle arthrodesis  Plan:     Weight bearing as tolerated in regular shoe. No restrictions, gradual return to exercise. Ok to drive and ride a bike.   F/u prn.   Refer to Dr. Burt for right knee pain.

## 2020-10-24 DIAGNOSIS — M25.569 KNEE PAIN, UNSPECIFIED CHRONICITY, UNSPECIFIED LATERALITY: Primary | ICD-10-CM

## 2020-10-29 ENCOUNTER — HOSPITAL ENCOUNTER (OUTPATIENT)
Dept: RADIOLOGY | Facility: HOSPITAL | Age: 70
Discharge: HOME | End: 2020-10-29
Attending: ORTHOPAEDIC SURGERY
Payer: MEDICARE

## 2020-10-29 ENCOUNTER — OFFICE VISIT (OUTPATIENT)
Dept: ORTHOPEDICS | Facility: CLINIC | Age: 70
End: 2020-10-29
Payer: MEDICARE

## 2020-10-29 VITALS — WEIGHT: 192 LBS | HEIGHT: 65 IN | RESPIRATION RATE: 17 BRPM | BODY MASS INDEX: 31.99 KG/M2

## 2020-10-29 DIAGNOSIS — M25.569 KNEE PAIN, UNSPECIFIED CHRONICITY, UNSPECIFIED LATERALITY: Primary | ICD-10-CM

## 2020-10-29 DIAGNOSIS — M25.569 KNEE PAIN, UNSPECIFIED CHRONICITY, UNSPECIFIED LATERALITY: ICD-10-CM

## 2020-10-29 DIAGNOSIS — M54.12 CERVICAL RADICULITIS: ICD-10-CM

## 2020-10-29 DIAGNOSIS — M17.10 ARTHRITIS OF KNEE: ICD-10-CM

## 2020-10-29 PROCEDURE — 99999 PR PBB SHADOW E&M-EST. PATIENT-LVL IV: ICD-10-PCS | Mod: PBBFAC,HCNC,, | Performed by: ORTHOPAEDIC SURGERY

## 2020-10-29 PROCEDURE — 73564 XR KNEE ORTHO RIGHT WITH FLEXION: ICD-10-PCS | Mod: 26,HCNC,RT, | Performed by: RADIOLOGY

## 2020-10-29 PROCEDURE — 73562 X-RAY EXAM OF KNEE 3: CPT | Mod: 26,HCNC,LT, | Performed by: RADIOLOGY

## 2020-10-29 PROCEDURE — 1125F PR PAIN SEVERITY QUANTIFIED, PAIN PRESENT: ICD-10-PCS | Mod: HCNC,S$GLB,, | Performed by: ORTHOPAEDIC SURGERY

## 2020-10-29 PROCEDURE — 3008F PR BODY MASS INDEX (BMI) DOCUMENTED: ICD-10-PCS | Mod: HCNC,CPTII,S$GLB, | Performed by: ORTHOPAEDIC SURGERY

## 2020-10-29 PROCEDURE — 73562 X-RAY EXAM OF KNEE 3: CPT | Mod: TC,HCNC,PN,LT

## 2020-10-29 PROCEDURE — 99203 PR OFFICE/OUTPT VISIT, NEW, LEVL III, 30-44 MIN: ICD-10-PCS | Mod: HCNC,S$GLB,, | Performed by: ORTHOPAEDIC SURGERY

## 2020-10-29 PROCEDURE — 3008F BODY MASS INDEX DOCD: CPT | Mod: HCNC,CPTII,S$GLB, | Performed by: ORTHOPAEDIC SURGERY

## 2020-10-29 PROCEDURE — 1101F PR PT FALLS ASSESS DOC 0-1 FALLS W/OUT INJ PAST YR: ICD-10-PCS | Mod: HCNC,CPTII,S$GLB, | Performed by: ORTHOPAEDIC SURGERY

## 2020-10-29 PROCEDURE — 1101F PT FALLS ASSESS-DOCD LE1/YR: CPT | Mod: HCNC,CPTII,S$GLB, | Performed by: ORTHOPAEDIC SURGERY

## 2020-10-29 PROCEDURE — 73562 XR KNEE ORTHO RIGHT WITH FLEXION: ICD-10-PCS | Mod: 26,HCNC,LT, | Performed by: RADIOLOGY

## 2020-10-29 PROCEDURE — 99203 OFFICE O/P NEW LOW 30 MIN: CPT | Mod: HCNC,S$GLB,, | Performed by: ORTHOPAEDIC SURGERY

## 2020-10-29 PROCEDURE — 1159F MED LIST DOCD IN RCRD: CPT | Mod: HCNC,S$GLB,, | Performed by: ORTHOPAEDIC SURGERY

## 2020-10-29 PROCEDURE — 1159F PR MEDICATION LIST DOCUMENTED IN MEDICAL RECORD: ICD-10-PCS | Mod: HCNC,S$GLB,, | Performed by: ORTHOPAEDIC SURGERY

## 2020-10-29 PROCEDURE — 73564 X-RAY EXAM KNEE 4 OR MORE: CPT | Mod: 26,HCNC,RT, | Performed by: RADIOLOGY

## 2020-10-29 PROCEDURE — 1125F AMNT PAIN NOTED PAIN PRSNT: CPT | Mod: HCNC,S$GLB,, | Performed by: ORTHOPAEDIC SURGERY

## 2020-10-29 PROCEDURE — 99999 PR PBB SHADOW E&M-EST. PATIENT-LVL IV: CPT | Mod: PBBFAC,HCNC,, | Performed by: ORTHOPAEDIC SURGERY

## 2020-10-29 NOTE — LETTER
October 29, 2020      Andrzej Mendez MD  [address] [facility] [address]  [facility] [address]           [address]  [facility] [address]  Phone: [phone]          Patient: Doris Pastrana   MR Number: [id number]   YOB: 1950   Date of Visit: 10/29/2020       Dear Dr. Andrzej Mendez:    Thank you for referring Doris Pastrana to me for evaluation. Attached you will find relevant portions of my assessment and plan of care.    If you have questions, please do not hesitate to call me. I look forward to following Doris Pastrana along with you.    Sincerely,    Igor Burt MD    Enclosure  CC:  No Recipients    If you would like to receive this communication electronically, please contact [email or web address] or [phone] to request more information on [employer] Mobile Link access.    For providers and/or their staff who would like to refer a patient to [facility], please contact us through our one-stop-shop provider referral line, [facility], at [phone].    If you feel you have received this communication in error or would no longer like to receive these types of communications, please e-mail [email or web address]

## 2020-10-29 NOTE — PROGRESS NOTES
Past Medical History:   Diagnosis Date    Acute sinus infection     Allergy     Anemia     Arthritis     Bronchitis     Degenerative disc disease     lumbar, pain contract 2013    Depression     Diabetes mellitus, type 2 16    Fatty liver     Hyperlipidemia 16    Hypertension     Kidney stone     Mitral valve prolapse     Pleurisy     Pneumonia     PONV (postoperative nausea and vomiting)     Sinus infection     TIA (transient ischemic attack) 2019       Past Surgical History:   Procedure Laterality Date    ANKLE SURGERY  2017    right ankle torn ligament    APPENDECTOMY       SECTION      CHOLECYSTECTOMY      COLONOSCOPY  8/13/15    Dr. Oliveira, five year recheck    EPIDURAL STEROID INJECTION INTO CERVICAL SPINE N/A 1/3/2020    Procedure: Injection-steroid-epidural-cervical;  Surgeon: Eddi Albrecht MD;  Location: [facility] OR;  Service: Pain Management;  Laterality: N/A;  C7-T1    EPIDURAL STEROID INJECTION INTO CERVICAL SPINE N/A 3/10/2020    Procedure: Injection-steroid-epidural-cervical;  Surgeon: Eddi Albrecht MD;  Location: [facility] OR;  Service: Pain Management;  Laterality: N/A;  C7-T1    EXTRACORPOREAL SHOCK WAVE LITHOTRIPSY Left 2019    Procedure: LITHOTRIPSY, ESWL - only if stone visible on xray  with cysto after on table possible;  Surgeon: Marisol Stewart MD;  Location: [facility] OR;  Service: Urology;  Laterality: Left;    HYSTERECTOMY      OOPHORECTOMY      TONSILLECTOMY      VAGINAL DELIVERY         Current Outpatient Medications   Medication Sig    albuterol (PROVENTIL/VENTOLIN HFA) 90 mcg/actuation inhaler Inhale 2 puffs into the lungs 4 (four) times daily. (Patient taking differently: Inhale 2 puffs into the lungs every 4 (four) hours as needed. )    alcohol swabs (ALCOHOL WIPES) PadM Apply 1 each topically as needed. Use two daily to check blood glucose.    ALPRAZolam (XANAX) 0.25 MG tablet Use one 20-30 minutes before flying prn     amLODIPine (NORVASC) 10 MG tablet Take 1 tablet (10 mg total) by mouth once daily.    blood sugar diagnostic Strp Patience Plus Accu-chek glucose strips check twice daily    blood-glucose meter kit Use as instructed    cetirizine (ZYRTEC) 10 MG tablet Take 10 mg by mouth daily as needed.     cyclobenzaprine (FLEXERIL) 10 MG tablet Take 1 tablet (10 mg total) by mouth 3 (three) times daily as needed.    dulaglutide (TRULICITY) 0.75 mg/0.5 mL PnIj INJECT ONE SYRINGE SUBCUTANEOUSLY ONCE WEEKLY.    ergocalciferol (ERGOCALCIFEROL) 50,000 unit Cap Take one twice a week    escitalopram oxalate (LEXAPRO) 10 MG tablet Take 1 tablet (10 mg total) by mouth once daily.    fluticasone propionate (FLONASE) 50 mcg/actuation nasal spray 2 sprays (100 mcg total) by Each Nostril route once daily.    gabapentin (NEURONTIN) 300 MG capsule Take 1 at bedtime for 1 week, then two a day for 1 week, then 3 a day (Patient taking differently: Take 900 mg by mouth once daily. Take 1 at bedtime for 1 week, then two a day for 1 week, then 3 a day)    HYDROcodone-acetaminophen (NORCO) 5-325 mg per tablet Take 1 tablet by mouth every 4 (four) hours as needed for Pain.    losartan (COZAAR) 100 MG tablet Take 1 tablet (100 mg total) by mouth once daily.    meclizine (ANTIVERT) 25 mg tablet Take 1 tablet (25 mg total) by mouth 3 (three) times daily as needed.    meloxicam (MOBIC) 15 MG tablet     metFORMIN (GLUCOPHAGE-XR) 750 MG ER 24hr tablet Take 1 tablet (750 mg total) by mouth 2 (two) times daily with meals.    mupirocin (BACTROBAN) 2 % ointment APPLY 1/2 GRAM TO EACH NOSTRIL TWICE A DAY FOR UP TO 5 DAYS PRIOR TO SURGERY OR AS DIRECTED BY YOUR PHYSICIAN.    ondansetron (ZOFRAN) 4 MG tablet Take 1 tablet (4 mg total) by mouth every 6 (six) hours.    rosuvastatin (CRESTOR) 40 MG Tab Take 1 tablet (40 mg total) by mouth every evening.    tamsulosin (FLOMAX) 0.4 mg Cap Take 0.4 mg by mouth once daily.     TRUEPLUS LANCETS 28 gauge  Misc TEST TWO TIMES DAILY     Current Facility-Administered Medications   Medication    dexamethasone injection 8 mg       Review of patient's allergies indicates:   Allergen Reactions    Omnicef [cefdinir] Hives    Codeine Nausea Only    Rhubarb Swelling    Strawberries [strawberry] Hives    Iodine Rash       Other reaction(s): Nausea    Latex, natural rubber Rash    Pravastatin Other (See Comments)     Muscle pain        Family History   Problem Relation Age of Onset    Arthritis Mother     Hypertension Mother     Vision loss Mother     Diabetes Mother     Alcohol abuse Father     Depression Father     Early death Father     Alcohol abuse Sister     Depression Sister     Cancer Sister         thyroid    Diabetes Sister     Heart disease Sister         chf    Atrial fibrillation Sister     Depression Brother     Hypertension Brother     Cancer Brother         prostate/stomach    Diabetes Brother     Aneurysm Brother         aaa    Learning disabilities Daughter     Hypertension Maternal Grandmother     Heart disease Maternal Grandmother     Arthritis Maternal Grandmother     Glaucoma Maternal Grandmother     Cancer Maternal Grandfather         stomach    Diabetes Paternal Grandmother     Breast cancer Paternal Grandmother     Heart disease Paternal Grandfather     Heart disease Maternal Uncle     Kidney disease Maternal Uncle     Kidney disease Paternal Aunt        Social History     Socioeconomic History    Marital status:      Spouse name: Not on file    Number of children: Not on file    Years of education: Not on file    Highest education level: Not on file   Occupational History     Employer: [employer]   Social Needs    Financial resource strain: Not on file    Food insecurity     Worry: Not on file     Inability: Not on file    Transportation needs     Medical: Not on file     Non-medical: Not on file   Tobacco Use    Smoking status: Former Smoker      Packs/day: 1.00     Types: Cigarettes     Quit date: 3/12/1983     Years since quittin.6    Smokeless tobacco: Never Used   Substance and Sexual Activity    Alcohol use: Yes     Comment: socially    Drug use: No    Sexual activity: Not Currently   Lifestyle    Physical activity     Days per week: Not on file     Minutes per session: Not on file    Stress: Not on file   Relationships    Social connections     Talks on phone: Not on file     Gets together: Not on file     Attends Yazdanism service: Not on file     Active member of club or organization: Not on file     Attends meetings of clubs or organizations: Not on file     Relationship status: Not on file   Other Topics Concern    Not on file   Social History Narrative    Not on file       Chief Complaint:   Chief Complaint   Patient presents with    Right Knee - Pain       History of present illness:  This is a 69-year-old female seen in consultation for Dr. Mendez for right knee pain.  Patient has had pain since her right ankle was fused in she was lot to start walking on it again.  Pain is moderate to severe.  Seems to be improving some.  Pain with walking.  No prior treatment.  Rates the pain as high as an 8/10.  Denies swelling or mechanical symptoms.  No prior treatment.      Review of Systems:    Constitution: Negative for chills, fever, and sweats.  Negative for unexplained weight loss.    HENT:  Negative for headaches and blurry vision.    Cardiovascular:Negative for chest pain or irregular heart beat. Negative for hypertension.    Respiratory:  Negative for cough and shortness of breath.    Gastrointestinal: Negative for abdominal pain, heartburn, melena, nausea, and vomitting.    Genitourinary:  Negative bladder incontinence and dysuria.    Musculoskeletal:  See HPI    Neurological: Negative for numbness.    Psychiatric/Behavioral: Negative for depression.  The patient is not nervous/anxious.      Endocrine: Negative for  polyuria    Hematologic/Lymphatic: Negative for bleeding problem.  Does not bruise/bleed easily.    Skin: Negative for poor would healing and rash      Physical Examination:    Vital Signs:    Vitals:    10/29/20 0859   Resp: 17       Body mass index is 31.95 kg/m².    This a well-developed, well nourished patient in no acute distress.  They are alert and oriented and cooperative to examination.  Pt. walks without an antalgic gait.        Examination of the right knee shows no rashes or erythema. There are no masses ecchymosis or effusion. Patient has full range of motion from 0-130°. Patient is nontender to palpation over lateral joint line and mildly tender to palpation over the medial joint line. Patient has a - Lachman exam, - anterior drawer exam, and - posterior drawer exam. - Dina's exam. Knee is stable to varus and valgus stress. 5 out of 5 motor strength. Palpable distal pulses. Intact light touch sensation. Negative Patellofemoral crepitus      Examination of the left knee shows no rashes or erythema. There are no masses ecchymosis or effusion. Patient has full range of motion from 0-130°. Patient is nontender to palpation over lateral joint line and nontender to palpation over the medial joint line. Patient has a - Lachman exam, - anterior drawer exam, and - posterior drawer exam. - Dina's exam. Knee is stable to varus and valgus stress. 5 out of 5 motor strength. Palpable distal pulses. Intact light touch sensation. Negative Patellofemoral crepitus      X-rays:  X-rays of the right knee are ordered and reviewed which show some very minimal arthritic changes     Assessment::  Right mild knee arthritis    Plan:  I reviewed the findings with her today.  I think most of her pain is due to the increased stress her knee is seeing after her ankle fusion.  We talked about treatment including Tylenol or NSAIDs.  We talked about giving it a period of adaptation.  Can return for possible injections if the  pain is not subsiding.    This note was created using [employer] voice recognition software that occasionally misinterpreted phrases or words.    Consult note is delivered via Epic messaging service.

## 2020-10-29 NOTE — PROGRESS NOTES
Refill Routing Note   Medication(s) are not appropriate for processing by [facility] for the following reason(s):     - Outside of protocol    [facility] actions taken in this encounter: Route          Medication reconciliation completed: No   Automatic Epic Generated Protocol Data:        Requested Prescriptions   Pending Prescriptions Disp Refills    gabapentin (NEURONTIN) 300 MG capsule [Pharmacy Med Name: GABAPENTIN 300 MG        CAP] 90 capsule 5     Sig: TAKE ONE CAPSULE BY MOUTH AT BEDTIME X 1 WEEK, THEN 2 AT BEDTIME X 1 WEEK, THEN 3 AT BEDTIME.       Anticonvulsants Protocol Passed - 10/29/2020  2:46 PM        Passed - Visit with Authorizing provider in past 9 months or upcoming 90 days        Passed - No active pregnancy on record              Appointments  past 12m or future 3m with PCP    Date Provider   Last Visit   6/22/2020 Tor Garg MD   Next Visit   12/16/2020 Tor Garg MD   ED visits in past 90 days: 1        Note composed:3:44 PM 10/29/2020

## 2020-10-30 RX ORDER — GABAPENTIN 300 MG/1
300 CAPSULE ORAL 3 TIMES DAILY
Qty: 90 CAPSULE | Refills: 5 | Status: SHIPPED | OUTPATIENT
Start: 2020-10-30 | End: 2020-12-16 | Stop reason: SDUPTHER

## 2020-10-30 RX ORDER — TAMSULOSIN HYDROCHLORIDE 0.4 MG/1
0.4 CAPSULE ORAL DAILY
Qty: 90 CAPSULE | Refills: 3 | Status: SHIPPED | OUTPATIENT
Start: 2020-10-30 | End: 2020-12-16 | Stop reason: SDUPTHER

## 2020-10-30 NOTE — TELEPHONE ENCOUNTER
No new care gaps identified.  Powered by [employer]. Reference number: [id number]. 10/30/2020 10:16:55 AM   CDT

## 2020-11-13 ENCOUNTER — OFFICE VISIT (OUTPATIENT)
Dept: FAMILY MEDICINE | Facility: CLINIC | Age: 70
End: 2020-11-13
Payer: MEDICARE

## 2020-11-13 ENCOUNTER — PATIENT OUTREACH (OUTPATIENT)
Dept: ADMINISTRATIVE | Facility: HOSPITAL | Age: 70
End: 2020-11-13

## 2020-11-13 VITALS
HEIGHT: 65 IN | HEART RATE: 93 BPM | DIASTOLIC BLOOD PRESSURE: 68 MMHG | WEIGHT: 188.5 LBS | BODY MASS INDEX: 31.4 KG/M2 | OXYGEN SATURATION: 95 % | TEMPERATURE: 98 F | SYSTOLIC BLOOD PRESSURE: 126 MMHG

## 2020-11-13 DIAGNOSIS — E11.00 TYPE 2 DIABETES MELLITUS WITH HYPEROSMOLARITY WITHOUT COMA, WITHOUT LONG-TERM CURRENT USE OF INSULIN: ICD-10-CM

## 2020-11-13 DIAGNOSIS — R68.83 CHILLS: ICD-10-CM

## 2020-11-13 DIAGNOSIS — L03.031 CELLULITIS OF TOE OF RIGHT FOOT: Primary | ICD-10-CM

## 2020-11-13 DIAGNOSIS — R05.9 COUGH: ICD-10-CM

## 2020-11-13 DIAGNOSIS — J06.9 VIRAL UPPER RESPIRATORY TRACT INFECTION: ICD-10-CM

## 2020-11-13 PROCEDURE — 3074F SYST BP LT 130 MM HG: CPT | Mod: HCNC,CPTII,S$GLB, | Performed by: PHYSICIAN ASSISTANT

## 2020-11-13 PROCEDURE — 3288F FALL RISK ASSESSMENT DOCD: CPT | Mod: HCNC,CPTII,S$GLB, | Performed by: PHYSICIAN ASSISTANT

## 2020-11-13 PROCEDURE — 3008F PR BODY MASS INDEX (BMI) DOCUMENTED: ICD-10-PCS | Mod: HCNC,CPTII,S$GLB, | Performed by: PHYSICIAN ASSISTANT

## 2020-11-13 PROCEDURE — 3288F PR FALLS RISK ASSESSMENT DOCUMENTED: ICD-10-PCS | Mod: HCNC,CPTII,S$GLB, | Performed by: PHYSICIAN ASSISTANT

## 2020-11-13 PROCEDURE — 1101F PR PT FALLS ASSESS DOC 0-1 FALLS W/OUT INJ PAST YR: ICD-10-PCS | Mod: HCNC,CPTII,S$GLB, | Performed by: PHYSICIAN ASSISTANT

## 2020-11-13 PROCEDURE — 3074F PR MOST RECENT SYSTOLIC BLOOD PRESSURE < 130 MM HG: ICD-10-PCS | Mod: HCNC,CPTII,S$GLB, | Performed by: PHYSICIAN ASSISTANT

## 2020-11-13 PROCEDURE — 1159F MED LIST DOCD IN RCRD: CPT | Mod: HCNC,S$GLB,, | Performed by: PHYSICIAN ASSISTANT

## 2020-11-13 PROCEDURE — 3078F DIAST BP <80 MM HG: CPT | Mod: HCNC,CPTII,S$GLB, | Performed by: PHYSICIAN ASSISTANT

## 2020-11-13 PROCEDURE — 99214 OFFICE O/P EST MOD 30 MIN: CPT | Mod: HCNC,S$GLB,, | Performed by: PHYSICIAN ASSISTANT

## 2020-11-13 PROCEDURE — 1125F AMNT PAIN NOTED PAIN PRSNT: CPT | Mod: HCNC,S$GLB,, | Performed by: PHYSICIAN ASSISTANT

## 2020-11-13 PROCEDURE — 3044F HG A1C LEVEL LT 7.0%: CPT | Mod: HCNC,CPTII,S$GLB, | Performed by: PHYSICIAN ASSISTANT

## 2020-11-13 PROCEDURE — U0003 INFECTIOUS AGENT DETECTION BY NUCLEIC ACID (DNA OR RNA); SEVERE ACUTE RESPIRATORY SYNDROME CORONAVIRUS 2 (SARS-COV-2) (CORONAVIRUS DISEASE [COVID-19]), AMPLIFIED PROBE TECHNIQUE, MAKING USE OF HIGH THROUGHPUT TECHNOLOGIES AS DESCRIBED BY CMS-2020-01-R: HCPCS | Mod: HCNC

## 2020-11-13 PROCEDURE — 99214 PR OFFICE/OUTPT VISIT, EST, LEVL IV, 30-39 MIN: ICD-10-PCS | Mod: HCNC,S$GLB,, | Performed by: PHYSICIAN ASSISTANT

## 2020-11-13 PROCEDURE — 3044F PR MOST RECENT HEMOGLOBIN A1C LEVEL <7.0%: ICD-10-PCS | Mod: HCNC,CPTII,S$GLB, | Performed by: PHYSICIAN ASSISTANT

## 2020-11-13 PROCEDURE — 1101F PT FALLS ASSESS-DOCD LE1/YR: CPT | Mod: HCNC,CPTII,S$GLB, | Performed by: PHYSICIAN ASSISTANT

## 2020-11-13 PROCEDURE — 3008F BODY MASS INDEX DOCD: CPT | Mod: HCNC,CPTII,S$GLB, | Performed by: PHYSICIAN ASSISTANT

## 2020-11-13 PROCEDURE — 99999 PR PBB SHADOW E&M-EST. PATIENT-LVL V: ICD-10-PCS | Mod: PBBFAC,HCNC,, | Performed by: PHYSICIAN ASSISTANT

## 2020-11-13 PROCEDURE — 1125F PR PAIN SEVERITY QUANTIFIED, PAIN PRESENT: ICD-10-PCS | Mod: HCNC,S$GLB,, | Performed by: PHYSICIAN ASSISTANT

## 2020-11-13 PROCEDURE — 3078F PR MOST RECENT DIASTOLIC BLOOD PRESSURE < 80 MM HG: ICD-10-PCS | Mod: HCNC,CPTII,S$GLB, | Performed by: PHYSICIAN ASSISTANT

## 2020-11-13 PROCEDURE — 99999 PR PBB SHADOW E&M-EST. PATIENT-LVL V: CPT | Mod: PBBFAC,HCNC,, | Performed by: PHYSICIAN ASSISTANT

## 2020-11-13 PROCEDURE — 1159F PR MEDICATION LIST DOCUMENTED IN MEDICAL RECORD: ICD-10-PCS | Mod: HCNC,S$GLB,, | Performed by: PHYSICIAN ASSISTANT

## 2020-11-13 RX ORDER — DOXYCYCLINE 100 MG/1
100 CAPSULE ORAL 2 TIMES DAILY
Qty: 20 CAPSULE | Refills: 0 | Status: SHIPPED | OUTPATIENT
Start: 2020-11-13 | End: 2020-11-23

## 2020-11-13 NOTE — PROGRESS NOTES
Subjective:       Patient ID: Doris Pastrana is a 69 y.o. female.    Chief Complaint: Infected toe on right foot    HPI   Tenderness and redness R Gt. Toe x 2 wks  Did drain occurred after pedicure   Pt. Diabetic  Good diabetic control  Pt. Also has cough and congestion x 1 wk  Chills and no fever  Drip and throat irritation  Review of Systems   Constitutional: Negative.  Negative for activity change, appetite change, chills, diaphoresis, fatigue, fever and unexpected weight change.   HENT: Positive for nasal congestion, postnasal drip and sore throat. Negative for sinus pressure/congestion.    Eyes: Negative.    Respiratory: Positive for cough. Negative for shortness of breath and wheezing.    Cardiovascular: Negative.  Negative for chest pain and leg swelling.   Gastrointestinal: Negative.    Endocrine: Negative.    Genitourinary: Negative.    Musculoskeletal: Negative.    Integumentary:  Positive for wound. Negative for rash.   Neurological: Negative.          Objective:      Physical Exam  Vitals signs reviewed.   Constitutional:       General: She is not in acute distress.     Appearance: Normal appearance. She is obese. She is not ill-appearing, toxic-appearing or diaphoretic.   HENT:      Head: Normocephalic and atraumatic.      Right Ear: Tympanic membrane, ear canal and external ear normal. There is no impacted cerumen.      Left Ear: Tympanic membrane, ear canal and external ear normal. There is no impacted cerumen.      Nose: Congestion present.      Comments: Clear mucus     Mouth/Throat:      Mouth: Mucous membranes are moist.      Pharynx: Oropharynx is clear. No oropharyngeal exudate or posterior oropharyngeal erythema.   Eyes:      General: No scleral icterus.     Conjunctiva/sclera: Conjunctivae normal.   Neck:      Musculoskeletal: Normal range of motion and neck supple. No neck rigidity or muscular tenderness.      Vascular: No carotid bruit.   Cardiovascular:      Rate and Rhythm: Normal rate  and regular rhythm.      Pulses: Normal pulses.      Heart sounds: Normal heart sounds. No murmur. No friction rub. No gallop.    Pulmonary:      Effort: Pulmonary effort is normal. No respiratory distress.      Breath sounds: Normal breath sounds. No stridor. No wheezing, rhonchi or rales.   Musculoskeletal: Normal range of motion.         General: No swelling.      Right lower leg: No edema.      Left lower leg: No edema.   Lymphadenopathy:      Cervical: No cervical adenopathy.   Skin:     General: Skin is warm and dry.      Comments: Erythema and tenderness lateral edge R Gt. Toenail  No drainage  Non fluctuant    Neurological:      General: No focal deficit present.      Mental Status: She is alert and oriented to person, place, and time.         Assessment:       1. Cellulitis of toe of right foot    2. Type 2 diabetes mellitus with hyperosmolarity without coma, without long-term current use of insulin    3. Viral upper respiratory tract infection        Plan:       Doris was seen today for infected toe on right foot.    Diagnoses and all orders for this visit:    Cellulitis of toe of right foot  -     doxycycline (MONODOX) 100 MG capsule; Take 1 capsule (100 mg total) by mouth 2 (two) times daily. for 10 days    Type 2 diabetes mellitus with hyperosmolarity without coma, without long-term current use of insulin    Viral upper respiratory tract infection    discussed otc's  Foot soaks  Recheck next wk if not resolved

## 2020-11-13 NOTE — LETTER
AUTHORIZATION FOR RELEASE OF   CONFIDENTIAL INFORMATION    Dear Dr. Hurst,    We are seeing Doris Pastrana, date of birth 1950, in the clinic at [facility]. Tor Garg MD is the patient's PCP. Doris Pastrana has an outstanding lab/procedure at the time we reviewed her chart. In order to help keep her health information updated, she has authorized us to request the following medical record(s):        (  )  MAMMOGRAM                                      (  )  COLONOSCOPY      (  )  PAP SMEAR                                          (  )  OUTSIDE LAB RESULTS     (  )  DEXA SCAN                                          ( X )  EYE EXAM            (  )  FOOT EXAM                                          (  )  ENTIRE RECORD     (  )  OUTSIDE IMMUNIZATIONS                 (  )  _______________         Please fax records to [facility], Robert W Taylor, [place], [phone]    Thank you in advance,  Marci Gutierrez LPN, Clinical Care Coordinator  [address] [address]  P: [phone]  F: [phone]              Patient Name: Doris Pastrana  : 1950  Patient Phone #: [phone]

## 2020-11-15 LAB — SARS-COV-2 RNA RESP QL NAA+PROBE: NOT DETECTED

## 2020-11-16 ENCOUNTER — TELEPHONE (OUTPATIENT)
Dept: FAMILY MEDICINE | Facility: CLINIC | Age: 70
End: 2020-11-16

## 2020-11-16 NOTE — TELEPHONE ENCOUNTER
----- Message from Suly Madrid sent at 11/16/2020  3:12 PM CST -----  Regarding: covid results  Contact: pt  Pt states that she had a covid test on Thursday and would like the results. She can be reached at [phone]

## 2020-12-02 ENCOUNTER — PATIENT OUTREACH (OUTPATIENT)
Dept: ADMINISTRATIVE | Facility: HOSPITAL | Age: 70
End: 2020-12-02

## 2020-12-02 NOTE — LETTER
AUTHORIZATION FOR RELEASE OF   CONFIDENTIAL INFORMATION    Dear [facility],    We are seeing Doris Pastrana, date of birth 1950, in the clinic at [facility]. Tor Garg MD is the patient's PCP. Doris Pastrana has an outstanding lab/procedure at the time we reviewed her chart. In order to help keep her health information updated, she has authorized us to request the following medical record(s):        (  )  MAMMOGRAM                                      (  )  COLONOSCOPY      (  )  PAP SMEAR                                          (  )  OUTSIDE LAB RESULTS     (  )  DEXA SCAN                                          ( X )  EYE EXAM            (  )  FOOT EXAM                                          (  )  ENTIRE RECORD     (  )  OUTSIDE IMMUNIZATIONS                 (  )  _______________         Please fax records to [facility], Robert W Taylor, [place], [phone]    Thank you in advance,  Marci Gutierrez LPN, Clinical Care Coordinator  [address] [address]  P: [phone]  F: [phone]              Patient Name: Doris Pastrana  : 1950  Patient Phone #: [phone]

## 2020-12-02 NOTE — PROGRESS NOTES
Chart review completed 2020.  Care Everywhere updates requested and reviewed.  Immunizations reconciled. Media reports reviewed.  Duplicate HM overrides and  orders removed.  Overdue HM topic chart audit and/or requested.  Overdue lab testing linked to upcoming lab appointments if applies.    Request for updated Eye Exam sent to \[facility]\"".    Health Maintenance Due   Topic Date Due    Shingles Vaccine (1 of 2) 2002    Eye Exam  2019    Colorectal Cancer Screening  2020

## 2020-12-11 ENCOUNTER — PATIENT MESSAGE (OUTPATIENT)
Dept: OTHER | Facility: OTHER | Age: 70
End: 2020-12-11

## 2020-12-14 ENCOUNTER — LAB VISIT (OUTPATIENT)
Dept: LAB | Facility: HOSPITAL | Age: 70
End: 2020-12-14
Attending: FAMILY MEDICINE
Payer: MEDICARE

## 2020-12-14 DIAGNOSIS — I15.2 HYPERTENSION ASSOCIATED WITH DIABETES: ICD-10-CM

## 2020-12-14 DIAGNOSIS — E11.9 CONTROLLED TYPE 2 DIABETES MELLITUS WITHOUT COMPLICATION, WITHOUT LONG-TERM CURRENT USE OF INSULIN: ICD-10-CM

## 2020-12-14 DIAGNOSIS — E11.59 HYPERTENSION ASSOCIATED WITH DIABETES: ICD-10-CM

## 2020-12-14 LAB
ANION GAP SERPL CALC-SCNC: 8 MMOL/L (ref 8–16)
BUN SERPL-MCNC: 15 MG/DL (ref 8–23)
CALCIUM SERPL-MCNC: 9.5 MG/DL (ref 8.7–10.5)
CHLORIDE SERPL-SCNC: 103 MMOL/L (ref 95–110)
CO2 SERPL-SCNC: 31 MMOL/L (ref 23–29)
CREAT SERPL-MCNC: 0.7 MG/DL (ref 0.5–1.4)
EST. GFR  (AFRICAN AMERICAN): >60 ML/MIN/1.73 M^2
EST. GFR  (NON AFRICAN AMERICAN): >60 ML/MIN/1.73 M^2
ESTIMATED AVG GLUCOSE: 143 MG/DL (ref 68–131)
GLUCOSE SERPL-MCNC: 138 MG/DL (ref 70–110)
HBA1C MFR BLD HPLC: 6.6 % (ref 4–5.6)
POTASSIUM SERPL-SCNC: 4.3 MMOL/L (ref 3.5–5.1)
SODIUM SERPL-SCNC: 142 MMOL/L (ref 136–145)

## 2020-12-14 PROCEDURE — 36415 COLL VENOUS BLD VENIPUNCTURE: CPT | Mod: HCNC,PO

## 2020-12-14 PROCEDURE — 83036 HEMOGLOBIN GLYCOSYLATED A1C: CPT | Mod: HCNC

## 2020-12-14 PROCEDURE — 80048 BASIC METABOLIC PNL TOTAL CA: CPT | Mod: HCNC

## 2020-12-16 ENCOUNTER — PATIENT OUTREACH (OUTPATIENT)
Dept: ADMINISTRATIVE | Facility: OTHER | Age: 70
End: 2020-12-16

## 2020-12-16 ENCOUNTER — OFFICE VISIT (OUTPATIENT)
Dept: FAMILY MEDICINE | Facility: CLINIC | Age: 70
End: 2020-12-16
Attending: FAMILY MEDICINE
Payer: MEDICARE

## 2020-12-16 VITALS
BODY MASS INDEX: 31.51 KG/M2 | HEART RATE: 94 BPM | WEIGHT: 189.13 LBS | SYSTOLIC BLOOD PRESSURE: 138 MMHG | HEIGHT: 65 IN | OXYGEN SATURATION: 96 % | DIASTOLIC BLOOD PRESSURE: 70 MMHG | TEMPERATURE: 97 F

## 2020-12-16 DIAGNOSIS — J31.0 CHRONIC RHINITIS: ICD-10-CM

## 2020-12-16 DIAGNOSIS — F41.9 ANXIETY: ICD-10-CM

## 2020-12-16 DIAGNOSIS — E78.5 HYPERLIPIDEMIA, UNSPECIFIED HYPERLIPIDEMIA TYPE: ICD-10-CM

## 2020-12-16 DIAGNOSIS — E11.59 HYPERTENSION ASSOCIATED WITH DIABETES: ICD-10-CM

## 2020-12-16 DIAGNOSIS — E11.9 CONTROLLED TYPE 2 DIABETES MELLITUS WITHOUT COMPLICATION, WITHOUT LONG-TERM CURRENT USE OF INSULIN: ICD-10-CM

## 2020-12-16 DIAGNOSIS — M54.12 CERVICAL RADICULITIS: ICD-10-CM

## 2020-12-16 DIAGNOSIS — I15.2 HYPERTENSION ASSOCIATED WITH DIABETES: ICD-10-CM

## 2020-12-16 DIAGNOSIS — M54.9 MID BACK PAIN: ICD-10-CM

## 2020-12-16 DIAGNOSIS — H69.92 DYSFUNCTION OF LEFT EUSTACHIAN TUBE: ICD-10-CM

## 2020-12-16 PROCEDURE — 3044F PR MOST RECENT HEMOGLOBIN A1C LEVEL <7.0%: ICD-10-PCS | Mod: HCNC,CPTII,S$GLB, | Performed by: FAMILY MEDICINE

## 2020-12-16 PROCEDURE — 3008F PR BODY MASS INDEX (BMI) DOCUMENTED: ICD-10-PCS | Mod: HCNC,CPTII,S$GLB, | Performed by: FAMILY MEDICINE

## 2020-12-16 PROCEDURE — 1125F AMNT PAIN NOTED PAIN PRSNT: CPT | Mod: HCNC,S$GLB,, | Performed by: FAMILY MEDICINE

## 2020-12-16 PROCEDURE — 3044F HG A1C LEVEL LT 7.0%: CPT | Mod: HCNC,CPTII,S$GLB, | Performed by: FAMILY MEDICINE

## 2020-12-16 PROCEDURE — 3288F PR FALLS RISK ASSESSMENT DOCUMENTED: ICD-10-PCS | Mod: HCNC,CPTII,S$GLB, | Performed by: FAMILY MEDICINE

## 2020-12-16 PROCEDURE — 3078F PR MOST RECENT DIASTOLIC BLOOD PRESSURE < 80 MM HG: ICD-10-PCS | Mod: HCNC,CPTII,S$GLB, | Performed by: FAMILY MEDICINE

## 2020-12-16 PROCEDURE — 99999 PR PBB SHADOW E&M-EST. PATIENT-LVL V: ICD-10-PCS | Mod: PBBFAC,HCNC,, | Performed by: FAMILY MEDICINE

## 2020-12-16 PROCEDURE — 1101F PR PT FALLS ASSESS DOC 0-1 FALLS W/OUT INJ PAST YR: ICD-10-PCS | Mod: HCNC,CPTII,S$GLB, | Performed by: FAMILY MEDICINE

## 2020-12-16 PROCEDURE — 3078F DIAST BP <80 MM HG: CPT | Mod: HCNC,CPTII,S$GLB, | Performed by: FAMILY MEDICINE

## 2020-12-16 PROCEDURE — 3288F FALL RISK ASSESSMENT DOCD: CPT | Mod: HCNC,CPTII,S$GLB, | Performed by: FAMILY MEDICINE

## 2020-12-16 PROCEDURE — 1159F MED LIST DOCD IN RCRD: CPT | Mod: HCNC,S$GLB,, | Performed by: FAMILY MEDICINE

## 2020-12-16 PROCEDURE — 3075F SYST BP GE 130 - 139MM HG: CPT | Mod: HCNC,CPTII,S$GLB, | Performed by: FAMILY MEDICINE

## 2020-12-16 PROCEDURE — 99214 PR OFFICE/OUTPT VISIT, EST, LEVL IV, 30-39 MIN: ICD-10-PCS | Mod: HCNC,S$GLB,, | Performed by: FAMILY MEDICINE

## 2020-12-16 PROCEDURE — 1125F PR PAIN SEVERITY QUANTIFIED, PAIN PRESENT: ICD-10-PCS | Mod: HCNC,S$GLB,, | Performed by: FAMILY MEDICINE

## 2020-12-16 PROCEDURE — 3008F BODY MASS INDEX DOCD: CPT | Mod: HCNC,CPTII,S$GLB, | Performed by: FAMILY MEDICINE

## 2020-12-16 PROCEDURE — 1159F PR MEDICATION LIST DOCUMENTED IN MEDICAL RECORD: ICD-10-PCS | Mod: HCNC,S$GLB,, | Performed by: FAMILY MEDICINE

## 2020-12-16 PROCEDURE — 99214 OFFICE O/P EST MOD 30 MIN: CPT | Mod: HCNC,S$GLB,, | Performed by: FAMILY MEDICINE

## 2020-12-16 PROCEDURE — 99999 PR PBB SHADOW E&M-EST. PATIENT-LVL V: CPT | Mod: PBBFAC,HCNC,, | Performed by: FAMILY MEDICINE

## 2020-12-16 PROCEDURE — 1101F PT FALLS ASSESS-DOCD LE1/YR: CPT | Mod: HCNC,CPTII,S$GLB, | Performed by: FAMILY MEDICINE

## 2020-12-16 PROCEDURE — 3075F PR MOST RECENT SYSTOLIC BLOOD PRESS GE 130-139MM HG: ICD-10-PCS | Mod: HCNC,CPTII,S$GLB, | Performed by: FAMILY MEDICINE

## 2020-12-16 RX ORDER — MECLIZINE HYDROCHLORIDE 25 MG/1
25 TABLET ORAL 3 TIMES DAILY PRN
Qty: 30 TABLET | Refills: 5 | Status: SHIPPED | OUTPATIENT
Start: 2020-12-16

## 2020-12-16 RX ORDER — DULAGLUTIDE 0.75 MG/.5ML
INJECTION, SOLUTION SUBCUTANEOUS
Qty: 6 ML | Refills: 1 | Status: SHIPPED | OUTPATIENT
Start: 2020-12-16 | End: 2021-07-09

## 2020-12-16 RX ORDER — ESCITALOPRAM OXALATE 10 MG/1
10 TABLET ORAL DAILY
Qty: 90 TABLET | Refills: 1 | Status: SHIPPED | OUTPATIENT
Start: 2020-12-16 | End: 2021-07-21

## 2020-12-16 RX ORDER — FLUTICASONE PROPIONATE 50 MCG
2 SPRAY, SUSPENSION (ML) NASAL DAILY
Qty: 48 G | Refills: 5 | Status: SHIPPED | OUTPATIENT
Start: 2020-12-16 | End: 2022-03-10

## 2020-12-16 RX ORDER — GABAPENTIN 300 MG/1
300 CAPSULE ORAL 3 TIMES DAILY
Qty: 270 CAPSULE | Refills: 1 | Status: SHIPPED | OUTPATIENT
Start: 2020-12-16 | End: 2021-12-09 | Stop reason: SDUPTHER

## 2020-12-16 RX ORDER — TAMSULOSIN HYDROCHLORIDE 0.4 MG/1
0.4 CAPSULE ORAL DAILY
Qty: 90 CAPSULE | Refills: 3 | Status: SHIPPED | OUTPATIENT
Start: 2020-12-16 | End: 2021-10-25

## 2020-12-16 RX ORDER — AMLODIPINE BESYLATE 10 MG/1
10 TABLET ORAL DAILY
Qty: 90 TABLET | Refills: 3 | Status: SHIPPED | OUTPATIENT
Start: 2020-12-16 | End: 2021-10-14

## 2020-12-16 RX ORDER — LANCETS 28 GAUGE
EACH MISCELLANEOUS
Qty: 200 EACH | Refills: 11 | Status: SHIPPED | OUTPATIENT
Start: 2020-12-16

## 2020-12-16 RX ORDER — ISOPROPYL ALCOHOL 70 ML/100ML
1 SWAB TOPICAL
Qty: 200 EACH | Refills: 3 | Status: SHIPPED | OUTPATIENT
Start: 2020-12-16 | End: 2021-10-25

## 2020-12-16 RX ORDER — CYCLOBENZAPRINE HCL 10 MG
10 TABLET ORAL 3 TIMES DAILY PRN
Qty: 270 TABLET | Refills: 1 | Status: SHIPPED | OUTPATIENT
Start: 2020-12-16 | End: 2021-06-18

## 2020-12-16 RX ORDER — ROSUVASTATIN CALCIUM 40 MG/1
40 TABLET, COATED ORAL NIGHTLY
Qty: 90 TABLET | Refills: 3 | Status: SHIPPED | OUTPATIENT
Start: 2020-12-16 | End: 2021-10-25

## 2020-12-16 RX ORDER — MELOXICAM 15 MG/1
15 TABLET ORAL DAILY
Qty: 90 TABLET | Refills: 1 | Status: SHIPPED | OUTPATIENT
Start: 2020-12-16 | End: 2021-06-18

## 2020-12-16 RX ORDER — METFORMIN HYDROCHLORIDE 750 MG/1
750 TABLET, EXTENDED RELEASE ORAL 2 TIMES DAILY WITH MEALS
Qty: 180 TABLET | Refills: 1 | Status: SHIPPED | OUTPATIENT
Start: 2020-12-16 | End: 2021-06-16

## 2020-12-16 RX ORDER — LOSARTAN POTASSIUM 100 MG/1
100 TABLET ORAL DAILY
Qty: 90 TABLET | Refills: 3 | Status: SHIPPED | OUTPATIENT
Start: 2020-12-16 | End: 2021-10-25

## 2020-12-16 NOTE — PROGRESS NOTES
Subjective:       Patient ID: Doris Pastrana is a 69 y.o. female.    Chief Complaint: Follow-up    69-year-old female coming in follow-up on diabetic labs.  She is having pain in her back in the left intrascapular area also with some numbness in the supraclavicular area radiating down her arm.  She has had this in the past and it resolved after epidural steroid injections by Dr. Albrecht in March of this year but has now recurred.  She gets some relief with Flexeril but is unable to take it during the day because it interferes with her working due to drowsiness.  Her A1c was well controlled at 6.6 and she is having no episodes of hypoglycemia to report.  Her blood sugar is well controlled.    Past Medical History:  No date: Acute sinus infection  No date: Allergy  No date: Anemia  No date: Arthritis  No date: Bronchitis  No date: Degenerative disc disease      Comment:  lumbar, pain contract 2013  No date: Depression  16: Diabetes mellitus, type 2  No date: Fatty liver  16: Hyperlipidemia  No date: Hypertension  No date: Kidney stone  No date: Mitral valve prolapse  No date: Pleurisy  No date: Pneumonia  No date: PONV (postoperative nausea and vomiting)  No date: Sinus infection  2019: TIA (transient ischemic attack)    Past Surgical History:  2017: ANKLE SURGERY      Comment:  right ankle torn ligament  No date: APPENDECTOMY  No date:  SECTION  No date: CHOLECYSTECTOMY  8/13/15: COLONOSCOPY      Comment:  Dr. Oliveira, five year recheck  1/3/2020: EPIDURAL STEROID INJECTION INTO CERVICAL SPINE; N/A      Comment:  Procedure: Injection-steroid-epidural-cervical;                 Surgeon: Eddi Albrecht MD;  Location: [facility];  Service:                Pain Management;  Laterality: N/A;  C7-T1  3/10/2020: EPIDURAL STEROID INJECTION INTO CERVICAL SPINE; N/A      Comment:  Procedure: Injection-steroid-epidural-cervical;                 Surgeon: Eddi Albrecht MD;  Location: [facility] OR;  Service:                 Pain Management;  Laterality: N/A;  C7-T1  5/22/2019: EXTRACORPOREAL SHOCK WAVE LITHOTRIPSY; Left      Comment:  Procedure: LITHOTRIPSY, ESWL - only if stone visible on                xray  with cysto after on table possible;  Surgeon:                Marisol Stewart MD;  Location: [facility];                 Service: Urology;  Laterality: Left;  No date: HYSTERECTOMY  No date: OOPHORECTOMY  No date: TONSILLECTOMY  No date: VAGINAL DELIVERY    Current Outpatient Medications on File Prior to Visit:  albuterol (PROVENTIL/VENTOLIN HFA) 90 mcg/actuation inhaler, Inhale 2 puffs into the lungs 4 (four) times daily. (Patient taking differently: Inhale 2 puffs into the lungs every 4 (four) hours as needed. ), Disp: 1 Inhaler, Rfl: 1  ALPRAZolam (XANAX) 0.25 MG tablet, Use one 20-30 minutes before flying prn, Disp: 10 tablet, Rfl: 1  blood-glucose meter kit, Use as instructed, Disp: 1 each, Rfl: 0  cetirizine (ZYRTEC) 10 MG tablet, Take 10 mg by mouth daily as needed. , Disp: , Rfl:   ergocalciferol (ERGOCALCIFEROL) 50,000 unit Cap, Take one twice a week, Disp: 24 capsule, Rfl: 1  HYDROcodone-acetaminophen (NORCO) 5-325 mg per tablet, Take 1 tablet by mouth every 4 (four) hours as needed for Pain., Disp: 6 tablet, Rfl: 0  mupirocin (BACTROBAN) 2 % ointment, APPLY 1/2 GRAM TO EACH NOSTRIL TWICE A DAY FOR UP TO 5 DAYS PRIOR TO SURGERY OR AS DIRECTED BY YOUR PHYSICIAN., Disp: , Rfl:   ondansetron (ZOFRAN) 4 MG tablet, Take 1 tablet (4 mg total) by mouth every 6 (six) hours., Disp: 12 tablet, Rfl: 0  (DISCONTINUED) alcohol swabs (ALCOHOL WIPES) PadM, Apply 1 each topically as needed. Use two daily to check blood glucose., Disp: 200 each, Rfl: 3  (DISCONTINUED) amLODIPine (NORVASC) 10 MG tablet, Take 1 tablet (10 mg total) by mouth once daily., Disp: 90 tablet, Rfl: 3  (DISCONTINUED) blood sugar diagnostic Strp, Patience Plus Accu-chek glucose strips check twice daily, Disp: 200 each, Rfl: 3  (DISCONTINUED)  cyclobenzaprine (FLEXERIL) 10 MG tablet, Take 1 tablet (10 mg total) by mouth 3 (three) times daily as needed., Disp: 270 tablet, Rfl: 1  (DISCONTINUED) dulaglutide (TRULICITY) 0.75 mg/0.5 mL PnIj, INJECT ONE SYRINGE SUBCUTANEOUSLY ONCE WEEKLY., Disp: 6 mL, Rfl: 1  (DISCONTINUED) escitalopram oxalate (LEXAPRO) 10 MG tablet, Take 1 tablet (10 mg total) by mouth once daily., Disp: 90 tablet, Rfl: 1  (DISCONTINUED) fluticasone propionate (FLONASE) 50 mcg/actuation nasal spray, 2 sprays (100 mcg total) by Each Nostril route once daily., Disp: 16 g, Rfl: 5  (DISCONTINUED) gabapentin (NEURONTIN) 300 MG capsule, Take 1 capsule (300 mg total) by mouth 3 (three) times daily. TAKE ONE CAPSULE BY MOUTH AT BEDTIME X 1 WEEK, THEN 2 AT BEDTIME X 1 WEEK, THEN 3 AT BEDTIME., Disp: 90 capsule, Rfl: 5  (DISCONTINUED) losartan (COZAAR) 100 MG tablet, Take 1 tablet (100 mg total) by mouth once daily., Disp: 90 tablet, Rfl: 1  (DISCONTINUED) meclizine (ANTIVERT) 25 mg tablet, Take 1 tablet (25 mg total) by mouth 3 (three) times daily as needed., Disp: 30 tablet, Rfl: 5  (DISCONTINUED) meloxicam (MOBIC) 15 MG tablet, , Disp: , Rfl:   (DISCONTINUED) metFORMIN (GLUCOPHAGE-XR) 750 MG ER 24hr tablet, Take 1 tablet (750 mg total) by mouth 2 (two) times daily with meals., Disp: 180 tablet, Rfl: 0  (DISCONTINUED) rosuvastatin (CRESTOR) 40 MG Tab, Take 1 tablet (40 mg total) by mouth every evening., Disp: 30 tablet, Rfl: 11  (DISCONTINUED) tamsulosin (FLOMAX) 0.4 mg Cap, Take 1 capsule (0.4 mg total) by mouth once daily., Disp: 90 capsule, Rfl: 3  (DISCONTINUED) TRUEPLUS LANCETS 28 gauge Misc, TEST TWO TIMES DAILY, Disp: 200 each, Rfl: 11    Current Facility-Administered Medications on File Prior to Visit:  dexamethasone injection 8 mg, 8 mg, Intramuscular, Once, Maddy S. Rezza, NP          Review of Systems   Constitutional: Negative for chills, diaphoresis and fever.   Respiratory: Negative for chest tightness and shortness of breath.     Cardiovascular: Negative for chest pain and palpitations.   Musculoskeletal: Positive for back pain, myalgias, neck pain and neck stiffness.   Skin: Negative for color change and rash.   Neurological: Positive for numbness.       Objective:      Physical Exam  Vitals signs and nursing note reviewed.   Constitutional:       General: She is not in acute distress.     Appearance: Normal appearance. She is obese. She is not ill-appearing, toxic-appearing or diaphoretic.      Comments: Good blood pressure control  Obese with a BMI of 31.5 she is down 4.4 lb from her June 22, 2000 72 visit   HENT:      Head: Normocephalic and atraumatic.   Neck:      Musculoskeletal: Muscular tenderness present. No neck rigidity.      Vascular: No carotid bruit.      Comments: Decreased range of motion with diffuse tenderness in the left neck and intrascapular area  Cardiovascular:      Rate and Rhythm: Normal rate and regular rhythm.      Heart sounds: Normal heart sounds. No murmur. No friction rub. No gallop.    Pulmonary:      Effort: Pulmonary effort is normal. No respiratory distress.      Breath sounds: Normal breath sounds. No stridor. No wheezing, rhonchi or rales.   Chest:      Chest wall: No tenderness.   Lymphadenopathy:      Cervical: No cervical adenopathy.   Neurological:      Mental Status: She is alert and oriented to person, place, and time.   Psychiatric:         Mood and Affect: Mood normal.         Behavior: Behavior normal.         Thought Content: Thought content normal.         Judgment: Judgment normal.         Assessment:       1. Controlled type 2 diabetes mellitus without complication, without long-term current use of insulin    2. Mid back pain    3. Anxiety    4. Chronic rhinitis    5. Dysfunction of left eustachian tube    6. Cervical radiculitis    7. Hypertension associated with diabetes    8. Hyperlipidemia, unspecified hyperlipidemia type        Plan:       1. Controlled type 2 diabetes mellitus  without complication, without long-term current use of insulin  Lab Results   Component Value Date    HGBA1C 6.6 (H) 12/14/2020     Good control, no changes needed recheck six months  - dulaglutide (TRULICITY) 0.75 mg/0.5 mL pen injector; INJECT ONE SYRINGE SUBCUTANEOUSLY ONCE WEEKLY.  Dispense: 6 mL; Refill: 1  - metFORMIN (GLUCOPHAGE-XR) 750 MG ER 24hr tablet; Take 1 tablet (750 mg total) by mouth 2 (two) times daily with meals.  Dispense: 180 tablet; Refill: 1  - Comprehensive Metabolic Panel; Future  - Hemoglobin A1C; Future  - Lipid Panel; Future  - Microalbumin/Creatinine Ratio, Urine; Future    2. Mid back pain  Appears to be due to radiculitis.  Renew referral to Dr. Albrecht.  I offered to try Zanaflex an attempt to avoid the daytime drowsiness but she is confident that the Flexeril works for her but limits when she can use it.  She prefers to stick with the Flexeril  - cyclobenzaprine (FLEXERIL) 10 MG tablet; Take 1 tablet (10 mg total) by mouth 3 (three) times daily as needed.  Dispense: 270 tablet; Refill: 1    3. Anxiety  Refill needed, she does not need any refills on the Xanax that she takes when flying as of yet  - escitalopram oxalate (LEXAPRO) 10 MG tablet; Take 1 tablet (10 mg total) by mouth once daily.  Dispense: 90 tablet; Refill: 1    4. Chronic rhinitis  Flonase refill needed  - fluticasone propionate (FLONASE) 50 mcg/actuation nasal spray; 2 sprays (100 mcg total) by Each Nostril route once daily.  Dispense: 48 g; Refill: 5    5. Dysfunction of left eustachian tube  - fluticasone propionate (FLONASE) 50 mcg/actuation nasal spray; 2 sprays (100 mcg total) by Each Nostril route once daily.  Dispense: 48 g; Refill: 5    6. Cervical radiculitis  See above  - gabapentin (NEURONTIN) 300 MG capsule; Take 1 capsule (300 mg total) by mouth 3 (three) times daily. TAKE ONE CAPSULE BY MOUTH AT BEDTIME X 1 WEEK, THEN 2 AT BEDTIME X 1 WEEK, THEN 3 AT BEDTIME.  Dispense: 270 capsule; Refill: 1  - Ambulatory  referral/consult to Pain Clinic; Future    7. Hypertension associated with diabetes  Good control, no changes needed  - losartan (COZAAR) 100 MG tablet; Take 1 tablet (100 mg total) by mouth once daily.  Dispense: 90 tablet; Refill: 3  - Comprehensive Metabolic Panel; Future  - Lipid Panel; Future    8. Hyperlipidemia, unspecified hyperlipidemia type  Lab Results   Component Value Date    CHOL 126 06/16/2020    CHOL 221 (H) 12/12/2019    CHOL 157 11/09/2018     Lab Results   Component Value Date    HDL 51 06/16/2020    HDL 48 12/12/2019    HDL 42 11/09/2018     Lab Results   Component Value Date    LDLCALC 45.2 (L) 06/16/2020    LDLCALC 130.4 12/12/2019    LDLCALC 68.4 11/09/2018     Lab Results   Component Value Date    TRIG 149 06/16/2020    TRIG 213 (H) 12/12/2019    TRIG 233 (H) 11/09/2018     Lab Results   Component Value Date    CHOLHDL 40.5 06/16/2020    CHOLHDL 21.7 12/12/2019    CHOLHDL 26.8 11/09/2018     Good control, needed no changes  - rosuvastatin (CRESTOR) 40 MG Tab; Take 1 tablet (40 mg total) by mouth every evening.  Dispense: 90 tablet; Refill: 3  - Comprehensive Metabolic Panel; Future  - Lipid Panel; Future

## 2020-12-17 ENCOUNTER — OFFICE VISIT (OUTPATIENT)
Dept: PAIN MEDICINE | Facility: CLINIC | Age: 70
End: 2020-12-17
Attending: FAMILY MEDICINE
Payer: MEDICARE

## 2020-12-17 VITALS
DIASTOLIC BLOOD PRESSURE: 75 MMHG | WEIGHT: 189 LBS | HEART RATE: 81 BPM | SYSTOLIC BLOOD PRESSURE: 137 MMHG | BODY MASS INDEX: 31.49 KG/M2 | HEIGHT: 65 IN

## 2020-12-17 DIAGNOSIS — Z01.818 PRE-OP TESTING: ICD-10-CM

## 2020-12-17 DIAGNOSIS — M54.12 CERVICAL RADICULITIS: Primary | ICD-10-CM

## 2020-12-17 DIAGNOSIS — M54.12 CERVICAL RADICULITIS: ICD-10-CM

## 2020-12-17 DIAGNOSIS — M47.896 OTHER SPONDYLOSIS, LUMBAR REGION: ICD-10-CM

## 2020-12-17 DIAGNOSIS — M50.30 DDD (DEGENERATIVE DISC DISEASE), CERVICAL: Primary | ICD-10-CM

## 2020-12-17 PROCEDURE — 3008F BODY MASS INDEX DOCD: CPT | Mod: HCNC,CPTII,S$GLB, | Performed by: ANESTHESIOLOGY

## 2020-12-17 PROCEDURE — 3078F DIAST BP <80 MM HG: CPT | Mod: HCNC,CPTII,S$GLB, | Performed by: ANESTHESIOLOGY

## 2020-12-17 PROCEDURE — 99204 OFFICE O/P NEW MOD 45 MIN: CPT | Mod: HCNC,S$GLB,, | Performed by: ANESTHESIOLOGY

## 2020-12-17 PROCEDURE — 1159F PR MEDICATION LIST DOCUMENTED IN MEDICAL RECORD: ICD-10-PCS | Mod: HCNC,S$GLB,, | Performed by: ANESTHESIOLOGY

## 2020-12-17 PROCEDURE — 1125F AMNT PAIN NOTED PAIN PRSNT: CPT | Mod: HCNC,S$GLB,, | Performed by: ANESTHESIOLOGY

## 2020-12-17 PROCEDURE — 1125F PR PAIN SEVERITY QUANTIFIED, PAIN PRESENT: ICD-10-PCS | Mod: HCNC,S$GLB,, | Performed by: ANESTHESIOLOGY

## 2020-12-17 PROCEDURE — 3288F FALL RISK ASSESSMENT DOCD: CPT | Mod: HCNC,CPTII,S$GLB, | Performed by: ANESTHESIOLOGY

## 2020-12-17 PROCEDURE — 1159F MED LIST DOCD IN RCRD: CPT | Mod: HCNC,S$GLB,, | Performed by: ANESTHESIOLOGY

## 2020-12-17 PROCEDURE — 1101F PT FALLS ASSESS-DOCD LE1/YR: CPT | Mod: HCNC,CPTII,S$GLB, | Performed by: ANESTHESIOLOGY

## 2020-12-17 PROCEDURE — 3008F PR BODY MASS INDEX (BMI) DOCUMENTED: ICD-10-PCS | Mod: HCNC,CPTII,S$GLB, | Performed by: ANESTHESIOLOGY

## 2020-12-17 PROCEDURE — 99999 PR PBB SHADOW E&M-EST. PATIENT-LVL V: CPT | Mod: PBBFAC,HCNC,, | Performed by: ANESTHESIOLOGY

## 2020-12-17 PROCEDURE — 3075F PR MOST RECENT SYSTOLIC BLOOD PRESS GE 130-139MM HG: ICD-10-PCS | Mod: HCNC,CPTII,S$GLB, | Performed by: ANESTHESIOLOGY

## 2020-12-17 PROCEDURE — 99204 PR OFFICE/OUTPT VISIT, NEW, LEVL IV, 45-59 MIN: ICD-10-PCS | Mod: HCNC,S$GLB,, | Performed by: ANESTHESIOLOGY

## 2020-12-17 PROCEDURE — 3075F SYST BP GE 130 - 139MM HG: CPT | Mod: HCNC,CPTII,S$GLB, | Performed by: ANESTHESIOLOGY

## 2020-12-17 PROCEDURE — 1101F PR PT FALLS ASSESS DOC 0-1 FALLS W/OUT INJ PAST YR: ICD-10-PCS | Mod: HCNC,CPTII,S$GLB, | Performed by: ANESTHESIOLOGY

## 2020-12-17 PROCEDURE — 3288F PR FALLS RISK ASSESSMENT DOCUMENTED: ICD-10-PCS | Mod: HCNC,CPTII,S$GLB, | Performed by: ANESTHESIOLOGY

## 2020-12-17 PROCEDURE — 99999 PR PBB SHADOW E&M-EST. PATIENT-LVL V: ICD-10-PCS | Mod: PBBFAC,HCNC,, | Performed by: ANESTHESIOLOGY

## 2020-12-17 PROCEDURE — 3078F PR MOST RECENT DIASTOLIC BLOOD PRESSURE < 80 MM HG: ICD-10-PCS | Mod: HCNC,CPTII,S$GLB, | Performed by: ANESTHESIOLOGY

## 2020-12-17 RX ORDER — HYDROCODONE BITARTRATE AND ACETAMINOPHEN 5; 325 MG/1; MG/1
1 TABLET ORAL EVERY 6 HOURS PRN
Qty: 28 TABLET | Refills: 0 | Status: SHIPPED | OUTPATIENT
Start: 2020-12-17 | End: 2021-01-16

## 2020-12-17 NOTE — H&P (VIEW-ONLY)
This note was completed with dictation software and grammatical errors may exist.    Referring Physician: Tor Garg MD    PCP: Tor Garg MD      CC: neck and left shoulder pain    HPI:   Doris Pastrana is a 69 y.o. female referred to us for neck and left shoulder pain.  Pain has been present for over 5 years.  Pain is a constant aching, throbbing pain in her neck.  Pain radiates her left shoulder left proximal arm.  Pain worsens with lateral rotation of the neck.  Pain improved with rest.  She has tried physical therapy with minimal benefit.  She underwent a cervical OLIVE recently with us in March 2020 which provided moderate benefit of her pain.  She was pleased with the results.  Pain has slowly recurred.  She wishes have repeat procedure.  She denies any worsening weakness.  No bowel or bladder changes      ROS:  CONSTITUTIONAL: No fevers, chills, night sweats, wt. loss, appetite changes  SKIN: no rashes or itching  ENT: No headaches, head trauma, vision changes, or eye pain  LYMPH NODES: None noticed   CV: No chest pain, palpitations.   RESP: No shortness of breath, dyspnea on exertion, cough, wheezing, or hemoptysis  GI: No nausea, emesis, diarrhea, constipation, melena, hematochezia, pain.    : No dysuria, hematuria, urgency, or frequency   HEME: No easy bruising, bleeding problems  PSYCHIATRIC: No depression, anxiety, psychosis, hallucinations.  NEURO: No seizures, memory loss, dizziness or difficulty sleeping  MSK:+HPI      Past Medical History:   Diagnosis Date    Acute sinus infection     Allergy     Anemia     Arthritis     Bronchitis     Degenerative disc disease     lumbar, pain contract 1/28/2013    Depression     Diabetes mellitus, type 2 2/25/16    Fatty liver     Hyperlipidemia 2/25/16    Hypertension     Kidney stone     Mitral valve prolapse     Pleurisy     Pneumonia     PONV (postoperative nausea and vomiting)     Sinus infection     TIA (transient ischemic  attack) 2019     Past Surgical History:   Procedure Laterality Date    ANKLE SURGERY  2017    right ankle torn ligament    APPENDECTOMY       SECTION      CHOLECYSTECTOMY      COLONOSCOPY  8/13/15    Dr. Oliveira, five year recheck    EPIDURAL STEROID INJECTION INTO CERVICAL SPINE N/A 1/3/2020    Procedure: Injection-steroid-epidural-cervical;  Surgeon: Eddi Albrecht MD;  Location: [facility] OR;  Service: Pain Management;  Laterality: N/A;  C7-T1    EPIDURAL STEROID INJECTION INTO CERVICAL SPINE N/A 3/10/2020    Procedure: Injection-steroid-epidural-cervical;  Surgeon: Eddi Albrecht MD;  Location: [facility] OR;  Service: Pain Management;  Laterality: N/A;  C7-T1    EXTRACORPOREAL SHOCK WAVE LITHOTRIPSY Left 2019    Procedure: LITHOTRIPSY, ESWL - only if stone visible on xray  with cysto after on table possible;  Surgeon: Marisol Stewart MD;  Location: [facility] OR;  Service: Urology;  Laterality: Left;    HYSTERECTOMY      OOPHORECTOMY      TONSILLECTOMY      VAGINAL DELIVERY       Family History   Problem Relation Age of Onset    Arthritis Mother     Hypertension Mother     Vision loss Mother     Diabetes Mother     Alcohol abuse Father     Depression Father     Early death Father     Alcohol abuse Sister     Depression Sister     Cancer Sister         thyroid    Diabetes Sister     Heart disease Sister         chf    Atrial fibrillation Sister     Depression Brother     Hypertension Brother     Cancer Brother         prostate/stomach    Diabetes Brother     Aneurysm Brother         aaa    Learning disabilities Daughter     Hypertension Maternal Grandmother     Heart disease Maternal Grandmother     Arthritis Maternal Grandmother     Glaucoma Maternal Grandmother     Cancer Maternal Grandfather         stomach    Diabetes Paternal Grandmother     Breast cancer Paternal Grandmother     Heart disease Paternal Grandfather     Heart disease Maternal Uncle      "Kidney disease Maternal Uncle     Kidney disease Paternal Aunt      Social History     Socioeconomic History    Marital status:      Spouse name: Not on file    Number of children: Not on file    Years of education: Not on file    Highest education level: Not on file   Occupational History     Employer: [employer]   Social Needs    Financial resource strain: Not on file    Food insecurity     Worry: Not on file     Inability: Not on file    Transportation needs     Medical: Not on file     Non-medical: Not on file   Tobacco Use    Smoking status: Former Smoker     Packs/day: 1.00     Types: Cigarettes     Quit date: 3/12/1983     Years since quittin.7    Smokeless tobacco: Never Used   Substance and Sexual Activity    Alcohol use: Yes     Comment: socially    Drug use: No    Sexual activity: Not Currently   Lifestyle    Physical activity     Days per week: Not on file     Minutes per session: Not on file    Stress: Not on file   Relationships    Social connections     Talks on phone: Not on file     Gets together: Not on file     Attends Mandaen service: Not on file     Active member of club or organization: Not on file     Attends meetings of clubs or organizations: Not on file     Relationship status: Not on file   Other Topics Concern    Not on file   Social History Narrative    Not on file         Medications/Allergies: See med card    Vitals:    20 0825   BP: 137/75   Pulse: 81   Weight: 85.7 kg (189 lb)   Height: 5' 5" (1.651 m)   PainSc:   6   PainLoc: Neck         Physical exam:    GENERAL: A and O x3, the patient appears well groomed and is in no acute distress.  Skin: No rashes or obvious lesions  HEENT: normocephalic, atraumatic  CARDIOVASCULAR:  Palpable peripheral pulses  LUNGS: easy work of breathing  ABDOMEN: soft, nontender   UPPER EXTREMITIES: Normal alignment, normal range of motion, no atrophy, no skin changes,  hair growth and nail growth normal and equal " bilaterally. No swelling, no tenderness.    LOWER EXTREMITIES:  Normal alignment, normal range of motion, no atrophy, no skin changes,  hair growth and nail growth normal and equal bilaterally. No swelling, no tenderness.  CERVICAL SPINE:  Cervical spine: ROM is limited in flexion, extension and lateral rotation with moderate increased pain.  Spurling's maneuver causes no neck pain to either side.  Myofascial exam: No Tenderness to palpation across cervical paraspinous region bilaterally.      MENTAL STATUS: normal orientation, speech, language, and fund of knowledge for social situation.  Emotional state appropriate.    CRANIAL NERVES:  II:  PERRL bilaterally,   III,IV,VI: EOMI.    V:  Facial sensation equal bilaterally  VII:  Facial motor function normal.  VIII:  Hearing equal to finger rub bilaterally  IX/X: Gag normal, palate symmetric  XI:  Shoulder shrug equal, head turn equal  XII:  Tongue midline without fasciculations      MOTOR: Tone and bulk: normal bilateral upper and lower Strength: normal   Delt Bi Tri WE WF     R 5 5 5 5 5 5   L 5 5 5 5 5 5     IP ADD ABD Quad TA Gas HAM  R 5 5 5 5 5 5 5  L 5 5 5 5 5 5 5    SENSATION: Light touch and pinprick intact bilaterally  REFLEXES: normal, symmetric, nonbrisk.  Toes down, no clonus. No hoffmans.  GAIT: normal rise, base, steps, and arm swing.        Imaging:  CT C-spine 2011  IMPRESSION:   1.  REVERSAL OF THE NORMAL CERVICAL LORDOSIS, WHICH MAY BE POSITIONAL OR   DUE TO MUSCULAR SPASM, WITH SLIGHT, 1 MM,  ANTEROLISTHESIS OF C4 ON C5 AS   WELL AS A WELL CORTICATED OSSIFIC FRAGMENT SUPERIOR TO THE DENS WHICH IS   CHRONIC WITH NO ACUTE FRACTURE DEMONSTRATED.         2.  MODERATE MULTILEVEL CERVICAL SPONDYLOSIS WITH MULTILEVEL NEURAL   FORAMINAL NARROWING AND LIKELY WITH MILD CENTRAL CANAL STENOSIS AT C5-6.     IF FURTHER EVALUATION OF THE LIGAMENTOUS STRUCTURES AND THE DEGENERATIVE   FINDINGS IS DESIRED, MRI COULD BE CONSIDERED.         3.  HETEROGENEOUS  APPEARANCE OF THE THYROID GLAND WHICH MAY REPRESENT   THYROID NODULES, WHICH WOULD BE BETTER EVALUATED BY ULTRASOUND.     Assessment:  Patient presents with neck pain  1. DDD (degenerative disc disease), cervical    2. Cervical radiculitis    3. Other spondylosis, lumbar region          Plan:  1. I have stressed the importance of physical activity and exercise to improve overall health  2. Reviewed pertinent imaging and records with patient  3. I think that the patient's neck pain and radicular arm symptoms are due to degenerative disc disease and have recommended a cervical epidural steroid injection.   4. Short script of Norco 5mg given prior to procedure  5. Follow up after procedure      Thank you for referring this interesting patient, and I look forward to continuing to collaborate in her care.

## 2020-12-17 NOTE — PROGRESS NOTES
This note was completed with dictation software and grammatical errors may exist.    Referring Physician: Tor Garg MD    PCP: Tor Garg MD      CC: neck and left shoulder pain    HPI:   Doris Pastrana is a 69 y.o. female referred to us for neck and left shoulder pain.  Pain has been present for over 5 years.  Pain is a constant aching, throbbing pain in her neck.  Pain radiates her left shoulder left proximal arm.  Pain worsens with lateral rotation of the neck.  Pain improved with rest.  She has tried physical therapy with minimal benefit.  She underwent a cervical OLIVE recently with us in March 2020 which provided moderate benefit of her pain.  She was pleased with the results.  Pain has slowly recurred.  She wishes have repeat procedure.  She denies any worsening weakness.  No bowel or bladder changes      ROS:  CONSTITUTIONAL: No fevers, chills, night sweats, wt. loss, appetite changes  SKIN: no rashes or itching  ENT: No headaches, head trauma, vision changes, or eye pain  LYMPH NODES: None noticed   CV: No chest pain, palpitations.   RESP: No shortness of breath, dyspnea on exertion, cough, wheezing, or hemoptysis  GI: No nausea, emesis, diarrhea, constipation, melena, hematochezia, pain.    : No dysuria, hematuria, urgency, or frequency   HEME: No easy bruising, bleeding problems  PSYCHIATRIC: No depression, anxiety, psychosis, hallucinations.  NEURO: No seizures, memory loss, dizziness or difficulty sleeping  MSK:+HPI      Past Medical History:   Diagnosis Date    Acute sinus infection     Allergy     Anemia     Arthritis     Bronchitis     Degenerative disc disease     lumbar, pain contract 1/28/2013    Depression     Diabetes mellitus, type 2 2/25/16    Fatty liver     Hyperlipidemia 2/25/16    Hypertension     Kidney stone     Mitral valve prolapse     Pleurisy     Pneumonia     PONV (postoperative nausea and vomiting)     Sinus infection     TIA (transient ischemic  attack) 2019     Past Surgical History:   Procedure Laterality Date    ANKLE SURGERY  2017    right ankle torn ligament    APPENDECTOMY       SECTION      CHOLECYSTECTOMY      COLONOSCOPY  8/13/15    Dr. Oliveira, five year recheck    EPIDURAL STEROID INJECTION INTO CERVICAL SPINE N/A 1/3/2020    Procedure: Injection-steroid-epidural-cervical;  Surgeon: Eddi Albrecht MD;  Location: [facility] OR;  Service: Pain Management;  Laterality: N/A;  C7-T1    EPIDURAL STEROID INJECTION INTO CERVICAL SPINE N/A 3/10/2020    Procedure: Injection-steroid-epidural-cervical;  Surgeon: Eddi Albrecht MD;  Location: [facility] OR;  Service: Pain Management;  Laterality: N/A;  C7-T1    EXTRACORPOREAL SHOCK WAVE LITHOTRIPSY Left 2019    Procedure: LITHOTRIPSY, ESWL - only if stone visible on xray  with cysto after on table possible;  Surgeon: Marisol Stewart MD;  Location: [facility] OR;  Service: Urology;  Laterality: Left;    HYSTERECTOMY      OOPHORECTOMY      TONSILLECTOMY      VAGINAL DELIVERY       Family History   Problem Relation Age of Onset    Arthritis Mother     Hypertension Mother     Vision loss Mother     Diabetes Mother     Alcohol abuse Father     Depression Father     Early death Father     Alcohol abuse Sister     Depression Sister     Cancer Sister         thyroid    Diabetes Sister     Heart disease Sister         chf    Atrial fibrillation Sister     Depression Brother     Hypertension Brother     Cancer Brother         prostate/stomach    Diabetes Brother     Aneurysm Brother         aaa    Learning disabilities Daughter     Hypertension Maternal Grandmother     Heart disease Maternal Grandmother     Arthritis Maternal Grandmother     Glaucoma Maternal Grandmother     Cancer Maternal Grandfather         stomach    Diabetes Paternal Grandmother     Breast cancer Paternal Grandmother     Heart disease Paternal Grandfather     Heart disease Maternal Uncle      "Kidney disease Maternal Uncle     Kidney disease Paternal Aunt      Social History     Socioeconomic History    Marital status:      Spouse name: Not on file    Number of children: Not on file    Years of education: Not on file    Highest education level: Not on file   Occupational History     Employer: [employer]   Social Needs    Financial resource strain: Not on file    Food insecurity     Worry: Not on file     Inability: Not on file    Transportation needs     Medical: Not on file     Non-medical: Not on file   Tobacco Use    Smoking status: Former Smoker     Packs/day: 1.00     Types: Cigarettes     Quit date: 3/12/1983     Years since quittin.7    Smokeless tobacco: Never Used   Substance and Sexual Activity    Alcohol use: Yes     Comment: socially    Drug use: No    Sexual activity: Not Currently   Lifestyle    Physical activity     Days per week: Not on file     Minutes per session: Not on file    Stress: Not on file   Relationships    Social connections     Talks on phone: Not on file     Gets together: Not on file     Attends Islam service: Not on file     Active member of club or organization: Not on file     Attends meetings of clubs or organizations: Not on file     Relationship status: Not on file   Other Topics Concern    Not on file   Social History Narrative    Not on file         Medications/Allergies: See med card    Vitals:    20 0825   BP: 137/75   Pulse: 81   Weight: 85.7 kg (189 lb)   Height: 5' 5" (1.651 m)   PainSc:   6   PainLoc: Neck         Physical exam:    GENERAL: A and O x3, the patient appears well groomed and is in no acute distress.  Skin: No rashes or obvious lesions  HEENT: normocephalic, atraumatic  CARDIOVASCULAR:  Palpable peripheral pulses  LUNGS: easy work of breathing  ABDOMEN: soft, nontender   UPPER EXTREMITIES: Normal alignment, normal range of motion, no atrophy, no skin changes,  hair growth and nail growth normal and equal " bilaterally. No swelling, no tenderness.    LOWER EXTREMITIES:  Normal alignment, normal range of motion, no atrophy, no skin changes,  hair growth and nail growth normal and equal bilaterally. No swelling, no tenderness.  CERVICAL SPINE:  Cervical spine: ROM is limited in flexion, extension and lateral rotation with moderate increased pain.  Spurling's maneuver causes no neck pain to either side.  Myofascial exam: No Tenderness to palpation across cervical paraspinous region bilaterally.      MENTAL STATUS: normal orientation, speech, language, and fund of knowledge for social situation.  Emotional state appropriate.    CRANIAL NERVES:  II:  PERRL bilaterally,   III,IV,VI: EOMI.    V:  Facial sensation equal bilaterally  VII:  Facial motor function normal.  VIII:  Hearing equal to finger rub bilaterally  IX/X: Gag normal, palate symmetric  XI:  Shoulder shrug equal, head turn equal  XII:  Tongue midline without fasciculations      MOTOR: Tone and bulk: normal bilateral upper and lower Strength: normal   Delt Bi Tri WE WF     R 5 5 5 5 5 5   L 5 5 5 5 5 5     IP ADD ABD Quad TA Gas HAM  R 5 5 5 5 5 5 5  L 5 5 5 5 5 5 5    SENSATION: Light touch and pinprick intact bilaterally  REFLEXES: normal, symmetric, nonbrisk.  Toes down, no clonus. No hoffmans.  GAIT: normal rise, base, steps, and arm swing.        Imaging:  CT C-spine 2011  IMPRESSION:   1.  REVERSAL OF THE NORMAL CERVICAL LORDOSIS, WHICH MAY BE POSITIONAL OR   DUE TO MUSCULAR SPASM, WITH SLIGHT, 1 MM,  ANTEROLISTHESIS OF C4 ON C5 AS   WELL AS A WELL CORTICATED OSSIFIC FRAGMENT SUPERIOR TO THE DENS WHICH IS   CHRONIC WITH NO ACUTE FRACTURE DEMONSTRATED.         2.  MODERATE MULTILEVEL CERVICAL SPONDYLOSIS WITH MULTILEVEL NEURAL   FORAMINAL NARROWING AND LIKELY WITH MILD CENTRAL CANAL STENOSIS AT C5-6.     IF FURTHER EVALUATION OF THE LIGAMENTOUS STRUCTURES AND THE DEGENERATIVE   FINDINGS IS DESIRED, MRI COULD BE CONSIDERED.         3.  HETEROGENEOUS  APPEARANCE OF THE THYROID GLAND WHICH MAY REPRESENT   THYROID NODULES, WHICH WOULD BE BETTER EVALUATED BY ULTRASOUND.     Assessment:  Patient presents with neck pain  1. DDD (degenerative disc disease), cervical    2. Cervical radiculitis    3. Other spondylosis, lumbar region          Plan:  1. I have stressed the importance of physical activity and exercise to improve overall health  2. Reviewed pertinent imaging and records with patient  3. I think that the patient's neck pain and radicular arm symptoms are due to degenerative disc disease and have recommended a cervical epidural steroid injection.   4. Short script of Norco 5mg given prior to procedure  5. Follow up after procedure      Thank you for referring this interesting patient, and I look forward to continuing to collaborate in her care.

## 2020-12-18 ENCOUNTER — TELEPHONE (OUTPATIENT)
Dept: FAMILY MEDICINE | Facility: CLINIC | Age: 70
End: 2020-12-18

## 2020-12-18 DIAGNOSIS — E11.9 CONTROLLED TYPE 2 DIABETES MELLITUS WITHOUT COMPLICATION, WITHOUT LONG-TERM CURRENT USE OF INSULIN: ICD-10-CM

## 2020-12-18 NOTE — TELEPHONE ENCOUNTER
No new care gaps identified.  Powered by [employer]. Reference number: [id number]. 12/18/2020 1:57:10 AM   CST

## 2020-12-21 DIAGNOSIS — E78.5 HYPERLIPIDEMIA, UNSPECIFIED HYPERLIPIDEMIA TYPE: ICD-10-CM

## 2020-12-21 RX ORDER — METFORMIN HYDROCHLORIDE 750 MG/1
TABLET, EXTENDED RELEASE ORAL
Qty: 180 TABLET | Refills: 1 | OUTPATIENT
Start: 2020-12-21

## 2020-12-21 NOTE — TELEPHONE ENCOUNTER
No new care gaps identified.  Powered by [employer]. Reference number: [id number]. 12/21/2020 11:07:30 AM   CST

## 2020-12-21 NOTE — PROGRESS NOTES
Jhony FOSTER Duplicate Request    Refill Authorization Note   Doris Pastrana  is requesting a refill authorization.  Brief Assessment and Rationale for Refill:  Quick Discontinue  Medication Therapy Plan:  [facility]    Medication Reconciliation Completed:  No      Comments:   Pended Medication(s)       Requested Prescriptions     Refused Prescriptions Disp Refills    metFORMIN (GLUCOPHAGE-XR) 750 MG ER 24hr tablet [Pharmacy Med Name: METFORMIN HYDROCHLORIDE  MG Tablet Extended Release 24 Hour] 180 tablet 1     Sig: TAKE 1 TABLET TWICE DAILY WITH MEALS     Refused By: LAUREEN TANG     Reason for Refusal: Request already responded to by other means (e.g. phone or fax)        Duplicate Pended Encounter(s)/ Last Prescribed Details:    Authorizing Provider: Tor Garg MD TAMMY #:  [id number] NPI:  [id number]    Ordering User:  Tor Garg MD            Diagnosis Association: Controlled type 2 diabetes mellitus without complication, without long-term current use of insulin (E11.9)      Original Order:  metFORMIN (GLUCOPHAGE-XR) 750 MG ER 24hr tablet [[id number]]      Pharmacy:  [facility] [address]   TAMMY #:  --   Pharmacy Comments: --          Fill quantity remaining: -- Fill quantity used: -- Next fill due: --       Outpatient Medication Detail     Disp Refills Start End GILMER   metFORMIN (GLUCOPHAGE-XR) 750 MG ER 24hr tablet 180 tablet 1 12/16/2020  No   Sig - Route: Take 1 tablet (750 mg total) by mouth 2 (two) times daily with meals. - Oral   Sent to pharmacy as: metFORMIN (GLUCOPHAGE-XR) 750 MG ER 24hr tablet   Class: Normal   Notes to Pharmacy: Please delete all prior scripts with same name and strength including on holds.   Order: [id number]   Date/Time Signed: 12/16/2020 10:12       E-Prescribing Status: Receipt confirmed by pharmacy (12/16/2020 10:12 AM CST)   Ordering Encounter Report    Associated Reports   View Encounter                Note composed:9:40  AM 12/21/2020

## 2020-12-23 RX ORDER — ROSUVASTATIN CALCIUM 40 MG/1
TABLET, COATED ORAL
Qty: 30 TABLET | OUTPATIENT
Start: 2020-12-23

## 2020-12-23 NOTE — PROGRESS NOTES
Jhony LAWLER. Duplicate Request    Refill Authorization Note   Doris Pastrana  is requesting a refill authorization.  Brief Assessment and Rationale for Refill:  Quick Discontinue  Medication Therapy Plan:       Medication Reconciliation Completed:  No      Comments:   Pended Medication(s)       Requested Prescriptions     Refused Prescriptions Disp Refills    rosuvastatin (CRESTOR) 40 MG Tab [Pharmacy Med Name: ROSUVASTATIN 40 MG  TAB JOI] 30 tablet      Sig: TAKE ONE TABLET BY MOUTH EVERY EVENING.     Refused By: AMAURY LIM     Reason for Refusal: Duplicate        Duplicate Pended Encounter(s)/ Last Prescribed Details:      Ordering Provider: -- TAMMY #:  -- NPI:  --    Authorizing Provider: Tor Garg MD TAMMY #:  [id number] NPI:  [id number]    Ordering User:  Tor Garg MD            Diagnosis Association: Hyperlipidemia, unspecified hyperlipidemia type (E78.5)      Original Order:  rosuvastatin (CRESTOR) 40 MG Tab [[id number]]      Pharmacy:  [facility] - [address]   TAMMY #:  --   Pharmacy Comments: --          Fill quantity remaining: -- Fill quantity used: -- Next fill due: --       Outpatient Medication Detail     Disp Refills Start End GILMER   rosuvastatin (CRESTOR) 40 MG Tab 90 tablet 3 12/16/2020  No   Sig - Route: Take 1 tablet (40 mg total) by mouth every evening. - Oral   Sent to pharmacy as: rosuvastatin (CRESTOR) 40 MG Tab   Class: Normal   Order: [id number]   Date/Time Signed: 12/16/2020 10:12       E-Prescribing Status: Receipt confirmed by pharmacy (12/16/2020 10:12 AM CST)       Note composed:5:48 PM 12/23/2020

## 2020-12-26 ENCOUNTER — LAB VISIT (OUTPATIENT)
Dept: PRIMARY CARE CLINIC | Facility: CLINIC | Age: 70
End: 2020-12-26
Payer: MEDICARE

## 2020-12-26 DIAGNOSIS — Z01.818 PRE-OP TESTING: ICD-10-CM

## 2020-12-26 PROCEDURE — U0003 INFECTIOUS AGENT DETECTION BY NUCLEIC ACID (DNA OR RNA); SEVERE ACUTE RESPIRATORY SYNDROME CORONAVIRUS 2 (SARS-COV-2) (CORONAVIRUS DISEASE [COVID-19]), AMPLIFIED PROBE TECHNIQUE, MAKING USE OF HIGH THROUGHPUT TECHNOLOGIES AS DESCRIBED BY CMS-2020-01-R: HCPCS | Mod: HCNC

## 2020-12-27 LAB — SARS-COV-2 RNA RESP QL NAA+PROBE: NOT DETECTED

## 2020-12-28 ENCOUNTER — PATIENT OUTREACH (OUTPATIENT)
Dept: ADMINISTRATIVE | Facility: HOSPITAL | Age: 70
End: 2020-12-28

## 2020-12-28 NOTE — LETTER
AUTHORIZATION FOR RELEASE OF   CONFIDENTIAL INFORMATION    Dear Dr. Hurst,    We are seeing Doris Pastrana, date of birth 1950, in the clinic at [facility]. Tor Garg MD is the patient's PCP. Doris Pastrana has an outstanding lab/procedure at the time we reviewed her chart. In order to help keep her health information updated, she has authorized us to request the following medical record(s):        (  )  MAMMOGRAM                                      (  )  COLONOSCOPY      (  )  PAP SMEAR                                          (  )  OUTSIDE LAB RESULTS     (  )  DEXA SCAN                                          ( X )  EYE EXAM            (  )  FOOT EXAM                                          (  )  ENTIRE RECORD     (  )  OUTSIDE IMMUNIZATIONS                 (  )  _______________         Please fax records to [facility], Robert W Taylor, MD, [phone]    Thank you in advance,  Marci Gutierrez LPN, Clinical Care Coordinator  [address] [address]  P: [phone]  F: [phone]              Patient Name: Doris Pastrana  : 1950  Patient Phone #: [phone]

## 2020-12-29 ENCOUNTER — HOSPITAL ENCOUNTER (OUTPATIENT)
Facility: AMBULARY SURGERY CENTER | Age: 70
Discharge: HOME | End: 2020-12-29
Attending: ANESTHESIOLOGY | Admitting: ANESTHESIOLOGY
Payer: MEDICARE

## 2020-12-29 DIAGNOSIS — M54.12 CERVICAL RADICULITIS: Primary | ICD-10-CM

## 2020-12-29 LAB — POCT GLUCOSE: 130 MG/DL (ref 70–110)

## 2020-12-29 PROCEDURE — 62323 NJX INTERLAMINAR LMBR/SAC: CPT | Mod: HCNC,,, | Performed by: ANESTHESIOLOGY

## 2020-12-29 PROCEDURE — 62321 NJX INTERLAMINAR CRV/THRC: CPT | Performed by: ANESTHESIOLOGY

## 2020-12-29 PROCEDURE — 62323 PR INJ LUMBAR/SACRAL, W/IMAGING GUIDANCE: ICD-10-PCS | Mod: HCNC,,, | Performed by: ANESTHESIOLOGY

## 2020-12-29 RX ORDER — SODIUM CHLORIDE 9 MG/ML
INJECTION, SOLUTION INTRAMUSCULAR; INTRAVENOUS; SUBCUTANEOUS
Status: DISCONTINUED | OUTPATIENT
Start: 2020-12-29 | End: 2020-12-29 | Stop reason: HOSPADM

## 2020-12-29 RX ORDER — FENTANYL CITRATE 50 UG/ML
INJECTION, SOLUTION INTRAMUSCULAR; INTRAVENOUS
Status: DISCONTINUED | OUTPATIENT
Start: 2020-12-29 | End: 2020-12-29 | Stop reason: HOSPADM

## 2020-12-29 RX ORDER — DEXAMETHASONE SODIUM PHOSPHATE 10 MG/ML
INJECTION INTRAMUSCULAR; INTRAVENOUS
Status: DISCONTINUED | OUTPATIENT
Start: 2020-12-29 | End: 2020-12-29 | Stop reason: HOSPADM

## 2020-12-29 RX ORDER — LIDOCAINE HYDROCHLORIDE 10 MG/ML
INJECTION, SOLUTION EPIDURAL; INFILTRATION; INTRACAUDAL; PERINEURAL
Status: DISCONTINUED | OUTPATIENT
Start: 2020-12-29 | End: 2020-12-29 | Stop reason: HOSPADM

## 2020-12-29 RX ORDER — SODIUM CHLORIDE, SODIUM LACTATE, POTASSIUM CHLORIDE, CALCIUM CHLORIDE 600; 310; 30; 20 MG/100ML; MG/100ML; MG/100ML; MG/100ML
INJECTION, SOLUTION INTRAVENOUS ONCE AS NEEDED
Status: COMPLETED | OUTPATIENT
Start: 2020-12-29 | End: 2020-12-29

## 2020-12-29 RX ORDER — MIDAZOLAM HYDROCHLORIDE 2 MG/2ML
INJECTION, SOLUTION INTRAMUSCULAR; INTRAVENOUS
Status: DISCONTINUED | OUTPATIENT
Start: 2020-12-29 | End: 2020-12-29 | Stop reason: HOSPADM

## 2020-12-29 RX ADMIN — SODIUM CHLORIDE, SODIUM LACTATE, POTASSIUM CHLORIDE, CALCIUM CHLORIDE: 600; 310; 30; 20 INJECTION, SOLUTION INTRAVENOUS at 09:12

## 2020-12-29 NOTE — OP NOTE
PROCEDURE DATE: 12/29/2020    Procedure: C7-T1 cervical interlaminar epidural steroid injection under utilizing fluoroscopy.    Diagnosis: Cervical Degenerative Disc Diease; Cervical Radiculitis  POSTOP DIAGNOSIS: SAME    Physician: Eddi Albrecht MD    Medications injected:  Dexamethasone 10mg followed by a slow injection of 4 mL sterile, preservative-free normal saline.    Local anesthetic used: Lidocaine 1%, 2 ml.    Sedation Medications: RN IV sedation    Complications:  None    Estimated blood loss: None    Technique:  A time-out was taken to identify patient and procedure prior to starting the procedure.  With the patient laying in a prone position with the neck in a mid-flexed forward position, the area was prepped and draped in the usual sterile fashion using ChloraPrep and a fenestrated drape.  The area was determined under AP fluoroscopic guidance.  Local anesthetic was given using a 25-gauge 1.5 inch needle by raising a wheal and then infiltrating ventrally.  A 3.5 inch 20-gauge Touhy needle was introduced under fluoroscopic guidance to meet the lamina of C7.  The needle was then hinged under the lamina then advanced using loss of resistance technique.  Once the tip of the needle was in the desired position, the 1m of non-iodine contrast dye Omnipaque was injected to determine placement and no uptake.  The steroid was then injected slowly followed by a slow injection of 4 mL of the sterile preservative-free normal saline.  The patient tolerated the procedure well.    The patient was monitored after the procedure and was given post-procedure and discharge instructions to follow at home. The patient was discharged in a stable condition.

## 2020-12-29 NOTE — INTERVAL H&P NOTE
The patient has been examined and the H&P has been reviewed:    I concur with the findings and no changes have occurred since H&P was written.    Surgery risks, benefits and alternative options discussed and understood by patient/family.  This patient has been cleared for surgery in an ambulatory surgical facility    ASA 3,  Mallampatti Score 3  No history of anesthetic complications  Plan for RN IV sedation          Active Hospital Problems    Diagnosis  POA    Cervical radiculitis [M54.12]  Yes      Resolved Hospital Problems   No resolved problems to display.

## 2020-12-29 NOTE — DISCHARGE SUMMARY
[facility]  Discharge Note  Short Stay    Procedure(s) (LRB):  Injection-steroid-epidural-cervical (N/A)    OUTCOME: Patient tolerated treatment/procedure well without complication and is now ready for discharge.    DISPOSITION: Home or Self Care    FINAL DIAGNOSIS:  Cervical radiculitis    FOLLOWUP: In clinic    DISCHARGE INSTRUCTIONS:    Discharge Procedure Orders   Notify your health care provider if you experience any of the following:  temperature >100.4     Notify your health care provider if you experience any of the following:  severe uncontrolled pain     Notify your health care provider if you experience any of the following:  redness, tenderness, or signs of infection (pain, swelling, redness, odor or green/yellow discharge around incision site)     Activity as tolerated

## 2020-12-29 NOTE — PLAN OF CARE
Stable states ready to go home, [place] fluids, pain tolerable, no new arm weakness, no leg weakness, ambulated to car with RN to daughter

## 2020-12-30 VITALS
WEIGHT: 189 LBS | HEIGHT: 65 IN | TEMPERATURE: 98 F | DIASTOLIC BLOOD PRESSURE: 82 MMHG | OXYGEN SATURATION: 92 % | HEART RATE: 80 BPM | SYSTOLIC BLOOD PRESSURE: 155 MMHG | BODY MASS INDEX: 31.49 KG/M2 | RESPIRATION RATE: 20 BRPM

## 2021-01-04 ENCOUNTER — PATIENT MESSAGE (OUTPATIENT)
Dept: ADMINISTRATIVE | Facility: HOSPITAL | Age: 71
End: 2021-01-04

## 2021-01-25 ENCOUNTER — PATIENT OUTREACH (OUTPATIENT)
Dept: ADMINISTRATIVE | Facility: OTHER | Age: 71
End: 2021-01-25

## 2021-01-27 ENCOUNTER — TELEPHONE (OUTPATIENT)
Dept: PAIN MEDICINE | Facility: CLINIC | Age: 71
End: 2021-01-27

## 2021-01-27 ENCOUNTER — PATIENT MESSAGE (OUTPATIENT)
Dept: FAMILY MEDICINE | Facility: CLINIC | Age: 71
End: 2021-01-27

## 2021-01-27 ENCOUNTER — OFFICE VISIT (OUTPATIENT)
Dept: PAIN MEDICINE | Facility: CLINIC | Age: 71
End: 2021-01-27
Payer: MEDICARE

## 2021-01-27 VITALS
BODY MASS INDEX: 37.11 KG/M2 | SYSTOLIC BLOOD PRESSURE: 126 MMHG | HEIGHT: 60 IN | HEART RATE: 100 BPM | DIASTOLIC BLOOD PRESSURE: 78 MMHG | WEIGHT: 189 LBS

## 2021-01-27 DIAGNOSIS — M54.12 CERVICAL RADICULITIS: Primary | ICD-10-CM

## 2021-01-27 DIAGNOSIS — M50.30 DDD (DEGENERATIVE DISC DISEASE), CERVICAL: ICD-10-CM

## 2021-01-27 DIAGNOSIS — M47.896 OTHER SPONDYLOSIS, LUMBAR REGION: ICD-10-CM

## 2021-01-27 DIAGNOSIS — M54.2 NECK PAIN: Primary | ICD-10-CM

## 2021-01-27 DIAGNOSIS — M79.18 MYOFASCIAL PAIN: ICD-10-CM

## 2021-01-27 PROCEDURE — 1125F PR PAIN SEVERITY QUANTIFIED, PAIN PRESENT: ICD-10-PCS | Mod: S$GLB,,, | Performed by: PHYSICIAN ASSISTANT

## 2021-01-27 PROCEDURE — 1159F PR MEDICATION LIST DOCUMENTED IN MEDICAL RECORD: ICD-10-PCS | Mod: S$GLB,,, | Performed by: PHYSICIAN ASSISTANT

## 2021-01-27 PROCEDURE — 3008F BODY MASS INDEX DOCD: CPT | Mod: CPTII,S$GLB,, | Performed by: PHYSICIAN ASSISTANT

## 2021-01-27 PROCEDURE — 1125F AMNT PAIN NOTED PAIN PRSNT: CPT | Mod: S$GLB,,, | Performed by: PHYSICIAN ASSISTANT

## 2021-01-27 PROCEDURE — 1159F MED LIST DOCD IN RCRD: CPT | Mod: S$GLB,,, | Performed by: PHYSICIAN ASSISTANT

## 2021-01-27 PROCEDURE — 99214 OFFICE O/P EST MOD 30 MIN: CPT | Mod: S$GLB,,, | Performed by: PHYSICIAN ASSISTANT

## 2021-01-27 PROCEDURE — 1101F PR PT FALLS ASSESS DOC 0-1 FALLS W/OUT INJ PAST YR: ICD-10-PCS | Mod: CPTII,S$GLB,, | Performed by: PHYSICIAN ASSISTANT

## 2021-01-27 PROCEDURE — 99999 PR PBB SHADOW E&M-EST. PATIENT-LVL V: CPT | Mod: PBBFAC,,, | Performed by: PHYSICIAN ASSISTANT

## 2021-01-27 PROCEDURE — 99214 PR OFFICE/OUTPT VISIT, EST, LEVL IV, 30-39 MIN: ICD-10-PCS | Mod: S$GLB,,, | Performed by: PHYSICIAN ASSISTANT

## 2021-01-27 PROCEDURE — 3078F PR MOST RECENT DIASTOLIC BLOOD PRESSURE < 80 MM HG: ICD-10-PCS | Mod: CPTII,S$GLB,, | Performed by: PHYSICIAN ASSISTANT

## 2021-01-27 PROCEDURE — 3008F PR BODY MASS INDEX (BMI) DOCUMENTED: ICD-10-PCS | Mod: CPTII,S$GLB,, | Performed by: PHYSICIAN ASSISTANT

## 2021-01-27 PROCEDURE — 3078F DIAST BP <80 MM HG: CPT | Mod: CPTII,S$GLB,, | Performed by: PHYSICIAN ASSISTANT

## 2021-01-27 PROCEDURE — 99999 PR PBB SHADOW E&M-EST. PATIENT-LVL V: ICD-10-PCS | Mod: PBBFAC,,, | Performed by: PHYSICIAN ASSISTANT

## 2021-01-27 PROCEDURE — 3288F FALL RISK ASSESSMENT DOCD: CPT | Mod: CPTII,S$GLB,, | Performed by: PHYSICIAN ASSISTANT

## 2021-01-27 PROCEDURE — 1101F PT FALLS ASSESS-DOCD LE1/YR: CPT | Mod: CPTII,S$GLB,, | Performed by: PHYSICIAN ASSISTANT

## 2021-01-27 PROCEDURE — 3074F SYST BP LT 130 MM HG: CPT | Mod: CPTII,S$GLB,, | Performed by: PHYSICIAN ASSISTANT

## 2021-01-27 PROCEDURE — 3074F PR MOST RECENT SYSTOLIC BLOOD PRESSURE < 130 MM HG: ICD-10-PCS | Mod: CPTII,S$GLB,, | Performed by: PHYSICIAN ASSISTANT

## 2021-01-27 PROCEDURE — 3288F PR FALLS RISK ASSESSMENT DOCUMENTED: ICD-10-PCS | Mod: CPTII,S$GLB,, | Performed by: PHYSICIAN ASSISTANT

## 2021-01-27 RX ORDER — HYDROCODONE BITARTRATE AND ACETAMINOPHEN 7.5; 325 MG/1; MG/1
1 TABLET ORAL EVERY 6 HOURS PRN
Qty: 28 TABLET | Refills: 0 | Status: SHIPPED | OUTPATIENT
Start: 2021-02-25 | End: 2021-03-04

## 2021-01-27 RX ORDER — METHOCARBAMOL 500 MG/1
500 TABLET, FILM COATED ORAL 4 TIMES DAILY
Qty: 60 TABLET | Refills: 0 | Status: SHIPPED | OUTPATIENT
Start: 2021-01-27 | End: 2021-02-11

## 2021-01-27 RX ORDER — LIDOCAINE AND PRILOCAINE 25; 25 MG/G; MG/G
CREAM TOPICAL
COMMUNITY
Start: 2020-12-30 | End: 2021-07-09

## 2021-01-27 RX ORDER — GEL BASE NO.184
GEL (GRAM) TOPICAL
Status: ON HOLD | COMMUNITY
Start: 2020-12-30 | End: 2023-09-06

## 2021-01-27 RX ORDER — DICLOFENAC SODIUM 10 MG/G
GEL TOPICAL
COMMUNITY
Start: 2020-12-30 | End: 2022-11-17 | Stop reason: SDUPTHER

## 2021-02-02 ENCOUNTER — PES CALL (OUTPATIENT)
Dept: ADMINISTRATIVE | Facility: CLINIC | Age: 71
End: 2021-02-02

## 2021-02-05 ENCOUNTER — OFFICE VISIT (OUTPATIENT)
Dept: PRIMARY CARE CLINIC | Facility: CLINIC | Age: 71
End: 2021-02-05
Payer: MEDICARE

## 2021-02-05 VITALS
SYSTOLIC BLOOD PRESSURE: 116 MMHG | DIASTOLIC BLOOD PRESSURE: 68 MMHG | WEIGHT: 187.81 LBS | HEIGHT: 65 IN | TEMPERATURE: 97 F | BODY MASS INDEX: 31.29 KG/M2 | HEART RATE: 88 BPM | OXYGEN SATURATION: 96 %

## 2021-02-05 DIAGNOSIS — E78.5 HYPERLIPIDEMIA ASSOCIATED WITH TYPE 2 DIABETES MELLITUS: ICD-10-CM

## 2021-02-05 DIAGNOSIS — E11.00 TYPE 2 DIABETES MELLITUS WITH HYPEROSMOLARITY WITHOUT COMA, WITHOUT LONG-TERM CURRENT USE OF INSULIN: ICD-10-CM

## 2021-02-05 DIAGNOSIS — Z00.00 ENCOUNTER FOR PREVENTIVE HEALTH EXAMINATION: Primary | ICD-10-CM

## 2021-02-05 DIAGNOSIS — I15.2 HYPERTENSION ASSOCIATED WITH DIABETES: ICD-10-CM

## 2021-02-05 DIAGNOSIS — E11.59 HYPERTENSION ASSOCIATED WITH DIABETES: ICD-10-CM

## 2021-02-05 DIAGNOSIS — E66.9 OBESITY (BMI 30-39.9): ICD-10-CM

## 2021-02-05 DIAGNOSIS — E11.9 TYPE 2 DIABETES MELLITUS WITHOUT COMPLICATION, UNSPECIFIED WHETHER LONG TERM INSULIN USE: ICD-10-CM

## 2021-02-05 DIAGNOSIS — J84.10 CALCIFIED GRANULOMA OF LUNG: ICD-10-CM

## 2021-02-05 DIAGNOSIS — E11.69 HYPERLIPIDEMIA ASSOCIATED WITH TYPE 2 DIABETES MELLITUS: ICD-10-CM

## 2021-02-05 DIAGNOSIS — R26.9 ABNORMALITY OF GAIT AND MOBILITY: ICD-10-CM

## 2021-02-05 PROCEDURE — 1101F PT FALLS ASSESS-DOCD LE1/YR: CPT | Mod: CPTII,S$GLB,, | Performed by: NURSE PRACTITIONER

## 2021-02-05 PROCEDURE — G0439 PR MEDICARE ANNUAL WELLNESS SUBSEQUENT VISIT: ICD-10-PCS | Mod: S$GLB,,, | Performed by: NURSE PRACTITIONER

## 2021-02-05 PROCEDURE — 3074F SYST BP LT 130 MM HG: CPT | Mod: CPTII,S$GLB,, | Performed by: NURSE PRACTITIONER

## 2021-02-05 PROCEDURE — G0439 PPPS, SUBSEQ VISIT: HCPCS | Mod: S$GLB,,, | Performed by: NURSE PRACTITIONER

## 2021-02-05 PROCEDURE — 1101F PR PT FALLS ASSESS DOC 0-1 FALLS W/OUT INJ PAST YR: ICD-10-PCS | Mod: CPTII,S$GLB,, | Performed by: NURSE PRACTITIONER

## 2021-02-05 PROCEDURE — 3044F HG A1C LEVEL LT 7.0%: CPT | Mod: CPTII,S$GLB,, | Performed by: NURSE PRACTITIONER

## 2021-02-05 PROCEDURE — 3288F PR FALLS RISK ASSESSMENT DOCUMENTED: ICD-10-PCS | Mod: CPTII,S$GLB,, | Performed by: NURSE PRACTITIONER

## 2021-02-05 PROCEDURE — 3078F PR MOST RECENT DIASTOLIC BLOOD PRESSURE < 80 MM HG: ICD-10-PCS | Mod: CPTII,S$GLB,, | Performed by: NURSE PRACTITIONER

## 2021-02-05 PROCEDURE — 3074F PR MOST RECENT SYSTOLIC BLOOD PRESSURE < 130 MM HG: ICD-10-PCS | Mod: CPTII,S$GLB,, | Performed by: NURSE PRACTITIONER

## 2021-02-05 PROCEDURE — 3288F FALL RISK ASSESSMENT DOCD: CPT | Mod: CPTII,S$GLB,, | Performed by: NURSE PRACTITIONER

## 2021-02-05 PROCEDURE — 3078F DIAST BP <80 MM HG: CPT | Mod: CPTII,S$GLB,, | Performed by: NURSE PRACTITIONER

## 2021-02-05 PROCEDURE — 3008F PR BODY MASS INDEX (BMI) DOCUMENTED: ICD-10-PCS | Mod: CPTII,S$GLB,, | Performed by: NURSE PRACTITIONER

## 2021-02-05 PROCEDURE — 3044F PR MOST RECENT HEMOGLOBIN A1C LEVEL <7.0%: ICD-10-PCS | Mod: CPTII,S$GLB,, | Performed by: NURSE PRACTITIONER

## 2021-02-05 PROCEDURE — 3008F BODY MASS INDEX DOCD: CPT | Mod: CPTII,S$GLB,, | Performed by: NURSE PRACTITIONER

## 2021-03-16 ENCOUNTER — TELEPHONE (OUTPATIENT)
Dept: ORTHOPEDICS | Facility: CLINIC | Age: 71
End: 2021-03-16

## 2021-03-17 DIAGNOSIS — M79.671 RIGHT FOOT PAIN: ICD-10-CM

## 2021-03-17 DIAGNOSIS — M25.571 RIGHT ANKLE PAIN, UNSPECIFIED CHRONICITY: Primary | ICD-10-CM

## 2021-03-23 ENCOUNTER — HOSPITAL ENCOUNTER (OUTPATIENT)
Dept: RADIOLOGY | Facility: HOSPITAL | Age: 71
Discharge: HOME | End: 2021-03-23
Attending: ORTHOPAEDIC SURGERY
Payer: MEDICARE

## 2021-03-23 ENCOUNTER — OFFICE VISIT (OUTPATIENT)
Dept: ORTHOPEDICS | Facility: CLINIC | Age: 71
End: 2021-03-23
Payer: MEDICARE

## 2021-03-23 VITALS
HEIGHT: 65 IN | DIASTOLIC BLOOD PRESSURE: 65 MMHG | HEART RATE: 86 BPM | WEIGHT: 187.81 LBS | BODY MASS INDEX: 31.29 KG/M2 | SYSTOLIC BLOOD PRESSURE: 115 MMHG

## 2021-03-23 DIAGNOSIS — M25.571 RIGHT ANKLE PAIN, UNSPECIFIED CHRONICITY: ICD-10-CM

## 2021-03-23 DIAGNOSIS — M79.671 RIGHT FOOT PAIN: ICD-10-CM

## 2021-03-23 DIAGNOSIS — M79.671 RIGHT FOOT PAIN: Primary | ICD-10-CM

## 2021-03-23 PROCEDURE — 99214 OFFICE O/P EST MOD 30 MIN: CPT | Mod: 25,S$GLB,, | Performed by: ORTHOPAEDIC SURGERY

## 2021-03-23 PROCEDURE — 64455 PR INJECT ANES/STEROID PLANTAR COMMON DIGITAL NERVE: ICD-10-PCS | Mod: RT,S$GLB,, | Performed by: ORTHOPAEDIC SURGERY

## 2021-03-23 PROCEDURE — 1101F PT FALLS ASSESS-DOCD LE1/YR: CPT | Mod: CPTII,S$GLB,, | Performed by: ORTHOPAEDIC SURGERY

## 2021-03-23 PROCEDURE — 3078F DIAST BP <80 MM HG: CPT | Mod: CPTII,S$GLB,, | Performed by: ORTHOPAEDIC SURGERY

## 2021-03-23 PROCEDURE — 1159F MED LIST DOCD IN RCRD: CPT | Mod: S$GLB,,, | Performed by: ORTHOPAEDIC SURGERY

## 2021-03-23 PROCEDURE — 99999 PR PBB SHADOW E&M-EST. PATIENT-LVL V: CPT | Mod: PBBFAC,,, | Performed by: ORTHOPAEDIC SURGERY

## 2021-03-23 PROCEDURE — 1101F PR PT FALLS ASSESS DOC 0-1 FALLS W/OUT INJ PAST YR: ICD-10-PCS | Mod: CPTII,S$GLB,, | Performed by: ORTHOPAEDIC SURGERY

## 2021-03-23 PROCEDURE — 1159F PR MEDICATION LIST DOCUMENTED IN MEDICAL RECORD: ICD-10-PCS | Mod: S$GLB,,, | Performed by: ORTHOPAEDIC SURGERY

## 2021-03-23 PROCEDURE — 73610 X-RAY EXAM OF ANKLE: CPT | Mod: 26,RT,, | Performed by: RADIOLOGY

## 2021-03-23 PROCEDURE — 3008F PR BODY MASS INDEX (BMI) DOCUMENTED: ICD-10-PCS | Mod: CPTII,S$GLB,, | Performed by: ORTHOPAEDIC SURGERY

## 2021-03-23 PROCEDURE — 64455 NJX AA&/STRD PLTR COM DG NRV: CPT | Mod: RT,S$GLB,, | Performed by: ORTHOPAEDIC SURGERY

## 2021-03-23 PROCEDURE — 3074F SYST BP LT 130 MM HG: CPT | Mod: CPTII,S$GLB,, | Performed by: ORTHOPAEDIC SURGERY

## 2021-03-23 PROCEDURE — 3288F FALL RISK ASSESSMENT DOCD: CPT | Mod: CPTII,S$GLB,, | Performed by: ORTHOPAEDIC SURGERY

## 2021-03-23 PROCEDURE — 73630 XR FOOT COMPLETE 3 VIEW RIGHT: ICD-10-PCS | Mod: 26,RT,, | Performed by: RADIOLOGY

## 2021-03-23 PROCEDURE — 73610 X-RAY EXAM OF ANKLE: CPT | Mod: TC,PO,RT

## 2021-03-23 PROCEDURE — 3288F PR FALLS RISK ASSESSMENT DOCUMENTED: ICD-10-PCS | Mod: CPTII,S$GLB,, | Performed by: ORTHOPAEDIC SURGERY

## 2021-03-23 PROCEDURE — 3008F BODY MASS INDEX DOCD: CPT | Mod: CPTII,S$GLB,, | Performed by: ORTHOPAEDIC SURGERY

## 2021-03-23 PROCEDURE — 73630 X-RAY EXAM OF FOOT: CPT | Mod: TC,PO,RT

## 2021-03-23 PROCEDURE — 73630 X-RAY EXAM OF FOOT: CPT | Mod: 26,RT,, | Performed by: RADIOLOGY

## 2021-03-23 PROCEDURE — 73610 XR ANKLE COMPLETE 3 VIEW RIGHT: ICD-10-PCS | Mod: 26,RT,, | Performed by: RADIOLOGY

## 2021-03-23 PROCEDURE — 3078F PR MOST RECENT DIASTOLIC BLOOD PRESSURE < 80 MM HG: ICD-10-PCS | Mod: CPTII,S$GLB,, | Performed by: ORTHOPAEDIC SURGERY

## 2021-03-23 PROCEDURE — 99214 PR OFFICE/OUTPT VISIT, EST, LEVL IV, 30-39 MIN: ICD-10-PCS | Mod: 25,S$GLB,, | Performed by: ORTHOPAEDIC SURGERY

## 2021-03-23 PROCEDURE — 1125F AMNT PAIN NOTED PAIN PRSNT: CPT | Mod: S$GLB,,, | Performed by: ORTHOPAEDIC SURGERY

## 2021-03-23 PROCEDURE — 3074F PR MOST RECENT SYSTOLIC BLOOD PRESSURE < 130 MM HG: ICD-10-PCS | Mod: CPTII,S$GLB,, | Performed by: ORTHOPAEDIC SURGERY

## 2021-03-23 PROCEDURE — 1125F PR PAIN SEVERITY QUANTIFIED, PAIN PRESENT: ICD-10-PCS | Mod: S$GLB,,, | Performed by: ORTHOPAEDIC SURGERY

## 2021-03-23 PROCEDURE — 99999 PR PBB SHADOW E&M-EST. PATIENT-LVL V: ICD-10-PCS | Mod: PBBFAC,,, | Performed by: ORTHOPAEDIC SURGERY

## 2021-03-23 RX ADMIN — TRIAMCINOLONE ACETONIDE 40 MG: 40 INJECTION, SUSPENSION INTRA-ARTICULAR; INTRAMUSCULAR at 05:03

## 2021-03-24 DIAGNOSIS — M25.512 LEFT SHOULDER PAIN, UNSPECIFIED CHRONICITY: Primary | ICD-10-CM

## 2021-03-25 ENCOUNTER — PATIENT OUTREACH (OUTPATIENT)
Dept: ADMINISTRATIVE | Facility: OTHER | Age: 71
End: 2021-03-25

## 2021-04-01 PROBLEM — M79.671 RIGHT FOOT PAIN: Status: ACTIVE | Noted: 2021-04-01

## 2021-04-01 RX ORDER — TRIAMCINOLONE ACETONIDE 40 MG/ML
40 INJECTION, SUSPENSION INTRA-ARTICULAR; INTRAMUSCULAR
Status: COMPLETED | OUTPATIENT
Start: 2021-04-01 | End: 2021-03-23

## 2021-04-05 ENCOUNTER — PATIENT MESSAGE (OUTPATIENT)
Dept: ADMINISTRATIVE | Facility: HOSPITAL | Age: 71
End: 2021-04-05

## 2021-04-05 ENCOUNTER — TELEPHONE (OUTPATIENT)
Dept: GASTROENTEROLOGY | Facility: CLINIC | Age: 71
End: 2021-04-05

## 2021-04-09 ENCOUNTER — TELEPHONE (OUTPATIENT)
Dept: GASTROENTEROLOGY | Facility: CLINIC | Age: 71
End: 2021-04-09

## 2021-04-09 DIAGNOSIS — Z12.11 SCREENING FOR COLON CANCER: Primary | ICD-10-CM

## 2021-04-16 ENCOUNTER — LAB VISIT (OUTPATIENT)
Dept: PRIMARY CARE CLINIC | Facility: CLINIC | Age: 71
End: 2021-04-16
Payer: MEDICARE

## 2021-04-16 DIAGNOSIS — Z01.818 PRE-OP TESTING: ICD-10-CM

## 2021-04-16 PROCEDURE — U0003 INFECTIOUS AGENT DETECTION BY NUCLEIC ACID (DNA OR RNA); SEVERE ACUTE RESPIRATORY SYNDROME CORONAVIRUS 2 (SARS-COV-2) (CORONAVIRUS DISEASE [COVID-19]), AMPLIFIED PROBE TECHNIQUE, MAKING USE OF HIGH THROUGHPUT TECHNOLOGIES AS DESCRIBED BY CMS-2020-01-R: HCPCS | Performed by: INTERNAL MEDICINE

## 2021-04-16 PROCEDURE — U0005 INFEC AGEN DETEC AMPLI PROBE: HCPCS | Performed by: INTERNAL MEDICINE

## 2021-04-17 LAB — SARS-COV-2 RNA RESP QL NAA+PROBE: NOT DETECTED

## 2021-04-19 ENCOUNTER — TELEPHONE (OUTPATIENT)
Dept: GASTROENTEROLOGY | Facility: CLINIC | Age: 71
End: 2021-04-19

## 2021-05-05 ENCOUNTER — ANESTHESIA EVENT (OUTPATIENT)
Dept: ENDOSCOPY | Facility: HOSPITAL | Age: 71
End: 2021-05-05
Payer: MEDICARE

## 2021-05-05 ENCOUNTER — ANESTHESIA (OUTPATIENT)
Dept: ENDOSCOPY | Facility: HOSPITAL | Age: 71
End: 2021-05-05
Payer: MEDICARE

## 2021-05-05 ENCOUNTER — HOSPITAL ENCOUNTER (OUTPATIENT)
Facility: HOSPITAL | Age: 71
Discharge: HOME | End: 2021-05-05
Attending: INTERNAL MEDICINE | Admitting: INTERNAL MEDICINE
Payer: MEDICARE

## 2021-05-05 VITALS
OXYGEN SATURATION: 98 % | SYSTOLIC BLOOD PRESSURE: 112 MMHG | BODY MASS INDEX: 29.95 KG/M2 | RESPIRATION RATE: 20 BRPM | WEIGHT: 180 LBS | HEART RATE: 76 BPM | DIASTOLIC BLOOD PRESSURE: 79 MMHG | TEMPERATURE: 98 F

## 2021-05-05 DIAGNOSIS — Z86.010 HX OF COLONIC POLYPS: ICD-10-CM

## 2021-05-05 DIAGNOSIS — K63.5 POLYP OF COLON, UNSPECIFIED PART OF COLON, UNSPECIFIED TYPE: ICD-10-CM

## 2021-05-05 DIAGNOSIS — K64.8 INTERNAL HEMORRHOIDS: Primary | ICD-10-CM

## 2021-05-05 PROBLEM — Z86.0100 HX OF COLONIC POLYPS: Status: ACTIVE | Noted: 2021-05-05

## 2021-05-05 PROCEDURE — 45385 PR COLONOSCOPY,REMV LESN,SNARE: ICD-10-PCS | Mod: PT,,, | Performed by: INTERNAL MEDICINE

## 2021-05-05 PROCEDURE — D9220A PRA ANESTHESIA: Mod: PT,CRNA,, | Performed by: NURSE ANESTHETIST, CERTIFIED REGISTERED

## 2021-05-05 PROCEDURE — 63600175 PHARM REV CODE 636 W HCPCS: Performed by: NURSE ANESTHETIST, CERTIFIED REGISTERED

## 2021-05-05 PROCEDURE — 88305 TISSUE EXAM BY PATHOLOGIST: ICD-10-PCS | Mod: 26,,, | Performed by: STUDENT IN AN ORGANIZED HEALTH CARE EDUCATION/TRAINING PROGRAM

## 2021-05-05 PROCEDURE — D9220A PRA ANESTHESIA: ICD-10-PCS | Mod: PT,ANES,, | Performed by: ANESTHESIOLOGY

## 2021-05-05 PROCEDURE — 25000003 PHARM REV CODE 250: Performed by: INTERNAL MEDICINE

## 2021-05-05 PROCEDURE — 25000003 PHARM REV CODE 250: Performed by: NURSE ANESTHETIST, CERTIFIED REGISTERED

## 2021-05-05 PROCEDURE — 37000008 HC ANESTHESIA 1ST 15 MINUTES: Performed by: INTERNAL MEDICINE

## 2021-05-05 PROCEDURE — 37000009 HC ANESTHESIA EA ADD 15 MINS: Performed by: INTERNAL MEDICINE

## 2021-05-05 PROCEDURE — D9220A PRA ANESTHESIA: ICD-10-PCS | Mod: PT,CRNA,, | Performed by: NURSE ANESTHETIST, CERTIFIED REGISTERED

## 2021-05-05 PROCEDURE — 88305 TISSUE EXAM BY PATHOLOGIST: CPT | Performed by: STUDENT IN AN ORGANIZED HEALTH CARE EDUCATION/TRAINING PROGRAM

## 2021-05-05 PROCEDURE — 45385 COLONOSCOPY W/LESION REMOVAL: CPT | Mod: PT,,, | Performed by: INTERNAL MEDICINE

## 2021-05-05 PROCEDURE — 88305 TISSUE EXAM BY PATHOLOGIST: CPT | Mod: 26,,, | Performed by: STUDENT IN AN ORGANIZED HEALTH CARE EDUCATION/TRAINING PROGRAM

## 2021-05-05 PROCEDURE — 27201089 HC SNARE, DISP (ANY): Performed by: INTERNAL MEDICINE

## 2021-05-05 PROCEDURE — 45385 COLONOSCOPY W/LESION REMOVAL: CPT | Performed by: INTERNAL MEDICINE

## 2021-05-05 PROCEDURE — D9220A PRA ANESTHESIA: Mod: PT,ANES,, | Performed by: ANESTHESIOLOGY

## 2021-05-05 RX ORDER — SODIUM CHLORIDE 9 MG/ML
INJECTION, SOLUTION INTRAVENOUS CONTINUOUS
Status: DISCONTINUED | OUTPATIENT
Start: 2021-05-05 | End: 2021-05-05 | Stop reason: HOSPADM

## 2021-05-05 RX ORDER — ONDANSETRON 2 MG/ML
INJECTION INTRAMUSCULAR; INTRAVENOUS
Status: DISCONTINUED | OUTPATIENT
Start: 2021-05-05 | End: 2021-05-05

## 2021-05-05 RX ORDER — PROPOFOL 10 MG/ML
VIAL (ML) INTRAVENOUS
Status: DISCONTINUED | OUTPATIENT
Start: 2021-05-05 | End: 2021-05-05

## 2021-05-05 RX ORDER — LIDOCAINE HCL/PF 100 MG/5ML
SYRINGE (ML) INTRAVENOUS
Status: DISCONTINUED | OUTPATIENT
Start: 2021-05-05 | End: 2021-05-05

## 2021-05-05 RX ADMIN — ONDANSETRON 4 MG: 2 INJECTION, SOLUTION INTRAMUSCULAR; INTRAVENOUS at 08:05

## 2021-05-05 RX ADMIN — PROPOFOL 50 MG: 10 INJECTION, EMULSION INTRAVENOUS at 09:05

## 2021-05-05 RX ADMIN — GLYCOPYRROLATE 0.1 MG: 0.2 INJECTION, SOLUTION INTRAMUSCULAR; INTRAVITREAL at 08:05

## 2021-05-05 RX ADMIN — PROPOFOL 100 MG: 10 INJECTION, EMULSION INTRAVENOUS at 08:05

## 2021-05-05 RX ADMIN — SODIUM CHLORIDE: 0.9 INJECTION, SOLUTION INTRAVENOUS at 08:05

## 2021-05-05 RX ADMIN — LIDOCAINE HYDROCHLORIDE 50 MG: 20 INJECTION INTRAVENOUS at 08:05

## 2021-05-06 ENCOUNTER — PATIENT MESSAGE (OUTPATIENT)
Dept: PAIN MEDICINE | Facility: CLINIC | Age: 71
End: 2021-05-06

## 2021-05-10 LAB
FINAL PATHOLOGIC DIAGNOSIS: NORMAL
GROSS: NORMAL
Lab: NORMAL
MICROSCOPIC EXAM: NORMAL

## 2021-05-16 ENCOUNTER — PATIENT OUTREACH (OUTPATIENT)
Dept: ADMINISTRATIVE | Facility: OTHER | Age: 71
End: 2021-05-16

## 2021-06-14 ENCOUNTER — LAB VISIT (OUTPATIENT)
Dept: LAB | Facility: HOSPITAL | Age: 71
End: 2021-06-14
Attending: FAMILY MEDICINE
Payer: MEDICARE

## 2021-06-14 DIAGNOSIS — E78.5 HYPERLIPIDEMIA, UNSPECIFIED HYPERLIPIDEMIA TYPE: ICD-10-CM

## 2021-06-14 DIAGNOSIS — E11.59 HYPERTENSION ASSOCIATED WITH DIABETES: ICD-10-CM

## 2021-06-14 DIAGNOSIS — E11.9 CONTROLLED TYPE 2 DIABETES MELLITUS WITHOUT COMPLICATION, WITHOUT LONG-TERM CURRENT USE OF INSULIN: ICD-10-CM

## 2021-06-14 DIAGNOSIS — I15.2 HYPERTENSION ASSOCIATED WITH DIABETES: ICD-10-CM

## 2021-06-14 LAB
ALBUMIN SERPL BCP-MCNC: 3.9 G/DL (ref 3.5–5.2)
ALP SERPL-CCNC: 117 U/L (ref 55–135)
ALT SERPL W/O P-5'-P-CCNC: 18 U/L (ref 10–44)
ANION GAP SERPL CALC-SCNC: 8 MMOL/L (ref 8–16)
AST SERPL-CCNC: 16 U/L (ref 10–40)
BILIRUB SERPL-MCNC: 0.8 MG/DL (ref 0.1–1)
BUN SERPL-MCNC: 12 MG/DL (ref 8–23)
CALCIUM SERPL-MCNC: 9.7 MG/DL (ref 8.7–10.5)
CHLORIDE SERPL-SCNC: 103 MMOL/L (ref 95–110)
CHOLEST SERPL-MCNC: 122 MG/DL (ref 120–199)
CHOLEST/HDLC SERPL: 2.4 {RATIO} (ref 2–5)
CO2 SERPL-SCNC: 29 MMOL/L (ref 23–29)
CREAT SERPL-MCNC: 0.7 MG/DL (ref 0.5–1.4)
EST. GFR  (AFRICAN AMERICAN): >60 ML/MIN/1.73 M^2
EST. GFR  (NON AFRICAN AMERICAN): >60 ML/MIN/1.73 M^2
ESTIMATED AVG GLUCOSE: 134 MG/DL (ref 68–131)
GLUCOSE SERPL-MCNC: 103 MG/DL (ref 70–110)
HBA1C MFR BLD: 6.3 % (ref 4–5.6)
HDLC SERPL-MCNC: 51 MG/DL (ref 40–75)
HDLC SERPL: 41.8 % (ref 20–50)
LDLC SERPL CALC-MCNC: 52 MG/DL (ref 63–159)
NONHDLC SERPL-MCNC: 71 MG/DL
POTASSIUM SERPL-SCNC: 4.3 MMOL/L (ref 3.5–5.1)
PROT SERPL-MCNC: 7 G/DL (ref 6–8.4)
SODIUM SERPL-SCNC: 140 MMOL/L (ref 136–145)
TRIGL SERPL-MCNC: 95 MG/DL (ref 30–150)

## 2021-06-14 PROCEDURE — 80053 COMPREHEN METABOLIC PANEL: CPT | Performed by: FAMILY MEDICINE

## 2021-06-14 PROCEDURE — 80061 LIPID PANEL: CPT | Performed by: FAMILY MEDICINE

## 2021-06-14 PROCEDURE — 36415 COLL VENOUS BLD VENIPUNCTURE: CPT | Mod: PO | Performed by: FAMILY MEDICINE

## 2021-06-14 PROCEDURE — 83036 HEMOGLOBIN GLYCOSYLATED A1C: CPT | Performed by: FAMILY MEDICINE

## 2021-06-16 DIAGNOSIS — M54.9 MID BACK PAIN: ICD-10-CM

## 2021-06-16 RX ORDER — METFORMIN HYDROCHLORIDE 750 MG/1
750 TABLET, EXTENDED RELEASE ORAL 2 TIMES DAILY WITH MEALS
Qty: 180 TABLET | Refills: 1 | Status: SHIPPED | OUTPATIENT
Start: 2021-06-16 | End: 2021-10-25

## 2021-06-18 ENCOUNTER — TELEPHONE (OUTPATIENT)
Dept: FAMILY MEDICINE | Facility: CLINIC | Age: 71
End: 2021-06-18

## 2021-06-18 RX ORDER — MELOXICAM 15 MG/1
TABLET ORAL
Qty: 90 TABLET | Refills: 1 | Status: SHIPPED | OUTPATIENT
Start: 2021-06-18 | End: 2022-05-26

## 2021-06-18 RX ORDER — CYCLOBENZAPRINE HCL 10 MG
10 TABLET ORAL 3 TIMES DAILY PRN
Qty: 270 TABLET | Refills: 1 | Status: SHIPPED | OUTPATIENT
Start: 2021-06-18 | End: 2021-12-09

## 2021-07-09 ENCOUNTER — OFFICE VISIT (OUTPATIENT)
Dept: FAMILY MEDICINE | Facility: CLINIC | Age: 71
End: 2021-07-09
Attending: FAMILY MEDICINE
Payer: MEDICARE

## 2021-07-09 VITALS
DIASTOLIC BLOOD PRESSURE: 60 MMHG | TEMPERATURE: 98 F | OXYGEN SATURATION: 94 % | WEIGHT: 189.81 LBS | HEART RATE: 91 BPM | HEIGHT: 65 IN | BODY MASS INDEX: 31.63 KG/M2 | SYSTOLIC BLOOD PRESSURE: 130 MMHG

## 2021-07-09 DIAGNOSIS — M54.16 LUMBAR RADICULITIS: ICD-10-CM

## 2021-07-09 DIAGNOSIS — E78.5 HYPERLIPIDEMIA ASSOCIATED WITH TYPE 2 DIABETES MELLITUS: ICD-10-CM

## 2021-07-09 DIAGNOSIS — E11.9 CONTROLLED TYPE 2 DIABETES MELLITUS WITHOUT COMPLICATION, WITHOUT LONG-TERM CURRENT USE OF INSULIN: ICD-10-CM

## 2021-07-09 DIAGNOSIS — E11.69 HYPERLIPIDEMIA ASSOCIATED WITH TYPE 2 DIABETES MELLITUS: ICD-10-CM

## 2021-07-09 DIAGNOSIS — I15.2 HYPERTENSION ASSOCIATED WITH DIABETES: Primary | ICD-10-CM

## 2021-07-09 DIAGNOSIS — E11.59 HYPERTENSION ASSOCIATED WITH DIABETES: Primary | ICD-10-CM

## 2021-07-09 DIAGNOSIS — M54.12 CERVICAL RADICULITIS: ICD-10-CM

## 2021-07-09 PROCEDURE — 99214 PR OFFICE/OUTPT VISIT, EST, LEVL IV, 30-39 MIN: ICD-10-PCS | Mod: S$GLB,,, | Performed by: FAMILY MEDICINE

## 2021-07-09 PROCEDURE — 99499 RISK ADDL DX/OHS AUDIT: ICD-10-PCS | Mod: HCNC,S$GLB,, | Performed by: FAMILY MEDICINE

## 2021-07-09 PROCEDURE — 99999 PR PBB SHADOW E&M-EST. PATIENT-LVL III: ICD-10-PCS | Mod: PBBFAC,,, | Performed by: FAMILY MEDICINE

## 2021-07-09 PROCEDURE — 3288F PR FALLS RISK ASSESSMENT DOCUMENTED: ICD-10-PCS | Mod: CPTII,S$GLB,, | Performed by: FAMILY MEDICINE

## 2021-07-09 PROCEDURE — 3078F PR MOST RECENT DIASTOLIC BLOOD PRESSURE < 80 MM HG: ICD-10-PCS | Mod: CPTII,S$GLB,, | Performed by: FAMILY MEDICINE

## 2021-07-09 PROCEDURE — 1126F PR PAIN SEVERITY QUANTIFIED, NO PAIN PRESENT: ICD-10-PCS | Mod: S$GLB,,, | Performed by: FAMILY MEDICINE

## 2021-07-09 PROCEDURE — 3008F PR BODY MASS INDEX (BMI) DOCUMENTED: ICD-10-PCS | Mod: CPTII,S$GLB,, | Performed by: FAMILY MEDICINE

## 2021-07-09 PROCEDURE — 99214 OFFICE O/P EST MOD 30 MIN: CPT | Mod: S$GLB,,, | Performed by: FAMILY MEDICINE

## 2021-07-09 PROCEDURE — 99499 UNLISTED E&M SERVICE: CPT | Mod: HCNC,S$GLB,, | Performed by: FAMILY MEDICINE

## 2021-07-09 PROCEDURE — 3075F SYST BP GE 130 - 139MM HG: CPT | Mod: CPTII,S$GLB,, | Performed by: FAMILY MEDICINE

## 2021-07-09 PROCEDURE — 3008F BODY MASS INDEX DOCD: CPT | Mod: CPTII,S$GLB,, | Performed by: FAMILY MEDICINE

## 2021-07-09 PROCEDURE — 3075F PR MOST RECENT SYSTOLIC BLOOD PRESS GE 130-139MM HG: ICD-10-PCS | Mod: CPTII,S$GLB,, | Performed by: FAMILY MEDICINE

## 2021-07-09 PROCEDURE — 3044F HG A1C LEVEL LT 7.0%: CPT | Mod: CPTII,S$GLB,, | Performed by: FAMILY MEDICINE

## 2021-07-09 PROCEDURE — 1159F MED LIST DOCD IN RCRD: CPT | Mod: S$GLB,,, | Performed by: FAMILY MEDICINE

## 2021-07-09 PROCEDURE — 3078F DIAST BP <80 MM HG: CPT | Mod: CPTII,S$GLB,, | Performed by: FAMILY MEDICINE

## 2021-07-09 PROCEDURE — 1101F PT FALLS ASSESS-DOCD LE1/YR: CPT | Mod: CPTII,S$GLB,, | Performed by: FAMILY MEDICINE

## 2021-07-09 PROCEDURE — 99999 PR PBB SHADOW E&M-EST. PATIENT-LVL III: CPT | Mod: PBBFAC,,, | Performed by: FAMILY MEDICINE

## 2021-07-09 PROCEDURE — 1101F PR PT FALLS ASSESS DOC 0-1 FALLS W/OUT INJ PAST YR: ICD-10-PCS | Mod: CPTII,S$GLB,, | Performed by: FAMILY MEDICINE

## 2021-07-09 PROCEDURE — 3288F FALL RISK ASSESSMENT DOCD: CPT | Mod: CPTII,S$GLB,, | Performed by: FAMILY MEDICINE

## 2021-07-09 PROCEDURE — 3044F PR MOST RECENT HEMOGLOBIN A1C LEVEL <7.0%: ICD-10-PCS | Mod: CPTII,S$GLB,, | Performed by: FAMILY MEDICINE

## 2021-07-09 PROCEDURE — 1126F AMNT PAIN NOTED NONE PRSNT: CPT | Mod: S$GLB,,, | Performed by: FAMILY MEDICINE

## 2021-07-09 PROCEDURE — 1159F PR MEDICATION LIST DOCUMENTED IN MEDICAL RECORD: ICD-10-PCS | Mod: S$GLB,,, | Performed by: FAMILY MEDICINE

## 2021-07-09 RX ORDER — DULAGLUTIDE 1.5 MG/.5ML
1.5 INJECTION, SOLUTION SUBCUTANEOUS
Qty: 12 PEN | Refills: 3 | Status: SHIPPED | OUTPATIENT
Start: 2021-07-09 | End: 2022-05-30

## 2021-07-12 ENCOUNTER — TELEPHONE (OUTPATIENT)
Dept: PAIN MEDICINE | Facility: CLINIC | Age: 71
End: 2021-07-12

## 2021-07-19 DIAGNOSIS — F41.9 ANXIETY: ICD-10-CM

## 2021-07-21 RX ORDER — ESCITALOPRAM OXALATE 10 MG/1
10 TABLET ORAL DAILY
Qty: 90 TABLET | Refills: 3 | Status: SHIPPED | OUTPATIENT
Start: 2021-07-21 | End: 2022-06-14

## 2021-10-11 ENCOUNTER — PATIENT OUTREACH (OUTPATIENT)
Dept: ADMINISTRATIVE | Facility: OTHER | Age: 71
End: 2021-10-11

## 2021-10-12 ENCOUNTER — OFFICE VISIT (OUTPATIENT)
Dept: OPHTHALMOLOGY | Facility: CLINIC | Age: 71
End: 2021-10-12
Payer: MEDICARE

## 2021-10-12 DIAGNOSIS — H04.123 DRY EYE SYNDROME, BILATERAL: ICD-10-CM

## 2021-10-12 DIAGNOSIS — E11.9 DIABETES MELLITUS TYPE 2 WITHOUT RETINOPATHY: Primary | ICD-10-CM

## 2021-10-12 DIAGNOSIS — H25.813 COMBINED FORMS OF AGE-RELATED CATARACT, BILATERAL: ICD-10-CM

## 2021-10-12 PROCEDURE — 99499 UNLISTED E&M SERVICE: CPT | Mod: HCNC,S$GLB,, | Performed by: OPHTHALMOLOGY

## 2021-10-12 PROCEDURE — 99999 PR PBB SHADOW E&M-EST. PATIENT-LVL IV: ICD-10-PCS | Mod: PBBFAC,HCNC,, | Performed by: OPHTHALMOLOGY

## 2021-10-12 PROCEDURE — 1160F RVW MEDS BY RX/DR IN RCRD: CPT | Mod: HCNC,CPTII,S$GLB, | Performed by: OPHTHALMOLOGY

## 2021-10-12 PROCEDURE — 3066F PR DOCUMENTATION OF TREATMENT FOR NEPHROPATHY: ICD-10-PCS | Mod: HCNC,CPTII,S$GLB, | Performed by: OPHTHALMOLOGY

## 2021-10-12 PROCEDURE — 2023F DILAT RTA XM W/O RTNOPTHY: CPT | Mod: HCNC,CPTII,S$GLB, | Performed by: OPHTHALMOLOGY

## 2021-10-12 PROCEDURE — 3288F PR FALLS RISK ASSESSMENT DOCUMENTED: ICD-10-PCS | Mod: HCNC,CPTII,S$GLB, | Performed by: OPHTHALMOLOGY

## 2021-10-12 PROCEDURE — 2023F PR DILATED RETINAL EXAM W/O EVID OF RETINOPATHY: ICD-10-PCS | Mod: HCNC,CPTII,S$GLB, | Performed by: OPHTHALMOLOGY

## 2021-10-12 PROCEDURE — 1159F PR MEDICATION LIST DOCUMENTED IN MEDICAL RECORD: ICD-10-PCS | Mod: HCNC,CPTII,S$GLB, | Performed by: OPHTHALMOLOGY

## 2021-10-12 PROCEDURE — 1101F PT FALLS ASSESS-DOCD LE1/YR: CPT | Mod: HCNC,CPTII,S$GLB, | Performed by: OPHTHALMOLOGY

## 2021-10-12 PROCEDURE — 4010F PR ACE/ARB THEARPY RXD/TAKEN: ICD-10-PCS | Mod: HCNC,CPTII,S$GLB, | Performed by: OPHTHALMOLOGY

## 2021-10-12 PROCEDURE — 3061F NEG MICROALBUMINURIA REV: CPT | Mod: HCNC,CPTII,S$GLB, | Performed by: OPHTHALMOLOGY

## 2021-10-12 PROCEDURE — 1159F MED LIST DOCD IN RCRD: CPT | Mod: HCNC,CPTII,S$GLB, | Performed by: OPHTHALMOLOGY

## 2021-10-12 PROCEDURE — 3288F FALL RISK ASSESSMENT DOCD: CPT | Mod: HCNC,CPTII,S$GLB, | Performed by: OPHTHALMOLOGY

## 2021-10-12 PROCEDURE — 1160F PR REVIEW ALL MEDS BY PRESCRIBER/CLIN PHARMACIST DOCUMENTED: ICD-10-PCS | Mod: HCNC,CPTII,S$GLB, | Performed by: OPHTHALMOLOGY

## 2021-10-12 PROCEDURE — 3061F PR NEG MICROALBUMINURIA RESULT DOCUMENTED/REVIEW: ICD-10-PCS | Mod: HCNC,CPTII,S$GLB, | Performed by: OPHTHALMOLOGY

## 2021-10-12 PROCEDURE — 1101F PR PT FALLS ASSESS DOC 0-1 FALLS W/OUT INJ PAST YR: ICD-10-PCS | Mod: HCNC,CPTII,S$GLB, | Performed by: OPHTHALMOLOGY

## 2021-10-12 PROCEDURE — 92004 COMPRE OPH EXAM NEW PT 1/>: CPT | Mod: HCNC,S$GLB,, | Performed by: OPHTHALMOLOGY

## 2021-10-12 PROCEDURE — 1126F PR PAIN SEVERITY QUANTIFIED, NO PAIN PRESENT: ICD-10-PCS | Mod: HCNC,CPTII,S$GLB, | Performed by: OPHTHALMOLOGY

## 2021-10-12 PROCEDURE — 92004 PR EYE EXAM, NEW PATIENT,COMPREHESV: ICD-10-PCS | Mod: HCNC,S$GLB,, | Performed by: OPHTHALMOLOGY

## 2021-10-12 PROCEDURE — 3044F PR MOST RECENT HEMOGLOBIN A1C LEVEL <7.0%: ICD-10-PCS | Mod: HCNC,CPTII,S$GLB, | Performed by: OPHTHALMOLOGY

## 2021-10-12 PROCEDURE — 4010F ACE/ARB THERAPY RXD/TAKEN: CPT | Mod: HCNC,CPTII,S$GLB, | Performed by: OPHTHALMOLOGY

## 2021-10-12 PROCEDURE — 99499 RISK ADDL DX/OHS AUDIT: ICD-10-PCS | Mod: HCNC,S$GLB,, | Performed by: OPHTHALMOLOGY

## 2021-10-12 PROCEDURE — 3044F HG A1C LEVEL LT 7.0%: CPT | Mod: HCNC,CPTII,S$GLB, | Performed by: OPHTHALMOLOGY

## 2021-10-12 PROCEDURE — 3066F NEPHROPATHY DOC TX: CPT | Mod: HCNC,CPTII,S$GLB, | Performed by: OPHTHALMOLOGY

## 2021-10-12 PROCEDURE — 99999 PR PBB SHADOW E&M-EST. PATIENT-LVL IV: CPT | Mod: PBBFAC,HCNC,, | Performed by: OPHTHALMOLOGY

## 2021-10-12 PROCEDURE — 1126F AMNT PAIN NOTED NONE PRSNT: CPT | Mod: HCNC,CPTII,S$GLB, | Performed by: OPHTHALMOLOGY

## 2021-12-01 DIAGNOSIS — Z12.31 OTHER SCREENING MAMMOGRAM: ICD-10-CM

## 2021-12-08 DIAGNOSIS — M54.12 CERVICAL RADICULITIS: ICD-10-CM

## 2021-12-08 DIAGNOSIS — M54.9 MID BACK PAIN: ICD-10-CM

## 2021-12-09 RX ORDER — CYCLOBENZAPRINE HCL 10 MG
10 TABLET ORAL 3 TIMES DAILY PRN
Qty: 270 TABLET | Refills: 1 | Status: SHIPPED | OUTPATIENT
Start: 2021-12-09 | End: 2022-05-02

## 2021-12-09 RX ORDER — MELOXICAM 15 MG/1
TABLET ORAL
Qty: 90 TABLET | Refills: 1 | OUTPATIENT
Start: 2021-12-09

## 2021-12-09 RX ORDER — GABAPENTIN 300 MG/1
300 CAPSULE ORAL 3 TIMES DAILY
Qty: 270 CAPSULE | Refills: 1 | Status: SHIPPED | OUTPATIENT
Start: 2021-12-09 | End: 2021-12-15

## 2021-12-15 DIAGNOSIS — M54.12 CERVICAL RADICULITIS: ICD-10-CM

## 2021-12-16 RX ORDER — GABAPENTIN 300 MG/1
900 CAPSULE ORAL NIGHTLY
Qty: 270 CAPSULE | Refills: 0 | Status: SHIPPED | OUTPATIENT
Start: 2021-12-16 | End: 2022-04-12 | Stop reason: SDUPTHER

## 2021-12-29 DIAGNOSIS — E11.9 TYPE 2 DIABETES MELLITUS WITHOUT COMPLICATION: ICD-10-CM

## 2022-01-10 ENCOUNTER — OFFICE VISIT (OUTPATIENT)
Dept: FAMILY MEDICINE | Facility: CLINIC | Age: 72
End: 2022-01-10
Attending: FAMILY MEDICINE
Payer: MEDICARE

## 2022-01-10 VITALS
OXYGEN SATURATION: 96 % | TEMPERATURE: 98 F | BODY MASS INDEX: 30.83 KG/M2 | WEIGHT: 185.06 LBS | HEART RATE: 90 BPM | SYSTOLIC BLOOD PRESSURE: 113 MMHG | DIASTOLIC BLOOD PRESSURE: 78 MMHG | RESPIRATION RATE: 18 BRPM | HEIGHT: 65 IN

## 2022-01-10 DIAGNOSIS — Z12.31 SCREENING MAMMOGRAM FOR BREAST CANCER: ICD-10-CM

## 2022-01-10 DIAGNOSIS — F40.243 ANXIETY WITH FLYING: ICD-10-CM

## 2022-01-10 DIAGNOSIS — I15.2 HYPERTENSION ASSOCIATED WITH DIABETES: ICD-10-CM

## 2022-01-10 DIAGNOSIS — E11.00 TYPE 2 DIABETES MELLITUS WITH HYPEROSMOLARITY WITHOUT COMA, WITHOUT LONG-TERM CURRENT USE OF INSULIN: Primary | ICD-10-CM

## 2022-01-10 DIAGNOSIS — Z78.0 MENOPAUSE: ICD-10-CM

## 2022-01-10 DIAGNOSIS — E11.69 HYPERLIPIDEMIA ASSOCIATED WITH TYPE 2 DIABETES MELLITUS: ICD-10-CM

## 2022-01-10 DIAGNOSIS — E11.59 HYPERTENSION ASSOCIATED WITH DIABETES: ICD-10-CM

## 2022-01-10 DIAGNOSIS — E78.5 HYPERLIPIDEMIA ASSOCIATED WITH TYPE 2 DIABETES MELLITUS: ICD-10-CM

## 2022-01-10 PROCEDURE — 90694 FLU VACCINE - QUADRIVALENT - ADJUVANTED: ICD-10-PCS | Mod: HCNC,S$GLB,, | Performed by: FAMILY MEDICINE

## 2022-01-10 PROCEDURE — G0008 FLU VACCINE - QUADRIVALENT - ADJUVANTED: ICD-10-PCS | Mod: HCNC,S$GLB,, | Performed by: FAMILY MEDICINE

## 2022-01-10 PROCEDURE — 3288F PR FALLS RISK ASSESSMENT DOCUMENTED: ICD-10-PCS | Mod: HCNC,CPTII,S$GLB, | Performed by: FAMILY MEDICINE

## 2022-01-10 PROCEDURE — 99499 RISK ADDL DX/OHS AUDIT: ICD-10-PCS | Mod: S$GLB,,, | Performed by: FAMILY MEDICINE

## 2022-01-10 PROCEDURE — 3008F BODY MASS INDEX DOCD: CPT | Mod: HCNC,CPTII,S$GLB, | Performed by: FAMILY MEDICINE

## 2022-01-10 PROCEDURE — 1125F AMNT PAIN NOTED PAIN PRSNT: CPT | Mod: HCNC,CPTII,S$GLB, | Performed by: FAMILY MEDICINE

## 2022-01-10 PROCEDURE — 1159F PR MEDICATION LIST DOCUMENTED IN MEDICAL RECORD: ICD-10-PCS | Mod: HCNC,CPTII,S$GLB, | Performed by: FAMILY MEDICINE

## 2022-01-10 PROCEDURE — 90694 VACC AIIV4 NO PRSRV 0.5ML IM: CPT | Mod: HCNC,S$GLB,, | Performed by: FAMILY MEDICINE

## 2022-01-10 PROCEDURE — 3078F DIAST BP <80 MM HG: CPT | Mod: HCNC,CPTII,S$GLB, | Performed by: FAMILY MEDICINE

## 2022-01-10 PROCEDURE — 99999 PR PBB SHADOW E&M-EST. PATIENT-LVL V: ICD-10-PCS | Mod: PBBFAC,HCNC,, | Performed by: FAMILY MEDICINE

## 2022-01-10 PROCEDURE — 3072F LOW RISK FOR RETINOPATHY: CPT | Mod: HCNC,CPTII,S$GLB, | Performed by: FAMILY MEDICINE

## 2022-01-10 PROCEDURE — 1101F PT FALLS ASSESS-DOCD LE1/YR: CPT | Mod: HCNC,CPTII,S$GLB, | Performed by: FAMILY MEDICINE

## 2022-01-10 PROCEDURE — 3074F PR MOST RECENT SYSTOLIC BLOOD PRESSURE < 130 MM HG: ICD-10-PCS | Mod: HCNC,CPTII,S$GLB, | Performed by: FAMILY MEDICINE

## 2022-01-10 PROCEDURE — 1125F PR PAIN SEVERITY QUANTIFIED, PAIN PRESENT: ICD-10-PCS | Mod: HCNC,CPTII,S$GLB, | Performed by: FAMILY MEDICINE

## 2022-01-10 PROCEDURE — 3072F PR LOW RISK FOR RETINOPATHY: ICD-10-PCS | Mod: HCNC,CPTII,S$GLB, | Performed by: FAMILY MEDICINE

## 2022-01-10 PROCEDURE — 1160F RVW MEDS BY RX/DR IN RCRD: CPT | Mod: HCNC,CPTII,S$GLB, | Performed by: FAMILY MEDICINE

## 2022-01-10 PROCEDURE — 3074F SYST BP LT 130 MM HG: CPT | Mod: HCNC,CPTII,S$GLB, | Performed by: FAMILY MEDICINE

## 2022-01-10 PROCEDURE — 99214 OFFICE O/P EST MOD 30 MIN: CPT | Mod: HCNC,S$GLB,, | Performed by: FAMILY MEDICINE

## 2022-01-10 PROCEDURE — 99499 UNLISTED E&M SERVICE: CPT | Mod: S$GLB,,, | Performed by: FAMILY MEDICINE

## 2022-01-10 PROCEDURE — 1159F MED LIST DOCD IN RCRD: CPT | Mod: HCNC,CPTII,S$GLB, | Performed by: FAMILY MEDICINE

## 2022-01-10 PROCEDURE — 99999 PR PBB SHADOW E&M-EST. PATIENT-LVL V: CPT | Mod: PBBFAC,HCNC,, | Performed by: FAMILY MEDICINE

## 2022-01-10 PROCEDURE — 3288F FALL RISK ASSESSMENT DOCD: CPT | Mod: HCNC,CPTII,S$GLB, | Performed by: FAMILY MEDICINE

## 2022-01-10 PROCEDURE — 99214 PR OFFICE/OUTPT VISIT, EST, LEVL IV, 30-39 MIN: ICD-10-PCS | Mod: HCNC,S$GLB,, | Performed by: FAMILY MEDICINE

## 2022-01-10 PROCEDURE — 3078F PR MOST RECENT DIASTOLIC BLOOD PRESSURE < 80 MM HG: ICD-10-PCS | Mod: HCNC,CPTII,S$GLB, | Performed by: FAMILY MEDICINE

## 2022-01-10 PROCEDURE — 1160F PR REVIEW ALL MEDS BY PRESCRIBER/CLIN PHARMACIST DOCUMENTED: ICD-10-PCS | Mod: HCNC,CPTII,S$GLB, | Performed by: FAMILY MEDICINE

## 2022-01-10 PROCEDURE — 3008F PR BODY MASS INDEX (BMI) DOCUMENTED: ICD-10-PCS | Mod: HCNC,CPTII,S$GLB, | Performed by: FAMILY MEDICINE

## 2022-01-10 PROCEDURE — G0008 ADMIN INFLUENZA VIRUS VAC: HCPCS | Mod: HCNC,S$GLB,, | Performed by: FAMILY MEDICINE

## 2022-01-10 PROCEDURE — 1101F PR PT FALLS ASSESS DOC 0-1 FALLS W/OUT INJ PAST YR: ICD-10-PCS | Mod: HCNC,CPTII,S$GLB, | Performed by: FAMILY MEDICINE

## 2022-01-10 RX ORDER — ALPRAZOLAM 0.25 MG/1
TABLET ORAL
Qty: 10 TABLET | Refills: 1 | Status: SHIPPED | OUTPATIENT
Start: 2022-01-10 | End: 2022-11-17 | Stop reason: SDUPTHER

## 2022-01-10 NOTE — PROGRESS NOTES
Subjective:       Patient ID: Doris Pastrana is a 71 y.o. female.    Chief Complaint: Diabetes (Pt states that she is here for her 6 mo fu ), Diarrhea (Pt states that lately she has been having loose bowels ), and Muscle Pain (Pt states that lately she has been having muscle cramps in her legs and her right foot)    71-year-old female coming in for six month follow-up on diabetes.  She is taking metformin 750 mg b.i.d. and Trulicity 1.5 mg weekly.  She had lost about 12 lb but regained a good bit of that over the holidays.  She is back on her diet and exercise program now.  She is due for a flu vaccine and willing to get that today, she had her Moderna booster but it is not reflected in epic.  She is due for a foot exam, mammogram, DEXA and an A1c and BMP.  She needs a refill of her Xanax that she takes for flying only.  She had to fly home from visiting family over the holidays and she did not have any and had a very very uncomfortable trip.  History includes radicular symptoms in the cervical and lumbar spine and she has noticed a tingling around the upper mid abdomen possibly aggravated by movement of the back.  She is asymptomatic now.  She has a history of diabetes type 2, hypertension, hyperlipidemia, kidney stones status post shockwave lithotripsy, colon polyps with her last colonoscopy less than a year ago.  She has arthritis fatty liver disease and some symptoms of depression.    She has plans for a tummy tuck that she expects to have in February but no date has yet been set.  She thinks she may need a preop clearance.    Past Medical History:  No date: Acute sinus infection  No date: Allergy  No date: Anemia  No date: Arthritis  No date: Bronchitis  No date: Degenerative disc disease      Comment:  lumbar, pain contract 1/28/2013  No date: Depression  2/25/16: Diabetes mellitus, type 2  No date: Fatty liver  2/25/16: Hyperlipidemia  No date: Hypertension  No date: Kidney stone  No date: Mitral valve  prolapse  No date: Pleurisy  No date: Pneumonia  No date: PONV (postoperative nausea and vomiting)  No date: Sinus infection  2019: TIA (transient ischemic attack) h    Past Surgical History:  2017: ANKLE SURGERY      Comment:  right ankle torn ligament  No date: APPENDECTOMY  No date:  SECTION  No date: CHOLECYSTECTOMY  8/13/15: COLONOSCOPY      Comment:  Dr. Oliveira, five year recheck  2021: COLONOSCOPY; N/A      Comment:  Procedure: COLONOSCOPY;  Surgeon: Tevin Oliveira MD;                Location: [facility];  Service: Endoscopy;  Laterality:                N/A;  1/3/2020: EPIDURAL STEROID INJECTION INTO CERVICAL SPINE; N/A      Comment:  Procedure: Injection-steroid-epidural-cervical;                 Surgeon: Eddi Albrecht MD;  [facility];  Service:                Pain Management;  Laterality: N/A;  C7-T1  3/10/2020: EPIDURAL STEROID INJECTION INTO CERVICAL SPINE; N/A      Comment:  Procedure: Injection-steroid-epidural-cervical;                 Surgeon: Eddi Albrecht MD;  Location: [facility] OR;  Service:                Pain Management;  Laterality: N/A;  C7-T1  2020: EPIDURAL STEROID INJECTION INTO CERVICAL SPINE; N/A      Comment:  Procedure: Injection-steroid-epidural-cervical;                 Surgeon: Eddi Albrecht MD;  [facility];  Service:                Pain Management;  Laterality: N/A;  C7-T1   2019: EXTRACORPOREAL SHOCK WAVE LITHOTRIPSY; Left      Comment:  Procedure: LITHOTRIPSY, ESWL - only if stone visible on                xray  with cysto after on table possible;  Surgeon:                Marisol Stewart MD;  Location: [facility];                 Service: Urology;  Laterality: Left;  No date: HYSTERECTOMY  No date: OOPHORECTOMY  No date: TONSILLECTOMY  No date: VAGINAL DELIVERY    Current Outpatient Medications on File Prior to Visit:  ACCU-CHEK JORGE PLUS TEST STRP Strp, TEST BLOOD SUGAR TWICE DAILY, Disp: 200 strip, Rfl: 3  albuterol  (PROVENTIL/VENTOLIN HFA) 90 mcg/actuation inhaler, Inhale 2 puffs into the lungs 4 (four) times daily., Disp: 1 Inhaler, Rfl: 1  amLODIPine (NORVASC) 10 MG tablet, TAKE 1 TABLET EVERY DAY, Disp: 90 tablet, Rfl: 2  BD ALCOHOL SWABS PadM, USE TWICE DAILY AS NEEDED AS DIRECTED, Disp: 200 each, Rfl: 3  cetirizine (ZYRTEC) 10 MG tablet, Take 10 mg by mouth daily as needed. , Disp: , Rfl:   cyclobenzaprine (FLEXERIL) 10 MG tablet, TAKE 1 TABLET (10 MG TOTAL) BY MOUTH 3 (THREE) TIMES DAILY AS NEEDED., Disp: 270 tablet, Rfl: 1  diclofenac sodium (VOLTAREN) 1 % Gel, APPLY 2.5 GRAMS TO THE AFFECTED AREA 3-4 TIMES DAILY STARTING 6 WEEKS POST SURGERY., Disp: , Rfl:   dulaglutide (TRULICITY) 1.5 mg/0.5 mL pen injector, Inject 1.5 mg into the skin every 7 days., Disp: 12 pen, Rfl: 3  ergocalciferol (ERGOCALCIFEROL) 50,000 unit Cap, Take one twice a week, Disp: 24 capsule, Rfl: 1  EScitalopram oxalate (LEXAPRO) 10 MG tablet, Take 1 tablet (10 mg total) by mouth once daily., Disp: 90 tablet, Rfl: 3  fluticasone propionate (FLONASE) 50 mcg/actuation nasal spray, 2 sprays (100 mcg total) by Each Nostril route once daily., Disp: 48 g, Rfl: 5  gabapentin (NEURONTIN) 300 MG capsule, Take 3 capsules (900 mg total) by mouth every evening., Disp: 270 capsule, Rfl: 0  lancets (TRUEPLUS LANCETS) 28 gauge Misc, TEST TWO TIMES DAILY, Disp: 200 each, Rfl: 11  losartan (COZAAR) 100 MG tablet, TAKE 1 TABLET (100 MG TOTAL) BY MOUTH ONCE DAILY., Disp: 90 tablet, Rfl: 2  meclizine (ANTIVERT) 25 mg tablet, Take 1 tablet (25 mg total) by mouth 3 (three) times daily as needed., Disp: 30 tablet, Rfl: 5  metFORMIN (GLUCOPHAGE-XR) 750 MG ER 24hr tablet, TAKE 1 TABLET TWICE DAILY WITH MEALS, Disp: 180 tablet, Rfl: 0  rosuvastatin (CRESTOR) 40 MG Tab, TAKE 1 TABLET (40 MG TOTAL) BY MOUTH EVERY EVENING., Disp: 90 tablet, Rfl: 2  tamsulosin (FLOMAX) 0.4 mg Cap, TAKE 1 CAPSULE (0.4 MG TOTAL) BY MOUTH ONCE DAILY., Disp: 90 capsule, Rfl: 3  (DISCONTINUED)  ALPRAZolam (XANAX) 0.25 MG tablet, Use one 20-30 minutes before flying prn, Disp: 10 tablet, Rfl: 1  blood-glucose meter kit, Use as instructed, Disp: 1 each, Rfl: 0  meloxicam (MOBIC) 15 MG tablet, TAKE 1 TABLET EVERY DAY (Patient not taking: Reported on 1/10/2022), Disp: 90 tablet, Rfl: 1  ondansetron (ZOFRAN) 4 MG tablet, Take 1 tablet (4 mg total) by mouth every 6 (six) hours. (Patient not taking: No sig reported), Disp: 12 tablet, Rfl: 0  SANARE ADV SCAR THERAPY BASE Gel, APPLY 1/2 GRAM (1 PUMP) TO AFFECTED AREAS TWICE DAILY STARTING 6 WEEKS POST SURGERY., Disp: , Rfl:     Current Facility-Administered Medications on File Prior to Visit:  dexamethasone injection 8 mg, 8 mg, Intramuscular, Once, Maddy Sims NP          Review of Systems   Constitutional: Negative for chills, diaphoresis and fever.   HENT: Negative for congestion, sinus pressure and sinus pain.    Eyes: Negative for redness and visual disturbance.   Respiratory: Negative for chest tightness and shortness of breath.    Cardiovascular: Positive for leg swelling (Slight swelling in the right ankle post surgery). Negative for chest pain and palpitations.   Gastrointestinal: Positive for diarrhea (Intermittent diarrhea may be related to milk products with the metformin). Negative for abdominal pain, blood in stool, constipation and nausea.   Genitourinary: Negative for dysuria, frequency and hematuria.   Musculoskeletal: Positive for back pain and joint swelling.   Neurological: Positive for numbness (Tingling/paresthesias upper mid abdomen intermittently). Negative for weakness.       Objective:      Physical Exam  Vitals and nursing note reviewed.   Constitutional:       General: She is not in acute distress.     Appearance: Normal appearance. She is obese. She is not ill-appearing, toxic-appearing or diaphoretic.      Comments: Good blood pressure control  Obese with a BMI of 30.8 she still down 4.7 lb from her last visit July 9, 2021 in  spite of recent weight gain over the holidays   HENT:      Head: Normocephalic and atraumatic.      Right Ear: Tympanic membrane, ear canal and external ear normal. There is no impacted cerumen.      Left Ear: Tympanic membrane, ear canal and external ear normal. There is no impacted cerumen.      Nose: Nose normal. No congestion or rhinorrhea.      Mouth/Throat:      Mouth: Mucous membranes are moist.      Pharynx: Oropharynx is clear. No oropharyngeal exudate or posterior oropharyngeal erythema.   Eyes:      General: No scleral icterus.     Extraocular Movements: Extraocular movements intact.      Pupils: Pupils are equal, round, and reactive to light.   Neck:      Vascular: No carotid bruit.   Cardiovascular:      Rate and Rhythm: Normal rate and regular rhythm.      Pulses: Normal pulses.           Dorsalis pedis pulses are 2+ on the right side and 2+ on the left side.        Posterior tibial pulses are 2+ on the right side.      Heart sounds: Normal heart sounds. No murmur heard.  No friction rub. No gallop.    Pulmonary:      Effort: Pulmonary effort is normal. No respiratory distress.      Breath sounds: Normal breath sounds. No stridor. No wheezing, rhonchi or rales.   Chest:      Chest wall: No tenderness.   Abdominal:      General: Abdomen is flat. Bowel sounds are normal. There is no distension.      Palpations: Abdomen is soft. There is no mass.      Tenderness: There is no abdominal tenderness. There is no guarding or rebound.      Hernia: No hernia is present.   Musculoskeletal:         General: No swelling, tenderness, deformity or signs of injury. Normal range of motion.      Cervical back: Normal range of motion and neck supple. No rigidity or tenderness.      Right lower leg: Edema (Trace) present.      Left lower leg: No edema.      Right foot: Normal range of motion. No deformity, bunion, Charcot foot, foot drop or prominent metatarsal heads.      Left foot: Normal range of motion. No deformity,  bunion, Charcot foot, foot drop or prominent metatarsal heads.   Feet:      Right foot:      Protective Sensation: 9 sites tested. 9 sites sensed.      Skin integrity: Skin integrity normal. No ulcer, blister, skin breakdown, erythema, warmth, callus, dry skin or fissure.      Toenail Condition: Right toenails are normal.      Left foot:      Protective Sensation: 9 sites tested. 9 sites sensed.      Skin integrity: Skin integrity normal. No ulcer, blister, skin breakdown, erythema, warmth, callus, dry skin or fissure.      Toenail Condition: Left toenails are normal.   Lymphadenopathy:      Cervical: No cervical adenopathy.   Skin:     General: Skin is warm and dry.      Capillary Refill: Capillary refill takes less than 2 seconds.      Coloration: Skin is not pale.      Findings: No erythema or rash.   Neurological:      General: No focal deficit present.      Mental Status: She is alert and oriented to person, place, and time. Mental status is at baseline.      Cranial Nerves: No cranial nerve deficit.      Sensory: No sensory deficit.      Motor: No weakness.      Coordination: Coordination normal.      Gait: Gait normal.      Deep Tendon Reflexes: Reflexes normal.      Comments: Proprioception intact all 10 toes   Psychiatric:         Mood and Affect: Mood normal.         Behavior: Behavior normal.         Thought Content: Thought content normal.         Judgment: Judgment normal.         Assessment:       1. Type 2 diabetes mellitus with hyperosmolarity without coma, without long-term current use of insulin    2. Hypertension associated with diabetes    3. Hyperlipidemia associated with type 2 diabetes mellitus    4. Menopause    5. Screening mammogram for breast cancer    6. Anxiety with flying        Plan:       1. Type 2 diabetes mellitus with hyperosmolarity without coma, without long-term current use of insulin  Await A1c and BMP  - Basic Metabolic Panel; Future  - Hemoglobin A1C; Future    2.  Hypertension associated with diabetes  Good blood pressure control with no medication changes needed  - Basic Metabolic Panel; Future    3. Hyperlipidemia associated with type 2 diabetes mellitus  Lab Results   Component Value Date    CHOL 122 06/14/2021    CHOL 126 06/16/2020    CHOL 221 (H) 12/12/2019     Lab Results   Component Value Date    HDL 51 06/14/2021    HDL 51 06/16/2020    HDL 48 12/12/2019     Lab Results   Component Value Date    LDLCALC 52.0 (L) 06/14/2021    LDLCALC 45.2 (L) 06/16/2020    LDLCALC 130.4 12/12/2019     Lab Results   Component Value Date    TRIG 95 06/14/2021    TRIG 149 06/16/2020    TRIG 213 (H) 12/12/2019     Lab Results   Component Value Date    CHOLHDL 41.8 06/14/2021    CHOLHDL 40.5 06/16/2020    CHOLHDL 21.7 12/12/2019     Previously Good control with recheck due in June/July    4. Menopause  DEXA to be scheduled  - DXA Bone Density Spine And Hip; Future    5. Screening mammogram for breast cancer  Mammogram to be scheduled  - Mammo Digital Screening Bilat w/ Moisés; Future    6. Anxiety with flying   checked with no inappropriate activity  - ALPRAZolam (XANAX) 0.25 MG tablet; Use one 20-30 minutes before flying prn  Dispense: 10 tablet; Refill: 1

## 2022-01-19 ENCOUNTER — LAB VISIT (OUTPATIENT)
Dept: LAB | Facility: HOSPITAL | Age: 72
End: 2022-01-19
Attending: FAMILY MEDICINE
Payer: MEDICARE

## 2022-01-19 DIAGNOSIS — E11.00 TYPE 2 DIABETES MELLITUS WITH HYPEROSMOLARITY WITHOUT COMA, WITHOUT LONG-TERM CURRENT USE OF INSULIN: ICD-10-CM

## 2022-01-19 DIAGNOSIS — E11.59 HYPERTENSION ASSOCIATED WITH DIABETES: ICD-10-CM

## 2022-01-19 DIAGNOSIS — I15.2 HYPERTENSION ASSOCIATED WITH DIABETES: ICD-10-CM

## 2022-01-19 LAB
ANION GAP SERPL CALC-SCNC: 10 MMOL/L (ref 8–16)
BUN SERPL-MCNC: 17 MG/DL (ref 8–23)
CALCIUM SERPL-MCNC: 9.9 MG/DL (ref 8.7–10.5)
CHLORIDE SERPL-SCNC: 101 MMOL/L (ref 95–110)
CO2 SERPL-SCNC: 30 MMOL/L (ref 23–29)
CREAT SERPL-MCNC: 0.6 MG/DL (ref 0.5–1.4)
EST. GFR  (AFRICAN AMERICAN): >60 ML/MIN/1.73 M^2
EST. GFR  (NON AFRICAN AMERICAN): >60 ML/MIN/1.73 M^2
ESTIMATED AVG GLUCOSE: 137 MG/DL (ref 68–131)
GLUCOSE SERPL-MCNC: 111 MG/DL (ref 70–110)
HBA1C MFR BLD: 6.4 % (ref 4–5.6)
POTASSIUM SERPL-SCNC: 5 MMOL/L (ref 3.5–5.1)
SODIUM SERPL-SCNC: 141 MMOL/L (ref 136–145)

## 2022-01-19 PROCEDURE — 36415 COLL VENOUS BLD VENIPUNCTURE: CPT | Mod: HCNC,PO | Performed by: FAMILY MEDICINE

## 2022-01-19 PROCEDURE — 83036 HEMOGLOBIN GLYCOSYLATED A1C: CPT | Mod: HCNC | Performed by: FAMILY MEDICINE

## 2022-01-19 PROCEDURE — 80048 BASIC METABOLIC PNL TOTAL CA: CPT | Mod: HCNC | Performed by: FAMILY MEDICINE

## 2022-01-21 DIAGNOSIS — E78.5 HYPERLIPIDEMIA, UNSPECIFIED HYPERLIPIDEMIA TYPE: Primary | ICD-10-CM

## 2022-01-21 DIAGNOSIS — E11.9 DIABETES MELLITUS WITHOUT COMPLICATION: ICD-10-CM

## 2022-02-01 ENCOUNTER — HOSPITAL ENCOUNTER (OUTPATIENT)
Dept: RADIOLOGY | Facility: CLINIC | Age: 72
Discharge: HOME | End: 2022-02-01
Attending: FAMILY MEDICINE
Payer: MEDICARE

## 2022-02-01 DIAGNOSIS — Z12.31 SCREENING MAMMOGRAM FOR BREAST CANCER: ICD-10-CM

## 2022-02-01 DIAGNOSIS — Z78.0 MENOPAUSE: ICD-10-CM

## 2022-02-01 PROCEDURE — 77067 SCR MAMMO BI INCL CAD: CPT | Mod: 26,HCNC,, | Performed by: RADIOLOGY

## 2022-02-01 PROCEDURE — 77080 DEXA BONE DENSITY SPINE HIP: ICD-10-PCS | Mod: 26,HCNC,, | Performed by: RADIOLOGY

## 2022-02-01 PROCEDURE — 77067 MAMMO DIGITAL SCREENING BILAT WITH TOMO: ICD-10-PCS | Mod: 26,HCNC,, | Performed by: RADIOLOGY

## 2022-02-01 PROCEDURE — 77080 DXA BONE DENSITY AXIAL: CPT | Mod: 26,HCNC,, | Performed by: RADIOLOGY

## 2022-02-01 PROCEDURE — 77063 MAMMO DIGITAL SCREENING BILAT WITH TOMO: ICD-10-PCS | Mod: 26,HCNC,, | Performed by: RADIOLOGY

## 2022-02-01 PROCEDURE — 77063 BREAST TOMOSYNTHESIS BI: CPT | Mod: TC,HCNC,PO

## 2022-02-01 PROCEDURE — 77080 DXA BONE DENSITY AXIAL: CPT | Mod: TC,HCNC,PO

## 2022-02-01 PROCEDURE — 77063 BREAST TOMOSYNTHESIS BI: CPT | Mod: 26,HCNC,, | Performed by: RADIOLOGY

## 2022-02-01 PROCEDURE — 77067 SCR MAMMO BI INCL CAD: CPT | Mod: TC,HCNC,PO

## 2022-03-02 DIAGNOSIS — J31.0 CHRONIC RHINITIS: ICD-10-CM

## 2022-03-02 DIAGNOSIS — H69.92 DYSFUNCTION OF LEFT EUSTACHIAN TUBE: ICD-10-CM

## 2022-03-03 NOTE — TELEPHONE ENCOUNTER
No new care gaps identified.  Powered by [employer]. Reference number: [id number].   3/02/2022 7:09:16 PM CST

## 2022-03-10 RX ORDER — FLUTICASONE PROPIONATE 50 MCG
SPRAY, SUSPENSION (ML) NASAL
Qty: 48 G | Refills: 3 | Status: SHIPPED | OUTPATIENT
Start: 2022-03-10 | End: 2023-01-31

## 2022-03-10 NOTE — TELEPHONE ENCOUNTER
Refill Authorization Note   Doris Pastrana  is requesting a refill authorization.  Brief Assessment and Rationale for Refill:  Approve     Medication Therapy Plan:       Medication Reconciliation Completed: No   Comments:   --->Care Gap information included below if applicable.   Orders Placed This Encounter    fluticasone propionate (FLONASE) 50 mcg/actuation nasal spray      Requested Prescriptions   Signed Prescriptions Disp Refills    fluticasone propionate (FLONASE) 50 mcg/actuation nasal spray 48 g 3     Sig: USE 2 SPRAYS IN EACH NOSTRIL EVERY DAY       Ear, Nose, and Throat: Nasal Preparations - Corticosteroids Passed - 3/2/2022  7:08 PM        Passed - Patient is at least 18 years old        Passed - Valid encounter within last 15 months     Recent Visits  Date Type Provider Dept   01/10/22 Office Visit Tor Garg MD [facility]   07/09/21 Office Visit oTr Garg MD [facility]   12/16/20 Office Visit Tor Garg MD [facility]   06/22/20 Office Visit Tor Garg MD [facility]   Showing recent visits within past 720 days and meeting all other requirements  Future Appointments  No visits were found meeting these conditions.  Showing future appointments within next 150 days and meeting all other requirements                    Appointments  past 12m or future 3m with PCP    Date Provider   Last Visit   1/10/2022 Tor Garg MD   Next Visit   Visit date not found Tor Garg MD   ED visits in past 90 days: 0     Note composed:8:53 AM 03/10/2022

## 2022-03-14 DIAGNOSIS — E11.9 CONTROLLED TYPE 2 DIABETES MELLITUS WITHOUT COMPLICATION, WITHOUT LONG-TERM CURRENT USE OF INSULIN: ICD-10-CM

## 2022-03-19 NOTE — TELEPHONE ENCOUNTER
Care Due:                  Date            Visit Type   Department     Provider  --------------------------------------------------------------------------------                                EP -                              PRIMARY      [facility]  Last Visit: 01-      CARE ([facility])   PRACTICE       Tor Garg  Next Visit: None Scheduled  None         None Found                                                            Last  Test          Frequency    Reason                     Performed    Due Date  --------------------------------------------------------------------------------    CMP.........  12 months..  rosuvastatin.............  06-   06-    Lipid Panel.  12 months..  rosuvastatin.............  06-   06-    Powered by [employer]. Reference number: [id number].   3/19/2022 12:02:40 PM CDT

## 2022-03-22 RX ORDER — METFORMIN HYDROCHLORIDE 750 MG/1
TABLET, EXTENDED RELEASE ORAL
Qty: 180 TABLET | Refills: 1 | Status: SHIPPED | OUTPATIENT
Start: 2022-03-22 | End: 2022-04-12 | Stop reason: SDUPTHER

## 2022-03-22 NOTE — TELEPHONE ENCOUNTER
Refill Authorization Note   Doris Pastrana  is requesting a refill authorization.  Brief Assessment and Rationale for Refill:  Approve     Medication Therapy Plan:       Medication Reconciliation Completed: No   Comments:   --->Care Gap information included below if applicable.   Orders Placed This Encounter    metFORMIN (GLUCOPHAGE-XR) 750 MG ER 24hr tablet      Requested Prescriptions   Signed Prescriptions Disp Refills    metFORMIN (GLUCOPHAGE-XR) 750 MG ER 24hr tablet 180 tablet 1     Sig: TAKE 1 TABLET TWICE DAILY WITH MEALS       Endocrinology:  Diabetes - Biguanides Passed - 3/19/2022 12:01 PM        Passed - Patient is at least 18 years old        Passed - Valid encounter within last 15 months     Recent Visits  Date Type Provider Dept   01/10/22 Office Visit Tor Garg MD [facility]   07/09/21 Office Visit Tor Garg MD [facility]   12/16/20 Office Visit Tor Garg MD [facility]   06/22/20 Office Visit Tor Garg MD [facility]   Showing recent visits within past 720 days and meeting all other requirements  Future Appointments  No visits were found meeting these conditions.  Showing future appointments within next 150 days and meeting all other requirements                Passed - Cr is 1.39 or below and within 360 days     Lab Results   Component Value Date    CREATININE 0.6 01/19/2022    CREATININE 0.7 06/14/2021    CREATININE 0.7 12/14/2020              Passed - HBA1C within 180 days     Lab Results   Component Value Date    HGBA1C 6.4 (H) 01/19/2022    HGBA1C 6.3 (H) 06/14/2021    HGBA1C 6.6 (H) 12/14/2020              Passed - eGFR is 45 or above and within 360 days     Lab Results   Component Value Date    EGFRNONAA >60.0 01/19/2022    EGFRNONAA >60.0 06/14/2021    EGFRNONAA >60.0 12/14/2020                    Appointments  past 12m or future 3m with PCP    Date Provider   Last Visit   1/10/2022 Tor Garg MD   Next Visit    Visit date not found Tor Garg MD   ED visits in past 90 days: 0     Note composed:11:11 AM 03/22/2022

## 2022-04-10 ENCOUNTER — ANESTHESIA (OUTPATIENT)
Dept: SURGERY | Facility: HOSPITAL | Age: 72
DRG: 661 | End: 2022-04-10
Payer: MEDICARE

## 2022-04-10 ENCOUNTER — ANESTHESIA EVENT (OUTPATIENT)
Dept: SURGERY | Facility: HOSPITAL | Age: 72
DRG: 661 | End: 2022-04-10
Payer: MEDICARE

## 2022-04-10 ENCOUNTER — HOSPITAL ENCOUNTER (INPATIENT)
Facility: HOSPITAL | Age: 72
LOS: 1 days | Discharge: HOME | DRG: 661 | End: 2022-04-11
Attending: EMERGENCY MEDICINE | Admitting: HOSPITALIST
Payer: MEDICARE

## 2022-04-10 DIAGNOSIS — N20.0 RENAL CALCULUS: ICD-10-CM

## 2022-04-10 DIAGNOSIS — N20.1 URETEROLITHIASIS: ICD-10-CM

## 2022-04-10 DIAGNOSIS — R07.9 CHEST PAIN: ICD-10-CM

## 2022-04-10 DIAGNOSIS — N20.0 NEPHROLITHIASIS: Primary | ICD-10-CM

## 2022-04-10 LAB
ALBUMIN SERPL BCP-MCNC: 3.9 G/DL (ref 3.5–5.2)
ALP SERPL-CCNC: 118 U/L (ref 55–135)
ALT SERPL W/O P-5'-P-CCNC: 21 U/L (ref 10–44)
AMORPH CRY URNS QL MICRO: ABNORMAL
ANION GAP SERPL CALC-SCNC: 13 MMOL/L (ref 8–16)
AST SERPL-CCNC: 15 U/L (ref 10–40)
BACTERIA #/AREA URNS HPF: ABNORMAL /HPF
BASOPHILS # BLD AUTO: 0.03 K/UL (ref 0–0.2)
BASOPHILS NFR BLD: 0.2 % (ref 0–1.9)
BILIRUB SERPL-MCNC: 1.8 MG/DL (ref 0.1–1)
BILIRUB UR QL STRIP: NEGATIVE
BUN SERPL-MCNC: 16 MG/DL (ref 8–23)
CALCIUM SERPL-MCNC: 9.7 MG/DL (ref 8.7–10.5)
CHLORIDE SERPL-SCNC: 102 MMOL/L (ref 95–110)
CLARITY UR: CLEAR
CO2 SERPL-SCNC: 24 MMOL/L (ref 23–29)
COLOR UR: YELLOW
CREAT SERPL-MCNC: 1.1 MG/DL (ref 0.5–1.4)
DIFFERENTIAL METHOD: ABNORMAL
EOSINOPHIL # BLD AUTO: 0 K/UL (ref 0–0.5)
EOSINOPHIL NFR BLD: 0.3 % (ref 0–8)
ERYTHROCYTE [DISTWIDTH] IN BLOOD BY AUTOMATED COUNT: 14.7 % (ref 11.5–14.5)
EST. GFR  (AFRICAN AMERICAN): 59 ML/MIN/1.73 M^2
EST. GFR  (NON AFRICAN AMERICAN): 51 ML/MIN/1.73 M^2
GLUCOSE SERPL-MCNC: 138 MG/DL (ref 70–110)
GLUCOSE UR QL STRIP: NEGATIVE
HCT VFR BLD AUTO: 41.8 % (ref 37–48.5)
HGB BLD-MCNC: 13.4 G/DL (ref 12–16)
HGB UR QL STRIP: ABNORMAL
IMM GRANULOCYTES # BLD AUTO: 0.04 K/UL (ref 0–0.04)
IMM GRANULOCYTES NFR BLD AUTO: 0.3 % (ref 0–0.5)
KETONES UR QL STRIP: NEGATIVE
LEUKOCYTE ESTERASE UR QL STRIP: NEGATIVE
LYMPHOCYTES # BLD AUTO: 1.5 K/UL (ref 1–4.8)
LYMPHOCYTES NFR BLD: 11.9 % (ref 18–48)
MCH RBC QN AUTO: 27.5 PG (ref 27–31)
MCHC RBC AUTO-ENTMCNC: 32.1 G/DL (ref 32–36)
MCV RBC AUTO: 86 FL (ref 82–98)
MICROSCOPIC COMMENT: ABNORMAL
MONOCYTES # BLD AUTO: 0.9 K/UL (ref 0.3–1)
MONOCYTES NFR BLD: 7.5 % (ref 4–15)
NEUTROPHILS # BLD AUTO: 9.7 K/UL (ref 1.8–7.7)
NEUTROPHILS NFR BLD: 79.8 % (ref 38–73)
NITRITE UR QL STRIP: NEGATIVE
NRBC BLD-RTO: 0 /100 WBC
PH UR STRIP: 6 [PH] (ref 5–8)
PLATELET # BLD AUTO: 250 K/UL (ref 150–450)
PMV BLD AUTO: 10 FL (ref 9.2–12.9)
POCT GLUCOSE: 193 MG/DL (ref 70–110)
POTASSIUM SERPL-SCNC: 4.1 MMOL/L (ref 3.5–5.1)
PROT SERPL-MCNC: 7.5 G/DL (ref 6–8.4)
PROT UR QL STRIP: ABNORMAL
RBC # BLD AUTO: 4.88 M/UL (ref 4–5.4)
RBC #/AREA URNS HPF: 15 /HPF (ref 0–4)
SARS-COV-2 RDRP RESP QL NAA+PROBE: NEGATIVE
SODIUM SERPL-SCNC: 139 MMOL/L (ref 136–145)
SP GR UR STRIP: 1.02 (ref 1–1.03)
SQUAMOUS #/AREA URNS HPF: 1 /HPF
URN SPEC COLLECT METH UR: ABNORMAL
UROBILINOGEN UR STRIP-ACNC: NEGATIVE EU/DL
WBC # BLD AUTO: 12.19 K/UL (ref 3.9–12.7)
WBC #/AREA URNS HPF: 2 /HPF (ref 0–5)

## 2022-04-10 PROCEDURE — D9220A PRA ANESTHESIA: ICD-10-PCS | Mod: CRNA,,, | Performed by: NURSE ANESTHETIST, CERTIFIED REGISTERED

## 2022-04-10 PROCEDURE — 96374 THER/PROPH/DIAG INJ IV PUSH: CPT

## 2022-04-10 PROCEDURE — 71000033 HC RECOVERY, INTIAL HOUR: Performed by: UROLOGY

## 2022-04-10 PROCEDURE — 87086 URINE CULTURE/COLONY COUNT: CPT | Performed by: UROLOGY

## 2022-04-10 PROCEDURE — 25500020 PHARM REV CODE 255: Performed by: UROLOGY

## 2022-04-10 PROCEDURE — 12000002 HC ACUTE/MED SURGE SEMI-PRIVATE ROOM

## 2022-04-10 PROCEDURE — 94799 UNLISTED PULMONARY SVC/PX: CPT

## 2022-04-10 PROCEDURE — 99223 1ST HOSP IP/OBS HIGH 75: CPT | Mod: 25,,, | Performed by: UROLOGY

## 2022-04-10 PROCEDURE — C2617 STENT, NON-COR, TEM W/O DEL: HCPCS | Performed by: UROLOGY

## 2022-04-10 PROCEDURE — 63600175 PHARM REV CODE 636 W HCPCS: Performed by: ANESTHESIOLOGY

## 2022-04-10 PROCEDURE — 36000706: Performed by: UROLOGY

## 2022-04-10 PROCEDURE — 85025 COMPLETE CBC W/AUTO DIFF WBC: CPT | Performed by: EMERGENCY MEDICINE

## 2022-04-10 PROCEDURE — D9220A PRA ANESTHESIA: ICD-10-PCS | Mod: ANES,,, | Performed by: ANESTHESIOLOGY

## 2022-04-10 PROCEDURE — 25000003 PHARM REV CODE 250: Performed by: NURSE PRACTITIONER

## 2022-04-10 PROCEDURE — 25000003 PHARM REV CODE 250: Performed by: EMERGENCY MEDICINE

## 2022-04-10 PROCEDURE — 27200651 HC AIRWAY, LMA: Performed by: ANESTHESIOLOGY

## 2022-04-10 PROCEDURE — 37000008 HC ANESTHESIA 1ST 15 MINUTES: Performed by: UROLOGY

## 2022-04-10 PROCEDURE — 63600175 PHARM REV CODE 636 W HCPCS: Performed by: EMERGENCY MEDICINE

## 2022-04-10 PROCEDURE — D9220A PRA ANESTHESIA: Mod: ANES,,, | Performed by: ANESTHESIOLOGY

## 2022-04-10 PROCEDURE — 94760 N-INVAS EAR/PLS OXIMETRY 1: CPT

## 2022-04-10 PROCEDURE — 36415 COLL VENOUS BLD VENIPUNCTURE: CPT | Performed by: EMERGENCY MEDICINE

## 2022-04-10 PROCEDURE — 99223 PR INITIAL HOSPITAL CARE,LEVL III: ICD-10-PCS | Mod: 25,,, | Performed by: UROLOGY

## 2022-04-10 PROCEDURE — 81000 URINALYSIS NONAUTO W/SCOPE: CPT | Performed by: EMERGENCY MEDICINE

## 2022-04-10 PROCEDURE — 80053 COMPREHEN METABOLIC PANEL: CPT | Performed by: EMERGENCY MEDICINE

## 2022-04-10 PROCEDURE — 99285 EMERGENCY DEPT VISIT HI MDM: CPT | Mod: 25

## 2022-04-10 PROCEDURE — 74420 PR  X-RAY RETROGRADE PYELOGRAM: ICD-10-PCS | Mod: 26,,, | Performed by: UROLOGY

## 2022-04-10 PROCEDURE — 71000039 HC RECOVERY, EACH ADD'L HOUR: Performed by: UROLOGY

## 2022-04-10 PROCEDURE — 36000707: Performed by: UROLOGY

## 2022-04-10 PROCEDURE — C1758 CATHETER, URETERAL: HCPCS | Performed by: UROLOGY

## 2022-04-10 PROCEDURE — U0002 COVID-19 LAB TEST NON-CDC: HCPCS | Performed by: NURSE PRACTITIONER

## 2022-04-10 PROCEDURE — 52332 CYSTOSCOPY AND TREATMENT: CPT | Mod: LT,,, | Performed by: UROLOGY

## 2022-04-10 PROCEDURE — 99900035 HC TECH TIME PER 15 MIN (STAT)

## 2022-04-10 PROCEDURE — 37000009 HC ANESTHESIA EA ADD 15 MINS: Performed by: UROLOGY

## 2022-04-10 PROCEDURE — 74420 UROGRAPHY RTRGR +-KUB: CPT | Mod: 26,,, | Performed by: UROLOGY

## 2022-04-10 PROCEDURE — 25000003 PHARM REV CODE 250: Performed by: NURSE ANESTHETIST, CERTIFIED REGISTERED

## 2022-04-10 PROCEDURE — 52332 PR CYSTOSCOPY,INSERT URETERAL STENT: ICD-10-PCS | Mod: LT,,, | Performed by: UROLOGY

## 2022-04-10 PROCEDURE — 63600175 PHARM REV CODE 636 W HCPCS

## 2022-04-10 PROCEDURE — 63600175 PHARM REV CODE 636 W HCPCS: Performed by: NURSE ANESTHETIST, CERTIFIED REGISTERED

## 2022-04-10 PROCEDURE — 63600175 PHARM REV CODE 636 W HCPCS: Performed by: NURSE PRACTITIONER

## 2022-04-10 PROCEDURE — C1769 GUIDE WIRE: HCPCS | Performed by: UROLOGY

## 2022-04-10 PROCEDURE — 96375 TX/PRO/DX INJ NEW DRUG ADDON: CPT

## 2022-04-10 PROCEDURE — D9220A PRA ANESTHESIA: Mod: CRNA,,, | Performed by: NURSE ANESTHETIST, CERTIFIED REGISTERED

## 2022-04-10 DEVICE — STENT SET URETERAL 6X24CM: Type: IMPLANTABLE DEVICE | Site: URETER | Status: FUNCTIONAL

## 2022-04-10 RX ORDER — PROCHLORPERAZINE EDISYLATE 5 MG/ML
5 INJECTION INTRAMUSCULAR; INTRAVENOUS EVERY 30 MIN PRN
Status: DISCONTINUED | OUTPATIENT
Start: 2022-04-10 | End: 2022-04-10 | Stop reason: HOSPADM

## 2022-04-10 RX ORDER — ATORVASTATIN CALCIUM 40 MG/1
80 TABLET, FILM COATED ORAL DAILY
Status: DISCONTINUED | OUTPATIENT
Start: 2022-04-11 | End: 2022-04-11 | Stop reason: HOSPADM

## 2022-04-10 RX ORDER — LIDOCAINE HCL/PF 100 MG/5ML
SYRINGE (ML) INTRAVENOUS
Status: DISCONTINUED | OUTPATIENT
Start: 2022-04-10 | End: 2022-04-10

## 2022-04-10 RX ORDER — IBUPROFEN 200 MG
16 TABLET ORAL
Status: DISCONTINUED | OUTPATIENT
Start: 2022-04-10 | End: 2022-04-11 | Stop reason: HOSPADM

## 2022-04-10 RX ORDER — ONDANSETRON 2 MG/ML
INJECTION INTRAMUSCULAR; INTRAVENOUS
Status: COMPLETED
Start: 2022-04-10 | End: 2022-04-10

## 2022-04-10 RX ORDER — ONDANSETRON 2 MG/ML
4 INJECTION INTRAMUSCULAR; INTRAVENOUS EVERY 8 HOURS PRN
Status: DISCONTINUED | OUTPATIENT
Start: 2022-04-10 | End: 2022-04-11 | Stop reason: HOSPADM

## 2022-04-10 RX ORDER — KETOROLAC TROMETHAMINE 30 MG/ML
15 INJECTION, SOLUTION INTRAMUSCULAR; INTRAVENOUS
Status: COMPLETED | OUTPATIENT
Start: 2022-04-10 | End: 2022-04-10

## 2022-04-10 RX ORDER — HYDROMORPHONE HYDROCHLORIDE 2 MG/ML
1 INJECTION, SOLUTION INTRAMUSCULAR; INTRAVENOUS; SUBCUTANEOUS
Status: COMPLETED | OUTPATIENT
Start: 2022-04-10 | End: 2022-04-10

## 2022-04-10 RX ORDER — FLUTICASONE PROPIONATE 50 MCG
2 SPRAY, SUSPENSION (ML) NASAL DAILY
Status: DISCONTINUED | OUTPATIENT
Start: 2022-04-11 | End: 2022-04-11 | Stop reason: HOSPADM

## 2022-04-10 RX ORDER — FENTANYL CITRATE 50 UG/ML
INJECTION, SOLUTION INTRAMUSCULAR; INTRAVENOUS
Status: DISCONTINUED | OUTPATIENT
Start: 2022-04-10 | End: 2022-04-10

## 2022-04-10 RX ORDER — ACETAMINOPHEN 325 MG/1
650 TABLET ORAL EVERY 8 HOURS PRN
Status: DISCONTINUED | OUTPATIENT
Start: 2022-04-10 | End: 2022-04-11 | Stop reason: HOSPADM

## 2022-04-10 RX ORDER — CIPROFLOXACIN 2 MG/ML
400 INJECTION, SOLUTION INTRAVENOUS
Status: CANCELLED | OUTPATIENT
Start: 2022-04-10

## 2022-04-10 RX ORDER — SODIUM,POTASSIUM PHOSPHATES 280-250MG
2 POWDER IN PACKET (EA) ORAL
Status: DISCONTINUED | OUTPATIENT
Start: 2022-04-10 | End: 2022-04-11 | Stop reason: HOSPADM

## 2022-04-10 RX ORDER — GABAPENTIN 300 MG/1
900 CAPSULE ORAL NIGHTLY
Status: DISCONTINUED | OUTPATIENT
Start: 2022-04-10 | End: 2022-04-11 | Stop reason: HOSPADM

## 2022-04-10 RX ORDER — ESCITALOPRAM OXALATE 10 MG/1
10 TABLET ORAL DAILY
Status: DISCONTINUED | OUTPATIENT
Start: 2022-04-11 | End: 2022-04-11 | Stop reason: HOSPADM

## 2022-04-10 RX ORDER — SIMETHICONE 80 MG
1 TABLET,CHEWABLE ORAL 4 TIMES DAILY PRN
Status: DISCONTINUED | OUTPATIENT
Start: 2022-04-10 | End: 2022-04-11 | Stop reason: HOSPADM

## 2022-04-10 RX ORDER — MIDAZOLAM HYDROCHLORIDE 1 MG/ML
INJECTION INTRAMUSCULAR; INTRAVENOUS
Status: DISCONTINUED | OUTPATIENT
Start: 2022-04-10 | End: 2022-04-10

## 2022-04-10 RX ORDER — IPRATROPIUM BROMIDE AND ALBUTEROL SULFATE 2.5; .5 MG/3ML; MG/3ML
3 SOLUTION RESPIRATORY (INHALATION) EVERY 4 HOURS PRN
Status: DISCONTINUED | OUTPATIENT
Start: 2022-04-10 | End: 2022-04-11 | Stop reason: HOSPADM

## 2022-04-10 RX ORDER — INSULIN ASPART 100 [IU]/ML
1-10 INJECTION, SOLUTION INTRAVENOUS; SUBCUTANEOUS
Status: DISCONTINUED | OUTPATIENT
Start: 2022-04-10 | End: 2022-04-11 | Stop reason: HOSPADM

## 2022-04-10 RX ORDER — NALOXONE HCL 0.4 MG/ML
0.02 VIAL (ML) INJECTION
Status: DISCONTINUED | OUTPATIENT
Start: 2022-04-10 | End: 2022-04-11 | Stop reason: HOSPADM

## 2022-04-10 RX ORDER — AMOXICILLIN 250 MG
1 CAPSULE ORAL 2 TIMES DAILY
Status: DISCONTINUED | OUTPATIENT
Start: 2022-04-10 | End: 2022-04-11 | Stop reason: HOSPADM

## 2022-04-10 RX ORDER — ONDANSETRON 2 MG/ML
4 INJECTION INTRAMUSCULAR; INTRAVENOUS ONCE AS NEEDED
Status: DISCONTINUED | OUTPATIENT
Start: 2022-04-10 | End: 2022-04-10 | Stop reason: HOSPADM

## 2022-04-10 RX ORDER — LOSARTAN POTASSIUM 25 MG/1
100 TABLET ORAL DAILY
Status: DISCONTINUED | OUTPATIENT
Start: 2022-04-11 | End: 2022-04-11 | Stop reason: HOSPADM

## 2022-04-10 RX ORDER — DIPHENHYDRAMINE HYDROCHLORIDE 50 MG/ML
12.5 INJECTION INTRAMUSCULAR; INTRAVENOUS ONCE AS NEEDED
Status: DISCONTINUED | OUTPATIENT
Start: 2022-04-10 | End: 2022-04-10 | Stop reason: HOSPADM

## 2022-04-10 RX ORDER — FENTANYL CITRATE 50 UG/ML
25 INJECTION, SOLUTION INTRAMUSCULAR; INTRAVENOUS EVERY 5 MIN PRN
Status: DISCONTINUED | OUTPATIENT
Start: 2022-04-10 | End: 2022-04-10 | Stop reason: HOSPADM

## 2022-04-10 RX ORDER — TALC
6 POWDER (GRAM) TOPICAL NIGHTLY PRN
Status: DISCONTINUED | OUTPATIENT
Start: 2022-04-10 | End: 2022-04-11 | Stop reason: HOSPADM

## 2022-04-10 RX ORDER — PROPOFOL 10 MG/ML
VIAL (ML) INTRAVENOUS
Status: DISCONTINUED | OUTPATIENT
Start: 2022-04-10 | End: 2022-04-10

## 2022-04-10 RX ORDER — ONDANSETRON 2 MG/ML
INJECTION INTRAMUSCULAR; INTRAVENOUS
Status: DISCONTINUED | OUTPATIENT
Start: 2022-04-10 | End: 2022-04-10

## 2022-04-10 RX ORDER — MEPERIDINE HYDROCHLORIDE 50 MG/ML
12.5 INJECTION INTRAMUSCULAR; INTRAVENOUS; SUBCUTANEOUS ONCE AS NEEDED
Status: DISCONTINUED | OUTPATIENT
Start: 2022-04-10 | End: 2022-04-10 | Stop reason: HOSPADM

## 2022-04-10 RX ORDER — MAG HYDROX/ALUMINUM HYD/SIMETH 200-200-20
30 SUSPENSION, ORAL (FINAL DOSE FORM) ORAL 4 TIMES DAILY PRN
Status: DISCONTINUED | OUTPATIENT
Start: 2022-04-10 | End: 2022-04-11 | Stop reason: HOSPADM

## 2022-04-10 RX ORDER — AMLODIPINE BESYLATE 5 MG/1
10 TABLET ORAL DAILY
Status: DISCONTINUED | OUTPATIENT
Start: 2022-04-11 | End: 2022-04-11 | Stop reason: HOSPADM

## 2022-04-10 RX ORDER — FENTANYL CITRATE 50 UG/ML
50 INJECTION, SOLUTION INTRAMUSCULAR; INTRAVENOUS
Status: COMPLETED | OUTPATIENT
Start: 2022-04-10 | End: 2022-04-10

## 2022-04-10 RX ORDER — IBUPROFEN 200 MG
24 TABLET ORAL
Status: DISCONTINUED | OUTPATIENT
Start: 2022-04-10 | End: 2022-04-11 | Stop reason: HOSPADM

## 2022-04-10 RX ORDER — OXYCODONE HYDROCHLORIDE 5 MG/1
5 TABLET ORAL ONCE AS NEEDED
Status: DISCONTINUED | OUTPATIENT
Start: 2022-04-10 | End: 2022-04-10 | Stop reason: HOSPADM

## 2022-04-10 RX ORDER — PHENYLEPHRINE HYDROCHLORIDE 10 MG/ML
INJECTION INTRAVENOUS
Status: DISCONTINUED | OUTPATIENT
Start: 2022-04-10 | End: 2022-04-10

## 2022-04-10 RX ORDER — GLUCAGON 1 MG
1 KIT INJECTION
Status: DISCONTINUED | OUTPATIENT
Start: 2022-04-10 | End: 2022-04-11 | Stop reason: HOSPADM

## 2022-04-10 RX ORDER — DEXAMETHASONE SODIUM PHOSPHATE 4 MG/ML
INJECTION, SOLUTION INTRA-ARTICULAR; INTRALESIONAL; INTRAMUSCULAR; INTRAVENOUS; SOFT TISSUE
Status: DISCONTINUED | OUTPATIENT
Start: 2022-04-10 | End: 2022-04-10

## 2022-04-10 RX ORDER — SODIUM CHLORIDE 9 MG/ML
INJECTION, SOLUTION INTRAVENOUS CONTINUOUS
Status: DISCONTINUED | OUTPATIENT
Start: 2022-04-10 | End: 2022-04-11 | Stop reason: HOSPADM

## 2022-04-10 RX ORDER — LORAZEPAM 2 MG/ML
0.25 INJECTION INTRAMUSCULAR ONCE AS NEEDED
Status: DISCONTINUED | OUTPATIENT
Start: 2022-04-10 | End: 2022-04-10 | Stop reason: HOSPADM

## 2022-04-10 RX ORDER — LANOLIN ALCOHOL/MO/W.PET/CERES
800 CREAM (GRAM) TOPICAL
Status: DISCONTINUED | OUTPATIENT
Start: 2022-04-10 | End: 2022-04-11 | Stop reason: HOSPADM

## 2022-04-10 RX ORDER — TAMSULOSIN HYDROCHLORIDE 0.4 MG/1
0.4 CAPSULE ORAL DAILY
Status: DISCONTINUED | OUTPATIENT
Start: 2022-04-11 | End: 2022-04-11 | Stop reason: HOSPADM

## 2022-04-10 RX ORDER — TAMSULOSIN HYDROCHLORIDE 0.4 MG/1
0.4 CAPSULE ORAL
Status: COMPLETED | OUTPATIENT
Start: 2022-04-10 | End: 2022-04-10

## 2022-04-10 RX ORDER — SODIUM CHLORIDE 0.9 % (FLUSH) 0.9 %
10 SYRINGE (ML) INJECTION EVERY 12 HOURS PRN
Status: DISCONTINUED | OUTPATIENT
Start: 2022-04-10 | End: 2022-04-11 | Stop reason: HOSPADM

## 2022-04-10 RX ORDER — KETOROLAC TROMETHAMINE 30 MG/ML
15 INJECTION, SOLUTION INTRAMUSCULAR; INTRAVENOUS EVERY 6 HOURS PRN
Status: DISCONTINUED | OUTPATIENT
Start: 2022-04-10 | End: 2022-04-11 | Stop reason: HOSPADM

## 2022-04-10 RX ORDER — HYDROMORPHONE HYDROCHLORIDE 2 MG/ML
0.2 INJECTION, SOLUTION INTRAMUSCULAR; INTRAVENOUS; SUBCUTANEOUS EVERY 5 MIN PRN
Status: DISCONTINUED | OUTPATIENT
Start: 2022-04-10 | End: 2022-04-10 | Stop reason: HOSPADM

## 2022-04-10 RX ORDER — CIPROFLOXACIN 2 MG/ML
INJECTION, SOLUTION INTRAVENOUS
Status: DISCONTINUED | OUTPATIENT
Start: 2022-04-10 | End: 2022-04-10

## 2022-04-10 RX ADMIN — GABAPENTIN 900 MG: 300 CAPSULE ORAL at 08:04

## 2022-04-10 RX ADMIN — SODIUM CHLORIDE, SODIUM GLUCONATE, SODIUM ACETATE, POTASSIUM CHLORIDE, MAGNESIUM CHLORIDE, SODIUM PHOSPHATE, DIBASIC, AND POTASSIUM PHOSPHATE: .53; .5; .37; .037; .03; .012; .00082 INJECTION, SOLUTION INTRAVENOUS at 03:04

## 2022-04-10 RX ADMIN — ONDANSETRON 4 MG: 2 INJECTION INTRAMUSCULAR; INTRAVENOUS at 11:04

## 2022-04-10 RX ADMIN — MELATONIN TAB 3 MG 6 MG: 3 TAB at 08:04

## 2022-04-10 RX ADMIN — CIPROFLOXACIN 400 MG: 2 INJECTION INTRAVENOUS at 03:04

## 2022-04-10 RX ADMIN — KETOROLAC TROMETHAMINE 15 MG: 30 INJECTION, SOLUTION INTRAMUSCULAR at 07:04

## 2022-04-10 RX ADMIN — PROCHLORPERAZINE EDISYLATE 5 MG: 5 INJECTION INTRAMUSCULAR; INTRAVENOUS at 04:04

## 2022-04-10 RX ADMIN — INSULIN ASPART 1 UNITS: 100 INJECTION, SOLUTION INTRAVENOUS; SUBCUTANEOUS at 08:04

## 2022-04-10 RX ADMIN — HYDROMORPHONE HYDROCHLORIDE 1 MG: 2 INJECTION, SOLUTION INTRAMUSCULAR; INTRAVENOUS; SUBCUTANEOUS at 11:04

## 2022-04-10 RX ADMIN — LIDOCAINE HYDROCHLORIDE 100 MG: 20 INJECTION INTRAVENOUS at 03:04

## 2022-04-10 RX ADMIN — FENTANYL CITRATE 50 MCG: 50 INJECTION INTRAMUSCULAR; INTRAVENOUS at 12:04

## 2022-04-10 RX ADMIN — ONDANSETRON 4 MG: 2 INJECTION INTRAMUSCULAR; INTRAVENOUS at 03:04

## 2022-04-10 RX ADMIN — PHENYLEPHRINE HYDROCHLORIDE 200 MCG: 10 INJECTION INTRAVENOUS at 03:04

## 2022-04-10 RX ADMIN — KETOROLAC TROMETHAMINE 15 MG: 30 INJECTION, SOLUTION INTRAMUSCULAR at 12:04

## 2022-04-10 RX ADMIN — GLYCOPYRROLATE 0.2 MG: 0.2 INJECTION, SOLUTION INTRAMUSCULAR; INTRAVITREAL at 03:04

## 2022-04-10 RX ADMIN — FENTANYL CITRATE 50 MCG: 50 INJECTION, SOLUTION INTRAMUSCULAR; INTRAVENOUS at 03:04

## 2022-04-10 RX ADMIN — SODIUM CHLORIDE: 0.9 INJECTION, SOLUTION INTRAVENOUS at 05:04

## 2022-04-10 RX ADMIN — DEXAMETHASONE SODIUM PHOSPHATE 4 MG: 4 INJECTION, SOLUTION INTRA-ARTICULAR; INTRALESIONAL; INTRAMUSCULAR; INTRAVENOUS; SOFT TISSUE at 03:04

## 2022-04-10 RX ADMIN — TAMSULOSIN HYDROCHLORIDE 0.4 MG: 0.4 CAPSULE ORAL at 12:04

## 2022-04-10 RX ADMIN — PROPOFOL 150 MG: 10 INJECTION, EMULSION INTRAVENOUS at 03:04

## 2022-04-10 RX ADMIN — MIDAZOLAM HYDROCHLORIDE 2 MG: 1 INJECTION, SOLUTION INTRAMUSCULAR; INTRAVENOUS at 03:04

## 2022-04-10 RX ADMIN — DOCUSATE SODIUM AND SENNOSIDES 1 TABLET: 8.6; 5 TABLET, FILM COATED ORAL at 08:04

## 2022-04-10 NOTE — H&P (VIEW-ONLY)
[facility] Urology Consult Note:    Doris Pastrana is a 71 y.o. female who presents for left nephrolithiasis.    Patient with a history of DM, HTN, nephrolithiasis and TIA who presented to the emergency department on 4/10/22 complaining of left flank pain associated with nausea and emesis for approximately 2 days.     CT renal stone revealed a left proximal 8 mm ureteral stone with hydroureteronephrosis. Also with bilateral non-obstructing stones. Urology consulted to assist with management.     WBC 12 and Creatinine 1.1.     She has a history of kidney stones s/p ESWL in the past with Dr. Stewart. States she also passed a stone previously.     Denies any fever, chills, gross hematuria, recent urinary tract infection,  trauma or history of  malignancy.       Past Medical History:   Diagnosis Date    Acute sinus infection     Allergy     Anemia     Arthritis     Bronchitis     Degenerative disc disease     lumbar, pain contract 2013    Depression     Diabetes mellitus, type 2 16    Fatty liver     Hyperlipidemia 16    Hypertension     Kidney stone     Mitral valve prolapse     Pleurisy     Pneumonia     PONV (postoperative nausea and vomiting)     Sinus infection     TIA (transient ischemic attack) 2019       Past Surgical History:   Procedure Laterality Date    ANKLE SURGERY  2017    right ankle torn ligament    APPENDECTOMY       SECTION      CHOLECYSTECTOMY      COLONOSCOPY  8/13/15    Dr. Oliveira, five year recheck    COLONOSCOPY N/A 2021    Procedure: COLONOSCOPY;  Surgeon: Tevin Oliveira MD;  Location: [facility];  Service: Endoscopy;  Laterality: N/A;    EPIDURAL STEROID INJECTION INTO CERVICAL SPINE N/A 1/3/2020    Procedure: Injection-steroid-epidural-cervical;  Surgeon: Eddi Albrecht MD;  Location: [facility] OR;  Service: Pain Management;  Laterality: N/A;  C7-T1    EPIDURAL STEROID INJECTION INTO CERVICAL SPINE N/A 3/10/2020     Procedure: Injection-steroid-epidural-cervical;  Surgeon: Eddi Albrecht MD;  Location: [facility] OR;  Service: Pain Management;  Laterality: N/A;  C7-T1    EPIDURAL STEROID INJECTION INTO CERVICAL SPINE N/A 12/29/2020    Procedure: Injection-steroid-epidural-cervical;  Surgeon: Eddi Albrecht MD;  Location: [facility] OR;  Service: Pain Management;  Laterality: N/A;  C7-T1     EXTRACORPOREAL SHOCK WAVE LITHOTRIPSY Left 5/22/2019    Procedure: LITHOTRIPSY, ESWL - only if stone visible on xray  with cysto after on table possible;  Surgeon: Marisol Stewart MD;  Location: [facility] OR;  Service: Urology;  Laterality: Left;    HYSTERECTOMY      OOPHORECTOMY      TONSILLECTOMY      VAGINAL DELIVERY         Family History   Problem Relation Age of Onset    Arthritis Mother     Hypertension Mother     Vision loss Mother     Diabetes Mother     Alcohol abuse Father     Depression Father     Early death Father     Alcohol abuse Sister     Depression Sister     Cancer Sister         thyroid    Diabetes Sister     Heart disease Sister         chf    Atrial fibrillation Sister     Depression Brother     Hypertension Brother     Cancer Brother         prostate/stomach    Diabetes Brother     Aneurysm Brother         aaa    Learning disabilities Daughter     Hypertension Maternal Grandmother     Heart disease Maternal Grandmother     Arthritis Maternal Grandmother     Glaucoma Maternal Grandmother     Cancer Maternal Grandfather         stomach    Diabetes Paternal Grandmother     Breast cancer Paternal Grandmother     Heart disease Paternal Grandfather     Heart disease Maternal Uncle     Kidney disease Maternal Uncle     Kidney disease Paternal Aunt        Review of patient's allergies indicates:   Allergen Reactions    Omnicef [cefdinir] Hives    Codeine Nausea Only    Rhubarb Swelling    Strawberries [strawberry] Hives    Iodine Rash       Other reaction(s): Nausea    Latex, natural rubber Rash     Pravastatin Other (See Comments)     Muscle pain        Medications Reviewed: see MAR    PHYSICAL EXAM:    Vitals:    04/10/22 1450   BP:    Pulse: 83   Resp:    Temp:      Body mass index is 29.95 kg/m². @FLOWAMB(14)@ @FLOWAMB(11)@       General: Alert, cooperative, no distress, appears stated age  Abdomen: Soft, non-tender, no CVA tenderness, non-distended      LABS:    Lab Results   Component Value Date    WBC 12.19 04/10/2022    HGB 13.4 04/10/2022    HCT 41.8 04/10/2022    MCV 86 04/10/2022     04/10/2022       BMP  Lab Results   Component Value Date     04/10/2022    K 4.1 04/10/2022     04/10/2022    CO2 24 04/10/2022    BUN 16 04/10/2022    CREATININE 1.1 04/10/2022    CALCIUM 9.7 04/10/2022    ANIONGAP 13 04/10/2022    ESTGFRAFRICA 59 (A) 04/10/2022    EGFRNONAA 51 (A) 04/10/2022       IMAGING:    CT Images independently reviewed by me        Assessment/Diagnosis:    1. Left ureteral stone - 8 mm  2. Left hydroureteronephrosis  3. Left flank pain    Plans:    - Discussed the etiology and management of the patient's nephrolithiasis. Explained that stone disease is multifactorial and can be secondary to urinary obstruction, urine composition, low urine volume, diet, hypokalemia, DM, hypertension, gout, RTA, UTI and medications. We discussed that stones can be managed with observation, trial of passage, ureteroscopy with laser lithotripsy, ESWL and PCNL. After extensive discussion, patient has decided to proceed with urgent left ureteral stent and retrograde pyelogram placement today. All risks, benefits and alternatives discussed at length with patient.

## 2022-04-10 NOTE — ASSESSMENT & PLAN NOTE
Patient is chronically on statin.will continue for now. Monitor clinically. Last LDL was   Lab Results   Component Value Date    LDLCALC 52.0 (L) 06/14/2021         Vaginal delivery

## 2022-04-10 NOTE — ANESTHESIA PROCEDURE NOTES
Intubation    Date/Time: 4/10/2022 3:51 PM  Performed by: Vikas Cruz CRNA  Authorized by: Mike Marrufo MD     Intubation:     Induction:  Intravenous    Attempts:  1    Attempted By:  CRNA    Difficult Airway Encountered?: No      Airway Device:  Supraglottic airway/LMA    Airway Device Size:  3.0    Style/Cuff Inflation:  Cuffed (inflated to minimal occlusive pressure)    Placement Verified By:  Capnometry    Complicating Factors:  None

## 2022-04-10 NOTE — ASSESSMENT & PLAN NOTE
Patient's FSGs are controlled on current medication regimen.  Last A1c reviewed-   Lab Results   Component Value Date    HGBA1C 6.4 (H) 01/19/2022     Most recent fingerstick glucose reviewed- No results for input(s): POCTGLUCOSE in the last 24 hours.  Current correctional scale  Medium  Maintain anti-hyperglycemic dose as follows-   Antihyperglycemics (From admission, onward)            SSI        Hold Oral hypoglycemics while patient is in the hospital.

## 2022-04-10 NOTE — OP NOTE
[facility]  Operative Note    Date: 04/10/2022    Pre-Op Diagnosis: Left obstructing urolithiasis    Post-Op Diagnosis: Same    Procedure(s) Performed:   1. Cystourethroscopy  2. Left retrograde pyelogram  3. Left ureteral stent placement, 6 x 24 JJ stent    Flouro <1 hour    Specimen(s): UA and culture    Staff Surgeon: Jack Redman Jr, MD    Anesthesia: General    Indications: Doris Pastrana is a 71 y.o. female with an obstructing 8 mm left proximal ureteral stone.     Findings:   Successful placement of a left ureteral stent. Mild left hydronephrosis on retrograde pyelogram.     Estimated Blood Loss: Minimal    Drains: 6 x 24 cm JJ stent    Complications: None    Procedure in Detail:  After risks, benefits and possible complications of cystoscopy were explained, the patient elected to undergo the procedure and informed consent was obtained. All questions were answered in the aleks-operative area. The patient was transferred to the cystoscopy suite and placed in the supine position.  SCDs were applied and working.  General anesthesia was administered.  Once the patient was adequately sedated, she was placed in the dorsal lithotomy position and prepped and draped in the usual sterile fashion.  Time out was performed, aleks-procedural antibiotics were confirmed.     A rigid cystoscope in a 22 Fr sheath was introduced into the bladder per urethra. This passed easily.  The entire urethra was visualized which showed no masses or strictures.  The right and left ureteral orifices were identified in the normal anatomic position and were seen effluxing clear urine.  Formal cystoscopy was performed which revealed no masses or lesions suspicious for malignancy, no trabeculations, no bladder stones and no bladder diverticuli.     The left ureter was then cannulated with a 5F open ended catheter and a retrograde pyelogram performed revealing mild hydronephrosis. The sensor wire was placed into the left  kidney and confirmed on fluoroscopy. The 6 x 24 cm stent was placed over the wire into the ureter. The pusher was used to advance the stent. Once in the appropriate position the wire was removed and there was a proximal and distal curl confirmed on fluoroscopy. The bladder was drained, and the patient was removed from lithotomy.      The patient tolerated the procedure well and was transferred to recovery in stable condition.      Disposition:  The patient will is scheduled for left ureteroscopy with laser lithotripsy on 4/26/22. Home once medically stable and pain controlled with PO pain meds, Keflex x 3 days and Flomax.     Jack Redman Jr, MD

## 2022-04-10 NOTE — PATIENT INSTRUCTIONS
VERY IMPORTANT - PLEASE READ    Your urologist is Dr. Jack Redman  Office number: [phone] ([facility])  Address: [address] [place] [address],  (2nd office building on left); [address] (located on 2nd floor).       What Dr. Redman did today: Cysto, left ureteral stent placement     If you do not hear from us please call clinic to make a post-op appointment.   The following are specific instructions for you, so please read:    The ureter is the tube that drains the kidney (where urine is made). It connects the kidney to the bladder (where urine is stored).     A ureteral stent is a is a soft plastic tube with holes in tube that bypasses anything that obstructs (stone, tumor, scar tissue) the urine from flow from the kidney to the bladder. It is placed by going through the urethra and into the bladder. No incisions are made. Its temporarily inserted into a ureter to help drain urine into the bladder. One end goes in the kidney. The other end goes in the bladder. A coil on each end holds the stent in place. The stent cant be seen from outside the body.          You have a ureteral stent in your LEFT kidney that was placed on 04/10/2022  -the stent can only remain for a maximum of 3 to 6 months. If the stent remains longer than this you can get recurrent urinary tract infections, the stent can have more stones form along it and urine will not be able to drain and you will lose all function of your kidney requiring a major surgery and a big incision to remove the kidney.      You have a stent that was placed for a stone that was stuck and there is still a stone there, then expect the following:  -You will return in 2-3 weeks to have the stone removed with smaller instruments. This is called ureteroscopy and it will be done while you are asleep (under anesthesia).  -currently your scheduled date for ureteroscopy and stone extraction is ____4/26/22_____. However if you were not given a date, then please call our  office at [phone] and let them know you still have a stent and a stone and need to know what happens next. Remember the stent cannot stay in indefinitely.  -You will likely not see Dr. Redman again until the time of your next procedure. However, if you have any questions, please feel free to contact our office and the nurse will be able to leave a message for the doctor to answer any questions you may have.  -I will place another stent after the stone extraction/ureteroscopy that will need to be removed a few days to weeks later depending on how much inflammation there is during the procedure.        If you do not receive a follow-up date or further instructions on what is to be done next about the stent, it is your responsibility to make sure that you have follow-up to have your stone or stent removed.     A stent CANNOT REMAIN indefinitely in the kidney. It should not remain if possible more than 3 to 6 months maximum (rarely 1 year if it was placed for cancer).     If you do not follow-up to have your stent removed then you can LOSE YOUR KIDNEY FUNCTION ON THAT SIDE, get RECURRENT URINARY TRACT INFECTIONS,and MORE STONES requiring SURGICAL OPEN removal of your kidney.    You can call our office ([facility] at [phone] and let them know a stent was placed and you need further instructions for follow-up). If there are issues with insurance we will work with you to help you arrange follow-up where it can be removed. Again,  A STENT CANNOT remain indefinitely.         A ureteral stent is left in place for many reasons:  -to keep the ureter open after a procedure as there will be much inflammation and if no stent is left you will experience the same pain as having the stone that caused the obstruction.   -to drain the kidney of infection.  -if the stone is up high, stuck, or you have an infection, the stent will stretch open the small ureter (the tube that drains the kidney) for a few weeks  (usually 2 to 4 weeks) so that we can return and place instruments into this tube to break up and remove the stone.      Ureteral Stent Symptoms can include but are not limited to   - pain in the kidney or bladder with urination during or especially at the end of voiding.   - constant urge to urinate, frequency of urination, severe bladder spasms and severe pain the kidney, especially during urination.  - blood in the urine, especially with increased activity. This can last the entire time the stent is in, it can sometimes clear and return or you may never have any at all. I recommend increasing fluid intake to prevent large clots that may be difficult to urinate out.      When to go to ER:   -fever >101.5 or inability to urinate (no urination for >4 hours and bladder pain).   -If you go to the ER with pain, they may check to make sure the stent is in good position with an x-ray or ultrasound but unlikely to do anything else.   -I will let you know when we will plan to have the stent either removed at the ambulatory surgical center as an outpatient procedure while you are awake, which is uncomfortable briefly, but not painful or you will remove it yourself by pulling the strings.

## 2022-04-10 NOTE — ANESTHESIA POSTPROCEDURE EVALUATION
Anesthesia Post Evaluation    Patient: Doris Pastrana    Procedure(s) Performed: Procedure(s) (LRB):  CYSTOSCOPY, WITH RETROGRADE PYELOGRAM AND URETERAL STENT INSERTION (Left)    [facility] Anesthesia Post Op Evaluation      Vitals Value Taken Time   BP 85/54 04/10/22 1615   Temp 36.7 °C (98.1 °F) 04/10/22 1610   Pulse 90 04/10/22 1617   Resp 15 04/10/22 1617   SpO2 92 % 04/10/22 1617   Vitals shown include unvalidated device data.      No case tracking events are documented in the log.      Pain/Hayde Score: Pain Rating Prior to Med Admin: 4 (4/10/2022 12:42 PM)  Hayde Score: 4 (4/10/2022  4:10 PM)

## 2022-04-10 NOTE — ASSESSMENT & PLAN NOTE
Chronic, controlled.  Latest blood pressure and vitals reviewed-   Temp:  [98.2 °F (36.8 °C)]   Pulse:  []   Resp:  [16-20]   BP: (120)/(59-71)   SpO2:  [92 %-95 %] .   Home meds for hypertension were reviewed and noted below.   Hypertension Medications             amLODIPine (NORVASC) 10 MG tablet TAKE 1 TABLET EVERY DAY    losartan (COZAAR) 100 MG tablet TAKE 1 TABLET (100 MG TOTAL) BY MOUTH ONCE DAILY.          While in the hospital, will manage blood pressure as follows; Continue home antihypertensive regimen    Will utilize p.r.n. blood pressure medication only if patient's blood pressure greater than  180/110 and she develops symptoms such as worsening chest pain or shortness of breath.

## 2022-04-10 NOTE — TRANSFER OF CARE
"Anesthesia Transfer of Care Note    Patient: Doris Pastrana    Procedure(s) Performed: Procedure(s) (LRB):  CYSTOSCOPY, WITH RETROGRADE PYELOGRAM AND URETERAL STENT INSERTION (Left)    Patient location: PACU    Anesthesia Type: general    Transport from OR: Transported from OR on 2-3 L/min O2 by NC with adequate spontaneous ventilation    Post pain: adequate analgesia    Post assessment: no apparent anesthetic complications    Post vital signs: stable    Level of consciousness: awake    Nausea/Vomiting: no nausea/vomiting    Complications: none    Transfer of care protocol was followed      Last vitals:   Visit Vitals  BP (!) 109/59   Pulse 84   Temp 36.8 °C (98.3 °F) (Oral)   Resp 17   Ht 5' 5" (1.651 m)   Wt 81.6 kg (180 lb)   SpO2 96%   BMI 29.95 kg/m²     "

## 2022-04-10 NOTE — ANESTHESIA POSTPROCEDURE EVALUATION
Anesthesia Post Evaluation    Patient: Doris Pastrana    Procedure(s) Performed: Procedure(s) (LRB):  CYSTOSCOPY, WITH RETROGRADE PYELOGRAM AND URETERAL STENT INSERTION (Left)    Final Anesthesia Type: general      Patient location during evaluation: PACU  Patient participation: Yes- Able to Participate  Level of consciousness: awake and alert and oriented  Post-procedure vital signs: reviewed and stable  Pain management: adequate  Airway patency: patent    PONV status at discharge: No PONV  Anesthetic complications: no      Cardiovascular status: blood pressure returned to baseline  Respiratory status: unassisted, spontaneous ventilation and room air  Hydration status: euvolemic  Follow-up not needed.          Vitals Value Taken Time   BP 85/54 04/10/22 1615   Temp 36.7 °C (98.1 °F) 04/10/22 1610   Pulse 90 04/10/22 1617   Resp 15 04/10/22 1617   SpO2 92 % 04/10/22 1617   Vitals shown include unvalidated device data.      No case tracking events are documented in the log.      Pain/Hayde Score: Pain Rating Prior to Med Admin: 4 (4/10/2022 12:42 PM)  Hayde Score: 4 (4/10/2022  4:10 PM)

## 2022-04-10 NOTE — H&P
[facility] Medicine  History & Physical    Patient Name: Doris Pastrana  MRN: [id number]  Patient Class: IP- Inpatient  Admission Date: 4/10/2022  Attending Physician: Emily Adhikari MD  Primary Care Provider: Tor Garg MD         Patient information was obtained from patient, relative(s), past medical records and ER records.     Subjective:     Principal Problem:Renal calculus    Chief Complaint:   Chief Complaint   Patient presents with    Flank Pain     Left         HPI: Doris Pastrana is a 72 y/o F with a past medical history significant for HTN, HLD, and DM2 who presented to the ED with c/o left flank pain accompanied by nausea and vomiting which began 2 days ago.  Patient is accompanied by her daughter who says that patient felt warm but they did not have a thermometer to check her temperature.  Patient states that she has a history of kidney stones and this presentation is similar to previous episodes.  CT scan reveals moderate left hydronephrosis and hydroureter secondary to an 8 mm calculus in the proximal lumbar ureter.  She was given IV Dilaudid, IV fentanyl, and IV Toradol while in the emergency department and currently states her pain is 2/10. The ED physician discussed the case with Dr. Redman who is on for urology today.  Patient will be held NPO for possible stent placement today.        Past Medical History:   Diagnosis Date    Acute sinus infection     Allergy     Anemia     Arthritis     Bronchitis     Degenerative disc disease     lumbar, pain contract 1/28/2013    Depression     Diabetes mellitus, type 2 2/25/16    Fatty liver     Hyperlipidemia 2/25/16    Hypertension     Kidney stone     Mitral valve prolapse     Pleurisy     Pneumonia     PONV (postoperative nausea and vomiting)     Sinus infection     TIA (transient ischemic attack) 03/28/2019       Past Surgical History:   Procedure Laterality Date    ANKLE SURGERY  08/09/2017     right ankle torn ligament    APPENDECTOMY       SECTION      CHOLECYSTECTOMY      COLONOSCOPY  8/13/15    Dr. Oliveira, five year recheck    COLONOSCOPY N/A 2021    Procedure: COLONOSCOPY;  Surgeon: Tevin Oliveira MD;  Location: [facility];  Service: Endoscopy;  Laterality: N/A;    EPIDURAL STEROID INJECTION INTO CERVICAL SPINE N/A 1/3/2020    Procedure: Injection-steroid-epidural-cervical;  Surgeon: Eddi Albrecht MD;  [facility]: [facility];  Service: Pain Management;  Laterality: N/A;  C7-T1    EPIDURAL STEROID INJECTION INTO CERVICAL SPINE N/A 3/10/2020    Procedure: Injection-steroid-epidural-cervical;  Surgeon: Eddi Albrecht MD;  Location: [facility] OR;  Service: Pain Management;  Laterality: N/A;  C7-T1    EPIDURAL STEROID INJECTION INTO CERVICAL SPINE N/A 2020    Procedure: Injection-steroid-epidural-cervical;  Surgeon: Eddi Albrecht MD;  Location: [facility] OR;  Service: Pain Management;  Laterality: N/A;  C7-T1     EXTRACORPOREAL SHOCK WAVE LITHOTRIPSY Left 2019    Procedure: LITHOTRIPSY, ESWL - only if stone visible on xray  with cysto after on table possible;  Surgeon: Marisol Stewart MD;  Location: [facility] OR;  Service: Urology;  Laterality: Left;    HYSTERECTOMY      OOPHORECTOMY      TONSILLECTOMY      VAGINAL DELIVERY         Review of patient's allergies indicates:   Allergen Reactions    Omnicef [cefdinir] Hives    Codeine Nausea Only    Rhubarb Swelling    Strawberries [strawberry] Hives    Iodine Rash       Other reaction(s): Nausea    Latex, natural rubber Rash    Pravastatin Other (See Comments)     Muscle pain        Current Facility-Administered Medications on File Prior to Encounter   Medication    dexamethasone injection 8 mg     Current Outpatient Medications on File Prior to Encounter   Medication Sig    ACCU-CHEK JORGE PLUS TEST STRP Strp TEST BLOOD SUGAR TWICE DAILY    albuterol (PROVENTIL/VENTOLIN HFA) 90 mcg/actuation inhaler Inhale 2 puffs into the  lungs 4 (four) times daily.    ALPRAZolam (XANAX) 0.25 MG tablet Use one 20-30 minutes before flying prn    amLODIPine (NORVASC) 10 MG tablet TAKE 1 TABLET EVERY DAY    BD ALCOHOL SWABS PadM USE TWICE DAILY AS NEEDED AS DIRECTED    blood-glucose meter kit Use as instructed    cetirizine (ZYRTEC) 10 MG tablet Take 10 mg by mouth daily as needed.     cyclobenzaprine (FLEXERIL) 10 MG tablet TAKE 1 TABLET (10 MG TOTAL) BY MOUTH 3 (THREE) TIMES DAILY AS NEEDED.    diclofenac sodium (VOLTAREN) 1 % Gel APPLY 2.5 GRAMS TO THE AFFECTED AREA 3-4 TIMES DAILY STARTING 6 WEEKS POST SURGERY.    dulaglutide (TRULICITY) 1.5 mg/0.5 mL pen injector Inject 1.5 mg into the skin every 7 days.    ergocalciferol (ERGOCALCIFEROL) 50,000 unit Cap Take one twice a week    EScitalopram oxalate (LEXAPRO) 10 MG tablet Take 1 tablet (10 mg total) by mouth once daily.    fluticasone propionate (FLONASE) 50 mcg/actuation nasal spray USE 2 SPRAYS IN EACH NOSTRIL EVERY DAY    gabapentin (NEURONTIN) 300 MG capsule Take 3 capsules (900 mg total) by mouth every evening.    lancets (TRUEPLUS LANCETS) 28 gauge Misc TEST TWO TIMES DAILY    losartan (COZAAR) 100 MG tablet TAKE 1 TABLET (100 MG TOTAL) BY MOUTH ONCE DAILY.    meclizine (ANTIVERT) 25 mg tablet Take 1 tablet (25 mg total) by mouth 3 (three) times daily as needed.    meloxicam (MOBIC) 15 MG tablet TAKE 1 TABLET EVERY DAY (Patient not taking: Reported on 1/10/2022)    metFORMIN (GLUCOPHAGE-XR) 750 MG ER 24hr tablet TAKE 1 TABLET TWICE DAILY WITH MEALS    ondansetron (ZOFRAN) 4 MG tablet Take 1 tablet (4 mg total) by mouth every 6 (six) hours. (Patient not taking: No sig reported)    rosuvastatin (CRESTOR) 40 MG Tab TAKE 1 TABLET (40 MG TOTAL) BY MOUTH EVERY EVENING.    SANARE ADV SCAR THERAPY BASE Gel APPLY 1/2 GRAM (1 PUMP) TO AFFECTED AREAS TWICE DAILY STARTING 6 WEEKS POST SURGERY.    tamsulosin (FLOMAX) 0.4 mg Cap TAKE 1 CAPSULE (0.4 MG TOTAL) BY MOUTH ONCE DAILY.      Family History       Problem Relation (Age of Onset)    Alcohol abuse Father, Sister    Aneurysm Brother    Arthritis Mother, Maternal Grandmother    Atrial fibrillation Sister    Breast cancer Paternal Grandmother    Cancer Sister, Brother, Maternal Grandfather    Depression Father, Sister, Brother    Diabetes Mother, Sister, Brother, Paternal Grandmother    Early death Father    Glaucoma Maternal Grandmother    Heart disease Sister, Maternal Grandmother, Paternal Grandfather, Maternal Uncle    Hypertension Mother, Brother, Maternal Grandmother    Kidney disease Maternal Uncle, Paternal Aunt    Learning disabilities Daughter    Vision loss Mother          Tobacco Use    Smoking status: Former Smoker     Packs/day: 1.00     Types: Cigarettes     Quit date: 3/12/1983     Years since quittin.1    Smokeless tobacco: Never Used   Substance and Sexual Activity    Alcohol use: Yes     Comment: socially    Drug use: No    Sexual activity: Not Currently     Review of Systems   Constitutional:  Positive for appetite change and fever (subjective). Negative for chills.   HENT:  Negative for congestion and postnasal drip.    Respiratory:  Negative for cough and shortness of breath.    Cardiovascular:  Negative for chest pain and palpitations.   Gastrointestinal:  Positive for abdominal pain, nausea and vomiting. Negative for diarrhea.   Genitourinary:  Positive for flank pain and pelvic pain.   Musculoskeletal:  Positive for back pain. Negative for neck pain.   Skin:  Negative for pallor and rash.   Neurological:  Negative for weakness and headaches.   Hematological:  Negative for adenopathy.   Psychiatric/Behavioral:  Negative for agitation and confusion.    All other systems reviewed and are negative.  Objective:     Vital Signs (Most Recent):  Temp: 98.2 °F (36.8 °C) (04/10/22 1100)  Pulse: 95 (04/10/22 1215)  Resp: 16 (04/10/22 1213)  BP: 120/64 (04/10/22 1215)  SpO2: (!) 92 % (04/10/22 1215)   Vital Signs  (24h Range):  Temp:  [98.2 °F (36.8 °C)] 98.2 °F (36.8 °C)  Pulse:  [] 95  Resp:  [16-20] 16  SpO2:  [92 %-95 %] 92 %  BP: (120)/(59-71) 120/64     Weight: 81.6 kg (180 lb)  Body mass index is 29.95 kg/m².    Physical Exam  Vitals and nursing note reviewed.   Constitutional:       Appearance: She is well-developed.   Cardiovascular:      Rate and Rhythm: Normal rate and regular rhythm.   Pulmonary:      Effort: Pulmonary effort is normal.      Breath sounds: Normal breath sounds.   Abdominal:      General: Bowel sounds are normal. There is no distension.      Palpations: Abdomen is soft.      Tenderness: There is no abdominal tenderness.   Musculoskeletal:      Cervical back: Normal range of motion and neck supple.   Skin:     General: Skin is warm and dry.      Findings: No rash.           Significant Labs: All pertinent labs within the past 24 hours have been reviewed.  CBC:   Recent Labs   Lab 04/10/22  1137   WBC 12.19   HGB 13.4   HCT 41.8        CMP:   Recent Labs   Lab 04/10/22  1137      K 4.1      CO2 24   *   BUN 16   CREATININE 1.1   CALCIUM 9.7   PROT 7.5   ALBUMIN 3.9   BILITOT 1.8*   ALKPHOS 118   AST 15   ALT 21   ANIONGAP 13   EGFRNONAA 51*       Significant Imaging: I have reviewed all pertinent imaging results/findings within the past 24 hours.  CT renal stone protocol:  Moderate left hydronephrosis and hydroureter secondary to an 8 mm calculus in the proximal lumbar ureter.  Bilateral nephrolithiasis.     Fatty infiltration of the liver.  Atherosclerosis.  Colonic diverticula.  Previous hysterectomy and cholecystectomy.      Assessment/Plan:     * Renal calculus  8 mm calculus in the proximal lumbar ureter.   Pain control-currently requiring IV narcotics for pain control  IV antiemetics as needed  Consult urology  NPO        Hypertension associated with diabetes  Chronic, controlled.  Latest blood pressure and vitals reviewed-   Temp:  [98.2 °F (36.8 °C)]   Pulse:   []   Resp:  [16-20]   BP: (120)/(59-71)   SpO2:  [92 %-95 %] .   Home meds for hypertension were reviewed and noted below.   Hypertension Medications             amLODIPine (NORVASC) 10 MG tablet TAKE 1 TABLET EVERY DAY    losartan (COZAAR) 100 MG tablet TAKE 1 TABLET (100 MG TOTAL) BY MOUTH ONCE DAILY.          While in the hospital, will manage blood pressure as follows; Continue home antihypertensive regimen    Will utilize p.r.n. blood pressure medication only if patient's blood pressure greater than  180/110 and she develops symptoms such as worsening chest pain or shortness of breath.      Hyperlipidemia associated with type 2 diabetes mellitus   Patient is chronically on statin.will continue for now. Monitor clinically. Last LDL was   Lab Results   Component Value Date    LDLCALC 52.0 (L) 06/14/2021          Diabetes mellitus, type 2  Patient's FSGs are controlled on current medication regimen.  Last A1c reviewed-   Lab Results   Component Value Date    HGBA1C 6.4 (H) 01/19/2022     Most recent fingerstick glucose reviewed- No results for input(s): POCTGLUCOSE in the last 24 hours.  Current correctional scale  Medium  Maintain anti-hyperglycemic dose as follows-   Antihyperglycemics (From admission, onward)            SSI        Hold Oral hypoglycemics while patient is in the hospital.          VTE Risk Mitigation (From admission, onward)    CHANEL Dawkins  Department of Hospital Medicine   [facility] - Emergency Dept

## 2022-04-10 NOTE — ASSESSMENT & PLAN NOTE
8 mm calculus in the proximal lumbar ureter.   Pain control-currently requiring IV narcotics for pain control  IV antiemetics as needed  Consult urology  NPO

## 2022-04-10 NOTE — CONSULTS
[facility] Urology Consult Note:    Doris Pastrana is a 71 y.o. female who presents for left nephrolithiasis.    Patient with a history of DM, HTN, nephrolithiasis and TIA who presented to the emergency department on 4/10/22 complaining of left flank pain associated with nausea and emesis for approximately 2 days.     CT renal stone revealed a left proximal 8 mm ureteral stone with hydroureteronephrosis. Also with bilateral non-obstructing stones. Urology consulted to assist with management.     WBC 12 and Creatinine 1.1.     She has a history of kidney stones s/p ESWL in the past with Dr. Stewart. States she also passed a stone previously.     Denies any fever, chills, gross hematuria, recent urinary tract infection,  trauma or history of  malignancy.       Past Medical History:   Diagnosis Date    Acute sinus infection     Allergy     Anemia     Arthritis     Bronchitis     Degenerative disc disease     lumbar, pain contract 2013    Depression     Diabetes mellitus, type 2 16    Fatty liver     Hyperlipidemia 16    Hypertension     Kidney stone     Mitral valve prolapse     Pleurisy     Pneumonia     PONV (postoperative nausea and vomiting)     Sinus infection     TIA (transient ischemic attack) 2019       Past Surgical History:   Procedure Laterality Date    ANKLE SURGERY  2017    right ankle torn ligament    APPENDECTOMY       SECTION      CHOLECYSTECTOMY      COLONOSCOPY  8/13/15    Dr. Oliveira, five year recheck    COLONOSCOPY N/A 2021    Procedure: COLONOSCOPY;  Surgeon: Tevin Oliveira MD;  Location: [facility];  Service: Endoscopy;  Laterality: N/A;    EPIDURAL STEROID INJECTION INTO CERVICAL SPINE N/A 1/3/2020    Procedure: Injection-steroid-epidural-cervical;  Surgeon: Eddi Albrecht MD;  Location: [facility] OR;  Service: Pain Management;  Laterality: N/A;  C7-T1    EPIDURAL STEROID INJECTION INTO CERVICAL SPINE N/A 3/10/2020     Procedure: Injection-steroid-epidural-cervical;  Surgeon: Eddi Albrecht MD;  Location: [facility] OR;  Service: Pain Management;  Laterality: N/A;  C7-T1    EPIDURAL STEROID INJECTION INTO CERVICAL SPINE N/A 12/29/2020    Procedure: Injection-steroid-epidural-cervical;  Surgeon: Eddi Albrecht MD;  Location: [facility] OR;  Service: Pain Management;  Laterality: N/A;  C7-T1     EXTRACORPOREAL SHOCK WAVE LITHOTRIPSY Left 5/22/2019    Procedure: LITHOTRIPSY, ESWL - only if stone visible on xray  with cysto after on table possible;  Surgeon: Marisol Stewart MD;  Location: [facility] OR;  Service: Urology;  Laterality: Left;    HYSTERECTOMY      OOPHORECTOMY      TONSILLECTOMY      VAGINAL DELIVERY         Family History   Problem Relation Age of Onset    Arthritis Mother     Hypertension Mother     Vision loss Mother     Diabetes Mother     Alcohol abuse Father     Depression Father     Early death Father     Alcohol abuse Sister     Depression Sister     Cancer Sister         thyroid    Diabetes Sister     Heart disease Sister         chf    Atrial fibrillation Sister     Depression Brother     Hypertension Brother     Cancer Brother         prostate/stomach    Diabetes Brother     Aneurysm Brother         aaa    Learning disabilities Daughter     Hypertension Maternal Grandmother     Heart disease Maternal Grandmother     Arthritis Maternal Grandmother     Glaucoma Maternal Grandmother     Cancer Maternal Grandfather         stomach    Diabetes Paternal Grandmother     Breast cancer Paternal Grandmother     Heart disease Paternal Grandfather     Heart disease Maternal Uncle     Kidney disease Maternal Uncle     Kidney disease Paternal Aunt        Review of patient's allergies indicates:   Allergen Reactions    Omnicef [cefdinir] Hives    Codeine Nausea Only    Rhubarb Swelling    Strawberries [strawberry] Hives    Iodine Rash       Other reaction(s): Nausea    Latex, natural rubber Rash     Pravastatin Other (See Comments)     Muscle pain        Medications Reviewed: see MAR    PHYSICAL EXAM:    Vitals:    04/10/22 1450   BP:    Pulse: 83   Resp:    Temp:      Body mass index is 29.95 kg/m². @FLOWAMB(14)@ @FLOWAMB(11)@       General: Alert, cooperative, no distress, appears stated age  Abdomen: Soft, non-tender, no CVA tenderness, non-distended      LABS:    Lab Results   Component Value Date    WBC 12.19 04/10/2022    HGB 13.4 04/10/2022    HCT 41.8 04/10/2022    MCV 86 04/10/2022     04/10/2022       BMP  Lab Results   Component Value Date     04/10/2022    K 4.1 04/10/2022     04/10/2022    CO2 24 04/10/2022    BUN 16 04/10/2022    CREATININE 1.1 04/10/2022    CALCIUM 9.7 04/10/2022    ANIONGAP 13 04/10/2022    ESTGFRAFRICA 59 (A) 04/10/2022    EGFRNONAA 51 (A) 04/10/2022       IMAGING:    CT Images independently reviewed by me        Assessment/Diagnosis:    1. Left ureteral stone - 8 mm  2. Left hydroureteronephrosis  3. Left flank pain    Plans:    - Discussed the etiology and management of the patient's nephrolithiasis. Explained that stone disease is multifactorial and can be secondary to urinary obstruction, urine composition, low urine volume, diet, hypokalemia, DM, hypertension, gout, RTA, UTI and medications. We discussed that stones can be managed with observation, trial of passage, ureteroscopy with laser lithotripsy, ESWL and PCNL. After extensive discussion, patient has decided to proceed with urgent left ureteral stent and retrograde pyelogram placement today. All risks, benefits and alternatives discussed at length with patient.

## 2022-04-10 NOTE — ANESTHESIA PREPROCEDURE EVALUATION
04/10/2022  Doris Pastrana is a 71 y.o., female.      Pre-op Assessment    I have reviewed the Patient Summary Reports.     I have reviewed the Nursing Notes. I have reviewed the NPO Status.   I have reviewed the Medications.     Review of Systems  Anesthesia Hx:  Hx of Anesthetic complications    Social:  Former Smoker    Cardiovascular:   Hypertension hyperlipidemia    Pulmonary:   Pneumonia    Renal/:   Chronic Renal Disease renal calculi    Hepatic/GI:   Liver Disease,    Musculoskeletal:   Arthritis     Neurological:   TIA, Neuromuscular Disease,    Endocrine:   Diabetes    Psych:   Psychiatric History          Physical Exam  General: Well nourished, Cooperative, Alert and Oriented    Airway:  Mallampati: II / II  Mouth Opening: Normal  TM Distance: 4 - 6 cm  Tongue: Normal    Dental:  Intact    Chest/Lungs:  Clear to auscultation, Normal Respiratory Rate    Heart:  Rate: Normal  Rhythm: Regular Rhythm  Sounds: Normal        Anesthesia Plan  Type of Anesthesia, risks & benefits discussed:    Anesthesia Type: Gen Supraglottic Airway  Intra-op Monitoring Plan: Standard ASA Monitors  Induction:  IV  Airway Plan: Via Tracheostomy  Informed Consent: Informed consent signed with the Patient and all parties understand the risks and agree with anesthesia plan.  All questions answered.   ASA Score: 3 Emergent    Ready For Surgery From Anesthesia Perspective.     .

## 2022-04-10 NOTE — SUBJECTIVE & OBJECTIVE
Past Medical History:   Diagnosis Date    Acute sinus infection     Allergy     Anemia     Arthritis     Bronchitis     Degenerative disc disease     lumbar, pain contract 2013    Depression     Diabetes mellitus, type 2 16    Fatty liver     Hyperlipidemia 16    Hypertension     Kidney stone     Mitral valve prolapse     Pleurisy     Pneumonia     PONV (postoperative nausea and vomiting)     Sinus infection     TIA (transient ischemic attack) 2019       Past Surgical History:   Procedure Laterality Date    ANKLE SURGERY  2017    right ankle torn ligament    APPENDECTOMY       SECTION      CHOLECYSTECTOMY      COLONOSCOPY  8/13/15    Dr. Oliveira, five year recheck    COLONOSCOPY N/A 2021    Procedure: COLONOSCOPY;  Surgeon: Tevin Oliveira MD;  Location: [facility];  Service: Endoscopy;  Laterality: N/A;    EPIDURAL STEROID INJECTION INTO CERVICAL SPINE N/A 1/3/2020    Procedure: Injection-steroid-epidural-cervical;  Surgeon: Eddi Albrecht MD;  [facility];  Service: Pain Management;  Laterality: N/A;  C7-T1    EPIDURAL STEROID INJECTION INTO CERVICAL SPINE N/A 3/10/2020    Procedure: Injection-steroid-epidural-cervical;  Surgeon: Eddi Albrecht MD;  [facility];  Service: Pain Management;  Laterality: N/A;  C7-T1    EPIDURAL STEROID INJECTION INTO CERVICAL SPINE N/A 2020    Procedure: Injection-steroid-epidural-cervical;  Surgeon: Eddi Albrecht MD;  [facility];  Service: Pain Management;  Laterality: N/A;  C7-T1     EXTRACORPOREAL SHOCK WAVE LITHOTRIPSY Left 2019    Procedure: LITHOTRIPSY, ESWL - only if stone visible on xray  with cysto after on table possible;  Surgeon: Marisol Stewart MD;  Location: [facility] OR;  Service: Urology;  Laterality: Left;    HYSTERECTOMY      OOPHORECTOMY      TONSILLECTOMY      VAGINAL DELIVERY         Review of patient's allergies indicates:   Allergen Reactions    Omnicef [cefdinir] Hives    Codeine Nausea Only     Rhubarb Swelling    Strawberries [strawberry] Hives    Iodine Rash       Other reaction(s): Nausea    Latex, natural rubber Rash    Pravastatin Other (See Comments)     Muscle pain        Current Facility-Administered Medications on File Prior to Encounter   Medication    dexamethasone injection 8 mg     Current Outpatient Medications on File Prior to Encounter   Medication Sig    ACCU-CHEK JORGE PLUS TEST STRP Strp TEST BLOOD SUGAR TWICE DAILY    albuterol (PROVENTIL/VENTOLIN HFA) 90 mcg/actuation inhaler Inhale 2 puffs into the lungs 4 (four) times daily.    ALPRAZolam (XANAX) 0.25 MG tablet Use one 20-30 minutes before flying prn    amLODIPine (NORVASC) 10 MG tablet TAKE 1 TABLET EVERY DAY    BD ALCOHOL SWABS PadM USE TWICE DAILY AS NEEDED AS DIRECTED    blood-glucose meter kit Use as instructed    cetirizine (ZYRTEC) 10 MG tablet Take 10 mg by mouth daily as needed.     cyclobenzaprine (FLEXERIL) 10 MG tablet TAKE 1 TABLET (10 MG TOTAL) BY MOUTH 3 (THREE) TIMES DAILY AS NEEDED.    diclofenac sodium (VOLTAREN) 1 % Gel APPLY 2.5 GRAMS TO THE AFFECTED AREA 3-4 TIMES DAILY STARTING 6 WEEKS POST SURGERY.    dulaglutide (TRULICITY) 1.5 mg/0.5 mL pen injector Inject 1.5 mg into the skin every 7 days.    ergocalciferol (ERGOCALCIFEROL) 50,000 unit Cap Take one twice a week    EScitalopram oxalate (LEXAPRO) 10 MG tablet Take 1 tablet (10 mg total) by mouth once daily.    fluticasone propionate (FLONASE) 50 mcg/actuation nasal spray USE 2 SPRAYS IN EACH NOSTRIL EVERY DAY    gabapentin (NEURONTIN) 300 MG capsule Take 3 capsules (900 mg total) by mouth every evening.    lancets (TRUEPLUS LANCETS) 28 gauge Misc TEST TWO TIMES DAILY    losartan (COZAAR) 100 MG tablet TAKE 1 TABLET (100 MG TOTAL) BY MOUTH ONCE DAILY.    meclizine (ANTIVERT) 25 mg tablet Take 1 tablet (25 mg total) by mouth 3 (three) times daily as needed.    meloxicam (MOBIC) 15 MG tablet TAKE 1 TABLET EVERY DAY (Patient not taking: Reported on 1/10/2022)     metFORMIN (GLUCOPHAGE-XR) 750 MG ER 24hr tablet TAKE 1 TABLET TWICE DAILY WITH MEALS    ondansetron (ZOFRAN) 4 MG tablet Take 1 tablet (4 mg total) by mouth every 6 (six) hours. (Patient not taking: No sig reported)    rosuvastatin (CRESTOR) 40 MG Tab TAKE 1 TABLET (40 MG TOTAL) BY MOUTH EVERY EVENING.    SANARE ADV SCAR THERAPY BASE Gel APPLY 1/2 GRAM (1 PUMP) TO AFFECTED AREAS TWICE DAILY STARTING 6 WEEKS POST SURGERY.    tamsulosin (FLOMAX) 0.4 mg Cap TAKE 1 CAPSULE (0.4 MG TOTAL) BY MOUTH ONCE DAILY.     Family History       Problem Relation (Age of Onset)    Alcohol abuse Father, Sister    Aneurysm Brother    Arthritis Mother, Maternal Grandmother    Atrial fibrillation Sister    Breast cancer Paternal Grandmother    Cancer Sister, Brother, Maternal Grandfather    Depression Father, Sister, Brother    Diabetes Mother, Sister, Brother, Paternal Grandmother    Early death Father    Glaucoma Maternal Grandmother    Heart disease Sister, Maternal Grandmother, Paternal Grandfather, Maternal Uncle    Hypertension Mother, Brother, Maternal Grandmother    Kidney disease Maternal Uncle, Paternal Aunt    Learning disabilities Daughter    Vision loss Mother          Tobacco Use    Smoking status: Former Smoker     Packs/day: 1.00     Types: Cigarettes     Quit date: 3/12/1983     Years since quittin.1    Smokeless tobacco: Never Used   Substance and Sexual Activity    Alcohol use: Yes     Comment: socially    Drug use: No    Sexual activity: Not Currently     Review of Systems   Constitutional:  Positive for appetite change and fever (subjective). Negative for chills.   HENT:  Negative for congestion and postnasal drip.    Respiratory:  Negative for cough and shortness of breath.    Cardiovascular:  Negative for chest pain and palpitations.   Gastrointestinal:  Positive for abdominal pain, nausea and vomiting. Negative for diarrhea.   Genitourinary:  Positive for flank pain and pelvic pain.   Musculoskeletal:   Positive for back pain. Negative for neck pain.   Skin:  Negative for pallor and rash.   Neurological:  Negative for weakness and headaches.   Hematological:  Negative for adenopathy.   Psychiatric/Behavioral:  Negative for agitation and confusion.    All other systems reviewed and are negative.  Objective:     Vital Signs (Most Recent):  Temp: 98.2 °F (36.8 °C) (04/10/22 1100)  Pulse: 95 (04/10/22 1215)  Resp: 16 (04/10/22 1213)  BP: 120/64 (04/10/22 1215)  SpO2: (!) 92 % (04/10/22 1215)   Vital Signs (24h Range):  Temp:  [98.2 °F (36.8 °C)] 98.2 °F (36.8 °C)  Pulse:  [] 95  Resp:  [16-20] 16  SpO2:  [92 %-95 %] 92 %  BP: (120)/(59-71) 120/64     Weight: 81.6 kg (180 lb)  Body mass index is 29.95 kg/m².    Physical Exam  Vitals and nursing note reviewed.   Constitutional:       Appearance: She is well-developed.   Cardiovascular:      Rate and Rhythm: Normal rate and regular rhythm.   Pulmonary:      Effort: Pulmonary effort is normal.      Breath sounds: Normal breath sounds.   Abdominal:      General: Bowel sounds are normal. There is no distension.      Palpations: Abdomen is soft.      Tenderness: There is no abdominal tenderness.   Musculoskeletal:      Cervical back: Normal range of motion and neck supple.   Skin:     General: Skin is warm and dry.      Findings: No rash.           Significant Labs: All pertinent labs within the past 24 hours have been reviewed.  CBC:   Recent Labs   Lab 04/10/22  1137   WBC 12.19   HGB 13.4   HCT 41.8        CMP:   Recent Labs   Lab 04/10/22  1137      K 4.1      CO2 24   *   BUN 16   CREATININE 1.1   CALCIUM 9.7   PROT 7.5   ALBUMIN 3.9   BILITOT 1.8*   ALKPHOS 118   AST 15   ALT 21   ANIONGAP 13   EGFRNONAA 51*       Significant Imaging: I have reviewed all pertinent imaging results/findings within the past 24 hours.  CT renal stone protocol:  Moderate left hydronephrosis and hydroureter secondary to an 8 mm calculus in the proximal lumbar  ureter.  Bilateral nephrolithiasis.     Fatty infiltration of the liver.  Atherosclerosis.  Colonic diverticula.  Previous hysterectomy and cholecystectomy.

## 2022-04-10 NOTE — HPI
Doris Pastrana is a 72 y/o F with a past medical history significant for HTN, HLD, and DM2 who presented to the ED with c/o left flank pain accompanied by nausea and vomiting which began 2 days ago.  Patient is accompanied by her daughter who says that patient felt warm but they did not have a thermometer to check her temperature.  Patient states that she has a history of kidney stones and this presentation is similar to previous episodes.  CT scan reveals moderate left hydronephrosis and hydroureter secondary to an 8 mm calculus in the proximal lumbar ureter.  She was given IV Dilaudid, IV fentanyl, and IV Toradol while in the emergency department and currently states her pain is 2/10. The ED physician discussed the case with Dr. Redman who is on for urology today.  Patient will be held NPO for possible stent placement today.

## 2022-04-10 NOTE — ED PROVIDER NOTES
Encounter Date: 4/10/2022    SCRIBE #1 NOTE: Sebas PAGAN am scribing for, and in the presence of, Nader Duron MD.       History     Chief Complaint   Patient presents with    Flank Pain     Left      Time seen by provider: 11:04 AM on 04/10/2022    Doris Pastrana is a 71 y.o. female who presents to the ED with an onset of left flank pain accompanied with N/V for the past 2 days. Patient has a hx of KS and suspects she has one currently. She has a hx of cholecystectomy, lithotripsy, and hysterectomy. The patient denies any other symptoms at this time. PMHx of DM-II. She states she took her regular medication today of Amlodipine, Losartan, and Metformin. Patient is a former smoker.     The history is provided by the patient.     Review of patient's allergies indicates:   Allergen Reactions    Omnicef [cefdinir] Hives    Codeine Nausea Only    Rhubarb Swelling    Strawberries [strawberry] Hives    Iodine Rash       Other reaction(s): Nausea    Latex, natural rubber Rash    Pravastatin Other (See Comments)     Muscle pain      Past Medical History:   Diagnosis Date    Acute sinus infection     Allergy     Anemia     Arthritis     Bronchitis     Degenerative disc disease     lumbar, pain contract 2013    Depression     Diabetes mellitus, type 2 16    Fatty liver     Hyperlipidemia 16    Hypertension     Kidney stone     Mitral valve prolapse     Pleurisy     Pneumonia     PONV (postoperative nausea and vomiting)     Sinus infection     TIA (transient ischemic attack) 2019     Past Surgical History:   Procedure Laterality Date    ANKLE SURGERY  2017    right ankle torn ligament    APPENDECTOMY       SECTION      CHOLECYSTECTOMY      COLONOSCOPY  8/13/15    Dr. Oliveira, five year recheck    COLONOSCOPY N/A 2021    Procedure: COLONOSCOPY;  Surgeon: Tevin Oliveira MD;  Location: [facility];  Service: Endoscopy;  Laterality: N/A;     EPIDURAL STEROID INJECTION INTO CERVICAL SPINE N/A 1/3/2020    Procedure: Injection-steroid-epidural-cervical;  Surgeon: Eddi Albrecht MD;  Location: [facility] OR;  Service: Pain Management;  Laterality: N/A;  C7-T1    EPIDURAL STEROID INJECTION INTO CERVICAL SPINE N/A 3/10/2020    Procedure: Injection-steroid-epidural-cervical;  Surgeon: Eddi Albrecht MD;  Location: [facility] OR;  Service: Pain Management;  Laterality: N/A;  C7-T1    EPIDURAL STEROID INJECTION INTO CERVICAL SPINE N/A 12/29/2020    Procedure: Injection-steroid-epidural-cervical;  Surgeon: Eddi Albrecht MD;  Location: [facility] OR;  Service: Pain Management;  Laterality: N/A;  C7-T1     EXTRACORPOREAL SHOCK WAVE LITHOTRIPSY Left 5/22/2019    Procedure: LITHOTRIPSY, ESWL - only if stone visible on xray  with cysto after on table possible;  Surgeon: Marisol Stewart MD;  Location: [facility] OR;  Service: Urology;  Laterality: Left;    HYSTERECTOMY      OOPHORECTOMY      TONSILLECTOMY      VAGINAL DELIVERY       Family History   Problem Relation Age of Onset    Arthritis Mother     Hypertension Mother     Vision loss Mother     Diabetes Mother     Alcohol abuse Father     Depression Father     Early death Father     Alcohol abuse Sister     Depression Sister     Cancer Sister         thyroid    Diabetes Sister     Heart disease Sister         chf    Atrial fibrillation Sister     Depression Brother     Hypertension Brother     Cancer Brother         prostate/stomach    Diabetes Brother     Aneurysm Brother         aaa    Learning disabilities Daughter     Hypertension Maternal Grandmother     Heart disease Maternal Grandmother     Arthritis Maternal Grandmother     Glaucoma Maternal Grandmother     Cancer Maternal Grandfather         stomach    Diabetes Paternal Grandmother     Breast cancer Paternal Grandmother     Heart disease Paternal Grandfather     Heart disease Maternal Uncle     Kidney disease Maternal Uncle     Kidney disease  Paternal Aunt      Social History     Tobacco Use    Smoking status: Former Smoker     Packs/day: 1.00     Types: Cigarettes     Quit date: 3/12/1983     Years since quittin.1    Smokeless tobacco: Never Used   Substance Use Topics    Alcohol use: Yes     Comment: socially    Drug use: No     Review of Systems   Constitutional: Negative for fever.   HENT: Negative for sore throat.    Respiratory: Negative for shortness of breath.    Cardiovascular: Negative for chest pain.   Gastrointestinal: Positive for nausea and vomiting.   Genitourinary: Positive for flank pain. Negative for dysuria.   Musculoskeletal: Negative for back pain.   Skin: Negative for rash.   Neurological: Negative for weakness.   Hematological: Does not bruise/bleed easily.   All other systems reviewed and are negative.      Physical Exam     Initial Vitals [04/10/22 1100]   BP Pulse Resp Temp SpO2   120/71 (!) 122 20 98.2 °F (36.8 °C) 95 %      MAP       --         Physical Exam    Nursing note and vitals reviewed.  Constitutional: She appears well-developed and well-nourished.   Appears uncomfortable and nauseated.    HENT:   Head: Normocephalic and atraumatic.   Eyes: EOM are normal.   Neck: Neck supple.   Normal range of motion.  Cardiovascular: Normal rate, regular rhythm and normal heart sounds. Exam reveals no gallop and no friction rub.    No murmur heard.  Pulmonary/Chest: Breath sounds normal. No respiratory distress. She has no wheezes. She has no rhonchi. She has no rales.   Musculoskeletal:         General: Normal range of motion.      Cervical back: Normal range of motion and neck supple.     Neurological: She is alert and oriented to person, place, and time.   Skin: Skin is warm and dry.   Psychiatric: She has a normal mood and affect. Her behavior is normal. Judgment and thought content normal.         ED Course   Procedures  Labs Reviewed   CBC W/ AUTO DIFFERENTIAL - Abnormal; Notable for the following components:        Result Value    RDW 14.7 (*)     Gran # (ANC) 9.7 (*)     Gran % 79.8 (*)     Lymph % 11.9 (*)     All other components within normal limits   COMPREHENSIVE METABOLIC PANEL - Abnormal; Notable for the following components:    Glucose 138 (*)     Total Bilirubin 1.8 (*)     eGFR if  59 (*)     eGFR if non  51 (*)     All other components within normal limits   URINALYSIS, REFLEX TO URINE CULTURE          Imaging Results          CT Renal Stone Study ABD Pelvis WO (Final result)  Result time 04/10/22 11:50:28    Final result by Genevieve Iverson MD (04/10/22 11:50:28)                 Impression:      Moderate left hydronephrosis and hydroureter secondary to an 8 mm calculus in the proximal lumbar ureter.  Bilateral nephrolithiasis.    Fatty infiltration of the liver.  Atherosclerosis.  Colonic diverticula.  Previous hysterectomy and cholecystectomy.      Electronically signed by: Genevieve Iverson  Date:    04/10/2022  Time:    11:50             Narrative:    EXAMINATION:  CT RENAL STONE STUDY ABD PELVIS WO    CLINICAL HISTORY:  Flank pain, kidney stone suspected; left flank pain    TECHNIQUE:  Low dose axial images, sagittal and coronal reformations were obtained from the lung bases to the pubic symphysis.  Contrast was not administered.    COMPARISON:  09/19/2020    FINDINGS:  The visualized portions of the lung bases are clear.    There is fatty infiltration of the liver.  The gallbladder is surgically absent.  Extrahepatic biliary ductal dilatation appears similar to the previous exam.  The pancreas, adrenal glands, spleen are normal.    There are 2 nonobstructing right upper pole renal calculi measuring 3 mm and 4 mm, respectively.  There are clustered calculi at the lower pole of the left kidney measuring up to 7 mm in size.  There are additional punctate nonobstructing upper and midpole renal calculi on the left.  There is mild to moderate left hydronephrosis and hydroureter  secondary to an 8 mm calculus in the proximal ureter at the level of L2/3.    The aorta is normal in caliber with moderate calcific plaque.    Urinary bladder unremarkable.  Uterus surgically absent.    Distal colonic diverticula are seen.  There is no obstruction or inflammation in the bowel.  No free fluid or free air.                                 Medications   HYDROmorphone (PF) injection 1 mg (1 mg Intravenous Given 4/10/22 1122)   ondansetron 4 mg/2 mL injection (4 mg Intravenous Given 4/10/22 1147)   fentaNYL 50 mcg/mL injection 50 mcg (50 mcg Intravenous Given 4/10/22 1213)   ketorolac injection 15 mg (15 mg Intravenous Given 4/10/22 1242)   tamsulosin 24 hr capsule 0.4 mg (0.4 mg Oral Given 4/10/22 1242)     Medical Decision Making:   History:   Old Medical Records: I decided to obtain old medical records.  Clinical Tests:   Lab Tests: Ordered and Reviewed  Radiological Study: Ordered and Reviewed          Scribe Attestation:   Scribe #1: I performed the above scribed service and the documentation accurately describes the services I performed. I attest to the accuracy of the note.        ED Course as of 04/10/22 1351   Sun Apr 10, 2022   1102 BP: 120/71 [EF]   1102 Temp: 98.2 °F (36.8 °C) [EF]   1102 Temp src: Oral [EF]   1102 Pulse(!): 122 [EF]   1102 Resp: 20 [EF]   1102 SpO2: 95 % [EF]   1157 CT Renal Stone Study ABD Pelvis WO [EF]   1337 Nilda with hospital medicine to admit [EF]   1344 Dr mishra urology consulted by phone [EF]      ED Course User Index  [EF] Nader Duron MD          I, Dr. Duron, personally performed the services described in this documentation. All medical record entries made by the scribe were at my direction and in my presence.  I have reviewed the chart and agree that the record reflects my personal performance and is accurate and complete.1:51 PM 04/10/2022        Clinical Impression:   Final diagnoses:  [N20.1] Ureterolithiasis          ED Disposition Condition     Observation               71-year-old female presents with left-sided flank pain.  CT demonstrates proximal 8 mm stone.  Pain is better at this time but continues.  Patient does not feel comfortable being discharged home.  She will be admitted to Hospital Medicine with Urology consultation.  She is unlikely to pass a stone of this size.     Nader Duron MD  04/10/22 0009

## 2022-04-11 VITALS
WEIGHT: 190.5 LBS | OXYGEN SATURATION: 98 % | TEMPERATURE: 98 F | SYSTOLIC BLOOD PRESSURE: 139 MMHG | DIASTOLIC BLOOD PRESSURE: 70 MMHG | HEIGHT: 65 IN | RESPIRATION RATE: 18 BRPM | BODY MASS INDEX: 31.74 KG/M2 | HEART RATE: 83 BPM

## 2022-04-11 LAB
ANION GAP SERPL CALC-SCNC: 12 MMOL/L (ref 8–16)
BASOPHILS # BLD AUTO: 0.01 K/UL (ref 0–0.2)
BASOPHILS NFR BLD: 0.1 % (ref 0–1.9)
BUN SERPL-MCNC: 20 MG/DL (ref 8–23)
CALCIUM SERPL-MCNC: 9 MG/DL (ref 8.7–10.5)
CHLORIDE SERPL-SCNC: 105 MMOL/L (ref 95–110)
CO2 SERPL-SCNC: 24 MMOL/L (ref 23–29)
CREAT SERPL-MCNC: 0.8 MG/DL (ref 0.5–1.4)
DIFFERENTIAL METHOD: ABNORMAL
EOSINOPHIL # BLD AUTO: 0 K/UL (ref 0–0.5)
EOSINOPHIL NFR BLD: 0 % (ref 0–8)
ERYTHROCYTE [DISTWIDTH] IN BLOOD BY AUTOMATED COUNT: 14.6 % (ref 11.5–14.5)
EST. GFR  (AFRICAN AMERICAN): >60 ML/MIN/1.73 M^2
EST. GFR  (NON AFRICAN AMERICAN): >60 ML/MIN/1.73 M^2
GLUCOSE SERPL-MCNC: 148 MG/DL (ref 70–110)
HCT VFR BLD AUTO: 38.4 % (ref 37–48.5)
HGB BLD-MCNC: 12.2 G/DL (ref 12–16)
IMM GRANULOCYTES # BLD AUTO: 0.03 K/UL (ref 0–0.04)
IMM GRANULOCYTES NFR BLD AUTO: 0.4 % (ref 0–0.5)
LYMPHOCYTES # BLD AUTO: 0.6 K/UL (ref 1–4.8)
LYMPHOCYTES NFR BLD: 8.5 % (ref 18–48)
MAGNESIUM SERPL-MCNC: 1.9 MG/DL (ref 1.6–2.6)
MCH RBC QN AUTO: 27.7 PG (ref 27–31)
MCHC RBC AUTO-ENTMCNC: 31.8 G/DL (ref 32–36)
MCV RBC AUTO: 87 FL (ref 82–98)
MONOCYTES # BLD AUTO: 0.3 K/UL (ref 0.3–1)
MONOCYTES NFR BLD: 4 % (ref 4–15)
NEUTROPHILS # BLD AUTO: 6.2 K/UL (ref 1.8–7.7)
NEUTROPHILS NFR BLD: 87 % (ref 38–73)
NRBC BLD-RTO: 0 /100 WBC
PLATELET # BLD AUTO: 218 K/UL (ref 150–450)
PMV BLD AUTO: 10.5 FL (ref 9.2–12.9)
POCT GLUCOSE: 123 MG/DL (ref 70–110)
POTASSIUM SERPL-SCNC: 4.2 MMOL/L (ref 3.5–5.1)
RBC # BLD AUTO: 4.41 M/UL (ref 4–5.4)
SODIUM SERPL-SCNC: 141 MMOL/L (ref 136–145)
WBC # BLD AUTO: 7.17 K/UL (ref 3.9–12.7)

## 2022-04-11 PROCEDURE — 94799 UNLISTED PULMONARY SVC/PX: CPT

## 2022-04-11 PROCEDURE — 94761 N-INVAS EAR/PLS OXIMETRY MLT: CPT

## 2022-04-11 PROCEDURE — 83735 ASSAY OF MAGNESIUM: CPT | Performed by: NURSE PRACTITIONER

## 2022-04-11 PROCEDURE — 85025 COMPLETE CBC W/AUTO DIFF WBC: CPT | Performed by: NURSE PRACTITIONER

## 2022-04-11 PROCEDURE — 36415 COLL VENOUS BLD VENIPUNCTURE: CPT | Performed by: NURSE PRACTITIONER

## 2022-04-11 PROCEDURE — 63600175 PHARM REV CODE 636 W HCPCS: Performed by: NURSE PRACTITIONER

## 2022-04-11 PROCEDURE — 99900035 HC TECH TIME PER 15 MIN (STAT)

## 2022-04-11 PROCEDURE — 25000003 PHARM REV CODE 250: Performed by: NURSE PRACTITIONER

## 2022-04-11 PROCEDURE — 80048 BASIC METABOLIC PNL TOTAL CA: CPT | Performed by: NURSE PRACTITIONER

## 2022-04-11 PROCEDURE — 25000242 PHARM REV CODE 250 ALT 637 W/ HCPCS: Performed by: NURSE PRACTITIONER

## 2022-04-11 RX ORDER — SULFAMETHOXAZOLE AND TRIMETHOPRIM 800; 160 MG/1; MG/1
1 TABLET ORAL 2 TIMES DAILY
Qty: 6 TABLET | Refills: 0 | Status: SHIPPED | OUTPATIENT
Start: 2022-04-11 | End: 2022-04-14

## 2022-04-11 RX ORDER — HYDROCODONE BITARTRATE AND ACETAMINOPHEN 5; 325 MG/1; MG/1
1 TABLET ORAL EVERY 6 HOURS PRN
Qty: 12 TABLET | Refills: 0 | Status: SHIPPED | OUTPATIENT
Start: 2022-04-11 | End: 2022-11-17

## 2022-04-11 RX ADMIN — TAMSULOSIN HYDROCHLORIDE 0.4 MG: 0.4 CAPSULE ORAL at 08:04

## 2022-04-11 RX ADMIN — AMLODIPINE BESYLATE 10 MG: 5 TABLET ORAL at 08:04

## 2022-04-11 RX ADMIN — ESCITALOPRAM OXALATE 10 MG: 10 TABLET ORAL at 08:04

## 2022-04-11 RX ADMIN — KETOROLAC TROMETHAMINE 15 MG: 30 INJECTION, SOLUTION INTRAMUSCULAR at 03:04

## 2022-04-11 RX ADMIN — ATORVASTATIN CALCIUM 80 MG: 40 TABLET, FILM COATED ORAL at 08:04

## 2022-04-11 RX ADMIN — LOSARTAN POTASSIUM 100 MG: 25 TABLET, FILM COATED ORAL at 08:04

## 2022-04-11 RX ADMIN — DOCUSATE SODIUM AND SENNOSIDES 1 TABLET: 8.6; 5 TABLET, FILM COATED ORAL at 08:04

## 2022-04-11 RX ADMIN — FLUTICASONE PROPIONATE 100 MCG: 50 SPRAY, METERED NASAL at 08:04

## 2022-04-11 NOTE — DISCHARGE INSTRUCTIONS
Discharge Instructions, [facility] Medicine    Continue to take oral antibiotics and flomax per Dr. Redman orders. Follow up with Dr. Redman on 4/26/22.    Thank you for choosing [facility] for your medical care.     You were admitted to the hospital with Renal calculus.     Please note your discharge instructions, including diet/activity restrictions, follow-up appointments, and medication changes.  If you have any questions about your medical issues, prescriptions, or any other questions, please feel free to contact the [facility] Medicine Dept at [phone] and we will help.    If you are previously with Home health, outpatient PT/OT or under a therapy program, you are cleared to return to those programs.    Please direct all long term medication refills and follow up to your primary care provider, Tor Garg MD. Thank you again for letting us take care of your health care needs.    Please note the following discharge instructions per your discharging physician-  Romulo Mello PA-C

## 2022-04-11 NOTE — PLAN OF CARE
Pain well controlled with prn pain meds.  Voiding red tinged urine without difficulty.    Safety maintained, call light within reach, bed locked and in low position, and side rails up. Patient remains free from injury.      Monitoring and following care plan.

## 2022-04-11 NOTE — PLAN OF CARE
Pt is cleared from  for discharge.       04/11/22 1121   Final Note   Assessment Type Final Discharge Note   Anticipated Discharge Disposition Home   Hospital Resources/Appts/Education Provided Appointments scheduled by Navigator/Coordinator

## 2022-04-11 NOTE — CARE UPDATE
04/11/22 0819   Patient Assessment/Suction   Level of Consciousness (AVPU) alert   Respiratory Effort Unlabored   All Lung Fields Breath Sounds clear   PRE-TX-O2   O2 Device (Oxygen Therapy) room air   SpO2 96 %   Pulse Oximetry Type Intermittent   $ Pulse Oximetry - Multiple Charge Pulse Oximetry - Multiple   Pulse 88   Resp 18   Aerosol Therapy   $ Aerosol Therapy Charges PRN treatment not required   Respiratory Treatment Status (SVN) PRN treatment not required   Incentive Spirometer   $ Incentive Spirometer Charges done with encouragement   Administration (IS) instruction provided, follow-up   Number of Repetitions (IS) 2   Level Incentive Spirometer (mL) 2000   Patient Tolerance (IS) fair

## 2022-04-11 NOTE — PLAN OF CARE
[facility]  Initial Discharge Assessment       Primary Care Provider: Tor Garg MD    Admission Diagnosis: Ureterolithiasis [N20.1]  Nephrolithiasis [N20.0]  Renal calculus [N20.0]    Admission Date: 4/10/2022  Expected Discharge Date: 4/11/2022    Discharge Barriers Identified: None    Cm completed the assessment with pt at bedside. Demographic is current on face sheet. Pt is independent in care. Pt denies POA/LW. Pt denies hh and dme. Pt is a diabetic.  Disposition:  Pt will discharge to home. Daughter or friend will provide transportation on discharge. No needs verbalized at this time.        Payor: [employer] MANAGED MEDICARE / Plan: [employer] MEDICARE HMO / Product Type: Capitation /     Extended Emergency Contact Information  Primary Emergency Contact: Esequiel Camejo  Address: [address] [place] [address] [facility]  Home Phone: [phone]  Mobile Phone: [phone]  Relation: Spouse  Preferred language: English   needed? No  Secondary Emergency Contact: jeffery camejo  Mobile Phone: [phone]  Relation: Daughter  Preferred language: English   needed? No    Discharge Plan A: Home with family  Discharge Plan B: Home with family      [id number] - [place] - [address]  [address] [place] [address]  Phone: [phone] Fax: [phone]    [facility] - [facility] [address] [facility] [address]  [address] [facility] [address]  Phone: [phone] Fax: [phone]      Initial Assessment (most recent)     Adult Discharge Assessment - 04/11/22 1010        Discharge Assessment    Assessment Type Discharge Planning Assessment     Confirmed/corrected address, phone number and insurance Yes     Confirmed Demographics Correct on Facesheet     Source of Information family     Lives With alone     Facility Arrived From: home     Do you expect to return to your current living  situation? Yes     Do you have help at home or someone to help you manage your care at home? Yes     Who are your caregiver(s) and their phone number(s)? srikanth Condon - [phone]     Prior to hospitilization cognitive status: Alert/Oriented     Current cognitive status: Alert/Oriented     Walking or Climbing Stairs Difficulty none     Dressing/Bathing Difficulty none     Equipment Currently Used at Home none     Readmission within 30 days? No     Patient currently being followed by outpatient case management? Yes     If yes, name of outpatient case management following: insurance company assigned oupatient case management     Do you currently have service(s) that help you manage your care at home? No     Do you take prescription medications? Yes     Do you have prescription coverage? Yes     Do you have any problems affording any of your prescribed medications? No     Is the patient taking medications as prescribed? yes     Who is going to help you get home at discharge? daughter or friend     How do you get to doctors appointments? family or friend will provide;car, drives self     Are you on dialysis? No     Do you take coumadin? No     Discharge Plan A Home with family     Discharge Plan B Home with family     DME Needed Upon Discharge  none     Discharge Plan discussed with: Patient     Discharge Barriers Identified None

## 2022-04-11 NOTE — HOSPITAL COURSE
Doris Pastrana is a 71 year old white female presenting for kidney stones. Patient monitored closely throughout course of hospital stay by hospital medicine team. Initial CT scan revealed moderate left hydronephrosis and hydroureter secondary to an 8 mm calculus in the proximal lumbar ureter. Patient pain well controlled on IV pain medication. Urology consulted and help NPO. Dr. Redman performed urgent left ureteral stent and retrograde pyelogram placement on 4/10/22 without complications. Patient scheduled to follow up with Dr. Redman in 2 weeks. Plan to give oral antibiotics, Flomax, and pain medication following discharge per Dr. Redman orders. Patient verbalized understanding of discharge instructions and return precautions.     Physical Exam:  General- Patient alert and oriented x3 in NAD  HEENT- PERRLA, EOMI  Neck- No JVD, Lymphadenopathy, Thyromegaly  CV- Regular rate and rhythm, No Murmur/cassandra/rubs  Resp- Lungs CTA Bilaterally, No increased WOB  GI- appropriate post op tenderness, BS normoactive x4 quads  Extrem- No cyanosis, clubbing, edema. Pulses 2+ and symmetric  Neuro- Strength 5/5 flexors/extensors, DTRs 2+ and symmetric, Intact sensation to light touch grossly

## 2022-04-12 ENCOUNTER — NURSE TRIAGE (OUTPATIENT)
Dept: ADMINISTRATIVE | Facility: CLINIC | Age: 72
End: 2022-04-12
Payer: MEDICARE

## 2022-04-12 ENCOUNTER — PATIENT OUTREACH (OUTPATIENT)
Dept: ADMINISTRATIVE | Facility: CLINIC | Age: 72
End: 2022-04-12
Payer: MEDICARE

## 2022-04-12 ENCOUNTER — TELEPHONE (OUTPATIENT)
Dept: MEDSURG UNIT | Facility: HOSPITAL | Age: 72
End: 2022-04-12
Payer: MEDICARE

## 2022-04-12 LAB — BACTERIA UR CULT: NO GROWTH

## 2022-04-12 RX ORDER — TAMSULOSIN HYDROCHLORIDE 0.4 MG/1
0.4 CAPSULE ORAL DAILY
Qty: 30 CAPSULE | Refills: 0 | Status: SHIPPED | OUTPATIENT
Start: 2022-04-12 | End: 2023-07-14

## 2022-04-12 NOTE — TELEPHONE ENCOUNTER
Called and spoke to patient. Informed patient that pain is normal and expected after having stent placed. Advised patient to increase water. Will forward message to MD for Rx for Flomax.

## 2022-04-12 NOTE — PROGRESS NOTES
C3 nurse attempted to contact Doris Pastrana for a TCC post hospital discharge follow up call. No answer. Left voicemail with callback information. The patient has a scheduled HOSFU appointment with Wen Cabrallo on 4/19/2022 @ 1120AM.

## 2022-04-12 NOTE — DISCHARGE SUMMARY
[facility] Medicine  Discharge Summary      Patient Name: Doris Pastrana  MRN: [id number]  Patient Class: IP- Inpatient  Admission Date: 4/10/2022  Hospital Length of Stay: 1 days  Discharge Date and Time: 4/11/2022  6:03 PM  Attending Physician: Emily Adhikari M.D.  Discharging Provider: Romulo Mello PA-C  Primary Care Provider: Tor Garg MD      HPI:   Doris Pastrana is a 72 y/o F with a past medical history significant for HTN, HLD, and DM2 who presented to the ED with c/o left flank pain accompanied by nausea and vomiting which began 2 days ago.  Patient is accompanied by her daughter who says that patient felt warm but they did not have a thermometer to check her temperature.  Patient states that she has a history of kidney stones and this presentation is similar to previous episodes.  CT scan reveals moderate left hydronephrosis and hydroureter secondary to an 8 mm calculus in the proximal lumbar ureter.  She was given IV Dilaudid, IV fentanyl, and IV Toradol while in the emergency department and currently states her pain is 2/10. The ED physician discussed the case with Dr. Redman who is on for urology today.  Patient will be held NPO for possible stent placement today.        Procedure(s) (LRB):  CYSTOSCOPY, WITH RETROGRADE PYELOGRAM AND URETERAL STENT INSERTION (Left)      Hospital Course:   Doris Pastrana is a 71 year old white female presenting for kidney stones. Patient monitored closely throughout course of hospital stay by hospital medicine team. Initial CT scan revealed moderate left hydronephrosis and hydroureter secondary to an 8 mm calculus in the proximal lumbar ureter. Patient pain well controlled on IV pain medication. Urology consulted and help NPO. Dr. Redman performed urgent left ureteral stent and retrograde pyelogram placement on 4/10/22 without complications. Patient scheduled to follow up with Dr. Redman in 2 weeks. Plan to give oral antibiotics,  Flomax, and pain medication following discharge per Dr. Redman orders. Patient verbalized understanding of discharge instructions and return precautions.     Physical Exam:  General- Patient alert and oriented x3 in NAD  HEENT- PERRLA, EOMI  Neck- No JVD, Lymphadenopathy, Thyromegaly  CV- Regular rate and rhythm, No Murmur/cassandra/rubs  Resp- Lungs CTA Bilaterally, No increased WOB  GI- appropriate post op tenderness, BS normoactive x4 quads  Extrem- No cyanosis, clubbing, edema. Pulses 2+ and symmetric  Neuro- Strength 5/5 flexors/extensors, DTRs 2+ and symmetric, Intact sensation to light touch grossly         Goals of Care Treatment Preferences:  Code Status: Full Code      Consults:     No new Assessment & Plan notes have been filed under this hospital service since the last note was generated.  Service: Hospital Medicine    Final Active Diagnoses:    Diagnosis Date Noted POA    PRINCIPAL PROBLEM:  Renal calculus [N20.0] 04/10/2022 Yes    Hypertension associated with diabetes [E11.59, I15.2] 02/07/2020 Yes    Diabetes mellitus, type 2 [E11.9]  Yes    Hyperlipidemia associated with type 2 diabetes mellitus [E11.69, E78.5] 02/25/2016 Yes      Problems Resolved During this Admission:       Discharged Condition: good    Disposition: Home or Self Care    Follow Up:   Follow-up Information     Jack Redman Jr, MD Follow up.    Specialty: Urology  Contact information:  [address] [place] [address] [address]  [address]  [place] [address] [phone]             Tor Garg MD Follow up in 3 day(s).    Specialty: Family Medicine  Why: hospital follow up  Contact information:  [address]  [address]  [place] [address] [phone]                       Patient Instructions:      Diet diabetic     Notify your health care provider if you experience any of the following:  temperature >100.4     Notify your health care provider if you experience any of the following:  persistent nausea and vomiting or diarrhea     Notify your  health care provider if you experience any of the following:  severe uncontrolled pain     Notify your health care provider if you experience any of the following:  redness, tenderness, or signs of infection (pain, swelling, redness, odor or green/yellow discharge around incision site)     Notify your health care provider if you experience any of the following:  difficulty breathing or increased cough     Notify your health care provider if you experience any of the following:  increased confusion or weakness     Notify your health care provider if you experience any of the following:  persistent dizziness, light-headedness, or visual disturbances     Activity as tolerated       Significant Diagnostic Studies: Labs:   CMP   Recent Labs   Lab 04/11/22  0439      K 4.2      CO2 24   *   BUN 20   CREATININE 0.8   CALCIUM 9.0   ANIONGAP 12   ESTGFRAFRICA >60   EGFRNONAA >60    and CBC   Recent Labs   Lab 04/11/22  0439   WBC 7.17   HGB 12.2   HCT 38.4          Pending Diagnostic Studies:     None         Medications:  Reconciled Home Medications:      Medication List      START taking these medications    HYDROcodone-acetaminophen 5-325 mg per tablet  Commonly known as: NORCO  Take 1 tablet by mouth every 6 (six) hours as needed for Pain.     sulfamethoxazole-trimethoprim 800-160mg 800-160 mg Tab  Commonly known as: BACTRIM DS  Take 1 tablet by mouth 2 (two) times daily. for 3 days        CONTINUE taking these medications    ACCU-CHEK JORGE PLUS TEST STRP Strp  Generic drug: blood sugar diagnostic  TEST BLOOD SUGAR TWICE DAILY     albuterol 90 mcg/actuation inhaler  Commonly known as: PROVENTIL/VENTOLIN HFA  Inhale 2 puffs into the lungs 4 (four) times daily.     ALPRAZolam 0.25 MG tablet  Commonly known as: XANAX  Use one 20-30 minutes before flying prn     amLODIPine 10 MG tablet  Commonly known as: NORVASC  TAKE 1 TABLET EVERY DAY     BD ALCOHOL SWABS Padm  Generic drug: alcohol swabs  USE  TWICE DAILY AS NEEDED AS DIRECTED     blood-glucose meter kit  Use as instructed     cetirizine 10 MG tablet  Commonly known as: ZYRTEC  Take 10 mg by mouth daily as needed.     cyclobenzaprine 10 MG tablet  Commonly known as: FLEXERIL  TAKE 1 TABLET (10 MG TOTAL) BY MOUTH 3 (THREE) TIMES DAILY AS NEEDED.     diclofenac sodium 1 % Gel  Commonly known as: VOLTAREN  APPLY 2.5 GRAMS TO THE AFFECTED AREA 3-4 TIMES DAILY STARTING 6 WEEKS POST SURGERY.     ergocalciferol 50,000 unit Cap  Commonly known as: ERGOCALCIFEROL  Take one twice a week     EScitalopram oxalate 10 MG tablet  Commonly known as: LEXAPRO  Take 1 tablet (10 mg total) by mouth once daily.     fluticasone propionate 50 mcg/actuation nasal spray  Commonly known as: FLONASE  USE 2 SPRAYS IN EACH NOSTRIL EVERY DAY     gabapentin 300 MG capsule  Commonly known as: NEURONTIN  Take 3 capsules (900 mg total) by mouth every evening.     lancets 28 gauge Misc  Commonly known as: TRUEPLUS LANCETS  TEST TWO TIMES DAILY     losartan 100 MG tablet  Commonly known as: COZAAR  TAKE 1 TABLET (100 MG TOTAL) BY MOUTH ONCE DAILY.     meclizine 25 mg tablet  Commonly known as: ANTIVERT  Take 1 tablet (25 mg total) by mouth 3 (three) times daily as needed.     meloxicam 15 MG tablet  Commonly known as: MOBIC  TAKE 1 TABLET EVERY DAY     metFORMIN 750 MG ER 24hr tablet  Commonly known as: GLUCOPHAGE-XR  TAKE 1 TABLET TWICE DAILY WITH MEALS     ondansetron 4 MG tablet  Commonly known as: ZOFRAN  Take 1 tablet (4 mg total) by mouth every 6 (six) hours.     rosuvastatin 40 MG Tab  Commonly known as: CRESTOR  TAKE 1 TABLET (40 MG TOTAL) BY MOUTH EVERY EVENING.     SANARE ADV SCAR THERAPY BASE Gel  Generic drug: gel base no.184 (bulk)  APPLY 1/2 GRAM (1 PUMP) TO AFFECTED AREAS TWICE DAILY STARTING 6 WEEKS POST SURGERY.     tamsulosin 0.4 mg Cap  Commonly known as: FLOMAX  TAKE 1 CAPSULE (0.4 MG TOTAL) BY MOUTH ONCE DAILY.     TRULICITY 1.5 mg/0.5 mL pen injector  Generic drug:  dulaglutide  Inject 1.5 mg into the skin every 7 days.            Indwelling Lines/Drains at time of discharge:   Lines/Drains/Airways     Drain  Duration                Closed/Suction Drain 07/20/20 1125 Right Foot Accordion 10 Fr. 631 days                Time spent on the discharge of patient: 43 minutes         Romulo Mello PA-C  Department of Hospital Medicine  [facility]

## 2022-04-12 NOTE — TELEPHONE ENCOUNTER
Pt escalated for post discharge day 1 symptoms. Pt recently had ureteral stent placed for kidney stone by Dr. Redman. Pt is complaining of left sided pain, worsened by bending over. Pt is having pain with urination and some urinary retention. States sometimes when she urinates she urinates fully and sometimes feels like she only urinates a few drops. Bladder still feels full. Pt is also passing small clots in urine. Pt was prescribed Norco 5-325 for pain. Pt stated that was not helping her pain so she took another half a pill which did help. Pt also has some Tamsulosin at home already that she is wondering if she can take to help. She states they were giving it to her while she was in the hospital but did not send her home with a rx for it. Dispo was ED/UC/or PCP with approval. Spoke to nurse Benavidez at Dr. Redman office who states pain is expected after stent placement. Passing small clots also expected. Pt needs to increase fluid intake. She will speak to Dr. Redman about pt's pain medication and rx for Tamsulosin and she will follow up with pt. Called pt back and advised of instructions and advised that nurse Benavidez will be following up. Pt verbalized understanding. Gave number to OOC for further concerns.    Reason for Disposition   Pain or burning with passing urine (urination) and female   Unable to urinate (or only a few drops) and bladder feels very full    Additional Information   Negative: Sounds like a life-threatening emergency to the triager   Negative: Shock suspected (e.g., cold/pale/clammy skin, too weak to stand, low BP, rapid pulse)   Negative: Sounds like a life-threatening emergency to the triager    Protocols used: POST-OP SYMPTOMS AND [id number], URINATION PAIN - FEMALE-A-OH

## 2022-04-13 ENCOUNTER — TELEPHONE (OUTPATIENT)
Dept: MEDSURG UNIT | Facility: HOSPITAL | Age: 72
End: 2022-04-13
Payer: MEDICARE

## 2022-04-13 NOTE — PROGRESS NOTES
C3 nurse attempted to contact Doris Pastrana for a TCC post hospital discharge follow up call. The patient is unable to conduct the call @ this time. The patient requested a callback.    The patient has a scheduled HOSFU appointment with Wen Carballo  on 4/19/2022 @1120AM.

## 2022-04-13 NOTE — PROGRESS NOTES
C3 nurse spoke with Doris Pastrana for a TCC post hospital discharge follow up call. The patient has a scheduled HOSFU appointment with Wen Carballo on 4/19/2022 @ 1120AM.

## 2022-04-14 NOTE — PHYSICIAN QUERY
PT Name: Doris Pastrana  MR #: [id number]    DOCUMENTATION CLARIFICATION      CDS/: Sarah Gonzalez RN               Contact information: [email or web address]    This form is a permanent document in the medical record.     Query Date: April 14, 2022    By submitting this query, we are merely seeking further clarification of documentation. Please utilize your independent clinical judgment when addressing the question(s) below.    The Medical Record contains the following:   Indicators   Supporting Clinical Findings Location in Medical Record   x Hypertension associated with diabetes documented Hypertension associated with diabetes    Chronic, controlled.  Latest blood pressure and vitals reviewed    While in the hospital, will manage blood pressure as follows; Continue home antihypertensive regimen     Will utilize p.r.n. blood pressure medication only if patient's blood pressure greater than  180/110 and she develops symptoms such as worsening chest pain or shortness of breath. Hosp Med H&P 4/10     Chronic condition(s)      Vital signs     x Treatment/Medication amLODIPine (NORVASC) 10 MG tablet  losartan (COZAAR) 100 MG tablet     Other       Provider, please clarify the relationship between the Hypertension and Diabetes Mellitus    [ x  ] Hypertension is a manifestation of Diabetes Mellitus (Secondary Hypertension)   [   ]  Hypertension is not a manifestation of Diabetes Mellitus (Essential Hypertension)   [   ] Other Clarification (please specify): ____________   [   ] Clinically Undetermined       Please document in your progress notes daily for the duration of treatment, until resolved, and include in your discharge summary.

## 2022-04-16 ENCOUNTER — PATIENT OUTREACH (OUTPATIENT)
Dept: ADMINISTRATIVE | Facility: CLINIC | Age: 72
End: 2022-04-16
Payer: MEDICARE

## 2022-04-16 NOTE — PROGRESS NOTES
Received a Day 5 text escalation in the PD tracker. Attempted to contact the patient via telephone, no answer left voicemail.

## 2022-04-18 ENCOUNTER — NURSE TRIAGE (OUTPATIENT)
Dept: ADMINISTRATIVE | Facility: CLINIC | Age: 72
End: 2022-04-18
Payer: MEDICARE

## 2022-04-18 ENCOUNTER — PATIENT OUTREACH (OUTPATIENT)
Dept: ADMINISTRATIVE | Facility: OTHER | Age: 72
End: 2022-04-18
Payer: MEDICARE

## 2022-04-18 ENCOUNTER — TELEPHONE (OUTPATIENT)
Dept: UROLOGY | Facility: CLINIC | Age: 72
End: 2022-04-18
Payer: MEDICARE

## 2022-04-18 RX ORDER — KETOROLAC TROMETHAMINE 10 MG/1
10 TABLET, FILM COATED ORAL EVERY 6 HOURS
Qty: 20 TABLET | Refills: 0 | Status: SHIPPED | OUTPATIENT
Start: 2022-04-18 | End: 2022-04-23

## 2022-04-18 RX ORDER — ONDANSETRON 4 MG/1
4 TABLET, ORALLY DISINTEGRATING ORAL EVERY 8 HOURS PRN
Qty: 10 TABLET | Refills: 0 | Status: ON HOLD | OUTPATIENT
Start: 2022-04-18 | End: 2023-09-06

## 2022-04-18 NOTE — TELEPHONE ENCOUNTER
Called and spoke to patient. States that she is still experiencing pain. Pain level 5 on scale of 1-10. Patient states that she is also experiencing nausea and taking phenergan. Patient reports bright red blood in urine. Advised to increase water intake and will route to MD for advisement for pain.    Please Advise.

## 2022-04-18 NOTE — PROGRESS NOTES
CHW - Case Closure    This Community Health Worker spoke to Doris Pastrana by telephone today.   Pt/Caregiver reported: Pain medication not working and blood in urine.  Pt/Caregiver denied any additional needs at this time and agrees with episode closure at this time.  Provided Doris Pastrana with Community Health Worker's contact information and encouraged him/her to contact this Community Health Worker if additional needs arise.

## 2022-04-18 NOTE — TELEPHONE ENCOUNTER
----- Message from Lisa Boggs sent at 4/18/2022 11:51 AM CDT -----  Contact: Dylan  Type: Needs Medical Advice    Who Called:KENNY Richardson Triage nurse  Best Call Back Number:[phone]  Additional  Information: Requesting call back regarding pt  Please Advise- Thank you

## 2022-04-18 NOTE — TELEPHONE ENCOUNTER
OOC incoming call - Pt c/o blood in urine since Sunday and norco not controlling her pain.  Protocol followed and pt advised to see a doctor today. Jack Garcia's office notified and nurse Trinidad BUTTERFIELD to contact Pt for continuity of care. Called back Pt after being notified by office nurse and left message that office will contact her to schedule an appointment and give her advise but, if she had any further questions or concerns to call ooc at [phone]. Encounter routed to PCP and pain clinic doc.    Reason for Disposition   Side (flank) or back pain present    Additional Information   Negative: Shock suspected (e.g., cold/pale/clammy skin, too weak to stand, low BP, rapid pulse)   Negative: Sounds like a life-threatening emergency to the triager   Negative: Unable to urinate (or only a few drops) > 4 hours and bladder feels very full (e.g., palpable bladder or strong urge to urinate)   Negative: Passing pure blood or large blood clots (i.e., larger than a dime or grape)   Negative: Fever > 100.4 F (38.0 C)   Negative: Patient sounds very sick or weak to the triager   Negative: Known sickle cell disease   Negative: Taking Coumadin (warfarin) or other strong blood thinner, or known bleeding disorder (e.g., thrombocytopenia)    Protocols used: URINE - BLOOD IN-A-OH

## 2022-04-18 NOTE — TELEPHONE ENCOUNTER
Called and spoke to patient. Informed that Rx for Zofran and Toradol sent to the pharmacy. Patient voiced understanding.

## 2022-04-23 ENCOUNTER — TELEPHONE (OUTPATIENT)
Dept: FAMILY MEDICINE | Facility: CLINIC | Age: 72
End: 2022-04-23
Payer: MEDICARE

## 2022-04-23 NOTE — TELEPHONE ENCOUNTER
I called the patient to schedule their free one hour Enhanced Annual Wellness Visit appointment with Maddy Sims NP

## 2022-04-25 ENCOUNTER — ANESTHESIA EVENT (OUTPATIENT)
Dept: SURGERY | Facility: HOSPITAL | Age: 72
End: 2022-04-25
Payer: MEDICARE

## 2022-04-26 ENCOUNTER — ANESTHESIA (OUTPATIENT)
Dept: SURGERY | Facility: HOSPITAL | Age: 72
End: 2022-04-26
Payer: MEDICARE

## 2022-04-26 ENCOUNTER — HOSPITAL ENCOUNTER (OUTPATIENT)
Facility: HOSPITAL | Age: 72
Discharge: HOME | End: 2022-04-26
Attending: UROLOGY | Admitting: UROLOGY
Payer: MEDICARE

## 2022-04-26 VITALS
TEMPERATURE: 98 F | HEART RATE: 86 BPM | RESPIRATION RATE: 18 BRPM | OXYGEN SATURATION: 97 % | SYSTOLIC BLOOD PRESSURE: 132 MMHG | DIASTOLIC BLOOD PRESSURE: 76 MMHG | BODY MASS INDEX: 29.99 KG/M2 | WEIGHT: 180 LBS | HEIGHT: 65 IN

## 2022-04-26 DIAGNOSIS — N20.0 NEPHROLITHIASIS: Primary | ICD-10-CM

## 2022-04-26 DIAGNOSIS — Z01.818 PRE-OP EXAM: ICD-10-CM

## 2022-04-26 PROCEDURE — 37000008 HC ANESTHESIA 1ST 15 MINUTES: Performed by: UROLOGY

## 2022-04-26 PROCEDURE — C1894 INTRO/SHEATH, NON-LASER: HCPCS | Performed by: UROLOGY

## 2022-04-26 PROCEDURE — 74420 PR  X-RAY RETROGRADE PYELOGRAM: ICD-10-PCS | Mod: 26,,, | Performed by: UROLOGY

## 2022-04-26 PROCEDURE — C2617 STENT, NON-COR, TEM W/O DEL: HCPCS | Performed by: UROLOGY

## 2022-04-26 PROCEDURE — 25000003 PHARM REV CODE 250: Performed by: ANESTHESIOLOGY

## 2022-04-26 PROCEDURE — D9220A PRA ANESTHESIA: Mod: CRNA,,, | Performed by: NURSE ANESTHETIST, CERTIFIED REGISTERED

## 2022-04-26 PROCEDURE — D9220A PRA ANESTHESIA: ICD-10-PCS | Mod: CRNA,,, | Performed by: NURSE ANESTHETIST, CERTIFIED REGISTERED

## 2022-04-26 PROCEDURE — 37000009 HC ANESTHESIA EA ADD 15 MINS: Performed by: UROLOGY

## 2022-04-26 PROCEDURE — D9220A PRA ANESTHESIA: ICD-10-PCS | Mod: ANES,,, | Performed by: ANESTHESIOLOGY

## 2022-04-26 PROCEDURE — 93010 EKG 12-LEAD: ICD-10-PCS | Mod: ,,, | Performed by: SPECIALIST

## 2022-04-26 PROCEDURE — 93010 ELECTROCARDIOGRAM REPORT: CPT | Mod: ,,, | Performed by: SPECIALIST

## 2022-04-26 PROCEDURE — D9220A PRA ANESTHESIA: Mod: ANES,,, | Performed by: ANESTHESIOLOGY

## 2022-04-26 PROCEDURE — C1758 CATHETER, URETERAL: HCPCS | Performed by: UROLOGY

## 2022-04-26 PROCEDURE — 93005 ELECTROCARDIOGRAM TRACING: CPT | Mod: 59

## 2022-04-26 PROCEDURE — 63600175 PHARM REV CODE 636 W HCPCS: Performed by: NURSE ANESTHETIST, CERTIFIED REGISTERED

## 2022-04-26 PROCEDURE — 36000706: Performed by: UROLOGY

## 2022-04-26 PROCEDURE — 82365 CALCULUS SPECTROSCOPY: CPT | Performed by: UROLOGY

## 2022-04-26 PROCEDURE — 71000015 HC POSTOP RECOV 1ST HR: Performed by: UROLOGY

## 2022-04-26 PROCEDURE — 25000003 PHARM REV CODE 250: Performed by: NURSE ANESTHETIST, CERTIFIED REGISTERED

## 2022-04-26 PROCEDURE — 63600175 PHARM REV CODE 636 W HCPCS: Performed by: ANESTHESIOLOGY

## 2022-04-26 PROCEDURE — 71000039 HC RECOVERY, EACH ADD'L HOUR: Performed by: UROLOGY

## 2022-04-26 PROCEDURE — 63600175 PHARM REV CODE 636 W HCPCS: Performed by: UROLOGY

## 2022-04-26 PROCEDURE — 99900103 DSU ONLY-NO CHARGE-INITIAL HR (STAT): Performed by: UROLOGY

## 2022-04-26 PROCEDURE — 71000033 HC RECOVERY, INTIAL HOUR: Performed by: UROLOGY

## 2022-04-26 PROCEDURE — 27201423 OPTIME MED/SURG SUP & DEVICES STERILE SUPPLY: Performed by: UROLOGY

## 2022-04-26 PROCEDURE — 52356 PR CYSTO/URETERO W/LITHOTRIPSY: ICD-10-PCS | Mod: LT,,, | Performed by: UROLOGY

## 2022-04-26 PROCEDURE — C1769 GUIDE WIRE: HCPCS | Performed by: UROLOGY

## 2022-04-26 PROCEDURE — 74420 UROGRAPHY RTRGR +-KUB: CPT | Mod: 26,,, | Performed by: UROLOGY

## 2022-04-26 PROCEDURE — 25500020 PHARM REV CODE 255: Performed by: UROLOGY

## 2022-04-26 PROCEDURE — 36000707: Performed by: UROLOGY

## 2022-04-26 PROCEDURE — 99900104 DSU ONLY-NO CHARGE-EA ADD'L HR (STAT): Performed by: UROLOGY

## 2022-04-26 PROCEDURE — 25000003 PHARM REV CODE 250: Performed by: UROLOGY

## 2022-04-26 PROCEDURE — 52356 CYSTO/URETERO W/LITHOTRIPSY: CPT | Mod: LT,,, | Performed by: UROLOGY

## 2022-04-26 DEVICE — STENT SET URETERAL 6X24CM: Type: IMPLANTABLE DEVICE | Site: URETER | Status: FUNCTIONAL

## 2022-04-26 RX ORDER — PHENAZOPYRIDINE HYDROCHLORIDE 200 MG/1
200 TABLET, FILM COATED ORAL ONCE
Status: COMPLETED | OUTPATIENT
Start: 2022-04-26 | End: 2022-04-26

## 2022-04-26 RX ORDER — OXYCODONE HYDROCHLORIDE 5 MG/1
5 TABLET ORAL ONCE AS NEEDED
Status: DISCONTINUED | OUTPATIENT
Start: 2022-04-27 | End: 2022-04-26 | Stop reason: HOSPADM

## 2022-04-26 RX ORDER — FENTANYL CITRATE 50 UG/ML
INJECTION, SOLUTION INTRAMUSCULAR; INTRAVENOUS
Status: DISCONTINUED | OUTPATIENT
Start: 2022-04-26 | End: 2022-04-26

## 2022-04-26 RX ORDER — DEXAMETHASONE SODIUM PHOSPHATE 4 MG/ML
INJECTION, SOLUTION INTRA-ARTICULAR; INTRALESIONAL; INTRAMUSCULAR; INTRAVENOUS; SOFT TISSUE
Status: DISCONTINUED | OUTPATIENT
Start: 2022-04-26 | End: 2022-04-26

## 2022-04-26 RX ORDER — DIPHENHYDRAMINE HYDROCHLORIDE 50 MG/ML
INJECTION INTRAMUSCULAR; INTRAVENOUS
Status: DISCONTINUED | OUTPATIENT
Start: 2022-04-26 | End: 2022-04-26

## 2022-04-26 RX ORDER — KETOROLAC TROMETHAMINE 10 MG/1
10 TABLET, FILM COATED ORAL EVERY 6 HOURS
COMMUNITY
End: 2022-11-17

## 2022-04-26 RX ORDER — SULFAMETHOXAZOLE AND TRIMETHOPRIM 800; 160 MG/1; MG/1
1 TABLET ORAL 2 TIMES DAILY
Qty: 6 TABLET | Refills: 0 | Status: SHIPPED | OUTPATIENT
Start: 2022-04-26 | End: 2022-04-29

## 2022-04-26 RX ORDER — LIDOCAINE HCL/PF 100 MG/5ML
SYRINGE (ML) INTRAVENOUS
Status: DISCONTINUED | OUTPATIENT
Start: 2022-04-26 | End: 2022-04-26

## 2022-04-26 RX ORDER — MIDAZOLAM HYDROCHLORIDE 1 MG/ML
INJECTION INTRAMUSCULAR; INTRAVENOUS
Status: DISCONTINUED | OUTPATIENT
Start: 2022-04-26 | End: 2022-04-26

## 2022-04-26 RX ORDER — ACETAMINOPHEN 10 MG/ML
INJECTION, SOLUTION INTRAVENOUS
Status: DISCONTINUED | OUTPATIENT
Start: 2022-04-26 | End: 2022-04-26

## 2022-04-26 RX ORDER — HYDROCODONE BITARTRATE AND ACETAMINOPHEN 5; 325 MG/1; MG/1
1 TABLET ORAL EVERY 6 HOURS PRN
Qty: 10 TABLET | Refills: 0 | Status: SHIPPED | OUTPATIENT
Start: 2022-04-26 | End: 2022-05-01

## 2022-04-26 RX ORDER — PHENAZOPYRIDINE HYDROCHLORIDE 200 MG/1
200 TABLET, FILM COATED ORAL
Qty: 9 TABLET | Refills: 0 | Status: SHIPPED | OUTPATIENT
Start: 2022-04-26 | End: 2022-04-29

## 2022-04-26 RX ORDER — PROMETHAZINE HYDROCHLORIDE 12.5 MG/1
12.5 TABLET ORAL ONCE
Status: COMPLETED | OUTPATIENT
Start: 2022-04-26 | End: 2022-04-26

## 2022-04-26 RX ORDER — CIPROFLOXACIN 2 MG/ML
400 INJECTION, SOLUTION INTRAVENOUS
Status: COMPLETED | OUTPATIENT
Start: 2022-04-26 | End: 2022-04-26

## 2022-04-26 RX ORDER — FENTANYL CITRATE 50 UG/ML
25 INJECTION, SOLUTION INTRAMUSCULAR; INTRAVENOUS EVERY 5 MIN PRN
Status: COMPLETED | OUTPATIENT
Start: 2022-04-26 | End: 2022-04-26

## 2022-04-26 RX ORDER — PROMETHAZINE HYDROCHLORIDE 25 MG/ML
INJECTION, SOLUTION INTRAMUSCULAR; INTRAVENOUS
Status: DISCONTINUED | OUTPATIENT
Start: 2022-04-26 | End: 2022-04-26

## 2022-04-26 RX ORDER — DIPHENHYDRAMINE HCL 25 MG
25 CAPSULE ORAL ONCE
Status: COMPLETED | OUTPATIENT
Start: 2022-04-26 | End: 2022-04-26

## 2022-04-26 RX ORDER — ONDANSETRON 2 MG/ML
4 INJECTION INTRAMUSCULAR; INTRAVENOUS ONCE AS NEEDED
Status: DISCONTINUED | OUTPATIENT
Start: 2022-04-27 | End: 2022-04-26 | Stop reason: HOSPADM

## 2022-04-26 RX ORDER — LIDOCAINE HYDROCHLORIDE 10 MG/ML
1 INJECTION, SOLUTION EPIDURAL; INFILTRATION; INTRACAUDAL; PERINEURAL ONCE
Status: DISCONTINUED | OUTPATIENT
Start: 2022-04-26 | End: 2022-04-26 | Stop reason: HOSPADM

## 2022-04-26 RX ORDER — PROPOFOL 10 MG/ML
VIAL (ML) INTRAVENOUS
Status: DISCONTINUED | OUTPATIENT
Start: 2022-04-26 | End: 2022-04-26

## 2022-04-26 RX ADMIN — FENTANYL CITRATE 25 MCG: 50 INJECTION INTRAMUSCULAR; INTRAVENOUS at 10:04

## 2022-04-26 RX ADMIN — LIDOCAINE HYDROCHLORIDE 100 MG: 20 INJECTION INTRAVENOUS at 08:04

## 2022-04-26 RX ADMIN — FENTANYL CITRATE 50 MCG: 50 INJECTION, SOLUTION INTRAMUSCULAR; INTRAVENOUS at 08:04

## 2022-04-26 RX ADMIN — PROMETHAZINE HYDROCHLORIDE 6.25 MG: 25 INJECTION INTRAMUSCULAR; INTRAVENOUS at 08:04

## 2022-04-26 RX ADMIN — PROMETHAZINE HYDROCHLORIDE 12.5 MG: 12.5 TABLET ORAL at 10:04

## 2022-04-26 RX ADMIN — FENTANYL CITRATE 25 MCG: 50 INJECTION INTRAMUSCULAR; INTRAVENOUS at 09:04

## 2022-04-26 RX ADMIN — DIPHENHYDRAMINE HYDROCHLORIDE 25 MG: 50 INJECTION INTRAMUSCULAR; INTRAVENOUS at 08:04

## 2022-04-26 RX ADMIN — CIPROFLOXACIN 400 MG: 2 INJECTION INTRAVENOUS at 08:04

## 2022-04-26 RX ADMIN — PHENAZOPYRIDINE 200 MG: 200 TABLET ORAL at 09:04

## 2022-04-26 RX ADMIN — DEXAMETHASONE SODIUM PHOSPHATE 4 MG: 4 INJECTION, SOLUTION INTRA-ARTICULAR; INTRALESIONAL; INTRAMUSCULAR; INTRAVENOUS; SOFT TISSUE at 08:04

## 2022-04-26 RX ADMIN — SODIUM CHLORIDE, SODIUM GLUCONATE, SODIUM ACETATE, POTASSIUM CHLORIDE, MAGNESIUM CHLORIDE, SODIUM PHOSPHATE, DIBASIC, AND POTASSIUM PHOSPHATE: .53; .5; .37; .037; .03; .012; .00082 INJECTION, SOLUTION INTRAVENOUS at 07:04

## 2022-04-26 RX ADMIN — SODIUM CHLORIDE, SODIUM GLUCONATE, SODIUM ACETATE, POTASSIUM CHLORIDE, MAGNESIUM CHLORIDE, SODIUM PHOSPHATE, DIBASIC, AND POTASSIUM PHOSPHATE: .53; .5; .37; .037; .03; .012; .00082 INJECTION, SOLUTION INTRAVENOUS at 08:04

## 2022-04-26 RX ADMIN — PROPOFOL 150 MG: 10 INJECTION, EMULSION INTRAVENOUS at 08:04

## 2022-04-26 RX ADMIN — ACETAMINOPHEN 1000 MG: 10 INJECTION, SOLUTION INTRAVENOUS at 08:04

## 2022-04-26 RX ADMIN — DIPHENHYDRAMINE HYDROCHLORIDE 25 MG: 25 CAPSULE ORAL at 10:04

## 2022-04-26 RX ADMIN — MIDAZOLAM HYDROCHLORIDE 2 MG: 1 INJECTION, SOLUTION INTRAMUSCULAR; INTRAVENOUS at 08:04

## 2022-04-26 NOTE — OP NOTE
[facility]  Operative/Discharge Note    Date: 04/26/2022    Pre-Op Diagnosis: Left ureteral stone    Post-Op Diagnosis: Same    Procedure(s) Performed:   1. Cystourethroscopy  2. Left ureteroscopy with laser lithotripsy and basket extraction of stone fragments  3. Left retrograde pyelogram  4. Left ureteral stent placement, 6 x 24 JJ stent    Flouro <1 hour    Specimen(s): Left ureteral stone    Staff Surgeon: Jack Redman Jr, MD    Anesthesia: General    Indications: Doris Pastrana is a 71 y.o. female with an 8 mm left proximal ureteral stone s/p stent placement.    Findings:   Successful left ureteroscopy with laser lithotripsy of an 8 mm proximal ureteral stone. Also removed small non-obstructing stones in the left kidney. Stent exchanged.    Estimated Blood Loss: Minimal    Drains: 6 x 24 cm JJ stent    Complications: None    Implants:   Implant Name Type Inv. Item Serial No.  Lot No. LRB No. Used Action   STENT SET URETERAL [id number] - [id number]  STENT SET URETERAL [id number]  [employer] [id number] Left 1 Implanted       Procedure in detail:  After informed consent was obtained, the patient was brought the the cystoscopy suite and placed in the supine position.  SCDs were applied and working.  GETA was administered.  The patient was then placed in the dorsal lithotomy position and prepped and draped in the usual sterile fashion.      A rigid cystoscope in a 22 Fr sheath was introduced into the patient's urethra.  This passed easily.  The entire urethra was visualized which showed no strictures or masses.  Formal cystoscopy was performed which revealed no masses or lesions suspicious for malignancy, no bladder stones, no bladder diverticula, notrabeculations.  The UOs were visualized in the normal anatomic position bilaterally and clear efflux was visualized.  The leftUO had no significant inflammation from the indwelling stent.     A motion wire was passed up the leftUO and up into the  kidney.  This passed easily and placement was confirmed using fluoro.  The cystoscope was removed keeping the guidewire in place.  The cystoscope was reintroduced and the stent was grasped using a grasper and removed.    An 8 Fr rigid ureteroscope was passed into the patient's bladder alongside the wire under direct vision.  It was then passed through the left UO alongside the wire.  A stone was encountered in the proximal left ureter. A 275 micron laser fiber was passed through the ureteroscope.  The stone was fragmented using the laser.  The laser fiber was removed and an NCircle basket was introduced through the ureteroscope.  Stone fragments were removed.    A wire was advanced up the ureteroscope and ureteroscope was removed leaving wires in place. A 35 cm ureteral access sheath was placed over the second wire under fluoroscopic guidance. A flexible ureteroscope was passed into the kidney. The flexible was able to be maneuvered proximally.  A retrograde was performed. The entire kidney was examined for stones and removed if encountered. The ureteroscope was removed keeping the guide wire in place.      A cystoscope was reinserted and the bladder was irrigated to remove the stone fragments.  The bladder was drained the cystoscope removed keeping the wire in place.  The wire was backloaded through the cystoscope using a pusher.    A 6x24 JJ ureteral stent with strings was passed over the wire and up into the renal pelvis using fluoro.  When the coil appeared to be in good position in the kidney and the radio-opaque marker of the pusher was at the inferior pubis, the wire was removed under continuous fluoro.  Good coils were seen in the kidney and the bladder using fluoro.      The patient tolerated the procedure well and was transferred to the recovery room in stable condition.      Disposition: Home    Discharge home today status post uncomplicated procedure as above  Diet - resume home diet  Follow up: RTC in 6  weeks with renal ultrasound prior.   Instructions: Ok to remove stent at home in 2 days.   Meds:     Medication List      START taking these medications    phenazopyridine 200 MG tablet  Commonly known as: PYRIDIUM  Take 1 tablet (200 mg total) by mouth 3 (three) times daily with meals. for 3 days     sulfamethoxazole-trimethoprim 800-160mg 800-160 mg Tab  Commonly known as: BACTRIM DS  Take 1 tablet by mouth 2 (two) times daily. for 3 days        CHANGE how you take these medications    * HYDROcodone-acetaminophen 5-325 mg per tablet  Commonly known as: NORCO  Take 1 tablet by mouth every 6 (six) hours as needed for Pain.  What changed: Another medication with the same name was added. Make sure you understand how and when to take each.     * HYDROcodone-acetaminophen 5-325 mg per tablet  Commonly known as: NORCO  Take 1 tablet by mouth every 6 (six) hours as needed for Pain.  What changed: You were already taking a medication with the same name, and this prescription was added. Make sure you understand how and when to take each.         * This list has 2 medication(s) that are the same as other medications prescribed for you. Read the directions carefully, and ask your doctor or other care provider to review them with you.            CONTINUE taking these medications    ACCU-CHEK JORGE PLUS TEST STRP Strp  Generic drug: blood sugar diagnostic  TEST BLOOD SUGAR TWICE DAILY     albuterol 90 mcg/actuation inhaler  Commonly known as: PROVENTIL/VENTOLIN HFA  Inhale 2 puffs into the lungs 4 (four) times daily.     ALPRAZolam 0.25 MG tablet  Commonly known as: XANAX  Use one 20-30 minutes before flying prn     amLODIPine 10 MG tablet  Commonly known as: NORVASC  TAKE 1 TABLET EVERY DAY     BD ALCOHOL SWABS Padm  Generic drug: alcohol swabs  USE TWICE DAILY AS NEEDED AS DIRECTED     blood-glucose meter kit  Use as instructed     cetirizine 10 MG tablet  Commonly known as: ZYRTEC     cyclobenzaprine 10 MG tablet  Commonly  known as: FLEXERIL  TAKE 1 TABLET (10 MG TOTAL) BY MOUTH 3 (THREE) TIMES DAILY AS NEEDED.     diclofenac sodium 1 % Gel  Commonly known as: VOLTAREN     ergocalciferol 50,000 unit Cap  Commonly known as: ERGOCALCIFEROL  Take one twice a week     EScitalopram oxalate 10 MG tablet  Commonly known as: LEXAPRO  Take 1 tablet (10 mg total) by mouth once daily.     fluticasone propionate 50 mcg/actuation nasal spray  Commonly known as: FLONASE  USE 2 SPRAYS IN EACH NOSTRIL EVERY DAY     gabapentin 300 MG capsule  Commonly known as: NEURONTIN  Take 3 capsules (900 mg total) by mouth every evening.     ketorolac 10 mg tablet  Commonly known as: TORADOL     lancets 28 gauge Misc  Commonly known as: TRUEPLUS LANCETS  TEST TWO TIMES DAILY     losartan 100 MG tablet  Commonly known as: COZAAR  TAKE 1 TABLET (100 MG TOTAL) BY MOUTH ONCE DAILY.     meclizine 25 mg tablet  Commonly known as: ANTIVERT  Take 1 tablet (25 mg total) by mouth 3 (three) times daily as needed.     meloxicam 15 MG tablet  Commonly known as: MOBIC  TAKE 1 TABLET EVERY DAY     metFORMIN 750 MG ER 24hr tablet  Commonly known as: GLUCOPHAGE-XR  Take 1 tablet (750 mg total) by mouth 2 (two) times daily with meals.     ondansetron 4 MG tablet  Commonly known as: ZOFRAN  Take 1 tablet (4 mg total) by mouth every 6 (six) hours.     ondansetron 4 MG Tbdl  Commonly known as: ZOFRAN-ODT  Take 1 tablet (4 mg total) by mouth every 8 (eight) hours as needed (Nausea).     rosuvastatin 40 MG Tab  Commonly known as: CRESTOR  TAKE 1 TABLET (40 MG TOTAL) BY MOUTH EVERY EVENING.     SANARE ADV SCAR THERAPY BASE Gel  Generic drug: gel base no.184 (bulk)     tamsulosin 0.4 mg Cap  Commonly known as: FLOMAX  Take 1 capsule (0.4 mg total) by mouth once daily.     TRULICITY 1.5 mg/0.5 mL pen injector  Generic drug: dulaglutide  Inject 1.5 mg into the skin every 7 days.           Where to Get Your Medications      These medications were sent to [id number] - [place] [facility]  [address]  [address], [place] [address]    Phone: [phone]   · HYDROcodone-acetaminophen 5-325 mg per tablet  · phenazopyridine 200 MG tablet  · sulfamethoxazole-trimethoprim 800-160mg 800-160 mg Tab         Jack Redman Jr, MD

## 2022-04-26 NOTE — TRANSFER OF CARE
"Anesthesia Transfer of Care Note    Patient: Doris Pastrana    Procedure(s) Performed: Procedure(s) (LRB):  REMOVAL, CALCULUS, URETER, URETEROSCOPIC (Left)    Patient location: PACU    Anesthesia Type: general    Transport from OR: Transported from OR on 2-3 L/min O2 by NC with adequate spontaneous ventilation    Post pain: adequate analgesia    Post assessment: no apparent anesthetic complications and tolerated procedure well    Post vital signs: stable    Level of consciousness: responds to stimulation and sedated    Nausea/Vomiting: no nausea/vomiting    Complications: none    Transfer of care protocol was followed      Last vitals:   Visit Vitals  /73 (BP Location: Left arm, Patient Position: Lying)   Pulse 91   Temp 36.7 °C (98.1 °F) (Skin)   Resp 16   Ht 5' 5" (1.651 m)   Wt 81.6 kg (180 lb)   SpO2 98%   Breastfeeding No   BMI 29.95 kg/m²     "

## 2022-04-26 NOTE — ANESTHESIA POSTPROCEDURE EVALUATION
Anesthesia Post Evaluation    Patient: Doris Pastrana    Procedure(s) Performed: Procedure(s) (LRB):  REMOVAL, CALCULUS, URETER, URETEROSCOPIC (Left)    Final Anesthesia Type: general      Patient location during evaluation: PACU  Patient participation: Yes- Able to Participate  Level of consciousness: awake and alert and oriented  Post-procedure vital signs: reviewed and stable  Pain management: adequate  Airway patency: patent  BENNY mitigation strategies: Multimodal analgesia and Extubation while patient is awake  PONV status at discharge: No PONV  Anesthetic complications: no      Cardiovascular status: blood pressure returned to baseline  Respiratory status: unassisted, spontaneous ventilation and room air  Hydration status: euvolemic  Follow-up not needed.          Vitals Value Taken Time   /76 04/26/22 0955   Temp 36.5 °C (97.7 °F) 04/26/22 1000   Pulse 84 04/26/22 1003   Resp 20 04/26/22 1003   SpO2 90 % 04/26/22 1003   Vitals shown include unvalidated device data.      Event Time   Out of Recovery 10:10:00         Pain/Hayde Score: Pain Rating Prior to Med Admin: 6 (4/26/2022 10:00 AM)  Pain Rating Post Med Admin: 6 (4/26/2022 10:05 AM)  Hayde Score: 10 (4/26/2022 10:05 AM)  Modified Hayde Score: 20 (4/26/2022 10:45 AM)

## 2022-04-26 NOTE — PATIENT INSTRUCTIONS
VERY IMPORTANT - PLEASE READ    Your urologist is Dr. Jack Redman  Office number: [phone] ([facility])  Address: [address] [place] [address],  (2nd office building on left); [address] (located on 2nd floor).       What Dr. Redman did today: Cysto, left ureteral stent placement, left ureteroscopy with laser lithotripsy     If you do not hear from us please call clinic to make a post-op appointment.   The following are specific instructions for you, so please read:    The ureter is the tube that drains the kidney (where urine is made). It connects the kidney to the bladder (where urine is stored).     A ureteral stent is a is a soft plastic tube with holes in tube that bypasses anything that obstructs (stone, tumor, scar tissue) the urine from flow from the kidney to the bladder. It is placed by going through the urethra and into the bladder. No incisions are made. Its temporarily inserted into a ureter to help drain urine into the bladder. One end goes in the kidney. The other end goes in the bladder. A coil on each end holds the stent in place. The stent cant be seen from outside the body.          You have a ureteral stent in your LEFTR kidney that was placed on 04/26/2022  -the stent can only remain for a maximum of 3 to 6 months. If the stent remains longer than this you can get recurrent urinary tract infections, the stent can have more stones form along it and urine will not be able to drain and you will lose all function of your kidney requiring a major surgery and a big incision to remove the kidney.       You have a string attached to the stent:  -you can remove the stent on ____4/28/22_____ at 8 am  -it will be attached to the outside of your vagina (if you are a female) or to the penis (if you are male)  -if the stent is partially or accidentally pulled out you will leak urine until the stent is removed because urine is now coming directly from your kidney and bypassing the urethra. Go ahead and  remove it if this happens but expect pain for 24 to 48 hours or more after stent removed, especially if it was removed earlier than expected.  -If you have not heard from anyone about when to pull the string and remove the stent, remove it at two weeks after your surgery by pulling the string. DO NOT cut the string.   -When you remove the string, you should see a small plastic tube about the size of a cocktail straw and about 12 inches long attached to a string that looks like dental floss. If you do not see this, please let call the office and let us know.       If you do not receive a follow-up date or further instructions on what is to be done next about the stent, it is your responsibility to make sure that you have follow-up to have your stone or stent removed.     A stent CANNOT REMAIN indefinitely in the kidney. It should not remain if possible more than 3 to 6 months maximum (rarely 1 year if it was placed for cancer).     If you do not follow-up to have your stent removed then you can LOSE YOUR KIDNEY FUNCTION ON THAT SIDE, get RECURRENT URINARY TRACT INFECTIONS,and MORE STONES requiring SURGICAL OPEN removal of your kidney.    You can call our office ([facility] at [phone] and let them know a stent was placed and you need further instructions for follow-up). If there are issues with insurance we will work with you to help you arrange follow-up where it can be removed. Again,  A STENT CANNOT remain indefinitely.       A ureteral stent is left in place for many reasons:  -to keep the ureter open after a procedure as there will be much inflammation and if no stent is left you will experience the same pain as having the stone that caused the obstruction.   -to drain the kidney of infection.  -if the stone is up high, stuck, or you have an infection, the stent will stretch open the small ureter (the tube that drains the kidney) for a few weeks (usually 2 to 4 weeks) so that we can return  and place instruments into this tube to break up and remove the stone.      Ureteral Stent Symptoms can include but are not limited to   - pain in the kidney or bladder with urination during or especially at the end of voiding.   - constant urge to urinate, frequency of urination, severe bladder spasms and severe pain the kidney, especially during urination.  - blood in the urine, especially with increased activity. This can last the entire time the stent is in, it can sometimes clear and return or you may never have any at all. I recommend increasing fluid intake to prevent large clots that may be difficult to urinate out.      When to go to ER:   -fever >101.5 or inability to urinate (no urination for >4 hours and bladder pain).   -If you go to the ER with pain, they may check to make sure the stent is in good position with an x-ray or ultrasound but unlikely to do anything else.   -I will let you know when we will plan to have the stent either removed at the ambulatory surgical center as an outpatient procedure while you are awake, which is uncomfortable briefly, but not painful or you will remove it yourself by pulling the strings.

## 2022-04-26 NOTE — PLAN OF CARE
IV removed after patient voided. All instructions gone over with patient. Patient started to c/o nausea and itching and requested more medication. Order obtained from Dr. Acosta for PO medication to give to patient for nausea and itching.

## 2022-04-26 NOTE — DISCHARGE INSTRUCTIONS
We hope your stay was comfortable as you heal now, mend and rest.    For we have enjoyed taking care of you by giving your our best.    And as you get better, by regaining your health and strength;   We count it as a privilege to have served you and hope your time at [facility] was well spent.      Thank  You!!!       Post op instructions for prevention of DVT  What is deep vein thrombosis?  Deep vein thrombosis (DVT) is the medical term for blood clots in the deep veins of the leg.  These blood clots can be dangerous.  A DVT can block a blood vessel and keep blood from getting where it needs to go.  Another problem is that the clot can travel to other parts of the body such as the lungs.  A clot that travels to the lungs is called a pulmonary embolus (PE) and can cause serious problems with breathing which can lead to death.  Am I at risk for DVT/PE?  If you are not very active, you are at risk of DVT.  Anyone confined to bed, sitting for long periods of time, recovering from surgery, etc. increases the risk of DVT.  Other risk factors are cancer diagnosis, certain medications, estrogen replacement in any form,older age, obesity, pregnancy, smoking, history of clotting disorders, and dehydration.  How will I know if I have a DVT?  Swelling in the lower leg  Pain  Warmth, redness, hardness or bulging of the vein  If you have any of these symptoms, call your doctors office right away.  Some people will not have any symptoms until the clot moves to the lungs.  What are the symptoms of a PE?  Panting, shortness of breath, or trouble breathing  Sharp, knife-like chest pain when you breathe  Coughing or coughing up blood  Rapid heartbeat  If you have any of these symptoms or get worse quickly, call 911 for emergency treatment.  How can I prevent a DVT?  Avoid long periods of inactivity and dont cross your legs--get up and walk around every hour or so.  Stay active--walking after surgery is highly encouraged.  This means  "you should get out of the house and walk in the neighborhood.  Going up and down stairs will not impair healing (unless advised against such activity by your doctor).    Drink plenty of noncaffeinated, nonalcoholic fluids each day to prevent dehydration.  Wear special support stockings, if they have been advised by your doctor.  If you travel, stop at least once an hour and walk around.  Avoid smoking (assistance with stopping is available through your healthcare provider)  Always notify your doctor if you are not able to follow the post operative instructions that are given to you at the time of discharge.  It may be necessary to prescribe one of the medications available to prevent DVT.         Discharge Instructions: After Your Surgery/Procedure  Youve just had surgery. During surgery you were given medicine called anesthesia to keep you relaxed and free of pain. After surgery you may have some pain or nausea. This is common. Here are some tips for feeling better and getting well after surgery.     Stay on schedule with your medication.   Going home  Your doctor or nurse will show you how to take care of yourself when you go home. He or she will also answer your questions. Have an adult family member or friend drive you home.      For your safety we recommend these precaution for the first 24 hours after your procedure:  Do not drive or use heavy equipment.  Do not make important decisions or sign legal papers.  Do not drink alcohol.  Have someone stay with you, if needed. He or she can watch for problems and help keep you safe.  Your concentration, balance, coordination, and judgement may be impaired for many hours after anesthesia.  Use caution when ambulating or standing up.     You may feel weak and "washed out" after anesthesia and surgery.      Subtle residual effects of general anesthesia or sedation with regional / local anesthesia can last more than 24 hours.  Rest for the remainder of the day or longer " if your Doctor/Surgeon has advised you to do so.  Although you may feel normal within the first 24 hours, your reflexes and mental ability may be impaired without you realizing it.  You may feel dizzy, lightheaded or sleepy for 24 hours or longer.      Be sure to go to all follow-up visits with your doctor. And rest after your surgery for as long as your doctor tells you to.  Coping with pain  If you have pain after surgery, pain medicine will help you feel better. Take it as told, before pain becomes severe. Also, ask your doctor or pharmacist about other ways to control pain. This might be with heat, ice, or relaxation. And follow any other instructions your surgeon or nurse gives you.  Tips for taking pain medicine  To get the best relief possible, remember these points:  Pain medicines can upset your stomach. Taking them with a little food may help.  Most pain relievers taken by mouth need at least 20 to 30 minutes to start to work.  Taking medicine on a schedule can help you remember to take it. Try to time your medicine so that you can take it before starting an activity. This might be before you get dressed, go for a walk, or sit down for dinner.  Constipation is a common side effect of pain medicines. Call your doctor before taking any medicines such as laxatives or stool softeners to help ease constipation. Also ask if you should skip any foods. Drinking lots of fluids and eating foods such as fruits and vegetables that are high in fiber can also help. Remember, do not take laxatives unless your surgeon has prescribed them.  Drinking alcohol and taking pain medicine can cause dizziness and slow your breathing. It can even be deadly. Do not drink alcohol while taking pain medicine.  Pain medicine can make you react more slowly to things. Do not drive or run machinery while taking pain medicine.  Your health care provider may tell you to take acetaminophen to help ease your pain. Ask him or her how much you  are supposed to take each day. Acetaminophen or other pain relievers may interact with your prescription medicines or other over-the-counter (OTC) drugs. Some prescription medicines have acetaminophen and other ingredients. Using both prescription and OTC acetaminophen for pain can cause you to overdose. Read the labels on your OTC medicines with care. This will help you to clearly know the list of ingredients, how much to take, and any warnings. It may also help you not take too much acetaminophen. If you have questions or do not understand the information, ask your pharmacist or health care provider to explain it to you before you take the OTC medicine.  Managing nausea  Some people have an upset stomach after surgery. This is often because of anesthesia, pain, or pain medicine, or the stress of surgery. These tips will help you handle nausea and eat healthy foods as you get better. If you were on a special food plan before surgery, ask your doctor if you should follow it while you get better. These tips may help:  Do not push yourself to eat. Your body will tell you when to eat and how much.  Start off with clear liquids and soup. They are easier to digest.  Next try semi-solid foods, such as mashed potatoes, applesauce, and gelatin, as you feel ready.  Slowly move to solid foods. Dont eat fatty, rich, or spicy foods at first.  Do not force yourself to have 3 large meals a day. Instead eat smaller amounts more often.  Take pain medicines with a small amount of solid food, such as crackers or toast, to avoid nausea.     Call your surgeon if  You still have pain an hour after taking medicine. The medicine may not be strong enough.  You feel too sleepy, dizzy, or groggy. The medicine may be too strong.  You have side effects like nausea, vomiting, or skin changes, such as rash, itching, or hives.       If you have obstructive sleep apnea  You were given anesthesia medicine during surgery to keep you comfortable  and free of pain. After surgery, you may have more apnea spells because of this medicine and other medicines you were given. The spells may last longer than usual.   At home:  Keep using the continuous positive airway pressure (CPAP) device when you sleep. Unless your health care provider tells you not to, use it when you sleep, day or night. CPAP is a common device used to treat obstructive sleep apnea.  Talk with your provider before taking any pain medicine, muscle relaxants, or sedatives. Your provider will tell you about the possible dangers of taking these medicines.  © 9710-4516 [employer]. [address] [place] [address], [place] [address]. All rights reserved. This information is not intended as a substitute for professional medical care. Always follow your healthcare professional's instructions.

## 2022-04-26 NOTE — PLAN OF CARE
Pt transferred to phase II. VSS, pain med given, tolerated clear liquids without N/V, L stent on strings, due to void. Report given to SAMARIA Gee.

## 2022-04-26 NOTE — PLAN OF CARE
Patient dry-heaving with no emesis. Explained to patient that it's going to take a few minutes for the medication to kick in. Verbalized understanding.

## 2022-04-26 NOTE — PLAN OF CARE
Called patient's son to head this way. Son stated will be 15 minutes and will call when he gets here.

## 2022-04-26 NOTE — ANESTHESIA PREPROCEDURE EVALUATION
04/26/2022  Doris Pastrana is a 71 y.o., female.      Pre-op Assessment    I have reviewed the Patient Summary Reports.     I have reviewed the Nursing Notes. I have reviewed the NPO Status.   I have reviewed the Medications.     Review of Systems  Anesthesia Hx:  Hx of Anesthetic complications    Social:  Former Smoker    Cardiovascular:   Hypertension hyperlipidemia    Pulmonary:   Pneumonia    Renal/:   Chronic Renal Disease renal calculi    Hepatic/GI:   Liver Disease,    Musculoskeletal:   Arthritis     Neurological:   TIA, Neuromuscular Disease,    Endocrine:   Diabetes, type 2    Psych:   Psychiatric History          Physical Exam  General: Well nourished, Cooperative, Alert and Oriented    Airway:  Mallampati: II / II  Mouth Opening: Normal  TM Distance: 4 - 6 cm  Tongue: Normal    Dental:  Intact    Chest/Lungs:  Clear to auscultation, Normal Respiratory Rate    Heart:  Rate: Normal  Rhythm: Regular Rhythm  Sounds: Normal        Anesthesia Plan  Type of Anesthesia, risks & benefits discussed:    Anesthesia Type: Gen Supraglottic Airway  Intra-op Monitoring Plan: Standard ASA Monitors  Induction:  IV  Airway Plan: Via Tracheostomy  Informed Consent: Informed consent signed with the Patient and all parties understand the risks and agree with anesthesia plan.  All questions answered.   ASA Score: 3 Emergent    Ready For Surgery From Anesthesia Perspective.     .

## 2022-04-28 ENCOUNTER — TELEPHONE (OUTPATIENT)
Dept: UROLOGY | Facility: CLINIC | Age: 72
End: 2022-04-28
Payer: MEDICARE

## 2022-04-28 DIAGNOSIS — N20.0 NEPHROLITHIASIS: Primary | ICD-10-CM

## 2022-04-28 NOTE — TELEPHONE ENCOUNTER
Called patient regarding medication reaction concerns. No answer. LMOM to give the office a call back.

## 2022-04-28 NOTE — TELEPHONE ENCOUNTER
----- Message from Laura Barrios sent at 4/28/2022 10:27 AM CDT -----  Contact: pt  Type: Needs Medical Advice  Who Called:  pt  Symptoms (please be specific):  swollen face,lips and throat was a little  How long has patient had these symptoms: this morning  Pharmacy name and phone #:    [id number] - [place] - [address]  [address] [id number]  Phone: [phone] Fax: [phone]   Best Call Back Number:[phone]   Additional Information: pt woke up  this morning  and her face,lips and throat  was swollen  after taking medication and ask that nurse are dr give her a call back pt states she took benadryl and its ok for now

## 2022-04-28 NOTE — TELEPHONE ENCOUNTER
----- Message from Laura Barrios sent at 4/28/2022 10:54 AM CDT -----  Contact: pt  Type:  Patient Returning Call    Who Called:  pt  Who Left Message for Patient: nurse  Does the patient know what this is regarding?:yes  Best Call Back Number: [phone]   Additional Information: missed call please call back

## 2022-04-29 ENCOUNTER — PATIENT MESSAGE (OUTPATIENT)
Dept: UROLOGY | Facility: CLINIC | Age: 72
End: 2022-04-29
Payer: MEDICARE

## 2022-04-29 ENCOUNTER — PATIENT MESSAGE (OUTPATIENT)
Dept: ADMINISTRATIVE | Facility: OTHER | Age: 72
End: 2022-04-29
Payer: MEDICARE

## 2022-04-29 LAB
COMPN STONE: NORMAL
SPECIMEN SOURCE: NORMAL
STONE ANALYSIS IR-IMP: NORMAL

## 2022-04-29 NOTE — TELEPHONE ENCOUNTER
Called and spoke to patient. States that she did stop taking the Bactrim and Pyridium. Patient states that she has not been taking the Norco. KUB and 6 week follow up scheduled. Patient voiced understanding.

## 2022-05-02 DIAGNOSIS — M54.9 MID BACK PAIN: ICD-10-CM

## 2022-05-02 RX ORDER — CYCLOBENZAPRINE HCL 10 MG
TABLET ORAL
Qty: 270 TABLET | Refills: 1 | Status: SHIPPED | OUTPATIENT
Start: 2022-05-02 | End: 2023-01-25

## 2022-05-02 NOTE — TELEPHONE ENCOUNTER
Care Due:                  Date            Visit Type   Department     Provider  --------------------------------------------------------------------------------                                EP -                              PRIMARY      [facility]  Last Visit: 01-      CARE ([facility])   PRACTICE       Tor Garg  Next Visit: None Scheduled  None         None Found                                                            Last  Test          Frequency    Reason                     Performed    Due Date  --------------------------------------------------------------------------------    HBA1C.......  6 months...  dulaglutide, metFORMIN...  01- 07-    Powered by [employer]. Reference number: [id number].   5/02/2022 3:24:36 PM CDT

## 2022-05-26 ENCOUNTER — LAB VISIT (OUTPATIENT)
Dept: LAB | Facility: HOSPITAL | Age: 72
End: 2022-05-26
Attending: FAMILY MEDICINE
Payer: MEDICARE

## 2022-05-26 ENCOUNTER — OFFICE VISIT (OUTPATIENT)
Dept: FAMILY MEDICINE | Facility: CLINIC | Age: 72
End: 2022-05-26
Attending: FAMILY MEDICINE
Payer: MEDICARE

## 2022-05-26 VITALS
HEART RATE: 97 BPM | RESPIRATION RATE: 18 BRPM | OXYGEN SATURATION: 93 % | TEMPERATURE: 98 F | BODY MASS INDEX: 30.59 KG/M2 | HEIGHT: 65 IN | DIASTOLIC BLOOD PRESSURE: 74 MMHG | WEIGHT: 183.63 LBS | SYSTOLIC BLOOD PRESSURE: 122 MMHG

## 2022-05-26 DIAGNOSIS — N20.1 LEFT URETERAL STONE: Primary | ICD-10-CM

## 2022-05-26 DIAGNOSIS — N30.01 ACUTE CYSTITIS WITH HEMATURIA: ICD-10-CM

## 2022-05-26 DIAGNOSIS — R10.9 ABDOMINAL PAIN, UNSPECIFIED ABDOMINAL LOCATION: ICD-10-CM

## 2022-05-26 DIAGNOSIS — R31.9 HEMATURIA, UNSPECIFIED TYPE: ICD-10-CM

## 2022-05-26 DIAGNOSIS — H69.92 ACUTE DYSFUNCTION OF LEFT EUSTACHIAN TUBE: ICD-10-CM

## 2022-05-26 DIAGNOSIS — J31.0 CHRONIC RHINITIS: ICD-10-CM

## 2022-05-26 LAB
BILIRUB SERPL-MCNC: ABNORMAL MG/DL
BLOOD URINE, POC: ABNORMAL
CLARITY, POC UA: CLEAR
COLOR, POC UA: YELLOW
GLUCOSE UR QL STRIP: NORMAL
KETONES UR QL STRIP: ABNORMAL
LEUKOCYTE ESTERASE URINE, POC: ABNORMAL
NITRITE, POC UA: ABNORMAL
PH, POC UA: 7
PROTEIN, POC: ABNORMAL
SPECIFIC GRAVITY, POC UA: 1
UROBILINOGEN, POC UA: NORMAL

## 2022-05-26 PROCEDURE — 1125F AMNT PAIN NOTED PAIN PRSNT: CPT | Mod: CPTII,S$GLB,, | Performed by: FAMILY MEDICINE

## 2022-05-26 PROCEDURE — 1159F MED LIST DOCD IN RCRD: CPT | Mod: CPTII,S$GLB,, | Performed by: FAMILY MEDICINE

## 2022-05-26 PROCEDURE — 87077 CULTURE AEROBIC IDENTIFY: CPT | Performed by: FAMILY MEDICINE

## 2022-05-26 PROCEDURE — 3008F PR BODY MASS INDEX (BMI) DOCUMENTED: ICD-10-PCS | Mod: CPTII,S$GLB,, | Performed by: FAMILY MEDICINE

## 2022-05-26 PROCEDURE — 81002 POCT URINE DIPSTICK WITHOUT MICROSCOPE: ICD-10-PCS | Mod: S$GLB,,, | Performed by: FAMILY MEDICINE

## 2022-05-26 PROCEDURE — 4010F PR ACE/ARB THEARPY RXD/TAKEN: ICD-10-PCS | Mod: CPTII,S$GLB,, | Performed by: FAMILY MEDICINE

## 2022-05-26 PROCEDURE — 3288F FALL RISK ASSESSMENT DOCD: CPT | Mod: CPTII,S$GLB,, | Performed by: FAMILY MEDICINE

## 2022-05-26 PROCEDURE — 81002 URINALYSIS NONAUTO W/O SCOPE: CPT | Mod: S$GLB,,, | Performed by: FAMILY MEDICINE

## 2022-05-26 PROCEDURE — 99214 OFFICE O/P EST MOD 30 MIN: CPT | Mod: S$GLB,,, | Performed by: FAMILY MEDICINE

## 2022-05-26 PROCEDURE — 99999 PR PBB SHADOW E&M-EST. PATIENT-LVL V: CPT | Mod: PBBFAC,,, | Performed by: FAMILY MEDICINE

## 2022-05-26 PROCEDURE — 1101F PR PT FALLS ASSESS DOC 0-1 FALLS W/OUT INJ PAST YR: ICD-10-PCS | Mod: CPTII,S$GLB,, | Performed by: FAMILY MEDICINE

## 2022-05-26 PROCEDURE — 3044F PR MOST RECENT HEMOGLOBIN A1C LEVEL <7.0%: ICD-10-PCS | Mod: CPTII,S$GLB,, | Performed by: FAMILY MEDICINE

## 2022-05-26 PROCEDURE — 1101F PT FALLS ASSESS-DOCD LE1/YR: CPT | Mod: CPTII,S$GLB,, | Performed by: FAMILY MEDICINE

## 2022-05-26 PROCEDURE — 3078F DIAST BP <80 MM HG: CPT | Mod: CPTII,S$GLB,, | Performed by: FAMILY MEDICINE

## 2022-05-26 PROCEDURE — 87088 URINE BACTERIA CULTURE: CPT | Performed by: FAMILY MEDICINE

## 2022-05-26 PROCEDURE — 1160F PR REVIEW ALL MEDS BY PRESCRIBER/CLIN PHARMACIST DOCUMENTED: ICD-10-PCS | Mod: CPTII,S$GLB,, | Performed by: FAMILY MEDICINE

## 2022-05-26 PROCEDURE — 1125F PR PAIN SEVERITY QUANTIFIED, PAIN PRESENT: ICD-10-PCS | Mod: CPTII,S$GLB,, | Performed by: FAMILY MEDICINE

## 2022-05-26 PROCEDURE — 3078F PR MOST RECENT DIASTOLIC BLOOD PRESSURE < 80 MM HG: ICD-10-PCS | Mod: CPTII,S$GLB,, | Performed by: FAMILY MEDICINE

## 2022-05-26 PROCEDURE — 99999 PR PBB SHADOW E&M-EST. PATIENT-LVL V: ICD-10-PCS | Mod: PBBFAC,,, | Performed by: FAMILY MEDICINE

## 2022-05-26 PROCEDURE — 3074F PR MOST RECENT SYSTOLIC BLOOD PRESSURE < 130 MM HG: ICD-10-PCS | Mod: CPTII,S$GLB,, | Performed by: FAMILY MEDICINE

## 2022-05-26 PROCEDURE — 4010F ACE/ARB THERAPY RXD/TAKEN: CPT | Mod: CPTII,S$GLB,, | Performed by: FAMILY MEDICINE

## 2022-05-26 PROCEDURE — 87186 SC STD MICRODIL/AGAR DIL: CPT | Performed by: FAMILY MEDICINE

## 2022-05-26 PROCEDURE — 1160F RVW MEDS BY RX/DR IN RCRD: CPT | Mod: CPTII,S$GLB,, | Performed by: FAMILY MEDICINE

## 2022-05-26 PROCEDURE — 1159F PR MEDICATION LIST DOCUMENTED IN MEDICAL RECORD: ICD-10-PCS | Mod: CPTII,S$GLB,, | Performed by: FAMILY MEDICINE

## 2022-05-26 PROCEDURE — 87086 URINE CULTURE/COLONY COUNT: CPT | Performed by: FAMILY MEDICINE

## 2022-05-26 PROCEDURE — 3072F PR LOW RISK FOR RETINOPATHY: ICD-10-PCS | Mod: CPTII,S$GLB,, | Performed by: FAMILY MEDICINE

## 2022-05-26 PROCEDURE — 3008F BODY MASS INDEX DOCD: CPT | Mod: CPTII,S$GLB,, | Performed by: FAMILY MEDICINE

## 2022-05-26 PROCEDURE — 3074F SYST BP LT 130 MM HG: CPT | Mod: CPTII,S$GLB,, | Performed by: FAMILY MEDICINE

## 2022-05-26 PROCEDURE — 99214 PR OFFICE/OUTPT VISIT, EST, LEVL IV, 30-39 MIN: ICD-10-PCS | Mod: S$GLB,,, | Performed by: FAMILY MEDICINE

## 2022-05-26 PROCEDURE — 3288F PR FALLS RISK ASSESSMENT DOCUMENTED: ICD-10-PCS | Mod: CPTII,S$GLB,, | Performed by: FAMILY MEDICINE

## 2022-05-26 PROCEDURE — 3072F LOW RISK FOR RETINOPATHY: CPT | Mod: CPTII,S$GLB,, | Performed by: FAMILY MEDICINE

## 2022-05-26 PROCEDURE — 3044F HG A1C LEVEL LT 7.0%: CPT | Mod: CPTII,S$GLB,, | Performed by: FAMILY MEDICINE

## 2022-05-26 RX ORDER — CIPROFLOXACIN 250 MG/1
250 TABLET, FILM COATED ORAL 2 TIMES DAILY
Qty: 14 TABLET | Refills: 0 | Status: SHIPPED | OUTPATIENT
Start: 2022-05-26 | End: 2022-05-30 | Stop reason: ALTCHOICE

## 2022-05-26 RX ORDER — HYDROCODONE BITARTRATE AND ACETAMINOPHEN 7.5; 325 MG/1; MG/1
1 TABLET ORAL EVERY 6 HOURS PRN
Qty: 28 TABLET | Refills: 0 | Status: SHIPPED | OUTPATIENT
Start: 2022-05-26 | End: 2023-09-13

## 2022-05-26 NOTE — PROGRESS NOTES
Subjective:       Patient ID: Doris Pastrana is a 71 y.o. female.    Chief Complaint: Otalgia and Urinary Tract Infection (Hurts when urinate and blood in urine)    71-year-old female actually scheduled for a preoperative clearance for an abdominoplasty that was not to be done in till more than 30 days out so it had to be postponed.  She is coming in also complaining of left flank pain, gross hematuria, and right ear pain with a history of recent treatment of kidney stone with ureteroscopic retrieval of an 8 mm stone in the left ureter that failed to pass about a month ago.  A CT renal stone protocol was done at that time which showed 3 to 4 stones up to nearly 7 mm in each kidney.  The patient had been taking vitamin-D supplementation but that has now been discontinued.  I do not see a pathology report on the makeup of the stones nor do I see any previous stone analysis reports.  The patient also has a history of chronic rhinitis and has pain and pressure in her right ear.  She uses Flonase but says it is ineffective and she does not like the taste of it.  On questioning she is sniffing very hard sucking it out of the nose and into the throat where it will not work.  She also reports she is taking Flomax and had her ureteral stent removed only a few days ago.  She has no fever chills or night sweats.  She has not yet developed nausea or vomiting but she commonly does with kidney stones.    Past Medical History:  No date: Acute sinus infection  No date: Allergy  No date: Anemia  No date: Arthritis  No date: Bronchitis  No date: Degenerative disc disease      Comment:  lumbar, pain contract 1/28/2013  No date: Depression  2/25/16: Diabetes mellitus, type 2  No date: Fatty liver  2/25/16: Hyperlipidemia  No date: Hypertension  No date: Kidney stone  No date: Mitral valve prolapse  No date: Pleurisy  No date: Pneumonia  No date: PONV (postoperative nausea and vomiting)  No date: Sinus infection  03/28/2019: TIA  (transient ischemic attack)     Past Surgical History:  2017: ANKLE SURGERY      Comment:  right ankle torn ligament  No date: APPENDECTOMY  No date:  SECTION  No date: CHOLECYSTECTOMY  8/13/15: COLONOSCOPY      Comment:  Dr. Oliveira, five year recheck  2021: COLONOSCOPY; N/A      Comment:  Procedure: COLONOSCOPY;  Surgeon: Tevin Oliveira MD;                Location: [facility] ENDO;  Service: Endoscopy;  Laterality:                N/A;  4/10/2022: CYSTOSCOPY WITH URETEROSCOPY, RETROGRADE PYELOGRAPHY, AND   INSERTION OF STENT; Left      Comment:  Procedure: CYSTOSCOPY, WITH RETROGRADE PYELOGRAM AND                URETERAL STENT INSERTION;  Surgeon: Jack Redman Jr., MD;  Location: [facility] OR;  Service: Urology;  Laterality:                Left;  2022: CYSTOSCOPY WITH URETEROSCOPY, RETROGRADE PYELOGRAPHY, AND   INSERTION OF STENT; Left      Comment:  Procedure: CYSTOSCOPY, WITH RETROGRADE PYELOGRAM AND                URETERAL STENT INSERTION;  Surgeon: Jack Redman Jr., MD;  Location: [facility] OR;  Service: Urology;  Laterality:                Left;  1/3/2020: EPIDURAL STEROID INJECTION INTO CERVICAL SPINE; N/A      Comment:  Procedure: Injection-steroid-epidural-cervical;                 Surgeon: Eddi Albrecht MD;  Location: [facility] OR;  Service:                Pain Management;  Laterality: N/A;  C7-T1  3/10/2020: EPIDURAL STEROID INJECTION INTO CERVICAL SPINE; N/A      Comment:  Procedure: Injection-steroid-epidural-cervical;                 Surgeon: Eddi Albrecht MD;  Location: [facility] OR;  Service:                Pain Management;  Laterality: N/A;  C7-T1  2020: EPIDURAL STEROID INJECTION INTO CERVICAL SPINE; N/A      Comment:  Procedure: Injection-steroid-epidural-cervical;                 Surgeon: Eddi Albrecht MD;  Location: [facility] OR;  Service:                Pain Management;  Laterality: N/A;  C7-T1   2019: EXTRACORPOREAL SHOCK WAVE LITHOTRIPSY; Left      Comment:   Procedure: LITHOTRIPSY, ESWL - only if stone visible on                xray  with cysto after on table possible;  Surgeon:                Marisol Stewart MD;  Location: [facility] OR;                 Service: Urology;  Laterality: Left;  No date: HYSTERECTOMY  4/26/2022: LASER LITHOTRIPSY; Left      Comment:  Procedure: LITHOTRIPSY, USING LASER;  Surgeon: Jack Redman Jr., MD;  Location: [facility] OR;  Service: Urology;                 Laterality: Left;  No date: OOPHORECTOMY  No date: TONSILLECTOMY  4/26/2022: URETEROSCOPIC REMOVAL OF URETERIC CALCULUS; Left      Comment:  Procedure: REMOVAL, CALCULUS, URETER, URETEROSCOPIC;                 Surgeon: Jack Redman Jr., MD;  Location: [facility] OR;                 Service: Urology;  Laterality: Left;  4/26/2022: URETEROSCOPY; Left      Comment:  Procedure: URETEROSCOPY;  Surgeon: Jack Redman Jr., MD;  Location: [facility] OR;  Service: Urology;  Laterality:                Left;  No date: VAGINAL DELIVERY    Current Outpatient Medications on File Prior to Visit:  ACCU-CHEK JORGE PLUS TEST STRP Strp, TEST BLOOD SUGAR TWICE DAILY, Disp: 200 strip, Rfl: 3  albuterol (PROVENTIL/VENTOLIN HFA) 90 mcg/actuation inhaler, Inhale 2 puffs into the lungs 4 (four) times daily., Disp: 1 Inhaler, Rfl: 1  ALPRAZolam (XANAX) 0.25 MG tablet, Use one 20-30 minutes before flying prn, Disp: 10 tablet, Rfl: 1  amLODIPine (NORVASC) 10 MG tablet, TAKE 1 TABLET EVERY DAY, Disp: 90 tablet, Rfl: 3  BD ALCOHOL SWABS PadM, USE TWICE DAILY AS NEEDED AS DIRECTED, Disp: 200 each, Rfl: 3  cetirizine (ZYRTEC) 10 MG tablet, Take 10 mg by mouth daily as needed. , Disp: , Rfl:   cyclobenzaprine (FLEXERIL) 10 MG tablet, TAKE 1 TABLET THREE TIMES DAILY AS NEEDED, Disp: 270 tablet, Rfl: 1  diclofenac sodium (VOLTAREN) 1 % Gel, APPLY 2.5 GRAMS TO THE AFFECTED AREA 3-4 TIMES DAILY STARTING 6 WEEKS POST SURGERY., Disp: , Rfl:   dulaglutide (TRULICITY) 1.5 mg/0.5 mL pen injector, Inject  1.5 mg into the skin every 7 days., Disp: 12 pen, Rfl: 3  ergocalciferol (ERGOCALCIFEROL) 50,000 unit Cap, Take one twice a week, Disp: 24 capsule, Rfl: 1  EScitalopram oxalate (LEXAPRO) 10 MG tablet, Take 1 tablet (10 mg total) by mouth once daily., Disp: 90 tablet, Rfl: 3  fluticasone propionate (FLONASE) 50 mcg/actuation nasal spray, USE 2 SPRAYS IN EACH NOSTRIL EVERY DAY, Disp: 48 g, Rfl: 3  gabapentin (NEURONTIN) 300 MG capsule, Take 3 capsules (900 mg total) by mouth every evening., Disp: 270 capsule, Rfl: 1  HYDROcodone-acetaminophen (NORCO) 5-325 mg per tablet, Take 1 tablet by mouth every 6 (six) hours as needed for Pain., Disp: 12 tablet, Rfl: 0  ketorolac (TORADOL) 10 mg tablet, Take 10 mg by mouth every 6 (six) hours., Disp: , Rfl:   lancets (TRUEPLUS LANCETS) 28 gauge Misc, TEST TWO TIMES DAILY, Disp: 200 each, Rfl: 11  losartan (COZAAR) 100 MG tablet, TAKE 1 TABLET (100 MG TOTAL) BY MOUTH ONCE DAILY., Disp: 90 tablet, Rfl: 2  meclizine (ANTIVERT) 25 mg tablet, Take 1 tablet (25 mg total) by mouth 3 (three) times daily as needed., Disp: 30 tablet, Rfl: 5  metFORMIN (GLUCOPHAGE-XR) 750 MG ER 24hr tablet, Take 1 tablet (750 mg total) by mouth 2 (two) times daily with meals., Disp: 180 tablet, Rfl: 1  ondansetron (ZOFRAN-ODT) 4 MG TbDL, Take 1 tablet (4 mg total) by mouth every 8 (eight) hours as needed (Nausea)., Disp: 10 tablet, Rfl: 0  rosuvastatin (CRESTOR) 40 MG Tab, TAKE 1 TABLET (40 MG TOTAL) BY MOUTH EVERY EVENING., Disp: 90 tablet, Rfl: 2  SANARE ADV SCAR THERAPY BASE Gel, APPLY 1/2 GRAM (1 PUMP) TO AFFECTED AREAS TWICE DAILY STARTING 6 WEEKS POST SURGERY., Disp: , Rfl:   blood-glucose meter kit, Use as instructed, Disp: 1 each, Rfl: 0  tamsulosin (FLOMAX) 0.4 mg Cap, Take 1 capsule (0.4 mg total) by mouth once daily., Disp: 30 capsule, Rfl: 0  (DISCONTINUED) meloxicam (MOBIC) 15 MG tablet, TAKE 1 TABLET EVERY DAY (Patient not taking: Reported on 5/26/2022), Disp: 90 tablet, Rfl: 1  (DISCONTINUED)  ondansetron (ZOFRAN) 4 MG tablet, Take 1 tablet (4 mg total) by mouth every 6 (six) hours. (Patient not taking: No sig reported), Disp: 12 tablet, Rfl: 0    No current facility-administered medications on file prior to visit.  \      Review of Systems   Constitutional: Negative for chills and fever.   HENT: Positive for ear pain. Negative for hearing loss.    Genitourinary: Positive for flank pain and hematuria.       Objective:      Physical Exam  Vitals and nursing note reviewed.   Constitutional:       General: She is not in acute distress.     Appearance: Normal appearance. She is obese. She is not ill-appearing, toxic-appearing or diaphoretic.      Comments: Good blood pressure control  Obese with a BMI of 30.6 she is down 1.4 lb from her January 10, 2022 visit   HENT:      Right Ear: Hearing, ear canal and external ear normal. No middle ear effusion. There is no impacted cerumen. Tympanic membrane is bulging. Tympanic membrane is not injected, scarred, perforated, erythematous or retracted. Tympanic membrane has decreased mobility.      Left Ear: Hearing, tympanic membrane, ear canal and external ear normal. There is no impacted cerumen.      Nose:      Right Turbinates: Enlarged and swollen. Not pale.      Left Turbinates: Swollen. Not enlarged or pale.   Abdominal:      Tenderness: There is left CVA tenderness. There is no right CVA tenderness.   Neurological:      Mental Status: She is alert.         Assessment:       1. Left ureteral stone    2. Abdominal pain, unspecified abdominal location    3. Hematuria, unspecified type    4. Acute cystitis with hematuria    5. Acute dysfunction of left eustachian tube    6. Chronic rhinitis        Plan:       1. Left ureteral stone  Point of care urine shows positive leukocytes, negative nitrates, trace of blood.  Culture was sent to the lab and the patient is being initiated on Cipro 250 mg twice daily for seven days.  Previously she had been on Bactrim on the last  kidney stone one month ago.  We will get CT renal stone protocol tomorrow, if the pain worsens tonight she may have to go to the emergency room and accelerate the process.  Continue taking Flomax  - ciprofloxacin HCl (CIPRO) 250 MG tablet; Take 1 tablet (250 mg total) by mouth 2 (two) times daily. for 7 days  Dispense: 14 tablet; Refill: 0  - HYDROcodone-acetaminophen (NORCO) 7.5-325 mg per tablet; Take 1 tablet by mouth every 6 (six) hours as needed for Pain (kidney stone).  Dispense: 28 tablet; Refill: 0  - CT Renal Stone Study ABD Pelvis WO; Future    2. Abdominal pain, unspecified abdominal location  See above  - CT Renal Stone Study ABD Pelvis WO; Future    3. Hematuria, unspecified type  See above  - POCT URINE DIPSTICK WITHOUT MICROSCOPE    4. Acute cystitis with hematuria  See above  - Urine culture; Future  - ciprofloxacin HCl (CIPRO) 250 MG tablet; Take 1 tablet (250 mg total) by mouth 2 (two) times daily. for 7 days  Dispense: 14 tablet; Refill: 0    5. Acute dysfunction of left eustachian tube  Patient instructed to sniff only lightly if at all when she uses the Flonase.  I would recommend using saline spray to clean the nasal tissues and mucosa of excessive mucus prior to using the Flonase    6. Chronic rhinitis  See above

## 2022-05-27 ENCOUNTER — TELEPHONE (OUTPATIENT)
Dept: FAMILY MEDICINE | Facility: CLINIC | Age: 72
End: 2022-05-27
Payer: MEDICARE

## 2022-05-27 ENCOUNTER — HOSPITAL ENCOUNTER (OUTPATIENT)
Dept: RADIOLOGY | Facility: HOSPITAL | Age: 72
Discharge: HOME | End: 2022-05-27
Attending: FAMILY MEDICINE
Payer: MEDICARE

## 2022-05-27 DIAGNOSIS — R10.9 ABDOMINAL PAIN, UNSPECIFIED ABDOMINAL LOCATION: ICD-10-CM

## 2022-05-27 DIAGNOSIS — N30.01 ACUTE CYSTITIS WITH HEMATURIA: Primary | ICD-10-CM

## 2022-05-27 DIAGNOSIS — N20.1 LEFT URETERAL STONE: ICD-10-CM

## 2022-05-27 PROCEDURE — 74176 CT ABD & PELVIS W/O CONTRAST: CPT | Mod: TC

## 2022-05-27 PROCEDURE — 74176 CT RENAL STONE STUDY ABD PELVIS WO: ICD-10-PCS | Mod: 26,,, | Performed by: RADIOLOGY

## 2022-05-27 PROCEDURE — 74176 CT ABD & PELVIS W/O CONTRAST: CPT | Mod: 26,,, | Performed by: RADIOLOGY

## 2022-05-27 NOTE — TELEPHONE ENCOUNTER
----- Message from Chata Newby sent at 5/27/2022  9:35 AM CDT -----  Contact: [facility]  [9:32 AM] Chata Newby    Hi have [facility] asking to speak to you about Doris Pastrana mrn [id number]        [9:32 AM] Chata Newby    states that the urine culture you put in for this patient was not put in correct can you talk to her

## 2022-05-30 ENCOUNTER — PATIENT MESSAGE (OUTPATIENT)
Dept: FAMILY MEDICINE | Facility: CLINIC | Age: 72
End: 2022-05-30
Payer: MEDICARE

## 2022-05-30 DIAGNOSIS — B96.20 E. COLI UTI: Primary | ICD-10-CM

## 2022-05-30 DIAGNOSIS — N39.0 E. COLI UTI: Primary | ICD-10-CM

## 2022-05-30 DIAGNOSIS — E11.9 CONTROLLED TYPE 2 DIABETES MELLITUS WITHOUT COMPLICATION, WITHOUT LONG-TERM CURRENT USE OF INSULIN: ICD-10-CM

## 2022-05-30 LAB — BACTERIA UR CULT: ABNORMAL

## 2022-05-30 RX ORDER — NITROFURANTOIN 25; 75 MG/1; MG/1
100 CAPSULE ORAL 2 TIMES DAILY
Qty: 14 CAPSULE | Refills: 0 | Status: SHIPPED | OUTPATIENT
Start: 2022-05-30 | End: 2022-06-06

## 2022-05-30 RX ORDER — DULAGLUTIDE 1.5 MG/.5ML
INJECTION, SOLUTION SUBCUTANEOUS
Qty: 12 PEN | Refills: 0 | Status: SHIPPED | OUTPATIENT
Start: 2022-05-30 | End: 2022-11-17

## 2022-05-30 NOTE — TELEPHONE ENCOUNTER
Done  
Patient notified via e-mail due to this being initial point of contact from patient regarding this matter.     
Spoke to patient who states she was prescribed Cipro for UTI; states urine culture indicates this medication is resistant to bacteria growing in urine per urine culture. Requesting new antibiotic be called in to pharmacy. Please advise. Thank you.    
My signature below certifies that the above stated patient is homebound and upon completion of the Face-To-Face encounter, has the need for intermittent skilled nursing, physical therapy and/or speech or occupational therapy services in their home for their current diagnosis as outlined in their initial plan of care. These services will continue to be monitored by myself or another physician.

## 2022-05-31 NOTE — TELEPHONE ENCOUNTER
Care Due:                  Date            Visit Type   Department     Provider  --------------------------------------------------------------------------------                                EP -                              PRIMARY      [facility]  Last Visit: 05-      CARE ([facility])   PRACTICE       Tor Garg  Next Visit: None Scheduled  None         None Found                                                            Last  Test          Frequency    Reason                     Performed    Due Date  --------------------------------------------------------------------------------    Lipid Panel.  12 months..  rosuvastatin.............  06-   06-    [facility] Embedded Care Gaps. Reference number: [id number]. 5/30/2022   8:34:02 PM CDT

## 2022-05-31 NOTE — TELEPHONE ENCOUNTER
Refill Authorization Note   Doris Pastrana  is requesting a refill authorization.  Brief Assessment and Rationale for Refill:  Approve    -Medication-Related Problems Identified: Requires labs  Medication Therapy Plan:       Medication Reconciliation Completed: No   Comments:     Provider Staff:     Action is required for this patient.   Please see care gap opportunities below in Care Due Message.     Thanks!  [facility]     Appointments      Date Provider   Last Visit   5/26/2022 Tor Garg MD   Next Visit   Visit date not found Tor Garg MD     Note composed:8:40 PM 05/30/2022           Note composed:8:40 PM 05/30/2022

## 2022-06-03 ENCOUNTER — OFFICE VISIT (OUTPATIENT)
Dept: FAMILY MEDICINE | Facility: CLINIC | Age: 72
End: 2022-06-03
Payer: MEDICARE

## 2022-06-03 VITALS
DIASTOLIC BLOOD PRESSURE: 60 MMHG | OXYGEN SATURATION: 95 % | WEIGHT: 184.06 LBS | HEART RATE: 93 BPM | BODY MASS INDEX: 30.67 KG/M2 | HEIGHT: 65 IN | SYSTOLIC BLOOD PRESSURE: 112 MMHG

## 2022-06-03 DIAGNOSIS — E11.00 TYPE 2 DIABETES MELLITUS WITH HYPEROSMOLARITY WITHOUT COMA, WITHOUT LONG-TERM CURRENT USE OF INSULIN: ICD-10-CM

## 2022-06-03 DIAGNOSIS — E66.9 OBESITY (BMI 30-39.9): ICD-10-CM

## 2022-06-03 DIAGNOSIS — I15.2 HYPERTENSION ASSOCIATED WITH DIABETES: ICD-10-CM

## 2022-06-03 DIAGNOSIS — E11.69 HYPERLIPIDEMIA ASSOCIATED WITH TYPE 2 DIABETES MELLITUS: ICD-10-CM

## 2022-06-03 DIAGNOSIS — E78.5 HYPERLIPIDEMIA ASSOCIATED WITH TYPE 2 DIABETES MELLITUS: ICD-10-CM

## 2022-06-03 DIAGNOSIS — Z01.818 PRE-OPERATIVE CLEARANCE: Primary | ICD-10-CM

## 2022-06-03 DIAGNOSIS — N39.0 URINARY TRACT INFECTION WITHOUT HEMATURIA, SITE UNSPECIFIED: ICD-10-CM

## 2022-06-03 DIAGNOSIS — E11.59 HYPERTENSION ASSOCIATED WITH DIABETES: ICD-10-CM

## 2022-06-03 PROCEDURE — 1101F PR PT FALLS ASSESS DOC 0-1 FALLS W/OUT INJ PAST YR: ICD-10-PCS | Mod: CPTII,S$GLB,, | Performed by: PHYSICIAN ASSISTANT

## 2022-06-03 PROCEDURE — 3044F PR MOST RECENT HEMOGLOBIN A1C LEVEL <7.0%: ICD-10-PCS | Mod: CPTII,S$GLB,, | Performed by: PHYSICIAN ASSISTANT

## 2022-06-03 PROCEDURE — 3072F LOW RISK FOR RETINOPATHY: CPT | Mod: CPTII,S$GLB,, | Performed by: PHYSICIAN ASSISTANT

## 2022-06-03 PROCEDURE — 3044F HG A1C LEVEL LT 7.0%: CPT | Mod: CPTII,S$GLB,, | Performed by: PHYSICIAN ASSISTANT

## 2022-06-03 PROCEDURE — 4010F ACE/ARB THERAPY RXD/TAKEN: CPT | Mod: CPTII,S$GLB,, | Performed by: PHYSICIAN ASSISTANT

## 2022-06-03 PROCEDURE — 3072F PR LOW RISK FOR RETINOPATHY: ICD-10-PCS | Mod: CPTII,S$GLB,, | Performed by: PHYSICIAN ASSISTANT

## 2022-06-03 PROCEDURE — 3078F DIAST BP <80 MM HG: CPT | Mod: CPTII,S$GLB,, | Performed by: PHYSICIAN ASSISTANT

## 2022-06-03 PROCEDURE — 4010F PR ACE/ARB THEARPY RXD/TAKEN: ICD-10-PCS | Mod: CPTII,S$GLB,, | Performed by: PHYSICIAN ASSISTANT

## 2022-06-03 PROCEDURE — 99999 PR PBB SHADOW E&M-EST. PATIENT-LVL V: ICD-10-PCS | Mod: PBBFAC,,, | Performed by: PHYSICIAN ASSISTANT

## 2022-06-03 PROCEDURE — 1126F PR PAIN SEVERITY QUANTIFIED, NO PAIN PRESENT: ICD-10-PCS | Mod: CPTII,S$GLB,, | Performed by: PHYSICIAN ASSISTANT

## 2022-06-03 PROCEDURE — 3008F BODY MASS INDEX DOCD: CPT | Mod: CPTII,S$GLB,, | Performed by: PHYSICIAN ASSISTANT

## 2022-06-03 PROCEDURE — 99214 PR OFFICE/OUTPT VISIT, EST, LEVL IV, 30-39 MIN: ICD-10-PCS | Mod: S$GLB,,, | Performed by: PHYSICIAN ASSISTANT

## 2022-06-03 PROCEDURE — 3078F PR MOST RECENT DIASTOLIC BLOOD PRESSURE < 80 MM HG: ICD-10-PCS | Mod: CPTII,S$GLB,, | Performed by: PHYSICIAN ASSISTANT

## 2022-06-03 PROCEDURE — 1126F AMNT PAIN NOTED NONE PRSNT: CPT | Mod: CPTII,S$GLB,, | Performed by: PHYSICIAN ASSISTANT

## 2022-06-03 PROCEDURE — 99214 OFFICE O/P EST MOD 30 MIN: CPT | Mod: S$GLB,,, | Performed by: PHYSICIAN ASSISTANT

## 2022-06-03 PROCEDURE — 3288F PR FALLS RISK ASSESSMENT DOCUMENTED: ICD-10-PCS | Mod: CPTII,S$GLB,, | Performed by: PHYSICIAN ASSISTANT

## 2022-06-03 PROCEDURE — 3074F PR MOST RECENT SYSTOLIC BLOOD PRESSURE < 130 MM HG: ICD-10-PCS | Mod: CPTII,S$GLB,, | Performed by: PHYSICIAN ASSISTANT

## 2022-06-03 PROCEDURE — 3288F FALL RISK ASSESSMENT DOCD: CPT | Mod: CPTII,S$GLB,, | Performed by: PHYSICIAN ASSISTANT

## 2022-06-03 PROCEDURE — 1101F PT FALLS ASSESS-DOCD LE1/YR: CPT | Mod: CPTII,S$GLB,, | Performed by: PHYSICIAN ASSISTANT

## 2022-06-03 PROCEDURE — 3008F PR BODY MASS INDEX (BMI) DOCUMENTED: ICD-10-PCS | Mod: CPTII,S$GLB,, | Performed by: PHYSICIAN ASSISTANT

## 2022-06-03 PROCEDURE — 3074F SYST BP LT 130 MM HG: CPT | Mod: CPTII,S$GLB,, | Performed by: PHYSICIAN ASSISTANT

## 2022-06-03 PROCEDURE — 99999 PR PBB SHADOW E&M-EST. PATIENT-LVL V: CPT | Mod: PBBFAC,,, | Performed by: PHYSICIAN ASSISTANT

## 2022-06-03 NOTE — PROGRESS NOTES
Subjective:       Patient ID: Doris Pastrana is a 71 y.o. female.    Chief Complaint: Pre-op Exam    HPI   Pre op exam  Pt. Scheduled for surgery 6-30-22 abdominoplasty   Dr Sotelo will perform the surgery using general anesthesia  Pt. Feels well  Taking Macrobid for UTI  Review of Systems   Constitutional: Negative.  Negative for activity change, appetite change, chills, diaphoresis, fatigue, fever and unexpected weight change.   HENT: Negative.    Eyes: Negative.    Respiratory: Negative.    Cardiovascular: Negative.  Negative for chest pain and leg swelling.   Gastrointestinal: Negative.    Endocrine: Negative.    Genitourinary: Negative.    Musculoskeletal: Negative.    Integumentary:  Negative for rash. Negative.   Neurological: Negative.          Objective:      Physical Exam  Vitals reviewed.   Constitutional:       General: She is not in acute distress.     Appearance: Normal appearance. She is obese. She is not ill-appearing, toxic-appearing or diaphoretic.   HENT:      Head: Normocephalic and atraumatic.      Right Ear: Tympanic membrane, ear canal and external ear normal. There is no impacted cerumen.      Left Ear: Tympanic membrane, ear canal and external ear normal. There is no impacted cerumen.      Nose: Nose normal.      Mouth/Throat:      Mouth: Mucous membranes are moist.      Pharynx: Oropharynx is clear. No oropharyngeal exudate or posterior oropharyngeal erythema.   Eyes:      General: No scleral icterus.     Conjunctiva/sclera: Conjunctivae normal.   Neck:      Vascular: No carotid bruit.   Cardiovascular:      Rate and Rhythm: Normal rate and regular rhythm.      Pulses: Normal pulses.      Heart sounds: Normal heart sounds. No murmur heard.    No friction rub. No gallop.   Pulmonary:      Effort: Pulmonary effort is normal. No respiratory distress.      Breath sounds: Normal breath sounds. No stridor. No wheezing, rhonchi or rales.   Abdominal:      General: Abdomen is flat. Bowel  sounds are normal. There is no distension.      Palpations: Abdomen is soft. There is no mass.      Tenderness: There is no abdominal tenderness. There is no guarding or rebound.      Hernia: No hernia is present.   Musculoskeletal:         General: No swelling.      Cervical back: Normal range of motion and neck supple. No rigidity or tenderness.      Right lower leg: No edema.      Left lower leg: No edema.   Lymphadenopathy:      Cervical: No cervical adenopathy.   Skin:     General: Skin is warm and dry.   Neurological:      General: No focal deficit present.      Mental Status: She is alert and oriented to person, place, and time.         Assessment:       Problem List Items Addressed This Visit     Obesity (BMI 30-39.9)    Diabetes mellitus, type 2    Hyperlipidemia associated with type 2 diabetes mellitus    Hypertension associated with diabetes      Other Visit Diagnoses     Pre-operative clearance    -  Primary    Relevant Orders    IN OFFICE EKG 12-LEAD (to Muse)    CBC Auto Differential    Comprehensive Metabolic Panel    X-Ray Chest PA And Lateral    Urinary tract infection without hematuria, site unspecified        Relevant Orders    Urinalysis    Urine culture          Plan:       Doris was seen today for pre-op exam.    Diagnoses and all orders for this visit:    Pre-operative clearance  -     IN OFFICE EKG 12-LEAD (to Muse); Future  -     CBC Auto Differential; Future  -     Comprehensive Metabolic Panel; Future  -     X-Ray Chest PA And Lateral; Future    Hypertension associated with diabetes    Hyperlipidemia associated with type 2 diabetes mellitus    Type 2 diabetes mellitus with hyperosmolarity without coma, without long-term current use of insulin    Obesity (BMI 30-39.9)    Urinary tract infection without hematuria, site unspecified  -     Urinalysis; Future  -     Urine culture; Future      EKG stable  Chest xray stable  Lab stable    Pt. Is cleared for surgery and general anesthesia     Deepak notified

## 2022-06-09 ENCOUNTER — OFFICE VISIT (OUTPATIENT)
Dept: UROLOGY | Facility: CLINIC | Age: 72
End: 2022-06-09
Payer: MEDICARE

## 2022-06-09 VITALS — HEART RATE: 102 BPM | DIASTOLIC BLOOD PRESSURE: 75 MMHG | SYSTOLIC BLOOD PRESSURE: 115 MMHG

## 2022-06-09 DIAGNOSIS — N20.0 NEPHROLITHIASIS: Primary | ICD-10-CM

## 2022-06-09 PROCEDURE — 1126F AMNT PAIN NOTED NONE PRSNT: CPT | Mod: CPTII,S$GLB,, | Performed by: UROLOGY

## 2022-06-09 PROCEDURE — 1159F PR MEDICATION LIST DOCUMENTED IN MEDICAL RECORD: ICD-10-PCS | Mod: CPTII,S$GLB,, | Performed by: UROLOGY

## 2022-06-09 PROCEDURE — 1160F PR REVIEW ALL MEDS BY PRESCRIBER/CLIN PHARMACIST DOCUMENTED: ICD-10-PCS | Mod: CPTII,S$GLB,, | Performed by: UROLOGY

## 2022-06-09 PROCEDURE — 1101F PT FALLS ASSESS-DOCD LE1/YR: CPT | Mod: CPTII,S$GLB,, | Performed by: UROLOGY

## 2022-06-09 PROCEDURE — 4010F ACE/ARB THERAPY RXD/TAKEN: CPT | Mod: CPTII,S$GLB,, | Performed by: UROLOGY

## 2022-06-09 PROCEDURE — 3072F PR LOW RISK FOR RETINOPATHY: ICD-10-PCS | Mod: CPTII,S$GLB,, | Performed by: UROLOGY

## 2022-06-09 PROCEDURE — 3288F PR FALLS RISK ASSESSMENT DOCUMENTED: ICD-10-PCS | Mod: CPTII,S$GLB,, | Performed by: UROLOGY

## 2022-06-09 PROCEDURE — 1160F RVW MEDS BY RX/DR IN RCRD: CPT | Mod: CPTII,S$GLB,, | Performed by: UROLOGY

## 2022-06-09 PROCEDURE — 1159F MED LIST DOCD IN RCRD: CPT | Mod: CPTII,S$GLB,, | Performed by: UROLOGY

## 2022-06-09 PROCEDURE — 99214 OFFICE O/P EST MOD 30 MIN: CPT | Mod: S$GLB,,, | Performed by: UROLOGY

## 2022-06-09 PROCEDURE — 3044F PR MOST RECENT HEMOGLOBIN A1C LEVEL <7.0%: ICD-10-PCS | Mod: CPTII,S$GLB,, | Performed by: UROLOGY

## 2022-06-09 PROCEDURE — 3078F DIAST BP <80 MM HG: CPT | Mod: CPTII,S$GLB,, | Performed by: UROLOGY

## 2022-06-09 PROCEDURE — 3288F FALL RISK ASSESSMENT DOCD: CPT | Mod: CPTII,S$GLB,, | Performed by: UROLOGY

## 2022-06-09 PROCEDURE — 4010F PR ACE/ARB THEARPY RXD/TAKEN: ICD-10-PCS | Mod: CPTII,S$GLB,, | Performed by: UROLOGY

## 2022-06-09 PROCEDURE — 99214 PR OFFICE/OUTPT VISIT, EST, LEVL IV, 30-39 MIN: ICD-10-PCS | Mod: S$GLB,,, | Performed by: UROLOGY

## 2022-06-09 PROCEDURE — 3044F HG A1C LEVEL LT 7.0%: CPT | Mod: CPTII,S$GLB,, | Performed by: UROLOGY

## 2022-06-09 PROCEDURE — 99999 PR PBB SHADOW E&M-EST. PATIENT-LVL V: CPT | Mod: PBBFAC,,, | Performed by: UROLOGY

## 2022-06-09 PROCEDURE — 3072F LOW RISK FOR RETINOPATHY: CPT | Mod: CPTII,S$GLB,, | Performed by: UROLOGY

## 2022-06-09 PROCEDURE — 3074F SYST BP LT 130 MM HG: CPT | Mod: CPTII,S$GLB,, | Performed by: UROLOGY

## 2022-06-09 PROCEDURE — 3074F PR MOST RECENT SYSTOLIC BLOOD PRESSURE < 130 MM HG: ICD-10-PCS | Mod: CPTII,S$GLB,, | Performed by: UROLOGY

## 2022-06-09 PROCEDURE — 99999 PR PBB SHADOW E&M-EST. PATIENT-LVL V: ICD-10-PCS | Mod: PBBFAC,,, | Performed by: UROLOGY

## 2022-06-09 PROCEDURE — 3078F PR MOST RECENT DIASTOLIC BLOOD PRESSURE < 80 MM HG: ICD-10-PCS | Mod: CPTII,S$GLB,, | Performed by: UROLOGY

## 2022-06-09 PROCEDURE — 1101F PR PT FALLS ASSESS DOC 0-1 FALLS W/OUT INJ PAST YR: ICD-10-PCS | Mod: CPTII,S$GLB,, | Performed by: UROLOGY

## 2022-06-09 PROCEDURE — 1126F PR PAIN SEVERITY QUANTIFIED, NO PAIN PRESENT: ICD-10-PCS | Mod: CPTII,S$GLB,, | Performed by: UROLOGY

## 2022-06-09 NOTE — PROGRESS NOTES
[facility] Established Patient/H&P:    Doris Pastrana is a 71 y.o. female who presents for follow up for left nephrolithiasis.     Patient with a history of DM, HTN, nephrolithiasis and TIA who presented to the emergency department on 4/10/22 complaining of left flank pain associated with nausea and emesis for approximately 2 days.      CT renal stone revealed a left proximal 8 mm ureteral stone with hydroureteronephrosis. Also with bilateral non-obstructing stones. Urology consulted to assist with management.      WBC 12 and Creatinine 1.1.      She has a history of kidney stones s/p ESWL in the past with Dr. Stewart. States she also passed a stone previously.       Interval History    22: She underwent left ureteral stent placement on 4/10/22. She subsequently had left ureteroscopy and laser lithotripsy of her stone on 22. Stent removed at home. Had a CT post-operatively per her PCP. Revealed a 3 mm distal ureteral fragment that she passed. Pain has resolved. Doing well with no complaints.     Denies any fever, chills, gross hematuria, recent urinary tract infection,  trauma or history of  malignancy.    Past Medical History:   Diagnosis Date    Acute sinus infection     Allergy     Anemia     Arthritis     Bronchitis     Degenerative disc disease     lumbar, pain contract 2013    Depression     Diabetes mellitus, type 2 16    Fatty liver     Hyperlipidemia 16    Hypertension     Kidney stone     Mitral valve prolapse     Pleurisy     Pneumonia     PONV (postoperative nausea and vomiting)     Sinus infection     TIA (transient ischemic attack) 2019       Past Surgical History:   Procedure Laterality Date    ANKLE SURGERY  2017    right ankle torn ligament    APPENDECTOMY       SECTION      CHOLECYSTECTOMY      COLONOSCOPY  8/13/15    Dr. Oliveira, five year recheck    COLONOSCOPY N/A 2021    Procedure: COLONOSCOPY;   Surgeon: Tevin Oliveira MD;  Location: [facility] ENDO;  Service: Endoscopy;  Laterality: N/A;    CYSTOSCOPY WITH URETEROSCOPY, RETROGRADE PYELOGRAPHY, AND INSERTION OF STENT Left 4/10/2022    Procedure: CYSTOSCOPY, WITH RETROGRADE PYELOGRAM AND URETERAL STENT INSERTION;  Surgeon: Jack Redman Jr., MD;  Location: [facility] OR;  Service: Urology;  Laterality: Left;    CYSTOSCOPY WITH URETEROSCOPY, RETROGRADE PYELOGRAPHY, AND INSERTION OF STENT Left 4/26/2022    Procedure: CYSTOSCOPY, WITH RETROGRADE PYELOGRAM AND URETERAL STENT INSERTION;  Surgeon: Jack Redman Jr., MD;  Location: [facility] OR;  Service: Urology;  Laterality: Left;    EPIDURAL STEROID INJECTION INTO CERVICAL SPINE N/A 1/3/2020    Procedure: Injection-steroid-epidural-cervical;  Surgeon: Eddi Albrecht MD;  Location: [facility] OR;  Service: Pain Management;  Laterality: N/A;  C7-T1    EPIDURAL STEROID INJECTION INTO CERVICAL SPINE N/A 3/10/2020    Procedure: Injection-steroid-epidural-cervical;  Surgeon: Eddi Albrecht MD;  Location: [facility] OR;  Service: Pain Management;  Laterality: N/A;  C7-T1    EPIDURAL STEROID INJECTION INTO CERVICAL SPINE N/A 12/29/2020    Procedure: Injection-steroid-epidural-cervical;  Surgeon: Eddi Albrecht MD;  Location: [facility] OR;  Service: Pain Management;  Laterality: N/A;  C7-T1     EXTRACORPOREAL SHOCK WAVE LITHOTRIPSY Left 5/22/2019    Procedure: LITHOTRIPSY, ESWL - only if stone visible on xray  with cysto after on table possible;  Surgeon: Marisol Stewart MD;  Location: [facility] OR;  Service: Urology;  Laterality: Left;    HYSTERECTOMY      LASER LITHOTRIPSY Left 4/26/2022    Procedure: LITHOTRIPSY, USING LASER;  Surgeon: Jack Redman Jr., MD;  Location: [facility] OR;  Service: Urology;  Laterality: Left;    OOPHORECTOMY      TONSILLECTOMY      URETEROSCOPIC REMOVAL OF URETERIC CALCULUS Left 4/26/2022    Procedure: REMOVAL, CALCULUS, URETER, URETEROSCOPIC;  Surgeon: Jack Redman Jr., MD;  Location: [facility] OR;  Service: Urology;   Laterality: Left;    URETEROSCOPY Left 4/26/2022    Procedure: URETEROSCOPY;  Surgeon: Jack Redman Jr., MD;  Location: [facility];  Service: Urology;  Laterality: Left;    VAGINAL DELIVERY         Review of patient's allergies indicates:   Allergen Reactions    Omnicef [cefdinir] Hives    Codeine Nausea Only    Rhubarb Swelling    Strawberries [strawberry] Hives    Iodine Rash       Other reaction(s): Nausea    Latex, natural rubber Rash    Pravastatin Other (See Comments)     Muscle pain        Medications Reviewed: see MAR      FOCUSED PHYSICAL EXAM:    Vitals:    06/09/22 1016   BP: 115/75   Pulse: 102     There is no height or weight on file to calculate BMI.           General: Alert, cooperative, no distress, appears stated age  Abdomen: Soft, non-tender, no CVA tenderness, non-distended      LABS:    Recent Results (from the past 336 hour(s))   POCT URINE DIPSTICK WITHOUT MICROSCOPE    Collection Time: 05/26/22  4:37 PM   Result Value Ref Range    Color, UA Yellow     pH, UA 7     WBC, UA +     Nitrite, UA neg     Protein, POC trace     Glucose, UA normal     Ketones, UA neg     Urobilinogen, UA normal     Bilirubin, POC neg     Blood, UA trace     Clarity, UA Clear     Spec Grav UA 1.005    Urine culture    Collection Time: 05/27/22  9:54 AM    Specimen: Urine, Clean Catch   Result Value Ref Range    Urine Culture, Routine ESCHERICHIA COLI  >100,000 cfu/ml   (A)        Susceptibility    Escherichia coli - CULTURE, URINE     Amp/Sulbactam <=8/4 Sensitive mcg/mL     Ampicillin <=8 Sensitive mcg/mL     Amox/K Clav'ate <=8/4 Sensitive mcg/mL     Ceftriaxone <=1 Sensitive mcg/mL     Cefazolin <=2 Sensitive mcg/mL     Ciprofloxacin >2 Resistant mcg/mL     Cefepime <=2 Sensitive mcg/mL     Ertapenem <=0.5 Sensitive mcg/mL     Nitrofurantoin <=32 Sensitive mcg/mL     Gentamicin <=4 Sensitive mcg/mL     Levofloxacin >4 Resistant mcg/mL     Meropenem <=1 Sensitive mcg/mL     Piperacillin/Tazo <=16 Sensitive  mcg/mL     Trimeth/Sulfa >2/38 Resistant mcg/mL     Tobramycin <=4 Sensitive mcg/mL         Assessment/Diagnosis:    1. Nephrolithiasis  US Retroperitoneal Complete (Kidney and       Plans:    - I spent 30 minutes of the day of this encounter preparing for, treating and managing this patient. Extensive discussion with patient regarding the etiology and management of nephrolithiasis. We discussed the importance of decreased sodium intake and decreased animal protein, as well as an increase H2O intake for stone prevention.   - We also discussed that there is a benefit to 24 hour urine to evaluate for other causes of stone disease.  - Patient would like to defer 24 hour urine at this time and states she will manage with lifestyle modifications.   - Renal ultrasound next available.   - RTC as needed.

## 2022-06-13 DIAGNOSIS — F41.9 ANXIETY: ICD-10-CM

## 2022-06-13 NOTE — TELEPHONE ENCOUNTER
No new care gaps identified.  [facility] Embedded Care Gaps. Reference number: [id number]. 6/13/2022   3:04:19 PM CDT

## 2022-06-14 RX ORDER — ESCITALOPRAM OXALATE 10 MG/1
TABLET ORAL
Qty: 90 TABLET | Refills: 3 | Status: SHIPPED | OUTPATIENT
Start: 2022-06-14 | End: 2023-06-26

## 2022-06-14 NOTE — TELEPHONE ENCOUNTER
Refill Authorization Note   Doris Pastrana  is requesting a refill authorization.  Brief Assessment and Rationale for Refill:  Approve     Medication Therapy Plan:       Medication Reconciliation Completed: No   Comments:     No Care Gaps recommended.     Note composed:11:45 AM 06/14/2022

## 2022-06-16 ENCOUNTER — HOSPITAL ENCOUNTER (OUTPATIENT)
Dept: RADIOLOGY | Facility: HOSPITAL | Age: 72
Discharge: HOME | End: 2022-06-16
Attending: UROLOGY
Payer: MEDICARE

## 2022-06-16 ENCOUNTER — HOSPITAL ENCOUNTER (OUTPATIENT)
Dept: RADIOLOGY | Facility: HOSPITAL | Age: 72
Discharge: HOME | End: 2022-06-16
Attending: PHYSICIAN ASSISTANT
Payer: MEDICARE

## 2022-06-16 DIAGNOSIS — N20.0 NEPHROLITHIASIS: ICD-10-CM

## 2022-06-16 DIAGNOSIS — Z01.818 PRE-OPERATIVE CLEARANCE: ICD-10-CM

## 2022-06-16 PROCEDURE — 76770 US RETROPERITONEAL COMPLETE: ICD-10-PCS | Mod: 26,,, | Performed by: RADIOLOGY

## 2022-06-16 PROCEDURE — 71046 X-RAY EXAM CHEST 2 VIEWS: CPT | Mod: TC,FY

## 2022-06-16 PROCEDURE — 71046 XR CHEST PA AND LATERAL: ICD-10-PCS | Mod: 26,,, | Performed by: RADIOLOGY

## 2022-06-16 PROCEDURE — 76770 US EXAM ABDO BACK WALL COMP: CPT | Mod: 26,,, | Performed by: RADIOLOGY

## 2022-06-16 PROCEDURE — 71046 X-RAY EXAM CHEST 2 VIEWS: CPT | Mod: 26,,, | Performed by: RADIOLOGY

## 2022-06-16 PROCEDURE — 76770 US EXAM ABDO BACK WALL COMP: CPT | Mod: TC

## 2022-06-17 ENCOUNTER — PATIENT MESSAGE (OUTPATIENT)
Dept: FAMILY MEDICINE | Facility: CLINIC | Age: 72
End: 2022-06-17
Payer: MEDICARE

## 2022-06-17 ENCOUNTER — CLINICAL SUPPORT (OUTPATIENT)
Dept: FAMILY MEDICINE | Facility: CLINIC | Age: 72
End: 2022-06-17
Payer: MEDICARE

## 2022-06-17 DIAGNOSIS — Z01.818 PRE-OPERATIVE CLEARANCE: ICD-10-CM

## 2022-06-17 PROCEDURE — 93010 ELECTROCARDIOGRAM REPORT: CPT | Mod: S$GLB,,, | Performed by: INTERNAL MEDICINE

## 2022-06-17 PROCEDURE — 93005 ELECTROCARDIOGRAM TRACING: CPT | Mod: PBBFAC,PO

## 2022-06-17 PROCEDURE — 93010 EKG 12-LEAD: ICD-10-PCS | Mod: S$GLB,,, | Performed by: INTERNAL MEDICINE

## 2022-06-17 NOTE — PROGRESS NOTES
Pt seen today for preop EKG. EKG performed, pt tolerated well. Readings given to provider for review.

## 2022-07-05 ENCOUNTER — TELEPHONE (OUTPATIENT)
Dept: UROLOGY | Facility: CLINIC | Age: 72
End: 2022-07-05
Payer: MEDICARE

## 2022-07-05 NOTE — TELEPHONE ENCOUNTER
----- Message from Stefany Christie sent at 7/5/2022  9:04 AM CDT -----  Type:  Patient Returning Call    Who Called:  patient   Who Left Message for Patient:  unsure  Does the patient know what this is regarding?:  no  Best Call Back Number:  [phone]   Additional Information:  please advise-thank you

## 2022-08-24 ENCOUNTER — PATIENT MESSAGE (OUTPATIENT)
Dept: ADMINISTRATIVE | Facility: HOSPITAL | Age: 72
End: 2022-08-24
Payer: MEDICARE

## 2022-08-25 ENCOUNTER — TELEPHONE (OUTPATIENT)
Dept: FAMILY MEDICINE | Facility: CLINIC | Age: 72
End: 2022-08-25
Payer: MEDICARE

## 2022-08-25 NOTE — TELEPHONE ENCOUNTER
----- Message from Stefany Bhagat LPN sent at 8/25/2022 11:34 AM CDT -----  Regarding: Refill  Good morning!    The pt is requesting a refill of Gabapentin. Can you please contact pt regarding this?    Thanks so much and have a great day!    Stefany Bhagat LPN [facility]  [address] [place] [address]  P- [phone]  F- [phone]     
Called patient- 6 mon supply sent to [facility] in April- should last until October-advised her to call [facility] and get them to check computer. 6 mon fu appt made with Dr. Garg in November.   
 used

## 2022-08-31 ENCOUNTER — LAB VISIT (OUTPATIENT)
Dept: LAB | Facility: HOSPITAL | Age: 72
End: 2022-08-31
Attending: FAMILY MEDICINE
Payer: MEDICARE

## 2022-08-31 DIAGNOSIS — E78.5 HYPERLIPIDEMIA, UNSPECIFIED HYPERLIPIDEMIA TYPE: ICD-10-CM

## 2022-08-31 DIAGNOSIS — E11.9 TYPE 2 DIABETES MELLITUS WITHOUT COMPLICATION: ICD-10-CM

## 2022-08-31 DIAGNOSIS — E11.9 DIABETES MELLITUS WITHOUT COMPLICATION: ICD-10-CM

## 2022-08-31 LAB
ALBUMIN SERPL BCP-MCNC: 4.1 G/DL (ref 3.5–5.2)
ALP SERPL-CCNC: 128 U/L (ref 55–135)
ALT SERPL W/O P-5'-P-CCNC: 14 U/L (ref 10–44)
ANION GAP SERPL CALC-SCNC: 7 MMOL/L (ref 8–16)
AST SERPL-CCNC: 16 U/L (ref 10–40)
BILIRUB SERPL-MCNC: 0.8 MG/DL (ref 0.1–1)
BUN SERPL-MCNC: 12 MG/DL (ref 8–23)
CALCIUM SERPL-MCNC: 9.6 MG/DL (ref 8.7–10.5)
CHLORIDE SERPL-SCNC: 105 MMOL/L (ref 95–110)
CHOLEST SERPL-MCNC: 147 MG/DL (ref 120–199)
CHOLEST/HDLC SERPL: 3 {RATIO} (ref 2–5)
CO2 SERPL-SCNC: 29 MMOL/L (ref 23–29)
CREAT SERPL-MCNC: 0.6 MG/DL (ref 0.5–1.4)
EST. GFR  (NO RACE VARIABLE): >60 ML/MIN/1.73 M^2
ESTIMATED AVG GLUCOSE: 123 MG/DL (ref 68–131)
GLUCOSE SERPL-MCNC: 100 MG/DL (ref 70–110)
HBA1C MFR BLD: 5.9 % (ref 4–5.6)
HDLC SERPL-MCNC: 49 MG/DL (ref 40–75)
HDLC SERPL: 33.3 % (ref 20–50)
LDLC SERPL CALC-MCNC: 71.4 MG/DL (ref 63–159)
NONHDLC SERPL-MCNC: 98 MG/DL
POTASSIUM SERPL-SCNC: 4.4 MMOL/L (ref 3.5–5.1)
PROT SERPL-MCNC: 7 G/DL (ref 6–8.4)
SODIUM SERPL-SCNC: 141 MMOL/L (ref 136–145)
TRIGL SERPL-MCNC: 133 MG/DL (ref 30–150)

## 2022-08-31 PROCEDURE — 80061 LIPID PANEL: CPT | Performed by: FAMILY MEDICINE

## 2022-08-31 PROCEDURE — 80053 COMPREHEN METABOLIC PANEL: CPT | Performed by: FAMILY MEDICINE

## 2022-08-31 PROCEDURE — 36415 COLL VENOUS BLD VENIPUNCTURE: CPT | Mod: PO | Performed by: FAMILY MEDICINE

## 2022-08-31 PROCEDURE — 83036 HEMOGLOBIN GLYCOSYLATED A1C: CPT | Performed by: FAMILY MEDICINE

## 2022-09-26 ENCOUNTER — TELEPHONE (OUTPATIENT)
Dept: FAMILY MEDICINE | Facility: CLINIC | Age: 72
End: 2022-09-26
Payer: MEDICARE

## 2022-09-26 DIAGNOSIS — D22.9 CHANGE IN COLOR OF SKIN MOLE: Primary | ICD-10-CM

## 2022-09-26 NOTE — TELEPHONE ENCOUNTER
Spoke to patient- has brown mole on torso that has developed a dark spot in it. Nothing in derm until February 2023. She will try to get in with Dr. Michael Weil here in [place].

## 2022-09-26 NOTE — TELEPHONE ENCOUNTER
----- Message from Emily Jem sent at 9/26/2022 10:00 AM CDT -----  Contact: self  Type:  Sooner Appointment Request        Caller is requesting a sooner appointment.  Caller declined first available appointment listed below.  Caller will not accept being placed on the waitlist and is requesting a message be sent to doctor.        Name of Caller:  patient  When is the first available appointment?  11/19  Symptoms:  mole color change  Best Call Back Number:  [phone]   Additional Information:  The pt would like to be seen as soon as possible. Please call back to zion appt. Thanks.

## 2022-10-22 ENCOUNTER — PES CALL (OUTPATIENT)
Dept: ADMINISTRATIVE | Facility: CLINIC | Age: 72
End: 2022-10-22
Payer: MEDICARE

## 2022-11-14 DIAGNOSIS — E78.5 HYPERLIPIDEMIA, UNSPECIFIED HYPERLIPIDEMIA TYPE: ICD-10-CM

## 2022-11-15 RX ORDER — ROSUVASTATIN CALCIUM 40 MG/1
TABLET, COATED ORAL
Qty: 90 TABLET | Refills: 1 | Status: SHIPPED | OUTPATIENT
Start: 2022-11-15 | End: 2023-06-26

## 2022-11-15 NOTE — TELEPHONE ENCOUNTER
Refill Decision Note   Doris Victoriano  is requesting a refill authorization.  Brief Assessment and Rationale for Refill:  Approve     Medication Therapy Plan:       Medication Reconciliation Completed: No   Comments:     No Care Gaps recommended.     Note composed:12:24 PM 11/15/2022

## 2022-11-15 NOTE — TELEPHONE ENCOUNTER
No new care gaps identified.  [facility] Embedded Care Gaps. Reference number: [id number]. 11/15/2022   12:12:25 PM CST

## 2022-11-17 ENCOUNTER — OFFICE VISIT (OUTPATIENT)
Dept: FAMILY MEDICINE | Facility: CLINIC | Age: 72
End: 2022-11-17
Attending: FAMILY MEDICINE
Payer: MEDICARE

## 2022-11-17 VITALS
OXYGEN SATURATION: 96 % | DIASTOLIC BLOOD PRESSURE: 78 MMHG | RESPIRATION RATE: 17 BRPM | WEIGHT: 179.56 LBS | HEIGHT: 65 IN | SYSTOLIC BLOOD PRESSURE: 124 MMHG | BODY MASS INDEX: 29.92 KG/M2 | HEART RATE: 80 BPM | TEMPERATURE: 99 F

## 2022-11-17 DIAGNOSIS — R51.9 CHRONIC LEFT-SIDED HEADACHE: Primary | ICD-10-CM

## 2022-11-17 DIAGNOSIS — E55.9 VITAMIN D DEFICIENCY: ICD-10-CM

## 2022-11-17 DIAGNOSIS — E11.00 TYPE 2 DIABETES MELLITUS WITH HYPEROSMOLARITY WITHOUT COMA, WITHOUT LONG-TERM CURRENT USE OF INSULIN: ICD-10-CM

## 2022-11-17 DIAGNOSIS — G89.29 CHRONIC LEFT-SIDED HEADACHE: Primary | ICD-10-CM

## 2022-11-17 DIAGNOSIS — F40.243 ANXIETY WITH FLYING: ICD-10-CM

## 2022-11-17 DIAGNOSIS — R22.1 MASS OF LEFT SIDE OF NECK: ICD-10-CM

## 2022-11-17 DIAGNOSIS — M79.89 MASS OF SOFT TISSUE OF CHEST: ICD-10-CM

## 2022-11-17 DIAGNOSIS — R22.2 LOCALIZED SWELLING, MASS AND LUMP, TRUNK: ICD-10-CM

## 2022-11-17 PROCEDURE — 3060F PR POS MICROALBUMINURIA RESULT DOCUMENTED/REVIEW: ICD-10-PCS | Mod: CPTII,S$GLB,, | Performed by: FAMILY MEDICINE

## 2022-11-17 PROCEDURE — 1160F RVW MEDS BY RX/DR IN RCRD: CPT | Mod: CPTII,S$GLB,, | Performed by: FAMILY MEDICINE

## 2022-11-17 PROCEDURE — 3060F POS MICROALBUMINURIA REV: CPT | Mod: CPTII,S$GLB,, | Performed by: FAMILY MEDICINE

## 2022-11-17 PROCEDURE — 3044F PR MOST RECENT HEMOGLOBIN A1C LEVEL <7.0%: ICD-10-PCS | Mod: CPTII,S$GLB,, | Performed by: FAMILY MEDICINE

## 2022-11-17 PROCEDURE — 3072F LOW RISK FOR RETINOPATHY: CPT | Mod: CPTII,S$GLB,, | Performed by: FAMILY MEDICINE

## 2022-11-17 PROCEDURE — 99215 OFFICE O/P EST HI 40 MIN: CPT | Mod: S$GLB,,, | Performed by: FAMILY MEDICINE

## 2022-11-17 PROCEDURE — 3008F BODY MASS INDEX DOCD: CPT | Mod: CPTII,S$GLB,, | Performed by: FAMILY MEDICINE

## 2022-11-17 PROCEDURE — 1159F PR MEDICATION LIST DOCUMENTED IN MEDICAL RECORD: ICD-10-PCS | Mod: CPTII,S$GLB,, | Performed by: FAMILY MEDICINE

## 2022-11-17 PROCEDURE — 3078F PR MOST RECENT DIASTOLIC BLOOD PRESSURE < 80 MM HG: ICD-10-PCS | Mod: CPTII,S$GLB,, | Performed by: FAMILY MEDICINE

## 2022-11-17 PROCEDURE — 90694 VACC AIIV4 NO PRSRV 0.5ML IM: CPT | Mod: S$GLB,,, | Performed by: FAMILY MEDICINE

## 2022-11-17 PROCEDURE — 3074F SYST BP LT 130 MM HG: CPT | Mod: CPTII,S$GLB,, | Performed by: FAMILY MEDICINE

## 2022-11-17 PROCEDURE — 90694 FLU VACCINE - QUADRIVALENT - ADJUVANTED: ICD-10-PCS | Mod: S$GLB,,, | Performed by: FAMILY MEDICINE

## 2022-11-17 PROCEDURE — 1125F PR PAIN SEVERITY QUANTIFIED, PAIN PRESENT: ICD-10-PCS | Mod: CPTII,S$GLB,, | Performed by: FAMILY MEDICINE

## 2022-11-17 PROCEDURE — 3072F PR LOW RISK FOR RETINOPATHY: ICD-10-PCS | Mod: CPTII,S$GLB,, | Performed by: FAMILY MEDICINE

## 2022-11-17 PROCEDURE — 1125F AMNT PAIN NOTED PAIN PRSNT: CPT | Mod: CPTII,S$GLB,, | Performed by: FAMILY MEDICINE

## 2022-11-17 PROCEDURE — 3008F PR BODY MASS INDEX (BMI) DOCUMENTED: ICD-10-PCS | Mod: CPTII,S$GLB,, | Performed by: FAMILY MEDICINE

## 2022-11-17 PROCEDURE — 3288F FALL RISK ASSESSMENT DOCD: CPT | Mod: CPTII,S$GLB,, | Performed by: FAMILY MEDICINE

## 2022-11-17 PROCEDURE — 1159F MED LIST DOCD IN RCRD: CPT | Mod: CPTII,S$GLB,, | Performed by: FAMILY MEDICINE

## 2022-11-17 PROCEDURE — 99499 UNLISTED E&M SERVICE: CPT | Mod: HCNC,S$GLB,, | Performed by: FAMILY MEDICINE

## 2022-11-17 PROCEDURE — G0008 FLU VACCINE - QUADRIVALENT - ADJUVANTED: ICD-10-PCS | Mod: S$GLB,,, | Performed by: FAMILY MEDICINE

## 2022-11-17 PROCEDURE — 99499 RISK ADDL DX/OHS AUDIT: ICD-10-PCS | Mod: HCNC,S$GLB,, | Performed by: FAMILY MEDICINE

## 2022-11-17 PROCEDURE — 1101F PR PT FALLS ASSESS DOC 0-1 FALLS W/OUT INJ PAST YR: ICD-10-PCS | Mod: CPTII,S$GLB,, | Performed by: FAMILY MEDICINE

## 2022-11-17 PROCEDURE — 1101F PT FALLS ASSESS-DOCD LE1/YR: CPT | Mod: CPTII,S$GLB,, | Performed by: FAMILY MEDICINE

## 2022-11-17 PROCEDURE — 99417 PR PROLONGED SVC, OUTPT, W/WO DIRECT PT CONTACT,  EA ADDTL 15 MIN: ICD-10-PCS | Mod: S$GLB,,, | Performed by: FAMILY MEDICINE

## 2022-11-17 PROCEDURE — 3078F DIAST BP <80 MM HG: CPT | Mod: CPTII,S$GLB,, | Performed by: FAMILY MEDICINE

## 2022-11-17 PROCEDURE — 3066F NEPHROPATHY DOC TX: CPT | Mod: CPTII,S$GLB,, | Performed by: FAMILY MEDICINE

## 2022-11-17 PROCEDURE — 3288F PR FALLS RISK ASSESSMENT DOCUMENTED: ICD-10-PCS | Mod: CPTII,S$GLB,, | Performed by: FAMILY MEDICINE

## 2022-11-17 PROCEDURE — G0008 ADMIN INFLUENZA VIRUS VAC: HCPCS | Mod: S$GLB,,, | Performed by: FAMILY MEDICINE

## 2022-11-17 PROCEDURE — 3044F HG A1C LEVEL LT 7.0%: CPT | Mod: CPTII,S$GLB,, | Performed by: FAMILY MEDICINE

## 2022-11-17 PROCEDURE — 3066F PR DOCUMENTATION OF TREATMENT FOR NEPHROPATHY: ICD-10-PCS | Mod: CPTII,S$GLB,, | Performed by: FAMILY MEDICINE

## 2022-11-17 PROCEDURE — 99417 PROLNG OP E/M EACH 15 MIN: CPT | Mod: S$GLB,,, | Performed by: FAMILY MEDICINE

## 2022-11-17 PROCEDURE — 4010F PR ACE/ARB THEARPY RXD/TAKEN: ICD-10-PCS | Mod: CPTII,S$GLB,, | Performed by: FAMILY MEDICINE

## 2022-11-17 PROCEDURE — 99215 PR OFFICE/OUTPT VISIT, EST, LEVL V, 40-54 MIN: ICD-10-PCS | Mod: S$GLB,,, | Performed by: FAMILY MEDICINE

## 2022-11-17 PROCEDURE — 3074F PR MOST RECENT SYSTOLIC BLOOD PRESSURE < 130 MM HG: ICD-10-PCS | Mod: CPTII,S$GLB,, | Performed by: FAMILY MEDICINE

## 2022-11-17 PROCEDURE — 4010F ACE/ARB THERAPY RXD/TAKEN: CPT | Mod: CPTII,S$GLB,, | Performed by: FAMILY MEDICINE

## 2022-11-17 PROCEDURE — 99999 PR PBB SHADOW E&M-EST. PATIENT-LVL V: CPT | Mod: PBBFAC,,, | Performed by: FAMILY MEDICINE

## 2022-11-17 PROCEDURE — 99999 PR PBB SHADOW E&M-EST. PATIENT-LVL V: ICD-10-PCS | Mod: PBBFAC,,, | Performed by: FAMILY MEDICINE

## 2022-11-17 PROCEDURE — 1160F PR REVIEW ALL MEDS BY PRESCRIBER/CLIN PHARMACIST DOCUMENTED: ICD-10-PCS | Mod: CPTII,S$GLB,, | Performed by: FAMILY MEDICINE

## 2022-11-17 RX ORDER — DICLOFENAC SODIUM 10 MG/G
GEL TOPICAL
Qty: 300 G | Refills: 3 | Status: ON HOLD | OUTPATIENT
Start: 2022-11-17 | End: 2023-09-06

## 2022-11-17 RX ORDER — MUPIROCIN 20 MG/G
OINTMENT TOPICAL 2 TIMES DAILY
Qty: 30 G | Refills: 1 | Status: SHIPPED | OUTPATIENT
Start: 2022-11-17 | End: 2023-01-13

## 2022-11-17 RX ORDER — MUPIROCIN 20 MG/G
OINTMENT TOPICAL
COMMUNITY
Start: 2022-06-16 | End: 2022-11-17 | Stop reason: SDUPTHER

## 2022-11-17 RX ORDER — PROMETHAZINE HYDROCHLORIDE 12.5 MG/1
TABLET ORAL
COMMUNITY
Start: 2022-06-16 | End: 2023-09-14

## 2022-11-17 RX ORDER — ALPRAZOLAM 0.25 MG/1
TABLET ORAL
Qty: 10 TABLET | Refills: 1 | Status: SHIPPED | OUTPATIENT
Start: 2022-11-17 | End: 2024-02-20 | Stop reason: SDUPTHER

## 2022-11-17 RX ORDER — ALBUTEROL SULFATE 90 UG/1
2 AEROSOL, METERED RESPIRATORY (INHALATION) 4 TIMES DAILY
Qty: 54 G | Refills: 1 | Status: SHIPPED | OUTPATIENT
Start: 2022-11-17 | End: 2023-02-22
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
## 2022-11-23 ENCOUNTER — HOSPITAL ENCOUNTER (OUTPATIENT)
Dept: RADIOLOGY | Facility: HOSPITAL | Age: 72
Discharge: HOME | End: 2022-11-23
Attending: FAMILY MEDICINE
Payer: MEDICARE

## 2022-11-23 ENCOUNTER — TELEPHONE (OUTPATIENT)
Dept: FAMILY MEDICINE | Facility: CLINIC | Age: 72
End: 2022-11-23
Payer: MEDICARE

## 2022-11-23 DIAGNOSIS — E04.2 MULTIPLE THYROID NODULES: Primary | ICD-10-CM

## 2022-11-23 DIAGNOSIS — G89.29 CHRONIC LEFT-SIDED HEADACHE: ICD-10-CM

## 2022-11-23 DIAGNOSIS — J32.3 CHRONIC SPHENOIDAL SINUSITIS: ICD-10-CM

## 2022-11-23 DIAGNOSIS — M79.89 MASS OF SOFT TISSUE OF CHEST: ICD-10-CM

## 2022-11-23 DIAGNOSIS — R22.2 LOCALIZED SWELLING, MASS AND LUMP, TRUNK: ICD-10-CM

## 2022-11-23 DIAGNOSIS — R51.9 CHRONIC LEFT-SIDED HEADACHE: ICD-10-CM

## 2022-11-23 DIAGNOSIS — R22.1 MASS OF LEFT SIDE OF NECK: ICD-10-CM

## 2022-11-23 PROCEDURE — 70450 CT HEAD WITHOUT CONTRAST: ICD-10-PCS | Mod: 26,,, | Performed by: RADIOLOGY

## 2022-11-23 PROCEDURE — 71250 CT THORAX DX C-: CPT | Mod: 26,,, | Performed by: RADIOLOGY

## 2022-11-23 PROCEDURE — 70450 CT HEAD/BRAIN W/O DYE: CPT | Mod: 26,,, | Performed by: RADIOLOGY

## 2022-11-23 PROCEDURE — 71250 CT NECK CHEST WITHOUT CONTRAST (XPD): ICD-10-PCS | Mod: 26,,, | Performed by: RADIOLOGY

## 2022-11-23 PROCEDURE — 70490 CT NECK CHEST WITHOUT CONTRAST (XPD): ICD-10-PCS | Mod: 26,,, | Performed by: RADIOLOGY

## 2022-11-23 PROCEDURE — 70450 CT HEAD/BRAIN W/O DYE: CPT | Mod: TC

## 2022-11-23 PROCEDURE — 70490 CT SOFT TISSUE NECK W/O DYE: CPT | Mod: 26,,, | Performed by: RADIOLOGY

## 2022-11-23 PROCEDURE — 70490 CT SOFT TISSUE NECK W/O DYE: CPT | Mod: TC

## 2022-11-23 RX ORDER — DOXYCYCLINE HYCLATE 100 MG
100 TABLET ORAL 2 TIMES DAILY
Qty: 20 TABLET | Refills: 0 | Status: SHIPPED | OUTPATIENT
Start: 2022-11-23 | End: 2022-12-03

## 2022-11-23 NOTE — TELEPHONE ENCOUNTER
Patient notified prescription for Doxycycline has been sent to the pharmacy. Patient noted to have scheduled for the date of 12-1-22.

## 2022-11-23 NOTE — TELEPHONE ENCOUNTER
----- Message from Tor Garg MD sent at 11/23/2022 10:45 AM CST -----  CT of the head shows chronic left sphenoid sinus disease, the sphenoid is behind the left eye and is likely responsible for her headaches.  It may also cause headaches at the rear and top of the head.  Otherwise the head is normal with no hemorrhage, mass, or evidence circulatory blockage.  In the upper part of the neck there are some degenerative changes at the C2/3 level that could cause some neck pain and possibly some tension headaches.    CT of the neck does not show any mass or abnormal lymph nodes in the left supraclavicular/neck area.  A normal lymph node was immediately below the area where the marker was placed but was not responsible for the soft tissue swelling.  The thyroid gland does show numerous nodules and the radiologist recommended an ultrasound of the thyroid for further evaluation.  They again saw the sphenoid disease on the left side but the mastoid sinuses behind the ears were normal.  There was no evidence of a mass within the chest or elsewhere in the head/neck/chest area.  In the upper abdomen some fatty liver disease was noted again.    I would suggest a course of either Augmentin or doxycycline for the left sphenoid sinusitis and set up a thyoid ultrasound to evaluate the thyroid nodules some of which may need biopsy.  No cause for the asymmetric swelling on the left upper chest/neck area is seen.  that may require a consult with a plastic surgeon to correct.    Results sent by email

## 2022-12-01 ENCOUNTER — HOSPITAL ENCOUNTER (OUTPATIENT)
Dept: RADIOLOGY | Facility: HOSPITAL | Age: 72
Discharge: HOME | End: 2022-12-01
Attending: FAMILY MEDICINE
Payer: MEDICARE

## 2022-12-01 DIAGNOSIS — E04.1 THYROID NODULE: Primary | ICD-10-CM

## 2022-12-01 DIAGNOSIS — E04.2 MULTIPLE THYROID NODULES: ICD-10-CM

## 2022-12-01 PROCEDURE — 76536 US EXAM OF HEAD AND NECK: CPT | Mod: 26,,, | Performed by: RADIOLOGY

## 2022-12-01 PROCEDURE — 76536 US SOFT TISSUE HEAD NECK THYROID: ICD-10-PCS | Mod: 26,,, | Performed by: RADIOLOGY

## 2022-12-01 PROCEDURE — 76536 US EXAM OF HEAD AND NECK: CPT | Mod: TC

## 2022-12-06 ENCOUNTER — TELEPHONE (OUTPATIENT)
Dept: FAMILY MEDICINE | Facility: CLINIC | Age: 72
End: 2022-12-06
Payer: MEDICARE

## 2022-12-06 DIAGNOSIS — E04.1 THYROID NODULE: Primary | ICD-10-CM

## 2022-12-06 NOTE — TELEPHONE ENCOUNTER
----- Message from Deepa Jaime MA sent at 12/6/2022 10:21 AM CST -----  Patient is requesting a medication to calm her nerves for the procedure.  I see that she has xanax for flying.  Can she take on prior to the procedure?

## 2022-12-06 NOTE — TELEPHONE ENCOUNTER
"Made patient aware she can take one of the xanax 60 min prior to procedure but that someone would need to drive her. Verbalized understanding. Requested to place procedure on "wait list" for sooner appt.   "

## 2022-12-06 NOTE — TELEPHONE ENCOUNTER
Please call her   Dr Garg sent in alprazolam #10 on 11/18/2022  She can take one of those 60 minutes prior to procedure- someone will need to drive her

## 2022-12-22 ENCOUNTER — HOSPITAL ENCOUNTER (OUTPATIENT)
Dept: RADIOLOGY | Facility: HOSPITAL | Age: 72
Discharge: HOME | End: 2022-12-22
Attending: PHYSICIAN ASSISTANT
Payer: MEDICARE

## 2022-12-22 DIAGNOSIS — E04.1 THYROID NODULE: ICD-10-CM

## 2022-12-22 PROCEDURE — 10005 US FINE NEEDLE ASPIRATION THYROID, FIRST LESION: ICD-10-PCS | Mod: RT,,, | Performed by: RADIOLOGY

## 2022-12-22 PROCEDURE — 88173 CYTOPATH EVAL FNA REPORT: CPT | Mod: 26,,, | Performed by: PATHOLOGY

## 2022-12-22 PROCEDURE — 10005 FNA BX W/US GDN 1ST LES: CPT | Mod: RT | Performed by: RADIOLOGY

## 2022-12-22 PROCEDURE — 88173 CYTOPATH EVAL FNA REPORT: CPT | Performed by: PATHOLOGY

## 2022-12-22 PROCEDURE — 88173 PR  INTERPRETATION OF FNA SMEAR: ICD-10-PCS | Mod: 26,,, | Performed by: PATHOLOGY

## 2022-12-22 PROCEDURE — A4550 SURGICAL TRAYS: HCPCS

## 2022-12-22 RX ORDER — LIDOCAINE HYDROCHLORIDE 10 MG/ML
1 INJECTION INFILTRATION; PERINEURAL ONCE
Status: DISCONTINUED | OUTPATIENT
Start: 2022-12-22 | End: 2022-12-23 | Stop reason: HOSPADM

## 2022-12-29 LAB
FINAL PATHOLOGIC DIAGNOSIS: ABNORMAL
Lab: ABNORMAL

## 2023-01-03 ENCOUNTER — TELEPHONE (OUTPATIENT)
Dept: FAMILY MEDICINE | Facility: CLINIC | Age: 73
End: 2023-01-03
Payer: MEDICARE

## 2023-01-03 DIAGNOSIS — E04.1 THYROID NODULE: Primary | ICD-10-CM

## 2023-01-03 NOTE — TELEPHONE ENCOUNTER
----- Message from NICK Hernandez sent at 1/3/2023  8:24 AM CST -----    ----- Message -----  From: [facility] Interface  Sent: 12/29/2022  12:28 PM CST  To: NICK Hernandez

## 2023-01-03 NOTE — TELEPHONE ENCOUNTER
Patient notified biopsy not done due to inadequate specimen. Please call her to schedule with endocrinology. Thank you.

## 2023-01-03 NOTE — TELEPHONE ENCOUNTER
Thyroid biopsy results available.  Report is that it is an inadequate specimen.  It was checked by cytopathologist at the time of the procedure and was reported adequate at that time.  Can not resolve conflict.  Recommend consult with Endocrinology

## 2023-01-16 ENCOUNTER — TELEPHONE (OUTPATIENT)
Dept: FAMILY MEDICINE | Facility: CLINIC | Age: 73
End: 2023-01-16
Payer: MEDICARE

## 2023-01-17 ENCOUNTER — TELEPHONE (OUTPATIENT)
Dept: ENDOCRINOLOGY | Facility: CLINIC | Age: 73
End: 2023-01-17
Payer: MEDICARE

## 2023-01-26 ENCOUNTER — TELEPHONE (OUTPATIENT)
Dept: FAMILY MEDICINE | Facility: CLINIC | Age: 73
End: 2023-01-26
Payer: MEDICARE

## 2023-01-26 DIAGNOSIS — Z12.31 SCREENING MAMMOGRAM FOR BREAST CANCER: Primary | ICD-10-CM

## 2023-01-26 NOTE — TELEPHONE ENCOUNTER
----- Message from Marlen Cheung sent at 1/26/2023  8:04 AM CST -----  Regarding: mammogram orders correction  The mammogram tech and radiologist needs the orders replaced to be Digital Bilateral Screening Mammorgram with VIN , Then let me know they are placed and I will complete the appt. Set up for our patient , Thanking you in advance.

## 2023-02-02 ENCOUNTER — HOSPITAL ENCOUNTER (OUTPATIENT)
Dept: RADIOLOGY | Facility: CLINIC | Age: 73
Discharge: HOME | End: 2023-02-02
Attending: FAMILY MEDICINE
Payer: MEDICARE

## 2023-02-02 DIAGNOSIS — Z12.31 SCREENING MAMMOGRAM FOR BREAST CANCER: ICD-10-CM

## 2023-02-02 PROCEDURE — 77063 BREAST TOMOSYNTHESIS BI: CPT | Mod: 26,HCNC,, | Performed by: RADIOLOGY

## 2023-02-02 PROCEDURE — 77067 MAMMO DIGITAL SCREENING BILAT WITH TOMO: ICD-10-PCS | Mod: 26,HCNC,, | Performed by: RADIOLOGY

## 2023-02-02 PROCEDURE — 77067 SCR MAMMO BI INCL CAD: CPT | Mod: TC,HCNC,PO

## 2023-02-02 PROCEDURE — 77063 MAMMO DIGITAL SCREENING BILAT WITH TOMO: ICD-10-PCS | Mod: 26,HCNC,, | Performed by: RADIOLOGY

## 2023-02-02 PROCEDURE — 77067 SCR MAMMO BI INCL CAD: CPT | Mod: 26,HCNC,, | Performed by: RADIOLOGY

## 2023-02-07 DIAGNOSIS — Z00.00 ENCOUNTER FOR MEDICARE ANNUAL WELLNESS EXAM: ICD-10-CM

## 2023-02-09 DIAGNOSIS — Z00.00 ENCOUNTER FOR MEDICARE ANNUAL WELLNESS EXAM: ICD-10-CM

## 2023-02-14 NOTE — TELEPHONE ENCOUNTER
----- Message from Miryam Sommers MA sent at 12/2/2022  4:14 PM CST -----  Pt informed and verbalized understanding.   Pt is requesting rx to 'calm her nerves prior to procedure.'  Please advise    Spironolactone Pregnancy And Lactation Text: This medication can cause feminization of the male fetus and should be avoided during pregnancy. The active metabolite is also found in breast milk.

## 2023-02-22 RX ORDER — ALBUTEROL SULFATE 90 UG/1
2 AEROSOL, METERED RESPIRATORY (INHALATION) 4 TIMES DAILY
Qty: 54 G | Refills: 2 | Status: SHIPPED | OUTPATIENT
Start: 2023-02-22 | End: 2023-11-03

## 2023-02-22 RX ORDER — DICLOFENAC SODIUM 10 MG/G
GEL TOPICAL
Qty: 900 G | OUTPATIENT
Start: 2023-02-22

## 2023-02-22 NOTE — TELEPHONE ENCOUNTER
Refill Routing Note   Medication(s) are not appropriate for processing by [facility] for the following reason(s):       Medication outside of protocol    [facility] action(s):  Route  Approve    Medication Therapy Plan: route diclofenac; approve albuterol    Appointments  past 12m or future 3m with PCP    Date Provider   Last Visit   11/17/2022 Tor Garg MD   Next Visit   Visit date not found Tor Garg MD   ED visits in past 90 days: 0        Note composed:4:17 PM 02/22/2023

## 2023-02-22 NOTE — TELEPHONE ENCOUNTER
No new care gaps identified.  [facility] Embedded Care Gaps. Reference number: [id number]. 2/22/2023   12:18:08 PM CST

## 2023-03-06 ENCOUNTER — PES CALL (OUTPATIENT)
Dept: ADMINISTRATIVE | Facility: CLINIC | Age: 73
End: 2023-03-06
Payer: MEDICARE

## 2023-04-12 ENCOUNTER — PATIENT MESSAGE (OUTPATIENT)
Dept: ADMINISTRATIVE | Facility: HOSPITAL | Age: 73
End: 2023-04-12
Payer: MEDICARE

## 2023-05-29 ENCOUNTER — PATIENT MESSAGE (OUTPATIENT)
Dept: ADMINISTRATIVE | Facility: HOSPITAL | Age: 73
End: 2023-05-29
Payer: MEDICARE

## 2023-05-29 DIAGNOSIS — E11.9 TYPE 2 DIABETES MELLITUS WITHOUT COMPLICATION, UNSPECIFIED WHETHER LONG TERM INSULIN USE: ICD-10-CM

## 2023-06-15 LAB
LEFT EYE DM RETINOPATHY: NEGATIVE
RIGHT EYE DM RETINOPATHY: NEGATIVE

## 2023-06-19 DIAGNOSIS — E11.9 CONTROLLED TYPE 2 DIABETES MELLITUS WITHOUT COMPLICATION, WITHOUT LONG-TERM CURRENT USE OF INSULIN: ICD-10-CM

## 2023-06-19 NOTE — TELEPHONE ENCOUNTER
Care Due:                  Date            Visit Type   Department     Provider  --------------------------------------------------------------------------------                                EP -                              PRIMARY      [facility]  Last Visit: 11-      CARE ([facility])   PRACTICE       Tor Garg  Next Visit: None Scheduled  None         None Found                                                            Last  Test          Frequency    Reason                     Performed    Due Date  --------------------------------------------------------------------------------    CMP.........  12 months..  losartan, metFORMIN,       08- 08-                             rosuvastatin.............    HBA1C.......  6 months...  metFORMIN, semaglutide...  08- 02-    Lipid Panel.  12 months..  rosuvastatin.............  08- 08-    [facility] Embedded Care Due Messages. Reference number: [id number].   6/19/2023 11:04:36 AM CDT

## 2023-06-20 NOTE — TELEPHONE ENCOUNTER
Refill Routing Note   Refill Routing Note   Medication(s) are not appropriate for processing by [facility] for the following reason(s):      Required labs outdated    [facility] action(s):  Defer Labs due        Medication reconciliation completed: No     Appointments  past 12m or future 3m with PCP    Date Provider   Last Visit   11/17/2022 Tor Garg MD   Next Visit   Visit date not found Tor Garg MD   ED visits in past 90 days: 0        Note composed:8:07 PM 06/19/2023

## 2023-06-21 NOTE — TELEPHONE ENCOUNTER
Last office visit November 17, 2022   Last A1c August 01/30/2022    No showed diabetic labs February 22, 2023    Overdue for office visit and labs, no appointments made

## 2023-06-23 ENCOUNTER — PATIENT OUTREACH (OUTPATIENT)
Dept: ADMINISTRATIVE | Facility: HOSPITAL | Age: 73
End: 2023-06-23
Payer: MEDICARE

## 2023-06-23 NOTE — PROGRESS NOTES
Population Health Chart Review & Patient Outreach Details:     Reason for Outreach Encounter:     []  Non-Compliant Report   []  Payor Report ([employer], PHN, BCBS, MSSP, MCIP, UHC, etc.)   []  Pre-Visit Chart Review     Updates Requested / Reviewed:     []  Care Everywhere    []     []  External Sources ([facility], [facility], DIS, etc.)   [x]  Care Team Updated    Patient Outreach Method:    []  Telephone Outreach Completed   [] Successful   [] Left Voicemail   [] Unable to Contact (wrong number, no voicemail)  []  [facility] Outreach Sent  []  Letter Outreach Mailed  []  Fax Sent for External Records  [x]  External Records Upload    Health Maintenance Topics Addressed and Outreach Outcomes / Actions Taken:        []      Breast Cancer Screening []  Mammo Scheduled      []  External Records Requested     []  Added Reminder to Complete to Upcoming Primary Care Appt Notes     []  Patient Declined     []  Patient Will Call Back to Schedule     []  Patient Will Schedule with External Provider / Order Routed if Applicable             []       Cervical Cancer Screening []  Pap Scheduled      []  External Records Requested     []  Added Reminder to Complete to Upcoming Primary Care Appt Notes     []  Patient Declined     []  Patient Will Call Back to Schedule     []  Patient Will Schedule with External Provider               []          Colorectal Cancer Screening []  Colonoscopy Case Request or Referral Placed     []  External Records Requested     []  Added Reminder to Complete to Upcoming Primary Care Appt Notes     []  Patient Declined     []  Patient Will Call Back to Schedule     []  Patient Will Schedule with External Provider     []  Fit Kit Mailed (add the SmartPhrase under additional notes)     []  Reminded Patient to Complete Home Test             [x]      Diabetic Eye Exam []  Eye Camera Scheduled or Optometry Referral Placed     []  External Records Requested     []  Added Reminder to Complete to  Upcoming Primary Care Appt Notes     []  Patient Declined     []  Patient Will Call Back to Schedule     []  Patient Will Schedule with External Provider             []      Blood Pressure Control []  Primary Care Follow Up Visit Scheduled     []  Remote Blood Pressure Reading Captured     []  Added Reminder to Complete to Upcoming Primary Care Appt Notes     []  Patient Declined     []  Patient Will Call Back / Patient Will Send Portal Message with Reading     []  Patient Will Call Back to Schedule Provider Visit             []       HbA1c & Other Labs []  Lab Appt Scheduled for Due Labs     []  Primary Care Follow Up Visit Scheduled      []  Reminded Patient to Complete Home Test     []  Added Reminder to Complete to Upcoming Primary Care Appt Notes     []  Patient Declined     []  Patient Will Call Back to Schedule     []  Patient Will Schedule with External Provider / Order Routed if Applicable           []    Schedule Primary Care Appt []  Primary Care Appt Scheduled     []  Patient Declined     []  Patient Will Call Back to Schedule     []  Pt Established with External Provider & Updated Care Team             []      Medication Adherence []  Primary Care Appointment Scheduled     []  Added Reminder to Upcoming Primary Care Appt Notes     []  Patient Reminded to  Prescription     []  Patient Declined, Provider Notified if Needed     []  Sent Provider Message to Review and/or Add Exclusion to Problem List             []      Osteoporosis Screening []  DXA Appointment Scheduled     []  External Records Requested     []  Added Reminder to Complete to Upcoming Primary Care Appt Notes     []  Patient Declined     []  Patient Will Call Back to Schedule     []  Patient Will Schedule with External Provider / Order Routed if Applicable     Additional Care Coordinator Notes:     EYE EXAM HYPERLINKED    Further Action Needed If Patient Returns Outreach:

## 2023-06-27 ENCOUNTER — OFFICE VISIT (OUTPATIENT)
Dept: FAMILY MEDICINE | Facility: CLINIC | Age: 73
End: 2023-06-27
Attending: FAMILY MEDICINE
Payer: MEDICARE

## 2023-06-27 VITALS
SYSTOLIC BLOOD PRESSURE: 116 MMHG | BODY MASS INDEX: 31.24 KG/M2 | HEART RATE: 92 BPM | TEMPERATURE: 99 F | DIASTOLIC BLOOD PRESSURE: 69 MMHG | RESPIRATION RATE: 17 BRPM | OXYGEN SATURATION: 95 % | WEIGHT: 187.5 LBS | HEIGHT: 65 IN

## 2023-06-27 DIAGNOSIS — E78.5 HYPERLIPIDEMIA ASSOCIATED WITH TYPE 2 DIABETES MELLITUS: ICD-10-CM

## 2023-06-27 DIAGNOSIS — E11.59 HYPERTENSION ASSOCIATED WITH DIABETES: ICD-10-CM

## 2023-06-27 DIAGNOSIS — I15.2 HYPERTENSION ASSOCIATED WITH DIABETES: ICD-10-CM

## 2023-06-27 DIAGNOSIS — M54.16 LUMBAR RADICULITIS: ICD-10-CM

## 2023-06-27 DIAGNOSIS — E11.00 TYPE 2 DIABETES MELLITUS WITH HYPEROSMOLARITY WITHOUT COMA, WITHOUT LONG-TERM CURRENT USE OF INSULIN: ICD-10-CM

## 2023-06-27 DIAGNOSIS — M79.10 MYALGIA: Primary | ICD-10-CM

## 2023-06-27 DIAGNOSIS — E11.69 HYPERLIPIDEMIA ASSOCIATED WITH TYPE 2 DIABETES MELLITUS: ICD-10-CM

## 2023-06-27 DIAGNOSIS — M54.12 CERVICAL RADICULITIS: ICD-10-CM

## 2023-06-27 PROCEDURE — 1125F AMNT PAIN NOTED PAIN PRSNT: CPT | Mod: HCNC,CPTII,S$GLB, | Performed by: FAMILY MEDICINE

## 2023-06-27 PROCEDURE — 99999 PR PBB SHADOW E&M-EST. PATIENT-LVL V: CPT | Mod: PBBFAC,HCNC,, | Performed by: FAMILY MEDICINE

## 2023-06-27 PROCEDURE — 3008F BODY MASS INDEX DOCD: CPT | Mod: HCNC,CPTII,S$GLB, | Performed by: FAMILY MEDICINE

## 2023-06-27 PROCEDURE — 4010F ACE/ARB THERAPY RXD/TAKEN: CPT | Mod: HCNC,CPTII,S$GLB, | Performed by: FAMILY MEDICINE

## 2023-06-27 PROCEDURE — 99999 PR PBB SHADOW E&M-EST. PATIENT-LVL V: ICD-10-PCS | Mod: PBBFAC,HCNC,, | Performed by: FAMILY MEDICINE

## 2023-06-27 PROCEDURE — 3078F DIAST BP <80 MM HG: CPT | Mod: HCNC,CPTII,S$GLB, | Performed by: FAMILY MEDICINE

## 2023-06-27 PROCEDURE — 4010F PR ACE/ARB THEARPY RXD/TAKEN: ICD-10-PCS | Mod: HCNC,CPTII,S$GLB, | Performed by: FAMILY MEDICINE

## 2023-06-27 PROCEDURE — 99214 OFFICE O/P EST MOD 30 MIN: CPT | Mod: HCNC,S$GLB,, | Performed by: FAMILY MEDICINE

## 2023-06-27 PROCEDURE — 3074F PR MOST RECENT SYSTOLIC BLOOD PRESSURE < 130 MM HG: ICD-10-PCS | Mod: HCNC,CPTII,S$GLB, | Performed by: FAMILY MEDICINE

## 2023-06-27 PROCEDURE — 3288F PR FALLS RISK ASSESSMENT DOCUMENTED: ICD-10-PCS | Mod: HCNC,CPTII,S$GLB, | Performed by: FAMILY MEDICINE

## 2023-06-27 PROCEDURE — 1159F PR MEDICATION LIST DOCUMENTED IN MEDICAL RECORD: ICD-10-PCS | Mod: HCNC,CPTII,S$GLB, | Performed by: FAMILY MEDICINE

## 2023-06-27 PROCEDURE — 3078F PR MOST RECENT DIASTOLIC BLOOD PRESSURE < 80 MM HG: ICD-10-PCS | Mod: HCNC,CPTII,S$GLB, | Performed by: FAMILY MEDICINE

## 2023-06-27 PROCEDURE — 1101F PR PT FALLS ASSESS DOC 0-1 FALLS W/OUT INJ PAST YR: ICD-10-PCS | Mod: HCNC,CPTII,S$GLB, | Performed by: FAMILY MEDICINE

## 2023-06-27 PROCEDURE — 99214 PR OFFICE/OUTPT VISIT, EST, LEVL IV, 30-39 MIN: ICD-10-PCS | Mod: HCNC,S$GLB,, | Performed by: FAMILY MEDICINE

## 2023-06-27 PROCEDURE — 3074F SYST BP LT 130 MM HG: CPT | Mod: HCNC,CPTII,S$GLB, | Performed by: FAMILY MEDICINE

## 2023-06-27 PROCEDURE — 3288F FALL RISK ASSESSMENT DOCD: CPT | Mod: HCNC,CPTII,S$GLB, | Performed by: FAMILY MEDICINE

## 2023-06-27 PROCEDURE — 1159F MED LIST DOCD IN RCRD: CPT | Mod: HCNC,CPTII,S$GLB, | Performed by: FAMILY MEDICINE

## 2023-06-27 PROCEDURE — 3008F PR BODY MASS INDEX (BMI) DOCUMENTED: ICD-10-PCS | Mod: HCNC,CPTII,S$GLB, | Performed by: FAMILY MEDICINE

## 2023-06-27 PROCEDURE — 1160F RVW MEDS BY RX/DR IN RCRD: CPT | Mod: HCNC,CPTII,S$GLB, | Performed by: FAMILY MEDICINE

## 2023-06-27 PROCEDURE — 1101F PT FALLS ASSESS-DOCD LE1/YR: CPT | Mod: HCNC,CPTII,S$GLB, | Performed by: FAMILY MEDICINE

## 2023-06-27 PROCEDURE — 1125F PR PAIN SEVERITY QUANTIFIED, PAIN PRESENT: ICD-10-PCS | Mod: HCNC,CPTII,S$GLB, | Performed by: FAMILY MEDICINE

## 2023-06-27 PROCEDURE — 1160F PR REVIEW ALL MEDS BY PRESCRIBER/CLIN PHARMACIST DOCUMENTED: ICD-10-PCS | Mod: HCNC,CPTII,S$GLB, | Performed by: FAMILY MEDICINE

## 2023-06-27 NOTE — PROGRESS NOTES
Subjective:       Patient ID: Doris Pastrana is a 72 y.o. female.    Chief Complaint: Shoulder Pain (Pt states that she is here due to her right shoulder pain and chest pain, pt states also thyroid nodule)    72-year-old female with history of cervical and lumbar radiculopathy and multiple epidural steroid injections with diabetes on metformin 750 mg only, discontinued Ozempic due to cost.  She has a history of hypertension, hyperlipidemia, kidney stones, colon polyps, osteoarthritis and fatty liver disease.  She is coming in for generalized myalgias greater on the left side than the right but diffuse in all over the body suggestive somewhat of polymyalgia or fibromyalgia.  These have been going on for years but are getting worse.  She is taking Crestor 40 mg.  She has some edematous tissue in the left supraclavicular area previous CT scan of head and neck did not show any pathology present.  She also notes she has some mild swelling over the left lateral orbit and malar area relative to the right side.    Past Medical History:  No date: Acute sinus infection  No date: Allergy  No date: Anemia  No date: Arthritis  No date: Bronchitis  No date: Degenerative disc disease      Comment:  lumbar, pain contract 2013  No date: Depression  16: Diabetes mellitus, type 2  No date: Fatty liver  16: Hyperlipidemia  No date: Hypertension  No date: Kidney stone  No date: Mitral valve prolapse  No date: Pleurisy  No date: Pneumonia  No date: PONV (postoperative nausea and vomiting)  No date: Sinus infection  2019: TIA (transient ischemic attack)    Past Surgical History:  2017: ANKLE SURGERY      Comment:  right ankle torn ligament  No date: APPENDECTOMY  No date:  SECTION  No date: CHOLECYSTECTOMY  8/13/15: COLONOSCOPY      Comment:  Dr. Oliveira, five year recheck  2021: COLONOSCOPY; N/A      Comment:  Procedure: COLONOSCOPY;  Surgeon: Tevin Oliveira MD;                Location: [facility]  ENDO;  Service: Endoscopy;  Laterality:                N/A;  4/10/2022: CYSTOSCOPY WITH URETEROSCOPY, RETROGRADE PYELOGRAPHY, AND   INSERTION OF STENT; Left      Comment:  Procedure: CYSTOSCOPY, WITH RETROGRADE PYELOGRAM AND                URETERAL STENT INSERTION;  Surgeon: Jack Redman Jr., MD;  Location: [facility] OR;  Service: Urology;  Laterality:                Left;  4/26/2022: CYSTOSCOPY WITH URETEROSCOPY, RETROGRADE PYELOGRAPHY, AND   INSERTION OF STENT; Left      Comment:  Procedure: CYSTOSCOPY, WITH RETROGRADE PYELOGRAM AND                URETERAL STENT INSERTION;  Surgeon: Jack Redman Jr., MD;  Location: [facility];  Service: Urology;  Laterality:                Left;  1/3/2020: EPIDURAL STEROID INJECTION INTO CERVICAL SPINE; N/A      Comment:  Procedure: Injection-steroid-epidural-cervical;                 Surgeon: Eddi Albrecht MD;  [facility];  Service:                Pain Management;  Laterality: N/A;  C7-T1  3/10/2020: EPIDURAL STEROID INJECTION INTO CERVICAL SPINE; N/A      Comment:  Procedure: Injection-steroid-epidural-cervical;                 Surgeon: Eddi Albrecht MD;  Location: [facility] OR;  Service:                Pain Management;  Laterality: N/A;  C7-T1  12/29/2020: EPIDURAL STEROID INJECTION INTO CERVICAL SPINE; N/A      Comment:  Procedure: Injection-steroid-epidural-cervical;                 Surgeon: Eddi Albrecht MD;  [facility];  Service:                Pain Management;  Laterality: N/A;  C7-T1   5/22/2019: EXTRACORPOREAL SHOCK WAVE LITHOTRIPSY; Left      Comment:  Procedure: LITHOTRIPSY, ESWL - only if stone visible on                xray  with cysto after on table possible;  Surgeon:                Marisol Stewart MD;  Location: [facility];                 Service: Urology;  Laterality: Left;  No date: HYSTERECTOMY  4/26/2022: LASER LITHOTRIPSY; Left      Comment:  Procedure: LITHOTRIPSY, USING LASER;  Surgeon: Jack Redman Jr.,  MD;  Location: [facility] OR;  Service: Urology;                 Laterality: Left;  No date: OOPHORECTOMY  No date: TONSILLECTOMY  4/26/2022: URETEROSCOPIC REMOVAL OF URETERIC CALCULUS; Left      Comment:  Procedure: REMOVAL, CALCULUS, URETER, URETEROSCOPIC;                 Surgeon: Jack Redman Jr., MD;  Location: [facility] OR;                 Service: Urology;  Laterality: Left;  4/26/2022: URETEROSCOPY; Left      Comment:  Procedure: URETEROSCOPY;  Surgeon: Jack Redman Jr., MD;  Location: [facility] OR;  Service: Urology;  Laterality:                Left;  No date: VAGINAL DELIVERY    Current Outpatient Medications on File Prior to Visit:  ACCU-CHEK JORGE PLUS TEST STRP Strp, TEST BLOOD SUGAR TWICE DAILY, Disp: 200 strip, Rfl: 3  albuterol (PROVENTIL/VENTOLIN HFA) 90 mcg/actuation inhaler, INHALE 2 PUFFS INTO THE LUNGS 4 (FOUR) TIMES DAILY., Disp: 54 g, Rfl: 2  ALPRAZolam (XANAX) 0.25 MG tablet, Use one 20-30 minutes before flying prn, Disp: 10 tablet, Rfl: 1  amLODIPine (NORVASC) 10 MG tablet, TAKE 1 TABLET EVERY DAY, Disp: 90 tablet, Rfl: 1  blood-glucose meter kit, Use as instructed, Disp: 1 each, Rfl: 0  cetirizine (ZYRTEC) 10 MG tablet, Take 10 mg by mouth daily as needed. , Disp: , Rfl:   cyclobenzaprine (FLEXERIL) 10 MG tablet, TAKE 1 TABLET THREE TIMES DAILY AS NEEDED, Disp: 270 tablet, Rfl: 1  DROPSAFE ALCOHOL PREP PADS PadM, USE TWICE DAILY AS NEEDED AS DIRECTED, Disp: 200 each, Rfl: 3  EScitalopram oxalate (LEXAPRO) 10 MG tablet, TAKE 1 TABLET EVERY DAY, Disp: 90 tablet, Rfl: 1  fluticasone propionate (FLONASE) 50 mcg/actuation nasal spray, USE 2 SPRAYS IN EACH NOSTRIL EVERY DAY, Disp: 48 g, Rfl: 3  HYDROcodone-acetaminophen (NORCO) 7.5-325 mg per tablet, Take 1 tablet by mouth every 6 (six) hours as needed for Pain (kidney stone)., Disp: 28 tablet, Rfl: 0  lancets (TRUEPLUS LANCETS) 28 gauge Misc, TEST TWO TIMES DAILY, Disp: 200 each, Rfl: 11  losartan (COZAAR) 100 MG tablet, TAKE 1 TABLET EVERY  DAY, Disp: 90 tablet, Rfl: 1  meclizine (ANTIVERT) 25 mg tablet, Take 1 tablet (25 mg total) by mouth 3 (three) times daily as needed., Disp: 30 tablet, Rfl: 5  metFORMIN (GLUCOPHAGE-XR) 750 MG ER 24hr tablet, TAKE 1 TABLET TWICE DAILY WITH MEALS, Disp: 180 tablet, Rfl: 1  mupirocin (BACTROBAN) 2 % ointment, APPLY BY NASAL ROUTE 2 (TWO) TIMES DAILY., Disp: 22 g, Rfl: 1  ondansetron (ZOFRAN-ODT) 4 MG TbDL, Take 1 tablet (4 mg total) by mouth every 8 (eight) hours as needed (Nausea)., Disp: 10 tablet, Rfl: 0  promethazine (PHENERGAN) 12.5 MG Tab, , Disp: , Rfl:   rosuvastatin (CRESTOR) 40 MG Tab, TAKE 1 TABLET EVERY EVENING, Disp: 90 tablet, Rfl: 0  diclofenac sodium (VOLTAREN) 1 % Gel, APPLY 2.5 GRAMS TO THE AFFECTED AREA 3-4 TIMES DAILY STARTING 6 WEEKS POST SURGERY. (Patient not taking: Reported on 6/27/2023), Disp: 300 g, Rfl: 3  ergocalciferol (ERGOCALCIFEROL) 50,000 unit Cap, Take one twice a week (Patient not taking: Reported on 6/27/2023), Disp: 24 capsule, Rfl: 1  gabapentin (NEURONTIN) 300 MG capsule, Take 3 capsules (900 mg total) by mouth every evening. (Patient not taking: Reported on 6/27/2023), Disp: 270 capsule, Rfl: 1  SANARE ADV SCAR THERAPY BASE Gel, APPLY 1/2 GRAM (1 PUMP) TO AFFECTED AREAS TWICE DAILY STARTING 6 WEEKS POST SURGERY., Disp: , Rfl:   tamsulosin (FLOMAX) 0.4 mg Cap, Take 1 capsule (0.4 mg total) by mouth once daily. (Patient not taking: Reported on 11/17/2022), Disp: 30 capsule, Rfl: 0    No current facility-administered medications on file prior to visit.        Review of Systems   Constitutional:  Negative for chills and fever.   Respiratory:  Positive for shortness of breath. Negative for chest tightness.    Cardiovascular:  Negative for chest pain and palpitations.   Musculoskeletal:  Positive for myalgias and neck pain.   Neurological:  Positive for headaches (Left periorbital pain not throbbing, pounding, or stabbing but just an ache).     Objective:      Physical Exam  Vitals  and nursing note reviewed.   Constitutional:       General: She is not in acute distress.     Appearance: Normal appearance. She is obese. She is not ill-appearing, toxic-appearing or diaphoretic.      Comments: Good blood pressure control  Normal pulse with regular rhythm  Obese with a BMI of 31.2 she is up 7.9 lb from her last visit with me November 17, 2012   HENT:      Head: Normocephalic and atraumatic.   Abdominal:      General: Abdomen is protuberant. Bowel sounds are normal.      Tenderness: There is generalized abdominal tenderness (Diffuse generalized tenderness). There is no guarding or rebound. Negative signs include Lemus's sign and McBurney's sign.   Musculoskeletal:         General: Tenderness (Diffuse) present.      Cervical back: Swelling (Fatty swelling in left supraclavicular area with no definite masses or hard nodules) present. No deformity, signs of trauma, spasms or tenderness. Normal range of motion.   Skin:     Coloration: Skin is not jaundiced or pale.      Findings: No erythema or rash.   Neurological:      General: No focal deficit present.      Mental Status: She is alert and oriented to person, place, and time.       Assessment:       1. Myalgia    2. Cervical radiculitis    3. Lumbar radiculitis    4. Hypertension associated with diabetes    5. Hyperlipidemia associated with type 2 diabetes mellitus    6. Type 2 diabetes mellitus with hyperosmolarity without coma, without long-term current use of insulin        Plan:       1. Myalgia  Generalized but mostly left-sided myalgia, possibly statin related, possibly polymyalgia rheumatica or fibromyalgia  - Comprehensive Metabolic Panel; Future  - CBC Auto Differential; Future  - CK; Future  - Sedimentation rate; Future  - C-Reactive Protein; Future  - Magnesium; Future    2. Cervical radiculitis  Stable    3. Lumbar radiculitis  Stable    4. Hypertension associated with diabetes  Well controlled no changes needed on losartan or  amlodipine  - Comprehensive Metabolic Panel; Future  - CBC Auto Differential; Future  - Lipid Panel; Future    5. Hyperlipidemia associated with type 2 diabetes mellitus  Will check cholesterol levels with lab in the meanwhile she is going to hold the Crestor for two weeks and see if some of her muscle pains resolve  - Comprehensive Metabolic Panel; Future  - Lipid Panel; Future    6. Type 2 diabetes mellitus with hyperosmolarity without coma, without long-term current use of insulin  Await A1c level  - Comprehensive Metabolic Panel; Future  - Hemoglobin A1C; Future  - Microalbumin/Creatinine Ratio, Urine; Future  - Lipid Panel; Future

## 2023-06-28 ENCOUNTER — LAB VISIT (OUTPATIENT)
Dept: LAB | Facility: HOSPITAL | Age: 73
End: 2023-06-28
Attending: FAMILY MEDICINE
Payer: MEDICARE

## 2023-06-28 DIAGNOSIS — I15.2 HYPERTENSION ASSOCIATED WITH DIABETES: ICD-10-CM

## 2023-06-28 DIAGNOSIS — E11.69 HYPERLIPIDEMIA ASSOCIATED WITH TYPE 2 DIABETES MELLITUS: ICD-10-CM

## 2023-06-28 DIAGNOSIS — E78.5 HYPERLIPIDEMIA ASSOCIATED WITH TYPE 2 DIABETES MELLITUS: ICD-10-CM

## 2023-06-28 DIAGNOSIS — E11.00 TYPE 2 DIABETES MELLITUS WITH HYPEROSMOLARITY WITHOUT COMA, WITHOUT LONG-TERM CURRENT USE OF INSULIN: ICD-10-CM

## 2023-06-28 DIAGNOSIS — M79.10 MYALGIA: ICD-10-CM

## 2023-06-28 DIAGNOSIS — E11.59 HYPERTENSION ASSOCIATED WITH DIABETES: ICD-10-CM

## 2023-06-28 LAB
ALBUMIN SERPL BCP-MCNC: 3.6 G/DL (ref 3.5–5.2)
ALP SERPL-CCNC: 130 U/L (ref 55–135)
ALT SERPL W/O P-5'-P-CCNC: 35 U/L (ref 10–44)
ANION GAP SERPL CALC-SCNC: 10 MMOL/L (ref 8–16)
AST SERPL-CCNC: 21 U/L (ref 10–40)
BASOPHILS # BLD AUTO: 0.05 K/UL (ref 0–0.2)
BASOPHILS NFR BLD: 0.7 % (ref 0–1.9)
BILIRUB SERPL-MCNC: 0.6 MG/DL (ref 0.1–1)
BUN SERPL-MCNC: 16 MG/DL (ref 8–23)
CALCIUM SERPL-MCNC: 9.5 MG/DL (ref 8.7–10.5)
CHLORIDE SERPL-SCNC: 103 MMOL/L (ref 95–110)
CHOLEST SERPL-MCNC: 112 MG/DL (ref 120–199)
CHOLEST/HDLC SERPL: 2.5 {RATIO} (ref 2–5)
CK SERPL-CCNC: 56 U/L (ref 20–180)
CO2 SERPL-SCNC: 29 MMOL/L (ref 23–29)
CREAT SERPL-MCNC: 0.8 MG/DL (ref 0.5–1.4)
CRP SERPL-MCNC: 3.5 MG/L (ref 0–8.2)
DIFFERENTIAL METHOD: ABNORMAL
EOSINOPHIL # BLD AUTO: 0.2 K/UL (ref 0–0.5)
EOSINOPHIL NFR BLD: 3.4 % (ref 0–8)
ERYTHROCYTE [DISTWIDTH] IN BLOOD BY AUTOMATED COUNT: 14.5 % (ref 11.5–14.5)
ERYTHROCYTE [SEDIMENTATION RATE] IN BLOOD BY WESTERGREN METHOD: 17 MM/HR (ref 0–20)
EST. GFR  (NO RACE VARIABLE): >60 ML/MIN/1.73 M^2
ESTIMATED AVG GLUCOSE: 177 MG/DL (ref 68–131)
GLUCOSE SERPL-MCNC: 136 MG/DL (ref 70–110)
HBA1C MFR BLD: 7.8 % (ref 4–5.6)
HCT VFR BLD AUTO: 38.2 % (ref 37–48.5)
HDLC SERPL-MCNC: 45 MG/DL (ref 40–75)
HDLC SERPL: 40.2 % (ref 20–50)
HGB BLD-MCNC: 11.9 G/DL (ref 12–16)
IMM GRANULOCYTES # BLD AUTO: 0.02 K/UL (ref 0–0.04)
IMM GRANULOCYTES NFR BLD AUTO: 0.3 % (ref 0–0.5)
LDLC SERPL CALC-MCNC: 36.6 MG/DL (ref 63–159)
LYMPHOCYTES # BLD AUTO: 1.9 K/UL (ref 1–4.8)
LYMPHOCYTES NFR BLD: 27.1 % (ref 18–48)
MAGNESIUM SERPL-MCNC: 1.9 MG/DL (ref 1.6–2.6)
MCH RBC QN AUTO: 27.4 PG (ref 27–31)
MCHC RBC AUTO-ENTMCNC: 31.2 G/DL (ref 32–36)
MCV RBC AUTO: 88 FL (ref 82–98)
MONOCYTES # BLD AUTO: 0.6 K/UL (ref 0.3–1)
MONOCYTES NFR BLD: 9.4 % (ref 4–15)
NEUTROPHILS # BLD AUTO: 4 K/UL (ref 1.8–7.7)
NEUTROPHILS NFR BLD: 59.1 % (ref 38–73)
NONHDLC SERPL-MCNC: 67 MG/DL
NRBC BLD-RTO: 0 /100 WBC
PLATELET # BLD AUTO: 264 K/UL (ref 150–450)
PMV BLD AUTO: 10.7 FL (ref 9.2–12.9)
POTASSIUM SERPL-SCNC: 4 MMOL/L (ref 3.5–5.1)
PROT SERPL-MCNC: 6.7 G/DL (ref 6–8.4)
RBC # BLD AUTO: 4.35 M/UL (ref 4–5.4)
SODIUM SERPL-SCNC: 142 MMOL/L (ref 136–145)
TRIGL SERPL-MCNC: 152 MG/DL (ref 30–150)
WBC # BLD AUTO: 6.82 K/UL (ref 3.9–12.7)

## 2023-06-28 PROCEDURE — 80061 LIPID PANEL: CPT | Mod: HCNC | Performed by: FAMILY MEDICINE

## 2023-06-28 PROCEDURE — 83735 ASSAY OF MAGNESIUM: CPT | Mod: HCNC | Performed by: FAMILY MEDICINE

## 2023-06-28 PROCEDURE — 82550 ASSAY OF CK (CPK): CPT | Mod: HCNC | Performed by: FAMILY MEDICINE

## 2023-06-28 PROCEDURE — 36415 COLL VENOUS BLD VENIPUNCTURE: CPT | Mod: HCNC,PO | Performed by: FAMILY MEDICINE

## 2023-06-28 PROCEDURE — 85651 RBC SED RATE NONAUTOMATED: CPT | Mod: HCNC,PO | Performed by: FAMILY MEDICINE

## 2023-06-28 PROCEDURE — 80053 COMPREHEN METABOLIC PANEL: CPT | Mod: HCNC | Performed by: FAMILY MEDICINE

## 2023-06-28 PROCEDURE — 86140 C-REACTIVE PROTEIN: CPT | Mod: HCNC | Performed by: FAMILY MEDICINE

## 2023-06-28 PROCEDURE — 85025 COMPLETE CBC W/AUTO DIFF WBC: CPT | Mod: HCNC | Performed by: FAMILY MEDICINE

## 2023-06-28 PROCEDURE — 83036 HEMOGLOBIN GLYCOSYLATED A1C: CPT | Mod: HCNC | Performed by: FAMILY MEDICINE

## 2023-06-29 ENCOUNTER — PATIENT MESSAGE (OUTPATIENT)
Dept: FAMILY MEDICINE | Facility: CLINIC | Age: 73
End: 2023-06-29
Payer: MEDICARE

## 2023-06-29 NOTE — TELEPHONE ENCOUNTER
See result notes dated June 28, 2023.  Question whether we are going to continue current dose of metformin and add Ozempic or Trulicity or change to 500 mg metformin and increase to 1000 mg b.i.d.?

## 2023-06-30 ENCOUNTER — PATIENT MESSAGE (OUTPATIENT)
Dept: FAMILY MEDICINE | Facility: CLINIC | Age: 73
End: 2023-06-30
Payer: MEDICARE

## 2023-06-30 ENCOUNTER — TELEPHONE (OUTPATIENT)
Dept: FAMILY MEDICINE | Facility: CLINIC | Age: 73
End: 2023-06-30
Payer: MEDICARE

## 2023-06-30 DIAGNOSIS — E11.00 TYPE 2 DIABETES MELLITUS WITH HYPEROSMOLARITY WITHOUT COMA, WITHOUT LONG-TERM CURRENT USE OF INSULIN: Primary | ICD-10-CM

## 2023-06-30 RX ORDER — METFORMIN HYDROCHLORIDE 750 MG/1
TABLET, EXTENDED RELEASE ORAL
Qty: 180 TABLET | Refills: 0 | Status: SHIPPED | OUTPATIENT
Start: 2023-06-30 | End: 2024-01-09

## 2023-06-30 NOTE — TELEPHONE ENCOUNTER
----- Message from Jeanne Boeckelman, MA sent at 6/30/2023  7:41 AM CDT -----  Patient has viewed her results in patient portal and this is here response: Dear Dr. Garg,  I think I'll try the Ozempic and quit eating sugar. I have been taking 0.5mg. Would a higher dose be possible?  Sincerely,  Doris

## 2023-07-03 RX ORDER — SEMAGLUTIDE 1.34 MG/ML
1 INJECTION, SOLUTION SUBCUTANEOUS
Qty: 3 ML | Refills: 0 | Status: SHIPPED | OUTPATIENT
Start: 2023-07-03 | End: 2023-07-31

## 2023-07-04 NOTE — TELEPHONE ENCOUNTER
Two prescriptions sent to [facility] pharmacy:  1. Ozempic 1 mg every seven days three mils no refills  2. Ozempic 2 mg every seven days nine mils three refills date to start July 31 2023

## 2023-07-05 ENCOUNTER — PATIENT MESSAGE (OUTPATIENT)
Dept: FAMILY MEDICINE | Facility: CLINIC | Age: 73
End: 2023-07-05
Payer: MEDICARE

## 2023-07-06 RX ORDER — MUPIROCIN 20 MG/G
OINTMENT TOPICAL
Qty: 22 G | Refills: 1 | Status: SHIPPED | OUTPATIENT
Start: 2023-07-06 | End: 2023-09-27

## 2023-07-14 ENCOUNTER — TELEPHONE (OUTPATIENT)
Dept: ENDOCRINOLOGY | Facility: CLINIC | Age: 73
End: 2023-07-14

## 2023-07-14 ENCOUNTER — OFFICE VISIT (OUTPATIENT)
Dept: ENDOCRINOLOGY | Facility: CLINIC | Age: 73
End: 2023-07-14
Attending: FAMILY MEDICINE
Payer: MEDICARE

## 2023-07-14 ENCOUNTER — LAB VISIT (OUTPATIENT)
Dept: LAB | Facility: HOSPITAL | Age: 73
End: 2023-07-14
Attending: INTERNAL MEDICINE
Payer: MEDICARE

## 2023-07-14 VITALS
OXYGEN SATURATION: 94 % | HEIGHT: 65 IN | HEART RATE: 89 BPM | WEIGHT: 184.88 LBS | SYSTOLIC BLOOD PRESSURE: 130 MMHG | DIASTOLIC BLOOD PRESSURE: 78 MMHG | BODY MASS INDEX: 30.8 KG/M2

## 2023-07-14 DIAGNOSIS — E04.1 THYROID NODULE: ICD-10-CM

## 2023-07-14 DIAGNOSIS — E04.2 MULTINODULAR GOITER: ICD-10-CM

## 2023-07-14 DIAGNOSIS — E04.2 MULTINODULAR GOITER: Primary | ICD-10-CM

## 2023-07-14 LAB — TSH SERPL DL<=0.005 MIU/L-ACNC: 2.38 UIU/ML (ref 0.4–4)

## 2023-07-14 PROCEDURE — 1160F RVW MEDS BY RX/DR IN RCRD: CPT | Mod: HCNC,CPTII,S$GLB, | Performed by: INTERNAL MEDICINE

## 2023-07-14 PROCEDURE — 3078F PR MOST RECENT DIASTOLIC BLOOD PRESSURE < 80 MM HG: ICD-10-PCS | Mod: HCNC,CPTII,S$GLB, | Performed by: INTERNAL MEDICINE

## 2023-07-14 PROCEDURE — 1125F PR PAIN SEVERITY QUANTIFIED, PAIN PRESENT: ICD-10-PCS | Mod: HCNC,CPTII,S$GLB, | Performed by: INTERNAL MEDICINE

## 2023-07-14 PROCEDURE — 3060F PR POS MICROALBUMINURIA RESULT DOCUMENTED/REVIEW: ICD-10-PCS | Mod: HCNC,CPTII,S$GLB, | Performed by: INTERNAL MEDICINE

## 2023-07-14 PROCEDURE — 99999 PR PBB SHADOW E&M-EST. PATIENT-LVL V: ICD-10-PCS | Mod: PBBFAC,HCNC,, | Performed by: INTERNAL MEDICINE

## 2023-07-14 PROCEDURE — 3008F PR BODY MASS INDEX (BMI) DOCUMENTED: ICD-10-PCS | Mod: HCNC,CPTII,S$GLB, | Performed by: INTERNAL MEDICINE

## 2023-07-14 PROCEDURE — 3075F SYST BP GE 130 - 139MM HG: CPT | Mod: HCNC,CPTII,S$GLB, | Performed by: INTERNAL MEDICINE

## 2023-07-14 PROCEDURE — 84443 ASSAY THYROID STIM HORMONE: CPT | Mod: HCNC | Performed by: INTERNAL MEDICINE

## 2023-07-14 PROCEDURE — 3008F BODY MASS INDEX DOCD: CPT | Mod: HCNC,CPTII,S$GLB, | Performed by: INTERNAL MEDICINE

## 2023-07-14 PROCEDURE — 3066F NEPHROPATHY DOC TX: CPT | Mod: HCNC,CPTII,S$GLB, | Performed by: INTERNAL MEDICINE

## 2023-07-14 PROCEDURE — 36415 COLL VENOUS BLD VENIPUNCTURE: CPT | Mod: HCNC,PN | Performed by: INTERNAL MEDICINE

## 2023-07-14 PROCEDURE — 1101F PR PT FALLS ASSESS DOC 0-1 FALLS W/OUT INJ PAST YR: ICD-10-PCS | Mod: HCNC,CPTII,S$GLB, | Performed by: INTERNAL MEDICINE

## 2023-07-14 PROCEDURE — 1101F PT FALLS ASSESS-DOCD LE1/YR: CPT | Mod: HCNC,CPTII,S$GLB, | Performed by: INTERNAL MEDICINE

## 2023-07-14 PROCEDURE — 3288F FALL RISK ASSESSMENT DOCD: CPT | Mod: HCNC,CPTII,S$GLB, | Performed by: INTERNAL MEDICINE

## 2023-07-14 PROCEDURE — 1125F AMNT PAIN NOTED PAIN PRSNT: CPT | Mod: HCNC,CPTII,S$GLB, | Performed by: INTERNAL MEDICINE

## 2023-07-14 PROCEDURE — 3078F DIAST BP <80 MM HG: CPT | Mod: HCNC,CPTII,S$GLB, | Performed by: INTERNAL MEDICINE

## 2023-07-14 PROCEDURE — 3075F PR MOST RECENT SYSTOLIC BLOOD PRESS GE 130-139MM HG: ICD-10-PCS | Mod: HCNC,CPTII,S$GLB, | Performed by: INTERNAL MEDICINE

## 2023-07-14 PROCEDURE — 99204 PR OFFICE/OUTPT VISIT, NEW, LEVL IV, 45-59 MIN: ICD-10-PCS | Mod: HCNC,S$GLB,, | Performed by: INTERNAL MEDICINE

## 2023-07-14 PROCEDURE — 1160F PR REVIEW ALL MEDS BY PRESCRIBER/CLIN PHARMACIST DOCUMENTED: ICD-10-PCS | Mod: HCNC,CPTII,S$GLB, | Performed by: INTERNAL MEDICINE

## 2023-07-14 PROCEDURE — 99999 PR PBB SHADOW E&M-EST. PATIENT-LVL V: CPT | Mod: PBBFAC,HCNC,, | Performed by: INTERNAL MEDICINE

## 2023-07-14 PROCEDURE — 4010F ACE/ARB THERAPY RXD/TAKEN: CPT | Mod: HCNC,CPTII,S$GLB, | Performed by: INTERNAL MEDICINE

## 2023-07-14 PROCEDURE — 99204 OFFICE O/P NEW MOD 45 MIN: CPT | Mod: HCNC,S$GLB,, | Performed by: INTERNAL MEDICINE

## 2023-07-14 PROCEDURE — 3066F PR DOCUMENTATION OF TREATMENT FOR NEPHROPATHY: ICD-10-PCS | Mod: HCNC,CPTII,S$GLB, | Performed by: INTERNAL MEDICINE

## 2023-07-14 PROCEDURE — 1159F MED LIST DOCD IN RCRD: CPT | Mod: HCNC,CPTII,S$GLB, | Performed by: INTERNAL MEDICINE

## 2023-07-14 PROCEDURE — 4010F PR ACE/ARB THEARPY RXD/TAKEN: ICD-10-PCS | Mod: HCNC,CPTII,S$GLB, | Performed by: INTERNAL MEDICINE

## 2023-07-14 PROCEDURE — 3051F PR MOST RECENT HEMOGLOBIN A1C LEVEL 7.0 - < 8.0%: ICD-10-PCS | Mod: HCNC,CPTII,S$GLB, | Performed by: INTERNAL MEDICINE

## 2023-07-14 PROCEDURE — 3051F HG A1C>EQUAL 7.0%<8.0%: CPT | Mod: HCNC,CPTII,S$GLB, | Performed by: INTERNAL MEDICINE

## 2023-07-14 PROCEDURE — 3060F POS MICROALBUMINURIA REV: CPT | Mod: HCNC,CPTII,S$GLB, | Performed by: INTERNAL MEDICINE

## 2023-07-14 PROCEDURE — 1159F PR MEDICATION LIST DOCUMENTED IN MEDICAL RECORD: ICD-10-PCS | Mod: HCNC,CPTII,S$GLB, | Performed by: INTERNAL MEDICINE

## 2023-07-14 PROCEDURE — 3288F PR FALLS RISK ASSESSMENT DOCUMENTED: ICD-10-PCS | Mod: HCNC,CPTII,S$GLB, | Performed by: INTERNAL MEDICINE

## 2023-07-14 NOTE — TELEPHONE ENCOUNTER
S/w [facility] hospital to schedule FNA. States the order is incorrect. Needs to be ordered as [id number] and sent to work queue [id number] and put in comments which nodule to be biopsied. I am not sure how to send the order to a specific work queue.

## 2023-07-14 NOTE — PROGRESS NOTES
CHIEF COMPLAINT:  Multinodular goiter  72 y.o. old being seen as a new patient.  Patient discovered to have a multinodular goiter.  Had an FNA of the right superior pole nodule on 12/22/2022-see below. No XRT to head/neck. No consistent dysphagia. No Change in voice.       12/22/2022-right superior pole nodule  RIGHT UPPER THYROID, FINE NEEDLE ASPIRATE:        Diagnostic category ([facility]):  Unsatisfactory; occasional   macrophages and blood.  No follicular cells are present for evaluation.       PAST MEDICAL HISTORY/PAST SURGICAL HISTORY:  Reviewed in [facility]    SOCIAL HISTORY: Reviewed in [facility]    FAMILY HISTORY:  Mother and sister have thyroid cancer- unsure type. + DM 2. PGM with DM 1.     MEDICATIONS/ALLERGIES: The patient's MedCard has been updated and reviewed.            PE:    GENERAL: Well developed, well nourished.  NECK: Supple, trachea midline, no palpable thyroid nodules  CHEST: Resp even and unlabored, CTA bilateral.  CARDIAC: RRR, S1, S2 heard, no murmurs, rubs, S3, or S4  SKIN: no acanthosis nigracans.    LABS/Radiology    US SOFT TISSUE HEAD NECK THYROID     CLINICAL HISTORY:  Nontoxic multinodular goiter     TECHNIQUE:  Ultrasound of the thyroid and cervical lymph nodes was performed.     COMPARISON:  11/23/2022     FINDINGS:  Right lobe 5.1 x 1.5 x 1.9 cm and left lobe 3.8 x 1.2 x 1.5 cm with isthmus at upper limit normal thickness.     Several lesions in the right thyroid lobe.  At upper pole there is a solid hypoechoic lesion measuring 1.1 cm with some microcalcifications.  Just adjacent at midpole there is a cyst measuring 1 cm.  A few other subcentimeter cystic lesions are present between the mid and lower poles.     There are a few lesions in the left thyroid lobe.  Largest is 0.8 cm solid and predominantly isoechoic at midpole.  Two additional subcentimeter cystic lesions are present along the lower pole.     Impression:     Multiple thyroid lesions.  Largest finding at the upper pole  of the right thyroid lobe does meet biopsy criteria.    ASSESSMENT/PLAN:  1. Multinodular goiter- no XRT.  No obstructive symptoms.  Independently reviewed images.  There are some areas of hyperechogenicity the right superior pole nodule.  However unsure if it is distinct microcalcifications.  Shee does have family history of thyroid cancer.  Advised finding out the type of thyroid cancer.  FNA nondiagnostic.  I did recommend that she should have the FNA repeated.  Discussed there still is risk for nondiagnostic FNA.  Was concerned about doing FNA again.  She states that painful.  States that she will check with her PCP taking Xanax prior anxious about the procedure.  Advise someone driving her.  Has not had a TSH so will check that today.      FOLLOWUP  Needs TSH  Repeat FNA right upper pole nodule- Can we see if can be done at [facility].

## 2023-07-15 ENCOUNTER — PATIENT MESSAGE (OUTPATIENT)
Dept: ENDOCRINOLOGY | Facility: CLINIC | Age: 73
End: 2023-07-15
Payer: MEDICARE

## 2023-07-15 ENCOUNTER — PATIENT MESSAGE (OUTPATIENT)
Dept: FAMILY MEDICINE | Facility: CLINIC | Age: 73
End: 2023-07-15
Payer: MEDICARE

## 2023-07-15 NOTE — TELEPHONE ENCOUNTER
Put the order in again. Not sure how to send to a work queue. Have not heard of that  I put in the comments.   If still does not work, would need to see someone from epic can help us

## 2023-07-17 ENCOUNTER — TELEPHONE (OUTPATIENT)
Dept: ENDOCRINOLOGY | Facility: CLINIC | Age: 73
End: 2023-07-17
Payer: MEDICARE

## 2023-07-17 DIAGNOSIS — E04.2 MULTINODULAR GOITER: Primary | ICD-10-CM

## 2023-07-17 NOTE — TELEPHONE ENCOUNTER
Take the full dose of the Crestor 40 but every other day giving her a full day between doses to recover from any irritant effect.  Research on Co Q10 is somewhat discouraging but there are people who take it who do seem to get benefit so I would suggest taking 100 mg of Co Q10/ubiquinol daily for a week before resuming the Crestor and continuing as long as she remains on the Crestor.

## 2023-07-17 NOTE — TELEPHONE ENCOUNTER
Marlen long/ [facility] says the radiologist wants pt to have more recent US before scheduling US.  Last done 12/1/22.  Please advise.

## 2023-07-20 ENCOUNTER — PATIENT MESSAGE (OUTPATIENT)
Dept: FAMILY MEDICINE | Facility: CLINIC | Age: 73
End: 2023-07-20
Payer: MEDICARE

## 2023-07-20 DIAGNOSIS — F06.4 ANXIETY DISORDER DUE TO MEDICAL CONDITION: Primary | ICD-10-CM

## 2023-07-20 NOTE — TELEPHONE ENCOUNTER
I will give her a 0.5 mg Xanax with a 2nd to take if she needs it.  My only concern would be if she could not sign consent forms which would result in the procedure being canceled and rescheduled.  She would have to get there early enough to sign anything before taking it.  30 minutes before the procedure should be plenty of time for it to work.  45 minutes would be better

## 2023-07-24 RX ORDER — ALPRAZOLAM 0.5 MG/1
TABLET ORAL
Qty: 2 TABLET | Refills: 0 | Status: ON HOLD | OUTPATIENT
Start: 2023-07-24 | End: 2023-09-07

## 2023-07-24 NOTE — TELEPHONE ENCOUNTER
[facility] site checked, no inappropriate activity found    Rx sent to [facility]

## 2023-07-31 DIAGNOSIS — E11.00 TYPE 2 DIABETES MELLITUS WITH HYPEROSMOLARITY WITHOUT COMA, WITHOUT LONG-TERM CURRENT USE OF INSULIN: ICD-10-CM

## 2023-07-31 NOTE — TELEPHONE ENCOUNTER
No care due was identified.  [facility] Embedded Care Due Messages. Reference number: [id number].   7/31/2023 2:37:45 PM CDT

## 2023-08-01 ENCOUNTER — HOSPITAL ENCOUNTER (OUTPATIENT)
Dept: RADIOLOGY | Facility: HOSPITAL | Age: 73
Discharge: HOME | End: 2023-08-01
Attending: INTERNAL MEDICINE
Payer: MEDICARE

## 2023-08-01 DIAGNOSIS — E04.2 MULTINODULAR GOITER: ICD-10-CM

## 2023-08-01 PROCEDURE — 76536 US EXAM OF HEAD AND NECK: CPT | Mod: TC,PO

## 2023-08-01 RX ORDER — SEMAGLUTIDE 1.34 MG/ML
INJECTION, SOLUTION SUBCUTANEOUS
Qty: 3 EACH | OUTPATIENT
Start: 2023-08-01

## 2023-08-01 NOTE — TELEPHONE ENCOUNTER
Refill Routing Note     Refill Routing Note   Medication(s) are not appropriate for processing by [facility] for the following reason(s):      Clarification of medication (Rx) details    [facility] action(s):  Defer Care Due:  None identified     Medication Therapy Plan: 2 active rx on file (1mg and 2mg); Patient not taking: Reported on 7/14/2023      Appointments  past 12m or future 3m with PCP    Date Provider   Last Visit   6/27/2023 Tor Garg MD   Next Visit   Visit date not found Tor Garg MD   ED visits in past 90 days: 0        Note composed:9:10 PM 07/31/2023

## 2023-08-16 ENCOUNTER — TELEPHONE (OUTPATIENT)
Dept: RADIOLOGY | Facility: HOSPITAL | Age: 73
End: 2023-08-16

## 2023-08-16 NOTE — NURSING
Pre procedure instructions for thyroid fna given to pt verbalized understanding to arrive at 1030 on 8/31/23 no dietary restrictions ,may drive herself home

## 2023-08-25 ENCOUNTER — PATIENT MESSAGE (OUTPATIENT)
Dept: ADMINISTRATIVE | Facility: OTHER | Age: 73
End: 2023-08-25
Payer: MEDICARE

## 2023-08-31 ENCOUNTER — HOSPITAL ENCOUNTER (OUTPATIENT)
Dept: RADIOLOGY | Facility: HOSPITAL | Age: 73
Discharge: HOME | End: 2023-08-31
Attending: INTERNAL MEDICINE
Payer: MEDICARE

## 2023-08-31 DIAGNOSIS — E04.2 MULTINODULAR GOITER: ICD-10-CM

## 2023-08-31 PROCEDURE — 27200940 US FINE NEEDLE ASPIRATION THYROID, FIRST LESION

## 2023-08-31 PROCEDURE — 10005 FNA BX W/US GDN 1ST LES: CPT

## 2023-08-31 PROCEDURE — 88173 CYTOPATH EVAL FNA REPORT: CPT | Performed by: INTERNAL MEDICINE

## 2023-08-31 PROCEDURE — 25000003 PHARM REV CODE 250: Performed by: RADIOLOGY

## 2023-08-31 RX ORDER — LIDOCAINE HYDROCHLORIDE 10 MG/ML
INJECTION, SOLUTION EPIDURAL; INFILTRATION; INTRACAUDAL; PERINEURAL
Status: COMPLETED | OUTPATIENT
Start: 2023-08-31 | End: 2023-08-31

## 2023-08-31 RX ADMIN — LIDOCAINE HYDROCHLORIDE 5 ML: 10 INJECTION, SOLUTION EPIDURAL; INFILTRATION; INTRACAUDAL; PERINEURAL at 09:08

## 2023-08-31 NOTE — PLAN OF CARE
Ambulated to US.  Id'd x 2 pt identifiers.  AAO x 3. YANEZ x4. Resp REU.   Denies pain or nausea. All questions answered.

## 2023-08-31 NOTE — PLAN OF CARE
Tolerated well.  Denies pain or nausea.  Resp REU. Verbal and written discharge instructions givne and understanding verbalized.  Discharged to home via private vehicle with daughter

## 2023-09-06 ENCOUNTER — ANESTHESIA EVENT (OUTPATIENT)
Dept: SURGERY | Facility: HOSPITAL | Age: 73
DRG: 661 | End: 2023-09-06
Payer: MEDICARE

## 2023-09-06 ENCOUNTER — ANESTHESIA (OUTPATIENT)
Dept: SURGERY | Facility: HOSPITAL | Age: 73
DRG: 661 | End: 2023-09-06
Payer: MEDICARE

## 2023-09-06 ENCOUNTER — PATIENT MESSAGE (OUTPATIENT)
Dept: SURGERY | Facility: HOSPITAL | Age: 73
End: 2023-09-06

## 2023-09-06 ENCOUNTER — HOSPITAL ENCOUNTER (INPATIENT)
Facility: HOSPITAL | Age: 73
LOS: 1 days | Discharge: HOME | DRG: 661 | End: 2023-09-07
Attending: EMERGENCY MEDICINE | Admitting: HOSPITALIST
Payer: MEDICARE

## 2023-09-06 DIAGNOSIS — R10.9 RIGHT FLANK PAIN: Primary | ICD-10-CM

## 2023-09-06 DIAGNOSIS — N20.0 NEPHROLITHIASIS: Primary | ICD-10-CM

## 2023-09-06 DIAGNOSIS — R31.9 URINARY TRACT INFECTION WITH HEMATURIA, SITE UNSPECIFIED: ICD-10-CM

## 2023-09-06 DIAGNOSIS — R07.9 CHEST PAIN: ICD-10-CM

## 2023-09-06 DIAGNOSIS — N39.0 URINARY TRACT INFECTION WITH HEMATURIA, SITE UNSPECIFIED: ICD-10-CM

## 2023-09-06 DIAGNOSIS — N20.1 URETEROLITHIASIS: ICD-10-CM

## 2023-09-06 DIAGNOSIS — N20.0 NEPHROLITHIASIS: ICD-10-CM

## 2023-09-06 PROBLEM — F32.A DEPRESSION: Status: ACTIVE | Noted: 2023-09-06

## 2023-09-06 PROBLEM — I10 HYPERTENSION: Status: ACTIVE | Noted: 2020-02-07

## 2023-09-06 LAB
ALBUMIN SERPL BCP-MCNC: 4.2 G/DL (ref 3.5–5.2)
ALLENS TEST: ABNORMAL
ALP SERPL-CCNC: 111 U/L (ref 55–135)
ALT SERPL W/O P-5'-P-CCNC: 34 U/L (ref 10–44)
ANION GAP SERPL CALC-SCNC: 13 MMOL/L (ref 8–16)
AST SERPL-CCNC: 18 U/L (ref 10–40)
BACTERIA #/AREA URNS HPF: ABNORMAL /HPF
BASOPHILS # BLD AUTO: 0.05 K/UL (ref 0–0.2)
BASOPHILS NFR BLD: 0.3 % (ref 0–1.9)
BILIRUB SERPL-MCNC: 1.3 MG/DL (ref 0.1–1)
BILIRUB UR QL STRIP: NEGATIVE
BUN SERPL-MCNC: 14 MG/DL (ref 8–23)
CALCIUM SERPL-MCNC: 10 MG/DL (ref 8.7–10.5)
CHLORIDE SERPL-SCNC: 100 MMOL/L (ref 95–110)
CLARITY UR: ABNORMAL
CO2 SERPL-SCNC: 26 MMOL/L (ref 23–29)
COLOR UR: YELLOW
CREAT SERPL-MCNC: 0.9 MG/DL (ref 0.5–1.4)
DELSYS: ABNORMAL
DIFFERENTIAL METHOD: ABNORMAL
EOSINOPHIL # BLD AUTO: 0 K/UL (ref 0–0.5)
EOSINOPHIL NFR BLD: 0.1 % (ref 0–8)
ERYTHROCYTE [DISTWIDTH] IN BLOOD BY AUTOMATED COUNT: 14.2 % (ref 11.5–14.5)
EST. GFR  (NO RACE VARIABLE): >60 ML/MIN/1.73 M^2
GLUCOSE SERPL-MCNC: 121 MG/DL (ref 70–110)
GLUCOSE UR QL STRIP: NEGATIVE
HCT VFR BLD AUTO: 38.9 % (ref 37–48.5)
HGB BLD-MCNC: 12.8 G/DL (ref 12–16)
HGB UR QL STRIP: ABNORMAL
HYALINE CASTS #/AREA URNS LPF: 0 /LPF
IMM GRANULOCYTES # BLD AUTO: 0.06 K/UL (ref 0–0.04)
IMM GRANULOCYTES NFR BLD AUTO: 0.4 % (ref 0–0.5)
KETONES UR QL STRIP: NEGATIVE
LACTATE SERPL-SCNC: 1.3 MMOL/L (ref 0.5–2.2)
LDH SERPL L TO P-CCNC: 2.25 MMOL/L (ref 0.5–2.2)
LEUKOCYTE ESTERASE UR QL STRIP: ABNORMAL
LYMPHOCYTES # BLD AUTO: 0.9 K/UL (ref 1–4.8)
LYMPHOCYTES NFR BLD: 6.4 % (ref 18–48)
MCH RBC QN AUTO: 27.9 PG (ref 27–31)
MCHC RBC AUTO-ENTMCNC: 32.9 G/DL (ref 32–36)
MCV RBC AUTO: 85 FL (ref 82–98)
MICROSCOPIC COMMENT: ABNORMAL
MONOCYTES # BLD AUTO: 0.9 K/UL (ref 0.3–1)
MONOCYTES NFR BLD: 5.9 % (ref 4–15)
NEUTROPHILS # BLD AUTO: 12.6 K/UL (ref 1.8–7.7)
NEUTROPHILS NFR BLD: 86.9 % (ref 38–73)
NITRITE UR QL STRIP: POSITIVE
NRBC BLD-RTO: 0 /100 WBC
PH UR STRIP: 6 [PH] (ref 5–8)
PLATELET # BLD AUTO: 254 K/UL (ref 150–450)
PMV BLD AUTO: 9.7 FL (ref 9.2–12.9)
POTASSIUM SERPL-SCNC: 4.2 MMOL/L (ref 3.5–5.1)
PROT SERPL-MCNC: 7.4 G/DL (ref 6–8.4)
PROT UR QL STRIP: ABNORMAL
RBC # BLD AUTO: 4.59 M/UL (ref 4–5.4)
RBC #/AREA URNS HPF: >100 /HPF (ref 0–4)
SAMPLE: ABNORMAL
SITE: ABNORMAL
SODIUM SERPL-SCNC: 139 MMOL/L (ref 136–145)
SP GR UR STRIP: 1.02 (ref 1–1.03)
URN SPEC COLLECT METH UR: ABNORMAL
UROBILINOGEN UR STRIP-ACNC: NEGATIVE EU/DL
WBC # BLD AUTO: 14.48 K/UL (ref 3.9–12.7)
WBC #/AREA URNS HPF: >100 /HPF (ref 0–5)
WBC CLUMPS URNS QL MICRO: ABNORMAL

## 2023-09-06 PROCEDURE — 80053 COMPREHEN METABOLIC PANEL: CPT | Performed by: PHYSICIAN ASSISTANT

## 2023-09-06 PROCEDURE — 99223 PR INITIAL HOSPITAL CARE,LEVL III: ICD-10-PCS | Mod: 25,,, | Performed by: UROLOGY

## 2023-09-06 PROCEDURE — 37000009 HC ANESTHESIA EA ADD 15 MINS: Performed by: UROLOGY

## 2023-09-06 PROCEDURE — 36415 COLL VENOUS BLD VENIPUNCTURE: CPT | Performed by: PHYSICIAN ASSISTANT

## 2023-09-06 PROCEDURE — 63600175 PHARM REV CODE 636 W HCPCS: Performed by: EMERGENCY MEDICINE

## 2023-09-06 PROCEDURE — 25000003 PHARM REV CODE 250: Performed by: ANESTHESIOLOGY

## 2023-09-06 PROCEDURE — 52332 CYSTOSCOPY AND TREATMENT: CPT | Mod: RT,,, | Performed by: UROLOGY

## 2023-09-06 PROCEDURE — 63600175 PHARM REV CODE 636 W HCPCS: Performed by: INTERNAL MEDICINE

## 2023-09-06 PROCEDURE — 85025 COMPLETE CBC W/AUTO DIFF WBC: CPT | Performed by: PHYSICIAN ASSISTANT

## 2023-09-06 PROCEDURE — 83605 ASSAY OF LACTIC ACID: CPT

## 2023-09-06 PROCEDURE — 87040 BLOOD CULTURE FOR BACTERIA: CPT | Performed by: EMERGENCY MEDICINE

## 2023-09-06 PROCEDURE — 99285 EMERGENCY DEPT VISIT HI MDM: CPT | Mod: 25

## 2023-09-06 PROCEDURE — 99223 1ST HOSP IP/OBS HIGH 75: CPT | Mod: 25,,, | Performed by: UROLOGY

## 2023-09-06 PROCEDURE — D9220A PRA ANESTHESIA: Mod: ANES,,, | Performed by: ANESTHESIOLOGY

## 2023-09-06 PROCEDURE — 96365 THER/PROPH/DIAG IV INF INIT: CPT

## 2023-09-06 PROCEDURE — 87077 CULTURE AEROBIC IDENTIFY: CPT | Performed by: EMERGENCY MEDICINE

## 2023-09-06 PROCEDURE — 74420 UROGRAPHY RTRGR +-KUB: CPT | Mod: 26,,, | Performed by: UROLOGY

## 2023-09-06 PROCEDURE — 63600175 PHARM REV CODE 636 W HCPCS: Performed by: UROLOGY

## 2023-09-06 PROCEDURE — 74420 PR  X-RAY RETROGRADE PYELOGRAM: ICD-10-PCS | Mod: 26,,, | Performed by: UROLOGY

## 2023-09-06 PROCEDURE — 81000 URINALYSIS NONAUTO W/SCOPE: CPT | Performed by: PHYSICIAN ASSISTANT

## 2023-09-06 PROCEDURE — 52332 PR CYSTOSCOPY,INSERT URETERAL STENT: ICD-10-PCS | Mod: RT,,, | Performed by: UROLOGY

## 2023-09-06 PROCEDURE — 12000002 HC ACUTE/MED SURGE SEMI-PRIVATE ROOM

## 2023-09-06 PROCEDURE — 37000008 HC ANESTHESIA 1ST 15 MINUTES: Performed by: UROLOGY

## 2023-09-06 PROCEDURE — C2617 STENT, NON-COR, TEM W/O DEL: HCPCS | Performed by: UROLOGY

## 2023-09-06 PROCEDURE — 63600175 PHARM REV CODE 636 W HCPCS: Performed by: NURSE PRACTITIONER

## 2023-09-06 PROCEDURE — 87077 CULTURE AEROBIC IDENTIFY: CPT | Mod: 59 | Performed by: PHYSICIAN ASSISTANT

## 2023-09-06 PROCEDURE — 25000003 PHARM REV CODE 250: Performed by: NURSE ANESTHETIST, CERTIFIED REGISTERED

## 2023-09-06 PROCEDURE — D9220A PRA ANESTHESIA: Mod: CRNA,,, | Performed by: NURSE ANESTHETIST, CERTIFIED REGISTERED

## 2023-09-06 PROCEDURE — 63600175 PHARM REV CODE 636 W HCPCS: Performed by: NURSE ANESTHETIST, CERTIFIED REGISTERED

## 2023-09-06 PROCEDURE — C1758 CATHETER, URETERAL: HCPCS | Performed by: UROLOGY

## 2023-09-06 PROCEDURE — 99900035 HC TECH TIME PER 15 MIN (STAT)

## 2023-09-06 PROCEDURE — 96361 HYDRATE IV INFUSION ADD-ON: CPT

## 2023-09-06 PROCEDURE — 87086 URINE CULTURE/COLONY COUNT: CPT | Performed by: PHYSICIAN ASSISTANT

## 2023-09-06 PROCEDURE — 94761 N-INVAS EAR/PLS OXIMETRY MLT: CPT

## 2023-09-06 PROCEDURE — 71000039 HC RECOVERY, EACH ADD'L HOUR: Performed by: UROLOGY

## 2023-09-06 PROCEDURE — 87154 CUL TYP ID BLD PTHGN 6+ TRGT: CPT | Performed by: EMERGENCY MEDICINE

## 2023-09-06 PROCEDURE — 87186 SC STD MICRODIL/AGAR DIL: CPT | Mod: 59 | Performed by: PHYSICIAN ASSISTANT

## 2023-09-06 PROCEDURE — 25000003 PHARM REV CODE 250: Performed by: EMERGENCY MEDICINE

## 2023-09-06 PROCEDURE — 36000706: Performed by: UROLOGY

## 2023-09-06 PROCEDURE — D9220A PRA ANESTHESIA: ICD-10-PCS | Mod: CRNA,,, | Performed by: NURSE ANESTHETIST, CERTIFIED REGISTERED

## 2023-09-06 PROCEDURE — 27200651 HC AIRWAY, LMA: Performed by: ANESTHESIOLOGY

## 2023-09-06 PROCEDURE — D9220A PRA ANESTHESIA: ICD-10-PCS | Mod: ANES,,, | Performed by: ANESTHESIOLOGY

## 2023-09-06 PROCEDURE — 87186 SC STD MICRODIL/AGAR DIL: CPT | Performed by: EMERGENCY MEDICINE

## 2023-09-06 PROCEDURE — 71000033 HC RECOVERY, INTIAL HOUR: Performed by: UROLOGY

## 2023-09-06 PROCEDURE — 36000707: Performed by: UROLOGY

## 2023-09-06 PROCEDURE — 36415 COLL VENOUS BLD VENIPUNCTURE: CPT | Performed by: EMERGENCY MEDICINE

## 2023-09-06 PROCEDURE — 83605 ASSAY OF LACTIC ACID: CPT | Performed by: EMERGENCY MEDICINE

## 2023-09-06 PROCEDURE — 25500020 PHARM REV CODE 255: Performed by: UROLOGY

## 2023-09-06 PROCEDURE — 25000003 PHARM REV CODE 250: Performed by: UROLOGY

## 2023-09-06 PROCEDURE — 25000003 PHARM REV CODE 250: Performed by: PHYSICIAN ASSISTANT

## 2023-09-06 PROCEDURE — C1769 GUIDE WIRE: HCPCS | Performed by: UROLOGY

## 2023-09-06 DEVICE — STENT DBL PGTL URET 6FR 26CM: Type: IMPLANTABLE DEVICE | Site: URETER | Status: FUNCTIONAL

## 2023-09-06 RX ORDER — AMOXICILLIN 250 MG
1 CAPSULE ORAL 2 TIMES DAILY
Status: DISCONTINUED | OUTPATIENT
Start: 2023-09-06 | End: 2023-09-07 | Stop reason: HOSPADM

## 2023-09-06 RX ORDER — MORPHINE SULFATE 4 MG/ML
4 INJECTION, SOLUTION INTRAMUSCULAR; INTRAVENOUS
Status: DISCONTINUED | OUTPATIENT
Start: 2023-09-06 | End: 2023-09-06

## 2023-09-06 RX ORDER — LIDOCAINE HYDROCHLORIDE 20 MG/ML
INJECTION INTRAVENOUS
Status: DISCONTINUED | OUTPATIENT
Start: 2023-09-06 | End: 2023-09-06

## 2023-09-06 RX ORDER — ONDANSETRON HYDROCHLORIDE 2 MG/ML
INJECTION, SOLUTION INTRAMUSCULAR; INTRAVENOUS
Status: DISCONTINUED | OUTPATIENT
Start: 2023-09-06 | End: 2023-09-06

## 2023-09-06 RX ORDER — OXYCODONE HYDROCHLORIDE 5 MG/1
5 TABLET ORAL
Status: DISCONTINUED | OUTPATIENT
Start: 2023-09-06 | End: 2023-09-06 | Stop reason: HOSPADM

## 2023-09-06 RX ORDER — LANOLIN ALCOHOL/MO/W.PET/CERES
800 CREAM (GRAM) TOPICAL
Status: DISCONTINUED | OUTPATIENT
Start: 2023-09-06 | End: 2023-09-07 | Stop reason: HOSPADM

## 2023-09-06 RX ORDER — ACETAMINOPHEN 10 MG/ML
INJECTION, SOLUTION INTRAVENOUS
Status: DISCONTINUED | OUTPATIENT
Start: 2023-09-06 | End: 2023-09-06

## 2023-09-06 RX ORDER — TAMSULOSIN HYDROCHLORIDE 0.4 MG/1
0.4 CAPSULE ORAL NIGHTLY
Qty: 30 CAPSULE | Refills: 0 | Status: SHIPPED | OUTPATIENT
Start: 2023-09-06 | End: 2023-09-14

## 2023-09-06 RX ORDER — KETOROLAC TROMETHAMINE 30 MG/ML
15 INJECTION, SOLUTION INTRAMUSCULAR; INTRAVENOUS
Status: DISPENSED | OUTPATIENT
Start: 2023-09-06 | End: 2023-09-07

## 2023-09-06 RX ORDER — LOSARTAN POTASSIUM 50 MG/1
100 TABLET ORAL DAILY
Status: DISCONTINUED | OUTPATIENT
Start: 2023-09-07 | End: 2023-09-07 | Stop reason: HOSPADM

## 2023-09-06 RX ORDER — PROCHLORPERAZINE EDISYLATE 5 MG/ML
5 INJECTION INTRAMUSCULAR; INTRAVENOUS EVERY 6 HOURS PRN
Status: DISCONTINUED | OUTPATIENT
Start: 2023-09-06 | End: 2023-09-07 | Stop reason: HOSPADM

## 2023-09-06 RX ORDER — SIMETHICONE 80 MG
1 TABLET,CHEWABLE ORAL 4 TIMES DAILY PRN
Status: DISCONTINUED | OUTPATIENT
Start: 2023-09-06 | End: 2023-09-07 | Stop reason: HOSPADM

## 2023-09-06 RX ORDER — FENTANYL CITRATE 50 UG/ML
25 INJECTION, SOLUTION INTRAMUSCULAR; INTRAVENOUS EVERY 5 MIN PRN
Status: DISCONTINUED | OUTPATIENT
Start: 2023-09-06 | End: 2023-09-06 | Stop reason: HOSPADM

## 2023-09-06 RX ORDER — IBUPROFEN 200 MG
16 TABLET ORAL
Status: DISCONTINUED | OUTPATIENT
Start: 2023-09-06 | End: 2023-09-07 | Stop reason: HOSPADM

## 2023-09-06 RX ORDER — SODIUM CHLORIDE 0.9 % (FLUSH) 0.9 %
10 SYRINGE (ML) INJECTION EVERY 8 HOURS PRN
Status: DISCONTINUED | OUTPATIENT
Start: 2023-09-06 | End: 2023-09-07 | Stop reason: HOSPADM

## 2023-09-06 RX ORDER — HYDROMORPHONE HYDROCHLORIDE 2 MG/ML
0.2 INJECTION, SOLUTION INTRAMUSCULAR; INTRAVENOUS; SUBCUTANEOUS EVERY 5 MIN PRN
Status: DISCONTINUED | OUTPATIENT
Start: 2023-09-06 | End: 2023-09-06 | Stop reason: HOSPADM

## 2023-09-06 RX ORDER — ATORVASTATIN CALCIUM 40 MG/1
80 TABLET, FILM COATED ORAL DAILY
Status: DISCONTINUED | OUTPATIENT
Start: 2023-09-07 | End: 2023-09-07 | Stop reason: HOSPADM

## 2023-09-06 RX ORDER — PHENYLEPHRINE HYDROCHLORIDE 10 MG/ML
INJECTION INTRAVENOUS
Status: DISCONTINUED | OUTPATIENT
Start: 2023-09-06 | End: 2023-09-06

## 2023-09-06 RX ORDER — NALOXONE HCL 0.4 MG/ML
0.02 VIAL (ML) INJECTION
Status: DISCONTINUED | OUTPATIENT
Start: 2023-09-06 | End: 2023-09-07 | Stop reason: HOSPADM

## 2023-09-06 RX ORDER — SODIUM,POTASSIUM PHOSPHATES 280-250MG
2 POWDER IN PACKET (EA) ORAL
Status: DISCONTINUED | OUTPATIENT
Start: 2023-09-06 | End: 2023-09-07 | Stop reason: HOSPADM

## 2023-09-06 RX ORDER — TALC
6 POWDER (GRAM) TOPICAL NIGHTLY PRN
Status: DISCONTINUED | OUTPATIENT
Start: 2023-09-06 | End: 2023-09-07 | Stop reason: HOSPADM

## 2023-09-06 RX ORDER — CIPROFLOXACIN 2 MG/ML
400 INJECTION, SOLUTION INTRAVENOUS
Status: DISCONTINUED | OUTPATIENT
Start: 2023-09-06 | End: 2023-09-07 | Stop reason: HOSPADM

## 2023-09-06 RX ORDER — MORPHINE SULFATE 2 MG/ML
2 INJECTION, SOLUTION INTRAMUSCULAR; INTRAVENOUS EVERY 4 HOURS PRN
Status: DISCONTINUED | OUTPATIENT
Start: 2023-09-06 | End: 2023-09-07 | Stop reason: HOSPADM

## 2023-09-06 RX ORDER — INSULIN ASPART 100 [IU]/ML
0-5 INJECTION, SOLUTION INTRAVENOUS; SUBCUTANEOUS
Status: DISCONTINUED | OUTPATIENT
Start: 2023-09-06 | End: 2023-09-07 | Stop reason: HOSPADM

## 2023-09-06 RX ORDER — ESCITALOPRAM OXALATE 10 MG/1
10 TABLET ORAL DAILY
Status: DISCONTINUED | OUTPATIENT
Start: 2023-09-07 | End: 2023-09-07 | Stop reason: HOSPADM

## 2023-09-06 RX ORDER — GLUCAGON 1 MG
1 KIT INJECTION
Status: DISCONTINUED | OUTPATIENT
Start: 2023-09-06 | End: 2023-09-07 | Stop reason: HOSPADM

## 2023-09-06 RX ORDER — AMLODIPINE BESYLATE 5 MG/1
10 TABLET ORAL DAILY
Status: DISCONTINUED | OUTPATIENT
Start: 2023-09-07 | End: 2023-09-07 | Stop reason: HOSPADM

## 2023-09-06 RX ORDER — CIPROFLOXACIN 2 MG/ML
400 INJECTION, SOLUTION INTRAVENOUS
Status: COMPLETED | OUTPATIENT
Start: 2023-09-06 | End: 2023-09-06

## 2023-09-06 RX ORDER — PROPOFOL 10 MG/ML
VIAL (ML) INTRAVENOUS
Status: DISCONTINUED | OUTPATIENT
Start: 2023-09-06 | End: 2023-09-06

## 2023-09-06 RX ORDER — MAG HYDROX/ALUMINUM HYD/SIMETH 200-200-20
30 SUSPENSION, ORAL (FINAL DOSE FORM) ORAL 4 TIMES DAILY PRN
Status: DISCONTINUED | OUTPATIENT
Start: 2023-09-06 | End: 2023-09-07 | Stop reason: HOSPADM

## 2023-09-06 RX ORDER — ACETAMINOPHEN 325 MG/1
650 TABLET ORAL EVERY 8 HOURS PRN
Status: DISCONTINUED | OUTPATIENT
Start: 2023-09-06 | End: 2023-09-07 | Stop reason: HOSPADM

## 2023-09-06 RX ORDER — MUPIROCIN 20 MG/G
OINTMENT TOPICAL 2 TIMES DAILY
Status: DISCONTINUED | OUTPATIENT
Start: 2023-09-06 | End: 2023-09-07 | Stop reason: HOSPADM

## 2023-09-06 RX ORDER — FENTANYL CITRATE 50 UG/ML
INJECTION, SOLUTION INTRAMUSCULAR; INTRAVENOUS
Status: DISCONTINUED | OUTPATIENT
Start: 2023-09-06 | End: 2023-09-06

## 2023-09-06 RX ORDER — GENTAMICIN SULFATE 40 MG/ML
160 INJECTION, SOLUTION INTRAMUSCULAR; INTRAVENOUS ONCE
Status: DISCONTINUED | OUTPATIENT
Start: 2023-09-06 | End: 2023-09-06

## 2023-09-06 RX ORDER — IBUPROFEN 200 MG
24 TABLET ORAL
Status: DISCONTINUED | OUTPATIENT
Start: 2023-09-06 | End: 2023-09-07 | Stop reason: HOSPADM

## 2023-09-06 RX ORDER — ONDANSETRON 2 MG/ML
4 INJECTION INTRAMUSCULAR; INTRAVENOUS EVERY 6 HOURS PRN
Status: DISCONTINUED | OUTPATIENT
Start: 2023-09-06 | End: 2023-09-07 | Stop reason: HOSPADM

## 2023-09-06 RX ORDER — KETOROLAC TROMETHAMINE 10 MG/1
10 TABLET, FILM COATED ORAL EVERY 8 HOURS PRN
Qty: 10 TABLET | Refills: 0 | Status: SHIPPED | OUTPATIENT
Start: 2023-09-06 | End: 2023-09-14

## 2023-09-06 RX ORDER — CIPROFLOXACIN 2 MG/ML
400 INJECTION, SOLUTION INTRAVENOUS
Status: CANCELLED | OUTPATIENT
Start: 2023-09-06

## 2023-09-06 RX ORDER — IPRATROPIUM BROMIDE AND ALBUTEROL SULFATE 2.5; .5 MG/3ML; MG/3ML
3 SOLUTION RESPIRATORY (INHALATION) EVERY 6 HOURS PRN
Status: DISCONTINUED | OUTPATIENT
Start: 2023-09-06 | End: 2023-09-07 | Stop reason: HOSPADM

## 2023-09-06 RX ORDER — DEXAMETHASONE SODIUM PHOSPHATE 4 MG/ML
INJECTION, SOLUTION INTRA-ARTICULAR; INTRALESIONAL; INTRAMUSCULAR; INTRAVENOUS; SOFT TISSUE
Status: DISCONTINUED | OUTPATIENT
Start: 2023-09-06 | End: 2023-09-06

## 2023-09-06 RX ORDER — MIDAZOLAM HYDROCHLORIDE 1 MG/ML
INJECTION INTRAMUSCULAR; INTRAVENOUS
Status: DISCONTINUED | OUTPATIENT
Start: 2023-09-06 | End: 2023-09-06

## 2023-09-06 RX ADMIN — SODIUM CHLORIDE 1000 ML: 9 INJECTION, SOLUTION INTRAVENOUS at 02:09

## 2023-09-06 RX ADMIN — PROPOFOL 140 MG: 10 INJECTION, EMULSION INTRAVENOUS at 05:09

## 2023-09-06 RX ADMIN — ONDANSETRON 4 MG: 2 INJECTION INTRAMUSCULAR; INTRAVENOUS at 05:09

## 2023-09-06 RX ADMIN — CIPROFLOXACIN 400 MG: 2 INJECTION, SOLUTION INTRAVENOUS at 10:09

## 2023-09-06 RX ADMIN — FENTANYL CITRATE 50 MCG: 50 INJECTION, SOLUTION INTRAMUSCULAR; INTRAVENOUS at 05:09

## 2023-09-06 RX ADMIN — LIDOCAINE HYDROCHLORIDE 75 MG: 20 INJECTION, SOLUTION INTRAVENOUS at 05:09

## 2023-09-06 RX ADMIN — PROMETHAZINE HYDROCHLORIDE 12.5 MG: 25 INJECTION INTRAMUSCULAR; INTRAVENOUS at 02:09

## 2023-09-06 RX ADMIN — SODIUM CHLORIDE, SODIUM GLUCONATE, SODIUM ACETATE, POTASSIUM CHLORIDE AND MAGNESIUM CHLORIDE: 526; 502; 368; 37; 30 INJECTION, SOLUTION INTRAVENOUS at 06:09

## 2023-09-06 RX ADMIN — MORPHINE SULFATE 2 MG: 2 INJECTION, SOLUTION INTRAMUSCULAR; INTRAVENOUS at 10:09

## 2023-09-06 RX ADMIN — ACETAMINOPHEN 1000 MG: 10 INJECTION, SOLUTION INTRAVENOUS at 05:09

## 2023-09-06 RX ADMIN — GENTAMICIN SULFATE 80 MG: 40 INJECTION, SOLUTION INTRAMUSCULAR; INTRAVENOUS at 05:09

## 2023-09-06 RX ADMIN — SODIUM CHLORIDE, SODIUM GLUCONATE, SODIUM ACETATE, POTASSIUM CHLORIDE AND MAGNESIUM CHLORIDE: 526; 502; 368; 37; 30 INJECTION, SOLUTION INTRAVENOUS at 04:09

## 2023-09-06 RX ADMIN — CIPROFLOXACIN 400 MG: 400 INJECTION, SOLUTION INTRAVENOUS at 03:09

## 2023-09-06 RX ADMIN — PHENYLEPHRINE HYDROCHLORIDE 100 MCG: 10 INJECTION INTRAVENOUS at 05:09

## 2023-09-06 RX ADMIN — DEXAMETHASONE SODIUM PHOSPHATE 4 MG: 4 INJECTION, SOLUTION INTRA-ARTICULAR; INTRALESIONAL; INTRAMUSCULAR; INTRAVENOUS; SOFT TISSUE at 05:09

## 2023-09-06 RX ADMIN — MIDAZOLAM HYDROCHLORIDE 2 MG: 1 INJECTION, SOLUTION INTRAMUSCULAR; INTRAVENOUS at 05:09

## 2023-09-06 NOTE — ASSESSMENT & PLAN NOTE
Patient has depression which is unknown and is currently controlled. Will Continue anti-depressant medications. We will not consult psychiatry at this time. Patient does not display psychosis at this time. Continue to monitor closely and adjust plan of care as needed.

## 2023-09-06 NOTE — ASSESSMENT & PLAN NOTE
Chronic, controlled.  Latest blood pressure and vitals reviewed-     Temp:  [97.5 °F (36.4 °C)-99.9 °F (37.7 °C)]   Pulse:  [100-112]   Resp:  [14-20]   BP: (120-151)/()   SpO2:  [93 %-96 %] .   Home meds for hypertension were reviewed and noted below-  Hypertension Medications             amLODIPine (NORVASC) 10 MG tablet TAKE 1 TABLET EVERY DAY    losartan (COZAAR) 100 MG tablet TAKE 1 TABLET EVERY DAY          While in the hospital, will manage blood pressure as follows; Continue home antihypertensive regimen    Will utilize p.r.n. blood pressure medication only if patient's blood pressure greater than 180/110 and she develops symptoms such as worsening chest pain or shortness of breath.

## 2023-09-06 NOTE — H&P
[facility] Consult Note - [place] - [facility]  Staff: MD Pat  Group:Pat/Waqas/Feroz/Lee/Ladan  Referring provider and please cc:    PCP: Tor Garg MD  Date of Service: 09/06/2023    CC: stone      Subjective:      [facility] Established [place] by   Doris Pastrana is a 71 y.o. female who presents for follow up for left nephrolithiasis.   Patient with a history of DM, HTN, nephrolithiasis and TIA who presented to the emergency department on 4/10/22 complaining of left flank pain associated with nausea and emesis for approximately 2 days.    CT renal stone revealed a left proximal 8 mm ureteral stone with hydroureteronephrosis. Also with bilateral non-obstructing stones. Urology consulted to assist with management.    WBC 12 and Creatinine 1.1.    She has a history of kidney stones s/p ESWL in the past with Dr. Stewart. States she also passed a stone previously.         Interval History 6/9/22 by  She underwent left ureteral stent placement on 4/10/22. She subsequently had left ureteroscopy and laser lithotripsy of her stone on 4/26/22. Stent removed at home. Had a CT post-operatively per her PCP. Revealed a 3 mm distal ureteral fragment that she passed. Pain has resolved. Doing well with no complaints. Denies any fever, chills, gross hematuria, recent urinary tract infection,  trauma or history of  malignancy    Initial consult by me in hospital on 9/6/23 for kidney stone:     Doris Pastrana is a 72 y.o. female admitted for Ureterolithiasis [N20.1] on 9/6/2023.     The patient has been referred for a kidney stone found: presented to ER today with R sided flank pain radiating to groin, pain started this morning. Also c/o dysuria. +vomiting. Fever to 100 at home 2 days ago, no fevers now.  Wbc 14.5. Cr normal. Cath urine + for nitrites/3+bld/3+leuk, >100 rbc/>100 wbc/few bact/mod wbc. Cr 0.8. a      Imaging: ctrss  shows proximal 5mm stone and 3mm RLP stone. 2 tiny stones in left kidney.             Urine history:               Recent Labs   Lab 22  0832 23  0742 23  1349   WBC 5.31 6.82 14.48 H   Hemoglobin 12.3 11.9 L 12.8   Hematocrit 38.0 38.2 38.9   Platelets 249 264 254   ]  Recent Labs   Lab 22  0439 22  0832 22  0856 23  0742 23  1349   Sodium 141   < > 141 142 139   Potassium 4.2   < > 4.4 4.0 4.2   Chloride 105   < > 105 103 100   CO2 24   < > 29 29 26   BUN 20   < > 12 16 14   Creatinine 0.8   < > 0.6 0.8 0.9   Glucose 148 H   < > 100 136 H 121 H   Calcium 9.0   < > 9.6 9.5 10.0   Magnesium 1.9  --   --  1.9  --    Alkaline Phosphatase  --    < > 128 130 111   Total Protein  --    < > 7.0 6.7 7.4   Albumin  --    < > 4.1 3.6 4.2   Total Bilirubin  --    < > 0.8 0.6 1.3 H   AST  --    < > 16 21 18   ALT  --    < > 14 35 34    < > = values in this interval not displayed.   ]    Lab Results   Component Value Date    HGBA1C 7.8 (H) 2023         Current REVIEW OF SYSTEMS:  Negative for the remaining 12 points of ROS except for as stated above       PMHx:  Past Medical History:   Diagnosis Date    Acute sinus infection     Allergy     Anemia     Arthritis     Bronchitis     Degenerative disc disease     lumbar, pain contract 2013    Depression     Diabetes mellitus, type 2 2016    Fatty liver     Hyperlipidemia 2016    Hypertension     Kidney stone     Mitral valve prolapse     Multinodular goiter     Pleurisy     Pneumonia     PONV (postoperative nausea and vomiting)     Sinus infection     TIA (transient ischemic attack) 2019       PSHx:  Past Surgical History:   Procedure Laterality Date    ANKLE SURGERY  2017    right ankle torn ligament    APPENDECTOMY       SECTION      CHOLECYSTECTOMY      COLONOSCOPY  8/13/15    Dr. Oliveira, five year recheck    COLONOSCOPY N/A 2021    Procedure: COLONOSCOPY;  Surgeon: Tevin Oliveira,  MD;  Location: [facility];  Service: Endoscopy;  Laterality: N/A;    CYSTOSCOPY WITH URETEROSCOPY, RETROGRADE PYELOGRAPHY, AND INSERTION OF STENT Left 4/10/2022    Procedure: CYSTOSCOPY, WITH RETROGRADE PYELOGRAM AND URETERAL STENT INSERTION;  Surgeon: Jack Redman Jr., MD;  Location: [facility] OR;  Service: Urology;  Laterality: Left;    CYSTOSCOPY WITH URETEROSCOPY, RETROGRADE PYELOGRAPHY, AND INSERTION OF STENT Left 4/26/2022    Procedure: CYSTOSCOPY, WITH RETROGRADE PYELOGRAM AND URETERAL STENT INSERTION;  Surgeon: Jack Redman Jr., MD;  Location: [facility] OR;  Service: Urology;  Laterality: Left;    EPIDURAL STEROID INJECTION INTO CERVICAL SPINE N/A 1/3/2020    Procedure: Injection-steroid-epidural-cervical;  Surgeon: Eddi Albrecht MD;  Location: [facility] OR;  Service: Pain Management;  Laterality: N/A;  C7-T1    EPIDURAL STEROID INJECTION INTO CERVICAL SPINE N/A 3/10/2020    Procedure: Injection-steroid-epidural-cervical;  Surgeon: Eddi Albrecht MD;  Location: [facility] OR;  Service: Pain Management;  Laterality: N/A;  C7-T1    EPIDURAL STEROID INJECTION INTO CERVICAL SPINE N/A 12/29/2020    Procedure: Injection-steroid-epidural-cervical;  Surgeon: Eddi Albrecht MD;  Location: [facility] OR;  Service: Pain Management;  Laterality: N/A;  C7-T1     EXTRACORPOREAL SHOCK WAVE LITHOTRIPSY Left 5/22/2019    Procedure: LITHOTRIPSY, ESWL - only if stone visible on xray  with cysto after on table possible;  Surgeon: Marisol Stewart MD;  Location: [facility] OR;  Service: Urology;  Laterality: Left;    HYSTERECTOMY      LASER LITHOTRIPSY Left 4/26/2022    Procedure: LITHOTRIPSY, USING LASER;  Surgeon: Jack Redman Jr., MD;  Location: [facility] OR;  Service: Urology;  Laterality: Left;    OOPHORECTOMY      TONSILLECTOMY      URETEROSCOPIC REMOVAL OF URETERIC CALCULUS Left 4/26/2022    Procedure: REMOVAL, CALCULUS, URETER, URETEROSCOPIC;  Surgeon: Jack Redman Jr., MD;  Location: [facility] OR;  Service: Urology;  Laterality: Left;    URETEROSCOPY Left  2022    Procedure: URETEROSCOPY;  Surgeon: Jack Redman Jr., MD;  Location: [facility];  Service: Urology;  Laterality: Left;    VAGINAL DELIVERY         Fam Hx:  Reviewed- pertinent family hx as above    Soc Hx:  Social History     Tobacco Use    Smoking status: Former     Current packs/day: 0.00     Types: Cigarettes     Quit date: 3/12/1983     Years since quittin.5    Smokeless tobacco: Never   Substance Use Topics    Alcohol use: Yes     Comment: socially    Drug use: No       Allergies:  Omnicef [cefdinir]; Codeine; Rhubarb; Strawberries [strawberry]; Iodine; Latex, natural rubber; and Pravastatin      Objective:     Vitals:    23 1550   BP: (!) 146/72   Pulse: 100   Resp: 18   Temp: 99.9 °F (37.7 °C)         General:wdwn  in NAD  Neurologic: CN grossly normal. Normal sensation.   Psychiatric: awake, alert and oriented x 3. Mood and affect Normal. Cooperative.  Eyes: PERRLA, normal conjunctiva  Respiratory: no increased work on breathing. No wheezing.   Cardiovascular: No obvious extremity edema. Warm and well perfused.  GI: no obvious stomach distension  Musculoskeletal: normal  range of motion of bilateral upper extremities. Normal muscle strength and tone.  Skin: no obvious rashes or lesions. No tightening of skin noted.    Pertinent  exam in HPI        Assessment:     Doris Pastrana is a 72 y.o. female with   1. Right flank pain    2. Ureterolithiasis    3. Urinary tract infection with hematuria, site unspecified        urology consulted for:  right ureteral stone with evidence of urinary tract infection,  without renal insufficiency, without intractable pain      Plan:     To OR today for cystoscopy and right stent placement without possible stone extraction.   If a stent is unable to be placed the patient was told that they would need a nephrostomy tube placed by Radiology in a separate setting in order to drain the kidney  We discussed the difference in stone treatment options and why  we are proceeding with this treatment plan.    If infection/pus is seen proximal/behind the stone after placement of wire, then the pt will only have a ureteral stent and a catheter placed to maximally drain infection. They will be admitted for IV abx and the baez will remain until patient's white blood cell count is normal/lower and they do not demonstrate any fevers for 24 hours.   then we will return in 2 to 4 weeks to do a stone extraction with or without laser once infection has been cleared. At that procedure the old stent will be removed and the stone/s will be fragmented and removed and a new ureteral stent replaced but likely on strings so the patient can remove themselves at a designated time so as to avoid another procedure to have the stent removed      Special considerations for this patient:   Return in 2 weeks for R urs and stone ext  The patient does have more stones in left kidney. tiny    Stent consent:  The patient was counseled extensively on the stent being only temporary. The pt understands that a stent should not be in longer than 1 year, and preferably less than 3 months. A stent can become encrusted, block urine flow and cause kidney function loss if it is not exchanged or removed at least within a year.  They understand that if the stent remains longer becomes encrusted it would require major surgeries and possible nephrectomy to remove the stent and or stones or affected kidney if unable to treat the stones endoscopically.      Information will also be given on the discharge instructions on how to get in contact with urologist to determine plan for stone/stent removal versus exchange.        Marisol Stewart MD

## 2023-09-06 NOTE — PATIENT INSTRUCTIONS
LEAVE AT TOP OF DISCHARGE INSTRUCTIONS    VERY IMPORTANT INSTRUCTIONS FROM  REGARDING YOUR PROCEDURE- PLEASE READ    Your urologist is Dr.Jennifer Stewart  Office number: [phone] ([facility])  Address: [address] [place] [address],  (2nd office building on left); [address] (located on 2nd floor).     The following are specific instructions for Doris Pastrana is a 72 y.o. female, so please read CAREFULLY:    If you are on blood thinners and are unsure whether to continue, stop or restart them and it is not mentioned above, then call 's office for further directions    Return plan:  Return on Wednesday sept 20th for right ureteroscopy and stone extraction  Continue antibiotics until next procedure.     Short term:  Admit to medicine inpatient service for uti (being transferred to [facility]? Due to bed shortage)  Continue sevilla catheter until afebrile x 24 hours and wbc decreased  Follow up urine culture  Ok to discharge home without Sevilla as long as patient remains afebrile without catheter in  Iv broad spectrum antibiotics until culture returns    Discharge with the following:  Tramadol/Ultram 50mg- take 1 every 6 hours as needed for pain alternate with Toradol if prescribed  Toradol/ketorolac 10 mg, dispense 10- take every 8 hours as needed alternating with Norco for pain  Flomax 0.4 mg/Tamsulosin - take 1 nightly for stent pain dispense 30- for stent pain and ureteral relaxation   AZO to take as needed for burning.   Antibiotic plan: continue antibiotics until next procedure    What to expect at the following procedure:   At the following procedure the URETERAL stent that is in now will be removed, the stone/stones will be fragmented with the laser (URETEROSCOPY WITH LASER LITHOTRIPSY) and basketed out, then   A NEW stent will be placed until the ureter heals because of inflammation from the procedure which could result in blockage  of urine and more pain (LIKELY TO BE PLACED ON STRINGS so you can remove it yourself- otherwise we will need to return for a 3rd procedure to remove the stent)    You have a ureteral stent in your right collecting system that was placed on 09/06/2023  -the stent can only remain for a maximum of 3 to 6 months. If the stent remains longer than this you can get recurrent urinary tract infections, the stent can have more stones form along it and urine will not be able to drain and you will lose all function of your kidney requiring a major surgery and a big incision to remove the kidney.  The ureter is the tube that drains the kidney (where urine is made). It connects the kidney to the bladder (where urine is stored).   A ureteral stent is a is a soft plastic tube with holes in tube that bypasses anything that obstructs (stone, tumor, scar tissue) the urine from flow from the kidney to the bladder. It is placed by going through the urethra and into the bladder. No incisions are made. Its temporarily inserted into a ureter to help drain urine into the bladder. One end goes in the kidney. The other end goes in the bladder. A coil on each end holds the stent in place. The stent cant be seen from outside the body.          You have a stent that was placed for a stone that was obstructing your urine draining from your kidney. Since the stone(s) is/are still there and the ureteral stent was only placed to bypass the stone to drain the urine,  then YOU STILL NEED YOUR STENT AND STONE(S) OUT. Please read the following for details.  you will return in 2-3 weeks to have the stone removed with smaller instruments. This is called ureteroscopy and it will be done while you are asleep (under anesthesia).  currently your scheduled date for ureteroscopy and stone extraction is listed above. However if you were not given a date, then please call our office at [phone] and let them know you still have a stent and a stone and need  to know what happens next. Remember the stent cannot stay in indefinitely.  7 to 10 days prior to your return procedure we may have you come in for a nurse visit or lab visit to give a urine sample to confirm no infection.  Our nurse should contact you for this or you should already have an appointment (see above). If you do not, call the office unless  told you you would not need to provide a sample. There are a few occassions were this is not required but call the clinic to confirm if no appointment made.   If you develop increasing flank/kidney pain, bloody urine, burning with urination (more than you have been experiencing) or fever then contact the office to  provide sample and let  know because if you have an infection, your infection will need to be treated and your procedure postponed or you can become septic after the procedure.   You will likely not see  again until the time of your next procedure. However, if you have any questions, please feel free to contact our office and the nurse will be able to leave a message for the doctor to answer any questions you may have.  will place another stent after the stone extraction/ureteroscopy that will need to be removed a few days to weeks later depending on how much inflammation there is during the procedure.  A new stent is placed because of inflammation from the procedure allowing the urine to drain for few days as about heals.  This new stent will hopefully be able to be removed within a few days.     URETEROSCOPY WITH LASER LITHOTRIPSY AND BASKET STONE EXTRACTION WITH A URETEROSCOPE          If you do not receive a follow-up date or further instructions on what is to be done next about the stent, it is your responsibility to make sure that you have follow-up to have your stone or stent removed.     A stent CANNOT remain indefinitely in the kidney. It should not remain if possible more than 3 to 6 months maximum  (rarely 1 year if it was placed for cancer).     If you do not follow-up to have your stent removed then you can LOSE YOUR KIDNEY FUNCTION ON THAT SIDE, develop RECURRENT URINARY TRACT INFECTIONS and MORE STONES requiring SURGICAL OPEN removal of your kidney.    You can call our office ([facility] at [phone] and let them know a stent was placed and you need further instructions for follow-up). If there are issues with insurance we will work with you to help you arrange follow-up where it can be removed. Again,  A STENT CANNOT remain indefinitely. Y      A ureteral stent is left in place for many reasons:  To keep the ureter open after a procedure as there will be much inflammation and if no stent is left you will experience the same pain as having the stone that caused the obstruction.   To drain the kidney of infection.  If the stone is up high and closer to the kideny, if the stone is stuck, or you have an infection, the stent will stretch open the small ureter (the tube that drains the kidney) for a few weeks (usually 2 to 4 weeks max) so that we can return, remove the old stent, place instruments into the ureter or kidney to break up and remove the stone. Sometimes you may need another stent after this procedure.     Ureteral Stent Symptoms can include but are not limited to   Stabbing pain in the kidney or bladder with urination during or especially at the end of voiding. Usually we write you for flomax/tamsulosin and toradol/ketorolac to help stretch and relax the ureter while you have the stent in place. We cannot write for toradol/ketorolac if you have poor kidney function. Please call if you have not received these prescriptions.  constant urge to urinate, frequency of urination, severe bladder spasms and severe pain the kidney, especially during urination. If this is very bothersome we can write you for ditropan, an overactive bladder medication to take short term to help with the  spasms.   blood in the urine, especially with increased activity. This can last the entire time the stent is in, it can sometimes clear and return or you may never have any at all. I recommend increasing fluid intake to prevent large clots that may be difficult to urinate out.    Explanation for medications that may have been written for. See the instructions above to see what you were sent home with  Toradol/ketorolac 10mg (dispense 10)- this is a stronger form of ibuprofen, you can take up to every 8 hours but stop taking ibuprofen. Too much of this medicine can cause kidney failure or stomach ulcers. This helps with the inflammation the body is experiencing from the stent. Take this with food. If you have kidney disease then do not take this medication, even if it was written for you.  Tramadol/Ultram 50mg-  take 1 every 4 to 6 hours for pain not relieved with ibuprofen or Tylenol. If you are taking this regularly you will need to take miralax or stool softeners daily to prevent constipation.   Flomax/Tamsulosin 0.4mg mg (dispense 30) - take once nightly before bedtime (at least 3 hours apart from other high blood pressure medicines) while the stent is in and for 1 week after stent removed to help relax the tube the stent is in. If you are taking amlodipine/norvasc for blood pressure, take at least four hours apart (preferably norvasc in morning and flomax/tamsulosin after dinner)  Antibiotics - antibiotics were given just before your procedure that will stay in your system for a while.  If you were written for oral antibiotics, take twice daily. Your doctor will specify amount of days to take. If you have another scheduled procedure to have the stone removed more than 2 weeks after the initial procedure, she may ask you to save antibiotics and restart them 3 days before your next procedure.   AZO over the counter -  you can find this at [facility]. Choose one for bladder pain and anti-spasmodic. You can take it up to  3x a day for 3-4 days as long as you have normal kidney function. It may stain your urine and your clothes  Potassium citrate 15meq Twice a day to help increase dietary citrate and prevent stone formation on the stents - expect to see this in the stool, the coating contains the medication.   Ditropan 10mg/Oxybutynin 10mg XL, dispense 30 (take once daily as needed for bladder spasms- can cause constipation, take with stool softeners or fiber gummies)    When to go to ER:   fever >101.5 or inability to urinate (no urination for >4 hours and bladder pain) due to thick clots  If you go to the ER with pain, they may check to make sure the stent is in good position with an x-ray or ultrasound but unlikely to do anything else.   I will let you know when we will plan to have the stent either removed at the ambulatory surgical center as an outpatient procedure while you are awake, which is uncomfortable briefly, but not painful or you will remove it yourself by pulling the strings.    6 Easy Ways to Prevent Kidney Stones - please read!!!   Taken from: https://www.kidney.org/atoz/content/kidneystones_prevent    Don't Underestimate Your Sweat. Saunas, hot yoga and heavy exercise may be good for your health, but they also may lead to kidney stones. Why? Loss of water through sweating - whether due to these activities or just the heat of summer--leads to less urine production. The more you sweat, the less you urinate, which allows for stone-causing minerals to settle and bond in the kidneys and urinary tract.  Instead: Hydrate with H2O. One of the best measures you can take to avoid kidney stones is to drink plenty of water, leading you to urinate a lot. So, be sure to keep well hydrated, especially when engaging in exercise or activities that cause a lot of sweating.    It's Not Just the Oxalate. Oxa-what? Oxalate is naturally found in many foods, including fruits and vegetables, nuts and seeds, grains, legumes, and even  "chocolate and tea. Some examples of foods that contain high levels of oxalate include: peanuts, rhubarb, spinach, beets, chocolate and sweet potatoes. Moderating intake of these foods may be beneficial for people who form calcium oxalate stones, the leading type of kidney stones. A common misconception is that cutting the oxalate-rich foods in your diet alone will reduce the likelihood of forming calcium oxalate kidney stones. While in theory this might be true, this approach isn't smart from an overall health perspective. Most kidney stones are formed when oxalate binds to calcium while urine is produced by the kidneys.  Instead: Eat and drink calcium and oxalate-rich foods together during a meal. In doing so, oxalate and calcium are more likely to bind to one another in the stomach and intestines before the kidneys begin processing, making it less likely that kidney stones will form.    Calcium is Not the Enemy. But it tends to get a bad rap! Most likely due to its name and composition, many are under the impression that calcium is the main culprit in calcium-oxalate stones. "I still see patients who wonder why they are getting recurring stones despite cutting down on their calcium intake," said Dr. Toussaint. "I've even had patients say that their doctors told them to reduce their calcium intake." A diet low in calcium actually increases one's risk of developing kidney stones.  Instead: Don't reduce the calcium. Work to cut back on the sodium in your diet and to pair calcium-rich foods with oxalate-rich foods.  It's Not One and Done. Passing a kidney stone is often described as one of the most painful experiences a person can have, but unfortunately, it's not always a one-time event. Studies have shown that having even one stone greatly increases your chances of having another. "Most people will want to do anything they can to ensure it doesn't happen again," said Dr. Toussaint. "Unfortunately, it doesn't seem to be " "the case that people make the changes they need to after their first stone event." Research conducted by Dr. Toussaint shows that those with kidney stones do not always heed the advice of their nephrologists and urinary specialists. About 15% of kidney stone patients didn't take prescribed medications and 41% did not follow the nutritional advice that would keep stones from recurring.  Instead: Take action! Without the right medications and diet adjustments, stones can come back, and recurring kidney stones also could be an indicator of other problems, including kidney disease.    When Life Hands You Kidney Stones don't fret. And as the saying goes, "make lemonade." It's important to consider dietary remedies alongside prescription medications. While it may seem easier to just take a pill to fix a medical problem, consider what lifestyle changes will also make a big impact on your health.  Instead: Next time you drive past a lemonade (or limeade) stand, consider your kidneys. Chronic kidney stones are often treated with potassium citrate, but studies have shown that limeade, lemonade and other fruits and juices high in natural citrate offers the same stone-preventing benefits. Beware of the sugar, though, because it can increase kidney stone risk.   Instead, buy sugar-free lemonade, or make your own by mixing lime or lemon juice with water and using a sugar substitute if needed. "We believe that citrate in the urine may prevent the calcium from binding with other constituents that lead to stones," said Dr. Toussaint. "Also, some evidence suggests that citrate may prevent crystals that are already present from binding with each other, thus preventing them from getting bigger."    Not All Stones are Created Equal. In addition to calcium oxalate stones, another common type of kidney stones is uric acid stones. Red meat, organ meats, and shellfish have high concentrations of a natural chemical compound known as purines. " ""High purine intake leads to a higher production of uric acid and produces a larger acid load for the kidneys to excrete," said Dr. Toussaint. Higher uric acid excretion leads to lower overall urine pH, which means the urine is more acidic. The high acid concentration of the urine makes it easier for uric acid stones to form.  Instead: To prevent uric acid stones, cut down on high-purine foods such as red meat, organ meats, and shellfish, and follow a healthy diet that contains mostly vegetables and fruits, whole grains, and low fat dairy products. Limit sugar-sweetened foods and drinks, especially those that contain high fructose corn syrup. Limit alcohol because it can increase uric acid levels in the blood and avoid crash diets for the same reason..Eating less animal-based protein and eating more fruits and vegetables will help decrease urine acidity and this will help reduce the chance for stone formation.                 "

## 2023-09-06 NOTE — ASSESSMENT & PLAN NOTE
Patient is chronically on statin.will continue for now. Monitor clinically. Last LDL was   Lab Results   Component Value Date    LDLCALC 36.6 (L) 06/28/2023

## 2023-09-06 NOTE — ANESTHESIA PREPROCEDURE EVALUATION
09/06/2023  Doris Pastrana is a 72 y.o., female.      Pre-op Assessment    I have reviewed the Patient Summary Reports.     I have reviewed the Nursing Notes. I have reviewed the NPO Status.   I have reviewed the Medications.     Review of Systems  Social:  Former Smoker    Cardiovascular:   Hypertension hyperlipidemia    Pulmonary:   Pneumonia    Renal/:   Chronic Renal Disease renal calculi    Hepatic/GI:   Liver Disease,    Musculoskeletal:   Arthritis     Neurological:   TIA, Neuromuscular Disease,    Endocrine:   Diabetes, type 2    Psych:   Psychiatric History          Physical Exam  General: Well nourished, Cooperative, Alert and Oriented    Airway:  Mallampati: II / II  Mouth Opening: Normal  TM Distance: 4 - 6 cm  Tongue: Normal    Dental:  Intact    Chest/Lungs:  Clear to auscultation, Normal Respiratory Rate    Heart:  Rate: Normal  Rhythm: Regular Rhythm  Sounds: Normal        Anesthesia Plan  Type of Anesthesia, risks & benefits discussed:    Anesthesia Type: Gen Supraglottic Airway  Intra-op Monitoring Plan: Standard ASA Monitors  Post Op Pain Control Plan: multimodal analgesia and IV/PO Opioids PRN  Induction:  IV  Airway Plan: Via Tracheostomy  Informed Consent: Informed consent signed with the Patient and all parties understand the risks and agree with anesthesia plan.  All questions answered.   ASA Score: 3 Emergent  Day of Surgery Review of History & Physical: H&P Update referred to the surgeon/provider.    Ready For Surgery From Anesthesia Perspective.     .

## 2023-09-06 NOTE — OP NOTE
[facility]  Operative Note    Date: 09/06/2023    Pre-Op Diagnosis: Right ureteral and renal stone, UTI    Post-Op Diagnosis: same    Procedure(s) Performed:   1.  Cystoscopy with Right JJ ureteral stent placement  2.  right retrograde pyelogram  Fluoro < 1 h    Specimen(s): none    Staff Surgeon: : Marisol Stewart MD    Anesthesia: General endotracheal anesthesia    Indications: Doris Pastrana is a 72 y.o. female with Right ureteral stone(s) and uti who presents for stent placement today    Findings:   The stone was radio-opaque.  The patient did not have right hydronephrosis proximal to the stone on a gentle retrograde pyelogram.  But did have R UPJo?  The urine was cloudy from kidney and bladder.    Placement of the wire and stent was not difficult.     Estimated Blood Loss: min    Drains:   6 x 26 Right Double J ureteral stent    16fr baez catheter with 10cc water in the balloon    Implants:   Implant Name Type Inv. Item Serial No.  Lot No. LRB No. Used Action   STENT DBL PGTL URET 6FR 26CM - [id number]  STENT DBL PGTL URET 6FR 26CM  [employer] [id number] Right 1 Implanted       Procedure in Detail:  After risks, benefits and possible complications of the procedure were explained, the patient elected to undergo the procedure and informed consent was obtained.  All questions were answered in the aleks-operative area. The patient was transferred to the cystoscopy suite and placed on the fluoroscopy table in the supine position.  SCDs were applied and working. Time out was performed, aleks-procedural antibiotics were given. Anesthesia was administered.  After adequate anesthesia the patient was placed in dorsal lithotomy position and prepped and draped in the usual sterile fashion.     A rigid cystoscope in a 22 Fr sheath was introduced into the patients bladder per urethra.  This passed easily.  The entire urethra and bladder were visualized with findings  as above.     My attention was turned to the patients right ureteral orifice.      A 0.38 sensor tip guide wire was advanced up the right ureteral orifice and the 5 french open ended ureteral catheter was advanced over the wire. The wire was removed leaving 5 french open ended ureteral catheter in place. 10 cc urine was aspirated and appeared cloudy. A gentle retrograde pyelogram was performed with findings as above. The wire was then replaced through the 5 french open ended ureteral catheter and the 5 french open ended ureteral catheter was removed. Findings as above.  We then passed a  Double J ureteral stent over the wire to the level of the renal pelvis under direct vision as well as flouroscop to confirm. The wire was removed. A 180 degree coil was observed in the renal pelvis as well as the bladder using fluoro.  A 180 degree coil was also seen using direct visualization in the bladder.      The patient's bladder was drained. A baez catheter was placed. The patient was transferred to the recovery room in stable condition.      Plan:    Return plan:  Return on Wednesday sept 20th for right ureteroscopy and stone extraction  Continue antibiotics until next procedure.     Short term:  Admit to medicine inpatient service for uti (being transferred to [facility]? Due to bed shortage)  Continue baez catheter until afebrile x 24 hours and wbc decreased  Follow up urine culture  Ok to discharge home without Baez as long as patient remains afebrile without catheter in  Iv broad spectrum antibiotics until culture returns    Discharge with the following:  Tramadol/Ultram 50mg- take 1 every 6 hours as needed for pain alternate with Toradol if prescribed  Toradol/ketorolac 10 mg, dispense 10- take every 8 hours as needed alternating with Norco for pain  Flomax 0.4 mg/Tamsulosin - take 1 nightly for stent pain dispense 30- for stent pain and ureteral relaxation   AZO to take as needed for burning.   Antibiotic plan:  continue antibiotics until next procedure    What to expect at the following procedure:   At the following procedure the URETERAL stent that is in now will be removed, the stone/stones will be fragmented with the laser (URETEROSCOPY WITH LASER LITHOTRIPSY) and basketed out, then   A NEW stent will be placed until the ureter heals because of inflammation from the procedure which could result in blockage of urine and more pain (LIKELY TO BE PLACED ON STRINGS so you can remove it yourself- otherwise we will need to return for a 3rd procedure to remove the stent)                  Marisol Stewart MD

## 2023-09-06 NOTE — PROGRESS NOTES
Procedure Order to Urology [[id number]]    Electronically signed by: Marisol Stewart MD on 09/06/23 1605 Status: Active   Ordering user: Marisol Stewart MD 09/06/23 1605 Authorized by: Marisol Stewart MD   Ordering mode: Standard   Frequency:  09/06/23 -     Diagnoses   Nephrolithiasis [N20.0]   Questionnaire    Question Answer   Procedure Ureteroscopy Stone Extraction (Remove Calculus Ureter) Comment - right, 9/20 wednesday   Facility Name: [facility]   Please order out patient hospital.CBC, BMP, U/A and culture , EKG over 40, Start IV,NPO, General sedation ,  Ancef 2gram ( alternative for pcn allergy cipro 400mg IV ) cpt- 71318

## 2023-09-06 NOTE — HPI
Doris Pastrana is a 72 year old female with a past medical history of HTN, HLD, nephrolithiasis, TIA, fatty liver and depression who presented to the ED with right flank pain that started this morning. Patient states sudden onset this morning with associated decreased urine output and dysuria. Patient states she is only able to get a few drops of urine at a time. She also reports nausea, vomiting and fever with T-max 100.4° this morning. Patient reports pain is sharp ache and she took a Norco this morning with no relief. Patient denies hematuria and hematemesis. ED work up revealed UTI and WBC 14.4.  Patient was started on IV Cipro and received IV pain medication antiemetics with some relief.  ED provider spoke to Dr. Stewart, urology, who agreed to consult and patient was referred to Hospital Medicine.  Patient states she is currently experiencing right flank pain and denies current nausea or vomiting.  She further denies chest pain, shortness of breath, headache and diarrhea.  Patient was seen by Dr. Stewart in the ED and is scheduled to go to the OR today.  Patient will be admitted to Hospital Medicine for further evaluation and management.

## 2023-09-06 NOTE — FIRST PROVIDER EVALUATION
Emergency Department TeleTriage Encounter Note      CHIEF COMPLAINT    Chief Complaint   Patient presents with    Flank Pain     Patient states that she is having right side flank pain and states when she attempts to use the restroom she only gets a few drops of urine at a time, vomiting, states she took a Norco at 8:30 with no relief        VITAL SIGNS   Initial Vitals [09/06/23 1235]   BP Pulse Resp Temp SpO2   (!) 120/55 110 20 98.2 °F (36.8 °C) 96 %      MAP       --            ALLERGIES    Review of patient's allergies indicates:   Allergen Reactions    Omnicef [cefdinir] Hives    Codeine Nausea Only    Rhubarb Swelling    Strawberries [strawberry] Hives    Iodine Rash       Other reaction(s): Nausea    Latex, natural rubber Rash    Pravastatin Other (See Comments)     Muscle pain        PROVIDER TRIAGE NOTE  Patient presents with complaint of right sided flank pain with associated nausea and decreased urine output. History of similar with stones.       Phy:   Constitutional: well nourished, well developed, appearing stated age, NAD   HEENT: NCAT, symmetrical lids, No obvious facial deformity.  Normal phonation. Normal Conjunctiva   Neck: NAROM   Respiratory: Normal effort.  No obvious use of accessory muscles   Musculoskeletal: Moved upper extremities well   Neuro: Alert, answers questions appropriately    Psych: appropriate mood and affect      Initial orders will be placed and care will be transferred to an alternate provider when patient is roomed for a full evaluation. Any additional orders and the final disposition will be determined by that provider.        ORDERS  Labs Reviewed   CBC W/ AUTO DIFFERENTIAL   COMPREHENSIVE METABOLIC PANEL   URINALYSIS, REFLEX TO URINE CULTURE       ED Orders (720h ago, onward)      Start Ordered     Status Ordering Provider    09/06/23 1245 09/06/23 1239  sodium chloride 0.9% bolus 1,000 mL 1,000 mL  ED 1 Time         Ordered CLEVELAND STUART    09/06/23 1240  09/06/23 1239  Saline lock IV  Once         Ordered CLEVELAND STUART    09/06/23 1240 09/06/23 1239  CBC auto differential  STAT         Pending Collection CLEVELAND STUART    09/06/23 1240 09/06/23 1239  Comprehensive metabolic panel  STAT         Pending Collection CLEVELAND STUART    09/06/23 1240 09/06/23 1239  Urinalysis, Reflex to Urine Culture Urine, Clean Catch  STAT         Ordered JOELLEN STUARTINE    09/06/23 1240 09/06/23 1239  CT Renal Stone Study ABD Pelvis WO  1 time imaging         Ordered CLEVELAND STUART              Virtual Visit Note: The provider triage portion of this emergency department evaluation and documentation was performed via [employer], a HIPAA-compliant telemedicine application, in concert with a tele-presenter in the room. A face to face patient evaluation with one of my colleagues will occur once the patient is placed in an emergency department room.      DISCLAIMER: This note was prepared with [employer] voice recognition transcription software. Garbled syntax, mangled pronouns, and other bizarre constructions may be attributed to that software system.

## 2023-09-06 NOTE — SUBJECTIVE & OBJECTIVE
Past Medical History:   Diagnosis Date    Acute sinus infection     Allergy     Anemia     Arthritis     Bronchitis     Degenerative disc disease     lumbar, pain contract 2013    Depression     Diabetes mellitus, type 2 2016    Fatty liver     Hyperlipidemia 2016    Hypertension     Kidney stone     Mitral valve prolapse     Multinodular goiter     Pleurisy     Pneumonia     PONV (postoperative nausea and vomiting)     Sinus infection     TIA (transient ischemic attack) 2019       Past Surgical History:   Procedure Laterality Date    ANKLE SURGERY  2017    right ankle torn ligament    APPENDECTOMY       SECTION      CHOLECYSTECTOMY      COLONOSCOPY  8/13/15    Dr. Oliveira, five year recheck    COLONOSCOPY N/A 2021    Procedure: COLONOSCOPY;  Surgeon: Tevin Oliveira MD;  Location: [facility] ENDO;  Service: Endoscopy;  Laterality: N/A;    CYSTOSCOPY WITH URETEROSCOPY, RETROGRADE PYELOGRAPHY, AND INSERTION OF STENT Left 4/10/2022    Procedure: CYSTOSCOPY, WITH RETROGRADE PYELOGRAM AND URETERAL STENT INSERTION;  Surgeon: Jack Redman Jr., MD;  Location: [facility] OR;  Service: Urology;  Laterality: Left;    CYSTOSCOPY WITH URETEROSCOPY, RETROGRADE PYELOGRAPHY, AND INSERTION OF STENT Left 2022    Procedure: CYSTOSCOPY, WITH RETROGRADE PYELOGRAM AND URETERAL STENT INSERTION;  Surgeon: Jack Redman Jr., MD;  Location: [facility] OR;  Service: Urology;  Laterality: Left;    EPIDURAL STEROID INJECTION INTO CERVICAL SPINE N/A 1/3/2020    Procedure: Injection-steroid-epidural-cervical;  Surgeon: Eddi Albrecht MD;  Location: [facility] OR;  Service: Pain Management;  Laterality: N/A;  C7-T1    EPIDURAL STEROID INJECTION INTO CERVICAL SPINE N/A 3/10/2020    Procedure: Injection-steroid-epidural-cervical;  Surgeon: Eddi Albrecht MD;  Location: [facility] OR;  Service: Pain Management;  Laterality: N/A;  C7-T1    EPIDURAL STEROID INJECTION INTO CERVICAL SPINE N/A 2020    Procedure:  Injection-steroid-epidural-cervical;  Surgeon: Eddi Albrecht MD;  Location: [facility] OR;  Service: Pain Management;  Laterality: N/A;  C7-T1     EXTRACORPOREAL SHOCK WAVE LITHOTRIPSY Left 5/22/2019    Procedure: LITHOTRIPSY, ESWL - only if stone visible on xray  with cysto after on table possible;  Surgeon: Marisol Stewart MD;  Location: [facility] OR;  Service: Urology;  Laterality: Left;    HYSTERECTOMY      LASER LITHOTRIPSY Left 4/26/2022    Procedure: LITHOTRIPSY, USING LASER;  Surgeon: Jack Redman Jr., MD;  Location: [facility] OR;  Service: Urology;  Laterality: Left;    OOPHORECTOMY      TONSILLECTOMY      URETEROSCOPIC REMOVAL OF URETERIC CALCULUS Left 4/26/2022    Procedure: REMOVAL, CALCULUS, URETER, URETEROSCOPIC;  Surgeon: Jack Redman Jr., MD;  Location: [facility] OR;  Service: Urology;  Laterality: Left;    URETEROSCOPY Left 4/26/2022    Procedure: URETEROSCOPY;  Surgeon: Jack Redman Jr., MD;  Location: [facility] OR;  Service: Urology;  Laterality: Left;    VAGINAL DELIVERY         Review of patient's allergies indicates:   Allergen Reactions    Omnicef [cefdinir] Hives    Codeine Nausea Only    Rhubarb Swelling    Strawberries [strawberry] Hives    Iodine Rash       Other reaction(s): Nausea    Latex, natural rubber Rash    Pravastatin Other (See Comments)     Muscle pain        No current facility-administered medications on file prior to encounter.     Current Outpatient Medications on File Prior to Encounter   Medication Sig    ACCU-CHEK JORGE PLUS TEST STRP Strp TEST BLOOD SUGAR TWICE DAILY    albuterol (PROVENTIL/VENTOLIN HFA) 90 mcg/actuation inhaler INHALE 2 PUFFS INTO THE LUNGS 4 (FOUR) TIMES DAILY.    ALPRAZolam (XANAX) 0.25 MG tablet Use one 20-30 minutes before flying prn    ALPRAZolam (XANAX) 0.5 MG tablet Take one (0.5mg) with sip of water 30-45 minutes before needle biopsy of thyroid after consents are signed.  May take second if needed after 20 minutes.    amLODIPine (NORVASC) 10 MG tablet TAKE 1  TABLET EVERY DAY    blood-glucose meter kit Use as instructed    cetirizine (ZYRTEC) 10 MG tablet Take 10 mg by mouth daily as needed.     cyclobenzaprine (FLEXERIL) 10 MG tablet TAKE 1 TABLET THREE TIMES DAILY AS NEEDED    diclofenac sodium (VOLTAREN) 1 % Gel APPLY 2.5 GRAMS TO THE AFFECTED AREA 3-4 TIMES DAILY STARTING 6 WEEKS POST SURGERY. (Patient not taking: Reported on 6/27/2023)    DROPSAFE ALCOHOL PREP PADS PadM USE TWICE DAILY AS NEEDED AS DIRECTED    ergocalciferol (ERGOCALCIFEROL) 50,000 unit Cap Take one twice a week (Patient not taking: Reported on 6/27/2023)    EScitalopram oxalate (LEXAPRO) 10 MG tablet TAKE 1 TABLET EVERY DAY    fluticasone propionate (FLONASE) 50 mcg/actuation nasal spray USE 2 SPRAYS IN EACH NOSTRIL EVERY DAY    gabapentin (NEURONTIN) 300 MG capsule Take 3 capsules (900 mg total) by mouth every evening. (Patient not taking: Reported on 6/27/2023)    HYDROcodone-acetaminophen (NORCO) 7.5-325 mg per tablet Take 1 tablet by mouth every 6 (six) hours as needed for Pain (kidney stone).    lancets (TRUEPLUS LANCETS) 28 gauge Misc TEST TWO TIMES DAILY    losartan (COZAAR) 100 MG tablet TAKE 1 TABLET EVERY DAY    meclizine (ANTIVERT) 25 mg tablet Take 1 tablet (25 mg total) by mouth 3 (three) times daily as needed.    metFORMIN (GLUCOPHAGE-XR) 750 MG ER 24hr tablet TAKE 1 TABLET TWICE DAILY WITH MEALS    mupirocin (BACTROBAN) 2 % ointment APPLY  NASALLY TWO TIMES DAILY    ondansetron (ZOFRAN-ODT) 4 MG TbDL Take 1 tablet (4 mg total) by mouth every 8 (eight) hours as needed (Nausea).    promethazine (PHENERGAN) 12.5 MG Tab     rosuvastatin (CRESTOR) 40 MG Tab TAKE 1 TABLET EVERY EVENING (Patient taking differently: 40 mg every other day.)    SANARE ADV SCAR THERAPY BASE Gel APPLY 1/2 GRAM (1 PUMP) TO AFFECTED AREAS TWICE DAILY STARTING 6 WEEKS POST SURGERY.    semaglutide (OZEMPIC) 2 mg/dose (8 mg/3 mL) PnIj Inject 2 mg into the skin every 7 days. (Patient not taking: Reported on 7/14/2023)      Family History       Problem Relation (Age of Onset)    Alcohol abuse Father, Sister    Aneurysm Brother    Arthritis Mother, Maternal Grandmother    Atrial fibrillation Sister    Breast cancer Paternal Grandmother    Cancer Sister, Brother, Maternal Grandfather    Depression Father, Sister, Brother    Diabetes Mother, Sister, Brother, Paternal Grandmother    Early death Father    Glaucoma Maternal Grandmother    Heart disease Sister, Maternal Grandmother, Paternal Grandfather, Maternal Uncle    Hypertension Mother, Brother, Maternal Grandmother    Kidney disease Maternal Uncle, Paternal Aunt    Learning disabilities Daughter    Vision loss Mother          Tobacco Use    Smoking status: Former     Current packs/day: 0.00     Types: Cigarettes     Quit date: 3/12/1983     Years since quittin.5    Smokeless tobacco: Never   Substance and Sexual Activity    Alcohol use: Yes     Comment: socially    Drug use: No    Sexual activity: Not Currently     Review of Systems   Constitutional:  Positive for fever. Negative for activity change, appetite change and chills.   HENT:  Negative for congestion, sore throat and trouble swallowing.    Eyes:  Negative for photophobia and visual disturbance.   Respiratory:  Negative for cough, chest tightness and shortness of breath.    Cardiovascular:  Negative for chest pain, palpitations and leg swelling.   Gastrointestinal:  Positive for abdominal pain, nausea and vomiting. Negative for diarrhea.   Genitourinary:  Positive for difficulty urinating and flank pain. Negative for dysuria and hematuria.   Musculoskeletal:  Negative for back pain.   Neurological:  Negative for dizziness, weakness and headaches.   Psychiatric/Behavioral:  Negative for confusion.      Objective:     Vital Signs (Most Recent):  Temp: 99.9 °F (37.7 °C) (23 1550)  Pulse: 100 (23 1550)  Resp: 18 (23 1550)  BP: (!) 146/72 (23 1550)  SpO2: 96 % (23 1550) Vital Signs (24h  Range):  Temp:  [97.5 °F (36.4 °C)-99.9 °F (37.7 °C)] 99.9 °F (37.7 °C)  Pulse:  [100-112] 100  Resp:  [14-20] 18  SpO2:  [93 %-96 %] 96 %  BP: (120-151)/() 146/72     Weight: 76.7 kg (169 lb)  Body mass index is 28.12 kg/m².     Physical Exam  Vitals reviewed.   Constitutional:       Appearance: Normal appearance. She is normal weight.   HENT:      Head: Normocephalic.      Mouth/Throat:      Mouth: Mucous membranes are moist.      Pharynx: Oropharynx is clear.   Eyes:      Pupils: Pupils are equal, round, and reactive to light.   Cardiovascular:      Rate and Rhythm: Normal rate and regular rhythm.      Pulses: Normal pulses.   Pulmonary:      Effort: Pulmonary effort is normal.      Breath sounds: Normal breath sounds.   Abdominal:      General: Bowel sounds are normal.      Palpations: Abdomen is soft.      Tenderness: There is abdominal tenderness in the suprapubic area.   Musculoskeletal:         General: Normal range of motion.      Cervical back: Normal range of motion.   Skin:     General: Skin is warm and dry.   Neurological:      Mental Status: She is alert and oriented to person, place, and time. Mental status is at baseline.   Psychiatric:         Attention and Perception: Attention normal.         Mood and Affect: Mood normal.         Speech: Speech normal.              CRANIAL NERVES     CN III, IV, VI   Pupils are equal, round, and reactive to light.       Significant Labs: All pertinent labs within the past 24 hours have been reviewed.  CBC:   Recent Labs   Lab 09/06/23  1349   WBC 14.48*   HGB 12.8   HCT 38.9        CMP:   Recent Labs   Lab 09/06/23  1349      K 4.2      CO2 26   *   BUN 14   CREATININE 0.9   CALCIUM 10.0   PROT 7.4   ALBUMIN 4.2   BILITOT 1.3*   ALKPHOS 111   AST 18   ALT 34   ANIONGAP 13       Significant Imaging: I have reviewed all pertinent imaging results/findings within the past 24 hours.  Imaging Results              CT Renal Stone Study ABD  Pelvis WO (Final result)  Result time 09/06/23 13:15:53      Final result by Dylan Walters MD (09/06/23 13:15:53)                   Impression:      Right proximal ureterolithiasis resulting in mild hydronephrosis.    Small bilateral nephrolithiasis.    Hepatic steatosis.      Electronically signed by: Dylan Walters MD  Date:    09/06/2023  Time:    13:15               Narrative:    EXAMINATION:  CT RENAL STONE STUDY ABD PELVIS WO    CLINICAL HISTORY:  Flank pain, kidney stone suspected;    TECHNIQUE:  Low dose axial images, sagittal and coronal reformations were obtained from the lung bases to the pubic symphysis.  Contrast was not administered.    COMPARISON:  05/27/2022    FINDINGS:  The liver is hypoattenuating indicative of fatty infiltration.  There has been a cholecystectomy.  The spleen is normal in size.  The pancreas and adrenal glands are normal.    There is a 5 mm stone of the proximal 3rd of the right ureter, resulting in mild hydronephrosis.  A few additional punctate stones of both kidneys are present.    The bowel is nondilated.  There is nonspecific haziness of central mesenteric fat.  The colon is unremarkable.    There is moderate calcification of the aorta without aneurysm.  Degenerative change of the spine typical for age is present.                        Final result by Dylan Walters MD (09/06/23 13:15:53)                   Impression:      Right proximal ureterolithiasis resulting in mild hydronephrosis.    Small bilateral nephrolithiasis.    Hepatic steatosis.      Electronically signed by: Dylan Walters MD  Date:    09/06/2023  Time:    13:15               Narrative:    EXAMINATION:  CT RENAL STONE STUDY ABD PELVIS WO    CLINICAL HISTORY:  Flank pain, kidney stone suspected;    TECHNIQUE:  Low dose axial images, sagittal and coronal reformations were obtained from the lung bases to the pubic symphysis.  Contrast was not  administered.    COMPARISON:  05/27/2022    FINDINGS:  The liver is hypoattenuating indicative of fatty infiltration.  There has been a cholecystectomy.  The spleen is normal in size.  The pancreas and adrenal glands are normal.    There is a 5 mm stone of the proximal 3rd of the right ureter, resulting in mild hydronephrosis.  A few additional punctate stones of both kidneys are present.    The bowel is nondilated.  There is nonspecific haziness of central mesenteric fat.  The colon is unremarkable.    There is moderate calcification of the aorta without aneurysm.  Degenerative change of the spine typical for age is present.                        Final result by Dylan Walters MD (09/06/23 13:15:53)                   Impression:      Right proximal ureterolithiasis resulting in mild hydronephrosis.    Small bilateral nephrolithiasis.    Hepatic steatosis.      Electronically signed by: Dylan Walters MD  Date:    09/06/2023  Time:    13:15               Narrative:    EXAMINATION:  CT RENAL STONE STUDY ABD PELVIS WO    CLINICAL HISTORY:  Flank pain, kidney stone suspected;    TECHNIQUE:  Low dose axial images, sagittal and coronal reformations were obtained from the lung bases to the pubic symphysis.  Contrast was not administered.    COMPARISON:  05/27/2022    FINDINGS:  The liver is hypoattenuating indicative of fatty infiltration.  There has been a cholecystectomy.  The spleen is normal in size.  The pancreas and adrenal glands are normal.    There is a 5 mm stone of the proximal 3rd of the right ureter, resulting in mild hydronephrosis.  A few additional punctate stones of both kidneys are present.    The bowel is nondilated.  There is nonspecific haziness of central mesenteric fat.  The colon is unremarkable.    There is moderate calcification of the aorta without aneurysm.  Degenerative change of the spine typical for age is present.                        Final result by Dylan Walters MD  (09/06/23 13:15:53)                   Impression:      Right proximal ureterolithiasis resulting in mild hydronephrosis.    Small bilateral nephrolithiasis.    Hepatic steatosis.      Electronically signed by: Dylan Walters MD  Date:    09/06/2023  Time:    13:15               Narrative:    EXAMINATION:  CT RENAL STONE STUDY ABD PELVIS WO    CLINICAL HISTORY:  Flank pain, kidney stone suspected;    TECHNIQUE:  Low dose axial images, sagittal and coronal reformations were obtained from the lung bases to the pubic symphysis.  Contrast was not administered.    COMPARISON:  05/27/2022    FINDINGS:  The liver is hypoattenuating indicative of fatty infiltration.  There has been a cholecystectomy.  The spleen is normal in size.  The pancreas and adrenal glands are normal.    There is a 5 mm stone of the proximal 3rd of the right ureter, resulting in mild hydronephrosis.  A few additional punctate stones of both kidneys are present.    The bowel is nondilated.  There is nonspecific haziness of central mesenteric fat.  The colon is unremarkable.    There is moderate calcification of the aorta without aneurysm.  Degenerative change of the spine typical for age is present.

## 2023-09-06 NOTE — TRANSFER OF CARE
"Anesthesia Transfer of Care Note    Patient: Doris Pastrana    Procedure(s) Performed: Procedure(s) (LRB):  CYSTOSCOPY, WITH URETERAL STENT INSERTION (Right)    Patient location: PACU    Anesthesia Type: general    Transport from OR: Transported from OR on 2-3 L/min O2 by NC with adequate spontaneous ventilation    Post pain: adequate analgesia    Post assessment: no apparent anesthetic complications and tolerated procedure well    Post vital signs: stable    Level of consciousness: awake, alert and oriented    Nausea/Vomiting: no nausea/vomiting    Complications: none    Transfer of care protocol was followed      Last vitals:   Visit Vitals  BP (!) 146/72 (BP Location: Right arm, Patient Position: Sitting)   Pulse 100   Temp 37.7 °C (99.9 °F) (Oral)   Resp 18   Ht 5' 5" (1.651 m)   Wt 76.7 kg (169 lb)   SpO2 96%   Breastfeeding No   BMI 28.12 kg/m²     "

## 2023-09-06 NOTE — H&P
[facility] Medicine  History & Physical    Patient Name: Doris Pastrana  MRN: [id number]  Patient Class: IP- Inpatient  Admission Date: 9/6/2023  Attending Physician: Emily Adhikari MD   Primary Care Provider: Tor Garg MD         Patient information was obtained from patient, past medical records and ER records.     Subjective:     Principal Problem:Ureterolithiasis    Chief Complaint:   Chief Complaint   Patient presents with    Flank Pain     Patient states that she is having right side flank pain and states when she attempts to use the restroom she only gets a few drops of urine at a time, vomiting, states she took a Norco at 8:30 with no relief         HPI: Doris Pastrana is a 72 year old female with a past medical history of HTN, HLD, nephrolithiasis, TIA, fatty liver and depression who presented to the ED with right flank pain that started this morning. Patient states sudden onset this morning with associated decreased urine output and dysuria. Patient states she is only able to get a few drops of urine at a time. She also reports nausea, vomiting and fever with T-max 100.4° this morning. Patient reports pain is sharp ache and she took a Norco this morning with no relief. Patient denies hematuria and hematemesis. ED work up revealed UTI and WBC 14.4.  Patient was started on IV Cipro and received IV pain medication antiemetics with some relief.  ED provider spoke to Dr. Stewart, urology, who agreed to consult and patient was referred to Hospital Medicine.  Patient states she is currently experiencing right flank pain and denies current nausea or vomiting.  She further denies chest pain, shortness of breath, headache and diarrhea.  Patient was seen by Dr. Stewart in the ED and is scheduled to go to the OR today.  Patient will be admitted to Hospital Medicine for further evaluation and management.          Past Medical History:   Diagnosis Date    Acute sinus  infection     Allergy     Anemia     Arthritis     Bronchitis     Degenerative disc disease     lumbar, pain contract 2013    Depression     Diabetes mellitus, type 2 2016    Fatty liver     Hyperlipidemia 2016    Hypertension     Kidney stone     Mitral valve prolapse     Multinodular goiter     Pleurisy     Pneumonia     PONV (postoperative nausea and vomiting)     Sinus infection     TIA (transient ischemic attack) 2019       Past Surgical History:   Procedure Laterality Date    ANKLE SURGERY  2017    right ankle torn ligament    APPENDECTOMY       SECTION      CHOLECYSTECTOMY      COLONOSCOPY  8/13/15    Dr. Oliveira, five year recheck    COLONOSCOPY N/A 2021    Procedure: COLONOSCOPY;  Surgeon: Tevin Oliveira MD;  Location: [facility] ENDO;  Service: Endoscopy;  Laterality: N/A;    CYSTOSCOPY WITH URETEROSCOPY, RETROGRADE PYELOGRAPHY, AND INSERTION OF STENT Left 4/10/2022    Procedure: CYSTOSCOPY, WITH RETROGRADE PYELOGRAM AND URETERAL STENT INSERTION;  Surgeon: Jack Redman Jr., MD;  Location: [facility] OR;  Service: Urology;  Laterality: Left;    CYSTOSCOPY WITH URETEROSCOPY, RETROGRADE PYELOGRAPHY, AND INSERTION OF STENT Left 2022    Procedure: CYSTOSCOPY, WITH RETROGRADE PYELOGRAM AND URETERAL STENT INSERTION;  Surgeon: Jack Redman Jr., MD;  Location: [facility] OR;  Service: Urology;  Laterality: Left;    EPIDURAL STEROID INJECTION INTO CERVICAL SPINE N/A 1/3/2020    Procedure: Injection-steroid-epidural-cervical;  Surgeon: Eddi Albrecht MD;  Location: [facility] OR;  Service: Pain Management;  Laterality: N/A;  C7-T1    EPIDURAL STEROID INJECTION INTO CERVICAL SPINE N/A 3/10/2020    Procedure: Injection-steroid-epidural-cervical;  Surgeon: Eddi Albrecht MD;  Location: [facility] OR;  Service: Pain Management;  Laterality: N/A;  C7-T1    EPIDURAL STEROID INJECTION INTO CERVICAL SPINE N/A 2020    Procedure: Injection-steroid-epidural-cervical;  Surgeon:  Eddi Albrecht MD;  Location: [facility] OR;  Service: Pain Management;  Laterality: N/A;  C7-T1     EXTRACORPOREAL SHOCK WAVE LITHOTRIPSY Left 5/22/2019    Procedure: LITHOTRIPSY, ESWL - only if stone visible on xray  with cysto after on table possible;  Surgeon: Marisol Stewart MD;  Location: [facility] OR;  Service: Urology;  Laterality: Left;    HYSTERECTOMY      LASER LITHOTRIPSY Left 4/26/2022    Procedure: LITHOTRIPSY, USING LASER;  Surgeon: Jack Redman Jr., MD;  Location: [facility] OR;  Service: Urology;  Laterality: Left;    OOPHORECTOMY      TONSILLECTOMY      URETEROSCOPIC REMOVAL OF URETERIC CALCULUS Left 4/26/2022    Procedure: REMOVAL, CALCULUS, URETER, URETEROSCOPIC;  Surgeon: Jack Redman Jr., MD;  Location: [facility] OR;  Service: Urology;  Laterality: Left;    URETEROSCOPY Left 4/26/2022    Procedure: URETEROSCOPY;  Surgeon: Jack Redman Jr., MD;  Location: [facility] OR;  Service: Urology;  Laterality: Left;    VAGINAL DELIVERY         Review of patient's allergies indicates:   Allergen Reactions    Omnicef [cefdinir] Hives    Codeine Nausea Only    Rhubarb Swelling    Strawberries [strawberry] Hives    Iodine Rash       Other reaction(s): Nausea    Latex, natural rubber Rash    Pravastatin Other (See Comments)     Muscle pain        No current facility-administered medications on file prior to encounter.     Current Outpatient Medications on File Prior to Encounter   Medication Sig    ACCU-CHEK JORGE PLUS TEST STRP Strp TEST BLOOD SUGAR TWICE DAILY    albuterol (PROVENTIL/VENTOLIN HFA) 90 mcg/actuation inhaler INHALE 2 PUFFS INTO THE LUNGS 4 (FOUR) TIMES DAILY.    ALPRAZolam (XANAX) 0.25 MG tablet Use one 20-30 minutes before flying prn    ALPRAZolam (XANAX) 0.5 MG tablet Take one (0.5mg) with sip of water 30-45 minutes before needle biopsy of thyroid after consents are signed.  May take second if needed after 20 minutes.    amLODIPine (NORVASC) 10 MG tablet TAKE 1 TABLET EVERY DAY     blood-glucose meter kit Use as instructed    cetirizine (ZYRTEC) 10 MG tablet Take 10 mg by mouth daily as needed.     cyclobenzaprine (FLEXERIL) 10 MG tablet TAKE 1 TABLET THREE TIMES DAILY AS NEEDED    diclofenac sodium (VOLTAREN) 1 % Gel APPLY 2.5 GRAMS TO THE AFFECTED AREA 3-4 TIMES DAILY STARTING 6 WEEKS POST SURGERY. (Patient not taking: Reported on 6/27/2023)    DROPSAFE ALCOHOL PREP PADS PadM USE TWICE DAILY AS NEEDED AS DIRECTED    ergocalciferol (ERGOCALCIFEROL) 50,000 unit Cap Take one twice a week (Patient not taking: Reported on 6/27/2023)    EScitalopram oxalate (LEXAPRO) 10 MG tablet TAKE 1 TABLET EVERY DAY    fluticasone propionate (FLONASE) 50 mcg/actuation nasal spray USE 2 SPRAYS IN EACH NOSTRIL EVERY DAY    gabapentin (NEURONTIN) 300 MG capsule Take 3 capsules (900 mg total) by mouth every evening. (Patient not taking: Reported on 6/27/2023)    HYDROcodone-acetaminophen (NORCO) 7.5-325 mg per tablet Take 1 tablet by mouth every 6 (six) hours as needed for Pain (kidney stone).    lancets (TRUEPLUS LANCETS) 28 gauge Misc TEST TWO TIMES DAILY    losartan (COZAAR) 100 MG tablet TAKE 1 TABLET EVERY DAY    meclizine (ANTIVERT) 25 mg tablet Take 1 tablet (25 mg total) by mouth 3 (three) times daily as needed.    metFORMIN (GLUCOPHAGE-XR) 750 MG ER 24hr tablet TAKE 1 TABLET TWICE DAILY WITH MEALS    mupirocin (BACTROBAN) 2 % ointment APPLY  NASALLY TWO TIMES DAILY    ondansetron (ZOFRAN-ODT) 4 MG TbDL Take 1 tablet (4 mg total) by mouth every 8 (eight) hours as needed (Nausea).    promethazine (PHENERGAN) 12.5 MG Tab     rosuvastatin (CRESTOR) 40 MG Tab TAKE 1 TABLET EVERY EVENING (Patient taking differently: 40 mg every other day.)    SANARE ADV SCAR THERAPY BASE Gel APPLY 1/2 GRAM (1 PUMP) TO AFFECTED AREAS TWICE DAILY STARTING 6 WEEKS POST SURGERY.    semaglutide (OZEMPIC) 2 mg/dose (8 mg/3 mL) PnIj Inject 2 mg into the skin every 7 days. (Patient not taking: Reported on 7/14/2023)      Family History       Problem Relation (Age of Onset)    Alcohol abuse Father, Sister    Aneurysm Brother    Arthritis Mother, Maternal Grandmother    Atrial fibrillation Sister    Breast cancer Paternal Grandmother    Cancer Sister, Brother, Maternal Grandfather    Depression Father, Sister, Brother    Diabetes Mother, Sister, Brother, Paternal Grandmother    Early death Father    Glaucoma Maternal Grandmother    Heart disease Sister, Maternal Grandmother, Paternal Grandfather, Maternal Uncle    Hypertension Mother, Brother, Maternal Grandmother    Kidney disease Maternal Uncle, Paternal Aunt    Learning disabilities Daughter    Vision loss Mother          Tobacco Use    Smoking status: Former     Current packs/day: 0.00     Types: Cigarettes     Quit date: 3/12/1983     Years since quittin.5    Smokeless tobacco: Never   Substance and Sexual Activity    Alcohol use: Yes     Comment: socially    Drug use: No    Sexual activity: Not Currently     Review of Systems   Constitutional:  Positive for fever. Negative for activity change, appetite change and chills.   HENT:  Negative for congestion, sore throat and trouble swallowing.    Eyes:  Negative for photophobia and visual disturbance.   Respiratory:  Negative for cough, chest tightness and shortness of breath.    Cardiovascular:  Negative for chest pain, palpitations and leg swelling.   Gastrointestinal:  Positive for abdominal pain, nausea and vomiting. Negative for diarrhea.   Genitourinary:  Positive for difficulty urinating and flank pain. Negative for dysuria and hematuria.   Musculoskeletal:  Negative for back pain.   Neurological:  Negative for dizziness, weakness and headaches.   Psychiatric/Behavioral:  Negative for confusion.      Objective:     Vital Signs (Most Recent):  Temp: 99.9 °F (37.7 °C) (23 1550)  Pulse: 100 (23 1550)  Resp: 18 (23 1550)  BP: (!) 146/72 (23 1550)  SpO2: 96 % (23 1550) Vital Signs (24h  Range):  Temp:  [97.5 °F (36.4 °C)-99.9 °F (37.7 °C)] 99.9 °F (37.7 °C)  Pulse:  [100-112] 100  Resp:  [14-20] 18  SpO2:  [93 %-96 %] 96 %  BP: (120-151)/() 146/72     Weight: 76.7 kg (169 lb)  Body mass index is 28.12 kg/m².     Physical Exam  Vitals reviewed.   Constitutional:       Appearance: Normal appearance. She is normal weight.   HENT:      Head: Normocephalic.      Mouth/Throat:      Mouth: Mucous membranes are moist.      Pharynx: Oropharynx is clear.   Eyes:      Pupils: Pupils are equal, round, and reactive to light.   Cardiovascular:      Rate and Rhythm: Normal rate and regular rhythm.      Pulses: Normal pulses.   Pulmonary:      Effort: Pulmonary effort is normal.      Breath sounds: Normal breath sounds.   Abdominal:      General: Bowel sounds are normal.      Palpations: Abdomen is soft.      Tenderness: There is abdominal tenderness in the suprapubic area.   Musculoskeletal:         General: Normal range of motion.      Cervical back: Normal range of motion.   Skin:     General: Skin is warm and dry.   Neurological:      Mental Status: She is alert and oriented to person, place, and time. Mental status is at baseline.   Psychiatric:         Attention and Perception: Attention normal.         Mood and Affect: Mood normal.         Speech: Speech normal.              CRANIAL NERVES     CN III, IV, VI   Pupils are equal, round, and reactive to light.       Significant Labs: All pertinent labs within the past 24 hours have been reviewed.  CBC:   Recent Labs   Lab 09/06/23  1349   WBC 14.48*   HGB 12.8   HCT 38.9        CMP:   Recent Labs   Lab 09/06/23  1349      K 4.2      CO2 26   *   BUN 14   CREATININE 0.9   CALCIUM 10.0   PROT 7.4   ALBUMIN 4.2   BILITOT 1.3*   ALKPHOS 111   AST 18   ALT 34   ANIONGAP 13       Significant Imaging: I have reviewed all pertinent imaging results/findings within the past 24 hours.  Imaging Results              CT Renal Stone Study ABD  Pelvis WO (Final result)  Result time 09/06/23 13:15:53      Final result by Dylan Walters MD (09/06/23 13:15:53)                   Impression:      Right proximal ureterolithiasis resulting in mild hydronephrosis.    Small bilateral nephrolithiasis.    Hepatic steatosis.      Electronically signed by: Dylan Walters MD  Date:    09/06/2023  Time:    13:15               Narrative:    EXAMINATION:  CT RENAL STONE STUDY ABD PELVIS WO    CLINICAL HISTORY:  Flank pain, kidney stone suspected;    TECHNIQUE:  Low dose axial images, sagittal and coronal reformations were obtained from the lung bases to the pubic symphysis.  Contrast was not administered.    COMPARISON:  05/27/2022    FINDINGS:  The liver is hypoattenuating indicative of fatty infiltration.  There has been a cholecystectomy.  The spleen is normal in size.  The pancreas and adrenal glands are normal.    There is a 5 mm stone of the proximal 3rd of the right ureter, resulting in mild hydronephrosis.  A few additional punctate stones of both kidneys are present.    The bowel is nondilated.  There is nonspecific haziness of central mesenteric fat.  The colon is unremarkable.    There is moderate calcification of the aorta without aneurysm.  Degenerative change of the spine typical for age is present.                        Final result by Dylan Walters MD (09/06/23 13:15:53)                   Impression:      Right proximal ureterolithiasis resulting in mild hydronephrosis.    Small bilateral nephrolithiasis.    Hepatic steatosis.      Electronically signed by: Dylan Walters MD  Date:    09/06/2023  Time:    13:15               Narrative:    EXAMINATION:  CT RENAL STONE STUDY ABD PELVIS WO    CLINICAL HISTORY:  Flank pain, kidney stone suspected;    TECHNIQUE:  Low dose axial images, sagittal and coronal reformations were obtained from the lung bases to the pubic symphysis.  Contrast was not  administered.    COMPARISON:  05/27/2022    FINDINGS:  The liver is hypoattenuating indicative of fatty infiltration.  There has been a cholecystectomy.  The spleen is normal in size.  The pancreas and adrenal glands are normal.    There is a 5 mm stone of the proximal 3rd of the right ureter, resulting in mild hydronephrosis.  A few additional punctate stones of both kidneys are present.    The bowel is nondilated.  There is nonspecific haziness of central mesenteric fat.  The colon is unremarkable.    There is moderate calcification of the aorta without aneurysm.  Degenerative change of the spine typical for age is present.                        Final result by Dylan Walters MD (09/06/23 13:15:53)                   Impression:      Right proximal ureterolithiasis resulting in mild hydronephrosis.    Small bilateral nephrolithiasis.    Hepatic steatosis.      Electronically signed by: Dylan Walters MD  Date:    09/06/2023  Time:    13:15               Narrative:    EXAMINATION:  CT RENAL STONE STUDY ABD PELVIS WO    CLINICAL HISTORY:  Flank pain, kidney stone suspected;    TECHNIQUE:  Low dose axial images, sagittal and coronal reformations were obtained from the lung bases to the pubic symphysis.  Contrast was not administered.    COMPARISON:  05/27/2022    FINDINGS:  The liver is hypoattenuating indicative of fatty infiltration.  There has been a cholecystectomy.  The spleen is normal in size.  The pancreas and adrenal glands are normal.    There is a 5 mm stone of the proximal 3rd of the right ureter, resulting in mild hydronephrosis.  A few additional punctate stones of both kidneys are present.    The bowel is nondilated.  There is nonspecific haziness of central mesenteric fat.  The colon is unremarkable.    There is moderate calcification of the aorta without aneurysm.  Degenerative change of the spine typical for age is present.                        Final result by Dylan Walters MD  (09/06/23 13:15:53)                   Impression:      Right proximal ureterolithiasis resulting in mild hydronephrosis.    Small bilateral nephrolithiasis.    Hepatic steatosis.      Electronically signed by: Dylan Walters MD  Date:    09/06/2023  Time:    13:15               Narrative:    EXAMINATION:  CT RENAL STONE STUDY ABD PELVIS WO    CLINICAL HISTORY:  Flank pain, kidney stone suspected;    TECHNIQUE:  Low dose axial images, sagittal and coronal reformations were obtained from the lung bases to the pubic symphysis.  Contrast was not administered.    COMPARISON:  05/27/2022    FINDINGS:  The liver is hypoattenuating indicative of fatty infiltration.  There has been a cholecystectomy.  The spleen is normal in size.  The pancreas and adrenal glands are normal.    There is a 5 mm stone of the proximal 3rd of the right ureter, resulting in mild hydronephrosis.  A few additional punctate stones of both kidneys are present.    The bowel is nondilated.  There is nonspecific haziness of central mesenteric fat.  The colon is unremarkable.    There is moderate calcification of the aorta without aneurysm.  Degenerative change of the spine typical for age is present.                                        Assessment/Plan:     * Ureterolithiasis  -Urology consulted, recommendations appreciated  -IV prn pain medication and antiemetics          UTI (urinary tract infection)  -IV Cipro  -urine cx pending      Depression  Patient has depression which is unknown and is currently controlled. Will Continue anti-depressant medications. We will not consult psychiatry at this time. Patient does not display psychosis at this time. Continue to monitor closely and adjust plan of care as needed.        Hypertension  Chronic, controlled.  Latest blood pressure and vitals reviewed-     Temp:  [97.5 °F (36.4 °C)-99.9 °F (37.7 °C)]   Pulse:  [100-112]   Resp:  [14-20]   BP: (120-151)/()   SpO2:  [93 %-96 %] .   Home meds for  "hypertension were reviewed and noted below-  Hypertension Medications             amLODIPine (NORVASC) 10 MG tablet TAKE 1 TABLET EVERY DAY    losartan (COZAAR) 100 MG tablet TAKE 1 TABLET EVERY DAY          While in the hospital, will manage blood pressure as follows; Continue home antihypertensive regimen    Will utilize p.r.n. blood pressure medication only if patient's blood pressure greater than 180/110 and she develops symptoms such as worsening chest pain or shortness of breath.      Hyperlipidemia associated with type 2 diabetes mellitus   Patient is chronically on statin.will continue for now. Monitor clinically. Last LDL was   Lab Results   Component Value Date    LDLCALC 36.6 (L) 06/28/2023          Diabetes mellitus, type 2  Patient's FSGs are controlled on current medication regimen.  Last A1c reviewed-   Lab Results   Component Value Date    HGBA1C 7.8 (H) 06/28/2023     Most recent fingerstick glucose reviewed- No results for input(s): "POCTGLUCOSE" in the last 24 hours.  Current correctional scale  Low  Maintain anti-hyperglycemic dose as follows-   Antihyperglycemics (From admission, onward)    None        Hold Oral hypoglycemics while patient is in the hospital.    VTE Risk Mitigation (From admission, onward)    None                     Marisol Chiu NP  Department of Hospital Medicine  [facility] -   "

## 2023-09-06 NOTE — CONSULTS
[facility] Consult Note - [place] - [facility]  Staff: MD Pat  Group:Pat/Waqas/Feroz/Lee/Ladan  Referring provider and please cc:    PCP: Tor Garg MD  Date of Service: 09/06/2023    CC: stone      Subjective:      [facility] Established [place] by   Doris Pastrana is a 71 y.o. female who presents for follow up for left nephrolithiasis.   Patient with a history of DM, HTN, nephrolithiasis and TIA who presented to the emergency department on 4/10/22 complaining of left flank pain associated with nausea and emesis for approximately 2 days.    CT renal stone revealed a left proximal 8 mm ureteral stone with hydroureteronephrosis. Also with bilateral non-obstructing stones. Urology consulted to assist with management.    WBC 12 and Creatinine 1.1.    She has a history of kidney stones s/p ESWL in the past with Dr. Stewart. States she also passed a stone previously.         Interval History 6/9/22 by  She underwent left ureteral stent placement on 4/10/22. She subsequently had left ureteroscopy and laser lithotripsy of her stone on 4/26/22. Stent removed at home. Had a CT post-operatively per her PCP. Revealed a 3 mm distal ureteral fragment that she passed. Pain has resolved. Doing well with no complaints. Denies any fever, chills, gross hematuria, recent urinary tract infection,  trauma or history of  malignancy    Initial consult by me in hospital on 9/6/23 for kidney stone:     Doris Pastrana is a 72 y.o. female admitted for No admission diagnoses are documented for this encounter. on 9/6/2023.     The patient has been referred for a kidney stone found: presented to ER today with R sided flank pain radiating to groin, pain started this morning. Also c/o dysuria. +vomiting. Fever to 100 at home 2 days ago, no fevers now.  Wbc 14.5. Cr normal. Cath urine + for nitrites/3+bld/3+leuk, >100 rbc/>100 wbc/few bact/mod wbc.  Cr 0.8. a      Imaging: ctrss shows proximal 5mm stone and 3mm RLP stone. 2 tiny stones in left kidney.             Urine history:               Recent Labs   Lab 22  0832 23  0742 23  1349   WBC 5.31 6.82 14.48 H   Hemoglobin 12.3 11.9 L 12.8   Hematocrit 38.0 38.2 38.9   Platelets 249 264 254   ]  Recent Labs   Lab 22  0439 22  0832 22  0856 23  0742 23  1349   Sodium 141   < > 141 142 139   Potassium 4.2   < > 4.4 4.0 4.2   Chloride 105   < > 105 103 100   CO2 24   < > 29 29 26   BUN 20   < > 12 16 14   Creatinine 0.8   < > 0.6 0.8 0.9   Glucose 148 H   < > 100 136 H 121 H   Calcium 9.0   < > 9.6 9.5 10.0   Magnesium 1.9  --   --  1.9  --    Alkaline Phosphatase  --    < > 128 130 111   Total Protein  --    < > 7.0 6.7 7.4   Albumin  --    < > 4.1 3.6 4.2   Total Bilirubin  --    < > 0.8 0.6 1.3 H   AST  --    < > 16 21 18   ALT  --    < > 14 35 34    < > = values in this interval not displayed.   ]    Lab Results   Component Value Date    HGBA1C 7.8 (H) 2023         Current REVIEW OF SYSTEMS:  Negative for the remaining 12 points of ROS except for as stated above       PMHx:  Past Medical History:   Diagnosis Date    Acute sinus infection     Allergy     Anemia     Arthritis     Bronchitis     Degenerative disc disease     lumbar, pain contract 2013    Depression     Diabetes mellitus, type 2 2016    Fatty liver     Hyperlipidemia 2016    Hypertension     Kidney stone     Mitral valve prolapse     Multinodular goiter     Pleurisy     Pneumonia     PONV (postoperative nausea and vomiting)     Sinus infection     TIA (transient ischemic attack) 2019       PSHx:  Past Surgical History:   Procedure Laterality Date    ANKLE SURGERY  2017    right ankle torn ligament    APPENDECTOMY       SECTION      CHOLECYSTECTOMY      COLONOSCOPY  8/13/15    Dr. Oliveira, five year recheck    COLONOSCOPY N/A 2021    Procedure:  COLONOSCOPY;  Surgeon: Tevin Oliveira MD;  Location: [facility] ENDO;  Service: Endoscopy;  Laterality: N/A;    CYSTOSCOPY WITH URETEROSCOPY, RETROGRADE PYELOGRAPHY, AND INSERTION OF STENT Left 4/10/2022    Procedure: CYSTOSCOPY, WITH RETROGRADE PYELOGRAM AND URETERAL STENT INSERTION;  Surgeon: Jack Redman Jr., MD;  Location: [facility] OR;  Service: Urology;  Laterality: Left;    CYSTOSCOPY WITH URETEROSCOPY, RETROGRADE PYELOGRAPHY, AND INSERTION OF STENT Left 4/26/2022    Procedure: CYSTOSCOPY, WITH RETROGRADE PYELOGRAM AND URETERAL STENT INSERTION;  Surgeon: Jack Redman Jr., MD;  Location: [facility] OR;  Service: Urology;  Laterality: Left;    EPIDURAL STEROID INJECTION INTO CERVICAL SPINE N/A 1/3/2020    Procedure: Injection-steroid-epidural-cervical;  Surgeon: Eddi Albrecht MD;  Location: [facility] OR;  Service: Pain Management;  Laterality: N/A;  C7-T1    EPIDURAL STEROID INJECTION INTO CERVICAL SPINE N/A 3/10/2020    Procedure: Injection-steroid-epidural-cervical;  Surgeon: Eddi Albrecht MD;  Location: [facility] OR;  Service: Pain Management;  Laterality: N/A;  C7-T1    EPIDURAL STEROID INJECTION INTO CERVICAL SPINE N/A 12/29/2020    Procedure: Injection-steroid-epidural-cervical;  Surgeon: Eddi Albrecht MD;  Location: [facility] OR;  Service: Pain Management;  Laterality: N/A;  C7-T1     EXTRACORPOREAL SHOCK WAVE LITHOTRIPSY Left 5/22/2019    Procedure: LITHOTRIPSY, ESWL - only if stone visible on xray  with cysto after on table possible;  Surgeon: Marisol Stewart MD;  Location: [facility] OR;  Service: Urology;  Laterality: Left;    HYSTERECTOMY      LASER LITHOTRIPSY Left 4/26/2022    Procedure: LITHOTRIPSY, USING LASER;  Surgeon: Jack Redman Jr., MD;  Location: [facility] OR;  Service: Urology;  Laterality: Left;    OOPHORECTOMY      TONSILLECTOMY      URETEROSCOPIC REMOVAL OF URETERIC CALCULUS Left 4/26/2022    Procedure: REMOVAL, CALCULUS, URETER, URETEROSCOPIC;  Surgeon: Jack Redman Jr., MD;  Location: [facility] OR;  Service:  Urology;  Laterality: Left;    URETEROSCOPY Left 2022    Procedure: URETEROSCOPY;  Surgeon: Jack Redman Jr., MD;  Location: [facility];  Service: Urology;  Laterality: Left;    VAGINAL DELIVERY         Fam Hx:  Reviewed- pertinent family hx as above    Soc Hx:  Social History     Tobacco Use    Smoking status: Former     Current packs/day: 0.00     Types: Cigarettes     Quit date: 3/12/1983     Years since quittin.5    Smokeless tobacco: Never   Substance Use Topics    Alcohol use: Yes     Comment: socially    Drug use: No       Allergies:  Omnicef [cefdinir]; Codeine; Rhubarb; Strawberries [strawberry]; Iodine; Latex, natural rubber; and Pravastatin      Objective:     Vitals:    23 1435   BP: (!) 151/74   Pulse: (!) 112   Resp: 18   Temp:          General:wdwn  in NAD  Neurologic: CN grossly normal. Normal sensation.   Psychiatric: awake, alert and oriented x 3. Mood and affect Normal. Cooperative.  Eyes: PERRLA, normal conjunctiva  Respiratory: no increased work on breathing. No wheezing.   Cardiovascular: No obvious extremity edema. Warm and well perfused.  GI: no obvious stomach distension  Musculoskeletal: normal  range of motion of bilateral upper extremities. Normal muscle strength and tone.  Skin: no obvious rashes or lesions. No tightening of skin noted.    Pertinent  exam in HPI        Assessment:     Doris Pastrana is a 72 y.o. female with   1. Right flank pain    2. Ureterolithiasis    3. Urinary tract infection with hematuria, site unspecified        urology consulted for:  right ureteral stone with evidence of urinary tract infection,  without renal insufficiency, without intractable pain      Plan:     To OR today for cystoscopy and right stent placement without possible stone extraction.   If a stent is unable to be placed the patient was told that they would need a nephrostomy tube placed by Radiology in a separate setting in order to drain the kidney  We discussed the  difference in stone treatment options and why we are proceeding with this treatment plan.    If infection/pus is seen proximal/behind the stone after placement of wire, then the pt will only have a ureteral stent and a catheter placed to maximally drain infection. They will be admitted for IV abx and the baez will remain until patient's white blood cell count is normal/lower and they do not demonstrate any fevers for 24 hours.   then we will return in 2 to 4 weeks to do a stone extraction with or without laser once infection has been cleared. At that procedure the old stent will be removed and the stone/s will be fragmented and removed and a new ureteral stent replaced but likely on strings so the patient can remove themselves at a designated time so as to avoid another procedure to have the stent removed      Special considerations for this patient:   Return in 2 weeks for R urs and stone ext  The patient does have more stones in left kidney. tiny    Stent consent:  The patient was counseled extensively on the stent being only temporary. The pt understands that a stent should not be in longer than 1 year, and preferably less than 3 months. A stent can become encrusted, block urine flow and cause kidney function loss if it is not exchanged or removed at least within a year.  They understand that if the stent remains longer becomes encrusted it would require major surgeries and possible nephrectomy to remove the stent and or stones or affected kidney if unable to treat the stones endoscopically.      Information will also be given on the discharge instructions on how to get in contact with urologist to determine plan for stone/stent removal versus exchange.        Marisol Stewart MD

## 2023-09-06 NOTE — PLAN OF CARE
1550. Pt to room via wc and to bed with nadn. Vss. Poc discussed, v/u. dr hanson in to see pt. New orders noted to stop cipro and give gent pre-op  1600 report to miranda louis

## 2023-09-06 NOTE — ASSESSMENT & PLAN NOTE
"Patient's FSGs are controlled on current medication regimen.  Last A1c reviewed-   Lab Results   Component Value Date    HGBA1C 7.8 (H) 06/28/2023     Most recent fingerstick glucose reviewed- No results for input(s): "POCTGLUCOSE" in the last 24 hours.  Current correctional scale  Low  Maintain anti-hyperglycemic dose as follows-   Antihyperglycemics (From admission, onward)    None        Hold Oral hypoglycemics while patient is in the hospital.  "

## 2023-09-06 NOTE — ED PROVIDER NOTES
Encounter Date: 9/6/2023       History     Chief Complaint   Patient presents with    Flank Pain     Patient states that she is having right side flank pain and states when she attempts to use the restroom she only gets a few drops of urine at a time, vomiting, states she took a Norco at 8:30 with no relief      72-year-old female presents today with right-sided flank pain that radiates to her groin.  Patient reports pain began this morning when she woke up.  She states decreased urine output.  She also reports some pain with urination.  Reports she only gets a few drops urinary time.  She reports she began vomiting nonbloody nonbilious emesis as well this morning.  She took a Norco with transient relief only lasting approximately 30 minutes.  She does report a history of kidney stones and feels this is another kidney stone.  Denies any hematuria.  Denies any diarrhea or constipation.  Last bowel movement was yesterday.  Denies any current fevers cough chest pain shortness of breath or any other symptoms.    The history is provided by the patient. No  was used.     Review of patient's allergies indicates:   Allergen Reactions    Omnicef [cefdinir] Hives    Codeine Nausea Only    Rhubarb Swelling    Strawberries [strawberry] Hives    Iodine Rash       Other reaction(s): Nausea    Latex, natural rubber Rash    Pravastatin Other (See Comments)     Muscle pain      Past Medical History:   Diagnosis Date    Acute sinus infection     Allergy     Anemia     Arthritis     Bronchitis     Degenerative disc disease     lumbar, pain contract 1/28/2013    Depression     Diabetes mellitus, type 2 02/25/2016    Fatty liver     Hyperlipidemia 02/25/2016    Hypertension     Kidney stone     Mitral valve prolapse     Multinodular goiter     Pleurisy     Pneumonia     PONV (postoperative nausea and vomiting)     Sinus infection     TIA (transient ischemic attack) 03/28/2019     Past Surgical History:   Procedure  Laterality Date    ANKLE SURGERY  2017    right ankle torn ligament    APPENDECTOMY       SECTION      CHOLECYSTECTOMY      COLONOSCOPY  8/13/15    Dr. Oliveira, five year recheck    COLONOSCOPY N/A 2021    Procedure: COLONOSCOPY;  Surgeon: Tevin Oliveira MD;  Location: [facility];  Service: Endoscopy;  Laterality: N/A;    CYSTOSCOPY WITH URETEROSCOPY, RETROGRADE PYELOGRAPHY, AND INSERTION OF STENT Left 4/10/2022    Procedure: CYSTOSCOPY, WITH RETROGRADE PYELOGRAM AND URETERAL STENT INSERTION;  Surgeon: Jack Redman Jr., MD;  Location: [facility] OR;  Service: Urology;  Laterality: Left;    CYSTOSCOPY WITH URETEROSCOPY, RETROGRADE PYELOGRAPHY, AND INSERTION OF STENT Left 2022    Procedure: CYSTOSCOPY, WITH RETROGRADE PYELOGRAM AND URETERAL STENT INSERTION;  Surgeon: Jack Redman Jr., MD;  Location: [facility] OR;  Service: Urology;  Laterality: Left;    EPIDURAL STEROID INJECTION INTO CERVICAL SPINE N/A 1/3/2020    Procedure: Injection-steroid-epidural-cervical;  Surgeon: Eddi Albrecht MD;  Location: [facility] OR;  Service: Pain Management;  Laterality: N/A;  C7-T1    EPIDURAL STEROID INJECTION INTO CERVICAL SPINE N/A 3/10/2020    Procedure: Injection-steroid-epidural-cervical;  Surgeon: Eddi Albrecht MD;  Location: [facility] OR;  Service: Pain Management;  Laterality: N/A;  C7-T1    EPIDURAL STEROID INJECTION INTO CERVICAL SPINE N/A 2020    Procedure: Injection-steroid-epidural-cervical;  Surgeon: Eddi Albrecht MD;  Location: [facility] OR;  Service: Pain Management;  Laterality: N/A;  C7-T1     EXTRACORPOREAL SHOCK WAVE LITHOTRIPSY Left 2019    Procedure: LITHOTRIPSY, ESWL - only if stone visible on xray  with cysto after on table possible;  Surgeon: Marisol Stewart MD;  Location: [facility] OR;  Service: Urology;  Laterality: Left;    HYSTERECTOMY      LASER LITHOTRIPSY Left 2022    Procedure: LITHOTRIPSY, USING LASER;  Surgeon: Jack Redman Jr., MD;  Location: [facility] OR;  Service: Urology;   Laterality: Left;    OOPHORECTOMY      TONSILLECTOMY      URETEROSCOPIC REMOVAL OF URETERIC CALCULUS Left 2022    Procedure: REMOVAL, CALCULUS, URETER, URETEROSCOPIC;  Surgeon: Jack Redman Jr., MD;  Location: [facility] OR;  Service: Urology;  Laterality: Left;    URETEROSCOPY Left 2022    Procedure: URETEROSCOPY;  Surgeon: Jack Redman Jr., MD;  Location: [facility] OR;  Service: Urology;  Laterality: Left;    VAGINAL DELIVERY       Family History   Problem Relation Age of Onset    Arthritis Mother     Hypertension Mother     Vision loss Mother     Diabetes Mother     Alcohol abuse Father     Depression Father     Early death Father     Alcohol abuse Sister     Depression Sister     Cancer Sister         thyroid    Diabetes Sister     Heart disease Sister         chf    Atrial fibrillation Sister     Depression Brother     Hypertension Brother     Cancer Brother         prostate/stomach    Diabetes Brother     Aneurysm Brother         aaa    Learning disabilities Daughter     Hypertension Maternal Grandmother     Heart disease Maternal Grandmother     Arthritis Maternal Grandmother     Glaucoma Maternal Grandmother     Cancer Maternal Grandfather         stomach    Diabetes Paternal Grandmother     Breast cancer Paternal Grandmother     Heart disease Paternal Grandfather     Heart disease Maternal Uncle     Kidney disease Maternal Uncle     Kidney disease Paternal Aunt      Social History     Tobacco Use    Smoking status: Former     Current packs/day: 0.00     Types: Cigarettes     Quit date: 3/12/1983     Years since quittin.5    Smokeless tobacco: Never   Substance Use Topics    Alcohol use: Yes     Comment: socially    Drug use: No     Review of Systems    Physical Exam     Initial Vitals [23 1235]   BP Pulse Resp Temp SpO2   (!) 120/55 110 20 98.2 °F (36.8 °C) 96 %      MAP       --         Physical Exam    Nursing note and vitals reviewed.  Constitutional: She appears well-developed. She is not  diaphoretic. No distress.   HENT:   Head: Normocephalic and atraumatic.   Nose: Nose normal.   Eyes: EOM are normal. No scleral icterus.   Neck: Neck supple.   Normal range of motion.  Cardiovascular:  Normal rate, regular rhythm, normal heart sounds and intact distal pulses.     Exam reveals no gallop and no friction rub.       No murmur heard.  Pulmonary/Chest: Breath sounds normal. No stridor. No respiratory distress. She has no wheezes. She has no rhonchi. She has no rales.   Abdominal: Abdomen is soft. Bowel sounds are normal. She exhibits no distension. There is no abdominal tenderness.   No CVA tenderness. There is no rebound and no guarding.   Musculoskeletal:         General: Normal range of motion.      Cervical back: Normal range of motion and neck supple.     Neurological: She is alert and oriented to person, place, and time. She has normal strength. No cranial nerve deficit or sensory deficit. GCS score is 15. GCS eye subscore is 4. GCS verbal subscore is 5. GCS motor subscore is 6.   Skin: Skin is warm and dry. Capillary refill takes less than 2 seconds. No rash noted.   Psychiatric: She has a normal mood and affect.         ED Course   Critical Care    Date/Time: 9/6/2023 12:23 PM    Performed by: Phani Serrano MD  Authorized by: Emily Adhikari MD  Direct patient critical care time: 35 minutes  Total critical care time (exclusive of procedural time) : 35 minutes  Critical care time was exclusive of separately billable procedures and treating other patients and teaching time.  Critical care was necessary to treat or prevent imminent or life-threatening deterioration of the following conditions: sepsis.  Critical care was time spent personally by me on the following activities: discussions with consultants, evaluation of patient's response to treatment, examination of patient, obtaining history from patient or surrogate, ordering and performing treatments and interventions, ordering and review of  laboratory studies, ordering and review of radiographic studies, development of treatment plan with patient or surrogate, pulse oximetry, re-evaluation of patient's condition and review of old charts.        Labs Reviewed   CBC W/ AUTO DIFFERENTIAL - Abnormal; Notable for the following components:       Result Value    WBC 14.48 (*)     Gran # (ANC) 12.6 (*)     Immature Grans (Abs) 0.06 (*)     Lymph # 0.9 (*)     Gran % 86.9 (*)     Lymph % 6.4 (*)     All other components within normal limits    Narrative:     Collection has been rescheduled by DTT at 09/06/2023 13:19 Reason: CT   COMPREHENSIVE METABOLIC PANEL - Abnormal; Notable for the following components:    Glucose 121 (*)     Total Bilirubin 1.3 (*)     All other components within normal limits    Narrative:     Collection has been rescheduled by DTT at 09/06/2023 13:19 Reason: CT   URINALYSIS, REFLEX TO URINE CULTURE - Abnormal; Notable for the following components:    Appearance, UA Cloudy (*)     Protein, UA 3+ (*)     Occult Blood UA 3+ (*)     Nitrite, UA Positive (*)     Leukocytes, UA 3+ (*)     All other components within normal limits    Narrative:     Specimen Source->Urine   URINALYSIS MICROSCOPIC - Abnormal; Notable for the following components:    RBC, UA >100 (*)     WBC, UA >100 (*)     WBC Clumps, UA Moderate (*)     Bacteria Few (*)     All other components within normal limits    Narrative:     Specimen Source->Urine   ISTAT LACTATE - Abnormal; Notable for the following components:    POC Lactate 2.25 (*)     All other components within normal limits   CULTURE, URINE   CULTURE, BLOOD   CULTURE, BLOOD   LACTIC ACID, PLASMA          Imaging Results              SURG FL Surgery Fluoro Usage (Final result)  Result time 09/06/23 18:10:26      Final result by Access, Silent  (09/06/23 18:10:26)                   Narrative:    See OP Notes for results.     IMPRESSION: See OP Notes for results.             This procedure was  auto-finalized by: Virtual Radiologist                                     CT Renal Stone Study ABD Pelvis WO (Final result)  Result time 09/06/23 13:15:53      Final result by Dylan Walters MD (09/06/23 13:15:53)                   Impression:      Right proximal ureterolithiasis resulting in mild hydronephrosis.    Small bilateral nephrolithiasis.    Hepatic steatosis.      Electronically signed by: Dylan Walters MD  Date:    09/06/2023  Time:    13:15               Narrative:    EXAMINATION:  CT RENAL STONE STUDY ABD PELVIS WO    CLINICAL HISTORY:  Flank pain, kidney stone suspected;    TECHNIQUE:  Low dose axial images, sagittal and coronal reformations were obtained from the lung bases to the pubic symphysis.  Contrast was not administered.    COMPARISON:  05/27/2022    FINDINGS:  The liver is hypoattenuating indicative of fatty infiltration.  There has been a cholecystectomy.  The spleen is normal in size.  The pancreas and adrenal glands are normal.    There is a 5 mm stone of the proximal 3rd of the right ureter, resulting in mild hydronephrosis.  A few additional punctate stones of both kidneys are present.    The bowel is nondilated.  There is nonspecific haziness of central mesenteric fat.  The colon is unremarkable.    There is moderate calcification of the aorta without aneurysm.  Degenerative change of the spine typical for age is present.                        Final result by Dylan Walters MD (09/06/23 13:15:53)                   Impression:      Right proximal ureterolithiasis resulting in mild hydronephrosis.    Small bilateral nephrolithiasis.    Hepatic steatosis.      Electronically signed by: Dylan Walters MD  Date:    09/06/2023  Time:    13:15               Narrative:    EXAMINATION:  CT RENAL STONE STUDY ABD PELVIS WO    CLINICAL HISTORY:  Flank pain, kidney stone suspected;    TECHNIQUE:  Low dose axial images, sagittal and coronal reformations were obtained from the  lung bases to the pubic symphysis.  Contrast was not administered.    COMPARISON:  05/27/2022    FINDINGS:  The liver is hypoattenuating indicative of fatty infiltration.  There has been a cholecystectomy.  The spleen is normal in size.  The pancreas and adrenal glands are normal.    There is a 5 mm stone of the proximal 3rd of the right ureter, resulting in mild hydronephrosis.  A few additional punctate stones of both kidneys are present.    The bowel is nondilated.  There is nonspecific haziness of central mesenteric fat.  The colon is unremarkable.    There is moderate calcification of the aorta without aneurysm.  Degenerative change of the spine typical for age is present.                        Final result by Dylan Walters MD (09/06/23 13:15:53)                   Impression:      Right proximal ureterolithiasis resulting in mild hydronephrosis.    Small bilateral nephrolithiasis.    Hepatic steatosis.      Electronically signed by: Dylan Walters MD  Date:    09/06/2023  Time:    13:15               Narrative:    EXAMINATION:  CT RENAL STONE STUDY ABD PELVIS WO    CLINICAL HISTORY:  Flank pain, kidney stone suspected;    TECHNIQUE:  Low dose axial images, sagittal and coronal reformations were obtained from the lung bases to the pubic symphysis.  Contrast was not administered.    COMPARISON:  05/27/2022    FINDINGS:  The liver is hypoattenuating indicative of fatty infiltration.  There has been a cholecystectomy.  The spleen is normal in size.  The pancreas and adrenal glands are normal.    There is a 5 mm stone of the proximal 3rd of the right ureter, resulting in mild hydronephrosis.  A few additional punctate stones of both kidneys are present.    The bowel is nondilated.  There is nonspecific haziness of central mesenteric fat.  The colon is unremarkable.    There is moderate calcification of the aorta without aneurysm.  Degenerative change of the spine typical for age is present.                         Final result by Dylan Walters MD (09/06/23 13:15:53)                   Impression:      Right proximal ureterolithiasis resulting in mild hydronephrosis.    Small bilateral nephrolithiasis.    Hepatic steatosis.      Electronically signed by: Dylan Walters MD  Date:    09/06/2023  Time:    13:15               Narrative:    EXAMINATION:  CT RENAL STONE STUDY ABD PELVIS WO    CLINICAL HISTORY:  Flank pain, kidney stone suspected;    TECHNIQUE:  Low dose axial images, sagittal and coronal reformations were obtained from the lung bases to the pubic symphysis.  Contrast was not administered.    COMPARISON:  05/27/2022    FINDINGS:  The liver is hypoattenuating indicative of fatty infiltration.  There has been a cholecystectomy.  The spleen is normal in size.  The pancreas and adrenal glands are normal.    There is a 5 mm stone of the proximal 3rd of the right ureter, resulting in mild hydronephrosis.  A few additional punctate stones of both kidneys are present.    The bowel is nondilated.  There is nonspecific haziness of central mesenteric fat.  The colon is unremarkable.    There is moderate calcification of the aorta without aneurysm.  Degenerative change of the spine typical for age is present.                                       Medications   ketorolac injection 15 mg (15 mg Intravenous Not Given 9/6/23 1430)   sodium chloride 0.9% bolus 1,000 mL 1,000 mL (has no administration in time range)   morphine injection 4 mg (has no administration in time range)   ciprofloxacin (CIPRO)400mg/200ml D5W IVPB 400 mg (has no administration in time range)   electrolyte-S (ISOLYTE) ( Intravenous New Bag 9/6/23 1805)   fentaNYL 50 mcg/mL injection 25 mcg (has no administration in time range)   oxyCODONE immediate release tablet 5 mg (has no administration in time range)   HYDROmorphone (PF) injection 0.2 mg (has no administration in time range)   mupirocin 2 % ointment (has no administration in time  range)   sodium chloride 0.9% bolus 1,000 mL 1,000 mL (0 mLs Intravenous Stopped 9/6/23 1501)   promethazine (PHENERGAN) 12.5 mg in dextrose 5 % (D5W) 50 mL IVPB (0 mg Intravenous Stopped 9/6/23 1509)   ciprofloxacin (CIPRO)400mg/200ml D5W IVPB 400 mg (0 mg Intravenous Stopped 9/6/23 1550)   gentamicin (GARAMYCIN) 160 mg in sodium chloride 0.9% 100 mL IVPB (80 mg Intravenous New Bag 9/6/23 1738)     Medical Decision Making  Admitted to Hospital Medicine with Urology consult for infected ureterolithiasis.  Labs show leukocytosis and mildly elevated lactic.  Given broad-spectrum antibiotics and IV fluids and admit further management.    Amount and/or Complexity of Data Reviewed  External Data Reviewed: labs and radiology.  Labs: ordered. Decision-making details documented in ED Course.  Radiology: ordered. Decision-making details documented in ED Course.    Risk  Prescription drug management.  Decision regarding hospitalization.               ED Course as of 09/06/23 1836   Wed Sep 06, 2023   1500 Afebrile.  Mildly tachycardic likely secondary to pain.  Patient has leukocytosis to 14.5. Creatinine reassuring at 0.9. [BD]   1501 CT Renal Stone Study ABD Pelvis WO [BD]   1501 Impression:     Right proximal ureterolithiasis resulting in mild hydronephrosis.     Small bilateral nephrolithiasis.     Hepatic steatosis.        Electronically signed by: Dylan Walters MD  Date:                                            09/06/2023  Time:                                           13:15 [BD]   1508 Leukocytes, UA(!): 3+ [BD]   1508 NITRITE UA(!): Positive [BD]   1508 Bacteria, UA(!): Few [BD]   1508 WBC Clumps, UA(!): Moderate [BD]   1508 WBC, UA(!): >100 [BD]   1508 RBC, UA(!): >100 [BD]   1517 Discussed case with on-call urologist, Dr. Stewart, who agree with plan for admission to Hospital Medicine and antibiotics.  [BD]   1518 Given patient's UA concerning for UTI in the setting leukocytosis and reported fever she  "meets SIRS criteria and will start sepsis workup. [BD]      ED Course User Index  [BD] Phani Serrano MD               Medical Decision Making:   Clinical Tests:   Sepsis Perfusion Assessment: "I attest a sepsis perfusion exam was performed within 6 hours of sepsis, severe sepsis, or septic shock presentation, following fluid resuscitation."      Clinical Impression:   Final diagnoses:  [R10.9] Right flank pain (Primary)  [N20.1] Ureterolithiasis  [N39.0, R31.9] Urinary tract infection with hematuria, site unspecified  [N20.0] Nephrolithiasis        ED Disposition Condition    Admit                 Phani Serrano MD  09/06/23 5916    "

## 2023-09-07 VITALS
TEMPERATURE: 98 F | RESPIRATION RATE: 18 BRPM | HEIGHT: 65 IN | DIASTOLIC BLOOD PRESSURE: 66 MMHG | HEART RATE: 85 BPM | BODY MASS INDEX: 28.16 KG/M2 | WEIGHT: 169 LBS | SYSTOLIC BLOOD PRESSURE: 124 MMHG | OXYGEN SATURATION: 94 %

## 2023-09-07 LAB
ACINETOBACTER CALCOACETICUS/BAUMANNII COMPLEX: NOT DETECTED
ALBUMIN SERPL BCP-MCNC: 3.4 G/DL (ref 3.5–5.2)
ALP SERPL-CCNC: 82 U/L (ref 55–135)
ALT SERPL W/O P-5'-P-CCNC: 33 U/L (ref 10–44)
ANION GAP SERPL CALC-SCNC: 3 MMOL/L (ref 8–16)
AST SERPL-CCNC: 22 U/L (ref 10–40)
BACTEROIDES FRAGILIS: NOT DETECTED
BASOPHILS # BLD AUTO: 0.03 K/UL (ref 0–0.2)
BASOPHILS NFR BLD: 0.2 % (ref 0–1.9)
BILIRUB SERPL-MCNC: 0.6 MG/DL (ref 0.1–1)
BUN SERPL-MCNC: 12 MG/DL (ref 8–23)
CALCIUM SERPL-MCNC: 8.6 MG/DL (ref 8.7–10.5)
CANDIDA ALBICANS: NOT DETECTED
CANDIDA AURIS: NOT DETECTED
CANDIDA GLABRATA: NOT DETECTED
CANDIDA KRUSEI: NOT DETECTED
CANDIDA PARAPSILOSIS: NOT DETECTED
CANDIDA TROPICALIS: NOT DETECTED
CHLORIDE SERPL-SCNC: 107 MMOL/L (ref 95–110)
CO2 SERPL-SCNC: 29 MMOL/L (ref 23–29)
CREAT SERPL-MCNC: 0.5 MG/DL (ref 0.5–1.4)
CRYPTOCOCCUS NEOFORMANS/GATTII: NOT DETECTED
CTX-M GENE: NOT DETECTED
DIFFERENTIAL METHOD: ABNORMAL
ENTEROBACTER CLOACAE COMPLEX: NOT DETECTED
ENTEROBACTERALES: ABNORMAL
ENTEROCOCCUS FAECALIS: NOT DETECTED
ENTEROCOCCUS FAECIUM: NOT DETECTED
EOSINOPHIL # BLD AUTO: 0 K/UL (ref 0–0.5)
EOSINOPHIL NFR BLD: 0 % (ref 0–8)
ERYTHROCYTE [DISTWIDTH] IN BLOOD BY AUTOMATED COUNT: 14.5 % (ref 11.5–14.5)
ESCHERICHIA COLI: DETECTED
EST. GFR  (NO RACE VARIABLE): >60 ML/MIN/1.73 M^2
GLUCOSE SERPL-MCNC: 166 MG/DL (ref 70–110)
HAEMOPHILUS INFLUENZAE: NOT DETECTED
HCT VFR BLD AUTO: 34.2 % (ref 37–48.5)
HGB BLD-MCNC: 11.2 G/DL (ref 12–16)
IMM GRANULOCYTES # BLD AUTO: 0.06 K/UL (ref 0–0.04)
IMM GRANULOCYTES NFR BLD AUTO: 0.4 % (ref 0–0.5)
IMP GENE: NOT DETECTED
KLEBSIELLA AEROGENES: NOT DETECTED
KLEBSIELLA OXYTOCA: NOT DETECTED
KLEBSIELLA PNEUMONIAE GROUP: NOT DETECTED
KPC: NOT DETECTED
LISTERIA MONOCYTOGENES: NOT DETECTED
LYMPHOCYTES # BLD AUTO: 0.7 K/UL (ref 1–4.8)
LYMPHOCYTES NFR BLD: 4.3 % (ref 18–48)
MAGNESIUM SERPL-MCNC: 1.7 MG/DL (ref 1.6–2.6)
MCH RBC QN AUTO: 27.7 PG (ref 27–31)
MCHC RBC AUTO-ENTMCNC: 32.7 G/DL (ref 32–36)
MCR-1: NOT DETECTED
MCV RBC AUTO: 85 FL (ref 82–98)
MEC A/C AND MREJ (MRSA): ABNORMAL
MEC A/C: ABNORMAL
MONOCYTES # BLD AUTO: 1 K/UL (ref 0.3–1)
MONOCYTES NFR BLD: 6.4 % (ref 4–15)
NDM GENE: NOT DETECTED
NEISSERIA MENINGITIDIS: NOT DETECTED
NEUTROPHILS # BLD AUTO: 13.6 K/UL (ref 1.8–7.7)
NEUTROPHILS NFR BLD: 88.7 % (ref 38–73)
NRBC BLD-RTO: 0 /100 WBC
OXA-48-LIKE: NOT DETECTED
PHOSPHATE SERPL-MCNC: 2.1 MG/DL (ref 2.7–4.5)
PLATELET # BLD AUTO: 217 K/UL (ref 150–450)
PMV BLD AUTO: 10.1 FL (ref 9.2–12.9)
POTASSIUM SERPL-SCNC: 4 MMOL/L (ref 3.5–5.1)
PROT SERPL-MCNC: 6.1 G/DL (ref 6–8.4)
PROTEUS SPECIES: NOT DETECTED
PSEUDOMONAS AERUGINOSA: NOT DETECTED
RBC # BLD AUTO: 4.04 M/UL (ref 4–5.4)
SALMONELLA SP: NOT DETECTED
SERRATIA MARCESCENS: NOT DETECTED
SODIUM SERPL-SCNC: 139 MMOL/L (ref 136–145)
STAPHYLOCOCCUS AUREUS: NOT DETECTED
STAPHYLOCOCCUS EPIDERMIDIS: NOT DETECTED
STAPHYLOCOCCUS LUGDUNESIS: NOT DETECTED
STAPHYLOCOCCUS SPECIES: NOT DETECTED
STENOTROPHOMONAS MALTOPHILIA: NOT DETECTED
STREPTOCOCCUS AGALACTIAE: NOT DETECTED
STREPTOCOCCUS PNEUMONIAE: NOT DETECTED
STREPTOCOCCUS PYOGENES: NOT DETECTED
STREPTOCOCCUS SPECIES: NOT DETECTED
VAN A/B: ABNORMAL
VIM GENE: NOT DETECTED
WBC # BLD AUTO: 15.29 K/UL (ref 3.9–12.7)

## 2023-09-07 PROCEDURE — 63600175 PHARM REV CODE 636 W HCPCS: Performed by: NURSE PRACTITIONER

## 2023-09-07 PROCEDURE — 25000003 PHARM REV CODE 250: Performed by: NURSE PRACTITIONER

## 2023-09-07 PROCEDURE — 63600175 PHARM REV CODE 636 W HCPCS: Performed by: INTERNAL MEDICINE

## 2023-09-07 PROCEDURE — 25000003 PHARM REV CODE 250: Performed by: INTERNAL MEDICINE

## 2023-09-07 PROCEDURE — 85025 COMPLETE CBC W/AUTO DIFF WBC: CPT | Performed by: NURSE PRACTITIONER

## 2023-09-07 PROCEDURE — 83735 ASSAY OF MAGNESIUM: CPT | Performed by: NURSE PRACTITIONER

## 2023-09-07 PROCEDURE — 84100 ASSAY OF PHOSPHORUS: CPT | Performed by: NURSE PRACTITIONER

## 2023-09-07 PROCEDURE — 80053 COMPREHEN METABOLIC PANEL: CPT | Performed by: NURSE PRACTITIONER

## 2023-09-07 PROCEDURE — 87040 BLOOD CULTURE FOR BACTERIA: CPT | Performed by: INTERNAL MEDICINE

## 2023-09-07 PROCEDURE — 94761 N-INVAS EAR/PLS OXIMETRY MLT: CPT

## 2023-09-07 PROCEDURE — 36415 COLL VENOUS BLD VENIPUNCTURE: CPT | Performed by: INTERNAL MEDICINE

## 2023-09-07 PROCEDURE — 36415 COLL VENOUS BLD VENIPUNCTURE: CPT | Performed by: NURSE PRACTITIONER

## 2023-09-07 RX ORDER — SODIUM CHLORIDE 9 MG/ML
INJECTION, SOLUTION INTRAVENOUS CONTINUOUS
Status: DISCONTINUED | OUTPATIENT
Start: 2023-09-07 | End: 2023-09-07 | Stop reason: HOSPADM

## 2023-09-07 RX ORDER — CIPROFLOXACIN 500 MG/1
500 TABLET ORAL EVERY 12 HOURS
Qty: 28 TABLET | Refills: 0 | Status: SHIPPED | OUTPATIENT
Start: 2023-09-07 | End: 2024-02-15 | Stop reason: ALTCHOICE

## 2023-09-07 RX ORDER — GABAPENTIN 100 MG/1
3 CAPSULE ORAL NIGHTLY
COMMUNITY
Start: 2023-04-22

## 2023-09-07 RX ADMIN — ESCITALOPRAM OXALATE 10 MG: 10 TABLET ORAL at 09:09

## 2023-09-07 RX ADMIN — LOSARTAN POTASSIUM 100 MG: 50 TABLET, FILM COATED ORAL at 09:09

## 2023-09-07 RX ADMIN — CIPROFLOXACIN 400 MG: 2 INJECTION, SOLUTION INTRAVENOUS at 09:09

## 2023-09-07 RX ADMIN — SODIUM CHLORIDE: 0.9 INJECTION, SOLUTION INTRAVENOUS at 08:09

## 2023-09-07 RX ADMIN — SENNOSIDES AND DOCUSATE SODIUM 1 TABLET: 50; 8.6 TABLET ORAL at 09:09

## 2023-09-07 RX ADMIN — AMLODIPINE BESYLATE 10 MG: 5 TABLET ORAL at 09:09

## 2023-09-07 RX ADMIN — ATORVASTATIN CALCIUM 80 MG: 40 TABLET, FILM COATED ORAL at 09:09

## 2023-09-07 RX ADMIN — MORPHINE SULFATE 2 MG: 2 INJECTION, SOLUTION INTRAMUSCULAR; INTRAVENOUS at 09:09

## 2023-09-07 NOTE — NURSING
Nurses Note -- 4 Eyes      9/6/2023   11:52 PM      Skin assessed during: Admit      [x] No Altered Skin Integrity Present    []Prevention Measures Documented      [] Yes- Altered Skin Integrity Present or Discovered   [] LDA Added if Not in Epic (Describe Wound)   [] New Altered Skin Integrity was Present on Admit and Documented in LDA   [] Wound Image Taken    Wound Care Consulted? No    Attending Nurse:  Quentin Unger RN/Staff Member:  lance

## 2023-09-07 NOTE — H&P
[facility] Medicine   History & Physical   Patient Name: Doris Pastrana  MRN: [id number]  Admission Date: 9/6/2023  1:44 PM  Attending Physician: Thierno Neil MD  Primary Care Provider: Tor Garg MD  Face-to-Face encounter date: 09/06/2023    Patient information was obtained from patient, past medical records, ER physician, and ER records.     HISTORY OF PRESENT ILLNESS:     Doris Pastrana is a 72 year old female with a past medical history of HTN, HLD, nephrolithiasis, TIA, fatty liver and depression who presented to the ED with right flank pain that started this morning. Patient states sudden onset this morning with associated decreased urine output and dysuria. Patient states she is only able to get a few drops of urine at a time. She also reports nausea, vomiting and fever with T-max 100.4° this morning. Patient reports pain is sharp ache and she took a Norco this morning with no relief. Patient denies hematuria and hematemesis. ED work up revealed UTI and WBC 14.4.  Patient was started on IV Cipro and received IV pain medication antiemetics with some relief. She further denies chest pain, shortness of breath, headache and diarrhea.  Patient was seen by Dr. Stewart in the ED and is scheduled to go to the OR for cystoscopy and right JJ ureteral stent placement. Post op Patient was transferred to [facility] as there was no bed available at [facility].     REVIEW OF SYSTEMS:     All systems reviewed and are negative except as noted per above.    PAST MEDICAL HISTORY:     Past Medical History:   Diagnosis Date    Acute sinus infection     Allergy     Anemia     Arthritis     Bronchitis     Degenerative disc disease     lumbar, pain contract 1/28/2013    Depression     Diabetes mellitus, type 2 02/25/2016    Fatty liver     Hyperlipidemia 02/25/2016    Hypertension     Kidney stone     Mitral valve prolapse     Multinodular goiter     Pleurisy     Pneumonia     PONV (postoperative  nausea and vomiting)     Sinus infection     TIA (transient ischemic attack) 2019       PAST SURGICAL HISTORY:     Past Surgical History:   Procedure Laterality Date    ANKLE SURGERY  2017    right ankle torn ligament    APPENDECTOMY       SECTION      CHOLECYSTECTOMY      COLONOSCOPY  8/13/15    Dr. Oliveira, five year recheck    COLONOSCOPY N/A 2021    Procedure: COLONOSCOPY;  Surgeon: Tevin Oliveira MD;  Location: [facility];  Service: Endoscopy;  Laterality: N/A;    CYSTOSCOPY WITH URETEROSCOPY, RETROGRADE PYELOGRAPHY, AND INSERTION OF STENT Left 4/10/2022    Procedure: CYSTOSCOPY, WITH RETROGRADE PYELOGRAM AND URETERAL STENT INSERTION;  Surgeon: Jack Redman Jr., MD;  Location: [facility] OR;  Service: Urology;  Laterality: Left;    CYSTOSCOPY WITH URETEROSCOPY, RETROGRADE PYELOGRAPHY, AND INSERTION OF STENT Left 2022    Procedure: CYSTOSCOPY, WITH RETROGRADE PYELOGRAM AND URETERAL STENT INSERTION;  Surgeon: Jack Redman Jr., MD;  Location: [facility] OR;  Service: Urology;  Laterality: Left;    EPIDURAL STEROID INJECTION INTO CERVICAL SPINE N/A 1/3/2020    Procedure: Injection-steroid-epidural-cervical;  Surgeon: Eddi Albrecht MD;  Location: [facility] OR;  Service: Pain Management;  Laterality: N/A;  C7-T1    EPIDURAL STEROID INJECTION INTO CERVICAL SPINE N/A 3/10/2020    Procedure: Injection-steroid-epidural-cervical;  Surgeon: Eddi Albrecht MD;  Location: [facility] OR;  Service: Pain Management;  Laterality: N/A;  C7-T1    EPIDURAL STEROID INJECTION INTO CERVICAL SPINE N/A 2020    Procedure: Injection-steroid-epidural-cervical;  Surgeon: Eddi Albrecht MD;  Location: [facility] OR;  Service: Pain Management;  Laterality: N/A;  C7-T1     EXTRACORPOREAL SHOCK WAVE LITHOTRIPSY Left 2019    Procedure: LITHOTRIPSY, ESWL - only if stone visible on xray  with cysto after on table possible;  Surgeon: Marisol Stewart MD;  Location: [facility] OR;  Service: Urology;  Laterality: Left;    HYSTERECTOMY       LASER LITHOTRIPSY Left 2022    Procedure: LITHOTRIPSY, USING LASER;  Surgeon: Jack Redman Jr., MD;  Location: [facility] OR;  Service: Urology;  Laterality: Left;    OOPHORECTOMY      TONSILLECTOMY      URETEROSCOPIC REMOVAL OF URETERIC CALCULUS Left 2022    Procedure: REMOVAL, CALCULUS, URETER, URETEROSCOPIC;  Surgeon: Jack Redman Jr., MD;  Location: [facility] OR;  Service: Urology;  Laterality: Left;    URETEROSCOPY Left 2022    Procedure: URETEROSCOPY;  Surgeon: Jack Redman Jr., MD;  Location: [facility] OR;  Service: Urology;  Laterality: Left;    VAGINAL DELIVERY         ALLERGIES:   Omnicef [cefdinir]; Codeine; Rhubarb; Strawberries [strawberry]; Iodine; Latex, natural rubber; and Pravastatin    FAMILY HISTORY:     Family History   Problem Relation Age of Onset    Arthritis Mother     Hypertension Mother     Vision loss Mother     Diabetes Mother     Alcohol abuse Father     Depression Father     Early death Father     Alcohol abuse Sister     Depression Sister     Cancer Sister         thyroid    Diabetes Sister     Heart disease Sister         chf    Atrial fibrillation Sister     Depression Brother     Hypertension Brother     Cancer Brother         prostate/stomach    Diabetes Brother     Aneurysm Brother         aaa    Learning disabilities Daughter     Hypertension Maternal Grandmother     Heart disease Maternal Grandmother     Arthritis Maternal Grandmother     Glaucoma Maternal Grandmother     Cancer Maternal Grandfather         stomach    Diabetes Paternal Grandmother     Breast cancer Paternal Grandmother     Heart disease Paternal Grandfather     Heart disease Maternal Uncle     Kidney disease Maternal Uncle     Kidney disease Paternal Aunt        SOCIAL HISTORY:     Social History     Tobacco Use    Smoking status: Former     Current packs/day: 0.00     Types: Cigarettes     Quit date: 3/12/1983     Years since quittin.5    Smokeless tobacco: Never   Substance Use Topics    Alcohol use:  Yes     Comment: socially        Social History     Substance and Sexual Activity   Sexual Activity Not Currently        HOME MEDICATIONS:     Prior to Admission medications    Medication Sig Start Date End Date Taking? Authorizing Provider   ACCU-CHEK OJRGE PLUS TEST STRP Strp TEST BLOOD SUGAR TWICE DAILY 10/20/22  Yes Tor Garg MD   ALPRAZolam (XANAX) 0.25 MG tablet Use one 20-30 minutes before flying prn 11/17/22  Yes Tor Garg MD   ALPRAZolam (XANAX) 0.5 MG tablet Take one (0.5mg) with sip of water 30-45 minutes before needle biopsy of thyroid after consents are signed.  May take second if needed after 20 minutes. 7/24/23  Yes Tor Garg MD   amLODIPine (NORVASC) 10 MG tablet TAKE 1 TABLET EVERY DAY 6/26/23  Yes Tor Garg MD   cyclobenzaprine (FLEXERIL) 10 MG tablet TAKE 1 TABLET THREE TIMES DAILY AS NEEDED 1/25/23  Yes Tor Garg MD   DROPSAFE ALCOHOL PREP PADS PadM USE TWICE DAILY AS NEEDED AS DIRECTED 10/20/22  Yes Tor Garg MD   EScitalopram oxalate (LEXAPRO) 10 MG tablet TAKE 1 TABLET EVERY DAY 6/26/23  Yes Tor Garg MD   HYDROcodone-acetaminophen (NORCO) 7.5-325 mg per tablet Take 1 tablet by mouth every 6 (six) hours as needed for Pain (kidney stone). 5/26/22  Yes Tor Garg MD   lancets (TRUEPLUS LANCETS) 28 gauge Misc TEST TWO TIMES DAILY 12/16/20  Yes Tor Garg MD   losartan (COZAAR) 100 MG tablet TAKE 1 TABLET EVERY DAY 3/2/23  Yes Tor Garg MD   metFORMIN (GLUCOPHAGE-XR) 750 MG ER 24hr tablet TAKE 1 TABLET TWICE DAILY WITH MEALS 6/30/23  Yes Tor Garg MD   mupirocin (BACTROBAN) 2 % ointment APPLY  NASALLY TWO TIMES DAILY 7/6/23  Yes Tor Garg MD   promethazine (PHENERGAN) 12.5 MG Tab  6/16/22  Yes Provider, Historical   rosuvastatin (CRESTOR) 40 MG Tab TAKE 1 TABLET EVERY EVENING  Patient taking differently: 40 mg every other day. 6/26/23  Yes Tor Garg MD   semaglutide (OZEMPIC) 2  mg/dose (8 mg/3 mL) PnIj Inject 2 mg into the skin every 7 days. 7/31/23 7/30/24 Yes Tor Garg MD   albuterol (PROVENTIL/VENTOLIN HFA) 90 mcg/actuation inhaler INHALE 2 PUFFS INTO THE LUNGS 4 (FOUR) TIMES DAILY. 2/22/23   Tor Garg MD   blood-glucose meter kit Use as instructed 3/30/20 7/14/23  Tor Garg MD   cetirizine (ZYRTEC) 10 MG tablet Take 10 mg by mouth daily as needed.     Provider, Historical   fluticasone propionate (FLONASE) 50 mcg/actuation nasal spray USE 2 SPRAYS IN EACH NOSTRIL EVERY DAY 1/31/23   Tor Garg MD   ketorolac (TORADOL) 10 mg tablet Take 1 tablet (10 mg total) by mouth every 8 (eight) hours as needed for Pain. 9/6/23   Marisol Stewart MD   meclizine (ANTIVERT) 25 mg tablet Take 1 tablet (25 mg total) by mouth 3 (three) times daily as needed. 12/16/20   Tor Garg MD   tamsulosin (FLOMAX) 0.4 mg Cap Take 1 capsule (0.4 mg total) by mouth every evening. Take nightly for stent/stone 9/6/23 10/6/23  Marisol Stewart MD   diclofenac sodium (VOLTAREN) 1 % Gel APPLY 2.5 GRAMS TO THE AFFECTED AREA 3-4 TIMES DAILY STARTING 6 WEEKS POST SURGERY.  Patient not taking: Reported on 6/27/2023 11/17/22 9/6/23  Tor Garg MD   ergocalciferol (ERGOCALCIFEROL) 50,000 unit Cap Take one twice a week  Patient not taking: Reported on 6/27/2023 6/22/20 9/6/23  Tor Garg MD   gabapentin (NEURONTIN) 300 MG capsule Take 3 capsules (900 mg total) by mouth every evening.  Patient not taking: Reported on 6/27/2023 4/12/22 9/6/23  Tor Garg MD   ondansetron (ZOFRAN-ODT) 4 MG TbDL Take 1 tablet (4 mg total) by mouth every 8 (eight) hours as needed (Nausea). 4/18/22 9/6/23  Jack Redman Jr., MD MAX ADV SCAR THERAPY BASE Gel APPLY 1/2 GRAM (1 PUMP) TO AFFECTED AREAS TWICE DAILY STARTING 6 WEEKS POST SURGERY. 12/30/20 9/6/23  Provider, Historical       PHYSICAL EXAM:     BP (!) 122/59   Pulse 105   Temp 99.9 °F (37.7 °C)    "Resp 20   Ht 5' 5" (1.651 m)   Wt 76.7 kg (169 lb)   SpO2 (!) 93%   Breastfeeding No   BMI 28.12 kg/m²     Gen: Awake and alert, good historian  HEENT: Eyes with no icterus, not injected.  External ears with no lesions  Nares patent  Mouth moist mucous membranes,  CV: Regular rate and rhythm, no murmurs, no edema.  Capillary refill < 2 seconds.  Lungs:  Clear to auscultation bilaterally, no tachypnea or increased work of breathing.  Abdomen:  Soft with active bowel sounds, +tenderness to palpation in right flank, no distention.  Musculoskeletal:  Moves all extremities with good strength.  No clubbing.  Skin:  Warm and dry, no obvious wounds or rashes.    Neuro:  No focal deficits.  No numbness or tingling.  Psych:  Calm and cooperative, awake alert and oriented.    LABS AND IMAGING:     Labs Reviewed   CBC W/ AUTO DIFFERENTIAL - Abnormal; Notable for the following components:       Result Value    WBC 14.48 (*)     Gran # (ANC) 12.6 (*)     Immature Grans (Abs) 0.06 (*)     Lymph # 0.9 (*)     Gran % 86.9 (*)     Lymph % 6.4 (*)     All other components within normal limits    Narrative:     Collection has been rescheduled by DTT at 09/06/2023 13:19 Reason: CT   COMPREHENSIVE METABOLIC PANEL - Abnormal; Notable for the following components:    Glucose 121 (*)     Total Bilirubin 1.3 (*)     All other components within normal limits    Narrative:     Collection has been rescheduled by DTT at 09/06/2023 13:19 Reason: CT   URINALYSIS, REFLEX TO URINE CULTURE - Abnormal; Notable for the following components:    Appearance, UA Cloudy (*)     Protein, UA 3+ (*)     Occult Blood UA 3+ (*)     Nitrite, UA Positive (*)     Leukocytes, UA 3+ (*)     All other components within normal limits    Narrative:     Specimen Source->Urine   URINALYSIS MICROSCOPIC - Abnormal; Notable for the following components:    RBC, UA >100 (*)     WBC, UA >100 (*)     WBC Clumps, UA Moderate (*)     Bacteria Few (*)     All other components " within normal limits    Narrative:     Specimen Source->Urine   ISTAT LACTATE - Abnormal; Notable for the following components:    POC Lactate 2.25 (*)     All other components within normal limits   CULTURE, URINE       SURG FL Surgery Fluoro Usage   Final Result      CT Renal Stone Study ABD Pelvis WO   Final Result      Right proximal ureterolithiasis resulting in mild hydronephrosis.      Small bilateral nephrolithiasis.      Hepatic steatosis.         Electronically signed by: Dylan Walters MD   Date:    09/06/2023   Time:    13:15          ASSESSMENT & PLAN:   Doris Pastrana is a 72 y.o. female admitted for    Active Hospital Problems    Diagnosis  POA    *Ureterolithiasis [N20.1]  Yes    UTI (urinary tract infection) [N39.0]  Yes    Depression [F32.A]  Yes    Hypertension [I10]  Yes    Diabetes mellitus, type 2 [E11.9]  Yes    Hyperlipidemia associated with type 2 diabetes mellitus [E11.69, E78.5]  Yes      Resolved Hospital Problems   No resolved problems to display.        Right nephrolithiasis wit mild right hydronephrosis   S/p ureteral JJ stent 9/6  - Pain control  - Follow further recommendations from urology     Complicated UTI  - Cont IB Cipro  - Follow urine cultures     HTN  Type II DM  HLD  - resume home meds   - Sliding scale     Catie Neil MD   [facility] Hospitalist  09/06/2023  10:44 PM

## 2023-09-07 NOTE — PLAN OF CARE
Problem: Adult Inpatient Plan of Care  Goal: Plan of Care Review  Outcome: Met  Goal: Patient-Specific Goal (Individualized)  Outcome: Met  Goal: Absence of Hospital-Acquired Illness or Injury  Outcome: Met  Goal: Optimal Comfort and Wellbeing  Outcome: Met  Goal: Readiness for Transition of Care  Outcome: Met     Problem: Diabetes Comorbidity  Goal: Blood Glucose Level Within Targeted Range  Outcome: Met     Problem: Infection  Goal: Absence of Infection Signs and Symptoms  Outcome: Met

## 2023-09-07 NOTE — PLAN OF CARE
[facility]  Initial Discharge Assessment       Primary Care Provider: Tor Garg MD    Admission Diagnosis: Ureterolithiasis [N20.1]  Right flank pain [R10.9]    Admission Date: 9/6/2023  Expected Discharge Date: 9/8/2023    Transition of Care Barriers: None    Assessment completed at bedside.  Advanced directives not addressed at this time.  Patient intends to discharge home where she lives alone and is able to complete ADL's without assistance.  No needs identified at this time.    Payor: [employer] MEDICARE / Plan: [employer] MEDICARE HMO / Product Type: Capitation /     Extended Emergency Contact Information  Primary Emergency Contact: jeffery camejo  Mobile Phone: [phone]  Relation: Daughter  Preferred language: English   needed? No  Secondary Emergency Contact: Mary BERUMEN  Mobile Phone: [phone]  Relation: Daughter  Preferred language: English   needed? No    Discharge Plan A: Home  Discharge Plan B: Home      [id number] - [place] - [address]  [address] [place] [address]  Phone: [phone] Fax: [phone]    [facility] - [facility] [address]  [address] [address]  Phone: [phone] Fax: [phone]      Initial Assessment (most recent)       Adult Discharge Assessment - 09/07/23 1114          Discharge Assessment    Assessment Type Discharge Planning Assessment     Confirmed/corrected address, phone number and insurance Yes     Confirmed Demographics Correct on Facesheet     Source of Information patient     Communicated [place] with patient/caregiver Yes     Reason For Admission kidney stones     People in Home alone     Facility Arrived From: home     Do you expect to return to your current living situation? Yes     Do you have help at home or someone to help you manage your care at home? No     Prior to hospitilization cognitive status:  Alert/Oriented     Current cognitive status: Alert/Oriented     Equipment Currently Used at Home none     Readmission within 30 days? No     Patient currently being followed by outpatient case management? No     Do you currently have service(s) that help you manage your care at home? No     Do you take prescription medications? Yes     Do you have prescription coverage? Yes     Coverage [employer]     Do you have any problems affording any of your prescribed medications? No     Is the patient taking medications as prescribed? yes     Who is going to help you get home at discharge? Roseanna [phone]     How do you get to doctors appointments? car, drives self     Are you on dialysis? No     Do you take coumadin? No     DME Needed Upon Discharge  none     Discharge Plan discussed with: Patient     Transition of Care Barriers None     Discharge Plan A Home     Discharge Plan B Home

## 2023-09-07 NOTE — ANESTHESIA POSTPROCEDURE EVALUATION
Anesthesia Post Evaluation    Patient: Doris Pastrana    Procedure(s) Performed: Procedure(s) (LRB):  CYSTOSCOPY, WITH URETERAL STENT INSERTION (Right)    Final Anesthesia Type: general      Patient location during evaluation: PACU  Patient participation: Yes- Able to Participate  Level of consciousness: awake and alert  Post-procedure vital signs: reviewed and stable  Pain management: adequate  Airway patency: patent    PONV status at discharge: No PONV  Anesthetic complications: no      Cardiovascular status: blood pressure returned to baseline  Respiratory status: unassisted and room air  Hydration status: euvolemic  Follow-up not needed.          Vitals Value Taken Time   /51 09/06/23 1911   Temp 37.4 °C (99.3 °F) 09/06/23 1810   Pulse 102 09/06/23 1912   Resp 19 09/06/23 1912   SpO2 96 % 09/06/23 1912   Vitals shown include unvalidated device data.      No case tracking events are documented in the log.      Pain/Hayde Score: Ahyde Score: 8 (9/6/2023  7:00 PM)

## 2023-09-07 NOTE — PHARMACY MED REC
"              .      Admission Medication History     The home medication history was taken by Kylee Yan.    You may go to "Admission" then "Reconcile Home Medications" tabs to review and/or act upon these items.     The home medication list has been updated by the Pharmacy department.   Please read ALL comments highlighted in yellow.   Please address this information as you see fit.    Feel free to contact us if you have any questions or require assistance.      The medications listed below were removed from the home medication list. Please reorder if appropriate:  Patient reports no longer taking the following medication(s):  Xanax 0.5 mg        Medications listed below were obtained from: Patient/family and Analytic software- [employer]  No current facility-administered medications on file prior to encounter.     Current Outpatient Medications on File Prior to Encounter   Medication Sig Dispense Refill    ACCU-CHEK JORGE PLUS TEST STRP Strp TEST BLOOD SUGAR TWICE DAILY 200 strip 3    ALPRAZolam (XANAX) 0.25 MG tablet Use one 20-30 minutes before flying prn (Patient taking differently: Take 0.25 mg by mouth 2 (two) times daily as needed for Anxiety. Use one 20-30 minutes before flying prn) 10 tablet 1    amLODIPine (NORVASC) 10 MG tablet TAKE 1 TABLET EVERY DAY (Patient taking differently: Take 10 mg by mouth once daily.) 90 tablet 1    cyclobenzaprine (FLEXERIL) 10 MG tablet TAKE 1 TABLET THREE TIMES DAILY AS NEEDED 270 tablet 1    DROPSAFE ALCOHOL PREP PADS PadM USE TWICE DAILY AS NEEDED AS DIRECTED 200 each 3    EScitalopram oxalate (LEXAPRO) 10 MG tablet TAKE 1 TABLET EVERY DAY (Patient taking differently: Take 10 mg by mouth once daily.) 90 tablet 1    gabapentin (NEURONTIN) 100 MG capsule Take 3 capsules by mouth every evening.      HYDROcodone-acetaminophen (NORCO) 7.5-325 mg per tablet Take 1 tablet by mouth every 6 (six) hours as needed for Pain (kidney stone). 28 tablet 0    lancets (TRUEPLUS LANCETS) " 28 gauge Misc TEST TWO TIMES DAILY 200 each 11    losartan (COZAAR) 100 MG tablet TAKE 1 TABLET EVERY DAY 90 tablet 1    metFORMIN (GLUCOPHAGE-XR) 750 MG ER 24hr tablet TAKE 1 TABLET TWICE DAILY WITH MEALS 180 tablet 0    mupirocin (BACTROBAN) 2 % ointment APPLY  NASALLY TWO TIMES DAILY 22 g 1    promethazine (PHENERGAN) 12.5 MG Tab       rosuvastatin (CRESTOR) 40 MG Tab TAKE 1 TABLET EVERY EVENING (Patient taking differently: 40 mg every other day.) 90 tablet 0    semaglutide (OZEMPIC) 2 mg/dose (8 mg/3 mL) PnIj Inject 2 mg into the skin every 7 days. 9 mL 3    [DISCONTINUED] ALPRAZolam (XANAX) 0.5 MG tablet Take one (0.5mg) with sip of water 30-45 minutes before needle biopsy of thyroid after consents are signed.  May take second if needed after 20 minutes. 2 tablet 0    albuterol (PROVENTIL/VENTOLIN HFA) 90 mcg/actuation inhaler INHALE 2 PUFFS INTO THE LUNGS 4 (FOUR) TIMES DAILY. 54 g 2    blood-glucose meter kit Use as instructed 1 each 0    cetirizine (ZYRTEC) 10 MG tablet Take 10 mg by mouth daily as needed.       fluticasone propionate (FLONASE) 50 mcg/actuation nasal spray USE 2 SPRAYS IN EACH NOSTRIL EVERY DAY (Patient taking differently: 2 sprays by Each Nostril route once daily.) 48 g 3    meclizine (ANTIVERT) 25 mg tablet Take 1 tablet (25 mg total) by mouth 3 (three) times daily as needed. 30 tablet 5           Kylee Cotton  [id number]

## 2023-09-07 NOTE — NURSING
Patient now in room 1213.  Updated patient on plan of care.  Notified MD that patient is now here from [facility].

## 2023-09-07 NOTE — NURSING
IV Removed W/O difficulty. Pt teaching completed with verbal understanding. Pt d/c off unit in stable condition with personal belongings.

## 2023-09-07 NOTE — PLAN OF CARE
Transferred to [facility] via stretcher and [facility] service.  Pt left the hospital in [facility].

## 2023-09-07 NOTE — PLAN OF CARE
09/07/23 1324   Final Note   Assessment Type Final Discharge Note   Anticipated Discharge Disposition Home   What phone number can be called within the next 1-3 days to see how you are doing after discharge? [phone]   Post-Acute Status   Discharge Delays None known at this time     Patient cleared for discharge from case management standpoint.    Follow up appointments scheduled and added to AVS.    Chart and discharge orders reviewed.  Patient discharged home with no further case management needs.

## 2023-09-07 NOTE — NURSING
RN Navigator met with patient at bedside to discuss scheduling tcc/hospital follow up appt. upon discharge. Pt is A&Ox4 and sitting up in bed eating lunch.  Pt is agreeable to schedule hospital follow up appt at [facility]. RN Navigator informed pt of scheduled appointment Date: 9/13/23  Time: 8:00 a.m.  and patient reminded to bring portable home O2 if used , as well as all medication bottles to Discharge Clinic follow up appointment.  Discharge Clinic information handout, appointment letter and folder provided to patient. Pt states she will drive herself to this appt.  Pt. reminded to call [facility] Contact number, [phone], with any questions or if appiontment needs to be rescheduled.    Barriers to attending TCC//Hospital Discharge Clinic visit: None verbalized by pt.

## 2023-09-08 ENCOUNTER — TELEPHONE (OUTPATIENT)
Dept: FAMILY MEDICINE | Facility: CLINIC | Age: 73
End: 2023-09-08
Payer: MEDICARE

## 2023-09-08 NOTE — DISCHARGE SUMMARY
Critical access hospital Medicine  Discharge Summary      Patient Name: Doris Pastrana  MRN: [id number]  ABBEY: [id number]  Patient Class: IP- Inpatient  Admission Date: 9/6/2023  Hospital Length of Stay: 1 days  Discharge Date and Time:  09/08/2023 6:21 PM  Attending Physician: Carolyne att. providers found   Discharging Provider: Radhames Olvera MD  Primary Care Provider: Tor Garg MD    Primary Care Team: Networked reference to record PCT     HPI:   Doris Pastrana is a 72 year old female with a past medical history of HTN, HLD, nephrolithiasis, TIA, fatty liver and depression who presented to the ED with right flank pain that started this morning. Patient states sudden onset this morning with associated decreased urine output and dysuria. Patient states she is only able to get a few drops of urine at a time. She also reports nausea, vomiting and fever with T-max 100.4° this morning. Patient reports pain is sharp ache and she took a Norco this morning with no relief. Patient denies hematuria and hematemesis. ED work up revealed UTI and WBC 14.4.  Patient was started on IV Cipro and received IV pain medication antiemetics with some relief.  ED provider spoke to Dr. Stewart, urology, who agreed to consult and patient was referred to Hospital Medicine.  Patient states she is currently experiencing right flank pain and denies current nausea or vomiting.  She further denies chest pain, shortness of breath, headache and diarrhea.  Patient was seen by Dr. Stewart in the ED and is scheduled to go to the OR today.  Patient will be admitted to Hospital Medicine for further evaluation and management.          Procedure(s) (LRB):  CYSTOSCOPY, WITH URETERAL STENT INSERTION (Right)      Hospital Course:   72 year old female with a past medical history of HTN, HLD, nephrolithiasis, TIA, fatty liver and depression was transferred secondary to  decreased urine output and dysuria and later found out Right  nephrolithiasis wit mild right hydronephrosis   S/p ureteral JJ stent 9/6 by dr barakat there and transferred here for capacity .  Urine and blood culture with E coli and later patient is afebrile and discharged with Cipro for 2 weeks duration.  Case discussed with infectious disease curbside .  Patient was given appointment with Dr. Stewart for follow-up.  Urinary catheter was removed as per Neurology recommendation               Goals of Care Treatment Preferences:  Code Status: Full Code      Consults:     No new Assessment & Plan notes have been filed under this hospital service since the last note was generated.  Service: Hospital Medicine    Final Active Diagnoses:    Diagnosis Date Noted POA    PRINCIPAL PROBLEM:  Ureterolithiasis [N20.1] 09/06/2023 Yes    UTI (urinary tract infection) [N39.0] 09/06/2023 Yes    Depression [F32.A] 09/06/2023 Yes    Hypertension [I10] 02/07/2020 Yes    Diabetes mellitus, type 2 [E11.9]  Yes    Hyperlipidemia associated with type 2 diabetes mellitus [E11.69, E78.5] 02/25/2016 Yes      Problems Resolved During this Admission:       Discharged Condition: good    Disposition: Home or Self Care    Follow Up:   Follow-up Information     [facility].    Specialty: Primary Care  Why: Hospital follow up appt. sched. Weds. 9/13/23 at 8:00 a.m.  Contact information:  [address], [facility] [address]  [facility] [id number] [phone]  Additional information:  [address]           Marisol Stewart MD Follow up in 1 week(s).    Specialty: Urology  Why: follow Dr Palacios instructions for next procedure.  Contact information:  [address] [place] [address]   [address]  [facility] [address]  [phone]                       Patient Instructions:      Ambulatory referral/consult to Urology   Standing Status: Future   Referral Priority: Routine Referral Type: Consultation   Referral Reason: Specialty Services Required   Referred to Provider:  LEANDRA GUNTER Requested Specialty: Urology   Number of Visits Requested: 1     Diet Adult Regular     Activity as tolerated       Significant Diagnostic Studies: Labs:   CMP   Recent Labs   Lab 09/07/23  0546      K 4.0      CO2 29   *   BUN 12   CREATININE 0.5   CALCIUM 8.6*   PROT 6.1   ALBUMIN 3.4*   BILITOT 0.6   ALKPHOS 82   AST 22   ALT 33   ANIONGAP 3*    and CBC   Recent Labs   Lab 09/07/23  0546   WBC 15.29*   HGB 11.2*   HCT 34.2*          Pending Diagnostic Studies:     None         Medications:  Reconciled Home Medications:      Medication List      START taking these medications    ciprofloxacin HCl 500 MG tablet  Commonly known as: CIPRO  Take 1 tablet (500 mg total) by mouth every 12 (twelve) hours.     ketorolac 10 mg tablet  Commonly known as: TORADOL  Take 1 tablet (10 mg total) by mouth every 8 (eight) hours as needed for Pain.     tamsulosin 0.4 mg Cap  Commonly known as: FLOMAX  Take 1 capsule (0.4 mg total) by mouth every evening. Take nightly for stent/stone        CHANGE how you take these medications    ALPRAZolam 0.25 MG tablet  Commonly known as: XANAX  Use one 20-30 minutes before flying prn  What changed:   · how much to take  · how to take this  · when to take this  · reasons to take this     amLODIPine 10 MG tablet  Commonly known as: NORVASC  TAKE 1 TABLET EVERY DAY  What changed: when to take this     EScitalopram oxalate 10 MG tablet  Commonly known as: LEXAPRO  TAKE 1 TABLET EVERY DAY  What changed: when to take this     fluticasone propionate 50 mcg/actuation nasal spray  Commonly known as: FLONASE  USE 2 SPRAYS IN EACH NOSTRIL EVERY DAY  What changed: when to take this     rosuvastatin 40 MG Tab  Commonly known as: CRESTOR  TAKE 1 TABLET EVERY EVENING  What changed:   · how to take this  · when to take this        CONTINUE taking these medications    ACCU-CHEK JORGE PLUS TEST STRP Strp  Generic drug: blood sugar diagnostic  TEST BLOOD SUGAR  TWICE DAILY     albuterol 90 mcg/actuation inhaler  Commonly known as: PROVENTIL/VENTOLIN HFA  INHALE 2 PUFFS INTO THE LUNGS 4 (FOUR) TIMES DAILY.     blood-glucose meter kit  Use as instructed     cetirizine 10 MG tablet  Commonly known as: ZYRTEC  Take 10 mg by mouth daily as needed.     cyclobenzaprine 10 MG tablet  Commonly known as: FLEXERIL  TAKE 1 TABLET THREE TIMES DAILY AS NEEDED     DROPSAFE ALCOHOL PREP PADS Padm  Generic drug: alcohol swabs  USE TWICE DAILY AS NEEDED AS DIRECTED     HYDROcodone-acetaminophen 7.5-325 mg per tablet  Commonly known as: NORCO  Take 1 tablet by mouth every 6 (six) hours as needed for Pain (kidney stone).     lancets 28 gauge Misc  Commonly known as: TRUEPLUS LANCETS  TEST TWO TIMES DAILY     losartan 100 MG tablet  Commonly known as: COZAAR  TAKE 1 TABLET EVERY DAY     meclizine 25 mg tablet  Commonly known as: ANTIVERT  Take 1 tablet (25 mg total) by mouth 3 (three) times daily as needed.     metFORMIN 750 MG ER 24hr tablet  Commonly known as: GLUCOPHAGE-XR  TAKE 1 TABLET TWICE DAILY WITH MEALS     mupirocin 2 % ointment  Commonly known as: BACTROBAN  APPLY  NASALLY TWO TIMES DAILY     promethazine 12.5 MG Tab  Commonly known as: PHENERGAN     semaglutide 2 mg/dose (8 mg/3 mL) Pnij  Commonly known as: OZEMPIC  Inject 2 mg into the skin every 7 days.        ASK your doctor about these medications    gabapentin 100 MG capsule  Commonly known as: NEURONTIN  Take 3 capsules by mouth every evening.  Ask about: Which instructions should I use?            Indwelling Lines/Drains at time of discharge:   Lines/Drains/Airways     None               General: Patient resting comfortably in no acute distress. Appears as stated age. Calm  Eyes: EOM intact. No conjunctivae injection. No scleral icterus.  ENT: Hearing grossly intact. No discharge from ears. No nasal discharge.   CVS: RRR. No LE edema BL.  Lungs: CTA BL, no wheezing or crackles. Good breath sounds. No accessory muscle use.  No acute respiratory distress  Neuro: non focal , Follows commands. Responds appropriately  Time spent on the discharge of patient: 23 minutes         Radhames Olvera MD  Department of Hospital Medicine  [facility]

## 2023-09-08 NOTE — HOSPITAL COURSE
72 year old female with a past medical history of HTN, HLD, nephrolithiasis, TIA, fatty liver and depression was transferred secondary to  decreased urine output and dysuria and later found out Right nephrolithiasis wit mild right hydronephrosis   S/p ureteral JJ stent 9/6 by dr barakat there and transferred here for capacity .  Urine and blood culture with E coli and later patient is afebrile and discharged with Cipro for 2 weeks duration.  Case discussed with infectious disease curbsselina .  Patient was given appointment with Dr. Stewart for follow-up.  Urinary catheter was removed as per Neurology recommendation

## 2023-09-09 LAB
BACTERIA BLD CULT: ABNORMAL
BACTERIA UR CULT: ABNORMAL

## 2023-09-10 ENCOUNTER — TELEPHONE (OUTPATIENT)
Dept: ENDOCRINOLOGY | Facility: CLINIC | Age: 73
End: 2023-09-10
Payer: MEDICARE

## 2023-09-10 DIAGNOSIS — E04.2 MULTINODULAR GOITER: Primary | ICD-10-CM

## 2023-09-10 NOTE — TELEPHONE ENCOUNTER
Let her know that her Ultrasound came back nondiagnostic again.   Would recommend repaet Ultrasound and F/U in 6 months

## 2023-09-11 ENCOUNTER — PATIENT OUTREACH (OUTPATIENT)
Dept: ADMINISTRATIVE | Facility: CLINIC | Age: 73
End: 2023-09-11
Payer: MEDICARE

## 2023-09-11 NOTE — PROGRESS NOTES
C3 nurse spoke with Doris Pastrana  for a TCC post hospital discharge follow up call. The patient has a scheduled HOSFU appointment with [facility] ([facility]); Hospital follow up appt. sched. Weds. 9/13/23 at 8:00 a.m.

## 2023-09-12 ENCOUNTER — PATIENT MESSAGE (OUTPATIENT)
Dept: PRIMARY CARE CLINIC | Facility: CLINIC | Age: 73
End: 2023-09-12
Payer: MEDICARE

## 2023-09-12 LAB
BACTERIA BLD CULT: NORMAL
BACTERIA BLD CULT: NORMAL

## 2023-09-13 ENCOUNTER — OFFICE VISIT (OUTPATIENT)
Dept: PRIMARY CARE CLINIC | Facility: CLINIC | Age: 73
End: 2023-09-13
Payer: MEDICARE

## 2023-09-13 VITALS
DIASTOLIC BLOOD PRESSURE: 60 MMHG | OXYGEN SATURATION: 94 % | BODY MASS INDEX: 28.02 KG/M2 | SYSTOLIC BLOOD PRESSURE: 100 MMHG | HEIGHT: 65 IN | WEIGHT: 168.19 LBS | HEART RATE: 100 BPM | TEMPERATURE: 99 F

## 2023-09-13 DIAGNOSIS — N20.0 NEPHROLITHIASIS: Primary | ICD-10-CM

## 2023-09-13 PROCEDURE — 1125F PR PAIN SEVERITY QUANTIFIED, PAIN PRESENT: ICD-10-PCS | Mod: HCNC,CPTII,S$GLB, | Performed by: NURSE PRACTITIONER

## 2023-09-13 PROCEDURE — 3288F PR FALLS RISK ASSESSMENT DOCUMENTED: ICD-10-PCS | Mod: HCNC,CPTII,S$GLB, | Performed by: NURSE PRACTITIONER

## 2023-09-13 PROCEDURE — 3066F PR DOCUMENTATION OF TREATMENT FOR NEPHROPATHY: ICD-10-PCS | Mod: HCNC,CPTII,S$GLB, | Performed by: NURSE PRACTITIONER

## 2023-09-13 PROCEDURE — 1159F PR MEDICATION LIST DOCUMENTED IN MEDICAL RECORD: ICD-10-PCS | Mod: HCNC,CPTII,S$GLB, | Performed by: NURSE PRACTITIONER

## 2023-09-13 PROCEDURE — 4010F PR ACE/ARB THEARPY RXD/TAKEN: ICD-10-PCS | Mod: HCNC,CPTII,S$GLB, | Performed by: NURSE PRACTITIONER

## 2023-09-13 PROCEDURE — 99214 OFFICE O/P EST MOD 30 MIN: CPT | Mod: HCNC,S$GLB,, | Performed by: NURSE PRACTITIONER

## 2023-09-13 PROCEDURE — 3051F PR MOST RECENT HEMOGLOBIN A1C LEVEL 7.0 - < 8.0%: ICD-10-PCS | Mod: HCNC,CPTII,S$GLB, | Performed by: NURSE PRACTITIONER

## 2023-09-13 PROCEDURE — 3074F PR MOST RECENT SYSTOLIC BLOOD PRESSURE < 130 MM HG: ICD-10-PCS | Mod: HCNC,CPTII,S$GLB, | Performed by: NURSE PRACTITIONER

## 2023-09-13 PROCEDURE — 4010F ACE/ARB THERAPY RXD/TAKEN: CPT | Mod: HCNC,CPTII,S$GLB, | Performed by: NURSE PRACTITIONER

## 2023-09-13 PROCEDURE — 99999 PR PBB SHADOW E&M-EST. PATIENT-LVL III: ICD-10-PCS | Mod: PBBFAC,HCNC,, | Performed by: NURSE PRACTITIONER

## 2023-09-13 PROCEDURE — 99999 PR PBB SHADOW E&M-EST. PATIENT-LVL III: CPT | Mod: PBBFAC,HCNC,, | Performed by: NURSE PRACTITIONER

## 2023-09-13 PROCEDURE — 3078F PR MOST RECENT DIASTOLIC BLOOD PRESSURE < 80 MM HG: ICD-10-PCS | Mod: HCNC,CPTII,S$GLB, | Performed by: NURSE PRACTITIONER

## 2023-09-13 PROCEDURE — 3008F BODY MASS INDEX DOCD: CPT | Mod: HCNC,CPTII,S$GLB, | Performed by: NURSE PRACTITIONER

## 2023-09-13 PROCEDURE — 1101F PT FALLS ASSESS-DOCD LE1/YR: CPT | Mod: HCNC,CPTII,S$GLB, | Performed by: NURSE PRACTITIONER

## 2023-09-13 PROCEDURE — 1111F PR DISCHARGE MEDS RECONCILED W/ CURRENT OUTPATIENT MED LIST: ICD-10-PCS | Mod: HCNC,CPTII,S$GLB, | Performed by: NURSE PRACTITIONER

## 2023-09-13 PROCEDURE — 3066F NEPHROPATHY DOC TX: CPT | Mod: HCNC,CPTII,S$GLB, | Performed by: NURSE PRACTITIONER

## 2023-09-13 PROCEDURE — 3060F POS MICROALBUMINURIA REV: CPT | Mod: HCNC,CPTII,S$GLB, | Performed by: NURSE PRACTITIONER

## 2023-09-13 PROCEDURE — 2023F PR DILATED RETINAL EXAM W/O EVID OF RETINOPATHY: ICD-10-PCS | Mod: HCNC,CPTII,S$GLB, | Performed by: NURSE PRACTITIONER

## 2023-09-13 PROCEDURE — 1159F MED LIST DOCD IN RCRD: CPT | Mod: HCNC,CPTII,S$GLB, | Performed by: NURSE PRACTITIONER

## 2023-09-13 PROCEDURE — 2023F DILAT RTA XM W/O RTNOPTHY: CPT | Mod: HCNC,CPTII,S$GLB, | Performed by: NURSE PRACTITIONER

## 2023-09-13 PROCEDURE — 3288F FALL RISK ASSESSMENT DOCD: CPT | Mod: HCNC,CPTII,S$GLB, | Performed by: NURSE PRACTITIONER

## 2023-09-13 PROCEDURE — 3078F DIAST BP <80 MM HG: CPT | Mod: HCNC,CPTII,S$GLB, | Performed by: NURSE PRACTITIONER

## 2023-09-13 PROCEDURE — 1111F DSCHRG MED/CURRENT MED MERGE: CPT | Mod: HCNC,CPTII,S$GLB, | Performed by: NURSE PRACTITIONER

## 2023-09-13 PROCEDURE — 3074F SYST BP LT 130 MM HG: CPT | Mod: HCNC,CPTII,S$GLB, | Performed by: NURSE PRACTITIONER

## 2023-09-13 PROCEDURE — 1125F AMNT PAIN NOTED PAIN PRSNT: CPT | Mod: HCNC,CPTII,S$GLB, | Performed by: NURSE PRACTITIONER

## 2023-09-13 PROCEDURE — 1101F PR PT FALLS ASSESS DOC 0-1 FALLS W/OUT INJ PAST YR: ICD-10-PCS | Mod: HCNC,CPTII,S$GLB, | Performed by: NURSE PRACTITIONER

## 2023-09-13 PROCEDURE — 99214 PR OFFICE/OUTPT VISIT, EST, LEVL IV, 30-39 MIN: ICD-10-PCS | Mod: HCNC,S$GLB,, | Performed by: NURSE PRACTITIONER

## 2023-09-13 PROCEDURE — 3051F HG A1C>EQUAL 7.0%<8.0%: CPT | Mod: HCNC,CPTII,S$GLB, | Performed by: NURSE PRACTITIONER

## 2023-09-13 PROCEDURE — 3060F PR POS MICROALBUMINURIA RESULT DOCUMENTED/REVIEW: ICD-10-PCS | Mod: HCNC,CPTII,S$GLB, | Performed by: NURSE PRACTITIONER

## 2023-09-13 PROCEDURE — 3008F PR BODY MASS INDEX (BMI) DOCUMENTED: ICD-10-PCS | Mod: HCNC,CPTII,S$GLB, | Performed by: NURSE PRACTITIONER

## 2023-09-13 RX ORDER — HYDROCODONE BITARTRATE AND ACETAMINOPHEN 5; 325 MG/1; MG/1
1 TABLET ORAL EVERY 6 HOURS PRN
Qty: 3 TABLET | Refills: 0 | Status: SHIPPED | OUTPATIENT
Start: 2023-09-13 | End: 2023-09-13 | Stop reason: SDUPTHER

## 2023-09-13 RX ORDER — HYDROCODONE BITARTRATE AND ACETAMINOPHEN 5; 325 MG/1; MG/1
1 TABLET ORAL EVERY 6 HOURS PRN
Qty: 3 TABLET | Refills: 0 | Status: SHIPPED | OUTPATIENT
Start: 2023-09-13 | End: 2023-09-13

## 2023-09-13 RX ORDER — HYDROCODONE BITARTRATE AND ACETAMINOPHEN 5; 325 MG/1; MG/1
1 TABLET ORAL EVERY 6 HOURS PRN
Qty: 3 TABLET | Refills: 0 | Status: SHIPPED | OUTPATIENT
Start: 2023-09-13 | End: 2023-09-14

## 2023-09-13 NOTE — PROGRESS NOTES
Transitional Care Note  Subjective:       Patient ID: Doris Pastrana is a 72 y.o. female.  Chief Complaint: Transitional Care    Family and/or Caretaker present at visit?  No.  Diagnostic tests reviewed/disposition: No diagnosic tests pending after this hospitalization.  Disease/illness education: None needed  Home health/community services discussion/referrals: Patient does not have home health established from hospital visit.  They do not need home health.  If needed, we will set up home health for the patient.   Establishment or re-establishment of referral orders for community resources: No other necessary community resources.   Discussion with other health care providers: No discussion with other health care providers necessary.   Doris Pastrana is a 72 year old female who presents to discharge clinic after being admitted for right nephrolithiasis wit mild right hydronephrosis. Patient underwent ureteral JJ stent with Dr. Stewart. Patient complaining of ongoing pain that has been uncontrolled with prescribed Toradol. Patient states she has had stents in the past and did not recall pain being as bad. Patient describes a pressure with constant ache that persists despite medication. She denies fever, chills, nausea and vomiting. She states she is scheduled for follow up with Urology 9/20 and would like to be seen earlier due to pain and discomfort.      Review of Systems   Constitutional:  Negative for activity change, appetite change, chills and fever.   HENT:  Negative for congestion, sore throat and trouble swallowing.    Eyes:  Negative for photophobia and visual disturbance.   Respiratory:  Negative for cough, chest tightness and shortness of breath.    Cardiovascular:  Negative for chest pain, palpitations and leg swelling.   Gastrointestinal:  Negative for abdominal pain, diarrhea and nausea.   Genitourinary:  Positive for flank pain. Negative for dysuria and hematuria.   Musculoskeletal:  Negative  for back pain.   Neurological:  Negative for dizziness, weakness and headaches.   Psychiatric/Behavioral:  Negative for confusion.        Objective:      Physical Exam  Vitals reviewed.   Constitutional:       Appearance: Normal appearance. She is normal weight.   HENT:      Head: Normocephalic.      Mouth/Throat:      Mouth: Mucous membranes are moist.      Pharynx: Oropharynx is clear.   Eyes:      Pupils: Pupils are equal, round, and reactive to light.   Cardiovascular:      Rate and Rhythm: Normal rate.      Pulses: Normal pulses.   Pulmonary:      Effort: Pulmonary effort is normal.      Breath sounds: Normal breath sounds.   Abdominal:      General: Bowel sounds are normal.      Comments: Mild tenderness to right flank and also reports some tenderness to left flank   Musculoskeletal:         General: Normal range of motion.      Cervical back: Normal range of motion.   Skin:     General: Skin is warm and dry.   Neurological:      Mental Status: She is alert and oriented to person, place, and time. Mental status is at baseline.   Psychiatric:         Mood and Affect: Mood normal.         Assessment:       1. Nephrolithiasis        Plan:       -Follow up with Dr. Stewart tomorrow as scheduled.

## 2023-09-14 ENCOUNTER — OFFICE VISIT (OUTPATIENT)
Dept: UROLOGY | Facility: CLINIC | Age: 73
End: 2023-09-14
Payer: MEDICARE

## 2023-09-14 ENCOUNTER — HOSPITAL ENCOUNTER (OUTPATIENT)
Dept: RADIOLOGY | Facility: HOSPITAL | Age: 73
Discharge: HOME | End: 2023-09-14
Attending: UROLOGY
Payer: MEDICARE

## 2023-09-14 VITALS
HEART RATE: 106 BPM | SYSTOLIC BLOOD PRESSURE: 119 MMHG | DIASTOLIC BLOOD PRESSURE: 69 MMHG | BODY MASS INDEX: 28.02 KG/M2 | HEIGHT: 65 IN | WEIGHT: 168.19 LBS

## 2023-09-14 DIAGNOSIS — N20.0 NEPHROLITHIASIS: Primary | ICD-10-CM

## 2023-09-14 DIAGNOSIS — N20.0 NEPHROLITHIASIS: ICD-10-CM

## 2023-09-14 LAB
BACTERIA #/AREA URNS AUTO: ABNORMAL /HPF
BILIRUBIN, UA POC OHS: NEGATIVE
BLOOD, UA POC OHS: ABNORMAL
CLARITY, UA POC OHS: CLEAR
COLOR, UA POC OHS: YELLOW
GLUCOSE, UA POC OHS: NEGATIVE
KETONES, UA POC OHS: NEGATIVE
LEUKOCYTES, UA POC OHS: ABNORMAL
MICROSCOPIC COMMENT: ABNORMAL
NITRITE, UA POC OHS: NEGATIVE
PH, UA POC OHS: 6.5
PROTEIN, UA POC OHS: 100
RBC #/AREA URNS AUTO: 24 /HPF (ref 0–4)
SPECIFIC GRAVITY, UA POC OHS: >=1.03
UROBILINOGEN, UA POC OHS: 0.2
WBC #/AREA URNS AUTO: 45 /HPF (ref 0–5)

## 2023-09-14 PROCEDURE — 3008F PR BODY MASS INDEX (BMI) DOCUMENTED: ICD-10-PCS | Mod: HCNC,CPTII,S$GLB, | Performed by: UROLOGY

## 2023-09-14 PROCEDURE — 1160F RVW MEDS BY RX/DR IN RCRD: CPT | Mod: HCNC,CPTII,S$GLB, | Performed by: UROLOGY

## 2023-09-14 PROCEDURE — 3060F PR POS MICROALBUMINURIA RESULT DOCUMENTED/REVIEW: ICD-10-PCS | Mod: HCNC,CPTII,S$GLB, | Performed by: UROLOGY

## 2023-09-14 PROCEDURE — 99999 PR PBB SHADOW E&M-EST. PATIENT-LVL V: ICD-10-PCS | Mod: PBBFAC,HCNC,, | Performed by: UROLOGY

## 2023-09-14 PROCEDURE — 99999 PR PBB SHADOW E&M-EST. PATIENT-LVL V: CPT | Mod: PBBFAC,HCNC,, | Performed by: UROLOGY

## 2023-09-14 PROCEDURE — 3288F PR FALLS RISK ASSESSMENT DOCUMENTED: ICD-10-PCS | Mod: HCNC,CPTII,S$GLB, | Performed by: UROLOGY

## 2023-09-14 PROCEDURE — 3288F FALL RISK ASSESSMENT DOCD: CPT | Mod: HCNC,CPTII,S$GLB, | Performed by: UROLOGY

## 2023-09-14 PROCEDURE — 1111F PR DISCHARGE MEDS RECONCILED W/ CURRENT OUTPATIENT MED LIST: ICD-10-PCS | Mod: HCNC,CPTII,S$GLB, | Performed by: UROLOGY

## 2023-09-14 PROCEDURE — 3051F HG A1C>EQUAL 7.0%<8.0%: CPT | Mod: HCNC,CPTII,S$GLB, | Performed by: UROLOGY

## 2023-09-14 PROCEDURE — 99214 OFFICE O/P EST MOD 30 MIN: CPT | Mod: HCNC,S$GLB,, | Performed by: UROLOGY

## 2023-09-14 PROCEDURE — 99214 PR OFFICE/OUTPT VISIT, EST, LEVL IV, 30-39 MIN: ICD-10-PCS | Mod: HCNC,S$GLB,, | Performed by: UROLOGY

## 2023-09-14 PROCEDURE — 3060F POS MICROALBUMINURIA REV: CPT | Mod: HCNC,CPTII,S$GLB, | Performed by: UROLOGY

## 2023-09-14 PROCEDURE — 87086 URINE CULTURE/COLONY COUNT: CPT | Mod: HCNC | Performed by: UROLOGY

## 2023-09-14 PROCEDURE — 3051F PR MOST RECENT HEMOGLOBIN A1C LEVEL 7.0 - < 8.0%: ICD-10-PCS | Mod: HCNC,CPTII,S$GLB, | Performed by: UROLOGY

## 2023-09-14 PROCEDURE — 81003 URINALYSIS AUTO W/O SCOPE: CPT | Mod: QW,HCNC,S$GLB, | Performed by: UROLOGY

## 2023-09-14 PROCEDURE — 3078F DIAST BP <80 MM HG: CPT | Mod: HCNC,CPTII,S$GLB, | Performed by: UROLOGY

## 2023-09-14 PROCEDURE — 3066F NEPHROPATHY DOC TX: CPT | Mod: HCNC,CPTII,S$GLB, | Performed by: UROLOGY

## 2023-09-14 PROCEDURE — 81003 POCT URINALYSIS(INSTRUMENT): ICD-10-PCS | Mod: QW,HCNC,S$GLB, | Performed by: UROLOGY

## 2023-09-14 PROCEDURE — 1159F MED LIST DOCD IN RCRD: CPT | Mod: HCNC,CPTII,S$GLB, | Performed by: UROLOGY

## 2023-09-14 PROCEDURE — 4010F ACE/ARB THERAPY RXD/TAKEN: CPT | Mod: HCNC,CPTII,S$GLB, | Performed by: UROLOGY

## 2023-09-14 PROCEDURE — 3008F BODY MASS INDEX DOCD: CPT | Mod: HCNC,CPTII,S$GLB, | Performed by: UROLOGY

## 2023-09-14 PROCEDURE — 3066F PR DOCUMENTATION OF TREATMENT FOR NEPHROPATHY: ICD-10-PCS | Mod: HCNC,CPTII,S$GLB, | Performed by: UROLOGY

## 2023-09-14 PROCEDURE — 1159F PR MEDICATION LIST DOCUMENTED IN MEDICAL RECORD: ICD-10-PCS | Mod: HCNC,CPTII,S$GLB, | Performed by: UROLOGY

## 2023-09-14 PROCEDURE — 1125F AMNT PAIN NOTED PAIN PRSNT: CPT | Mod: HCNC,CPTII,S$GLB, | Performed by: UROLOGY

## 2023-09-14 PROCEDURE — 1111F DSCHRG MED/CURRENT MED MERGE: CPT | Mod: HCNC,CPTII,S$GLB, | Performed by: UROLOGY

## 2023-09-14 PROCEDURE — 3074F SYST BP LT 130 MM HG: CPT | Mod: HCNC,CPTII,S$GLB, | Performed by: UROLOGY

## 2023-09-14 PROCEDURE — 81001 URINALYSIS AUTO W/SCOPE: CPT | Mod: HCNC | Performed by: UROLOGY

## 2023-09-14 PROCEDURE — 3078F PR MOST RECENT DIASTOLIC BLOOD PRESSURE < 80 MM HG: ICD-10-PCS | Mod: HCNC,CPTII,S$GLB, | Performed by: UROLOGY

## 2023-09-14 PROCEDURE — 74018 RADEX ABDOMEN 1 VIEW: CPT | Mod: TC

## 2023-09-14 PROCEDURE — 1160F PR REVIEW ALL MEDS BY PRESCRIBER/CLIN PHARMACIST DOCUMENTED: ICD-10-PCS | Mod: HCNC,CPTII,S$GLB, | Performed by: UROLOGY

## 2023-09-14 PROCEDURE — 1101F PT FALLS ASSESS-DOCD LE1/YR: CPT | Mod: HCNC,CPTII,S$GLB, | Performed by: UROLOGY

## 2023-09-14 PROCEDURE — 74018 RADEX ABDOMEN 1 VIEW: CPT | Mod: 26,,, | Performed by: RADIOLOGY

## 2023-09-14 PROCEDURE — 74018 XR ABDOMEN AP 1 VIEW: ICD-10-PCS | Mod: 26,,, | Performed by: RADIOLOGY

## 2023-09-14 PROCEDURE — 4010F PR ACE/ARB THEARPY RXD/TAKEN: ICD-10-PCS | Mod: HCNC,CPTII,S$GLB, | Performed by: UROLOGY

## 2023-09-14 PROCEDURE — 3074F PR MOST RECENT SYSTOLIC BLOOD PRESSURE < 130 MM HG: ICD-10-PCS | Mod: HCNC,CPTII,S$GLB, | Performed by: UROLOGY

## 2023-09-14 PROCEDURE — 1125F PR PAIN SEVERITY QUANTIFIED, PAIN PRESENT: ICD-10-PCS | Mod: HCNC,CPTII,S$GLB, | Performed by: UROLOGY

## 2023-09-14 PROCEDURE — 1101F PR PT FALLS ASSESS DOC 0-1 FALLS W/OUT INJ PAST YR: ICD-10-PCS | Mod: HCNC,CPTII,S$GLB, | Performed by: UROLOGY

## 2023-09-14 RX ORDER — OXYBUTYNIN CHLORIDE 10 MG/1
10 TABLET, EXTENDED RELEASE ORAL DAILY
Qty: 30 TABLET | Refills: 0 | Status: SHIPPED | OUTPATIENT
Start: 2023-09-14 | End: 2024-02-20

## 2023-09-14 RX ORDER — HYDROCODONE BITARTRATE AND ACETAMINOPHEN 5; 325 MG/1; MG/1
1 TABLET ORAL EVERY 6 HOURS PRN
Qty: 10 TABLET | Refills: 0 | Status: SHIPPED | OUTPATIENT
Start: 2023-09-14 | End: 2024-02-20

## 2023-09-14 NOTE — PATIENT INSTRUCTIONS
Right ureteral stone s/p stent with vaginal pain      Plan:     I have her scheduled for right ureteroscopy and stone extraction for next Wednesday.  We will plan to remove the right proximal ureteral stone and the 1 kidney stone in that right kidney.  She still has 2 other tiny stones on the left.  Nonobstructing.  Says she has some left flank pain but I suspect this is musculoskeletal since it is tender to palpation.  Discontinue Flomax which can be Contributing possibly to her symptoms.    Took Toradol and tramadol off of her med list since it is causing itching  Start oxybutynin 10 mg once daily.  It can cause dry mouth and constipation so needs to be taking MiraLax 1 cap daily or 2-3 fiber gummies a day while taking oxybutynin and pain medication.  Oxybutynin is for feeling like she has to urinate or for stabbing pain/bladder spasms  In wrote for norco 5 dispense 10 as this seems to help more with the pain then the tramadol.  Discussed her procedure for next week and ultimately will need follow-up for left renal stones including a 24 urine  Send voided urine for ua kathryn and culture to confirm no infection.  Continue cipro until next week      Return on wedendsay 9/20for right ureteroscopy and stone extraction of remaining stone(s)-      or continue the following medications from the pharmacy unless they have some remaining at home:  PAIN MEDICATION:norco 5  dispense 10   Take every 8 hours as needed for pain- can cause constipation   MIRALAX, FIBER GUMMIES OR STOOL SOFTENERS AND TAKE DAILY while taking Tramadol to prevent constipation  FOR BLADDER PAIN AND SPASMS/CRAMPING:    AZO available over the counter (in the UTI section) to help with    AND START Ditropan 10mg/Oxybutynin 10mg XL  dispense 30   take once daily as needed for bladder spasms

## 2023-09-14 NOTE — PROGRESS NOTES
[facility] Consult Note - [place] [facility]  Staff: MD Pat  Group:Pat/Waqas/Feroz/Lee/Ladan  Referring provider and please cc:    PCP: Tor Garg MD  Date of Service: 09/14/2023    CC: stone      Subjective:      [facility] Established [place] by   Doris Pastrana is a 71 y.o. female who presents for follow up for left nephrolithiasis.   Patient with a history of DM, HTN, nephrolithiasis and TIA who presented to the emergency department on 4/10/22 complaining of left flank pain associated with nausea and emesis for approximately 2 days.    CT renal stone revealed a left proximal 8 mm ureteral stone with hydroureteronephrosis. Also with bilateral non-obstructing stones. Urology consulted to assist with management.    WBC 12 and Creatinine 1.1.    She has a history of kidney stones s/p ESWL in the past with Dr. Stewart. States she also passed a stone previously.         Interval History 6/9/22 by  She underwent left ureteral stent placement on 4/10/22. She subsequently had left ureteroscopy and laser lithotripsy of her stone on 4/26/22. Stent removed at home. Had a CT post-operatively per her PCP. Revealed a 3 mm distal ureteral fragment that she passed. Pain has resolved. Doing well with no complaints. Denies any fever, chills, gross hematuria, recent urinary tract infection,  trauma or history of  malignancy    Initial consult by me in hospital on 9/6/23 for kidney stone:     Doris Pastrana is a 72 y.o. female admitted for No admission diagnoses are documented for this encounter. on (Not on file).     The patient has been referred for a kidney stone found: presented to ER today with R sided flank pain radiating to groin, pain started this morning. Also c/o dysuria. +vomiting. Fever to 100 at home 2 days ago, no fevers now.  Wbc 14.5. Cr normal. Cath urine + for nitrites/3+bld/3+leuk, >100 rbc/>100 wbc/few bact/mod  wbc. Cr 0.8.     Imaging: ctrss shows proximal 5mm stone and 3mm RLP stone. 2 tiny stones in left kidney.             Cysto R stent on 9/6/23  The stone was radio-opaque.  The patient did not have right hydronephrosis proximal to the stone on a gentle retrograde pyelogram.  But did have R UPJo?  The urine was cloudy from kidney and bladder.    Placement of the wire and stent was not difficult    9/6 culture grew e.coli. she's been on cipro.     Interval history by ME in CLINIC on 9/14/23:  Her urine culture ended up growing E coli.  She is been on Cipro since.  She was discharged and initially was doing okay but says that she started feeling some pelvic pressure and vaginal pain/stabbing pain.  She saw her primary care who gave her 3 Norco yesterday.  She would tried tramadol but it caused her itching in the Norco seems to help.  Voided urine today shows moderate blood and trace leukocytes.  She still on Cipro.  Denies any burning with urination.  I had her get a KUB (Xray of the abdomen to look for stones) to make sure the stent was in place.  Independent review of the KUB (Xray of the abdomen to look for stones) shows that the stent is in correct location but the distal coil is going across her bladder which is likely causing her the symptoms that she is experiencing.  Has some L flank pain but tender        Urine history:   9/6/20  e.coli            Recent Labs   Lab 06/28/23  0742 09/06/23  1349 09/07/23  0546   WBC 6.82 14.48 H 15.29 H   Hemoglobin 11.9 L 12.8 11.2 L   Hematocrit 38.2 38.9 34.2 L   Platelets 264 254 217   ]  Recent Labs   Lab 04/11/22  0439 06/16/22  0832 06/28/23  0742 09/06/23  1349 09/07/23  0546   Sodium 141   < > 142 139 139   Potassium 4.2   < > 4.0 4.2 4.0   Chloride 105   < > 103 100 107   CO2 24   < > 29 26 29   BUN 20   < > 16 14 12   Creatinine 0.8   < > 0.8 0.9 0.5   Glucose 148 H   < > 136 H 121 H 166 H   Calcium 9.0   < > 9.5 10.0 8.6 L   Magnesium 1.9  --  1.9  --  1.7    Phosphorus  --   --   --   --  2.1 L   Alkaline Phosphatase  --    < > 130 111 82   Total Protein  --    < > 6.7 7.4 6.1   Albumin  --    < > 3.6 4.2 3.4 L   Total Bilirubin  --    < > 0.6 1.3 H 0.6   AST  --    < > 21 18 22   ALT  --    < > 35 34 33    < > = values in this interval not displayed.   ]    Lab Results   Component Value Date    HGBA1C 7.8 (H) 2023         Current REVIEW OF SYSTEMS:  Negative for the remaining 12 points of ROS except for as stated above    Fam Hx:  Reviewed- pertinent family hx as above    Soc Hx:  Social History     Tobacco Use    Smoking status: Former     Current packs/day: 0.00     Types: Cigarettes     Quit date: 3/12/1983     Years since quittin.5    Smokeless tobacco: Never   Substance Use Topics    Alcohol use: Yes     Comment: socially    Drug use: No       Allergies:  Omnicef [cefdinir]; Codeine; Rhubarb; Strawberries [strawberry]; Iodine; Latex, natural rubber; Pravastatin; Toradol [ketorolac]; and Tramadol      Objective:     Vitals:    23 1109   BP: 119/69   Pulse: 106             Assessment:     Doris Pastrana is a 72 y.o. female with   1. Nephrolithiasis      Right ureteral stone s/p stent with vaginal pain      Plan:     I have her scheduled for right ureteroscopy and stone extraction for next Wednesday.  We will plan to remove the right proximal ureteral stone and the 1 kidney stone in that right kidney.  She still has 2 other tiny stones on the left.  Nonobstructing.  Says she has some left flank pain but I suspect this is musculoskeletal since it is tender to palpation.  Discontinue Flomax which can be Contributing possibly to her symptoms.    Took Toradol and tramadol off of her med list since it is causing itching  Start oxybutynin 10 mg once daily.  It can cause dry mouth and constipation so needs to be taking MiraLax 1 cap daily or 2-3 fiber gummies a day while taking oxybutynin and pain medication.  Oxybutynin is for feeling like she has to  urinate or for stabbing pain/bladder spasms  In wrote for norco 5 dispense 10 as this seems to help more with the pain then the tramadol.  Discussed her procedure for next week and ultimately will need follow-up for left renal stones including a 24 urine  Send voided urine for ua kathryn and culture to confirm no infection.  Continue cipro until next week      Return on wedendsay 9/20for right ureteroscopy and stone extraction of remaining stone(s)-      or continue the following medications from the pharmacy unless they have some remaining at home:  PAIN MEDICATION:norco 5  dispense 10   Take every 8 hours as needed for pain- can cause constipation   MIRALAX, FIBER GUMMIES OR STOOL SOFTENERS AND TAKE DAILY while taking Tramadol to prevent constipation  FOR BLADDER PAIN AND SPASMS/CRAMPING:    AZO available over the counter (in the UTI section) to help with    AND START Ditropan 10mg/Oxybutynin 10mg XL  dispense 30   take once daily as needed for bladder spasms

## 2023-09-15 LAB — BACTERIA UR CULT: NO GROWTH

## 2023-09-16 ENCOUNTER — TELEPHONE (OUTPATIENT)
Dept: ADMINISTRATIVE | Facility: CLINIC | Age: 73
End: 2023-09-16
Payer: MEDICARE

## 2023-09-16 ENCOUNTER — PATIENT OUTREACH (OUTPATIENT)
Dept: ADMINISTRATIVE | Facility: OTHER | Age: 73
End: 2023-09-16
Payer: MEDICARE

## 2023-09-19 ENCOUNTER — ANESTHESIA EVENT (OUTPATIENT)
Dept: SURGERY | Facility: HOSPITAL | Age: 73
End: 2023-09-19
Payer: MEDICARE

## 2023-09-19 NOTE — H&P
[facility] Consult Note - [place] [facility]  Staff: MD Pat  Group:Pat/Waqas/Feroz/Lee/Ladan  Referring provider and please cc:    PCP: Tor Garg MD  Date of Service: 09/19/2023    CC: stone      Subjective:      [facility] Established [place] by   Doris Pastrana is a 71 y.o. female who presents for follow up for left nephrolithiasis.   Patient with a history of DM, HTN, nephrolithiasis and TIA who presented to the emergency department on 4/10/22 complaining of left flank pain associated with nausea and emesis for approximately 2 days.    CT renal stone revealed a left proximal 8 mm ureteral stone with hydroureteronephrosis. Also with bilateral non-obstructing stones. Urology consulted to assist with management.    WBC 12 and Creatinine 1.1.    She has a history of kidney stones s/p ESWL in the past with Dr. Stewart. States she also passed a stone previously.         Interval History 6/9/22 by  She underwent left ureteral stent placement on 4/10/22. She subsequently had left ureteroscopy and laser lithotripsy of her stone on 4/26/22. Stent removed at home. Had a CT post-operatively per her PCP. Revealed a 3 mm distal ureteral fragment that she passed. Pain has resolved. Doing well with no complaints. Denies any fever, chills, gross hematuria, recent urinary tract infection,  trauma or history of  malignancy    Initial consult by me in hospital on 9/6/23 for kidney stone:     Doris Pastrana is a 72 y.o. female admitted for Nephrolithiasis [N20.0] on (Not on file).     The patient has been referred for a kidney stone found: presented to ER today with R sided flank pain radiating to groin, pain started this morning. Also c/o dysuria. +vomiting. Fever to 100 at home 2 days ago, no fevers now.  Wbc 14.5. Cr normal. Cath urine + for nitrites/3+bld/3+leuk, >100 rbc/>100 wbc/few bact/mod wbc. Cr 0.8.     Imaging: ctrss  shows proximal 5mm stone and 3mm RLP stone. 2 tiny stones in left kidney.             Cysto R stent on 9/6/23  The stone was radio-opaque.  The patient did not have right hydronephrosis proximal to the stone on a gentle retrograde pyelogram.  But did have R UPJo?  The urine was cloudy from kidney and bladder.    Placement of the wire and stent was not difficult    9/6 culture grew e.coli. she's been on cipro.     Interval history by ME in CLINIC on 9/14/23:  Her urine culture ended up growing E coli.  She is been on Cipro since.  She was discharged and initially was doing okay but says that she started feeling some pelvic pressure and vaginal pain/stabbing pain.  She saw her primary care who gave her 3 Norco yesterday.  She would tried tramadol but it caused her itching in the Norco seems to help.  Voided urine today shows moderate blood and trace leukocytes.  She still on Cipro.  Denies any burning with urination.  I had her get a KUB (Xray of the abdomen to look for stones) to make sure the stent was in place.  Independent review of the KUB (Xray of the abdomen to look for stones) shows that the stent is in correct location but the distal coil is going across her bladder which is likely causing her the symptoms that she is experiencing.  Has some L flank pain but tender        Interval history/H&P Update by me prior to R urs and stone extraction on 9/20/23:  9/14/23 Neg, mod bld/tr wbc/20 rbc/40 wbc  Has been on cipro for the last 2 weeks since her laast procedure.   No blood thinners.         Urine history:  no blood thinners. + stones.   9/14/23 Neg, mod bld/tr wbc/20 rbc/40 wbc  9/6/23   e.coli            Recent Labs   Lab 06/28/23  0742 09/06/23  1349 09/07/23  0546   WBC 6.82 14.48 H 15.29 H   Hemoglobin 11.9 L 12.8 11.2 L   Hematocrit 38.2 38.9 34.2 L   Platelets 264 254 217   ]  Recent Labs   Lab 04/11/22  0439 06/16/22  0832 06/28/23  0742 09/06/23  1349 09/07/23  0546   Sodium 141   < > 142 139 139    Potassium 4.2   < > 4.0 4.2 4.0   Chloride 105   < > 103 100 107   CO2 24   < > 29 26 29   BUN 20   < > 16 14 12   Creatinine 0.8   < > 0.8 0.9 0.5   Glucose 148 H   < > 136 H 121 H 166 H   Calcium 9.0   < > 9.5 10.0 8.6 L   Magnesium 1.9  --  1.9  --  1.7   Phosphorus  --   --   --   --  2.1 L   Alkaline Phosphatase  --    < > 130 111 82   Total Protein  --    < > 6.7 7.4 6.1   Albumin  --    < > 3.6 4.2 3.4 L   Total Bilirubin  --    < > 0.6 1.3 H 0.6   AST  --    < > 21 18 22   ALT  --    < > 35 34 33    < > = values in this interval not displayed.   ]    Lab Results   Component Value Date    HGBA1C 7.8 (H) 2023         Current REVIEW OF SYSTEMS:  Negative for the remaining 12 points of ROS except for as stated above    Fam Hx:  Reviewed- pertinent family hx as above    Soc Hx:  Social History     Tobacco Use    Smoking status: Former     Current packs/day: 0.00     Types: Cigarettes     Quit date: 3/12/1983     Years since quittin.5    Smokeless tobacco: Never   Substance Use Topics    Alcohol use: Yes     Comment: socially    Drug use: No       Allergies:  Omnicef [cefdinir]; Codeine; Rhubarb; Strawberries [strawberry]; Iodine; Latex, natural rubber; Pravastatin; Toradol [ketorolac]; and Tramadol      Objective:   Vitals reviewed    General:wdwn  in NAD  Neurologic: CN grossly normal. Normal sensation.   Psychiatric: awake, alert and oriented x 3. Mood and affect Normal. Cooperative.  Eyes: PERRLA, normal conjunctiva  Respiratory: no increased work on breathing. No wheezing.   Cardiovascular: No obvious extremity edema. Warm and well perfused.  GI: no obvious stomach distension  Musculoskeletal: normal  range of motion of bilateral upper extremities. Normal muscle strength and tone.  Skin: no obvious rashes or lesions. No tightening of skin noted.    Pertinent  exam in HPI      Recent Labs   Lab 23  0742 23  1349 23  0546   WBC 6.82 14.48 H 15.29 H   Hemoglobin 11.9 L 12.8 11.2  L   Hematocrit 38.2 38.9 34.2 L   Platelets 264 254 217   ]  Recent Labs   Lab 04/11/22  0439 06/16/22  0832 06/28/23  0742 09/06/23  1349 09/07/23  0546   Sodium 141   < > 142 139 139   Potassium 4.2   < > 4.0 4.2 4.0   Chloride 105   < > 103 100 107   CO2 24   < > 29 26 29   BUN 20   < > 16 14 12   Creatinine 0.8   < > 0.8 0.9 0.5   Glucose 148 H   < > 136 H 121 H 166 H   Calcium 9.0   < > 9.5 10.0 8.6 L   Magnesium 1.9  --  1.9  --  1.7   Phosphorus  --   --   --   --  2.1 L   Alkaline Phosphatase  --    < > 130 111 82   Total Protein  --    < > 6.7 7.4 6.1   Albumin  --    < > 3.6 4.2 3.4 L   Total Bilirubin  --    < > 0.6 1.3 H 0.6   AST  --    < > 21 18 22   ALT  --    < > 35 34 33    < > = values in this interval not displayed.   ]    Lab Results   Component Value Date    HGBA1C 7.8 (H) 06/28/2023             Assessment:     Doris Pastrana is a 72 y.o. female with   1. Pre-op testing      Right ureteral stone s/p stent with vaginal pain      Plan:     I have her scheduled for right ureteroscopy and stone extraction for today.  We will plan to remove the right proximal ureteral stone and the 1 kidney stone in that right kidney.  She still has 2 other tiny stones on the left.  Nonobstructing.  Says she has some left flank pain but I suspect this is musculoskeletal since it is tender to palpation.  no Flomax which can be Contributing possibly to her symptoms.    no Toradol and tramadol off of her med list since it is causing itching  She has more norco 5 dispense 10 as this seems to help more with the pain then the tramadol.  Has been on cipro    Still has:  Some tiny left stones  Needs 24 hour urine  Fu in 3 motnhs with uro np to review 24 hour urine and rbus to ensure no hydro.  Otherwise kub and rbus in a year to monitor tiny left stones.           To OR today forcystoscopy, right  stent exchange and stone extraction with possible laser lithotripsy.   We discussed the difference in stone treatment options  and why we are proceeding with this treatment plan.    I will first remove stone, gain access to stone location and will attempt to reach stone, laser/fragment it if necessary and then basket out the fragments.  A stent will be placed likely on strings to be removed at the designated time by the patient so as not to have to need another procedure to remove the stent.  it was explained to the patient that it is very easy to remove the stent and does not painful to the remove the stent but in fact the pain usually occurs within a few hours after the stent is removed and is managed with pain medicine.      Stent consent:  The patient was counseled extensively on the stent being only temporary. The pt understands that a stent should not be in longer than 1 year, and preferably less than 3 months. A stent can become encrusted, block urine flow and cause kidney function loss if it is not exchanged or removed at least within a year.  They understand that if the stent remains longer becomes encrusted it would require major surgeries and possible nephrectomy to remove the stent and or stones or affected kidney if unable to treat the stones endoscopically.      Ureteroscopy consent info:  The risks and benefits of ureteroscopy were discussed with the patient in detail.  Consent was obtained.  The risks include but are not limited to burning with urination, bleeding, infection, pain, incomplete fragmentation of the stone, need for further procedures, injury to the kidney, ureter, bladder, ureteral stricture and need for open surgery.  The patient was informed that they may require a ureteral stent and that stents can cause irritative voiding symptoms.  They also understand that ureteral stents are temporary and must be removed or exchanged in a timely fashion as they can calcify and make more stones and become difficult to remove. Alternative treatments were also discussed with the patient in detail to include ESWL,  percutaneous treatment of the stone, open surgery or observation. Patient understands these risks and has agreed to proceed with surgery.

## 2023-09-20 ENCOUNTER — HOSPITAL ENCOUNTER (OUTPATIENT)
Facility: HOSPITAL | Age: 73
Discharge: HOME | End: 2023-09-20
Attending: UROLOGY | Admitting: UROLOGY
Payer: MEDICARE

## 2023-09-20 ENCOUNTER — ANESTHESIA (OUTPATIENT)
Dept: SURGERY | Facility: HOSPITAL | Age: 73
End: 2023-09-20
Payer: MEDICARE

## 2023-09-20 DIAGNOSIS — N20.0 NEPHROLITHIASIS: ICD-10-CM

## 2023-09-20 DIAGNOSIS — Z01.810 PREOP CARDIOVASCULAR EXAM: ICD-10-CM

## 2023-09-20 DIAGNOSIS — Z01.818 PRE-OP TESTING: Primary | ICD-10-CM

## 2023-09-20 PROCEDURE — 37000008 HC ANESTHESIA 1ST 15 MINUTES: Performed by: UROLOGY

## 2023-09-20 PROCEDURE — 52332 CYSTOSCOPY AND TREATMENT: CPT | Mod: 51,RT,, | Performed by: UROLOGY

## 2023-09-20 PROCEDURE — 25000003 PHARM REV CODE 250: Performed by: NURSE ANESTHETIST, CERTIFIED REGISTERED

## 2023-09-20 PROCEDURE — 63600175 PHARM REV CODE 636 W HCPCS: Performed by: ANESTHESIOLOGY

## 2023-09-20 PROCEDURE — 25000003 PHARM REV CODE 250: Performed by: ANESTHESIOLOGY

## 2023-09-20 PROCEDURE — C1769 GUIDE WIRE: HCPCS | Performed by: UROLOGY

## 2023-09-20 PROCEDURE — 93005 ELECTROCARDIOGRAM TRACING: CPT | Mod: 59

## 2023-09-20 PROCEDURE — C1747 OPTIME ENDOSCOPE, SINGLE, URINARY TRACT: HCPCS | Performed by: UROLOGY

## 2023-09-20 PROCEDURE — 27200651 HC AIRWAY, LMA: Performed by: NURSE ANESTHETIST, CERTIFIED REGISTERED

## 2023-09-20 PROCEDURE — 52352 PR CYSTO/URETERO/PYELOSCOPY, CALCULUS TX: ICD-10-PCS | Mod: RT,,, | Performed by: UROLOGY

## 2023-09-20 PROCEDURE — 52352 CYSTOURETERO W/STONE REMOVE: CPT | Mod: RT,,, | Performed by: UROLOGY

## 2023-09-20 PROCEDURE — 82365 CALCULUS SPECTROSCOPY: CPT | Performed by: UROLOGY

## 2023-09-20 PROCEDURE — 74420 PR  X-RAY RETROGRADE PYELOGRAM: ICD-10-PCS | Mod: 26,,, | Performed by: UROLOGY

## 2023-09-20 PROCEDURE — 71000033 HC RECOVERY, INTIAL HOUR: Performed by: UROLOGY

## 2023-09-20 PROCEDURE — 74420 UROGRAPHY RTRGR +-KUB: CPT | Mod: 26,,, | Performed by: UROLOGY

## 2023-09-20 PROCEDURE — D9220A PRA ANESTHESIA: ICD-10-PCS | Mod: CRNA,,, | Performed by: NURSE ANESTHETIST, CERTIFIED REGISTERED

## 2023-09-20 PROCEDURE — 27201423 OPTIME MED/SURG SUP & DEVICES STERILE SUPPLY: Performed by: UROLOGY

## 2023-09-20 PROCEDURE — 52332 PR CYSTOSCOPY,INSERT URETERAL STENT: ICD-10-PCS | Mod: 51,RT,, | Performed by: UROLOGY

## 2023-09-20 PROCEDURE — 36000706: Performed by: UROLOGY

## 2023-09-20 PROCEDURE — 93010 ELECTROCARDIOGRAM REPORT: CPT | Mod: ,,, | Performed by: INTERNAL MEDICINE

## 2023-09-20 PROCEDURE — 71000015 HC POSTOP RECOV 1ST HR: Performed by: UROLOGY

## 2023-09-20 PROCEDURE — C1758 CATHETER, URETERAL: HCPCS | Performed by: UROLOGY

## 2023-09-20 PROCEDURE — 37000009 HC ANESTHESIA EA ADD 15 MINS: Performed by: UROLOGY

## 2023-09-20 PROCEDURE — 25500020 PHARM REV CODE 255: Performed by: UROLOGY

## 2023-09-20 PROCEDURE — C2617 STENT, NON-COR, TEM W/O DEL: HCPCS | Performed by: UROLOGY

## 2023-09-20 PROCEDURE — D9220A PRA ANESTHESIA: Mod: ANES,,, | Performed by: ANESTHESIOLOGY

## 2023-09-20 PROCEDURE — 71000039 HC RECOVERY, EACH ADD'L HOUR: Performed by: UROLOGY

## 2023-09-20 PROCEDURE — D9220A PRA ANESTHESIA: Mod: CRNA,,, | Performed by: NURSE ANESTHETIST, CERTIFIED REGISTERED

## 2023-09-20 PROCEDURE — 63600175 PHARM REV CODE 636 W HCPCS: Performed by: NURSE ANESTHETIST, CERTIFIED REGISTERED

## 2023-09-20 PROCEDURE — C1894 INTRO/SHEATH, NON-LASER: HCPCS | Performed by: UROLOGY

## 2023-09-20 PROCEDURE — 63600175 PHARM REV CODE 636 W HCPCS: Performed by: UROLOGY

## 2023-09-20 PROCEDURE — 93010 EKG 12-LEAD: ICD-10-PCS | Mod: ,,, | Performed by: INTERNAL MEDICINE

## 2023-09-20 PROCEDURE — D9220A PRA ANESTHESIA: ICD-10-PCS | Mod: ANES,,, | Performed by: ANESTHESIOLOGY

## 2023-09-20 PROCEDURE — 36000707: Performed by: UROLOGY

## 2023-09-20 DEVICE — BLACK SILICONE FILIFORM DOUBLE PIGTAIL URETERAL STENT SET
Type: IMPLANTABLE DEVICE | Site: URETER | Status: FUNCTIONAL
Brand: COOK

## 2023-09-20 RX ORDER — HYDROMORPHONE HYDROCHLORIDE 2 MG/ML
0.2 INJECTION, SOLUTION INTRAMUSCULAR; INTRAVENOUS; SUBCUTANEOUS EVERY 5 MIN PRN
Status: DISCONTINUED | OUTPATIENT
Start: 2023-09-20 | End: 2023-09-20 | Stop reason: HOSPADM

## 2023-09-20 RX ORDER — SODIUM CHLORIDE, SODIUM LACTATE, POTASSIUM CHLORIDE, CALCIUM CHLORIDE 600; 310; 30; 20 MG/100ML; MG/100ML; MG/100ML; MG/100ML
INJECTION, SOLUTION INTRAVENOUS CONTINUOUS
Status: DISCONTINUED | OUTPATIENT
Start: 2023-09-20 | End: 2023-09-20 | Stop reason: HOSPADM

## 2023-09-20 RX ORDER — DIPHENHYDRAMINE HYDROCHLORIDE 50 MG/ML
12.5 INJECTION INTRAMUSCULAR; INTRAVENOUS EVERY 6 HOURS PRN
Status: DISCONTINUED | OUTPATIENT
Start: 2023-09-20 | End: 2023-09-20 | Stop reason: HOSPADM

## 2023-09-20 RX ORDER — DEXAMETHASONE SODIUM PHOSPHATE 4 MG/ML
INJECTION, SOLUTION INTRA-ARTICULAR; INTRALESIONAL; INTRAMUSCULAR; INTRAVENOUS; SOFT TISSUE
Status: DISCONTINUED | OUTPATIENT
Start: 2023-09-20 | End: 2023-09-20

## 2023-09-20 RX ORDER — CIPROFLOXACIN 2 MG/ML
400 INJECTION, SOLUTION INTRAVENOUS
Status: COMPLETED | OUTPATIENT
Start: 2023-09-20 | End: 2023-09-20

## 2023-09-20 RX ORDER — ONDANSETRON 2 MG/ML
INJECTION INTRAMUSCULAR; INTRAVENOUS
Status: DISCONTINUED | OUTPATIENT
Start: 2023-09-20 | End: 2023-09-20

## 2023-09-20 RX ORDER — PROPOFOL 10 MG/ML
VIAL (ML) INTRAVENOUS
Status: DISCONTINUED | OUTPATIENT
Start: 2023-09-20 | End: 2023-09-20

## 2023-09-20 RX ORDER — FENTANYL CITRATE 50 UG/ML
INJECTION, SOLUTION INTRAMUSCULAR; INTRAVENOUS
Status: DISCONTINUED | OUTPATIENT
Start: 2023-09-20 | End: 2023-09-20

## 2023-09-20 RX ORDER — LIDOCAINE HYDROCHLORIDE 20 MG/ML
INJECTION INTRAVENOUS
Status: DISCONTINUED | OUTPATIENT
Start: 2023-09-20 | End: 2023-09-20

## 2023-09-20 RX ORDER — MIDAZOLAM HYDROCHLORIDE 1 MG/ML
INJECTION INTRAMUSCULAR; INTRAVENOUS
Status: DISCONTINUED | OUTPATIENT
Start: 2023-09-20 | End: 2023-09-20

## 2023-09-20 RX ORDER — ONDANSETRON 2 MG/ML
4 INJECTION INTRAMUSCULAR; INTRAVENOUS ONCE AS NEEDED
Status: DISCONTINUED | OUTPATIENT
Start: 2023-09-20 | End: 2023-09-20 | Stop reason: HOSPADM

## 2023-09-20 RX ORDER — FENTANYL CITRATE 50 UG/ML
25 INJECTION, SOLUTION INTRAMUSCULAR; INTRAVENOUS EVERY 5 MIN PRN
Status: DISCONTINUED | OUTPATIENT
Start: 2023-09-20 | End: 2023-09-20 | Stop reason: HOSPADM

## 2023-09-20 RX ORDER — OXYCODONE HYDROCHLORIDE 5 MG/1
5 TABLET ORAL
Status: DISCONTINUED | OUTPATIENT
Start: 2023-09-20 | End: 2023-09-20 | Stop reason: HOSPADM

## 2023-09-20 RX ORDER — PHENYLEPHRINE HYDROCHLORIDE 10 MG/ML
INJECTION INTRAVENOUS
Status: DISCONTINUED | OUTPATIENT
Start: 2023-09-20 | End: 2023-09-20

## 2023-09-20 RX ORDER — ACETAMINOPHEN 10 MG/ML
INJECTION, SOLUTION INTRAVENOUS
Status: DISCONTINUED | OUTPATIENT
Start: 2023-09-20 | End: 2023-09-20

## 2023-09-20 RX ORDER — MEPERIDINE HYDROCHLORIDE 50 MG/ML
12.5 INJECTION INTRAMUSCULAR; INTRAVENOUS; SUBCUTANEOUS ONCE AS NEEDED
Status: DISCONTINUED | OUTPATIENT
Start: 2023-09-20 | End: 2023-09-20 | Stop reason: HOSPADM

## 2023-09-20 RX ORDER — LIDOCAINE HYDROCHLORIDE 10 MG/ML
1 INJECTION, SOLUTION EPIDURAL; INFILTRATION; INTRACAUDAL; PERINEURAL ONCE
Status: DISCONTINUED | OUTPATIENT
Start: 2023-09-20 | End: 2023-09-20 | Stop reason: HOSPADM

## 2023-09-20 RX ADMIN — ACETAMINOPHEN 1000 MG: 10 INJECTION, SOLUTION INTRAVENOUS at 11:09

## 2023-09-20 RX ADMIN — LIDOCAINE HYDROCHLORIDE 75 MG: 20 INJECTION, SOLUTION INTRAVENOUS at 11:09

## 2023-09-20 RX ADMIN — FENTANYL CITRATE 50 MCG: 50 INJECTION, SOLUTION INTRAMUSCULAR; INTRAVENOUS at 11:09

## 2023-09-20 RX ADMIN — PHENYLEPHRINE HYDROCHLORIDE 200 MCG: 10 INJECTION INTRAVENOUS at 11:09

## 2023-09-20 RX ADMIN — SODIUM CHLORIDE, SODIUM GLUCONATE, SODIUM ACETATE, POTASSIUM CHLORIDE AND MAGNESIUM CHLORIDE: 526; 502; 368; 37; 30 INJECTION, SOLUTION INTRAVENOUS at 10:09

## 2023-09-20 RX ADMIN — FENTANYL CITRATE 50 MCG: 50 INJECTION, SOLUTION INTRAMUSCULAR; INTRAVENOUS at 12:09

## 2023-09-20 RX ADMIN — FENTANYL CITRATE 25 MCG: 50 INJECTION INTRAMUSCULAR; INTRAVENOUS at 01:09

## 2023-09-20 RX ADMIN — ONDANSETRON 4 MG: 2 INJECTION INTRAMUSCULAR; INTRAVENOUS at 11:09

## 2023-09-20 RX ADMIN — GLYCOPYRROLATE 0.2 MG: 0.2 INJECTION, SOLUTION INTRAMUSCULAR; INTRAVITREAL at 11:09

## 2023-09-20 RX ADMIN — CIPROFLOXACIN 400 MG: 2 INJECTION INTRAVENOUS at 11:09

## 2023-09-20 RX ADMIN — DEXAMETHASONE SODIUM PHOSPHATE 4 MG: 4 INJECTION, SOLUTION INTRA-ARTICULAR; INTRALESIONAL; INTRAMUSCULAR; INTRAVENOUS; SOFT TISSUE at 11:09

## 2023-09-20 RX ADMIN — MIDAZOLAM HYDROCHLORIDE 2 MG: 1 INJECTION, SOLUTION INTRAMUSCULAR; INTRAVENOUS at 11:09

## 2023-09-20 RX ADMIN — SODIUM CHLORIDE, SODIUM GLUCONATE, SODIUM ACETATE, POTASSIUM CHLORIDE AND MAGNESIUM CHLORIDE: 526; 502; 368; 37; 30 INJECTION, SOLUTION INTRAVENOUS at 01:09

## 2023-09-20 RX ADMIN — OXYCODONE HYDROCHLORIDE 5 MG: 5 TABLET ORAL at 01:09

## 2023-09-20 RX ADMIN — PROPOFOL 150 MG: 10 INJECTION, EMULSION INTRAVENOUS at 11:09

## 2023-09-20 NOTE — DISCHARGE INSTRUCTIONS
LEAVE AT TOP OF DISCHARGE INSTRUCTIONS    VERY IMPORTANT INSTRUCTIONS FROM  REGARDING YOUR PROCEDURE- PLEASE READ    Your urologist is Dr.Jennifer Stewart  Office number: [phone] ([facility])  Address: [address] [place] [address],  (2nd office building on left); [address] (located on 2nd floor).     The following are specific instructions for Doris Pastrana is a 72 y.o. female, so please read CAREFULLY:    If you are on blood thinners and are unsure whether to continue, stop or restart them and it is not mentioned above, then call 's office for further directions    Plan for stent removal on strings:  The patient can remove the stent by pulling strings on MONDAY at 6am/early morning (take pain medications prior to removing, expect pain after stent removed but call office or go to ER if pain lasts >48 hours and is severe. See discharge instructions for details.   The stone was sent for an analysis from today and we can send the results to the patient directly.   To help figure out how to decrease the patient's rate of stone production: The patient should complete a 24 hour urine collection kit 4 weeks after the stent comes out.   The collection kit should be mailed by [employer].   Contact the office to have mailed to you if one was not sent within the next few weeks.   Everything should be able to be done from home.   UPS comes and picks up the urine.  We will discuss the results at the follow up.   Return to clinic to see urology nurse practiioner in 12 weeks to discuss to discuss the 24 hour urine results (help determine why patient makes stones) with an ultrasound beforehand to monitor remaining kidney stones. Contact office if none scheduled to confirm if one needs to be done prior to follow up     or continue the following medications from the pharmacy unless they have some remaining at home:  Continue tramadol- has at home.        You have a ureteral stent in your right collecting system that was placed on 09/20/2023  -the stent can only remain for a maximum of 3 to 6 months. If the stent remains longer than this you can get recurrent urinary tract infections, the stent can have more stones form along it and urine will not be able to drain and you will lose all function of your kidney requiring a major surgery and a big incision to remove the kidney.  The ureter is the tube that drains the kidney (where urine is made). It connects the kidney to the bladder (where urine is stored).   A ureteral stent is a is a soft plastic tube with holes in tube that bypasses anything that obstructs (stone, tumor, scar tissue) the urine from flow from the kidney to the bladder. It is placed by going through the urethra and into the bladder. No incisions are made. Its temporarily inserted into a ureter to help drain urine into the bladder. One end goes in the kidney. The other end goes in the bladder. A coil on each end holds the stent in place. The stent cant be seen from outside the body.          Because you have a string attached to the stent and  has asked you to remove the stent yourself, read the following carefully:  When should you remove the stent? You can remove the stent __MONDAY_ at 6am in the morning. If you have not heard from anyone about when to pull the string and remove the stent, remove it two weeks after your surgery by pulling the string. DO NOT cut the string.   Where is the string? What does it look like? It looks like dental floss and will be attached to the outside of your vagina (if you are a female) or to the penis (if you are male) with tape  What happens if you start leaking urine uncontrollably, the stent has likely been partially pulled out? The urine is now coming directly from your kidney and bypassing the urethra (the tube that drains the bladder). Go ahead and remove it if this happens but expect pain  for 24 to 48 hours or more after stent removed, especially if it was removed earlier than expected.  What to expect when you pull the stent out: When you remove the string, you should see a small plastic tube about the size of a cocktail straw and about 12 inches long attached to a string that looks like dental floss. If you do not see this, please let call the office and let us know.   Is it painful?   Removing the stent yourself is not painful, only slightly uncomfortable. However, EXPECT pain AFTER the stent is removed as the ureter closes up due to the inflammation.  left the stent to allow the ureter (the tube that drains the kidney to the bladder) to heal. It may take a few days for the inflammation to improve.  As the inflammation decreases and the ureter opens back up the pain will improve. Pain should last no more than 24 to 48 hours.  Prior to removing the stent, take a Toradol (if you were prescribed this) and/or pain pill 30 minutes before removing the stent in anticipation of this.   If the pain lasts >48 hours OR if you have fever, contact  or go to the ER.   Why have we asked you to remove the stent yourself? The goal of placing this stent on a string is to save you from returning from having another procedure to remove the stent. If you have a stent without a string- while you are awake  would have to take the stent out with a special small camera.  However, if you feel that you cannot remove the string yourself, you can call our clinic and our nurse can walk you through it or she can schedule to have you come in to have it removed.          If you do not receive a follow-up date or further instructions on what is to be done next about the stent, it is your responsibility to make sure that you have follow-up to have your stone or stent removed.     A stent CANNOT remain indefinitely in the kidney. It should not remain if possible more than 3 to 6 months  maximum (rarely 1 year if it was placed for cancer).     If you do not follow-up to have your stent removed then you can LOSE YOUR KIDNEY FUNCTION ON THAT SIDE, develop RECURRENT URINARY TRACT INFECTIONS and MORE STONES requiring SURGICAL OPEN removal of your kidney.    You can call our office ([facility] at [phone] and let them know a stent was placed and you need further instructions for follow-up). If there are issues with insurance we will work with you to help you arrange follow-up where it can be removed. Again,  A STENT CANNOT remain indefinitely. Y      A ureteral stent is left in place for many reasons:  To keep the ureter open after a procedure as there will be much inflammation and if no stent is left you will experience the same pain as having the stone that caused the obstruction.   To drain the kidney of infection.  If the stone is up high and closer to the kideny, if the stone is stuck, or you have an infection, the stent will stretch open the small ureter (the tube that drains the kidney) for a few weeks (usually 2 to 4 weeks max) so that we can return, remove the old stent, place instruments into the ureter or kidney to break up and remove the stone. Sometimes you may need another stent after this procedure.     Ureteral Stent Symptoms can include but are not limited to   Stabbing pain in the kidney or bladder with urination during or especially at the end of voiding. Usually we write you for flomax/tamsulosin and toradol/ketorolac to help stretch and relax the ureter while you have the stent in place. We cannot write for toradol/ketorolac if you have poor kidney function. Please call if you have not received these prescriptions.  constant urge to urinate, frequency of urination, severe bladder spasms and severe pain the kidney, especially during urination. If this is very bothersome we can write you for ditropan, an overactive bladder medication to take short term to help with  the spasms.   blood in the urine, especially with increased activity. This can last the entire time the stent is in, it can sometimes clear and return or you may never have any at all. I recommend increasing fluid intake to prevent large clots that may be difficult to urinate out.    Explanation for medications that may have been written for. See the instructions above to see what you were sent home with  Toradol/ketorolac 10mg (dispense 10)- this is a stronger form of ibuprofen, you can take up to every 8 hours but stop taking ibuprofen. Too much of this medicine can cause kidney failure or stomach ulcers. This helps with the inflammation the body is experiencing from the stent. Take this with food. If you have kidney disease then do not take this medication, even if it was written for you.  Tramadol/Ultram 50mg-  take 1 every 4 to 6 hours for pain not relieved with ibuprofen or Tylenol. If you are taking this regularly you will need to take miralax or stool softeners daily to prevent constipation.   Flomax/Tamsulosin 0.4mg mg (dispense 30) - take once nightly before bedtime (at least 3 hours apart from other high blood pressure medicines) while the stent is in and for 1 week after stent removed to help relax the tube the stent is in. If you are taking amlodipine/norvasc for blood pressure, take at least four hours apart (preferably norvasc in morning and flomax/tamsulosin after dinner)  Antibiotics - antibiotics were given just before your procedure that will stay in your system for a while.  If you were written for oral antibiotics, take twice daily. Your doctor will specify amount of days to take. If you have another scheduled procedure to have the stone removed more than 2 weeks after the initial procedure, she may ask you to save antibiotics and restart them 3 days before your next procedure.   AZO over the counter -  you can find this at [facility]. Choose one for bladder pain and anti-spasmodic. You can take it up  to 3x a day for 3-4 days as long as you have normal kidney function. It may stain your urine and your clothes  Potassium citrate 15meq Twice a day to help increase dietary citrate and prevent stone formation on the stents - expect to see this in the stool, the coating contains the medication.   Ditropan 10mg/Oxybutynin 10mg XL, dispense 30 (take once daily as needed for bladder spasms- can cause constipation, take with stool softeners or fiber gummies)    When to go to ER:   fever >101.5 or inability to urinate (no urination for >4 hours and bladder pain) due to thick clots  If you go to the ER with pain, they may check to make sure the stent is in good position with an x-ray or ultrasound but unlikely to do anything else.   I will let you know when we will plan to have the stent either removed at the ambulatory surgical center as an outpatient procedure while you are awake, which is uncomfortable briefly, but not painful or you will remove it yourself by pulling the strings.    6 Easy Ways to Prevent Kidney Stones - please read!!!   Taken from: https://www.kidney.org/atoz/content/kidneystones_prevent    Don't Underestimate Your Sweat. Saunas, hot yoga and heavy exercise may be good for your health, but they also may lead to kidney stones. Why? Loss of water through sweating - whether due to these activities or just the heat of summer--leads to less urine production. The more you sweat, the less you urinate, which allows for stone-causing minerals to settle and bond in the kidneys and urinary tract.  Instead: Hydrate with H2O. One of the best measures you can take to avoid kidney stones is to drink plenty of water, leading you to urinate a lot. So, be sure to keep well hydrated, especially when engaging in exercise or activities that cause a lot of sweating.    It's Not Just the Oxalate. Oxa-what? Oxalate is naturally found in many foods, including fruits and vegetables, nuts and seeds, grains, legumes, and even  "chocolate and tea. Some examples of foods that contain high levels of oxalate include: peanuts, rhubarb, spinach, beets, chocolate and sweet potatoes. Moderating intake of these foods may be beneficial for people who form calcium oxalate stones, the leading type of kidney stones. A common misconception is that cutting the oxalate-rich foods in your diet alone will reduce the likelihood of forming calcium oxalate kidney stones. While in theory this might be true, this approach isn't smart from an overall health perspective. Most kidney stones are formed when oxalate binds to calcium while urine is produced by the kidneys.  Instead: Eat and drink calcium and oxalate-rich foods together during a meal. In doing so, oxalate and calcium are more likely to bind to one another in the stomach and intestines before the kidneys begin processing, making it less likely that kidney stones will form.    Calcium is Not the Enemy. But it tends to get a bad rap! Most likely due to its name and composition, many are under the impression that calcium is the main culprit in calcium-oxalate stones. "I still see patients who wonder why they are getting recurring stones despite cutting down on their calcium intake," said Dr. Toussaint. "I've even had patients say that their doctors told them to reduce their calcium intake." A diet low in calcium actually increases one's risk of developing kidney stones.  Instead: Don't reduce the calcium. Work to cut back on the sodium in your diet and to pair calcium-rich foods with oxalate-rich foods.  It's Not One and Done. Passing a kidney stone is often described as one of the most painful experiences a person can have, but unfortunately, it's not always a one-time event. Studies have shown that having even one stone greatly increases your chances of having another. "Most people will want to do anything they can to ensure it doesn't happen again," said Dr. Toussaint. "Unfortunately, it doesn't seem to be " "the case that people make the changes they need to after their first stone event." Research conducted by Dr. Toussaint shows that those with kidney stones do not always heed the advice of their nephrologists and urinary specialists. About 15% of kidney stone patients didn't take prescribed medications and 41% did not follow the nutritional advice that would keep stones from recurring.  Instead: Take action! Without the right medications and diet adjustments, stones can come back, and recurring kidney stones also could be an indicator of other problems, including kidney disease.    When Life Hands You Kidney Stones don't fret. And as the saying goes, "make lemonade." It's important to consider dietary remedies alongside prescription medications. While it may seem easier to just take a pill to fix a medical problem, consider what lifestyle changes will also make a big impact on your health.  Instead: Next time you drive past a lemonade (or limeade) stand, consider your kidneys. Chronic kidney stones are often treated with potassium citrate, but studies have shown that limeade, lemonade and other fruits and juices high in natural citrate offers the same stone-preventing benefits. Beware of the sugar, though, because it can increase kidney stone risk.   Instead, buy sugar-free lemonade, or make your own by mixing lime or lemon juice with water and using a sugar substitute if needed. "We believe that citrate in the urine may prevent the calcium from binding with other constituents that lead to stones," said Dr. Toussaint. "Also, some evidence suggests that citrate may prevent crystals that are already present from binding with each other, thus preventing them from getting bigger."    Not All Stones are Created Equal. In addition to calcium oxalate stones, another common type of kidney stones is uric acid stones. Red meat, organ meats, and shellfish have high concentrations of a natural chemical compound known as purines. " ""High purine intake leads to a higher production of uric acid and produces a larger acid load for the kidneys to excrete," said Dr. Toussaint. Higher uric acid excretion leads to lower overall urine pH, which means the urine is more acidic. The high acid concentration of the urine makes it easier for uric acid stones to form.  Instead: To prevent uric acid stones, cut down on high-purine foods such as red meat, organ meats, and shellfish, and follow a healthy diet that contains mostly vegetables and fruits, whole grains, and low fat dairy products. Limit sugar-sweetened foods and drinks, especially those that contain high fructose corn syrup. Limit alcohol because it can increase uric acid levels in the blood and avoid crash diets for the same reason..Eating less animal-based protein and eating more fruits and vegetables will help decrease urine acidity and this will help reduce the chance for stone formation.                "

## 2023-09-20 NOTE — PLAN OF CARE
Patient ready for surgery. Surgery and anesthesia consents signed. Educated on incentive spirometer use. Belongings in pre-op locker. Daughter set up with text message notifications.

## 2023-09-20 NOTE — ANESTHESIA PREPROCEDURE EVALUATION
09/20/2023  Doris Pastrana is a 72 y.o., female.      Pre-op Assessment    I have reviewed the Patient Summary Reports.     I have reviewed the Nursing Notes. I have reviewed the NPO Status.   I have reviewed the Medications.     Review of Systems  Anesthesia Hx:  Denies Family Hx of Anesthesia complications.  Personal Hx of Anesthesia complications, Post-Operative Nausea/Vomiting, in the past, but not with recent anesthetics / prophylaxis   Social:  Former Smoker    Cardiovascular:   Hypertension hyperlipidemia ECG has been reviewed.    Pulmonary:   Pneumonia    Renal/:   Chronic Renal Disease renal calculi    Hepatic/GI:   Liver Disease,    Musculoskeletal:   Arthritis     Neurological:   TIA, Neuromuscular Disease,    Endocrine:   Diabetes, type 2    Psych:   Psychiatric History          Physical Exam  General: Well nourished, Cooperative, Alert and Oriented    Airway:  Mallampati: II / II  Mouth Opening: Normal  TM Distance: 4 - 6 cm  Tongue: Normal  Neck ROM: Normal ROM    Dental:  Intact    Chest/Lungs:  Normal Respiratory Rate    Heart:  Rate: Normal  Rhythm: Regular Rhythm        Anesthesia Plan  Type of Anesthesia, risks & benefits discussed:    Anesthesia Type: Gen Supraglottic Airway  Intra-op Monitoring Plan: Standard ASA Monitors  Post Op Pain Control Plan: multimodal analgesia  Induction:  IV  Airway Plan: , Post-Induction  Informed Consent: Informed consent signed with the Patient and all parties understand the risks and agree with anesthesia plan.  All questions answered.   ASA Score: 3    Ready For Surgery From Anesthesia Perspective.     .

## 2023-09-20 NOTE — DISCHARGE SUMMARY
[facility]  Urology  Discharge Note - Short Stay      Patient Name: Doris Pastrana  MRN: [id number]  Discharge Date and Time:  09/20/2023 12:59 PM  Attending Physician: Marisol Stewart.*   Discharging Provider: Marisol Stewart MD  Primary Care Physician: Tor Garg MD    There are no hospital problems to display for this patient.      Final Diagnoses: Same as principal problem.    Hospital Course: Patient was admitted for an outpatient procedure and tolerated the procedure well with no complications.*    Procedure(s) (LRB):  REMOVAL, CALCULUS, URETER, URETEROSCOPIC (Right)  CYSTOURETEROSCOPY, WITH RETROGRADE PYELOGRAM AND URETERAL STENT INSERTION (Right)     Indwelling Lines/Drains at time of discharge:   Lines/Drains/Airways       None                   Discharged Condition: good    Disposition: home    Follow Up:      Patient Instructions:   No discharge procedures on file.    Medications:  Reconciled Home Medications:      Medication List        CHANGE how you take these medications      ALPRAZolam 0.25 MG tablet  Commonly known as: XANAX  Use one 20-30 minutes before flying prn  What changed:   how much to take  how to take this  when to take this  reasons to take this     amLODIPine 10 MG tablet  Commonly known as: NORVASC  TAKE 1 TABLET EVERY DAY  What changed: when to take this     EScitalopram oxalate 10 MG tablet  Commonly known as: LEXAPRO  TAKE 1 TABLET EVERY DAY  What changed: when to take this     fluticasone propionate 50 mcg/actuation nasal spray  Commonly known as: FLONASE  USE 2 SPRAYS IN EACH NOSTRIL EVERY DAY  What changed: when to take this     rosuvastatin 40 MG Tab  Commonly known as: CRESTOR  TAKE 1 TABLET EVERY EVENING  What changed:   how to take this  when to take this            CONTINUE taking these medications      ACCU-CHEK JORGE PLUS TEST STRP Strp  Generic drug: blood sugar diagnostic  TEST BLOOD SUGAR TWICE DAILY     albuterol 90  mcg/actuation inhaler  Commonly known as: PROVENTIL/VENTOLIN HFA  INHALE 2 PUFFS INTO THE LUNGS 4 (FOUR) TIMES DAILY.     blood-glucose meter kit  Use as instructed     cetirizine 10 MG tablet  Commonly known as: ZYRTEC  Take 10 mg by mouth daily as needed.     ciprofloxacin HCl 500 MG tablet  Commonly known as: CIPRO  Take 1 tablet (500 mg total) by mouth every 12 (twelve) hours.     cyclobenzaprine 10 MG tablet  Commonly known as: FLEXERIL  TAKE 1 TABLET THREE TIMES DAILY AS NEEDED     DROPSAFE ALCOHOL PREP PADS Padm  Generic drug: alcohol swabs  USE TWICE DAILY AS NEEDED AS DIRECTED     gabapentin 100 MG capsule  Commonly known as: NEURONTIN  Take 3 capsules by mouth every evening.     HYDROcodone-acetaminophen 5-325 mg per tablet  Commonly known as: NORCO  Take 1 tablet by mouth every 6 (six) hours as needed for Pain.     lancets 28 gauge Misc  Commonly known as: TRUEPLUS LANCETS  TEST TWO TIMES DAILY     losartan 100 MG tablet  Commonly known as: COZAAR  TAKE 1 TABLET EVERY DAY     meclizine 25 mg tablet  Commonly known as: ANTIVERT  Take 1 tablet (25 mg total) by mouth 3 (three) times daily as needed.     metFORMIN 750 MG ER 24hr tablet  Commonly known as: GLUCOPHAGE-XR  TAKE 1 TABLET TWICE DAILY WITH MEALS     mupirocin 2 % ointment  Commonly known as: BACTROBAN  APPLY  NASALLY TWO TIMES DAILY     oxybutynin 10 MG 24 hr tablet  Commonly known as: DITROPAN-XL  Take 1 tablet (10 mg total) by mouth once daily.     semaglutide 2 mg/dose (8 mg/3 mL) Pnij  Commonly known as: OZEMPIC  Inject 2 mg into the skin every 7 days.              Discharge Procedure Orders (must include Diet, Follow-up, Activity):  No discharge procedures on file.     Plan for stent removal on strings:  The patient can remove the stent by pulling strings on MONDAY at 6am/early morning (take pain medications prior to removing, expect pain after stent removed but call office or go to ER if pain lasts >48 hours and is severe. See discharge  instructions for details.   The stone was sent for an analysis from today and we can send the results to the patient directly.   To help figure out how to decrease the patient's rate of stone production: The patient should complete a 24 hour urine collection kit 4 weeks after the stent comes out.   The collection kit should be mailed by [employer].   Contact the office to have mailed to you if one was not sent within the next few weeks.   Everything should be able to be done from home.   UPS comes and picks up the urine.  We will discuss the results at the follow up.   Return to clinic to see urology nurse practiioner in 12 weeks to discuss to discuss the 24 hour urine results (help determine why patient makes stones) with an ultrasound beforehand to monitor remaining kidney stones. Contact office if none scheduled to confirm if one needs to be done prior to follow up     or continue the following medications from the pharmacy unless they have some remaining at home:  Continue tramadol- has at home.       Marisol Stewart MD  Urology  [facility]

## 2023-09-20 NOTE — OP NOTE
[facility]  Operative Note    Date: 09/20/2023    Pre-Op Diagnosis: right ureteral and renal stone    Post-Op Diagnosis: same    Procedure(s) Performed:   Cystoscopy, right retrograde pyelogram  right stent exchange  right  rigid ureteroscopy  right flexible nephroscopy  and basket stone extraction  Flouro <1 hour    Specimen(s): stone fragments from right ureteral and renal    Staff Surgeon: Marisol Stewart MD    Anesthesia: General endotracheal anesthesia    Indications: Doris Pastrana is a 72 y.o. female with above condition    Findings:  Stones had moved into kidney. R UPJo/drooping kidney? Check kidney ultrasound after stent removal for hydro.     Right RGP      New stent:      Estimated Blood Loss: minimal    Drains: 6x26 with strings, right    Implants: * No implants in log *    Procedure in detail:  After informed consent was obtained, the patient was brought the the cystoscopy suite and placed in the supine position.  SCDs were applied and working.  GETA was administered.  The patient was then placed in the dorsal lithotomy position and prepped and draped in the usual sterile fashion.      A rigid cystoscope in a 22 Fr sheath was introduced into the patient's urethra.  This passed easily.  The entire urethra was visualized which showed no strictures or masses.  Formal cystoscopy was performed which revealed no masses or lesions suspicious for malignancy.  The UOs were visualized in the normal anatomic position bilaterally. The right UO had mild  inflammation from the indwelling stent.     The right stent was grasped and brought to the meatus and a sensor tip wire was passed through the stent.  The stent was removed leaving the wire in place.     right rigid ureteroscopy performed first: A rigid ureteroscope was advanced along the wire to proximal ureter.  No stone seen. A rgp was performed through the ureteroscope with findings as above. An amplatz super stiff was then advanced  through the ureteroscope to the kidney using flouro to confirm leaving a supers stiff and sensor tip wire in place.     A 12/14 28cm  ureteral access sheath was placed over the amplatz. Inner first,  then inner and outer and the inner sheath and amplatz was removed.  A flexible ureteroscope was passed through the outer sheath. The ureteroscope was advanced into the kidney.  A pyeloscopy was performed with findings as above.     Stone fragments were removed using an Ngage basket.     The ureteroscope and ureteral access sheath was removed keeping a wire in place and evaluating the entire ureter for remaining stone fragments    A cystoscope was reinserted and the bladder was irrigated to remove the stone fragments.  The bladder was drained the cystoscope removed keeping the wire in place. The wire was backloaded through the stent using a pusher.    A JJ ureteral stent with strings (see findings for size) was passed over the wire and up into the right renal pelvis using fluoro.  When the coil appeared to be in good position in the kidney and the radio-opaque marker of the pusher was at the inferior pubis, the wire was removed under continuous fluoro.  Good coils were seen in the kidney and the bladder using fluoro.       The strings were attached to the vagina using benzoin and tegaderm.     The patient tolerated the procedure well and was transferred to the recovery room in stable condition.        Plan:    Plan for stent removal on strings:  The patient can remove the stent by pulling strings on MONDAY at 6am/early morning (take pain medications prior to removing, expect pain after stent removed but call office or go to ER if pain lasts >48 hours and is severe. See discharge instructions for details.   The stone was sent for an analysis from today and we can send the results to the patient directly.   To help figure out how to decrease the patient's rate of stone production: The patient should complete a 24 hour  urine collection kit 4 weeks after the stent comes out.   The collection kit should be mailed by [employer].   Contact the office to have mailed to you if one was not sent within the next few weeks.   Everything should be able to be done from home.   UPS comes and picks up the urine.  We will discuss the results at the follow up.   Return to clinic to see urology nurse practiioner in 12 weeks to discuss to discuss the 24 hour urine results (help determine why patient makes stones) with an ultrasound beforehand to monitor remaining kidney stones and evaluate for R hydro. If so, order f/u renal scan to eval for delayed drainage (UPJo?). Contact office if none scheduled to confirm if one needs to be done prior to follow up     or continue the following medications from the pharmacy unless they have some remaining at home:  Continue tramadol- has at home.

## 2023-09-20 NOTE — TRANSFER OF CARE
Anesthesia Transfer of Care Note    Patient: Doris Pastrana    Procedure(s) Performed: Procedure(s) (LRB):  REMOVAL, CALCULUS, URETER, URETEROSCOPIC (Right)  CYSTOURETEROSCOPY, WITH RETROGRADE PYELOGRAM AND URETERAL STENT INSERTION (Right)    Patient location: PACU    Anesthesia Type: general    Transport from OR: Transported from OR on 2-3 L/min O2 by NC with adequate spontaneous ventilation    Post pain: adequate analgesia    Post assessment: no apparent anesthetic complications    Post vital signs: stable    Level of consciousness: awake    Nausea/Vomiting: no nausea/vomiting    Complications: none    Transfer of care protocol was followed      Last vitals:   Visit Vitals  /69 (BP Location: Left arm, Patient Position: Lying)   Pulse 79   Temp 36.6 °C (97.9 °F)   Resp 16   Wt 76.2 kg (168 lb)   SpO2 96%   Breastfeeding No   BMI 27.96 kg/m²

## 2023-09-21 VITALS
RESPIRATION RATE: 20 BRPM | HEART RATE: 88 BPM | DIASTOLIC BLOOD PRESSURE: 66 MMHG | TEMPERATURE: 98 F | SYSTOLIC BLOOD PRESSURE: 118 MMHG | OXYGEN SATURATION: 98 % | BODY MASS INDEX: 27.96 KG/M2 | WEIGHT: 168 LBS

## 2023-09-21 NOTE — PROGRESS NOTES
Very pleased with care given.  States all staff were excellent and kind.  States she understands all discharge instructions.  Drinking lots of fluids.

## 2023-09-21 NOTE — PROGRESS NOTES
Very pleased with care given. States all staff were kind and excellent care, supportive.   Understands and has read all discharge instructions.  Drinking lots of  fluids.

## 2023-09-22 LAB
CYTOLOGY TISS FNA DOC CYTO: NORMAL
FNA PERFORMED BY: NORMAL
PATH REPORT.SITE OF ORIGIN SPEC: NORMAL
PATH REPORT.SITE OF ORIGIN SPEC: NORMAL
PATHOLOGIST NAME: NORMAL

## 2023-09-22 NOTE — ANESTHESIA POSTPROCEDURE EVALUATION
Anesthesia Post Evaluation    Patient: Doris Pastrana    Procedure(s) Performed: Procedure(s) (LRB):  REMOVAL, CALCULUS, URETER, URETEROSCOPIC (Right)  CYSTOURETEROSCOPY, WITH RETROGRADE PYELOGRAM AND URETERAL STENT INSERTION (Right)    Final Anesthesia Type: general      Patient location during evaluation: PACU  Patient participation: Yes- Able to Participate  Level of consciousness: awake and alert and oriented  Post-procedure vital signs: reviewed and stable  Pain management: adequate  Airway patency: patent  BENNY mitigation strategies: Multimodal analgesia and Extubation while patient is awake  PONV status at discharge: No PONV  Anesthetic complications: no      Cardiovascular status: blood pressure returned to baseline  Respiratory status: unassisted, spontaneous ventilation and room air  Hydration status: euvolemic  Follow-up not needed.          Vitals Value Taken Time   /66 09/20/23 1335   Temp 36.7 °C (98.1 °F) 09/20/23 1320   Pulse 88 09/20/23 1335   Resp 20 09/20/23 1335   SpO2 98 % 09/20/23 1335         Event Time   Out of Recovery 13:27:00         Pain/Hayde Score: No data recorded

## 2023-09-25 NOTE — PHYSICIAN QUERY
PT Name: Doris Pastrana  MR #: [id number]     Documentation Clarification      CDS/: Samara Reilly               Contact information: [phone]    This form is a permanent document in the medical record.     Query Date: September 25, 2023    By submitting this query, we are merely seeking further clarification of documentation. Please utilize your independent clinical judgment when addressing the question(s) below.    The Medical Record reflects the following:    Supporting Clinical Findings Location in Medical Record   Blood culture with E coli      Pyonephrosis Discharge Summary   ESCHERICHIA COLI - Blood Culture Microbiology            You should know patient is treated for it and see my discharge summary                                     Provider, please provide diagnosis or diagnoses that correlates with the above clinical findings:     [   ]  Let the day doctor know . I am admitting        [ x  ] significant Finding

## 2023-09-27 DIAGNOSIS — M54.9 MID BACK PAIN: ICD-10-CM

## 2023-09-27 RX ORDER — MUPIROCIN 20 MG/G
OINTMENT TOPICAL
Qty: 22 G | Refills: 1 | Status: SHIPPED | OUTPATIENT
Start: 2023-09-27 | End: 2023-12-18

## 2023-09-27 RX ORDER — CYCLOBENZAPRINE HCL 10 MG
TABLET ORAL
Qty: 270 TABLET | Refills: 1 | Status: SHIPPED | OUTPATIENT
Start: 2023-09-27

## 2023-09-27 NOTE — TELEPHONE ENCOUNTER
Care Due:                  Date            Visit Type   Department     Provider  --------------------------------------------------------------------------------                                EP -                              PRIMARY      [facility]  Last Visit: 06-      CARE ([facility])   PRACTICE       Tor Garg  Next Visit: None Scheduled  None         None Found                                                            Last  Test          Frequency    Reason                     Performed    Due Date  --------------------------------------------------------------------------------    HBA1C.......  6 months...  metFORMIN, semaglutide...  06- 12-    [facility] Embedded Care Due Messages. Reference number: [id number].   9/27/2023 11:10:21 AM CDT

## 2023-09-27 NOTE — TELEPHONE ENCOUNTER
Refill Routing Note   Medication(s) are not appropriate for processing by [facility] for the following reason(s):      Medication outside of protocol    [facility] action(s):  Route Care Due:  None identified            Appointments  past 12m or future 3m with PCP    Date Provider   Last Visit   6/27/2023 Tor Garg MD   Next Visit   9/27/2023 Tor Garg MD   ED visits in past 90 days: 0        Note composed:12:12 PM 09/27/2023

## 2023-10-02 LAB
COMPN STONE: NORMAL
LABORATORY COMMENT REPORT: NORMAL
SPECIMEN SOURCE: NORMAL
STONE ANALYSIS IR-IMP: NORMAL

## 2023-10-04 ENCOUNTER — PATIENT MESSAGE (OUTPATIENT)
Dept: ADMINISTRATIVE | Facility: OTHER | Age: 73
End: 2023-10-04
Payer: MEDICARE

## 2023-12-11 PROBLEM — N39.0 UTI (URINARY TRACT INFECTION): Status: RESOLVED | Noted: 2023-09-06 | Resolved: 2023-12-11

## 2023-12-18 RX ORDER — MUPIROCIN 20 MG/G
OINTMENT TOPICAL
Qty: 22 G | Refills: 3 | Status: SHIPPED | OUTPATIENT
Start: 2023-12-18

## 2024-01-02 ENCOUNTER — ON-DEMAND VIRTUAL (OUTPATIENT)
Dept: URGENT CARE | Facility: CLINIC | Age: 74
End: 2024-01-02
Payer: MEDICARE

## 2024-01-02 DIAGNOSIS — R68.89 FLU-LIKE SYMPTOMS: Primary | ICD-10-CM

## 2024-01-02 PROCEDURE — 99212 OFFICE O/P EST SF 10 MIN: CPT | Mod: 95,,, | Performed by: NURSE PRACTITIONER

## 2024-01-02 RX ORDER — BENZONATATE 100 MG/1
200 CAPSULE ORAL 3 TIMES DAILY PRN
Qty: 40 CAPSULE | Refills: 0 | Status: SHIPPED | OUTPATIENT
Start: 2024-01-02 | End: 2024-02-20 | Stop reason: ALTCHOICE

## 2024-01-03 NOTE — PROGRESS NOTES
Subjective:      Patient ID: Doris Pastrana is a 73 y.o. female.    Vitals:  vitals were not taken for this visit.     Chief Complaint: Cough      Visit Type: TELE AUDIOVISUAL    Present with the patient at the time of consultation: TELEMED PRESENT WITH PATIENT: None    Past Medical History:   Diagnosis Date    Acute sinus infection     Allergy     Anemia     Arthritis     Bronchitis     Degenerative disc disease     lumbar, pain contract 2013    Depression     Diabetes mellitus, type 2 2016    Fatty liver     Hyperlipidemia 2016    Hypertension     Kidney stone     Mitral valve prolapse     Multinodular goiter     Pleurisy     Pneumonia     PONV (postoperative nausea and vomiting)     Sinus infection     TIA (transient ischemic attack) 2019     Past Surgical History:   Procedure Laterality Date    ANKLE SURGERY  2017    right ankle torn ligament    APPENDECTOMY       SECTION      CHOLECYSTECTOMY      COLONOSCOPY  8/13/15    Dr. Oliveira, five year recheck    COLONOSCOPY N/A 2021    Procedure: COLONOSCOPY;  Surgeon: Tevin Oliveira MD;  Location: [facility];  Service: Endoscopy;  Laterality: N/A;    CYSTOSCOPY W/ URETERAL STENT PLACEMENT Right 2023    Procedure: CYSTOSCOPY, WITH URETERAL STENT INSERTION;  Surgeon: Marisol Stewart MD;  Location: [facility];  Service: Urology;  Laterality: Right;    CYSTOSCOPY WITH URETEROSCOPY, RETROGRADE PYELOGRAPHY, AND INSERTION OF STENT Left 4/10/2022    Procedure: CYSTOSCOPY, WITH RETROGRADE PYELOGRAM AND URETERAL STENT INSERTION;  Surgeon: Jack Remdan Jr., MD;  Location: [facility];  Service: Urology;  Laterality: Left;    CYSTOSCOPY WITH URETEROSCOPY, RETROGRADE PYELOGRAPHY, AND INSERTION OF STENT Left 2022    Procedure: CYSTOSCOPY, WITH RETROGRADE PYELOGRAM AND URETERAL STENT INSERTION;  Surgeon: Jack Redman Jr., MD;  Location: [facility] OR;  Service: Urology;  Laterality: Left;    CYSTOURETEROSCOPY WITH RETROGRADE  PYELOGRAPHY AND INSERTION OF STENT INTO URETER Right 9/20/2023    Procedure: CYSTOURETEROSCOPY, WITH RETROGRADE PYELOGRAM AND URETERAL STENT INSERTION;  Surgeon: Marisol Stewart MD;  Location: [facility] OR;  Service: Urology;  Laterality: Right;    EPIDURAL STEROID INJECTION INTO CERVICAL SPINE N/A 1/3/2020    Procedure: Injection-steroid-epidural-cervical;  Surgeon: Eddi Albrecht MD;  Location: [facility] OR;  Service: Pain Management;  Laterality: N/A;  C7-T1    EPIDURAL STEROID INJECTION INTO CERVICAL SPINE N/A 3/10/2020    Procedure: Injection-steroid-epidural-cervical;  Surgeon: Eddi Albrecht MD;  Location: [facility] OR;  Service: Pain Management;  Laterality: N/A;  C7-T1    EPIDURAL STEROID INJECTION INTO CERVICAL SPINE N/A 12/29/2020    Procedure: Injection-steroid-epidural-cervical;  Surgeon: Eddi Albrecht MD;  Location: [facility] OR;  Service: Pain Management;  Laterality: N/A;  C7-T1     EXTRACORPOREAL SHOCK WAVE LITHOTRIPSY Left 5/22/2019    Procedure: LITHOTRIPSY, ESWL - only if stone visible on xray  with cysto after on table possible;  Surgeon: Marisol Stewart MD;  Location: [facility] OR;  Service: Urology;  Laterality: Left;    HYSTERECTOMY      LASER LITHOTRIPSY Left 4/26/2022    Procedure: LITHOTRIPSY, USING LASER;  Surgeon: Jack Redman Jr., MD;  Location: [facility] OR;  Service: Urology;  Laterality: Left;    OOPHORECTOMY      TONSILLECTOMY      URETEROSCOPIC REMOVAL OF URETERIC CALCULUS Left 4/26/2022    Procedure: REMOVAL, CALCULUS, URETER, URETEROSCOPIC;  Surgeon: Jack Redman Jr., MD;  Location: [facility] OR;  Service: Urology;  Laterality: Left;    URETEROSCOPIC REMOVAL OF URETERIC CALCULUS Right 9/20/2023    Procedure: REMOVAL, CALCULUS, URETER, URETEROSCOPIC;  Surgeon: Marisol Stewart MD;  Location: [facility] OR;  Service: Urology;  Laterality: Right;    URETEROSCOPY Left 4/26/2022    Procedure: URETEROSCOPY;  Surgeon: Jack Redman Jr., MD;  Location: [facility] OR;  Service: Urology;  Laterality: Left;     VAGINAL DELIVERY       Review of patient's allergies indicates:   Allergen Reactions    Omnicef [cefdinir] Hives    Codeine Nausea Only    Rhubarb Swelling    Strawberries [strawberry] Hives    Iodine Rash       Other reaction(s): Nausea    Latex, natural rubber Rash    Pravastatin Other (See Comments)     Muscle pain     Toradol [ketorolac] Itching    Tramadol Itching     Current Outpatient Medications on File Prior to Visit   Medication Sig Dispense Refill    albuterol (PROVENTIL/VENTOLIN HFA) 90 mcg/actuation inhaler INHALE 2 PUFFS INTO THE LUNGS 4 (FOUR) TIMES DAILY. 54 g 2    ALPRAZolam (XANAX) 0.25 MG tablet Use one 20-30 minutes before flying prn (Patient taking differently: Take 0.25 mg by mouth 2 (two) times daily as needed for Anxiety. Use one 20-30 minutes before flying prn) 10 tablet 1    amLODIPine (NORVASC) 10 MG tablet TAKE 1 TABLET EVERY DAY (Patient taking differently: Take 10 mg by mouth once daily.) 90 tablet 1    blood-glucose meter kit Use as instructed 1 each 0    cetirizine (ZYRTEC) 10 MG tablet Take 10 mg by mouth daily as needed.       ciprofloxacin HCl (CIPRO) 500 MG tablet Take 1 tablet (500 mg total) by mouth every 12 (twelve) hours. 28 tablet 0    cyclobenzaprine (FLEXERIL) 10 MG tablet TAKE 1 TABLET THREE TIMES DAILY AS NEEDED 270 tablet 1    DROPSAFE ALCOHOL PREP PADS PadM USE TWICE DAILY AS NEEDED AS DIRECTED 200 each 3    EScitalopram oxalate (LEXAPRO) 10 MG tablet TAKE 1 TABLET EVERY DAY (Patient taking differently: Take 10 mg by mouth once daily.) 90 tablet 1    fluticasone propionate (FLONASE) 50 mcg/actuation nasal spray USE 2 SPRAYS IN EACH NOSTRIL EVERY DAY (Patient taking differently: 2 sprays by Each Nostril route once daily.) 48 g 3    gabapentin (NEURONTIN) 100 MG capsule Take 3 capsules by mouth every evening.      HYDROcodone-acetaminophen (NORCO) 5-325 mg per tablet Take 1 tablet by mouth every 6 (six) hours as needed for Pain. 10 tablet 0    lancets (TRUEPLUS LANCETS)  28 gauge Misc TEST TWO TIMES DAILY 200 each 11    losartan (COZAAR) 100 MG tablet TAKE 1 TABLET EVERY DAY 90 tablet 0    meclizine (ANTIVERT) 25 mg tablet Take 1 tablet (25 mg total) by mouth 3 (three) times daily as needed. 30 tablet 5    metFORMIN (GLUCOPHAGE-XR) 750 MG ER 24hr tablet TAKE 1 TABLET TWICE DAILY WITH MEALS 180 tablet 0    mupirocin (BACTROBAN) 2 % ointment APPLY NASALLY TWO TIMES DAILY 22 g 3    oxybutynin (DITROPAN-XL) 10 MG 24 hr tablet Take 1 tablet (10 mg total) by mouth once daily. 30 tablet 0    rosuvastatin (CRESTOR) 40 MG Tab TAKE 1 TABLET EVERY EVENING (Patient taking differently: 40 mg every other day.) 90 tablet 0    semaglutide (OZEMPIC) 2 mg/dose (8 mg/3 mL) PnIj Inject 2 mg into the skin every 7 days. 9 mL 3     No current facility-administered medications on file prior to visit.     Family History   Problem Relation Age of Onset    Arthritis Mother     Hypertension Mother     Vision loss Mother     Diabetes Mother     Alcohol abuse Father     Depression Father     Early death Father     Alcohol abuse Sister     Depression Sister     Cancer Sister         thyroid    Diabetes Sister     Heart disease Sister         chf    Atrial fibrillation Sister     Depression Brother     Hypertension Brother     Cancer Brother         prostate/stomach    Diabetes Brother     Aneurysm Brother         aaa    Learning disabilities Daughter     Hypertension Maternal Grandmother     Heart disease Maternal Grandmother     Arthritis Maternal Grandmother     Glaucoma Maternal Grandmother     Cancer Maternal Grandfather         stomach    Diabetes Paternal Grandmother     Breast cancer Paternal Grandmother     Heart disease Paternal Grandfather     Heart disease Maternal Uncle     Kidney disease Maternal Uncle     Kidney disease Paternal Aunt        Medications Ordered                [id number] - [place], [place] - [address] AT [facility] OF [address]   [address],  [place] [address]    Telephone: [phone]   Fax: [phone]   Hours: Not open 24 hours                         E-Prescribed (1 of 1)              benzonatate (TESSALON PERLES) 100 MG capsule    Sig: Take 2 capsules (200 mg total) by mouth 3 (three) times daily as needed for Cough.       Start: 1/2/24     Quantity: 40 capsule Refills: 0                           [facility] Peq Odvv Intake    1/2/2024  9:20 PM CST - Filed by Patient   Describe your reason for todays visit 99.7 degree fever, cough, sore throat, sunus drip bad headache   What is your current physical address in the event of a medical emergency? [address]   Are you able to take your vital signs? No   Please attach any relevant images or files          73 year old female calls in today with complaints of HA, fever, coughing, sore throat, sinus drip,and teeth pain that began about 5-6 hours ago. Taking pseudoephedrine. NO known exposure to flu or COVID.         ROS     Objective:   The physical exam was conducted virtually.  Physical Exam   Constitutional: She is oriented to person, place, and time. No distress.   HENT:   Head: Normocephalic and atraumatic.   Mouth/Throat: Oropharynx is clear and moist and mucous membranes are normal.   Eyes: Conjunctivae are normal. No scleral icterus.   Pulmonary/Chest: Effort normal. No respiratory distress.   Musculoskeletal: Normal range of motion.         General: Normal range of motion.   Neurological: She is alert and oriented to person, place, and time.   Skin: Skin is not diaphoretic.   Psychiatric: Her behavior is normal. Judgment and thought content normal.   Vitals reviewed.      Assessment:     1. Flu-like symptoms        Plan:   -discussed monitoring symptoms, treating with over the counter meds. After, 48 hours take at home COVID test. If negative, may have influenza and can call for tamiflu.     Flu-like symptoms  -     benzonatate (TESSALON PERLES) 100 MG capsule; Take 2 capsules (200 mg total)  by mouth 3 (three) times daily as needed for Cough.  Dispense: 40 capsule; Refill: 0                  Thank you for choosing [facility]!    Our goal in the [facility] is to always provide outstanding medical care. You may receive a survey by mail or e-mail in the next week regarding your experience today. We would greatly appreciate you completing and returning the survey. Your feedback provides us with a way to recognize our staff who provide very good care, and it helps us learn how to improve when your experience was below our aspiration of excellence.         We appreciate you trusting us with your medical care. We hope you feel better soon. We will be happy to take care of you for all of your future medical needs.    You must understand that you've received an Urgent Care treatment only and that you may be released before all your medical problems are known or treated. You, the patient, will arrange for follow up care as instructed.    Follow up with your PCP or specialty clinic as directed in the next 1-2 weeks if not improved or as needed.  You can call [phone] to schedule an appointment with the appropriate provider.    If your condition worsens we recommend that you receive another evaluation in person, with your primary care provider, urgent care or at the emergency room immediately or contact your primary medical clinics after hours call service to discuss your concerns.

## 2024-01-05 DIAGNOSIS — E11.00 TYPE 2 DIABETES MELLITUS WITH HYPEROSMOLARITY WITHOUT COMA, WITHOUT LONG-TERM CURRENT USE OF INSULIN: Primary | ICD-10-CM

## 2024-01-05 RX ORDER — BLOOD SUGAR DIAGNOSTIC
STRIP MISCELLANEOUS
Qty: 200 STRIP | Refills: 3 | Status: SHIPPED | OUTPATIENT
Start: 2024-01-05

## 2024-01-05 NOTE — TELEPHONE ENCOUNTER
Refill Routing Note   Medication(s) are not appropriate for processing by [facility] for the following reason(s):        ED/Hospital Visit since last OV with provider    [facility] action(s):  Defer   Requires labs : Yes               Appointments  past 12m or future 3m with PCP    Date Provider   Last Visit   6/27/2023 Tor Garg MD   Next Visit   2/20/2024 Tor Garg MD   ED visits in past 90 days: 0        Note composed:5:25 PM 01/05/2024

## 2024-01-05 NOTE — TELEPHONE ENCOUNTER
Care Due:                  Date            Visit Type   Department     Provider  --------------------------------------------------------------------------------                                EP -                              PRIMARY      [facility]  Last Visit: 06-      CARE ([facility])   PRACTICE       Tor Garg                               -                              PRIMARY      [facility]  Next Visit: 02-      CARE ([facility])   PRACTICE       Tor Garg                                                            Last  Test          Frequency    Reason                     Performed    Due Date  --------------------------------------------------------------------------------    HBA1C.......  6 months...  metFORMIN, semaglutide...  06- 12-    [facility] Embedded Care Due Messages. Reference number: [id number].   1/05/2024 10:32:12 AM CST

## 2024-01-08 DIAGNOSIS — E11.9 CONTROLLED TYPE 2 DIABETES MELLITUS WITHOUT COMPLICATION, WITHOUT LONG-TERM CURRENT USE OF INSULIN: ICD-10-CM

## 2024-01-08 NOTE — TELEPHONE ENCOUNTER
No care due was identified.  [facility] Embedded Care Due Messages. Reference number: [id number].   1/08/2024 11:51:59 AM CST

## 2024-01-09 RX ORDER — METFORMIN HYDROCHLORIDE 750 MG/1
TABLET, EXTENDED RELEASE ORAL
Qty: 180 TABLET | Refills: 0 | Status: SHIPPED | OUTPATIENT
Start: 2024-01-09 | End: 2024-02-26

## 2024-01-09 NOTE — TELEPHONE ENCOUNTER
Refill Routing Note   Medication(s) are not appropriate for processing by [facility] for the following reason(s):        Required labs outdated  ED/Hospital Visit since last OV with provider    [facility] action(s):  Defer               Appointments  past 12m or future 3m with PCP    Date Provider   Last Visit   6/27/2023 Tor Garg MD   Next Visit   2/20/2024 Tor Garg MD   ED visits in past 90 days: 0        Note composed:12:47 PM 01/09/2024

## 2024-01-20 DIAGNOSIS — E78.5 HYPERLIPIDEMIA, UNSPECIFIED HYPERLIPIDEMIA TYPE: ICD-10-CM

## 2024-01-22 RX ORDER — ROSUVASTATIN CALCIUM 40 MG/1
40 TABLET, COATED ORAL EVERY OTHER DAY
Qty: 30 TABLET | Refills: 0 | Status: SHIPPED | OUTPATIENT
Start: 2024-01-22 | End: 2024-03-13

## 2024-01-22 NOTE — TELEPHONE ENCOUNTER
No care due was identified.  [facility] Embedded Care Due Messages. Reference number: [id number].   1/22/2024 10:38:40 AM CST

## 2024-01-22 NOTE — TELEPHONE ENCOUNTER
Refill request routed to [facility] Review Pool for Pharmacist Review.    *Patient reported taking differently 8/16/23: 40 mg Every other day

## 2024-01-22 NOTE — TELEPHONE ENCOUNTER
Refill Decision Note   Doris Victoriano  is requesting a refill authorization.  Brief Assessment and Rationale for Refill:  Approve     Medication Therapy Plan:       Medication Reconciliation Completed: No   Comments:     No Care Gaps recommended.     Note composed:11:10 AM 01/22/2024

## 2024-02-02 ENCOUNTER — TELEPHONE (OUTPATIENT)
Dept: FAMILY MEDICINE | Facility: CLINIC | Age: 74
End: 2024-02-02
Payer: MEDICARE

## 2024-02-02 NOTE — TELEPHONE ENCOUNTER
----- Message from Makenzie Boyd sent at 2/2/2024 11:58 AM CST -----  Contact: Patient    ----- Message -----  From: Makenzie Boyd  Sent: 2/2/2024  11:48 AM CST  To: Suze Lentz Staff    Type:  Same Day Appointment Request    Caller is requesting a same day appointment.  Caller declined first available appointment listed below.    Name of Caller:Patient     When is the first available appointment?Feb 8th    Symptoms:UTI possibly/ Urine is cloudy, has a bad odor, and is painful to urinate with some burning as well    Best Call Back Number:[phone] (home)     Additional Information: Please call to advise

## 2024-02-02 NOTE — TELEPHONE ENCOUNTER
----- Message from Magdalena Williamson, Patient Care Assistant sent at 2/2/2024 11:57 AM CST -----  Contact: Pt  Type:  RX Refill Request    Who Called:  Pt  Refill or New Rx:  New Rx  RX Name and Strength:  UTI Symptoms  How is the patient currently taking it? (ex. 1XDay):  As Directed  Is this a 30 day or 90 day RX:  30  Preferred Pharmacy with phone number:    [facility] [address] [place], [place] - [address]  [address] [place], [place] [address] [facility] [address]  Phone: [phone] Fax: [phone]  Local or Mail Order:  local  Ordering Provider:  Britany Lyon Call Back Number:  [phone] (home)   Additional Information:  Please contact pt upon completion-Thank you~

## 2024-02-15 ENCOUNTER — TELEPHONE (OUTPATIENT)
Dept: FAMILY MEDICINE | Facility: CLINIC | Age: 74
End: 2024-02-15
Payer: MEDICARE

## 2024-02-15 ENCOUNTER — LAB VISIT (OUTPATIENT)
Dept: LAB | Facility: HOSPITAL | Age: 74
End: 2024-02-15
Attending: FAMILY MEDICINE
Payer: MEDICARE

## 2024-02-15 DIAGNOSIS — N30.00 ACUTE CYSTITIS WITHOUT HEMATURIA: Primary | ICD-10-CM

## 2024-02-15 DIAGNOSIS — R30.0 DYSURIA: Primary | ICD-10-CM

## 2024-02-15 DIAGNOSIS — R30.0 DYSURIA: ICD-10-CM

## 2024-02-15 LAB
BACTERIA #/AREA URNS HPF: ABNORMAL /HPF
BILIRUB UR QL STRIP: NEGATIVE
CLARITY UR: ABNORMAL
COLOR UR: YELLOW
GLUCOSE UR QL STRIP: NEGATIVE
HGB UR QL STRIP: NEGATIVE
KETONES UR QL STRIP: NEGATIVE
LEUKOCYTE ESTERASE UR QL STRIP: ABNORMAL
MICROSCOPIC COMMENT: ABNORMAL
NITRITE UR QL STRIP: POSITIVE
NON-SQ EPI CELLS #/AREA URNS HPF: 1 /HPF
PH UR STRIP: 6 [PH] (ref 5–8)
PROT UR QL STRIP: ABNORMAL
RBC #/AREA URNS HPF: 1 /HPF (ref 0–4)
SP GR UR STRIP: 1.02 (ref 1–1.03)
SQUAMOUS #/AREA URNS HPF: 0 /HPF
URN SPEC COLLECT METH UR: ABNORMAL
UROBILINOGEN UR STRIP-ACNC: NEGATIVE EU/DL
WBC #/AREA URNS HPF: 9 /HPF (ref 0–5)

## 2024-02-15 PROCEDURE — 81000 URINALYSIS NONAUTO W/SCOPE: CPT | Performed by: FAMILY MEDICINE

## 2024-02-15 RX ORDER — NITROFURANTOIN 25; 75 MG/1; MG/1
100 CAPSULE ORAL 2 TIMES DAILY
Qty: 14 CAPSULE | Refills: 0 | Status: SHIPPED | OUTPATIENT
Start: 2024-02-15 | End: 2024-02-22

## 2024-02-15 NOTE — TELEPHONE ENCOUNTER
Spoke to patient.   On 2-2-2024 she went to  for UTI   She was given Macrobid.    She said finished antibiotics.   She said they did not do a urine culture.   C/o burning and pressure on urination.   Low grade fever.   She has appointment on 2/20/2024.  Urine pended

## 2024-02-15 NOTE — TELEPHONE ENCOUNTER
----- Message from Suly Alvarado sent at 2/14/2024  4:32 PM CST -----  Type:  Needs Medical Advice    Who Called: pt    Symptoms (please be specific): still has UTI     How long has patient had these symptoms:  2-3 weeks    Pharmacy name and phone #:    [id number] - [place] - [address] [facility] [address] [address]  [facility] [address] [address]  Phone: [phone] Fax: [phone]    Would the patient rather a call back or a response via [facility]? Call back    Best Call Back Number: [phone]      Additional Information: pt said she went to [facility] the first time and they gave her Microbid but she said she is guessing it didn't work, she is also starting with fever again      Please call Back to advise. Thanks!

## 2024-02-15 NOTE — TELEPHONE ENCOUNTER
Order in for urinalysis reflex to culture.  I changed it to a stat so we will hopefully get it today.  She needs to get it as quickly as possible, it does take some hours to get it back from the lab.

## 2024-02-15 NOTE — TELEPHONE ENCOUNTER
Spoke to patient    She is at work   Stressed to importance of getting the urine stat   She will have it done this afternoon

## 2024-02-20 ENCOUNTER — OFFICE VISIT (OUTPATIENT)
Dept: FAMILY MEDICINE | Facility: CLINIC | Age: 74
End: 2024-02-20
Attending: FAMILY MEDICINE
Payer: MEDICARE

## 2024-02-20 VITALS
WEIGHT: 160.94 LBS | HEIGHT: 65 IN | OXYGEN SATURATION: 96 % | HEART RATE: 88 BPM | SYSTOLIC BLOOD PRESSURE: 106 MMHG | TEMPERATURE: 99 F | BODY MASS INDEX: 26.81 KG/M2 | DIASTOLIC BLOOD PRESSURE: 62 MMHG

## 2024-02-20 DIAGNOSIS — E11.00 TYPE 2 DIABETES MELLITUS WITH HYPEROSMOLARITY WITHOUT COMA, WITHOUT LONG-TERM CURRENT USE OF INSULIN: Primary | ICD-10-CM

## 2024-02-20 DIAGNOSIS — I10 HYPERTENSION, UNSPECIFIED TYPE: ICD-10-CM

## 2024-02-20 DIAGNOSIS — F40.243 ANXIETY WITH FLYING: ICD-10-CM

## 2024-02-20 DIAGNOSIS — Z12.31 SCREENING MAMMOGRAM FOR BREAST CANCER: ICD-10-CM

## 2024-02-20 DIAGNOSIS — E11.69 HYPERLIPIDEMIA ASSOCIATED WITH TYPE 2 DIABETES MELLITUS: ICD-10-CM

## 2024-02-20 DIAGNOSIS — E78.5 HYPERLIPIDEMIA ASSOCIATED WITH TYPE 2 DIABETES MELLITUS: ICD-10-CM

## 2024-02-20 DIAGNOSIS — I70.0 AORTIC ATHEROSCLEROSIS: ICD-10-CM

## 2024-02-20 PROCEDURE — 1101F PT FALLS ASSESS-DOCD LE1/YR: CPT | Mod: CPTII,S$GLB,, | Performed by: FAMILY MEDICINE

## 2024-02-20 PROCEDURE — 1159F MED LIST DOCD IN RCRD: CPT | Mod: CPTII,S$GLB,, | Performed by: FAMILY MEDICINE

## 2024-02-20 PROCEDURE — 4010F ACE/ARB THERAPY RXD/TAKEN: CPT | Mod: CPTII,S$GLB,, | Performed by: FAMILY MEDICINE

## 2024-02-20 PROCEDURE — 99214 OFFICE O/P EST MOD 30 MIN: CPT | Mod: S$GLB,,, | Performed by: FAMILY MEDICINE

## 2024-02-20 PROCEDURE — 3008F BODY MASS INDEX DOCD: CPT | Mod: CPTII,S$GLB,, | Performed by: FAMILY MEDICINE

## 2024-02-20 PROCEDURE — 1125F AMNT PAIN NOTED PAIN PRSNT: CPT | Mod: CPTII,S$GLB,, | Performed by: FAMILY MEDICINE

## 2024-02-20 PROCEDURE — 1160F RVW MEDS BY RX/DR IN RCRD: CPT | Mod: CPTII,S$GLB,, | Performed by: FAMILY MEDICINE

## 2024-02-20 PROCEDURE — 99999 PR PBB SHADOW E&M-EST. PATIENT-LVL IV: CPT | Mod: PBBFAC,,, | Performed by: FAMILY MEDICINE

## 2024-02-20 PROCEDURE — 3074F SYST BP LT 130 MM HG: CPT | Mod: CPTII,S$GLB,, | Performed by: FAMILY MEDICINE

## 2024-02-20 PROCEDURE — 3078F DIAST BP <80 MM HG: CPT | Mod: CPTII,S$GLB,, | Performed by: FAMILY MEDICINE

## 2024-02-20 PROCEDURE — 3288F FALL RISK ASSESSMENT DOCD: CPT | Mod: CPTII,S$GLB,, | Performed by: FAMILY MEDICINE

## 2024-02-20 RX ORDER — ALPRAZOLAM 0.25 MG/1
TABLET ORAL
Qty: 20 TABLET | Refills: 1 | Status: SHIPPED | OUTPATIENT
Start: 2024-02-20

## 2024-02-20 NOTE — PROGRESS NOTES
Subjective:       Patient ID: Doris Pastrana is a 73 y.o. female.    Chief Complaint: Diabetes    73-year-old female coming in for follow-up on diabetes hypertension and other problems.  She has a history of anxiety with flying and uses low-dose Xanax when flying.  She is out of the Xanax having run out of it on [place]  She has several trips planned in the near future including one to [place] including [place] where [place] so she is requesting a refill.   was checked with no inappropriate activity.  History includes lumbar and cervical radiculopathy with several epidural steroid injections to the cervical spine, arthritis of the right hip right shoulder and right foot, depression on Lexapro, diabetes currently on Ozempic 2 mg weekly and metformin 750 mg b.i.d..  She is having a significant amount of cramping diarrhea and abdominal bloating she associates with the metformin.  She has dropped almost 27 lb since her last visit using the Ozempic and has not been checking her blood sugars.  If her A1c is good enough she would like to try to reduce the metformin.  She has additional history of hypertension, hyperlipidemia, kidney stones with several extractions and lithotripsies and colon polyps with her last colonoscopy in 2021 with Dr. Oliveira.  Her recheck is due in five years, May 2026.    Past Medical History:  No date: Acute sinus infection  No date: Allergy  No date: Anemia  No date: Arthritis  No date: Bronchitis  No date: Degenerative disc disease      Comment:  lumbar, pain contract 1/28/2013  No date: Depression  02/25/2016: Diabetes mellitus, type 2  No date: Fatty liver  02/25/2016: Hyperlipidemia  No date: Hypertension  No date: Kidney stone  No date: Mitral valve prolapse  No date: Multinodular goiter  No date: Pleurisy  No date: Pneumonia  No date: PONV (postoperative nausea and vomiting)  No date: Sinus infection  03/28/2019: TIA (transient  ischemic attack)    Past Surgical History:  2017: ANKLE SURGERY      Comment:  right ankle torn ligament  No date: APPENDECTOMY  No date:  SECTION  No date: CHOLECYSTECTOMY  8/13/15: COLONOSCOPY      Comment:  Dr. Oliveira, five year recheck  2021: COLONOSCOPY; N/A      Comment:  Procedure: COLONOSCOPY;  Surgeon: Tevin Oliveira MD;                Location: [facility];  Service: Endoscopy;  Laterality:                N/A;  2023: CYSTOSCOPY W/ URETERAL STENT PLACEMENT; Right      Comment:  Procedure: CYSTOSCOPY, WITH URETERAL STENT INSERTION;                 Surgeon: Marisol Stewart MD;  Location: [facility];  Service: Urology;  Laterality: Right;  4/10/2022: CYSTOSCOPY WITH URETEROSCOPY, RETROGRADE PYELOGRAPHY, AND   INSERTION OF STENT; Left      Comment:  Procedure: CYSTOSCOPY, WITH RETROGRADE PYELOGRAM AND                URETERAL STENT INSERTION;  Surgeon: Jack Redman Jr., MD;  Location: [facility] OR;  Service: Urology;  Laterality:                Left;  2022: CYSTOSCOPY WITH URETEROSCOPY, RETROGRADE PYELOGRAPHY, AND   INSERTION OF STENT; Left      Comment:  Procedure: CYSTOSCOPY, WITH RETROGRADE PYELOGRAM AND                URETERAL STENT INSERTION;  Surgeon: Jack Redman Jr., MD;  Location: [facility] OR;  Service: Urology;  Laterality:                Left;  2023: CYSTOURETEROSCOPY WITH RETROGRADE PYELOGRAPHY AND   INSERTION OF STENT INTO URETER; Right      Comment:  Procedure: CYSTOURETEROSCOPY, WITH RETROGRADE PYELOGRAM                AND URETERAL STENT INSERTION;  Surgeon: Marisol Stewart MD;  Location: [facility] OR;  Service: Urology;                 Laterality: Right;  1/3/2020: EPIDURAL STEROID INJECTION INTO CERVICAL SPINE; N/A      Comment:  Procedure: Injection-steroid-epidural-cervical;                 Surgeon: Eddi Albrecht MD;  Location: [facility] OR;  Service:                Pain Management;  Laterality: N/A;   C7-T1  3/10/2020: EPIDURAL STEROID INJECTION INTO CERVICAL SPINE; N/A      Comment:  Procedure: Injection-steroid-epidural-cervical;                 Surgeon: Eddi Albrecht MD;  Location: [facility] OR;  Service:                Pain Management;  Laterality: N/A;  C7-T1  12/29/2020: EPIDURAL STEROID INJECTION INTO CERVICAL SPINE; N/A      Comment:  Procedure: Injection-steroid-epidural-cervical;                 Surgeon: Eddi Albrecht MD;  Location: [facility] OR;  Service:                Pain Management;  Laterality: N/A;  C7-T1   5/22/2019: EXTRACORPOREAL SHOCK WAVE LITHOTRIPSY; Left      Comment:  Procedure: LITHOTRIPSY, ESWL - only if stone visible on                xray  with cysto after on table possible;  Surgeon:                Marisol Stewart MD;  Location: [facility];                 Service: Urology;  Laterality: Left;  No date: HYSTERECTOMY  4/26/2022: LASER LITHOTRIPSY; Left      Comment:  Procedure: LITHOTRIPSY, USING LASER;  Surgeon: Jack Redman Jr., MD;  Location: [facility];  Service: Urology;                 Laterality: Left;  No date: OOPHORECTOMY  No date: TONSILLECTOMY  4/26/2022: URETEROSCOPIC REMOVAL OF URETERIC CALCULUS; Left      Comment:  Procedure: REMOVAL, CALCULUS, URETER, URETEROSCOPIC;                 Surgeon: Jack Redman Jr., MD;  Location: [facility];                 Service: Urology;  Laterality: Left;  9/20/2023: URETEROSCOPIC REMOVAL OF URETERIC CALCULUS; Right      Comment:  Procedure: REMOVAL, CALCULUS, URETER, URETEROSCOPIC;                 Surgeon: Marisol Stewart MD;  Location: [facility];  Service: Urology;  Laterality: Right;  4/26/2022: URETEROSCOPY; Left      Comment:  Procedure: URETEROSCOPY;  Surgeon: Jack Redman Jr., MD;  Location: [facility];  Service: Urology;  Laterality:                Left;  No date: VAGINAL DELIVERY    Current Outpatient Medications on File Prior to Visit:  ACCU-CHEK JORGE PLUS TEST STRP Strp, TEST BLOOD  SUGAR TWO TIMES DAILY, Disp: 200 strip, Rfl: 3  amLODIPine (NORVASC) 10 MG tablet, TAKE 1 TABLET EVERY DAY (Patient taking differently: Take 10 mg by mouth once daily.), Disp: 90 tablet, Rfl: 1  cetirizine (ZYRTEC) 10 MG tablet, Take 10 mg by mouth daily as needed. , Disp: , Rfl:   cyclobenzaprine (FLEXERIL) 10 MG tablet, TAKE 1 TABLET THREE TIMES DAILY AS NEEDED, Disp: 270 tablet, Rfl: 1  EScitalopram oxalate (LEXAPRO) 10 MG tablet, TAKE 1 TABLET EVERY DAY (Patient taking differently: Take 10 mg by mouth once daily.), Disp: 90 tablet, Rfl: 1  fluticasone propionate (FLONASE) 50 mcg/actuation nasal spray, USE 2 SPRAYS IN EACH NOSTRIL EVERY DAY (Patient taking differently: 2 sprays by Each Nostril route once daily.), Disp: 48 g, Rfl: 3  gabapentin (NEURONTIN) 100 MG capsule, Take 3 capsules by mouth every evening., Disp: , Rfl:   lancets (TRUEPLUS LANCETS) 28 gauge Misc, TEST TWO TIMES DAILY, Disp: 200 each, Rfl: 11  losartan (COZAAR) 100 MG tablet, TAKE 1 TABLET EVERY DAY, Disp: 90 tablet, Rfl: 0  meclizine (ANTIVERT) 25 mg tablet, Take 1 tablet (25 mg total) by mouth 3 (three) times daily as needed., Disp: 30 tablet, Rfl: 5  mupirocin (BACTROBAN) 2 % ointment, APPLY NASALLY TWO TIMES DAILY, Disp: 22 g, Rfl: 3  nitrofurantoin, macrocrystal-monohydrate, (MACROBID) 100 MG capsule, Take 1 capsule (100 mg total) by mouth 2 (two) times daily. for 7 days, Disp: 14 capsule, Rfl: 0  rosuvastatin (CRESTOR) 40 MG Tab, Take 1 tablet (40 mg total) by mouth every other day., Disp: 30 tablet, Rfl: 0  semaglutide (OZEMPIC) 2 mg/dose (8 mg/3 mL) PnIj, Inject 2 mg into the skin every 7 days., Disp: 9 mL, Rfl: 3  [DISCONTINUED] ALPRAZolam (XANAX) 0.25 MG tablet, Use one 20-30 minutes before flying prn (Patient taking differently: Take 0.25 mg by mouth 2 (two) times daily as needed for Anxiety. Use one 20-30 minutes before flying prn), Disp: 10 tablet, Rfl: 1  blood-glucose meter kit, Use as instructed, Disp: 1 each, Rfl: 0  metFORMIN  (GLUCOPHAGE-XR) 750 MG ER 24hr tablet, TAKE 1 TABLET TWICE DAILY WITH MEALS, Disp: 180 tablet, Rfl: 0  [DISCONTINUED] albuterol (PROVENTIL/VENTOLIN HFA) 90 mcg/actuation inhaler, INHALE 2 PUFFS INTO THE LUNGS 4 (FOUR) TIMES DAILY., Disp: 54 g, Rfl: 2  [DISCONTINUED] benzonatate (TESSALON PERLES) 100 MG capsule, Take 2 capsules (200 mg total) by mouth 3 (three) times daily as needed for Cough., Disp: 40 capsule, Rfl: 0  [DISCONTINUED] DROPSAFE ALCOHOL PREP PADS PadM, USE TWICE DAILY AS NEEDED AS DIRECTED, Disp: 200 each, Rfl: 3  [DISCONTINUED] HYDROcodone-acetaminophen (NORCO) 5-325 mg per tablet, Take 1 tablet by mouth every 6 (six) hours as needed for Pain., Disp: 10 tablet, Rfl: 0  [DISCONTINUED] oxybutynin (DITROPAN-XL) 10 MG 24 hr tablet, Take 1 tablet (10 mg total) by mouth once daily., Disp: 30 tablet, Rfl: 0    No current facility-administered medications on file prior to visit.          Review of Systems   Constitutional:  Negative for chills, diaphoresis and fever.   Respiratory:  Negative for chest tightness and shortness of breath.    Gastrointestinal:  Positive for abdominal distention and diarrhea (She associates with metformin use).   Endocrine: Negative for polydipsia and polyuria.   Genitourinary:  Negative for dysuria, frequency and hematuria.   Musculoskeletal:  Positive for back pain and neck pain.       Objective:      Physical Exam  Vitals and nursing note reviewed.   Constitutional:       General: She is not in acute distress.     Appearance: Normal appearance. She is normal weight. She is not ill-appearing, toxic-appearing or diaphoretic.      Comments: Good blood pressure control  Normal pulse with regular rhythm   Good weight for age with a BMI of 26.8 she is down 26.6 lb from June 27, 2023   HENT:      Head: Normocephalic and atraumatic.      Right Ear: Tympanic membrane, ear canal and external ear normal. There is no impacted cerumen.      Left Ear: Tympanic membrane, ear canal and  external ear normal. There is no impacted cerumen.      Nose: Nose normal. No congestion or rhinorrhea.      Mouth/Throat:      Mouth: Mucous membranes are moist.      Pharynx: Oropharynx is clear. No oropharyngeal exudate or posterior oropharyngeal erythema.   Eyes:      General: No scleral icterus.        Right eye: No discharge.         Left eye: No discharge.      Extraocular Movements: Extraocular movements intact.      Conjunctiva/sclera: Conjunctivae normal.      Pupils: Pupils are equal, round, and reactive to light.   Neck:      Vascular: No carotid bruit.   Cardiovascular:      Rate and Rhythm: Normal rate and regular rhythm.      Pulses:           Dorsalis pedis pulses are 2+ on the right side.        Posterior tibial pulses are 2+ on the right side and 2+ on the left side.      Heart sounds: Normal heart sounds. No murmur heard.     No friction rub. No gallop.   Pulmonary:      Effort: Pulmonary effort is normal. No respiratory distress.      Breath sounds: Normal breath sounds. No stridor. No wheezing, rhonchi or rales.   Chest:      Chest wall: No tenderness.   Abdominal:      General: Abdomen is flat. Bowel sounds are normal. There is no distension.      Palpations: Abdomen is soft. There is no mass.      Tenderness: There is no abdominal tenderness. There is no guarding or rebound.      Hernia: No hernia is present.   Musculoskeletal:      Cervical back: Normal range of motion and neck supple. No rigidity or tenderness.      Right lower leg: No edema.      Left lower leg: No edema.      Right foot: Normal range of motion. No deformity, bunion, Charcot foot, foot drop or prominent metatarsal heads.      Left foot: Normal range of motion. No deformity, bunion, Charcot foot, foot drop or prominent metatarsal heads.   Feet:      Right foot:      Protective Sensation: 9 sites tested.  9 sites sensed.      Skin integrity: Skin integrity normal. No ulcer, blister, skin breakdown, erythema, warmth, callus,  dry skin or fissure.      Toenail Condition: Right toenails are normal.      Left foot:      Protective Sensation: 9 sites tested.  9 sites sensed.      Skin integrity: Skin integrity normal. No ulcer, blister, skin breakdown, erythema, warmth, callus, dry skin or fissure.      Toenail Condition: Left toenails are normal.   Lymphadenopathy:      Cervical: No cervical adenopathy.   Neurological:      Mental Status: She is alert.         Assessment:       1. Type 2 diabetes mellitus with hyperosmolarity without coma, without long-term current use of insulin    2. Hyperlipidemia associated with type 2 diabetes mellitus    3. Hypertension, unspecified type    4. Screening mammogram for breast cancer    5. Anxiety with flying    6. Aortic atherosclerosis    7. BMI 26.0-26.9,adult        Plan:       1. Type 2 diabetes mellitus with hyperosmolarity without coma, without long-term current use of insulin  Await A1c result.  If satisfactory we will try to reduce the metformin at least by 1/2 of the current dose  - Comprehensive Metabolic Panel; Future  - Hemoglobin A1C; Future  - Lipid Panel; Future  - Microalbumin/Creatinine Ratio, Urine; Future    2. Hyperlipidemia associated with type 2 diabetes mellitus  Await lipid panel results for possible change in Crestor 40 mg  - Comprehensive Metabolic Panel; Future  - Lipid Panel; Future    3. Hypertension, unspecified type  Good control, no changes needed.  Watch for hypotension with her weight loss, she may need a reduction in the amlodipine.  - Comprehensive Metabolic Panel; Future  - Lipid Panel; Future  - CBC Auto Differential; Future    4. Screening mammogram for breast cancer  Mammogram scheduled  - Mammo Digital Screening Bilat w/ [facility]; Future    5. Anxiety with flying   checked with no inappropriate activity.  Refill for her upcoming trips was given  - ALPRAZolam (XANAX) 0.25 MG tablet; Use one 20-30 minutes before flying prn  Dispense: 20 tablet; Refill: 1    6.  Aortic atherosclerosis  Incidental finding on CT renal stone study, asymptomatic.  Maintain good cholesterol control, blood pressure control, and diabetic control    7. BMI 26.0-26.9,adult  Good weight for age

## 2024-02-22 ENCOUNTER — LAB VISIT (OUTPATIENT)
Dept: LAB | Facility: HOSPITAL | Age: 74
End: 2024-02-22
Attending: FAMILY MEDICINE
Payer: MEDICARE

## 2024-02-22 DIAGNOSIS — E11.00 TYPE 2 DIABETES MELLITUS WITH HYPEROSMOLARITY WITHOUT COMA, WITHOUT LONG-TERM CURRENT USE OF INSULIN: ICD-10-CM

## 2024-02-22 DIAGNOSIS — I10 HYPERTENSION, UNSPECIFIED TYPE: ICD-10-CM

## 2024-02-22 DIAGNOSIS — E11.69 HYPERLIPIDEMIA ASSOCIATED WITH TYPE 2 DIABETES MELLITUS: ICD-10-CM

## 2024-02-22 DIAGNOSIS — E78.5 HYPERLIPIDEMIA ASSOCIATED WITH TYPE 2 DIABETES MELLITUS: ICD-10-CM

## 2024-02-22 LAB
ALBUMIN SERPL BCP-MCNC: 3.7 G/DL (ref 3.5–5.2)
ALP SERPL-CCNC: 129 U/L (ref 55–135)
ALT SERPL W/O P-5'-P-CCNC: 12 U/L (ref 10–44)
ANION GAP SERPL CALC-SCNC: 10 MMOL/L (ref 8–16)
AST SERPL-CCNC: 14 U/L (ref 10–40)
BASOPHILS # BLD AUTO: 0.06 K/UL (ref 0–0.2)
BASOPHILS NFR BLD: 1 % (ref 0–1.9)
BILIRUB SERPL-MCNC: 0.9 MG/DL (ref 0.1–1)
BUN SERPL-MCNC: 17 MG/DL (ref 8–23)
CALCIUM SERPL-MCNC: 9.7 MG/DL (ref 8.7–10.5)
CHLORIDE SERPL-SCNC: 103 MMOL/L (ref 95–110)
CHOLEST SERPL-MCNC: 138 MG/DL (ref 120–199)
CHOLEST/HDLC SERPL: 3 {RATIO} (ref 2–5)
CO2 SERPL-SCNC: 26 MMOL/L (ref 23–29)
CREAT SERPL-MCNC: 0.7 MG/DL (ref 0.5–1.4)
DIFFERENTIAL METHOD BLD: ABNORMAL
EOSINOPHIL # BLD AUTO: 0.1 K/UL (ref 0–0.5)
EOSINOPHIL NFR BLD: 2.2 % (ref 0–8)
ERYTHROCYTE [DISTWIDTH] IN BLOOD BY AUTOMATED COUNT: 14.6 % (ref 11.5–14.5)
EST. GFR  (NO RACE VARIABLE): >60 ML/MIN/1.73 M^2
ESTIMATED AVG GLUCOSE: 128 MG/DL (ref 68–131)
GLUCOSE SERPL-MCNC: 94 MG/DL (ref 70–110)
HBA1C MFR BLD: 6.1 % (ref 4–5.6)
HCT VFR BLD AUTO: 39.8 % (ref 37–48.5)
HDLC SERPL-MCNC: 46 MG/DL (ref 40–75)
HDLC SERPL: 33.3 % (ref 20–50)
HGB BLD-MCNC: 12.3 G/DL (ref 12–16)
IMM GRANULOCYTES # BLD AUTO: 0.01 K/UL (ref 0–0.04)
IMM GRANULOCYTES NFR BLD AUTO: 0.2 % (ref 0–0.5)
LDLC SERPL CALC-MCNC: 74.4 MG/DL (ref 63–159)
LYMPHOCYTES # BLD AUTO: 1.4 K/UL (ref 1–4.8)
LYMPHOCYTES NFR BLD: 24.8 % (ref 18–48)
MCH RBC QN AUTO: 27.2 PG (ref 27–31)
MCHC RBC AUTO-ENTMCNC: 30.9 G/DL (ref 32–36)
MCV RBC AUTO: 88 FL (ref 82–98)
MONOCYTES # BLD AUTO: 0.5 K/UL (ref 0.3–1)
MONOCYTES NFR BLD: 7.7 % (ref 4–15)
NEUTROPHILS # BLD AUTO: 3.7 K/UL (ref 1.8–7.7)
NEUTROPHILS NFR BLD: 64.1 % (ref 38–73)
NONHDLC SERPL-MCNC: 92 MG/DL
NRBC BLD-RTO: 0 /100 WBC
PLATELET # BLD AUTO: 276 K/UL (ref 150–450)
PMV BLD AUTO: 10.3 FL (ref 9.2–12.9)
POTASSIUM SERPL-SCNC: 4.3 MMOL/L (ref 3.5–5.1)
PROT SERPL-MCNC: 7 G/DL (ref 6–8.4)
RBC # BLD AUTO: 4.52 M/UL (ref 4–5.4)
SODIUM SERPL-SCNC: 139 MMOL/L (ref 136–145)
TRIGL SERPL-MCNC: 88 MG/DL (ref 30–150)
WBC # BLD AUTO: 5.81 K/UL (ref 3.9–12.7)

## 2024-02-22 PROCEDURE — 85025 COMPLETE CBC W/AUTO DIFF WBC: CPT | Performed by: FAMILY MEDICINE

## 2024-02-22 PROCEDURE — 36415 COLL VENOUS BLD VENIPUNCTURE: CPT | Mod: PO | Performed by: FAMILY MEDICINE

## 2024-02-22 PROCEDURE — 83036 HEMOGLOBIN GLYCOSYLATED A1C: CPT | Performed by: FAMILY MEDICINE

## 2024-02-22 PROCEDURE — 80053 COMPREHEN METABOLIC PANEL: CPT | Performed by: FAMILY MEDICINE

## 2024-02-22 PROCEDURE — 80061 LIPID PANEL: CPT | Performed by: FAMILY MEDICINE

## 2024-02-23 ENCOUNTER — PATIENT MESSAGE (OUTPATIENT)
Dept: FAMILY MEDICINE | Facility: CLINIC | Age: 74
End: 2024-02-23
Payer: MEDICARE

## 2024-02-23 DIAGNOSIS — E11.9 CONTROLLED TYPE 2 DIABETES MELLITUS WITHOUT COMPLICATION, WITHOUT LONG-TERM CURRENT USE OF INSULIN: ICD-10-CM

## 2024-02-26 RX ORDER — METFORMIN HYDROCHLORIDE 750 MG/1
750 TABLET, EXTENDED RELEASE ORAL
Qty: 90 TABLET | Refills: 0
Start: 2024-02-26

## 2024-02-26 NOTE — TELEPHONE ENCOUNTER
No care due was identified.  Health [facility] Embedded Care Due Messages. Reference number: [id number].   2/26/2024 9:10:19 AM CST   No

## 2024-02-28 DIAGNOSIS — J31.0 CHRONIC RHINITIS: ICD-10-CM

## 2024-02-28 DIAGNOSIS — F41.9 ANXIETY: ICD-10-CM

## 2024-02-28 DIAGNOSIS — H69.92 DYSFUNCTION OF LEFT EUSTACHIAN TUBE: ICD-10-CM

## 2024-02-28 RX ORDER — ESCITALOPRAM OXALATE 10 MG/1
TABLET ORAL
Qty: 90 TABLET | Refills: 3 | Status: SHIPPED | OUTPATIENT
Start: 2024-02-28

## 2024-02-28 RX ORDER — AMLODIPINE BESYLATE 10 MG/1
TABLET ORAL
Qty: 90 TABLET | Refills: 3 | Status: SHIPPED | OUTPATIENT
Start: 2024-02-28

## 2024-02-28 RX ORDER — FLUTICASONE PROPIONATE 50 MCG
SPRAY, SUSPENSION (ML) NASAL
Qty: 48 G | Refills: 3 | Status: SHIPPED | OUTPATIENT
Start: 2024-02-28

## 2024-02-28 NOTE — TELEPHONE ENCOUNTER
Refill Decision Note   Doris Pastrana  is requesting a refill authorization.  Brief Assessment and Rationale for Refill:  Approve     Medication Therapy Plan:         Comments:     Note composed:2:34 PM 02/28/2024

## 2024-02-28 NOTE — TELEPHONE ENCOUNTER
No care due was identified.  Health [facility] Embedded Care Due Messages. Reference number: [id number].   2/28/2024 1:11:03 PM CST

## 2024-03-13 DIAGNOSIS — E78.5 HYPERLIPIDEMIA, UNSPECIFIED HYPERLIPIDEMIA TYPE: ICD-10-CM

## 2024-03-13 RX ORDER — ROSUVASTATIN CALCIUM 40 MG/1
40 TABLET, COATED ORAL EVERY OTHER DAY
Qty: 45 TABLET | Refills: 3 | Status: SHIPPED | OUTPATIENT
Start: 2024-03-13

## 2024-03-13 NOTE — TELEPHONE ENCOUNTER
No care due was identified.  Health [facility] Embedded Care Due Messages. Reference number: [id number].   3/13/2024 11:09:08 AM CDT

## 2024-03-13 NOTE — TELEPHONE ENCOUNTER
Refill Routing Note   Medication(s) are not appropriate for processing by [facility] for the following reason(s):        New or recently adjusted medication    [facility] action(s):  Defer             Appointments  past 12m or future 3m with PCP    Date Provider   Last Visit   2/20/2024 Tor Garg MD   Next Visit   Visit date not found Tor Garg MD   ED visits in past 90 days: 0        Note composed:11:58 AM 03/13/2024

## 2024-03-18 ENCOUNTER — PATIENT MESSAGE (OUTPATIENT)
Dept: FAMILY MEDICINE | Facility: CLINIC | Age: 74
End: 2024-03-18
Payer: MEDICARE

## 2024-03-18 ENCOUNTER — PATIENT MESSAGE (OUTPATIENT)
Dept: ENDOCRINOLOGY | Facility: CLINIC | Age: 74
End: 2024-03-18
Payer: MEDICARE

## 2024-03-18 ENCOUNTER — TELEPHONE (OUTPATIENT)
Dept: FAMILY MEDICINE | Facility: CLINIC | Age: 74
End: 2024-03-18
Payer: MEDICARE

## 2024-03-18 ENCOUNTER — LAB VISIT (OUTPATIENT)
Dept: LAB | Facility: HOSPITAL | Age: 74
End: 2024-03-18
Attending: FAMILY MEDICINE
Payer: MEDICARE

## 2024-03-18 DIAGNOSIS — R30.0 BURNING WITH URINATION: Primary | ICD-10-CM

## 2024-03-18 DIAGNOSIS — R30.0 BURNING WITH URINATION: ICD-10-CM

## 2024-03-18 DIAGNOSIS — N30.00 ACUTE CYSTITIS WITHOUT HEMATURIA: Primary | ICD-10-CM

## 2024-03-18 LAB
BACTERIA #/AREA URNS HPF: ABNORMAL /HPF
BILIRUB UR QL STRIP: NEGATIVE
CLARITY UR: CLEAR
COLOR UR: YELLOW
GLUCOSE UR QL STRIP: NEGATIVE
HGB UR QL STRIP: NEGATIVE
KETONES UR QL STRIP: NEGATIVE
LEUKOCYTE ESTERASE UR QL STRIP: ABNORMAL
MICROSCOPIC COMMENT: ABNORMAL
NITRITE UR QL STRIP: POSITIVE
PH UR STRIP: 6 [PH] (ref 5–8)
PROT UR QL STRIP: ABNORMAL
RBC #/AREA URNS HPF: 1 /HPF (ref 0–4)
SP GR UR STRIP: 1.01 (ref 1–1.03)
SQUAMOUS #/AREA URNS HPF: 1 /HPF
URN SPEC COLLECT METH UR: ABNORMAL
UROBILINOGEN UR STRIP-ACNC: NEGATIVE EU/DL
WBC #/AREA URNS HPF: 13 /HPF (ref 0–5)

## 2024-03-18 PROCEDURE — 87086 URINE CULTURE/COLONY COUNT: CPT | Performed by: FAMILY MEDICINE

## 2024-03-18 PROCEDURE — 87186 SC STD MICRODIL/AGAR DIL: CPT | Performed by: FAMILY MEDICINE

## 2024-03-18 PROCEDURE — 87077 CULTURE AEROBIC IDENTIFY: CPT | Performed by: FAMILY MEDICINE

## 2024-03-18 PROCEDURE — 81000 URINALYSIS NONAUTO W/SCOPE: CPT | Performed by: FAMILY MEDICINE

## 2024-03-18 RX ORDER — SULFAMETHOXAZOLE AND TRIMETHOPRIM 800; 160 MG/1; MG/1
1 TABLET ORAL 2 TIMES DAILY
Qty: 14 TABLET | Refills: 0 | Status: SHIPPED | OUTPATIENT
Start: 2024-03-18 | End: 2024-03-25

## 2024-03-18 NOTE — TELEPHONE ENCOUNTER
Patient has back pain and burning with urination-afebrile. No other symptoms- ua and culture ordered.

## 2024-03-18 NOTE — TELEPHONE ENCOUNTER
----- Message from Jordy Obrien sent at 3/18/2024 11:47 AM CDT -----  Contact: Self  Type:  Same Day Appointment Request    Caller is requesting a same day appointment.  Caller declined first available appointment listed below.      Name of Caller:  PT  When is the first available appointment?  N/a  Symptoms:  UTI  Best Call Back Number:  [phone]    Additional Information:

## 2024-03-19 NOTE — TELEPHONE ENCOUNTER
Urinalysis positive, culture pending.  Patient allergic to Omnicef which can cross react with penicillins and recently used Macrobid.  Bactrim sent to her pharmacy, patient notified by result note

## 2024-03-20 ENCOUNTER — TELEPHONE (OUTPATIENT)
Dept: FAMILY MEDICINE | Facility: CLINIC | Age: 74
End: 2024-03-20
Payer: MEDICARE

## 2024-03-20 LAB — BACTERIA UR CULT: ABNORMAL

## 2024-03-20 NOTE — TELEPHONE ENCOUNTER
ESBL-penicillins, cephalosporins, and Aztreonam will not work on it due to multidrug resistance.  She is on Bactrim which it is sensitive to.

## 2024-04-02 DIAGNOSIS — E11.9 CONTROLLED TYPE 2 DIABETES MELLITUS WITHOUT COMPLICATION, WITHOUT LONG-TERM CURRENT USE OF INSULIN: ICD-10-CM

## 2024-04-03 RX ORDER — METFORMIN HYDROCHLORIDE 750 MG/1
750 TABLET, EXTENDED RELEASE ORAL 2 TIMES DAILY WITH MEALS
Qty: 180 TABLET | Refills: 3 | OUTPATIENT
Start: 2024-04-03

## 2024-04-03 NOTE — TELEPHONE ENCOUNTER
Refill Decision Note   Doris Houghsuzanna  is requesting a refill authorization.  Brief Assessment and Rationale for Refill:  Quick Discontinue     Medication Therapy Plan:  Discontinued by: Tor Garg MD on 2/26/2024. Dose decreased to 1 tablet daily      Comments:     Note composed:1:46 PM 04/03/2024

## 2024-04-06 ENCOUNTER — ON-DEMAND VIRTUAL (OUTPATIENT)
Dept: URGENT CARE | Facility: CLINIC | Age: 74
End: 2024-04-06
Payer: MEDICARE

## 2024-04-06 DIAGNOSIS — I15.2 HYPERTENSION ASSOCIATED WITH DIABETES: ICD-10-CM

## 2024-04-06 DIAGNOSIS — E11.59 HYPERTENSION ASSOCIATED WITH DIABETES: ICD-10-CM

## 2024-04-06 DIAGNOSIS — A08.4 VIRAL GASTROENTERITIS: Primary | ICD-10-CM

## 2024-04-06 PROCEDURE — 99213 OFFICE O/P EST LOW 20 MIN: CPT | Mod: 95,,,

## 2024-04-06 RX ORDER — LOSARTAN POTASSIUM 100 MG/1
TABLET ORAL
Qty: 90 TABLET | Refills: 3 | Status: SHIPPED | OUTPATIENT
Start: 2024-04-06

## 2024-04-06 RX ORDER — ONDANSETRON 4 MG/1
4 TABLET, ORALLY DISINTEGRATING ORAL EVERY 8 HOURS PRN
Qty: 21 TABLET | Refills: 0 | Status: SHIPPED | OUTPATIENT
Start: 2024-04-06

## 2024-04-06 NOTE — TELEPHONE ENCOUNTER
No care due was identified.  [facility] Embedded Care Due Messages. Reference number: [id number].   4/06/2024 12:20:37 PM CDT

## 2024-04-07 NOTE — PROGRESS NOTES
Subjective:      Patient ID: Doris Pastrana is a 73 y.o. female at home    Vitals:  vitals were not taken for this visit.     Chief Complaint: Nausea      Visit Type: TELE AUDIOVISUAL    Present with the patient at the time of consultation: TELEMED PRESENT WITH PATIENT: None    Past Medical History:   Diagnosis Date    Acute sinus infection     Allergy     Anemia     Arthritis     Bronchitis     Degenerative disc disease     lumbar, pain contract 2013    Depression     Diabetes mellitus, type 2 2016    Fatty liver     Hyperlipidemia 2016    Hypertension     Kidney stone     Mitral valve prolapse     Multinodular goiter     Pleurisy     Pneumonia     PONV (postoperative nausea and vomiting)     Sinus infection     TIA (transient ischemic attack) 2019     Past Surgical History:   Procedure Laterality Date    ANKLE SURGERY  2017    right ankle torn ligament    APPENDECTOMY       SECTION      CHOLECYSTECTOMY      COLONOSCOPY  8/13/15    Dr. Oliveira, five year recheck    COLONOSCOPY N/A 2021    Procedure: COLONOSCOPY;  Surgeon: Tevin Oliveira MD;  Location: [facility];  Service: Endoscopy;  Laterality: N/A;    CYSTOSCOPY W/ URETERAL STENT PLACEMENT Right 2023    Procedure: CYSTOSCOPY, WITH URETERAL STENT INSERTION;  Surgeon: Marisol Stewart MD;  Location: [facility];  Service: Urology;  Laterality: Right;    CYSTOSCOPY WITH URETEROSCOPY, RETROGRADE PYELOGRAPHY, AND INSERTION OF STENT Left 4/10/2022    Procedure: CYSTOSCOPY, WITH RETROGRADE PYELOGRAM AND URETERAL STENT INSERTION;  Surgeon: Jack Redman Jr., MD;  Location: [facility];  Service: Urology;  Laterality: Left;    CYSTOSCOPY WITH URETEROSCOPY, RETROGRADE PYELOGRAPHY, AND INSERTION OF STENT Left 2022    Procedure: CYSTOSCOPY, WITH RETROGRADE PYELOGRAM AND URETERAL STENT INSERTION;  Surgeon: Jack Redman Jr., MD;  Location: [facility];  Service: Urology;  Laterality: Left;    CYSTOURETEROSCOPY WITH  RETROGRADE PYELOGRAPHY AND INSERTION OF STENT INTO URETER Right 9/20/2023    Procedure: CYSTOURETEROSCOPY, WITH RETROGRADE PYELOGRAM AND URETERAL STENT INSERTION;  Surgeon: Marisol Stewart MD;  Location: [facility] OR;  Service: Urology;  Laterality: Right;    EPIDURAL STEROID INJECTION INTO CERVICAL SPINE N/A 1/3/2020    Procedure: Injection-steroid-epidural-cervical;  Surgeon: Eddi Albrecht MD;  Location: [facility] OR;  Service: Pain Management;  Laterality: N/A;  C7-T1    EPIDURAL STEROID INJECTION INTO CERVICAL SPINE N/A 3/10/2020    Procedure: Injection-steroid-epidural-cervical;  Surgeon: Eddi Albrecht MD;  Location: [facility] OR;  Service: Pain Management;  Laterality: N/A;  C7-T1    EPIDURAL STEROID INJECTION INTO CERVICAL SPINE N/A 12/29/2020    Procedure: Injection-steroid-epidural-cervical;  Surgeon: Eddi Albrecht MD;  Location: [facility] OR;  Service: Pain Management;  Laterality: N/A;  C7-T1     EXTRACORPOREAL SHOCK WAVE LITHOTRIPSY Left 5/22/2019    Procedure: LITHOTRIPSY, ESWL - only if stone visible on xray  with cysto after on table possible;  Surgeon: Marisol Stewart MD;  Location: [facility] OR;  Service: Urology;  Laterality: Left;    HYSTERECTOMY      LASER LITHOTRIPSY Left 4/26/2022    Procedure: LITHOTRIPSY, USING LASER;  Surgeon: Jack Redman Jr., MD;  Location: [facility] OR;  Service: Urology;  Laterality: Left;    OOPHORECTOMY      TONSILLECTOMY      URETEROSCOPIC REMOVAL OF URETERIC CALCULUS Left 4/26/2022    Procedure: REMOVAL, CALCULUS, URETER, URETEROSCOPIC;  Surgeon: Jack Redman Jr., MD;  Location: [facility] OR;  Service: Urology;  Laterality: Left;    URETEROSCOPIC REMOVAL OF URETERIC CALCULUS Right 9/20/2023    Procedure: REMOVAL, CALCULUS, URETER, URETEROSCOPIC;  Surgeon: Marisol Stewart MD;  Location: [facility] OR;  Service: Urology;  Laterality: Right;    URETEROSCOPY Left 4/26/2022    Procedure: URETEROSCOPY;  Surgeon: Jack Redman Jr., MD;  Location: [facility] OR;  Service: Urology;  Laterality:  Left;    VAGINAL DELIVERY       Review of patient's allergies indicates:   Allergen Reactions    Omnicef [cefdinir] Hives    Codeine Nausea Only    Rhubarb Swelling    Strawberries [strawberry] Hives    Iodine Rash       Other reaction(s): Nausea    Latex, natural rubber Rash    Pravastatin Other (See Comments)     Muscle pain     Toradol [ketorolac] Itching    Tramadol Itching     Current Outpatient Medications on File Prior to Visit   Medication Sig Dispense Refill    ACCU-CHEK JORGE PLUS TEST STRP Strp TEST BLOOD SUGAR TWO TIMES DAILY 200 strip 3    ALPRAZolam (XANAX) 0.25 MG tablet Use one 20-30 minutes before flying prn 20 tablet 1    amLODIPine (NORVASC) 10 MG tablet TAKE 1 TABLET EVERY DAY 90 tablet 3    blood-glucose meter kit Use as instructed 1 each 0    cetirizine (ZYRTEC) 10 MG tablet Take 10 mg by mouth daily as needed.       cyclobenzaprine (FLEXERIL) 10 MG tablet TAKE 1 TABLET THREE TIMES DAILY AS NEEDED 270 tablet 1    EScitalopram oxalate (LEXAPRO) 10 MG tablet TAKE 1 TABLET EVERY DAY 90 tablet 3    fluticasone propionate (FLONASE) 50 mcg/actuation nasal spray USE 2 SPRAYS IN EACH NOSTRIL EVERY DAY 48 g 3    gabapentin (NEURONTIN) 100 MG capsule Take 3 capsules by mouth every evening.      lancets (TRUEPLUS LANCETS) 28 gauge Misc TEST TWO TIMES DAILY 200 each 11    losartan (COZAAR) 100 MG tablet TAKE 1 TABLET EVERY DAY 90 tablet 3    meclizine (ANTIVERT) 25 mg tablet Take 1 tablet (25 mg total) by mouth 3 (three) times daily as needed. 30 tablet 5    metFORMIN (GLUCOPHAGE-XR) 750 MG ER 24hr tablet Take 1 tablet (750 mg total) by mouth daily with breakfast. 90 tablet 0    mupirocin (BACTROBAN) 2 % ointment APPLY NASALLY TWO TIMES DAILY 22 g 3    rosuvastatin (CRESTOR) 40 MG Tab TAKE 1 TABLET EVERY OTHER DAY 45 tablet 3    semaglutide (OZEMPIC) 2 mg/dose (8 mg/3 mL) PnIj Inject 2 mg into the skin every 7 days. 9 mL 3    [DISCONTINUED] losartan (COZAAR) 100 MG tablet TAKE 1 TABLET EVERY DAY 90 tablet  0     No current facility-administered medications on file prior to visit.     Family History   Problem Relation Age of Onset    Arthritis Mother     Hypertension Mother     Vision loss Mother     Diabetes Mother     Alcohol abuse Father     Depression Father     Early death Father     Alcohol abuse Sister     Depression Sister     Cancer Sister         thyroid    Diabetes Sister     Heart disease Sister         chf    Atrial fibrillation Sister     Depression Brother     Hypertension Brother     Cancer Brother         prostate/stomach    Diabetes Brother     Aneurysm Brother         aaa    Learning disabilities Daughter     Hypertension Maternal Grandmother     Heart disease Maternal Grandmother     Arthritis Maternal Grandmother     Glaucoma Maternal Grandmother     Cancer Maternal Grandfather         stomach    Diabetes Paternal Grandmother     Breast cancer Paternal Grandmother     Heart disease Paternal Grandfather     Heart disease Maternal Uncle     Kidney disease Maternal Uncle     Kidney disease Paternal Aunt        Medications Ordered                [id number] - [place], [place] - [address]  [address] & [facility]   [address] [facility] [address]    Telephone: [phone]   Fax: [phone]   Hours: Open 24 hours                         E-Prescribed (1 of 1)              ondansetron (ZOFRAN-ODT) 4 MG TbDL    Sig: Take 1 tablet (4 mg total) by mouth every 8 (eight) hours as needed (nausea).       Start: 4/6/24     Quantity: 21 tablet Refills: 0                           Ohs Peq Odvv Intake    4/6/2024  7:14 PM CDT - Filed by Patient   What is your current physical address in the event of a medical emergency? [address]   Are you able to take your vital signs? No   Please attach any relevant images or files          Patient states that she woke up this morning with nausea and vomiting. Patient states that she has also had some diarrhea. Patient states that she has had  about 6 episodes of vomiting with 3 episodes of diarrhea. Patient states that she does have some mild generalized abdominal pain. Patient denies blood in stool or other symptoms at this time         Constitution: Negative.   HENT: Negative.     Neck: neck negative.   Cardiovascular: Negative.    Eyes: Negative.    Respiratory: Negative.     Gastrointestinal:  Positive for abdominal pain, nausea and vomiting.   Endocrine: negative.   Genitourinary: Negative.    Musculoskeletal: Negative.    Skin: Negative.    Allergic/Immunologic: Negative.    Neurological: Negative.    Hematologic/Lymphatic: Negative.    Psychiatric/Behavioral: Negative.          Objective:   The physical exam was conducted virtually.  Physical Exam   Constitutional: She is oriented to person, place, and time.   HENT:   Head: Normocephalic and atraumatic.   Nose: Nose normal.   Eyes: Conjunctivae are normal. Pupils are equal, round, and reactive to light. Extraocular movement intact   Neck: Neck supple.   Pulmonary/Chest: Effort normal.   Abdominal: Normal appearance.   Musculoskeletal: Normal range of motion.         General: Normal range of motion.   Neurological: no focal deficit. She is alert, oriented to person, place, and time and at baseline.   Skin: Skin is warm.   Psychiatric: Her behavior is normal. Mood, judgment and thought content normal.       Assessment:     1. Viral gastroenteritis        Plan:       Viral gastroenteritis  -     ondansetron (ZOFRAN-ODT) 4 MG TbDL; Take 1 tablet (4 mg total) by mouth every 8 (eight) hours as needed (nausea).  Dispense: 21 tablet; Refill: 0

## 2024-04-07 NOTE — PATIENT INSTRUCTIONS
Report directly to emergency department for any acute worsening of symptoms.    Recommend clear liquid bland diet times 12 hours.    Please get plenty of rest.    Follow-up with PCP or return to clinic if no improvement in symptoms over the next 2-3 days.

## 2024-04-25 ENCOUNTER — HOSPITAL ENCOUNTER (OUTPATIENT)
Dept: RADIOLOGY | Facility: HOSPITAL | Age: 74
Discharge: HOME | End: 2024-04-25
Attending: INTERNAL MEDICINE
Payer: MEDICARE

## 2024-04-25 ENCOUNTER — TELEPHONE (OUTPATIENT)
Dept: FAMILY MEDICINE | Facility: CLINIC | Age: 74
End: 2024-04-25
Payer: MEDICARE

## 2024-04-25 DIAGNOSIS — R30.0 BURNING WITH URINATION: Primary | ICD-10-CM

## 2024-04-25 DIAGNOSIS — E04.2 MULTINODULAR GOITER: ICD-10-CM

## 2024-04-25 PROCEDURE — 76536 US EXAM OF HEAD AND NECK: CPT | Mod: 26,,, | Performed by: RADIOLOGY

## 2024-04-25 PROCEDURE — 76536 US EXAM OF HEAD AND NECK: CPT | Mod: TC

## 2024-04-25 NOTE — TELEPHONE ENCOUNTER
----- Message from Walter Redman sent at 4/25/2024  2:34 PM CDT -----  Regarding: advice  Contact: patient  Type: Needs Medical Advice  Who Called:  patient   Symptoms (please be specific):  uti  How long has patient had these symptoms:  a week  Pharmacy name and phone #:      [facility] [address] [place], [place] - [address]  [address]  [facility] [address]  Phone: [phone] Fax: [phone]      Best Call Back Number: [phone]  Additional Information: pt stated that she may have an UTI. Please call to advice. Thanks

## 2024-05-02 ENCOUNTER — LAB VISIT (OUTPATIENT)
Dept: LAB | Facility: HOSPITAL | Age: 74
End: 2024-05-02
Attending: FAMILY MEDICINE
Payer: MEDICARE

## 2024-05-02 ENCOUNTER — TELEPHONE (OUTPATIENT)
Dept: FAMILY MEDICINE | Facility: CLINIC | Age: 74
End: 2024-05-02
Payer: MEDICARE

## 2024-05-02 DIAGNOSIS — N30.90 CYSTITIS: Primary | ICD-10-CM

## 2024-05-02 DIAGNOSIS — R30.0 BURNING WITH URINATION: ICD-10-CM

## 2024-05-02 DIAGNOSIS — R30.0 BURNING WITH URINATION: Primary | ICD-10-CM

## 2024-05-02 LAB
BACTERIA #/AREA URNS AUTO: ABNORMAL /HPF
BILIRUB UR QL STRIP: NEGATIVE
CLARITY UR: ABNORMAL
COLOR UR: YELLOW
GLUCOSE UR QL STRIP: NEGATIVE
HGB UR QL STRIP: NEGATIVE
KETONES UR QL STRIP: NEGATIVE
LEUKOCYTE ESTERASE UR QL STRIP: ABNORMAL
MICROSCOPIC COMMENT: ABNORMAL
NITRITE UR QL STRIP: POSITIVE
PH UR STRIP: 7 [PH] (ref 5–8)
PROT UR QL STRIP: ABNORMAL
RBC #/AREA URNS AUTO: 1 /HPF (ref 0–4)
SP GR UR STRIP: 1.01 (ref 1–1.03)
SQUAMOUS #/AREA URNS AUTO: 1 /HPF
URN SPEC COLLECT METH UR: ABNORMAL
WBC #/AREA URNS AUTO: 11 /HPF (ref 0–5)

## 2024-05-02 PROCEDURE — 81000 URINALYSIS NONAUTO W/SCOPE: CPT | Mod: HCNC,PO | Performed by: FAMILY MEDICINE

## 2024-05-02 PROCEDURE — 87186 SC STD MICRODIL/AGAR DIL: CPT | Mod: HCNC | Performed by: FAMILY MEDICINE

## 2024-05-02 PROCEDURE — 87086 URINE CULTURE/COLONY COUNT: CPT | Mod: HCNC | Performed by: FAMILY MEDICINE

## 2024-05-02 PROCEDURE — 87077 CULTURE AEROBIC IDENTIFY: CPT | Mod: HCNC | Performed by: FAMILY MEDICINE

## 2024-05-02 PROCEDURE — 87088 URINE BACTERIA CULTURE: CPT | Mod: HCNC | Performed by: FAMILY MEDICINE

## 2024-05-02 RX ORDER — NITROFURANTOIN 25; 75 MG/1; MG/1
100 CAPSULE ORAL 2 TIMES DAILY
Qty: 14 CAPSULE | Refills: 0 | Status: SHIPPED | OUTPATIENT
Start: 2024-05-02 | End: 2024-05-09

## 2024-05-02 NOTE — TELEPHONE ENCOUNTER
Spoke to patient   states left specimen at [facility] on 4/25/2024  She is still having symptoms.      Called lab they states urine was not received   Urine would need to be reordered  Labs pended

## 2024-05-03 ENCOUNTER — TELEPHONE (OUTPATIENT)
Dept: FAMILY MEDICINE | Facility: CLINIC | Age: 74
End: 2024-05-03
Payer: MEDICARE

## 2024-05-03 NOTE — TELEPHONE ENCOUNTER
----- Message from Nilda Morillo sent at 5/3/2024 11:27 AM CDT -----  Contact: PT  Type:  Patient Returning Call    Who Called:PT   Who Left Message for Patient: ALEKSANDER   Does the patient know what this is regarding?: N/A  Would the patient rather a call back or a response via [facility]? CALL   Best Call Back Number:[phone] (home)   Additional Information: THANK YOU

## 2024-05-05 LAB — BACTERIA UR CULT: ABNORMAL

## 2024-06-04 ENCOUNTER — TELEPHONE (OUTPATIENT)
Dept: FAMILY MEDICINE | Facility: CLINIC | Age: 74
End: 2024-06-04
Payer: MEDICARE

## 2024-06-04 DIAGNOSIS — N30.00 ACUTE CYSTITIS WITHOUT HEMATURIA: Primary | ICD-10-CM

## 2024-06-04 DIAGNOSIS — N95.2 ATROPHIC VAGINITIS: ICD-10-CM

## 2024-06-04 RX ORDER — ESTRADIOL 0.1 MG/G
CREAM VAGINAL
Qty: 42.5 G | Refills: 2 | Status: SHIPPED | OUTPATIENT
Start: 2024-06-04 | End: 2025-06-04

## 2024-06-04 NOTE — TELEPHONE ENCOUNTER
A common cause of this is atrophic vaginitis that affects the resistance of the lower urinary tract system to bacterial invasion.  E coli is the most common urinary tract infection we see and commonly is a contaminant from the colon.  Does she have a gynecologist?  Usually it is treated with estrogen cream

## 2024-06-04 NOTE — TELEPHONE ENCOUNTER
Estrace vaginal cream sent to [facility]     Orders in for urinalysis and culture.  Please schedule at the hospital lab

## 2024-06-04 NOTE — TELEPHONE ENCOUNTER
She was positive with E Coli on 5/2/2024   She is having so many recurrent UTI   She denies fever at this time.   She has noted blood in her urine.   She does feel that the infection clears but it comes back in about two weeks   Please advise         ----- Message from Alexi Man sent at 6/4/2024 10:59 AM CDT -----  Contact: Self  Type: Needs Medical Advice  Who Called:  Patient  Symptoms (please be specific):  Symptom: Urination Pain  Outcome: Schedule a same-day appointment or talk to a nurse or provider within 1 hour.  Reason: Caller denied all higher acuity questions  How long has patient had these symptoms: 7d  Pharmacy name and phone #:      [facility] [address] [place], [place] - [address]  [address] [facility] [address]  Phone: [phone] Fax: [phone]  Best Call Back Number: [phone]   Additional Information:  Called to speak with office regarding symptoms/turning in sample. States she is relapsing on macrobid.     Also called to have mammo order put in for annual.

## 2024-06-04 NOTE — TELEPHONE ENCOUNTER
Spoke with patient She does not see a GYN but she would be interested in the Estrogen cream.   She she repeat the urine test?

## 2024-06-05 NOTE — TELEPHONE ENCOUNTER
Spoke with patient    She is in [place] she will do her urine specimen this afternoon or tomorrow morning

## 2024-06-06 ENCOUNTER — LAB VISIT (OUTPATIENT)
Dept: LAB | Facility: HOSPITAL | Age: 74
End: 2024-06-06
Attending: FAMILY MEDICINE
Payer: MEDICARE

## 2024-06-06 ENCOUNTER — PATIENT MESSAGE (OUTPATIENT)
Dept: FAMILY MEDICINE | Facility: CLINIC | Age: 74
End: 2024-06-06
Payer: MEDICARE

## 2024-06-06 DIAGNOSIS — N30.00 ACUTE CYSTITIS WITHOUT HEMATURIA: ICD-10-CM

## 2024-06-06 LAB
BACTERIA #/AREA URNS HPF: ABNORMAL /HPF
BILIRUB UR QL STRIP: NEGATIVE
CLARITY UR: ABNORMAL
COLOR UR: YELLOW
GLUCOSE UR QL STRIP: NEGATIVE
HGB UR QL STRIP: NEGATIVE
HYALINE CASTS #/AREA URNS LPF: 3 /LPF
KETONES UR QL STRIP: NEGATIVE
LEUKOCYTE ESTERASE UR QL STRIP: ABNORMAL
MICROSCOPIC COMMENT: ABNORMAL
NITRITE UR QL STRIP: POSITIVE
PH UR STRIP: 7 [PH] (ref 5–8)
PROT UR QL STRIP: ABNORMAL
RBC #/AREA URNS HPF: 2 /HPF (ref 0–4)
SP GR UR STRIP: 1.01 (ref 1–1.03)
SQUAMOUS #/AREA URNS HPF: 0 /HPF
URN SPEC COLLECT METH UR: ABNORMAL
UROBILINOGEN UR STRIP-ACNC: NEGATIVE EU/DL
WBC #/AREA URNS HPF: 23 /HPF (ref 0–5)

## 2024-06-06 PROCEDURE — 81000 URINALYSIS NONAUTO W/SCOPE: CPT | Performed by: FAMILY MEDICINE

## 2024-06-06 PROCEDURE — 87086 URINE CULTURE/COLONY COUNT: CPT | Performed by: FAMILY MEDICINE

## 2024-06-06 PROCEDURE — 87186 SC STD MICRODIL/AGAR DIL: CPT | Performed by: FAMILY MEDICINE

## 2024-06-07 NOTE — TELEPHONE ENCOUNTER
Her urine test is positive for a urinary tract infection on dipstick and on microscopic exam.  Culture is pending.  Historically her cultures have been resistant to penicillins but we will not know until we get it in.      Has she already had the root canal, if so she needs to take the amoxicillin and we will wait for the culture results.  If it was ordered for a root canal to be done in the future then I would suggest we put her on Bactrim for the UTI as most of her recent culture results were sensitive to it.  It would then be up to Dr. Hood to decide whether he can accept the Bactrim.    Reply sent to patient by e-mail covering the same information

## 2024-06-08 LAB — BACTERIA UR CULT: ABNORMAL

## 2024-06-10 ENCOUNTER — TELEPHONE (OUTPATIENT)
Dept: FAMILY MEDICINE | Facility: CLINIC | Age: 74
End: 2024-06-10
Payer: MEDICARE

## 2024-06-10 DIAGNOSIS — B96.20 E. COLI UTI: Primary | ICD-10-CM

## 2024-06-10 DIAGNOSIS — N39.0 E. COLI UTI: Primary | ICD-10-CM

## 2024-06-10 RX ORDER — SULFAMETHOXAZOLE AND TRIMETHOPRIM 800; 160 MG/1; MG/1
1 TABLET ORAL 2 TIMES DAILY
Qty: 20 TABLET | Refills: 0 | Status: SHIPPED | OUTPATIENT
Start: 2024-06-10 | End: 2024-06-20

## 2024-06-10 NOTE — TELEPHONE ENCOUNTER
Bactrim sent to [facility].  Normally we use shorter courses for urinary tract infections but since she has had so many recurrences of this same bacteria I put her on a full 10 day course.  Make sure she uses it all

## 2024-06-10 NOTE — TELEPHONE ENCOUNTER
Patient notified of results. Patient said she has started antibiotic from her dentist but will stop that and take whatever you recommend.

## 2024-06-10 NOTE — TELEPHONE ENCOUNTER
----- Message from Nilda Morillo sent at 6/10/2024 11:19 AM CDT -----  Contact: PT  Type:  Patient Returning Call    Who Called:PT   Who Left Message for Patient:N/A  Does the patient know what this is regarding?:N/A  Would the patient rather a call back or a response via [facility]? CALL   Best Call Back Number:[phone] ([place])   Additional Information: THANK YOU

## 2024-06-10 NOTE — TELEPHONE ENCOUNTER
She spoke to her dentist and he said to stop taking amoxicillin. She just needs Dr Garg to order Bactrim to [facility] on [address] in [place].

## 2024-06-10 NOTE — TELEPHONE ENCOUNTER
----- Message from Tor Garg MD sent at 6/10/2024  8:05 AM CDT -----  The urine culture is again growing a common E coli with multiple drug resistance problems.  It is only sensitive to the Macrobid/nitrofurantoin and the Bactrim.  Is she taking the amoxicillin given to her by her dentist now?  It is not likely to cover it as the E coli was resistant to penicillins.    Patient notified by e-mail

## 2024-06-10 NOTE — TELEPHONE ENCOUNTER
I would prefer that she check with her dentist and see if the Bactrim will cover what he needs covered 1st.

## 2024-06-12 DIAGNOSIS — M54.9 MID BACK PAIN: ICD-10-CM

## 2024-06-12 NOTE — TELEPHONE ENCOUNTER
Care Due:                  Date            Visit Type   Department     Provider  --------------------------------------------------------------------------------                                EP -                              PRIMARY      [facility]  Last Visit: 02-      CARE ([facility])   PRACTICE       Tor Garg  Next Visit: None Scheduled  None         None Found                                                            Last  Test          Frequency    Reason                     Performed    Due Date  --------------------------------------------------------------------------------    HBA1C.......  6 months...  metFORMIN, semaglutide...  02- 08-    [facility] Embedded Care Due Messages. Reference number: [id number].   6/12/2024 5:02:05 PM CDT

## 2024-06-13 RX ORDER — CYCLOBENZAPRINE HCL 10 MG
TABLET ORAL
Qty: 270 TABLET | Refills: 0 | Status: SHIPPED | OUTPATIENT
Start: 2024-06-13

## 2024-06-13 NOTE — TELEPHONE ENCOUNTER
Refill Routing Note   Medication(s) are not appropriate for processing by [facility] for the following reason(s):        Outside of protocol    [facility] action(s):  Route               Appointments  past 12m or future 3m with PCP    Date Provider   Last Visit   2/20/2024 Tor Garg MD   Next Visit   Visit date not found Tor Garg MD   ED visits in past 90 days: 0        Note composed:8:23 AM 06/13/2024

## 2024-06-19 DIAGNOSIS — E11.00 TYPE 2 DIABETES MELLITUS WITH HYPEROSMOLARITY WITHOUT COMA, WITHOUT LONG-TERM CURRENT USE OF INSULIN: ICD-10-CM

## 2024-06-19 RX ORDER — SEMAGLUTIDE 2.68 MG/ML
INJECTION, SOLUTION SUBCUTANEOUS
Qty: 9 ML | Refills: 0 | Status: SHIPPED | OUTPATIENT
Start: 2024-06-19

## 2024-06-19 NOTE — TELEPHONE ENCOUNTER
No care due was identified.  [facility] Embedded Care Due Messages. Reference number: [id number].   6/19/2024 12:50:29 PM CDT

## 2024-06-19 NOTE — TELEPHONE ENCOUNTER
Refill Decision Note   Doris Pastrana  is requesting a refill authorization.  Brief Assessment and Rationale for Refill:  Approve     Medication Therapy Plan:         Comments:     Note composed:3:43 PM 06/19/2024

## 2024-06-20 ENCOUNTER — PATIENT MESSAGE (OUTPATIENT)
Dept: FAMILY MEDICINE | Facility: CLINIC | Age: 74
End: 2024-06-20
Payer: MEDICARE

## 2024-06-20 DIAGNOSIS — E11.9 TYPE 2 DIABETES MELLITUS WITHOUT COMPLICATION, UNSPECIFIED WHETHER LONG TERM INSULIN USE: ICD-10-CM

## 2024-07-14 ENCOUNTER — HOSPITAL ENCOUNTER (EMERGENCY)
Facility: HOSPITAL | Age: 74
Discharge: HOME | End: 2024-07-14
Attending: EMERGENCY MEDICINE
Payer: MEDICARE

## 2024-07-14 VITALS
SYSTOLIC BLOOD PRESSURE: 114 MMHG | HEART RATE: 81 BPM | BODY MASS INDEX: 26.66 KG/M2 | DIASTOLIC BLOOD PRESSURE: 61 MMHG | RESPIRATION RATE: 16 BRPM | WEIGHT: 160 LBS | TEMPERATURE: 97 F | HEIGHT: 65 IN | OXYGEN SATURATION: 95 %

## 2024-07-14 DIAGNOSIS — R07.9 CHEST PAIN: ICD-10-CM

## 2024-07-14 DIAGNOSIS — M25.473 ANKLE SWELLING: ICD-10-CM

## 2024-07-14 DIAGNOSIS — N39.0 URINARY TRACT INFECTION WITHOUT HEMATURIA, SITE UNSPECIFIED: Primary | ICD-10-CM

## 2024-07-14 DIAGNOSIS — R60.9 SWELLING: ICD-10-CM

## 2024-07-14 LAB
ALBUMIN SERPL BCP-MCNC: 4.4 G/DL (ref 3.5–5.2)
ALP SERPL-CCNC: 132 U/L (ref 55–135)
ALT SERPL W/O P-5'-P-CCNC: 20 U/L (ref 10–44)
ANION GAP SERPL CALC-SCNC: 7 MMOL/L (ref 8–16)
AST SERPL-CCNC: 15 U/L (ref 10–40)
BACTERIA #/AREA URNS HPF: ABNORMAL /HPF
BASOPHILS # BLD AUTO: 0.06 K/UL (ref 0–0.2)
BASOPHILS NFR BLD: 0.9 % (ref 0–1.9)
BILIRUB SERPL-MCNC: 0.7 MG/DL (ref 0.1–1)
BILIRUB UR QL STRIP: NEGATIVE
BNP SERPL-MCNC: 9 PG/ML (ref 0–99)
BUN SERPL-MCNC: 15 MG/DL (ref 8–23)
CALCIUM SERPL-MCNC: 9.2 MG/DL (ref 8.7–10.5)
CHLORIDE SERPL-SCNC: 103 MMOL/L (ref 95–110)
CLARITY UR: CLEAR
CO2 SERPL-SCNC: 28 MMOL/L (ref 23–29)
COLOR UR: YELLOW
CREAT SERPL-MCNC: 0.8 MG/DL (ref 0.5–1.4)
D DIMER PPP IA.FEU-MCNC: 0.71 MG/L FEU (ref 0–0.49)
DIFFERENTIAL METHOD BLD: ABNORMAL
EOSINOPHIL # BLD AUTO: 0.2 K/UL (ref 0–0.5)
EOSINOPHIL NFR BLD: 2.5 % (ref 0–8)
ERYTHROCYTE [DISTWIDTH] IN BLOOD BY AUTOMATED COUNT: 14.2 % (ref 11.5–14.5)
EST. GFR  (NO RACE VARIABLE): >60 ML/MIN/1.73 M^2
GLUCOSE SERPL-MCNC: 85 MG/DL (ref 70–110)
GLUCOSE UR QL STRIP: NEGATIVE
HCT VFR BLD AUTO: 38.4 % (ref 37–48.5)
HGB BLD-MCNC: 12.4 G/DL (ref 12–16)
HGB UR QL STRIP: NEGATIVE
IMM GRANULOCYTES # BLD AUTO: 0.04 K/UL (ref 0–0.04)
IMM GRANULOCYTES NFR BLD AUTO: 0.6 % (ref 0–0.5)
KETONES UR QL STRIP: NEGATIVE
LEUKOCYTE ESTERASE UR QL STRIP: ABNORMAL
LYMPHOCYTES # BLD AUTO: 1.9 K/UL (ref 1–4.8)
LYMPHOCYTES NFR BLD: 28.5 % (ref 18–48)
MAGNESIUM SERPL-MCNC: 1.9 MG/DL (ref 1.6–2.6)
MCH RBC QN AUTO: 28.3 PG (ref 27–31)
MCHC RBC AUTO-ENTMCNC: 32.3 G/DL (ref 32–36)
MCV RBC AUTO: 88 FL (ref 82–98)
MICROSCOPIC COMMENT: ABNORMAL
MONOCYTES # BLD AUTO: 0.6 K/UL (ref 0.3–1)
MONOCYTES NFR BLD: 9.1 % (ref 4–15)
NEUTROPHILS # BLD AUTO: 3.9 K/UL (ref 1.8–7.7)
NEUTROPHILS NFR BLD: 58.4 % (ref 38–73)
NITRITE UR QL STRIP: POSITIVE
NRBC BLD-RTO: 0 /100 WBC
OHS QRS DURATION: 68 MS
OHS QTC CALCULATION: 411 MS
PH UR STRIP: 7 [PH] (ref 5–8)
PLATELET # BLD AUTO: 245 K/UL (ref 150–450)
PMV BLD AUTO: 10.3 FL (ref 9.2–12.9)
POTASSIUM SERPL-SCNC: 3.9 MMOL/L (ref 3.5–5.1)
PROT SERPL-MCNC: 7.1 G/DL (ref 6–8.4)
PROT UR QL STRIP: NEGATIVE
RBC # BLD AUTO: 4.38 M/UL (ref 4–5.4)
RBC #/AREA URNS HPF: 0 /HPF (ref 0–4)
SODIUM SERPL-SCNC: 138 MMOL/L (ref 136–145)
SP GR UR STRIP: 1.01 (ref 1–1.03)
SQUAMOUS #/AREA URNS HPF: 3 /HPF
TROPONIN I SERPL HS-MCNC: 3 PG/ML (ref 0–14.9)
TROPONIN I SERPL HS-MCNC: 4 PG/ML (ref 0–14.9)
URN SPEC COLLECT METH UR: ABNORMAL
UROBILINOGEN UR STRIP-ACNC: NEGATIVE EU/DL
WBC # BLD AUTO: 6.7 K/UL (ref 3.9–12.7)
WBC #/AREA URNS HPF: 7 /HPF (ref 0–5)
YEAST URNS QL MICRO: ABNORMAL

## 2024-07-14 PROCEDURE — 81001 URINALYSIS AUTO W/SCOPE: CPT | Performed by: EMERGENCY MEDICINE

## 2024-07-14 PROCEDURE — 96374 THER/PROPH/DIAG INJ IV PUSH: CPT

## 2024-07-14 PROCEDURE — 83735 ASSAY OF MAGNESIUM: CPT | Performed by: EMERGENCY MEDICINE

## 2024-07-14 PROCEDURE — 99285 EMERGENCY DEPT VISIT HI MDM: CPT | Mod: 25

## 2024-07-14 PROCEDURE — 63600175 PHARM REV CODE 636 W HCPCS: Performed by: EMERGENCY MEDICINE

## 2024-07-14 PROCEDURE — 80053 COMPREHEN METABOLIC PANEL: CPT | Performed by: EMERGENCY MEDICINE

## 2024-07-14 PROCEDURE — 83880 ASSAY OF NATRIURETIC PEPTIDE: CPT | Performed by: EMERGENCY MEDICINE

## 2024-07-14 PROCEDURE — 25500020 PHARM REV CODE 255: Performed by: EMERGENCY MEDICINE

## 2024-07-14 PROCEDURE — 36415 COLL VENOUS BLD VENIPUNCTURE: CPT | Performed by: EMERGENCY MEDICINE

## 2024-07-14 PROCEDURE — 85025 COMPLETE CBC W/AUTO DIFF WBC: CPT | Performed by: EMERGENCY MEDICINE

## 2024-07-14 PROCEDURE — 84484 ASSAY OF TROPONIN QUANT: CPT | Mod: 91 | Performed by: EMERGENCY MEDICINE

## 2024-07-14 PROCEDURE — 96375 TX/PRO/DX INJ NEW DRUG ADDON: CPT | Mod: 59

## 2024-07-14 PROCEDURE — 85379 FIBRIN DEGRADATION QUANT: CPT | Performed by: EMERGENCY MEDICINE

## 2024-07-14 PROCEDURE — 93010 ELECTROCARDIOGRAM REPORT: CPT | Mod: ,,, | Performed by: GENERAL PRACTICE

## 2024-07-14 PROCEDURE — 25000003 PHARM REV CODE 250: Performed by: EMERGENCY MEDICINE

## 2024-07-14 PROCEDURE — 93005 ELECTROCARDIOGRAM TRACING: CPT | Performed by: GENERAL PRACTICE

## 2024-07-14 RX ORDER — CIPROFLOXACIN 500 MG/1
500 TABLET ORAL EVERY 12 HOURS
Qty: 14 TABLET | Refills: 0 | Status: SHIPPED | OUTPATIENT
Start: 2024-07-14

## 2024-07-14 RX ORDER — CIPROFLOXACIN 500 MG/1
500 TABLET ORAL
Status: COMPLETED | OUTPATIENT
Start: 2024-07-14 | End: 2024-07-14

## 2024-07-14 RX ORDER — METHYLPREDNISOLONE SOD SUCC 125 MG
125 VIAL (EA) INJECTION
Status: COMPLETED | OUTPATIENT
Start: 2024-07-14 | End: 2024-07-14

## 2024-07-14 RX ORDER — DIPHENHYDRAMINE HYDROCHLORIDE 50 MG/ML
25 INJECTION INTRAMUSCULAR; INTRAVENOUS
Status: COMPLETED | OUTPATIENT
Start: 2024-07-14 | End: 2024-07-14

## 2024-07-14 RX ORDER — ASPIRIN 325 MG
325 TABLET ORAL
Status: DISCONTINUED | OUTPATIENT
Start: 2024-07-14 | End: 2024-07-14 | Stop reason: HOSPADM

## 2024-07-14 RX ORDER — ONDANSETRON HYDROCHLORIDE 2 MG/ML
4 INJECTION, SOLUTION INTRAVENOUS
Status: COMPLETED | OUTPATIENT
Start: 2024-07-14 | End: 2024-07-14

## 2024-07-14 RX ADMIN — DIPHENHYDRAMINE HYDROCHLORIDE 25 MG: 50 INJECTION INTRAMUSCULAR; INTRAVENOUS at 05:07

## 2024-07-14 RX ADMIN — ONDANSETRON 4 MG: 2 INJECTION INTRAMUSCULAR; INTRAVENOUS at 05:07

## 2024-07-14 RX ADMIN — METHYLPREDNISOLONE SODIUM SUCCINATE 125 MG: 125 INJECTION, POWDER, FOR SOLUTION INTRAMUSCULAR; INTRAVENOUS at 05:07

## 2024-07-14 RX ADMIN — IOHEXOL 100 ML: 350 INJECTION, SOLUTION INTRAVENOUS at 06:07

## 2024-07-14 RX ADMIN — CIPROFLOXACIN 500 MG: 500 TABLET ORAL at 06:07

## 2024-07-15 NOTE — DISCHARGE INSTRUCTIONS
Today you have been evaluated for chest pain.  You are not having a heart attack.  We can not rule out other causes of cardiac chest pain including obstructive coronary artery disease.  Return immediately for any repeat or worsening symptoms.

## 2024-07-16 NOTE — PLAN OF CARE
07/16/24 0857   Discharge Reassessment   Assessment Type Discharge Planning Reassessment   Did the patient's condition or plan change since previous assessment? Yes   Post-Acute Status   Hospital Resources/Appts/Education Provided Appointments scheduled and added to AVS   Discharge Delays None known at this time     Pt scheduled for Stress Test on July 18, 2024, Pt will be contacted the day before with instructions and arrival time.

## 2024-07-17 ENCOUNTER — TELEPHONE (OUTPATIENT)
Dept: CARDIOLOGY | Facility: HOSPITAL | Age: 74
End: 2024-07-17

## 2024-07-18 ENCOUNTER — TELEPHONE (OUTPATIENT)
Dept: CARDIOLOGY | Facility: HOSPITAL | Age: 74
End: 2024-07-18

## 2024-07-18 ENCOUNTER — HOSPITAL ENCOUNTER (OUTPATIENT)
Dept: RADIOLOGY | Facility: HOSPITAL | Age: 74
Discharge: HOME | End: 2024-07-18
Attending: EMERGENCY MEDICINE
Payer: MEDICARE

## 2024-07-18 ENCOUNTER — CLINICAL SUPPORT (OUTPATIENT)
Dept: CARDIOLOGY | Facility: HOSPITAL | Age: 74
End: 2024-07-18
Attending: EMERGENCY MEDICINE
Payer: MEDICARE

## 2024-07-18 DIAGNOSIS — R07.9 CHEST PAIN: ICD-10-CM

## 2024-07-18 LAB
CV STRESS BASE HR: 85 BPM
DIASTOLIC BLOOD PRESSURE: 75 MMHG
OHS CV CPX 1 MINUTE RECOVERY HEART RATE: 114 BPM
OHS CV CPX 85 PERCENT MAX PREDICTED HEART RATE MALE: 127
OHS CV CPX ESTIMATED METS: 6.5
OHS CV CPX MAX PREDICTED HEART RATE: 149
OHS CV CPX PATIENT IS FEMALE: 1
OHS CV CPX PATIENT IS MALE: 0
OHS CV CPX PEAK DIASTOLIC BLOOD PRESSURE: 76 MMHG
OHS CV CPX PEAK HEAR RATE: 139 BPM
OHS CV CPX PEAK RATE PRESSURE PRODUCT: NORMAL
OHS CV CPX PEAK SYSTOLIC BLOOD PRESSURE: 186 MMHG
OHS CV CPX PERCENT MAX PREDICTED HEART RATE ACHIEVED: 97
OHS CV CPX RATE PRESSURE PRODUCT PRESENTING: NORMAL
STRESS ECHO POST EXERCISE DUR MIN: 4 MINUTES
SYSTOLIC BLOOD PRESSURE: 133 MMHG

## 2024-07-18 PROCEDURE — 93016 CV STRESS TEST SUPVJ ONLY: CPT | Mod: ,,, | Performed by: GENERAL PRACTICE

## 2024-07-18 PROCEDURE — 78452 HT MUSCLE IMAGE SPECT MULT: CPT | Mod: TC

## 2024-07-18 PROCEDURE — A9502 TC99M TETROFOSMIN: HCPCS | Performed by: EMERGENCY MEDICINE

## 2024-07-18 PROCEDURE — 93017 CV STRESS TEST TRACING ONLY: CPT

## 2024-07-18 PROCEDURE — 93018 CV STRESS TEST I&R ONLY: CPT | Mod: ,,, | Performed by: GENERAL PRACTICE

## 2024-07-18 RX ADMIN — TETROFOSMIN 26 MILLICURIE: 1.38 INJECTION, POWDER, LYOPHILIZED, FOR SOLUTION INTRAVENOUS at 09:07

## 2024-07-18 RX ADMIN — TETROFOSMIN 10.2 MILLICURIE: 1.38 INJECTION, POWDER, LYOPHILIZED, FOR SOLUTION INTRAVENOUS at 08:07

## 2024-07-18 NOTE — NURSING NOTE
Nuclear Treadmill Stress Test completed without complications. Patient verbalized understanding of pre-post test instructions. Discharged from stress lab per Cardiology

## 2024-07-25 LAB
OHS QRS DURATION: 68 MS
OHS QTC CALCULATION: 411 MS

## 2024-08-14 ENCOUNTER — TELEPHONE (OUTPATIENT)
Dept: FAMILY MEDICINE | Facility: CLINIC | Age: 74
End: 2024-08-14
Payer: MEDICARE

## 2024-08-14 ENCOUNTER — LAB VISIT (OUTPATIENT)
Dept: LAB | Facility: HOSPITAL | Age: 74
End: 2024-08-14
Attending: FAMILY MEDICINE
Payer: MEDICARE

## 2024-08-14 DIAGNOSIS — R35.0 URINARY FREQUENCY: ICD-10-CM

## 2024-08-14 DIAGNOSIS — R30.9 URINARY PAIN: ICD-10-CM

## 2024-08-14 DIAGNOSIS — R35.0 URINARY FREQUENCY: Primary | ICD-10-CM

## 2024-08-14 LAB
BACTERIA #/AREA URNS HPF: ABNORMAL /HPF
HYALINE CASTS #/AREA URNS LPF: 1 /LPF
MICROSCOPIC COMMENT: ABNORMAL
RBC #/AREA URNS HPF: 3 /HPF (ref 0–4)
SQUAMOUS #/AREA URNS HPF: 4 /HPF
WBC #/AREA URNS HPF: 8 /HPF (ref 0–5)

## 2024-08-14 PROCEDURE — 87086 URINE CULTURE/COLONY COUNT: CPT | Performed by: FAMILY MEDICINE

## 2024-08-14 PROCEDURE — 81000 URINALYSIS NONAUTO W/SCOPE: CPT | Performed by: FAMILY MEDICINE

## 2024-08-14 PROCEDURE — 87186 SC STD MICRODIL/AGAR DIL: CPT | Performed by: FAMILY MEDICINE

## 2024-08-14 NOTE — TELEPHONE ENCOUNTER
Patient called- urinary frequency, pain and odor-back hurts too-ua and culture ordered-patient notified Dr. Garg will notified her this evening in My Chart.

## 2024-08-15 ENCOUNTER — PATIENT MESSAGE (OUTPATIENT)
Dept: FAMILY MEDICINE | Facility: CLINIC | Age: 74
End: 2024-08-15
Payer: MEDICARE

## 2024-08-15 DIAGNOSIS — B96.29 UTI DUE TO EXTENDED-SPECTRUM BETA LACTAMASE (ESBL) PRODUCING ESCHERICHIA COLI: Primary | ICD-10-CM

## 2024-08-15 DIAGNOSIS — Z16.12 UTI DUE TO EXTENDED-SPECTRUM BETA LACTAMASE (ESBL) PRODUCING ESCHERICHIA COLI: Primary | ICD-10-CM

## 2024-08-15 DIAGNOSIS — N39.0 UTI DUE TO EXTENDED-SPECTRUM BETA LACTAMASE (ESBL) PRODUCING ESCHERICHIA COLI: Primary | ICD-10-CM

## 2024-08-16 LAB
BACTERIA UR CULT: ABNORMAL
LEFT EYE DM RETINOPATHY: NEGATIVE
RIGHT EYE DM RETINOPATHY: NEGATIVE

## 2024-08-16 RX ORDER — SULFAMETHOXAZOLE AND TRIMETHOPRIM 800; 160 MG/1; MG/1
1 TABLET ORAL 2 TIMES DAILY
Qty: 28 TABLET | Refills: 1 | Status: SHIPPED | OUTPATIENT
Start: 2024-08-16 | End: 2024-08-30

## 2024-08-16 NOTE — TELEPHONE ENCOUNTER
She has a persistent urinary tract infection with an E coli that is antibiotic resistant.  Cultures indicate it is sensitive to Bactrim and to Macrobid but she has had two courses of each and still comes up positive for the same bacteria.    Order is in for Infectious Disease consult    Repeat culture shows it is still sensitive to Bactrim so I am putting her on a 14 day course with a refill if needed.  Order is in for a urinalysis with culture if indicated to be done in 10 days while she is still completing the Bactrim.  If it still looks like she has a UTI she will then get the refill and continue going at least until she sees Infectious Disease.    I sent her a note with instructions.  Please schedule the repeat urinalysis, consult has been sent for infectious disease and needs to be scheduled if you can do that

## 2024-08-19 ENCOUNTER — PATIENT OUTREACH (OUTPATIENT)
Dept: ADMINISTRATIVE | Facility: HOSPITAL | Age: 74
End: 2024-08-19
Payer: MEDICARE

## 2024-08-19 NOTE — PROGRESS NOTES
Population Health Chart Review & Patient Outreach Details      Additional [facility] Notes:               Updates Requested / Reviewed:      Updated Care Coordination Note and          Health Maintenance Topics Overdue:      VB Score: 2     Eye Exam  Mammogram    Shingles/Zoster Vaccine  RSV Vaccine                  Health Maintenance Topic(s) Outreach Outcomes & Actions Taken:    Eye Exam - Outreach Outcomes & Actions Taken  : Diabetic Eye External Records Uploaded, Care Team & History Updated if Applicable

## 2024-08-20 ENCOUNTER — TELEPHONE (OUTPATIENT)
Dept: INFECTIOUS DISEASES | Facility: CLINIC | Age: 74
End: 2024-08-20
Payer: MEDICARE

## 2024-08-20 NOTE — TELEPHONE ENCOUNTER
----- Message from Grace Mendoza sent at 8/19/2024  5:37 PM CDT -----  Type:  Appointment Request     Name of Caller:KARON DOWNS [[id number]]  When is the first available appointment?No access  Symptoms:uti  Would the patient rather a call back or a response via [facility]? call  Best Call Back Number:[phone]   Additional Information: The patient has a referral for the department and needs an appt. Please reach out to schedule as I dont have the access.

## 2024-08-20 NOTE — TELEPHONE ENCOUNTER
Called pt, discussed [place] ID as she lives in [place] and all of her medical care is in [place]. Provided pt phone number to [facility] -[phone]. Patient voiced understanding and appreciation

## 2024-08-28 DIAGNOSIS — E11.00 TYPE 2 DIABETES MELLITUS WITH HYPEROSMOLARITY WITHOUT COMA, WITHOUT LONG-TERM CURRENT USE OF INSULIN: Primary | ICD-10-CM

## 2024-08-28 DIAGNOSIS — M54.9 MID BACK PAIN: ICD-10-CM

## 2024-08-28 NOTE — TELEPHONE ENCOUNTER
Care Due:                  Date            Visit Type   Department     Provider  --------------------------------------------------------------------------------                                EP -                              PRIMARY      [facility] FAMILY  Last Visit: 02-      CARE ([facility])   PRACTICE       Tor Garg                               -                              PRIMARY      [facility]  Next Visit: 08-      CARE ([facility])   PRACTICE       Tor Knott  Test          Frequency    Reason                     Performed    Due Date  --------------------------------------------------------------------------------    HBA1C.......  6 months...  OZEMPIC, metFORMIN.......  02- 08-    [facility] Embedded Care Due Messages. Reference number: [id number].   8/28/2024 10:09:08 AM CDT

## 2024-08-29 NOTE — TELEPHONE ENCOUNTER
Rescheduled on soonest appointment 11/6/2024    ----- Message from Serena Richardson sent at 8/29/2024  1:50 PM CDT -----  Type:  Patient Returning Call    Who Called: pt   Who Left Message for Patient:pt   Does the patient know what this is regarding?:pt was sick and couldn't make her appt on todat and need a call to get a new appt date   Would the patient rather a call back or a response via [facility]? call  Best Call Back Number: [phone]  Additional Information: call back

## 2024-08-29 NOTE — TELEPHONE ENCOUNTER
I wanted to talk to her about the Flexeril when she came in today but she canceled when she got sick.  Flexeril is one of the more habit-forming muscle relaxers and is being used less and less.  They also are not recommended to use for more than a few days at a time.  I would like to get her off of them.  Does she think she can do without them?

## 2024-09-03 RX ORDER — CYCLOBENZAPRINE HCL 10 MG
TABLET ORAL
Qty: 270 TABLET | Refills: 3 | OUTPATIENT
Start: 2024-09-03

## 2024-09-03 NOTE — TELEPHONE ENCOUNTER
She recently had lab in July, all she is due for now is an A1c.  Order is in.    Baclofen and Zanaflex are the two muscle relaxers approved for Medicare.  Even then they are not intended for long-term use but just for flare ups.  Baclofen would be closest to the Flexeril.

## 2024-09-04 NOTE — TELEPHONE ENCOUNTER
If she is not having any severe symptoms I would try to hold off until she sees Infectious Disease on Thursday.  They very likely may repeat a culture and a urine, if those come up negative they very well may do nothing.

## 2024-09-05 ENCOUNTER — OFFICE VISIT (OUTPATIENT)
Dept: INFECTIOUS DISEASES | Facility: CLINIC | Age: 74
End: 2024-09-05
Payer: MEDICARE

## 2024-09-05 ENCOUNTER — HOSPITAL ENCOUNTER (OUTPATIENT)
Dept: RADIOLOGY | Facility: HOSPITAL | Age: 74
Discharge: HOME | End: 2024-09-05
Attending: INTERNAL MEDICINE
Payer: MEDICARE

## 2024-09-05 VITALS
HEART RATE: 93 BPM | DIASTOLIC BLOOD PRESSURE: 71 MMHG | TEMPERATURE: 98 F | WEIGHT: 176.56 LBS | BODY MASS INDEX: 29.42 KG/M2 | SYSTOLIC BLOOD PRESSURE: 126 MMHG | HEIGHT: 65 IN

## 2024-09-05 DIAGNOSIS — N20.0 RENAL CALCULUS: Primary | ICD-10-CM

## 2024-09-05 DIAGNOSIS — Z16.12 UTI DUE TO EXTENDED-SPECTRUM BETA LACTAMASE (ESBL) PRODUCING ESCHERICHIA COLI: ICD-10-CM

## 2024-09-05 DIAGNOSIS — N39.0 UTI DUE TO EXTENDED-SPECTRUM BETA LACTAMASE (ESBL) PRODUCING ESCHERICHIA COLI: ICD-10-CM

## 2024-09-05 DIAGNOSIS — N20.0 RENAL CALCULUS: ICD-10-CM

## 2024-09-05 DIAGNOSIS — B96.29 UTI DUE TO EXTENDED-SPECTRUM BETA LACTAMASE (ESBL) PRODUCING ESCHERICHIA COLI: ICD-10-CM

## 2024-09-05 PROCEDURE — 72192 CT PELVIS W/O DYE: CPT | Mod: TC

## 2024-09-05 PROCEDURE — 72192 CT PELVIS W/O DYE: CPT | Mod: 26,,, | Performed by: RADIOLOGY

## 2024-09-05 PROCEDURE — 99999 PR PBB SHADOW E&M-EST. PATIENT-LVL V: CPT | Mod: PBBFAC,HCNC,, | Performed by: INTERNAL MEDICINE

## 2024-09-05 RX ORDER — HYDROCODONE BITARTRATE AND ACETAMINOPHEN 7.5; 325 MG/1; MG/1
1 TABLET ORAL EVERY 6 HOURS PRN
COMMUNITY
Start: 2024-06-06

## 2024-09-05 RX ORDER — DIAZEPAM 10 MG/1
TABLET ORAL
COMMUNITY
Start: 2024-06-26

## 2024-09-05 RX ORDER — AMOXICILLIN 500 MG/1
CAPSULE ORAL 3 TIMES DAILY
COMMUNITY
Start: 2024-06-06

## 2024-09-05 NOTE — PROGRESS NOTES
Subjective     Patient ID: Doris Pastrana is a 73 y.o. female.    Chief Complaint:Follow-up      History of Present Illness    Consult from Dr. Garg re: recurrent ESBL E.coli UTI.  Finished 2 week course of antibiotics 2 days ago.  No symptoms currently.  Has history of renal stones, last study done 2023.    Review of Systems   Musculoskeletal:  Positive for joint pain, myalgias, neck pain and stiffness.      Objective   Physical Exam  Vitals and nursing note reviewed.   Abdominal:      Tenderness: There is no right CVA tenderness or left CVA tenderness.        Assessment and Plan     1. Renal calculus    2. UTI due to extended-spectrum beta lactamase (ESBL) producing Escherichia coli        Plan:  Doris was seen today for follow-up.    Diagnoses and all orders for this visit:    Renal calculus  -     Urinalysis, Reflex to Urine Culture Urine, Clean Catch; Future  -     CT Pelvis Without Contrast; Future    UTI due to extended-spectrum beta lactamase (ESBL) producing Escherichia coli  -     Ambulatory referral/consult to Infectious Disease  -     Urinalysis, Reflex to Urine Culture Urine, Clean Catch; Future  -     CT Pelvis Without Contrast; Future      Need to reassess for stones, if stones are present, will need urology evaluation because the likelihood of eradication in the presence of stones is difficult.    Time: 40 minutes   50% of time spent on face-to-face counseling and coordination of care. Counseling included review of test results, diagnosis, and treatment plan with patient and/or family.

## 2024-09-18 ENCOUNTER — TELEPHONE (OUTPATIENT)
Dept: FAMILY MEDICINE | Facility: CLINIC | Age: 74
End: 2024-09-18
Payer: MEDICARE

## 2024-09-18 NOTE — TELEPHONE ENCOUNTER
Patient having painful intercourse even with lubricant. She did not stay on the Estradiol cream Dr. Garg gave her in June. She will get back to using it.

## 2024-09-18 NOTE — TELEPHONE ENCOUNTER
----- Message from Walter Redman sent at 9/18/2024  9:17 AM CDT -----  Regarding: advice  Contact: patient  Type: Needs Medical Advice  Who Called:  patient   Symptoms (please be specific):    How long has patient had these symptoms:    Pharmacy name and phone #:    Best Call Back Number: [phone]  Additional Information: Pt stated that she deeds a call back due to having a female problem. Please call to advise. Thanks

## 2024-10-10 RX ORDER — MUPIROCIN 20 MG/G
OINTMENT TOPICAL
Qty: 22 G | Refills: 11 | Status: SHIPPED | OUTPATIENT
Start: 2024-10-10

## 2024-10-10 NOTE — TELEPHONE ENCOUNTER
Refill Routing Note   Medication(s) are not appropriate for processing by [facility] for the following reason(s):        Outside of protocol    [facility] action(s):  Route               Appointments  past 12m or future 3m with PCP    Date Provider   Last Visit   2/20/2024 Tor Garg MD   Next Visit   11/6/2024 Tor Garg MD   ED visits in past 90 days: 1        Note composed:8:30 PM 10/09/2024

## 2024-10-27 DIAGNOSIS — E11.00 TYPE 2 DIABETES MELLITUS WITH HYPEROSMOLARITY WITHOUT COMA, WITHOUT LONG-TERM CURRENT USE OF INSULIN: ICD-10-CM

## 2024-10-28 RX ORDER — SEMAGLUTIDE 2.68 MG/ML
2 INJECTION, SOLUTION SUBCUTANEOUS WEEKLY
Qty: 3 EACH | Refills: 0 | Status: SHIPPED | OUTPATIENT
Start: 2024-10-28

## 2024-10-30 ENCOUNTER — HOSPITAL ENCOUNTER (OUTPATIENT)
Dept: RADIOLOGY | Facility: CLINIC | Age: 74
Discharge: HOME | End: 2024-10-30
Attending: FAMILY MEDICINE
Payer: MEDICARE

## 2024-10-30 DIAGNOSIS — Z12.31 SCREENING MAMMOGRAM FOR BREAST CANCER: ICD-10-CM

## 2024-10-30 PROCEDURE — 77063 BREAST TOMOSYNTHESIS BI: CPT | Mod: 26,HCNC,, | Performed by: RADIOLOGY

## 2024-10-30 PROCEDURE — 77067 SCR MAMMO BI INCL CAD: CPT | Mod: TC,HCNC,PO

## 2024-10-30 PROCEDURE — 77067 SCR MAMMO BI INCL CAD: CPT | Mod: 26,HCNC,, | Performed by: RADIOLOGY

## 2024-10-30 PROCEDURE — 77063 BREAST TOMOSYNTHESIS BI: CPT | Mod: TC,HCNC,PO

## 2024-11-06 ENCOUNTER — OFFICE VISIT (OUTPATIENT)
Dept: FAMILY MEDICINE | Facility: CLINIC | Age: 74
End: 2024-11-06
Attending: FAMILY MEDICINE
Payer: MEDICARE

## 2024-11-06 VITALS
HEART RATE: 82 BPM | TEMPERATURE: 98 F | SYSTOLIC BLOOD PRESSURE: 124 MMHG | WEIGHT: 175.06 LBS | BODY MASS INDEX: 29.17 KG/M2 | DIASTOLIC BLOOD PRESSURE: 68 MMHG | HEIGHT: 65 IN | OXYGEN SATURATION: 94 %

## 2024-11-06 DIAGNOSIS — E78.5 HYPERLIPIDEMIA ASSOCIATED WITH TYPE 2 DIABETES MELLITUS: ICD-10-CM

## 2024-11-06 DIAGNOSIS — E11.9 CONTROLLED TYPE 2 DIABETES MELLITUS WITHOUT COMPLICATION, WITHOUT LONG-TERM CURRENT USE OF INSULIN: ICD-10-CM

## 2024-11-06 DIAGNOSIS — Z00.00 ENCOUNTER FOR PREVENTIVE HEALTH EXAMINATION: Primary | ICD-10-CM

## 2024-11-06 DIAGNOSIS — F41.9 ANXIETY: ICD-10-CM

## 2024-11-06 DIAGNOSIS — N95.2 ATROPHIC VAGINITIS: ICD-10-CM

## 2024-11-06 DIAGNOSIS — I15.2 HYPERTENSION ASSOCIATED WITH DIABETES: ICD-10-CM

## 2024-11-06 DIAGNOSIS — E11.59 HYPERTENSION ASSOCIATED WITH DIABETES: ICD-10-CM

## 2024-11-06 DIAGNOSIS — M54.12 CERVICAL RADICULITIS: ICD-10-CM

## 2024-11-06 DIAGNOSIS — M79.10 MYALGIA: ICD-10-CM

## 2024-11-06 DIAGNOSIS — R42 VERTIGO: ICD-10-CM

## 2024-11-06 DIAGNOSIS — E11.69 HYPERLIPIDEMIA ASSOCIATED WITH TYPE 2 DIABETES MELLITUS: ICD-10-CM

## 2024-11-06 DIAGNOSIS — I70.0 AORTIC ATHEROSCLEROSIS: ICD-10-CM

## 2024-11-06 PROCEDURE — 1125F AMNT PAIN NOTED PAIN PRSNT: CPT | Mod: HCNC,CPTII,S$GLB, | Performed by: FAMILY MEDICINE

## 2024-11-06 PROCEDURE — 3078F DIAST BP <80 MM HG: CPT | Mod: HCNC,CPTII,S$GLB, | Performed by: FAMILY MEDICINE

## 2024-11-06 PROCEDURE — 1159F MED LIST DOCD IN RCRD: CPT | Mod: HCNC,CPTII,S$GLB, | Performed by: FAMILY MEDICINE

## 2024-11-06 PROCEDURE — 3008F BODY MASS INDEX DOCD: CPT | Mod: HCNC,CPTII,S$GLB, | Performed by: FAMILY MEDICINE

## 2024-11-06 PROCEDURE — 3066F NEPHROPATHY DOC TX: CPT | Mod: HCNC,CPTII,S$GLB, | Performed by: FAMILY MEDICINE

## 2024-11-06 PROCEDURE — 3288F FALL RISK ASSESSMENT DOCD: CPT | Mod: HCNC,CPTII,S$GLB, | Performed by: FAMILY MEDICINE

## 2024-11-06 PROCEDURE — 3061F NEG MICROALBUMINURIA REV: CPT | Mod: HCNC,CPTII,S$GLB, | Performed by: FAMILY MEDICINE

## 2024-11-06 PROCEDURE — 2023F DILAT RTA XM W/O RTNOPTHY: CPT | Mod: HCNC,CPTII,S$GLB, | Performed by: FAMILY MEDICINE

## 2024-11-06 PROCEDURE — 1160F RVW MEDS BY RX/DR IN RCRD: CPT | Mod: HCNC,CPTII,S$GLB, | Performed by: FAMILY MEDICINE

## 2024-11-06 PROCEDURE — 99999 PR PBB SHADOW E&M-EST. PATIENT-LVL IV: CPT | Mod: PBBFAC,HCNC,, | Performed by: FAMILY MEDICINE

## 2024-11-06 PROCEDURE — 3044F HG A1C LEVEL LT 7.0%: CPT | Mod: HCNC,CPTII,S$GLB, | Performed by: FAMILY MEDICINE

## 2024-11-06 PROCEDURE — 1101F PT FALLS ASSESS-DOCD LE1/YR: CPT | Mod: HCNC,CPTII,S$GLB, | Performed by: FAMILY MEDICINE

## 2024-11-06 PROCEDURE — 4010F ACE/ARB THERAPY RXD/TAKEN: CPT | Mod: HCNC,CPTII,S$GLB, | Performed by: FAMILY MEDICINE

## 2024-11-06 PROCEDURE — 99397 PER PM REEVAL EST PAT 65+ YR: CPT | Mod: HCNC,S$GLB,, | Performed by: FAMILY MEDICINE

## 2024-11-06 PROCEDURE — 3074F SYST BP LT 130 MM HG: CPT | Mod: HCNC,CPTII,S$GLB, | Performed by: FAMILY MEDICINE

## 2024-11-06 RX ORDER — DIAZEPAM 2 MG/1
2 TABLET ORAL EVERY 6 HOURS PRN
Qty: 40 TABLET | Refills: 0 | Status: SHIPPED | OUTPATIENT
Start: 2024-11-06 | End: 2024-12-06

## 2024-11-06 RX ORDER — TIRZEPATIDE 12.5 MG/.5ML
12.5 INJECTION, SOLUTION SUBCUTANEOUS
Qty: 4 PEN | Refills: 2 | Status: SHIPPED | OUTPATIENT
Start: 2024-11-06

## 2024-11-06 RX ORDER — ESCITALOPRAM OXALATE 10 MG/1
10 TABLET ORAL DAILY
Qty: 90 TABLET | Refills: 3 | Status: SHIPPED | OUTPATIENT
Start: 2024-11-06

## 2024-11-06 RX ORDER — LOSARTAN POTASSIUM 100 MG/1
100 TABLET ORAL DAILY
Qty: 90 TABLET | Refills: 3 | Status: SHIPPED | OUTPATIENT
Start: 2024-11-06

## 2024-11-06 RX ORDER — METFORMIN HYDROCHLORIDE 750 MG/1
750 TABLET, EXTENDED RELEASE ORAL
Qty: 90 TABLET | Refills: 3 | Status: SHIPPED | OUTPATIENT
Start: 2024-11-06

## 2024-11-06 RX ORDER — GABAPENTIN 300 MG/1
CAPSULE ORAL
Qty: 90 CAPSULE | Refills: 11 | Status: SHIPPED | OUTPATIENT
Start: 2024-11-06

## 2024-11-06 RX ORDER — MECLIZINE HYDROCHLORIDE 25 MG/1
25 TABLET ORAL 3 TIMES DAILY PRN
Qty: 30 TABLET | Refills: 5 | Status: SHIPPED | OUTPATIENT
Start: 2024-11-06

## 2024-11-06 RX ORDER — AMLODIPINE BESYLATE 10 MG/1
10 TABLET ORAL DAILY
Qty: 90 TABLET | Refills: 3 | Status: SHIPPED | OUTPATIENT
Start: 2024-11-06

## 2024-11-06 RX ORDER — ESTRADIOL 0.1 MG/G
CREAM VAGINAL
Qty: 42.5 G | Refills: 3 | Status: SHIPPED | OUTPATIENT
Start: 2024-11-06 | End: 2025-11-06

## 2024-11-06 NOTE — PROGRESS NOTES
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

## 2024-11-07 ENCOUNTER — HOSPITAL ENCOUNTER (OUTPATIENT)
Dept: RADIOLOGY | Facility: CLINIC | Age: 74
Discharge: HOME | End: 2024-11-07
Attending: FAMILY MEDICINE
Payer: MEDICARE

## 2024-11-07 DIAGNOSIS — M54.12 CERVICAL RADICULITIS: ICD-10-CM

## 2024-11-07 PROCEDURE — 72040 X-RAY EXAM NECK SPINE 2-3 VW: CPT | Mod: 26,HCNC,, | Performed by: RADIOLOGY

## 2024-11-07 PROCEDURE — 72040 X-RAY EXAM NECK SPINE 2-3 VW: CPT | Mod: TC,HCNC,FY,PO

## 2024-11-14 ENCOUNTER — TELEPHONE (OUTPATIENT)
Dept: FAMILY MEDICINE | Facility: CLINIC | Age: 74
End: 2024-11-14
Payer: MEDICARE

## 2024-11-14 NOTE — TELEPHONE ENCOUNTER
----- Message from Tor sent at 11/14/2024 10:57 AM CST -----  Contact: Self  Type:  Patient Returning Call    Who Called:  Patient  Who Left Message for Patient:  Yara  Does the patient know what this is regarding?:  Yes  Best Call Back Number:  [phone]  Additional Information:  Patient is returning a missed call

## 2024-12-04 ENCOUNTER — PATIENT MESSAGE (OUTPATIENT)
Dept: FAMILY MEDICINE | Facility: CLINIC | Age: 74
End: 2024-12-04
Payer: MEDICARE

## 2024-12-04 DIAGNOSIS — E11.9 CONTROLLED TYPE 2 DIABETES MELLITUS WITHOUT COMPLICATION, WITHOUT LONG-TERM CURRENT USE OF INSULIN: ICD-10-CM

## 2024-12-04 RX ORDER — TIRZEPATIDE 15 MG/.5ML
15 INJECTION, SOLUTION SUBCUTANEOUS
Qty: 2 ML | Refills: 2 | Status: SHIPPED | OUTPATIENT
Start: 2024-12-04

## 2025-03-25 NOTE — TELEPHONE ENCOUNTER
3/25/25 SW/Care Manager [facility] liaison is following & will submit precert when appropriate. PRECERT, GOLDY & 7000 needed prior to discharge. SW following. Electronically signed by NATACHA Arellano on 3/25/2025 at 3:05 PM     Apt booked 11-7-19 for bmp, a1c, lipid, vit d.   NEED ORDER  For urin micro.     Apt booked to follow up with Dr Garg 20 min apt 11-14-19.

## (undated) DEVICE — SCRUB DYNA-HEX LIQ 4% CHG 4OZ

## (undated) DEVICE — JELLY SURGILUBE LUBE TUBE 2OZ

## (undated) DEVICE — SOL NACL IRR 3000ML

## (undated) DEVICE — GLOVE SURG ULTRA TOUCH 6

## (undated) DEVICE — SEAL UROLOGY

## (undated) DEVICE — BOWL STERILE LARGE 32OZ

## (undated) DEVICE — GLOVE SURGICAL LATEX SZ 6.5

## (undated) DEVICE — ELECTRODE REM PLYHSV RETURN 9

## (undated) DEVICE — EXTRACTOR STONE 4WR NIT 2.2F 1

## (undated) DEVICE — SLEEVE SCD EXPRESS CALF MEDIUM

## (undated) DEVICE — GAUZE SPONGE 4X4 12PLY

## (undated) DEVICE — SOL IRRI STRL WATER 1000ML

## (undated) DEVICE — CATH POLLACK OPEN-END FLEXI-TI

## (undated) DEVICE — SEE MEDLINE ITEM 146313

## (undated) DEVICE — TUBING MINIBORE EXTENSION

## (undated) DEVICE — BAG LINGEMAN DRAIN UROLOGY

## (undated) DEVICE — NDL HYPO A BEVEL 22X1 1/2

## (undated) DEVICE — SEE MEDLINE ITEM 157116

## (undated) DEVICE — SET IRR URLGY 2LINE UNIV SPIKE

## (undated) DEVICE — SEE MEDLINE ITEM 152487

## (undated) DEVICE — CONTAINER SPECIMEN OR STER 4OZ

## (undated) DEVICE — SPLINT PLASTER FAST SET 5X30IN

## (undated) DEVICE — LUBRICANT SURGILUBE 2 OZ

## (undated) DEVICE — SYS LABEL CORRECT MED

## (undated) DEVICE — GUIDEWIRE AMPLATZ .035X145 STR

## (undated) DEVICE — ADHESIVE MASTISOL VIAL 48/BX

## (undated) DEVICE — SHEATH FLEXOR ACCESS 12X35

## (undated) DEVICE — SABRE SJ 2MMX7CM

## (undated) DEVICE — SPONGE BULKEE II ABSRB 6X6.75

## (undated) DEVICE — SEE MEDLINE ITEM 152530

## (undated) DEVICE — SUT MONOCRYL 2-0 S UND

## (undated) DEVICE — GOWN POLY REINF BRTH SLV XL

## (undated) DEVICE — DRAPE CYSTOSCOPY LARGE

## (undated) DEVICE — SLEEVE SCD EXPRESS KNEE MEDIUM

## (undated) DEVICE — CHLORAPREP 10.5 ML APPLICATOR

## (undated) DEVICE — BLADE SURG #15 CARBON STEEL

## (undated) DEVICE — SYR ONLY LUER LOCK 20CC

## (undated) DEVICE — SYR GLASS 5CC LUER LOK

## (undated) DEVICE — GAUZE SPONGE 8X4 12 PLY

## (undated) DEVICE — SENSOR DUAL FLEX STR 150CM

## (undated) DEVICE — SYR DISP LL 5CC

## (undated) DEVICE — GOWN B1 X-LG X-LONG

## (undated) DEVICE — SUT FIBERWIRE 0 38 W/NDL

## (undated) DEVICE — PAD CAST SPECIALIST STRL 4

## (undated) DEVICE — NDL HYPODERMIC BLUNT 18G 1.5IN

## (undated) DEVICE — SEE MEDLINE ITEM 157185

## (undated) DEVICE — PENCIL ROCKER SWITCH 10FT CORD

## (undated) DEVICE — NDL TUOHY EPIDURAL 20G X 3.5

## (undated) DEVICE — DRAPE STERI U-SHAPED 47X51IN

## (undated) DEVICE — PAD CAST SPECIALIST STRL 6

## (undated) DEVICE — GLOVE SURG ULTRA TOUCH 7.5

## (undated) DEVICE — DRESSING TRANS 2X2 TEGADERM

## (undated) DEVICE — GOWN POLY REINF BRTH SLV LG

## (undated) DEVICE — DURAPREP SURG SCRUB 26ML

## (undated) DEVICE — UNDERGLOVE BIOGEL PI SZ 6.5 LF

## (undated) DEVICE — TRAY CATH FOL SIL URIMTR 16FR

## (undated) DEVICE — KIT DISPOSABLE INSTRUMENT MINI

## (undated) DEVICE — GUIDE WIRE MOTION .035 X 150CM

## (undated) DEVICE — SEE MEDLINE ITEM 146298

## (undated) DEVICE — SUT MONOCRYL 3-0 SH U/D

## (undated) DEVICE — Device

## (undated) DEVICE — CATH URTRL OPEN END STR TIP 5F

## (undated) DEVICE — PAD ABD 8X10 STERILE

## (undated) DEVICE — DRAPE PLASTIC U 60X72

## (undated) DEVICE — TOURNIQUET SB QC DP 24X4IN

## (undated) DEVICE — SHEATH URETERAL FLEXOR 12X28CM

## (undated) DEVICE — IMPLANTABLE DEVICE
Type: IMPLANTABLE DEVICE | Site: ANKLE | Status: NON-FUNCTIONAL
Removed: 2020-07-20

## (undated) DEVICE — DRESSING XEROFORM FOIL PK 1X8

## (undated) DEVICE — WIRE GD LUB STD 3CM .035 150CM

## (undated) DEVICE — GLOVE SENSICARE PI ALOE 6

## (undated) DEVICE — GLOVE SENSICARE PI GRN 6.5

## (undated) DEVICE — SOL IRR NACL .9% 3000ML

## (undated) DEVICE — URETERO-RENOSCOPE FLEX-X POS

## (undated) DEVICE — BIT DRILL AO 2.6X135MM SCALED

## (undated) DEVICE — COVER TABLE 44X90 STERILE

## (undated) DEVICE — SEE MEDLINE ITEM 146308

## (undated) DEVICE — SYR 10CC LUER LOCK

## (undated) DEVICE — BLADE MEDIUM 9MM X 25MM

## (undated) DEVICE — NDL SAFETY 25G X 1.5 ECLIPSE

## (undated) DEVICE — PADDING CAST 4IN SPECIALIST

## (undated) DEVICE — SUT ETHILON 3-0 PS2 18 BLK

## (undated) DEVICE — APPLICATOR CHLORAPREP ORN 26ML

## (undated) DEVICE — SWABSTICK BENZOIN 4 IN

## (undated) DEVICE — GUIDEWIRE NITINOL HYBRID 150CM

## (undated) DEVICE — ALCOHOL 70% ISOP RUBBING 4OZ

## (undated) DEVICE — BAG LINGEMAN URO DRAIN HOSE

## (undated) DEVICE — BURR CARBIDE OVAL 5.5X48MM

## (undated) DEVICE — BANDAGE ESMARK 6X12

## (undated) DEVICE — EXTRACTOR STNE 1.7FRX115CM

## (undated) DEVICE — GUIDEWIRE AMPLATZ .035X145CM

## (undated) DEVICE — BLADE SURG CARBON STEEL SZ11

## (undated) DEVICE — SEE MEDLINE ITEM 152529

## (undated) DEVICE — STAPLER SKIN ROTATING HEAD

## (undated) DEVICE — SEE MEDLINE ITEM 157117

## (undated) DEVICE — LEGGING CLEAR POLY 2/PACK

## (undated) DEVICE — SPONGE SUPER KERLIX 6X6.75IN

## (undated) DEVICE — DRAPE C-ARMOR EQUIPMENT COVER

## (undated) DEVICE — DRESSING TEGADERM 2 3/8 X 2.75

## (undated) DEVICE — SUT MONOCRYL 0 CT-1 UND MON

## (undated) DEVICE — SEE MEDLINE ITEM 152622

## (undated) DEVICE — FIBER LASER HOLMIUM 273 MICRON

## (undated) DEVICE — GLOVE PROTEXIS PI CLASSIC 6.0

## (undated) DEVICE — STRAP ANKLE ARTHSCP DSRCTOR

## (undated) DEVICE — PIN FIXATION ANCHORAGE
Type: IMPLANTABLE DEVICE | Site: ANKLE | Status: NON-FUNCTIONAL
Removed: 2020-07-20

## (undated) DEVICE — ILLUMIATOR PHOTONSABER 12FR LN

## (undated) DEVICE — SEE MEDLINE ITEM 157131

## (undated) DEVICE — SUT FIBERWIRE 2-0 18

## (undated) DEVICE — NDL 22GA X1 1/2 REG BEVEL

## (undated) DEVICE — NDL SPINAL 18GX3.5 SPINOCAN

## (undated) DEVICE — SCRUB HIBICLENS 4% CHG 4OZ

## (undated) DEVICE — SOL WATER STRL IRR 1000ML

## (undated) DEVICE — GLOVE SENSICARE PI GRN 7

## (undated) DEVICE — DRAPE C ARM 42 X 120 10/BX

## (undated) DEVICE — BURR OVAL DMND 4.0MM LONG.